# Patient Record
Sex: FEMALE | Race: WHITE | NOT HISPANIC OR LATINO | Employment: OTHER | URBAN - METROPOLITAN AREA
[De-identification: names, ages, dates, MRNs, and addresses within clinical notes are randomized per-mention and may not be internally consistent; named-entity substitution may affect disease eponyms.]

---

## 2018-03-29 ENCOUNTER — ANESTHESIA EVENT (OUTPATIENT)
Dept: ENDOSCOPY | Facility: HOSPITAL | Age: 63
DRG: 812 | End: 2018-03-29

## 2018-03-29 ENCOUNTER — TELEPHONE (OUTPATIENT)
Dept: GASTROENTEROLOGY | Facility: CLINIC | Age: 63
End: 2018-03-29

## 2018-03-29 ENCOUNTER — HOSPITAL ENCOUNTER (INPATIENT)
Facility: HOSPITAL | Age: 63
LOS: 2 days | Discharge: HOME | DRG: 812 | End: 2018-03-31
Attending: EMERGENCY MEDICINE | Admitting: INTERNAL MEDICINE

## 2018-03-29 DIAGNOSIS — Z85.038 HISTORY OF COLON CANCER: ICD-10-CM

## 2018-03-29 DIAGNOSIS — K92.1 HEMATOCHEZIA: Primary | ICD-10-CM

## 2018-03-29 DIAGNOSIS — I10 HYPERTENSION, UNSPECIFIED TYPE: ICD-10-CM

## 2018-03-29 DIAGNOSIS — D64.9 ANEMIA, UNSPECIFIED TYPE: ICD-10-CM

## 2018-03-29 DIAGNOSIS — D64.9 SYMPTOMATIC ANEMIA: ICD-10-CM

## 2018-03-29 LAB
ABO + RH BLD: NORMAL
ALBUMIN SERPL BCP-MCNC: 3.2 G/DL
ALP SERPL-CCNC: 82 U/L
ALT SERPL W/O P-5'-P-CCNC: 12 U/L
ANION GAP SERPL CALC-SCNC: 10 MMOL/L
ANISOCYTOSIS BLD QL SMEAR: SLIGHT
AST SERPL-CCNC: 13 U/L
BASOPHILS # BLD AUTO: 0 K/UL
BASOPHILS NFR BLD: 0 %
BILIRUB SERPL-MCNC: 0.3 MG/DL
BLD GP AB SCN CELLS X3 SERPL QL: NORMAL
BLD PROD TYP BPU: NORMAL
BLD PROD TYP BPU: NORMAL
BLOOD UNIT EXPIRATION DATE: NORMAL
BLOOD UNIT EXPIRATION DATE: NORMAL
BLOOD UNIT TYPE CODE: 6200
BLOOD UNIT TYPE CODE: 6200
BLOOD UNIT TYPE: NORMAL
BLOOD UNIT TYPE: NORMAL
BUN SERPL-MCNC: 12 MG/DL
CALCIUM SERPL-MCNC: 9.3 MG/DL
CHLORIDE SERPL-SCNC: 103 MMOL/L
CO2 SERPL-SCNC: 26 MMOL/L
CODING SYSTEM: NORMAL
CODING SYSTEM: NORMAL
CREAT SERPL-MCNC: 0.7 MG/DL
DIFFERENTIAL METHOD: ABNORMAL
DISPENSE STATUS: NORMAL
DISPENSE STATUS: NORMAL
EOSINOPHIL # BLD AUTO: 0 K/UL
EOSINOPHIL NFR BLD: 0.5 %
ERYTHROCYTE [DISTWIDTH] IN BLOOD BY AUTOMATED COUNT: 18.2 %
EST. GFR  (AFRICAN AMERICAN): >60 ML/MIN/1.73 M^2
EST. GFR  (NON AFRICAN AMERICAN): >60 ML/MIN/1.73 M^2
FOLATE SERPL-MCNC: 19.5 NG/ML
GLUCOSE SERPL-MCNC: 109 MG/DL
HCT VFR BLD AUTO: 16.2 %
HCT VFR BLD AUTO: 23.5 %
HGB BLD-MCNC: 5 G/DL
HGB BLD-MCNC: 7.3 G/DL
HYPOCHROMIA BLD QL SMEAR: ABNORMAL
INR PPP: 1.1
LYMPHOCYTES # BLD AUTO: 1.2 K/UL
LYMPHOCYTES NFR BLD: 16.4 %
MCH RBC QN AUTO: 21.6 PG
MCHC RBC AUTO-ENTMCNC: 30.9 G/DL
MCV RBC AUTO: 70 FL
MONOCYTES # BLD AUTO: 0.4 K/UL
MONOCYTES NFR BLD: 5.7 %
NEUTROPHILS # BLD AUTO: 5.5 K/UL
NEUTROPHILS NFR BLD: 77.4 %
NUM UNITS TRANS PACKED RBC: NORMAL
NUM UNITS TRANS PACKED RBC: NORMAL
OVALOCYTES BLD QL SMEAR: ABNORMAL
PLATELET # BLD AUTO: 517 K/UL
PLATELET BLD QL SMEAR: ABNORMAL
PMV BLD AUTO: 7.2 FL
POIKILOCYTOSIS BLD QL SMEAR: SLIGHT
POTASSIUM SERPL-SCNC: 3.7 MMOL/L
PROT SERPL-MCNC: 6.7 G/DL
PROTHROMBIN TIME: 11.5 SEC
RBC # BLD AUTO: 2.31 M/UL
SODIUM SERPL-SCNC: 139 MMOL/L
TARGETS BLD QL SMEAR: ABNORMAL
VIT B12 SERPL-MCNC: 422 PG/ML
WBC # BLD AUTO: 7.2 K/UL

## 2018-03-29 PROCEDURE — 85018 HEMOGLOBIN: CPT | Mod: 91

## 2018-03-29 PROCEDURE — 25000003 PHARM REV CODE 250: Performed by: EMERGENCY MEDICINE

## 2018-03-29 PROCEDURE — 83540 ASSAY OF IRON: CPT

## 2018-03-29 PROCEDURE — 25500020 PHARM REV CODE 255

## 2018-03-29 PROCEDURE — C9113 INJ PANTOPRAZOLE SODIUM, VIA: HCPCS | Performed by: NURSE PRACTITIONER

## 2018-03-29 PROCEDURE — 11000001 HC ACUTE MED/SURG PRIVATE ROOM

## 2018-03-29 PROCEDURE — 99223 1ST HOSP IP/OBS HIGH 75: CPT | Mod: ,,, | Performed by: INTERNAL MEDICINE

## 2018-03-29 PROCEDURE — 63600175 PHARM REV CODE 636 W HCPCS: Performed by: NURSE PRACTITIONER

## 2018-03-29 PROCEDURE — 85610 PROTHROMBIN TIME: CPT

## 2018-03-29 PROCEDURE — 63600175 PHARM REV CODE 636 W HCPCS: Performed by: EMERGENCY MEDICINE

## 2018-03-29 PROCEDURE — P9016 RBC LEUKOCYTES REDUCED: HCPCS

## 2018-03-29 PROCEDURE — 86850 RBC ANTIBODY SCREEN: CPT

## 2018-03-29 PROCEDURE — 96374 THER/PROPH/DIAG INJ IV PUSH: CPT

## 2018-03-29 PROCEDURE — 94761 N-INVAS EAR/PLS OXIMETRY MLT: CPT

## 2018-03-29 PROCEDURE — 36415 COLL VENOUS BLD VENIPUNCTURE: CPT

## 2018-03-29 PROCEDURE — 80053 COMPREHEN METABOLIC PANEL: CPT

## 2018-03-29 PROCEDURE — 25000003 PHARM REV CODE 250: Performed by: INTERNAL MEDICINE

## 2018-03-29 PROCEDURE — 99284 EMERGENCY DEPT VISIT MOD MDM: CPT | Mod: 25

## 2018-03-29 PROCEDURE — 82746 ASSAY OF FOLIC ACID SERUM: CPT

## 2018-03-29 PROCEDURE — C9113 INJ PANTOPRAZOLE SODIUM, VIA: HCPCS | Performed by: EMERGENCY MEDICINE

## 2018-03-29 PROCEDURE — 85025 COMPLETE CBC W/AUTO DIFF WBC: CPT

## 2018-03-29 PROCEDURE — 85014 HEMATOCRIT: CPT | Mod: 91

## 2018-03-29 PROCEDURE — 86920 COMPATIBILITY TEST SPIN: CPT

## 2018-03-29 PROCEDURE — 99254 IP/OBS CNSLTJ NEW/EST MOD 60: CPT | Mod: ,,, | Performed by: INTERNAL MEDICINE

## 2018-03-29 PROCEDURE — 82607 VITAMIN B-12: CPT

## 2018-03-29 RX ORDER — METOPROLOL TARTRATE 25 MG/1
TABLET, FILM COATED ORAL NIGHTLY
COMMUNITY
End: 2019-02-11 | Stop reason: SDUPTHER

## 2018-03-29 RX ORDER — AMLODIPINE BESYLATE 5 MG/1
5 TABLET ORAL DAILY
Status: ON HOLD | COMMUNITY
End: 2019-01-04 | Stop reason: HOSPADM

## 2018-03-29 RX ORDER — SIMVASTATIN 20 MG/1
20 TABLET, FILM COATED ORAL NIGHTLY
Status: ON HOLD | COMMUNITY
End: 2018-12-04 | Stop reason: ALTCHOICE

## 2018-03-29 RX ORDER — AMLODIPINE BESYLATE 5 MG/1
5 TABLET ORAL DAILY
Status: DISCONTINUED | OUTPATIENT
Start: 2018-03-30 | End: 2018-03-31 | Stop reason: HOSPADM

## 2018-03-29 RX ORDER — METOPROLOL TARTRATE 25 MG/1
25 TABLET, FILM COATED ORAL 2 TIMES DAILY
Status: DISCONTINUED | OUTPATIENT
Start: 2018-03-29 | End: 2018-03-31 | Stop reason: HOSPADM

## 2018-03-29 RX ORDER — ASPIRIN 81 MG/1
81 TABLET ORAL DAILY
Status: ON HOLD | COMMUNITY
End: 2018-12-04 | Stop reason: ALTCHOICE

## 2018-03-29 RX ORDER — HYDROCHLOROTHIAZIDE 12.5 MG/1
12.5 CAPSULE ORAL DAILY
Status: ON HOLD | COMMUNITY
End: 2019-01-04 | Stop reason: HOSPADM

## 2018-03-29 RX ORDER — PANTOPRAZOLE SODIUM 40 MG/10ML
80 INJECTION, POWDER, LYOPHILIZED, FOR SOLUTION INTRAVENOUS
Status: COMPLETED | OUTPATIENT
Start: 2018-03-29 | End: 2018-03-29

## 2018-03-29 RX ORDER — HYDROCODONE BITARTRATE AND ACETAMINOPHEN 500; 5 MG/1; MG/1
TABLET ORAL
Status: DISCONTINUED | OUTPATIENT
Start: 2018-03-29 | End: 2018-03-31 | Stop reason: HOSPADM

## 2018-03-29 RX ORDER — SODIUM CHLORIDE 9 MG/ML
INJECTION, SOLUTION INTRAVENOUS
Status: DISPENSED
Start: 2018-03-29 | End: 2018-03-29

## 2018-03-29 RX ORDER — SODIUM CHLORIDE 0.9 % (FLUSH) 0.9 %
3 SYRINGE (ML) INJECTION
Status: DISCONTINUED | OUTPATIENT
Start: 2018-03-29 | End: 2018-03-31 | Stop reason: HOSPADM

## 2018-03-29 RX ADMIN — DEXTROSE 8 MG/HR: 50 INJECTION, SOLUTION INTRAVENOUS at 06:03

## 2018-03-29 RX ADMIN — IOHEXOL 30 ML: 350 INJECTION, SOLUTION INTRAVENOUS at 12:03

## 2018-03-29 RX ADMIN — PANTOPRAZOLE SODIUM 80 MG: 40 INJECTION, POWDER, FOR SOLUTION INTRAVENOUS at 12:03

## 2018-03-29 RX ADMIN — METOPROLOL TARTRATE 25 MG: 25 TABLET ORAL at 08:03

## 2018-03-29 RX ADMIN — POLYETHYLENE GLYCOL-3350 AND ELECTROLYTES WITH FLAVOR PACK 4000 ML: 240; 5.84; 2.98; 6.72; 22.72 POWDER, FOR SOLUTION ORAL at 08:03

## 2018-03-29 RX ADMIN — IOHEXOL 100 ML: 350 INJECTION, SOLUTION INTRAVENOUS at 12:03

## 2018-03-29 RX ADMIN — DEXTROSE 8 MG/HR: 50 INJECTION, SOLUTION INTRAVENOUS at 02:03

## 2018-03-29 NOTE — NURSING
patient arrived to unit, Dr Arguello at bedside, patient awake alert, oriented x4, denies pain/ discomfort/ complaints, patient oriented to room and bed, patient instructed to call for assistance when needed to ambulate, call light and bedside table within reach patient assisted to bathroom

## 2018-03-29 NOTE — PLAN OF CARE
Problem: Fall Risk (Adult)  Goal: Identify Related Risk Factors and Signs and Symptoms  Related risk factors and signs and symptoms are identified upon initiation of Human Response Clinical Practice Guideline (CPG)   Patient lying in bed, blood infusing, call light and table within reach, fall sign in place, patient calls for assistance when needed, patient denies pain/ discomfort/complaints

## 2018-03-29 NOTE — ED PROVIDER NOTES
"Encounter Date: 3/29/2018    SCRIBE #1 NOTE: IOmer, am scribing for, and in the presence of, Dr. Quintana .       History     Chief Complaint   Patient presents with    Anemia     sent by . Fatigue, sob       03/29/2018 9:51 AM     Chief complaint: Anemia       Indigo Staurt is a 62 y.o. female with a hx of HTN and colon CA who presents to the ED with complaints of anemia. She was called by Dr. Conner office advising she present to the ED for further evaluation for anemia. The blood was drawn yesterday. Pt reports fatigue and SOB x a few days. She took iron supplements for the fatigue with no significant relief. Pt reports a hx of colon cancer which was removed 13 years ago. No f/u colonoscopy in 8 years. Pt also reports cough and congestion x 1 week. She denies diarrhea, bloody stools, abd pain ,vaginal bleeding hx of anemia and anticoagulant use. PSHx include hysterectomy and cholecystomy. Allergens include Codeine.        The history is provided by the patient.     Review of patient's allergies indicates:   Allergen Reactions    Codeine      Past Medical History:   Diagnosis Date    Cancer     Hypertension      Past Surgical History:   Procedure Laterality Date    CHOLECYSTECTOMY      COLON SURGERY      HYSTERECTOMY       History reviewed. No pertinent family history.  Social History   Substance Use Topics    Smoking status: Never Smoker    Smokeless tobacco: Not on file    Alcohol use Not on file     Review of Systems   Constitutional: Positive for fatigue. Negative for fever.        + for "Anemia"   HENT: Positive for congestion. Negative for sore throat.    Eyes: Negative for redness.   Respiratory: Positive for cough and shortness of breath.    Cardiovascular: Negative for chest pain.   Gastrointestinal: Negative for abdominal pain, diarrhea and nausea.   Genitourinary: Negative for dysuria and vaginal bleeding.   Musculoskeletal: Negative for back pain.   Skin: Negative " for rash.   Neurological: Negative for weakness.   Hematological: Does not bruise/bleed easily.       Physical Exam     Initial Vitals [03/29/18 0840]   BP Pulse Resp Temp SpO2   136/64 95 16 99 °F (37.2 °C) 100 %      MAP       88         Physical Exam    Nursing note and vitals reviewed.  Constitutional: She appears well-developed.   HENT:   Head: Normocephalic and atraumatic.   Dry mouth.    Eyes: EOM are normal. Pupils are equal, round, and reactive to light.   Pale conjunctivae.    Neck: Neck supple.   Cardiovascular: Normal rate, regular rhythm, normal heart sounds and intact distal pulses. Exam reveals no gallop and no friction rub.    No murmur heard.  Pulmonary/Chest: Breath sounds normal. No respiratory distress. She has no decreased breath sounds. She has no wheezes. She has no rhonchi. She has no rales.   Abdominal: Soft. Bowel sounds are normal.   Vertical scar in lower abd.    Genitourinary: Rectal exam shows guaiac positive stool. Guaiac positive stool. : Acceptable.  Musculoskeletal: Normal range of motion.   No peripheral edema.    Neurological: She is alert and oriented to person, place, and time.   Skin: Skin is warm and dry. There is pallor.   Psychiatric: She has a normal mood and affect.         ED Course   Critical Care  Performed by: VICTORIANO OZUNA.  Authorized by: VICTORIANO OZUNA   Direct patient critical care time: 15 minutes  Additional history critical care time: 5 minutes  Ordering / reviewing critical care time: 5 minutes  Documentation critical care time: 5 minutes  Consulting other physicians critical care time: 5 minutes  Consult with family critical care time: 5 minutes  Total critical care time (exclusive of procedural time) : 40 minutes  Critical care was necessary to treat or prevent imminent or life-threatening deterioration of the following conditions: metabolic crisis.  Critical care was time spent personally by me on the following activities: discussions  with consultants, evaluation of patient's response to treatment, ordering and review of laboratory studies, examination of patient, ordering and review of radiographic studies, re-evaluation of patient's condition, ordering and performing treatments and interventions, obtaining history from patient or surrogate and review of old charts.        Labs Reviewed   CBC W/ AUTO DIFFERENTIAL - Abnormal; Notable for the following:        Result Value    RBC 2.31 (*)     Hemoglobin 5.0 (*)     Hematocrit 16.2 (*)     MCV 70 (*)     MCH 21.6 (*)     MCHC 30.9 (*)     RDW 18.2 (*)     Platelets 517 (*)     MPV 7.2 (*)     All other components within normal limits    Narrative:        critical result(s) called and verbal readback obtained from Marichuy Gao, 03/29/2018 09:49   COMPREHENSIVE METABOLIC PANEL - Abnormal; Notable for the following:     Albumin 3.2 (*)     All other components within normal limits   PROTIME-INR   TYPE & SCREEN     EKG Readings: (Independently Interpreted)          Medical Decision Making:   History:   Old Medical Records: I decided to obtain old medical records.  Clinical Tests:   Lab Tests: Ordered and Reviewed  Radiological Study: Ordered and Reviewed  Patient has critical anemia and will need to be admitted for blood transfusion and for evaluation of chronic GI bleed.  She does not have significant bleeding here in the ER at this time.  Spoke with gastroenterologist who agrees with admission.  She was given IV fluids and Protonix in the ER.    Imaging Results          CT Abdomen Pelvis With Contrast (Final result)  Result time 03/29/18 12:20:45    Final result by Namrata Kennedy MD (03/29/18 12:20:45)                 Impression:      No acute findings.  Findings as detailed above including ventral/periumbilical hernia, prior sigmoidectomy, hysterectomy, cholecystectomy.  Renal masses consistent with cysts.      Electronically signed by: Namrata Kennedy MD  Date:    03/29/2018  Time:    12:20              Narrative:    EXAMINATION:  CT ABDOMEN PELVIS WITH CONTRAST    CLINICAL HISTORY:  GI bleeding;    TECHNIQUE:  Low dose axial images, sagittal and coronal reformations were obtained from the lung bases to the pubic symphysis following the IV administration of 100 mL of Omnipaque 350 and the oral administration of 30 mL of Omnipaque 350.    COMPARISON:  None.    FINDINGS:  There are cholecystectomy clips.  The liver is unremarkable in appearance.  There are calcifications of the splenic hilum appearing vascular and small calcified renal artery aneurysm measuring just less than 1 cm.  Adrenal glands and pancreas are unremarkable in appearance.  Abdominal aorta is non aneurysmal.  There is 2.4 cm left renal mass consistent with a cyst.  There is subcentimeter hypodensity in the right kidney too small to reliably characterize but most likely a cyst.  There is mild renal scarring.    There is small fat containing supraumbilical ventral hernia.  There is umbilical or periumbilical hernia containing fat and small segment of bowel without bowel obstructive or inflammatory change.  There is fat stranding anterior abdominal wall likely on a postoperative basis although could represent mild edematous or inflammatory change.  There is no focal fluid collection suggesting drainable abscess.  There is no free intraperitoneal air or fluid.  The patient has had a prior hysterectomy and the adnexal region is unremarkable in appearance.    There is a mild grade 1 spondylolisthesis L4-L5 and there are degenerative changes within the visualized spine and hips.    There are postoperative changes with anastomotic sutures sigmoid colon suggesting prior partial colectomy. There is mild diverticulosis versus incomplete distention of colon without CT findings of acute diverticulitis.  Normal appearing appendix is thought to be visualized and no abnormal appendix inflammatory changes appendiceal region is seen    There is very mild  mesenteric fat stranding likely representing panniculitis. There are small shotty appearing nodes.                                      Scribe Attestation:   Scribe #1: I performed the above scribed service and the documentation accurately describes the services I performed. I attest to the accuracy of the note.    I, Dr. Bebeto Quintana personally performed the services described in this documentation. All medical record entries made by the scribe were at my direction and in my presence.  I have reviewed the chart and agree that the record reflects my personal performance and is accurate and complete. Bebeto Quintana MD.  7:58 PM 04/04/2018    DISCLAIMER: This note was prepared with Dragon NaturallySpeaking voice recognition transcription software. Garbled syntax, mangled pronouns, and other bizarre constructions may be attributed to that software system            Clinical Impression:     1. Hematochezia    2. Anemia, unspecified type        Disposition:   Disposition: Admitted                        Bebeto Quintana MD  04/04/18 1958

## 2018-03-29 NOTE — TELEPHONE ENCOUNTER
----- Message from Leila Ambrocio sent at 3/29/2018 10:37 AM CDT -----  Contact: Dr Patterson in the Er   Dr Quintana  in the Er needs to speak to Dr Magana about patient     Please call back [phone]

## 2018-03-29 NOTE — H&P
"PCP: John Conner MD    History & Physical    Chief Complaint: Worsening anemia    History of Present Illness:  Patient is a 62 y.o. female admitted to Hospitalist Service from [facility] Room with complaint of worsening anemia. Patient reportedly has past medical history significant for history of colon cancer and hypertension. She was called by Dr. Conner office advising she present to the ED for further evaluation for anemia. The blood was drawn yesterday. Pt reported associated fatigue, BAUER and frequent passage of bloody stools. Patient has history of Colon cancer removed 13 years ago. No f/u colonoscop in 8 years ago "sometime" constipation. Patient denied chest pain, headache, vision changes, focal neuro-deficits, cough or fever.    Past Medical History:   Diagnosis Date    Cancer     Hypertension      Past Surgical History:   Procedure Laterality Date    CHOLECYSTECTOMY      COLON SURGERY      HYSTERECTOMY       History reviewed. No pertinent family history.  Social History   Substance Use Topics    Smoking status: Never Smoker    Smokeless tobacco: Not on file    Alcohol use Not on file      Review of patient's allergies indicates:   Allergen Reactions    Codeine        (Not in a hospital admission)  Review of Systems:  Constitutional: no fever or chills. + Fatigue  Eyes: no visual changes  Ears, nose, mouth, throat, and face: +Congestion  Respiratory: + cough +shorness of breath  Cardiovascular: no chest pain or palpitations  Gastrointestinal: see HPI  Genitourinary: no hematuria or dysuria  Integument/breast: no rash or pruritis  Hematologic/lymphatic: no easy bruising or lymphadenopathy  Musculoskeletal: no arthralgias or myalgias  Neurological: no seizures or tremors.  Behavioral/Psych: no auditory or visual hallucinations  Endocrine: no heat or cold intolerance     OBJECTIVE:     Vital Signs (Most Recent)  Temp: 99 °F (37.2 °C) (03/29/18 0840)  Pulse: 92 (03/29/18 " 1108)  Resp: 18 (03/29/18 1108)  BP: 135/65 (03/29/18 1108)  SpO2: 99 % (03/29/18 1108)    Physical Exam:  General appearance: well developed, appears stated age  Head: normocephalic, atraumatic  Eyes:  Pallor conjunctivae/corneas clear. PERRL.  Nose: Nares normal. Septum midline.  Throat: lips, dry mucosa, and tongue normal; teeth and gums normal, no throat erythema.  Neck: supple, symmetrical, trachea midline, no JVD and thyroid not enlarged, symmetric, no tenderness/mass/nodules  Lungs:  clear to auscultation bilaterally and normal respiratory effort  Chest wall: no tenderness  Heart: regular rate and rhythm, S1, S2 normal, no murmur, click, rub or gallop  Abdomen: soft, non-tender non-distented; bowel sounds normal; no masses,  no organomegaly. Positive Guaiac in the ER.  Extremities: no cyanosis, clubbing or edema.   Pulses: 2+ and symmetric  Skin: Skin color, texture, turgor normal. No rashes or lesions.  Lymph nodes: Cervical, supraclavicular, and axillary nodes normal.  Neurologic: Normal strength and tone. No focal numbness or weakness. CNII-XII intact.      Laboratory:   CBC:   Recent Labs  Lab 03/29/18  0915   WBC 7.20   RBC 2.31*   HGB 5.0*   HCT 16.2*   *   MCV 70*   MCH 21.6*   MCHC 30.9*     CMP:   Recent Labs  Lab 03/29/18  0915      CALCIUM 9.3   ALBUMIN 3.2*   PROT 6.7      K 3.7   CO2 26      BUN 12   CREATININE 0.7   ALKPHOS 82   ALT 12   AST 13   BILITOT 0.3     Coagulation:   Recent Labs  Lab 03/29/18  0915   LABPROT 11.5   INR 1.1     Microbiology Results (last 7 days)     ** No results found for the last 168 hours. **        Diagnostic Results:   CT abdomen and pelvis: No acute findings.  Findings as detailed above including ventral/periumbilical hernia, prior sigmoidectomy, hysterectomy, cholecystectomy.  Renal masses consistent with cysts.    Assessment/Plan:     Active Hospital Problems    Diagnosis  POA    *Symptomatic anemia [D64.9]  Yes    Hematochezia  [K92.1]  History of colon cancer [Z85.038]  Keep patient NPO. Hold ASA.   Follow H/H closely. Type and screen blood and transfuse 3 PRBC  Continue IV Protonix infusion 8 mg/hr.  Consult Gastronetrologist.   Check Iron, TIBC, B12 and folic acid.   Continue IVF hydration.   Use IV anti-emetics as needed.     Yes    Hypertension [I10]  Chronic problem. Will continue chronic medications and monitor for any changes, adjusting as needed.    Yes         Discussed with family members.      DVT prophylaxis: Use SCD and TEDs.    Dahlia Li MD  Department of Hospital Medicine   [facility]

## 2018-03-30 ENCOUNTER — ANESTHESIA (OUTPATIENT)
Dept: ENDOSCOPY | Facility: HOSPITAL | Age: 63
DRG: 812 | End: 2018-03-30

## 2018-03-30 LAB
ALBUMIN SERPL BCP-MCNC: 3.4 G/DL
ALP SERPL-CCNC: 77 U/L
ALT SERPL W/O P-5'-P-CCNC: 14 U/L
ANION GAP SERPL CALC-SCNC: 12 MMOL/L
AST SERPL-CCNC: 22 U/L
BASOPHILS # BLD AUTO: 0.1 K/UL
BASOPHILS NFR BLD: 0.6 %
BILIRUB SERPL-MCNC: 1 MG/DL
BUN SERPL-MCNC: 9 MG/DL
CALCIUM SERPL-MCNC: 9 MG/DL
CHLORIDE SERPL-SCNC: 103 MMOL/L
CO2 SERPL-SCNC: 21 MMOL/L
CREAT SERPL-MCNC: 0.7 MG/DL
DIFFERENTIAL METHOD: ABNORMAL
EOSINOPHIL # BLD AUTO: 0 K/UL
EOSINOPHIL NFR BLD: 0 %
ERYTHROCYTE [DISTWIDTH] IN BLOOD BY AUTOMATED COUNT: 20.5 %
EST. GFR  (AFRICAN AMERICAN): >60 ML/MIN/1.73 M^2
EST. GFR  (NON AFRICAN AMERICAN): >60 ML/MIN/1.73 M^2
GLUCOSE SERPL-MCNC: 94 MG/DL
HCT VFR BLD AUTO: 24 %
HCT VFR BLD AUTO: 24 %
HCT VFR BLD AUTO: 25.1 %
HGB BLD-MCNC: 7.7 G/DL
HGB BLD-MCNC: 7.7 G/DL
HGB BLD-MCNC: 7.8 G/DL
IRON SERPL-MCNC: 71 UG/DL
LYMPHOCYTES # BLD AUTO: 1.2 K/UL
LYMPHOCYTES NFR BLD: 11.9 %
MCH RBC QN AUTO: 24.2 PG
MCHC RBC AUTO-ENTMCNC: 32.3 G/DL
MCV RBC AUTO: 75 FL
MONOCYTES # BLD AUTO: 0.7 K/UL
MONOCYTES NFR BLD: 7.2 %
NEUTROPHILS # BLD AUTO: 7.9 K/UL
NEUTROPHILS NFR BLD: 80.3 %
PLATELET # BLD AUTO: 467 K/UL
PMV BLD AUTO: 7.7 FL
POTASSIUM SERPL-SCNC: 3.8 MMOL/L
PROT SERPL-MCNC: 6.8 G/DL
RBC # BLD AUTO: 3.2 M/UL
SATURATED IRON: 12 %
SODIUM SERPL-SCNC: 136 MMOL/L
TOTAL IRON BINDING CAPACITY: 591 UG/DL
TRANSFERRIN SERPL-MCNC: 399 MG/DL
WBC # BLD AUTO: 9.8 K/UL

## 2018-03-30 PROCEDURE — 80053 COMPREHEN METABOLIC PANEL: CPT

## 2018-03-30 PROCEDURE — 37000009 HC ANESTHESIA EA ADD 15 MINS: Performed by: INTERNAL MEDICINE

## 2018-03-30 PROCEDURE — 63600175 PHARM REV CODE 636 W HCPCS: Performed by: NURSE ANESTHETIST, CERTIFIED REGISTERED

## 2018-03-30 PROCEDURE — D9220A PRA ANESTHESIA: Mod: ,,, | Performed by: ANESTHESIOLOGY

## 2018-03-30 PROCEDURE — C9113 INJ PANTOPRAZOLE SODIUM, VIA: HCPCS | Performed by: EMERGENCY MEDICINE

## 2018-03-30 PROCEDURE — 88304 TISSUE EXAM BY PATHOLOGIST: CPT | Mod: 26,,, | Performed by: PATHOLOGY

## 2018-03-30 PROCEDURE — 85025 COMPLETE CBC W/AUTO DIFF WBC: CPT

## 2018-03-30 PROCEDURE — 11000001 HC ACUTE MED/SURG PRIVATE ROOM

## 2018-03-30 PROCEDURE — 25000003 PHARM REV CODE 250: Performed by: INTERNAL MEDICINE

## 2018-03-30 PROCEDURE — 25000003 PHARM REV CODE 250: Performed by: NURSE ANESTHETIST, CERTIFIED REGISTERED

## 2018-03-30 PROCEDURE — 43235 EGD DIAGNOSTIC BRUSH WASH: CPT | Mod: 51,,, | Performed by: INTERNAL MEDICINE

## 2018-03-30 PROCEDURE — P9016 RBC LEUKOCYTES REDUCED: HCPCS

## 2018-03-30 PROCEDURE — 0DBL8ZX EXCISION OF TRANSVERSE COLON, VIA NATURAL OR ARTIFICIAL OPENING ENDOSCOPIC, DIAGNOSTIC: ICD-10-PCS | Performed by: INTERNAL MEDICINE

## 2018-03-30 PROCEDURE — 43235 EGD DIAGNOSTIC BRUSH WASH: CPT | Performed by: INTERNAL MEDICINE

## 2018-03-30 PROCEDURE — 88304 TISSUE EXAM BY PATHOLOGIST: CPT | Performed by: PATHOLOGY

## 2018-03-30 PROCEDURE — 63600175 PHARM REV CODE 636 W HCPCS: Performed by: EMERGENCY MEDICINE

## 2018-03-30 PROCEDURE — 45385 COLONOSCOPY W/LESION REMOVAL: CPT | Performed by: INTERNAL MEDICINE

## 2018-03-30 PROCEDURE — 45385 COLONOSCOPY W/LESION REMOVAL: CPT | Mod: ,,, | Performed by: INTERNAL MEDICINE

## 2018-03-30 PROCEDURE — 37000008 HC ANESTHESIA 1ST 15 MINUTES: Performed by: INTERNAL MEDICINE

## 2018-03-30 PROCEDURE — 27201089 HC SNARE, DISP (ANY): Performed by: INTERNAL MEDICINE

## 2018-03-30 PROCEDURE — 99233 SBSQ HOSP IP/OBS HIGH 50: CPT | Mod: ,,, | Performed by: INTERNAL MEDICINE

## 2018-03-30 PROCEDURE — 0DJ08ZZ INSPECTION OF UPPER INTESTINAL TRACT, VIA NATURAL OR ARTIFICIAL OPENING ENDOSCOPIC: ICD-10-PCS | Performed by: INTERNAL MEDICINE

## 2018-03-30 RX ORDER — GLYCOPYRROLATE 0.2 MG/ML
INJECTION INTRAMUSCULAR; INTRAVENOUS
Status: DISCONTINUED | OUTPATIENT
Start: 2018-03-30 | End: 2018-03-30

## 2018-03-30 RX ORDER — GLYCOPYRROLATE 0.2 MG/ML
INJECTION INTRAMUSCULAR; INTRAVENOUS
Status: DISCONTINUED
Start: 2018-03-30 | End: 2018-03-31 | Stop reason: HOSPADM

## 2018-03-30 RX ORDER — SODIUM CHLORIDE 9 MG/ML
INJECTION, SOLUTION INTRAVENOUS CONTINUOUS
Status: DISCONTINUED | OUTPATIENT
Start: 2018-03-30 | End: 2018-03-31 | Stop reason: HOSPADM

## 2018-03-30 RX ORDER — DEXTROMETHORPHAN/PSEUDOEPHED 2.5-7.5/.8
DROPS ORAL
Status: DISCONTINUED
Start: 2018-03-30 | End: 2018-03-31 | Stop reason: HOSPADM

## 2018-03-30 RX ORDER — LIDOCAINE HYDROCHLORIDE 10 MG/ML
INJECTION, SOLUTION EPIDURAL; INFILTRATION; INTRACAUDAL; PERINEURAL
Status: DISCONTINUED | OUTPATIENT
Start: 2018-03-30 | End: 2018-03-30

## 2018-03-30 RX ORDER — LIDOCAINE HYDROCHLORIDE 10 MG/ML
INJECTION, SOLUTION EPIDURAL; INFILTRATION; INTRACAUDAL; PERINEURAL
Status: COMPLETED
Start: 2018-03-30 | End: 2018-03-30

## 2018-03-30 RX ORDER — PROPOFOL 10 MG/ML
INJECTION, EMULSION INTRAVENOUS
Status: COMPLETED
Start: 2018-03-30 | End: 2018-03-30

## 2018-03-30 RX ORDER — PROPOFOL 10 MG/ML
VIAL (ML) INTRAVENOUS
Status: DISCONTINUED | OUTPATIENT
Start: 2018-03-30 | End: 2018-03-30

## 2018-03-30 RX ORDER — PANTOPRAZOLE SODIUM 40 MG/1
40 TABLET, DELAYED RELEASE ORAL DAILY
Status: DISCONTINUED | OUTPATIENT
Start: 2018-03-30 | End: 2018-03-31 | Stop reason: HOSPADM

## 2018-03-30 RX ADMIN — PROPOFOL 20 MG: 10 INJECTION, EMULSION INTRAVENOUS at 09:03

## 2018-03-30 RX ADMIN — METOPROLOL TARTRATE 25 MG: 25 TABLET ORAL at 09:03

## 2018-03-30 RX ADMIN — PROPOFOL 20 MG: 10 INJECTION, EMULSION INTRAVENOUS at 08:03

## 2018-03-30 RX ADMIN — DEXTROSE 8 MG/HR: 50 INJECTION, SOLUTION INTRAVENOUS at 04:03

## 2018-03-30 RX ADMIN — DEXTROSE 8 MG/HR: 50 INJECTION, SOLUTION INTRAVENOUS at 12:03

## 2018-03-30 RX ADMIN — PANTOPRAZOLE SODIUM 40 MG: 40 TABLET, DELAYED RELEASE ORAL at 11:03

## 2018-03-30 RX ADMIN — AMLODIPINE BESYLATE 5 MG: 5 TABLET ORAL at 09:03

## 2018-03-30 RX ADMIN — LIDOCAINE HYDROCHLORIDE 100 MG: 10 INJECTION, SOLUTION EPIDURAL; INFILTRATION; INTRACAUDAL; PERINEURAL at 08:03

## 2018-03-30 RX ADMIN — PROPOFOL 30 MG: 10 INJECTION, EMULSION INTRAVENOUS at 08:03

## 2018-03-30 RX ADMIN — PROPOFOL 80 MG: 10 INJECTION, EMULSION INTRAVENOUS at 08:03

## 2018-03-30 RX ADMIN — GLYCOPYRROLATE 0.2 MG: 0.2 INJECTION, SOLUTION INTRAMUSCULAR; INTRAVENOUS at 09:03

## 2018-03-30 RX ADMIN — SODIUM CHLORIDE: 0.9 INJECTION, SOLUTION INTRAVENOUS at 11:03

## 2018-03-30 RX ADMIN — METOPROLOL TARTRATE 25 MG: 25 TABLET ORAL at 08:03

## 2018-03-30 RX ADMIN — SODIUM CHLORIDE: 0.9 INJECTION, SOLUTION INTRAVENOUS at 08:03

## 2018-03-30 NOTE — OR NURSING
Report received from Jazz, assumed care of patient via wheelchair per RN transport, family notified at bedside and updated per text alerts.

## 2018-03-30 NOTE — NURSING
Spoke with dr. Li no clarify blood orders. Dr. Li states to give one unit of PRBC and H&H will be rechecked in the am

## 2018-03-30 NOTE — PROVATION PATIENT INSTRUCTIONS
Discharge Summary/Instructions after an Endoscopic Procedure  Patient Name: Indigo Stuart  Patient MRN: [id number]  Patient YOB: 1955 Friday, March 30, 2018  Daniel Magana MD  RESTRICTIONS:  During your procedure today, you received medications for sedation.  These   medications may affect your judgment, balance and coordination.  Therefore,   for 24 hours, you have the following restrictions:   - DO NOT drive a car, operate machinery, make legal/financial decisions,   sign important papers or drink alcohol.    ACTIVITY:  The following day: return to full activity including work, except no heavy   lifting, straining or running for 3 days if polyps were removed.  DIET:  Eat and drink normally unless instructed otherwise.     TREATMENT FOR COMMON SIDE EFFECTS:  - Mild abdominal pain, nausea, belching, bloating or excessive gas:  rest,   eat lightly and use a heating pad.  - Sore Throat: treat with throat lozenges and/or gargle with warm salt   water.  - Because air was used during the procedure, expelling large amounts of air   from your rectum or belching is normal.  - If a bowel prep was taken, you may not have a bowel movement for 1-3 days.    This is normal.  SYMPTOMS TO WATCH FOR AND REPORT TO YOUR PHYSICIAN:  1. Abdominal pain or bloating, other than gas cramps.  2. Chest pain.  3. Back pain.  4. Signs of infection such as: chills or fever occurring within 24 hours   after the procedure.  5. Rectal bleeding, which would show as bright red, maroon, or black stools.   (A tablespoon of blood from the rectum is not serious, especially if   hemorrhoids are present.)  6. Vomiting.  7. Weakness or dizziness.  GO DIRECTLY TO THE NEAREST EMERGENCY ROOM IF YOU HAVE ANY OF THE FOLLOWING:      Difficulty breathing              Chills and/or fever over 101 F   Persistent vomiting and/or vomiting blood   Severe abdominal pain   Severe chest pain   Black, tarry stools   Bleeding- more than one tablespoon   Any  other symptom or condition that you feel may need urgent attention  Your doctor recommends these additional instructions:  If any biopsies were taken, your doctors clinic will contact you in 1 to 2   weeks with any results.  - Await pathology results.   - Repeat colonoscopy in 5 years for surveillance based on pathology results.     - [facility].  For questions, problems or results please call your physician - Daniel Magana MD at Work: [phone].  [facility], EMERGENCY ROOM PHONE NUMBER: [phone]  IF A COMPLICATION OR EMERGENCY SITUATION ARISES AND YOU ARE UNABLE TO REACH   YOUR PHYSICIAN - GO DIRECTLY TO THE EMERGENCY ROOM.  Daniel Magana MD  3/30/2018 9:25:20 AM  This report has been verified and signed electronically.

## 2018-03-30 NOTE — PLAN OF CARE
Report given to Jazz, patient returned to room 212 via wheelchair, VSS, RN assessment complete per flowsheet

## 2018-03-30 NOTE — TRANSFER OF CARE
"Anesthesia Transfer of Care Note    Patient: Indigo Stuart    Procedure(s) Performed: Procedure(s) (LRB):  ESOPHAGOGASTRODUODENOSCOPY (EGD) (N/A)  COLONOSCOPY (N/A)    Patient location: PACU    Anesthesia Type: general    Transport from OR: Transported from OR on room air with adequate spontaneous ventilation    Post pain: adequate analgesia    Post assessment: no apparent anesthetic complications    Post vital signs: stable    Level of consciousness: awake    Nausea/Vomiting: no nausea/vomiting    Complications: none    Transfer of care protocol was followed      Last vitals:   Visit Vitals  BP (!) 149/69 (BP Location: Left arm, Patient Position: Sitting)   Pulse 84   Temp 36.6 °C (97.9 °F) (Skin)   Resp 20   Ht 5' 2" (1.575 m)   Wt 111.7 kg (246 lb 4.1 oz)   SpO2 100%   Breastfeeding? No   BMI 45.04 kg/m²     "

## 2018-03-30 NOTE — PLAN OF CARE
Problem: Patient Care Overview  Goal: Plan of Care Review  Outcome: Ongoing (interventions implemented as appropriate)  Pt went for EGD and Colonoscopy today no evidence of active bleed, one colon polyp found and biopsied, one unit of PRBC given for H/H of 7.7/24, plan to re-drawl H/H tomorrow morning, Protonix gtt stopped IV NS at 100cc/hr started Cardiac diet resumed without complications, bed low and locked call light within reach no distress or acute events thus far this shift will continue to monitor pt.

## 2018-03-30 NOTE — PROVATION PATIENT INSTRUCTIONS
Discharge Summary/Instructions after an Endoscopic Procedure  Patient Name: Indigo Stuart  Patient MRN: [id number]  Patient YOB: 1955 Friday, March 30, 2018  Daniel Magana MD  RESTRICTIONS:  During your procedure today, you received medications for sedation.  These   medications may affect your judgment, balance and coordination.  Therefore,   for 24 hours, you have the following restrictions:   - DO NOT drive a car, operate machinery, make legal/financial decisions,   sign important papers or drink alcohol.    ACTIVITY:  The following day: return to full activity including work, except no heavy   lifting, straining or running for 3 days if polyps were removed.  DIET:  Eat and drink normally unless instructed otherwise.     TREATMENT FOR COMMON SIDE EFFECTS:  - Mild abdominal pain, nausea, belching, bloating or excessive gas:  rest,   eat lightly and use a heating pad.  - Sore Throat: treat with throat lozenges and/or gargle with warm salt   water.  - Because air was used during the procedure, expelling large amounts of air   from your rectum or belching is normal.  - If a bowel prep was taken, you may not have a bowel movement for 1-3 days.    This is normal.  SYMPTOMS TO WATCH FOR AND REPORT TO YOUR PHYSICIAN:  1. Abdominal pain or bloating, other than gas cramps.  2. Chest pain.  3. Back pain.  4. Signs of infection such as: chills or fever occurring within 24 hours   after the procedure.  5. Rectal bleeding, which would show as bright red, maroon, or black stools.   (A tablespoon of blood from the rectum is not serious, especially if   hemorrhoids are present.)  6. Vomiting.  7. Weakness or dizziness.  GO DIRECTLY TO THE NEAREST EMERGENCY ROOM IF YOU HAVE ANY OF THE FOLLOWING:      Difficulty breathing              Chills and/or fever over 101 F   Persistent vomiting and/or vomiting blood   Severe abdominal pain   Severe chest pain   Black, tarry stools   Bleeding- more than one tablespoon   Any  other symptom or condition that you feel may need urgent attention  Your doctor recommends these additional instructions:  If any biopsies were taken, your doctors clinic will contact you in 1 to 2   weeks with any results.  - Perform a colonoscopy today.   - [facility] to [facility] for [facility] care.  For questions, problems or results please call your physician - Daniel Magana MD at Work: [phone].  [facility], EMERGENCY ROOM PHONE NUMBER: [phone]  IF A COMPLICATION OR EMERGENCY SITUATION ARISES AND YOU ARE UNABLE TO REACH   YOUR PHYSICIAN - GO DIRECTLY TO THE EMERGENCY ROOM.  Daniel Magana MD  3/30/2018 9:06:15 AM  This report has been verified and signed electronically.

## 2018-03-30 NOTE — PROGRESS NOTES
"Progress Note  Hospital Medicine  Patient Name:Indigo Stuart  MRN:  [id number]  Patient Class: IP- Inpatient  Admit Date: 3/29/2018  Length of Stay: 1 days  Expected Discharge Date:   Attending Physician: Dahlia Li MD  Primary Care Provider:  John Conner MD    SUBJECTIVE:     Principal Problem: Symptomatic anemia  Initial history of present illness: Patient is a 62 y.o. female admitted to Hospitalist Service from [facility] Room with complaint of worsening anemia. Patient reportedly has past medical history significant for history of colon cancer and hypertension. She was called by Dr. Connre office advising she present to the ED for further evaluation for anemia. The blood was drawn yesterday. Pt reported associated fatigue, BAUER and frequent passage of bloody stools. Patient has history of Colon cancer removed 13 years ago. No f/u colonoscop in 8 years ago "sometime" constipation. Patient denied chest pain, headache, vision changes, focal neuro-deficits, cough or fever.    PMH/PSH/SH/FH/Meds: reviewed.    Symptoms/Review of Systems: No obvious blood loss reported. Scheduled for EGD and colonoscopy today. No shortness of breath, cough, chest pain or headache, fever or abdominal pain. Feeling better with 2 PRBC.  Diet:  NPO  Activity level: Normal.    Pain:  Patient reports no pain.       OBJECTIVE:   Vital Signs (Most Recent):      Temp: 99.3 °F (37.4 °C) (03/30/18 0454)  Pulse: 77 (03/30/18 0454)  Resp: 18 (03/30/18 0454)  BP: (!) 143/63 (03/30/18 0454)  SpO2: 99 % (03/30/18 0454)       Vital Signs Range (Last 24H):  Temp:  [98.1 °F (36.7 °C)-99.9 °F (37.7 °C)]   Pulse:  [77-96]   Resp:  [16-18]   BP: (135-163)/(63-86)   SpO2:  [97 %-100 %]     Weight: 111.7 kg (246 lb 4.1 oz)  Body mass index is 45.04 kg/m².    Intake/Output Summary (Last 24 hours) at 03/30/18 0649  Last data filed at 03/30/18 0400   Gross per 24 hour   Intake          2779.33 ml   Output                7 ml "   Net          2772.33 ml     Physical Examination:  General appearance: well developed, appears stated age  Head: normocephalic, atraumatic  Eyes:  Pallor conjunctivae/corneas clear. PERRL.  Nose: Nares normal. Septum midline.  Throat: lips, dry mucosa, and tongue normal; teeth and gums normal, no throat erythema.  Neck: supple, symmetrical, trachea midline, no JVD and thyroid not enlarged, symmetric, no tenderness/mass/nodules  Lungs:  clear to auscultation bilaterally and normal respiratory effort  Chest wall: no tenderness  Heart: regular rate and rhythm, S1, S2 normal, no murmur, click, rub or gallop  Abdomen: soft, non-tender non-distented; bowel sounds normal; no masses,  no organomegaly. Positive Guaiac in the ER.  Extremities: no cyanosis, clubbing or edema.   Pulses: 2+ and symmetric  Skin: Skin color, texture, turgor normal. No rashes or lesions.  Lymph nodes: Cervical, supraclavicular, and axillary nodes normal.  Neurologic: Normal strength and tone. No focal numbness or weakness. CNII-XII intact.      CBC:    Recent Labs  Lab 03/29/18  0915 03/29/18  1832 03/29/18  2340   WBC 7.20  --   --    RBC 2.31*  --   --    HGB 5.0* 7.3* 7.8*   HCT 16.2* 23.5* 25.1*   *  --   --    MCV 70*  --   --    MCH 21.6*  --   --    MCHC 30.9*  --   --    BMP    Recent Labs  Lab 03/29/18  0915         K 3.7      CO2 26   BUN 12   CREATININE 0.7   CALCIUM 9.3      Diagnostic Results:  Microbiology Results (last 7 days)     ** No results found for the last 168 hours. **         CT abdomen and pelvis: No acute findings.  Findings as detailed above including ventral/periumbilical hernia, prior sigmoidectomy, hysterectomy, cholecystectomy.  Renal masses consistent with cysts.    EGD:   - Normal esophagus.                       - Normal stomach.                       - Normal examined duodenum.                       - No specimens collected.    Colonoscopy:   - One 5 to 6 mm polyp in the distal  transverse                        colon, removed with a cold snare. Resected and                        retrieved.                       - Internal hemorrhoids.                       - Patent end-to-end colo-colonic anastomosis,                        characterized by healthy appearing mucosa.                       - The examination was otherwise normal.    Assessment/Plan:      *Symptomatic anemia,  Iron deficiency [D64.9]   Yes    Hematochezia [K92.1]  History of colon cancer [Z85.038]  Keep patient NPO. Hold ASA.   Follow H/H closely. Transfuse 1 more unit of PRBC as patient is complaining of fatigue and weakness.  Continue IV Protonix infusion 8 mg/hr. Follow GI recommendations.  Continue IVF hydration.   Use IV anti-emetics as needed.       Yes    Hypertension [I10]  Continue chronic medications and monitor for any changes, adjusting as needed.      Yes             Discussed with family members.       DVT prophylaxis: Use SCD and TEDs.    Dahlia Li MD  Department of Hospital Medicine   [facility]

## 2018-03-30 NOTE — CONSULTS
"[facility]     CC: Blood in stool    HPI 62 y.o. female with blood in stool, bright red initially with some clot thereafter, recent onset, intermittent, associated with intermittent constipation, with no alleviating/exacerbating factors.  Associated signs/symptoms include fatigue and BAUER.  She has a history of colon cancer resected several years ago.  Last colonoscopy was 8 years ago.  No recent EGD.  She has profound anemia which has been worsening lately and was told to present for evaluation by her PCP.  She denies hematemesis, dysphagia, or weight loss.      Past Medical History:   Diagnosis Date    Cancer     Hypertension        Past Surgical History:   Procedure Laterality Date    CHOLECYSTECTOMY      COLON SURGERY      HYSTERECTOMY         Social History   Substance Use Topics    Smoking status: Never Smoker    Smokeless tobacco: Not on file    Alcohol use Not on file       History reviewed. No pertinent family history.    Review of Systems  General ROS: negative for - chills, fever or weight loss  Psychological ROS: negative for - hallucination, depression or suicidal ideation  Ophthalmic ROS: negative for - blurry vision, photophobia or eye pain  ENT ROS: negative for - epistaxis, sore throat or rhinorrhea  Respiratory ROS: no cough, + shortness of breath, no wheezing  Cardiovascular ROS: no chest pain + dyspnea on exertion  Gastrointestinal ROS: + blood in stool  Genito-Urinary ROS: no dysuria, trouble voiding, or hematuria  Musculoskeletal ROS: negative for - arthralgia, myalgia, weakness  Neurological ROS: no syncope or seizures; no ataxia  Dermatological ROS: negative for pruritis, rash and jaundice    Physical Examination  /65   Pulse 82   Temp 98.2 °F (36.8 °C) (Oral)   Resp 16   Ht 5' 2" (1.575 m)   Wt 111.7 kg (246 lb 4.1 oz)   SpO2 97%   Breastfeeding? No   BMI 45.04 kg/m²   General appearance: alert, cooperative, no distress  HENT: Normocephalic, atraumatic, " neck symmetrical, no nasal discharge   Eyes: conjunctivae/corneas clear, PERRL, EOM's intact, sclera anicteric  Lungs: coarse to auscultation bilaterally, no dullness to percussion bilaterally, symmetric expansion, breathing unlabored  Heart: regular rate and rhythm without rub; no displacement of the PMI   Abdomen: obese, NT + BS  Extremities: extremities symmetric; no clubbing, cyanosis, or edema  Integument: Skin color, texture, turgor normal; no rashes; hair distrubution normal, no jaundice  Neurologic: Alert and oriented X 3, no focal sensory or motor neurologic deficits  Psychiatric: no pressured speech; normal affect; no evidence of impaired cognition, no anxiety/depression     Labs:  Lab Results   Component Value Date    WBC 7.20 03/29/2018    HGB 7.3 (L) 03/29/2018    HCT 23.5 (L) 03/29/2018    MCV 70 (L) 03/29/2018     (H) 03/29/2018       CMP  Sodium   Date Value Ref Range Status   03/29/2018 139 136 - 145 mmol/L Final     Potassium   Date Value Ref Range Status   03/29/2018 3.7 3.5 - 5.1 mmol/L Final     Chloride   Date Value Ref Range Status   03/29/2018 103 95 - 110 mmol/L Final     CO2   Date Value Ref Range Status   03/29/2018 26 23 - 29 mmol/L Final     Glucose   Date Value Ref Range Status   03/29/2018 109 70 - 110 mg/dL Final     BUN, Bld   Date Value Ref Range Status   03/29/2018 12 8 - 23 mg/dL Final     Creatinine   Date Value Ref Range Status   03/29/2018 0.7 0.5 - 1.4 mg/dL Final     Calcium   Date Value Ref Range Status   03/29/2018 9.3 8.7 - 10.5 mg/dL Final     Total Protein   Date Value Ref Range Status   03/29/2018 6.7 6.0 - 8.4 g/dL Final     Albumin   Date Value Ref Range Status   03/29/2018 3.2 (L) 3.5 - 5.2 g/dL Final     Total Bilirubin   Date Value Ref Range Status   03/29/2018 0.3 0.1 - 1.0 mg/dL Final     Comment:     For infants and newborns, interpretation of results should be based  on gestational age, weight and in agreement with clinical  observations.  Premature  Infant recommended reference ranges:  Up to 24 hours.............<8.0 mg/dL  Up to 48 hours............<12.0 mg/dL  3-5 days..................<15.0 mg/dL  6-29 days.................<15.0 mg/dL       Alkaline Phosphatase   Date Value Ref Range Status   03/29/2018 82 55 - 135 U/L Final     AST   Date Value Ref Range Status   03/29/2018 13 10 - 40 U/L Final     ALT   Date Value Ref Range Status   03/29/2018 12 10 - 44 U/L Final     Anion Gap   Date Value Ref Range Status   03/29/2018 10 8 - 16 mmol/L Final     eGFR if    Date Value Ref Range Status   03/29/2018 >60 >60 mL/min/1.73 m^2 Final     eGFR if non    Date Value Ref Range Status   03/29/2018 >60 >60 mL/min/1.73 m^2 Final     Comment:     Calculation used to obtain the estimated glomerular filtration  rate (eGFR) is the CKD-EPI equation.              Imaging:  CT scan was independently visualized and reviewed by me and showed postoperative changes and chronic changes but no acute process.    I have personally reviewed these images    Case discussed with Dr. Quintana who relates that transfusion has been started.  Case discussed with multiple family members at bedside who state that she has been weak lately.      Assessment:   1.  Anemia  2.  BRBPR  3.  History of colon cancer      Plan:  1.  Transfuse PRN  2.  Bowel prep tonight  3.  EGD/colonoscopy tomorrow to further evaluate.  4.  Further recommendations to follow after above.  5.  Communication will be sent to the referring MD, Dr. Li regarding my assessment and plan on this patient via EPIC.      Adis Valverde MD  [facility]  [address], [address]  [place] [address]  Office: [phone]  Fax: [phone]

## 2018-03-30 NOTE — PLAN OF CARE
Problem: Patient Care Overview  Goal: Plan of Care Review  NPO status, Cardiac, and Fall  Precautions all  maintained.  Continuous Protonix running 20 ml/hr. Colyte bowel prep initiated and maintained. Bed in low position, call light within reach.

## 2018-03-30 NOTE — H&P
History & Physical - Short Stay  Gastroenterology      SUBJECTIVE:     Procedure: Colonoscopy and EGD    Chief Complaint/Indication for Procedure: History of Colon Cancer, Rectal Bleeding and Iron Deficiency Anemia    PTA Medications   Medication Sig    amLODIPine (NORVASC) 5 MG tablet Take 5 mg by mouth once daily.    aspirin (ECOTRIN) 81 MG EC tablet Take 81 mg by mouth once daily.    hydroCHLOROthiazide (MICROZIDE) 12.5 mg capsule Take 12.5 mg by mouth once daily.    metoprolol tartrate (LOPRESSOR) 25 MG tablet Take 25 mg by mouth 2 (two) times daily.    simvastatin (ZOCOR) 20 MG tablet Take 20 mg by mouth every evening.       Review of patient's allergies indicates:   Allergen Reactions    Codeine Nausea And Vomiting        Past Medical History:   Diagnosis Date    Cancer     colon    Encounter for blood transfusion     Hypertension      Past Surgical History:   Procedure Laterality Date    CHOLECYSTECTOMY      COLON SURGERY      HYSTERECTOMY       History reviewed. No pertinent family history.  Social History   Substance Use Topics    Smoking status: Never Smoker    Smokeless tobacco: Never Used    Alcohol use No         OBJECTIVE:     Vital Signs (Most Recent)  Temp: 97.9 °F (36.6 °C) (03/30/18 0809)  Pulse: 84 (03/30/18 0809)  Resp: 20 (03/30/18 0809)  BP: (!) 149/69 (03/30/18 0809)  SpO2: 100 % (03/30/18 0809)    Physical Exam:                                                       GENERAL:  Comfortable, in no acute distress.                                 HEENT EXAM:  Nonicteric.  No adenopathy.  Oropharynx is clear.               NECK:  Supple.                                                               LUNGS:  Clear.                                                               CARDIAC:  Regular rate and rhythm.  S1, S2.  No murmur.                      ABDOMEN:  Soft, positive bowel sounds, nontender.  No hepatosplenomegaly or masses.  No rebound or guarding.                                              EXTREMITIES:  No edema.     MENTAL STATUS:  Normal, alert and oriented.      ASSESSMENT/PLAN:     Assessment: History of Colon Cancer, Rectal Bleeding and Iron Deficiency Anemia    Plan: Colonoscopy and EGD    Anesthesia Plan: General    ASA Grade: ASA 2 - Patient with mild systemic disease with no functional limitations    MALLAMPATI SCORE:  I (soft palate, uvula, fauces, and tonsillar pillars visible)

## 2018-03-30 NOTE — ANESTHESIA POSTPROCEDURE EVALUATION
"Anesthesia Post Evaluation    Patient: Indigo Stuart    Procedure(s) Performed: Procedure(s) (LRB):  ESOPHAGOGASTRODUODENOSCOPY (EGD) (N/A)  COLONOSCOPY (N/A)    Final Anesthesia Type: general  Patient location during evaluation: PACU  Patient participation: Yes- Able to Participate  Level of consciousness: awake and alert  Post-procedure vital signs: reviewed and stable  Pain management: adequate  Airway patency: patent  PONV status at discharge: No PONV  Anesthetic complications: no      Cardiovascular status: hemodynamically stable and blood pressure returned to baseline  Respiratory status: unassisted, spontaneous ventilation and room air  Hydration status: euvolemic  Follow-up not needed.        Visit Vitals  /60   Pulse 80   Temp 36.7 °C (98 °F) (Skin)   Resp 16   Ht 5' 2" (1.575 m)   Wt 111.7 kg (246 lb 4.1 oz)   SpO2 100%   Breastfeeding? No   BMI 45.04 kg/m²       Pain/Ming Score: Pain Assessment Performed: Yes (3/30/2018  9:40 AM)  Presence of Pain: denies (3/30/2018  9:40 AM)  Ming Score: 10 (3/30/2018  9:40 AM)      "

## 2018-03-30 NOTE — ANESTHESIA PREPROCEDURE EVALUATION
03/30/2018  Indigo Stuart is a 62 y.o., female.    Anesthesia Evaluation      I have reviewed the Medications.     Review of Systems  Anesthesia Hx:  No problems with previous Anesthesia   Social:  Non-Smoker, No Alcohol Use    Hematology/Oncology:         -- Anemia: Hematology Comments: H/H 7/24  --  Cancer in past history (colon CA):    Cardiovascular:   Hypertension    Pulmonary:  Pulmonary Normal    Renal/:  Renal/ Normal     Hepatic/GI:  Hepatic/GI Normal    Neurological:  Neurology Normal    Endocrine:  Endocrine Normal        Physical Exam  General:  Morbid Obesity    Airway/Jaw/Neck:  Airway Findings: Mouth Opening: Normal General Airway Assessment: Adult  Mallampati: II  Jaw/Neck Findings:  Neck ROM: Extension Decreased, Mild       Chest/Lungs:  Chest/Lungs Findings: Clear to auscultation, Normal Respiratory Rate     Heart/Vascular:  Heart Findings: Rate: Normal  Rhythm: Regular Rhythm  Sounds: Normal  Heart murmur: negative Vascular Findings: Normal (No carotid bruits.)       Mental Status:  Mental Status Findings:  Cooperative, Alert and Oriented         Anesthesia Plan  Type of Anesthesia, risks & benefits discussed:  Anesthesia Type:  general  Patient's Preference:   Intra-op Monitoring Plan:   Intra-op Monitoring Plan Comments:   Post Op Pain Control Plan:   Post Op Pain Control Plan Comments:   Induction:   IV  Beta Blocker:  Patient is on a Beta-Blocker and has received one dose within the past 24 hours (No further documentation required).       Informed Consent: Patient understands risks and agrees with Anesthesia plan.  Questions answered. Anesthesia consent signed with patient.  ASA Score: 3     Day of Surgery Review of History & Physical:        Anesthesia Plan Notes: Propofol general.        Ready For Surgery From Anesthesia Perspective.

## 2018-03-31 VITALS
DIASTOLIC BLOOD PRESSURE: 61 MMHG | WEIGHT: 246.25 LBS | OXYGEN SATURATION: 97 % | RESPIRATION RATE: 18 BRPM | BODY MASS INDEX: 45.32 KG/M2 | HEIGHT: 62 IN | TEMPERATURE: 98 F | HEART RATE: 83 BPM | SYSTOLIC BLOOD PRESSURE: 129 MMHG

## 2018-03-31 LAB
ALBUMIN SERPL BCP-MCNC: 3 G/DL
ALP SERPL-CCNC: 76 U/L
ALT SERPL W/O P-5'-P-CCNC: 16 U/L
ANION GAP SERPL CALC-SCNC: 10 MMOL/L
AST SERPL-CCNC: 27 U/L
BASOPHILS # BLD AUTO: 0 K/UL
BASOPHILS NFR BLD: 0.2 %
BILIRUB SERPL-MCNC: 0.5 MG/DL
BUN SERPL-MCNC: 12 MG/DL
CALCIUM SERPL-MCNC: 8.5 MG/DL
CHLORIDE SERPL-SCNC: 108 MMOL/L
CO2 SERPL-SCNC: 25 MMOL/L
CREAT SERPL-MCNC: 0.7 MG/DL
DIFFERENTIAL METHOD: ABNORMAL
EOSINOPHIL # BLD AUTO: 0.1 K/UL
EOSINOPHIL NFR BLD: 0.9 %
ERYTHROCYTE [DISTWIDTH] IN BLOOD BY AUTOMATED COUNT: 22 %
EST. GFR  (AFRICAN AMERICAN): >60 ML/MIN/1.73 M^2
EST. GFR  (NON AFRICAN AMERICAN): >60 ML/MIN/1.73 M^2
GLUCOSE SERPL-MCNC: 111 MG/DL
HCT VFR BLD AUTO: 25.8 %
HGB BLD-MCNC: 8.1 G/DL
LYMPHOCYTES # BLD AUTO: 1.7 K/UL
LYMPHOCYTES NFR BLD: 17.5 %
MCH RBC QN AUTO: 24.5 PG
MCHC RBC AUTO-ENTMCNC: 31.4 G/DL
MCV RBC AUTO: 78 FL
MONOCYTES # BLD AUTO: 0.6 K/UL
MONOCYTES NFR BLD: 5.9 %
NEUTROPHILS # BLD AUTO: 7.3 K/UL
NEUTROPHILS NFR BLD: 75.5 %
PLATELET # BLD AUTO: 434 K/UL
PMV BLD AUTO: 7.3 FL
POTASSIUM SERPL-SCNC: 3.3 MMOL/L
PROT SERPL-MCNC: 6.1 G/DL
RBC # BLD AUTO: 3.3 M/UL
SODIUM SERPL-SCNC: 143 MMOL/L
WBC # BLD AUTO: 9.7 K/UL

## 2018-03-31 PROCEDURE — 25000003 PHARM REV CODE 250: Performed by: INTERNAL MEDICINE

## 2018-03-31 PROCEDURE — 36415 COLL VENOUS BLD VENIPUNCTURE: CPT

## 2018-03-31 PROCEDURE — 25000003 PHARM REV CODE 250: Performed by: HOSPITALIST

## 2018-03-31 PROCEDURE — 80053 COMPREHEN METABOLIC PANEL: CPT

## 2018-03-31 PROCEDURE — 85025 COMPLETE CBC W/AUTO DIFF WBC: CPT

## 2018-03-31 RX ORDER — POTASSIUM CHLORIDE 20 MEQ/1
40 TABLET, EXTENDED RELEASE ORAL ONCE
Status: COMPLETED | OUTPATIENT
Start: 2018-03-31 | End: 2018-03-31

## 2018-03-31 RX ORDER — PANTOPRAZOLE SODIUM 40 MG/1
40 TABLET, DELAYED RELEASE ORAL DAILY
Qty: 30 TABLET | Refills: 5 | Status: ON HOLD | OUTPATIENT
Start: 2018-04-01 | End: 2018-12-04

## 2018-03-31 RX ADMIN — POTASSIUM CHLORIDE 40 MEQ: 20 TABLET, EXTENDED RELEASE ORAL at 09:03

## 2018-03-31 RX ADMIN — PANTOPRAZOLE SODIUM 40 MG: 40 TABLET, DELAYED RELEASE ORAL at 08:03

## 2018-03-31 RX ADMIN — AMLODIPINE BESYLATE 5 MG: 5 TABLET ORAL at 08:03

## 2018-03-31 RX ADMIN — METOPROLOL TARTRATE 25 MG: 25 TABLET ORAL at 08:03

## 2018-03-31 NOTE — PLAN OF CARE
03/31/18 1231   Final Note   Assessment Type Final Discharge Note   Discharge Disposition Home

## 2018-03-31 NOTE — PLAN OF CARE
Problem: Patient Care Overview  Goal: Plan of Care Review  Outcome: Revised  Sleeping well tonight  No bowel movements tonight so far  No s/s bleeding noted  Monitor am labs; report any critical levels  Pt want to go home today.  Denies any pain

## 2018-04-02 LAB
BLD PROD TYP BPU: NORMAL
BLOOD UNIT EXPIRATION DATE: NORMAL
BLOOD UNIT TYPE CODE: 6200
BLOOD UNIT TYPE: NORMAL
CODING SYSTEM: NORMAL
DISPENSE STATUS: NORMAL
NUM UNITS TRANS PACKED RBC: NORMAL

## 2018-04-08 NOTE — HOSPITAL COURSE
The patient was admitted and GI was consulted. She was transfused a total of 3 units of PRBCs during the hospitalization. She underwent and EGD and colonoscopy that showed     EGD:   - Normal esophagus.                       - Normal stomach.                       - Normal examined duodenum.                       - No specimens collected.     Colonoscopy:   - One 5 to 6 mm polyp in the distal transverse                        colon, removed with a cold snare. Resected and                        retrieved.                       - Internal hemorrhoids.                       - Patent end-to-end colo-colonic anastomosis,                        characterized by healthy appearing mucosa.                       - The examination was otherwise normal.    She had no acute events during her stay in the hospital and was discharged home with instructions to follow up with her GI physician. H/h at the time of discharge was 8.1/25. The plan was discussed with the patient at the time of discharge and she was in agreement.

## 2018-04-08 NOTE — HPI
She is a 62 y.o. female who was admitted to the hospitalist service from the [facility] Emergency Room with complaints of worsening anemia. Patient has a past medical history significant for history of colon cancer and hypertension. She was called by Dr. Conner office advising she present to the ED for further evaluation for anemia. The blood was drawn the day prior to presentation. Pt reported associated fatigue, BAUER and frequent passage of bloody stools. Patient has history of Colon cancer removed 13 years ago. No f/u colonoscopy in 8 years. Patient denied chest pain, headache, vision changes, focal neuro-deficits, cough or fever on presentation.

## 2018-04-08 NOTE — DISCHARGE SUMMARY
[facility] Medicine  Discharge Summary    Patient Name: Indigo Stuart  MRN: [id number]  Admission Date: 3/29/2018  Hospital Length of Stay: 2 days  Discharge Date and Time: 3/31/2018  1:17 PM  Attending Physician: Helga att. providers found   Discharging Provider: Sulaiman Galeana MD  Primary Care Provider: John Conner MD      HPI:   She is a 62 y.o. female  who was admitted to the hospitalist service from the [facility] Emergency Room with complaints of worsening anemia. Patient has a past medical history significant for history of colon cancer and hypertension. She was called by Dr. Conner office advising she present to the ED for further evaluation for anemia. The blood was drawn the day prior to presentation. Pt reported associated fatigue, BAUER and frequent passage of bloody stools. Patient has history of Colon cancer removed 13 years ago. No f/u colonoscopy in 8 years. Patient denied chest pain, headache, vision changes, focal neuro-deficits, cough or fever on presentation.    Procedure(s) (LRB):  ESOPHAGOGASTRODUODENOSCOPY (EGD) (N/A)  COLONOSCOPY (N/A)      Hospital Course:   The patient was admitted and GI was consulted. She was transfused a total of 3 units of PRBCs during the hospitalization. She underwent and EGD and colonoscopy that showed     EGD:   - Normal esophagus.                       - Normal stomach.                       - Normal examined duodenum.                       - No specimens collected.     Colonoscopy:   - One 5 to 6 mm polyp in the distal transverse                        colon, removed with a cold snare. Resected and                        retrieved.                       - Internal hemorrhoids.                       - Patent end-to-end colo-colonic anastomosis,                        characterized by healthy appearing mucosa.                       - The examination was otherwise normal.    She had no acute events during her stay in  the hospital and was discharged home with instructions to follow up with her GI physician. H/h at the time of discharge was 8.1/25. The plan was discussed with the patient at the time of discharge and she was in agreement.     Consults:   Consults         Status Ordering Provider     Inpatient consult to Gastroenterology  Once     Provider:  Daniel Magana MD    Completed VICTORIANO OZUNA     Inpatient consult to Gastroenterology  Once     Provider:  Adis Arguello MD    Completed ANTHONY AKINS          No new Assessment & Plan notes have been filed under this hospital service since the last note was generated.  Service: Hospital Medicine    Final Active Diagnoses:    Diagnosis Date Noted POA    PRINCIPAL PROBLEM:  Symptomatic anemia [D64.9] 03/29/2018 Yes    Hematochezia [K92.1] 03/29/2018 Yes    Hypertension [I10] 03/29/2018 Yes    History of colon cancer [Z85.038] 03/29/2018 Yes      Problems Resolved During this Admission:    Diagnosis Date Noted Date Resolved POA       Discharged Condition: good    Disposition: Home or Self Care    Follow Up:  Follow-up Information     Adis Arguello MD In 1 week.    Specialty:  Gastroenterology  Contact information:  [address] [place] [address]  [address]  [facility] [address]  [phone]                 Patient Instructions:     Activity as tolerated     Notify your health care provider if you experience any of the following:  temperature >100.4     Notify your health care provider if you experience any of the following:  persistent nausea and vomiting or diarrhea     Notify your health care provider if you experience any of the following:  severe uncontrolled pain     Notify your health care provider if you experience any of the following:  difficulty breathing or increased cough     Notify your health care provider if you experience any of the following:  severe persistent headache     Notify your health care provider if you experience any of the following:  worsening rash      Notify your health care provider if you experience any of the following:  persistent dizziness, light-headedness, or visual disturbances     Notify your health care provider if you experience any of the following:  increased confusion or weakness         Significant Diagnostic Studies: Labs:   BMP: No results for input(s): GLU, NA, K, CL, CO2, BUN, CREATININE, CALCIUM, MG in the last 48 hours., CMP No results for input(s): NA, K, CL, CO2, GLU, BUN, CREATININE, CALCIUM, PROT, ALBUMIN, BILITOT, ALKPHOS, AST, ALT, ANIONGAP, ESTGFRAFRICA, EGFRNONAA in the last 48 hours., CBC No results for input(s): WBC, HGB, HCT, PLT in the last 48 hours. and INR   Lab Results   Component Value Date    INR 1.1 03/29/2018     Microbiology: Blood Culture No results found for: LABBLOO  Radiology: CT scan: CT ABDOMEN PELVIS WITH CONTRAST:   Results for orders placed or performed during the hospital encounter of 03/29/18   CT Abdomen Pelvis With Contrast    Narrative    EXAMINATION:  CT ABDOMEN PELVIS WITH CONTRAST    CLINICAL HISTORY:  GI bleeding;    TECHNIQUE:  Low dose axial images, sagittal and coronal reformations were obtained from the lung bases to the pubic symphysis following the IV administration of 100 mL of Omnipaque 350 and the oral administration of 30 mL of Omnipaque 350.    COMPARISON:  None.    FINDINGS:  There are cholecystectomy clips.  The liver is unremarkable in appearance.  There are calcifications of the splenic hilum appearing vascular and small calcified renal artery aneurysm measuring just less than 1 cm.  Adrenal glands and pancreas are unremarkable in appearance.  Abdominal aorta is non aneurysmal.  There is 2.4 cm left renal mass consistent with a cyst.  There is subcentimeter hypodensity in the right kidney too small to reliably characterize but most likely a cyst.  There is mild renal scarring.    There is small fat containing supraumbilical ventral hernia.  There is umbilical or periumbilical hernia  containing fat and small segment of bowel without bowel obstructive or inflammatory change.  There is fat stranding anterior abdominal wall likely on a postoperative basis although could represent mild edematous or inflammatory change.  There is no focal fluid collection suggesting drainable abscess.  There is no free intraperitoneal air or fluid.  The patient has had a prior hysterectomy and the adnexal region is unremarkable in appearance.    There is a mild grade 1 spondylolisthesis L4-L5 and there are degenerative changes within the visualized spine and hips.    There are postoperative changes with anastomotic sutures sigmoid colon suggesting prior partial colectomy. There is mild diverticulosis versus incomplete distention of colon without CT findings of acute diverticulitis.  Normal appearing appendix is thought to be visualized and no abnormal appendix inflammatory changes appendiceal region is seen    There is very mild mesenteric fat stranding likely representing panniculitis. There are small shotty appearing nodes.      Impression    No acute findings.  Findings as detailed above including ventral/periumbilical hernia, prior sigmoidectomy, hysterectomy, cholecystectomy.  Renal masses consistent with cysts.      Electronically signed by: Namrata Kennedy MD  Date:    03/29/2018  Time:    12:20     Cardiac Graphics: Echocardiogram: 2D echo with color flow doppler: No results found for this or any previous visit.      Pending Diagnostic Studies:     Procedure Component Value Units Date/Time    EKG 12-LEAD [[id number]]     Order Status:  Sent Lab Status:  No result          Medications:  Reconciled Home Medications:      Medication List      START taking these medications    pantoprazole 40 MG tablet  Commonly known as:  PROTONIX  Take 1 tablet (40 mg total) by mouth once daily.        CONTINUE taking these medications    amLODIPine 5 MG tablet  Commonly known as:  NORVASC     aspirin 81 MG EC tablet  Commonly  known as:  ECOTRIN     hydroCHLOROthiazide 12.5 mg capsule  Commonly known as:  MICROZIDE     metoprolol tartrate 25 MG tablet  Commonly known as:  LOPRESSOR     simvastatin 20 MG tablet  Commonly known as:  ZOCOR           Where to Get Your Medications      You can get these medications from any pharmacy    Bring a paper prescription for each of these medications  · pantoprazole 40 MG tablet         Indwelling Lines/Drains at time of discharge:   Lines/Drains/Airways          No matching active lines, drains, or airways          Time spent on the discharge of patient: 35 minutes  Patient was seen and examined on the date of discharge and determined to be suitable for discharge.    Sulaiman Galeana MD  Department of Hospital Medicine  [facility]

## 2018-04-09 NOTE — PHYSICIAN QUERY
PT Name: Indigo Stuart  MR #: [id number]     Physician Query Form - Documentation Clarification      CDS/: Danyell Shrestha RN              Contact information: [phone]    This form is a permanent document in the medical record.     Query Date: April 9, 2018    By submitting this query, we are merely seeking further clarification of documentation. Please utilize your independent clinical judgment when addressing the question(s) below.    The Medical record reflects the following:    Supporting Clinical Findings Location in Medical Record   Iron deficiency anemia    Pt reported... frequent passage of bloody stools.       H&P 3/30   Assessment: History of Colon Cancer, Rectal Bleeding     Hematochezia    Rectal exam shows guaiac positive stool. Guaiac positive stool    Colonoscopy:   - One 5 to 6 mm polyp in the distal transverse                        colon, removed with a cold snare. Resected and                        retrieved.                       - Internal hemorrhoids.                       - Patent end-to-end colo-colonic anastomosis,                        characterized by healthy appearing mucosa.                       - The examination was otherwise normal.    Iron                    71  TIBC                  591  Saturated iron   12  Transferrin         399  Folate               19.5  Vit B 12             422       Gastro H&P 3/30      DC Summary 3/31    ED Note 3/29      HM PN 3/30                            Labs 3/29                                                                                Doctor, for accurate coding purposes, if known, please further specify iron deficiency anemia as    Provider Use Only    [  ] due to inadequate dietary iron intake  [ x ] secondary to blood loss, chronic  [  ] other (please clarify)________________________________  [  ] clinically undetermined

## 2018-04-11 ENCOUNTER — TELEPHONE (OUTPATIENT)
Dept: SURGERY | Facility: CLINIC | Age: 63
End: 2018-04-11

## 2018-04-11 NOTE — TELEPHONE ENCOUNTER
----- Message from Aidee Akins sent at 4/11/2018 10:06 AM CDT -----  Contact: Karen w/ [facility] is calling on behalf of PT requesting to see doctor Heather, patient is having issues with bleeding, anemia, hemroids, etc would like to be seen ASAP (pt is cash pay, and is aware of $500 copay)    Call back# [phone]  Thanks

## 2018-04-11 NOTE — TELEPHONE ENCOUNTER
Patient called back and I scheduled her to see Dr. Romero on Thursday, 4/12/18 at 10:00am in [place].  Kush

## 2018-04-12 ENCOUNTER — OFFICE VISIT (OUTPATIENT)
Dept: SURGERY | Facility: CLINIC | Age: 63
End: 2018-04-12

## 2018-04-12 VITALS
SYSTOLIC BLOOD PRESSURE: 141 MMHG | HEIGHT: 62 IN | DIASTOLIC BLOOD PRESSURE: 67 MMHG | BODY MASS INDEX: 45.06 KG/M2 | WEIGHT: 244.88 LBS | HEART RATE: 88 BPM | TEMPERATURE: 98 F

## 2018-04-12 DIAGNOSIS — K64.1 GRADE II HEMORRHOIDS: Primary | ICD-10-CM

## 2018-04-12 PROCEDURE — 99999 PR PBB SHADOW E&M-EST. PATIENT-LVL III: CPT | Mod: PBBFAC,,, | Performed by: SURGERY

## 2018-04-12 PROCEDURE — 46221 LIGATION OF HEMORRHOID(S): CPT | Mod: S$PBB,,, | Performed by: SURGERY

## 2018-04-12 PROCEDURE — 46221 LIGATION OF HEMORRHOID(S): CPT | Mod: PBBFAC,PO | Performed by: SURGERY

## 2018-04-12 PROCEDURE — 99204 OFFICE O/P NEW MOD 45 MIN: CPT | Mod: S$PBB,25,, | Performed by: SURGERY

## 2018-04-12 PROCEDURE — 99213 OFFICE O/P EST LOW 20 MIN: CPT | Mod: PBBFAC,PO,25 | Performed by: SURGERY

## 2018-04-12 RX ORDER — BIOTIN 1 MG
1000 TABLET ORAL 3 TIMES DAILY
Status: ON HOLD | COMMUNITY
End: 2018-12-04 | Stop reason: ALTCHOICE

## 2018-04-12 NOTE — PROGRESS NOTES
Subjective:       Patient ID: Indigo Stuart is a 62 y.o. female.    Chief Complaint: Consult (Bleeding hemorrhoids)    HPI  Pleasant 63 yo F referred to me in consultation from Dr Conner for evaluation of anemia felt to be from hemorrhoids. [facility] found to be at [facility].  She herself denies seeing any blood per rectum.  NO blood in stool and no bloody leakage.  DDneis changes in bowel habits.  Pt had EGD and cscope in the hospital with hemorrhoids seen but no source of bleeding. Pt denies fever/chills. No n/v.  No changes in bowel habits. Has had a cough recently.  CT scan of abd/pelvis in the hospital with no acute findings.  PT has been told that maybe her hemorrhoids were bleeding.  Pt did respond appropriately to to blood when given in hospital. On exam yesterday she states that Dr Armstrong tested for FOBT which was positive       Review of Systems   Constitutional: Negative for activity change, appetite change, fever and unexpected weight change.   HENT: Negative for congestion and ear pain.    Respiratory: Negative for chest tightness, shortness of breath and wheezing.    Cardiovascular: Negative for chest pain.   Gastrointestinal: Negative for abdominal distention, abdominal pain, anal bleeding, blood in stool, constipation, diarrhea, nausea, rectal pain and vomiting.   Genitourinary: Negative for difficulty urinating, dysuria and frequency.   Musculoskeletal: Negative for arthralgias and joint swelling.   Skin: Negative for color change and wound.   Neurological: Negative for dizziness.   Hematological: Negative for adenopathy. Does not bruise/bleed easily.   Psychiatric/Behavioral: Negative for agitation and decreased concentration.       Objective:      Physical Exam   Constitutional: She is oriented to person, place, and time. She appears well-developed and well-nourished.   HENT:   Head: Normocephalic and atraumatic.   Eyes: Pupils are equal, round,  and reactive to light.   Neck: Normal range of motion. Neck supple. No tracheal deviation present. No thyromegaly present.   Cardiovascular: Normal rate, regular rhythm and normal heart sounds.    No murmur heard.  Pulmonary/Chest: Effort normal and breath sounds normal. She exhibits no tenderness.   Abdominal: Soft. Bowel sounds are normal. She exhibits no distension, no abdominal bruit, no pulsatile midline mass and no mass. There is no hepatosplenomegaly. There is no tenderness. There is no rigidity, no rebound, no guarding, no tenderness at McBurney's point and negative Alvarado's sign. No hernia. Hernia confirmed negative in the ventral area.   Genitourinary: Rectum normal.   Genitourinary Comments: Ext exam demonstrates no significant ext hemorrhoids.  SMILEY with normal sphincter tone. Anoscopy demonstrating enalrged nonbleeding hemorrhoids in the R ant and L lateral position.  Mildly in R post.    Musculoskeletal: Normal range of motion.   Neurological: She is alert and oriented to person, place, and time.   Skin: Skin is warm. No rash noted. No erythema.   Psychiatric: She has a normal mood and affect.   Vitals reviewed.      Assessment:     Anemia  Hemorrhoids  No diagnosis found.    Plan:       D/w pt. And her .  Stated to them that she did in fact have internal hemorrhoids but that I am uncertain that is the source of her anemia.  Typ[ically with hemorrhoids one will see bright blood mixed with the stool or in toilet which she has not seen at all.  I have offered to perform banding but did states that I do not know how beneficial that will be.  She prefers to proceed with banding today    Having received informed consent pt was placed in prone jackknife position. I then placed rubber bands on the L lateral column, R Ant and R post columns without event. Bleeding was minimal and pt tolerated the procedure well.    PT will RTC in 6 weeks.  If anemia persists will need consideration for capsule study with  GI

## 2018-04-12 NOTE — Clinical Note
I saw your patient, Indigo Stuart in the office.  Attached are my findings and plan.  Thank you for referring her to my office and if you have any questions please do not hesitate to call my cell [phone].  Renaldo Romero

## 2018-12-04 ENCOUNTER — HOSPITAL ENCOUNTER (INPATIENT)
Facility: HOSPITAL | Age: 63
LOS: 3 days | Discharge: HOME | DRG: 436 | End: 2018-12-07
Attending: INTERNAL MEDICINE | Admitting: INTERNAL MEDICINE
Payer: MEDICARE

## 2018-12-04 DIAGNOSIS — E44.0 MALNUTRITION OF MODERATE DEGREE: ICD-10-CM

## 2018-12-04 DIAGNOSIS — D64.9 ANEMIA, UNSPECIFIED TYPE: ICD-10-CM

## 2018-12-04 DIAGNOSIS — R00.0 TACHYCARDIA: ICD-10-CM

## 2018-12-04 DIAGNOSIS — E87.6 HYPOKALEMIA: ICD-10-CM

## 2018-12-04 DIAGNOSIS — D64.9 ANEMIA: ICD-10-CM

## 2018-12-04 DIAGNOSIS — Z85.038 HISTORY OF COLON CANCER: Primary | ICD-10-CM

## 2018-12-04 DIAGNOSIS — D64.9 SYMPTOMATIC ANEMIA: ICD-10-CM

## 2018-12-04 DIAGNOSIS — I10 HYPERTENSION, UNSPECIFIED TYPE: ICD-10-CM

## 2018-12-04 LAB
ABO + RH BLD: NORMAL
ALBUMIN SERPL BCP-MCNC: 2.6 G/DL
ALP SERPL-CCNC: 301 U/L
ALT SERPL W/O P-5'-P-CCNC: 30 U/L
ANION GAP SERPL CALC-SCNC: 9 MMOL/L
AST SERPL-CCNC: 33 U/L
BASOPHILS # BLD AUTO: 0 K/UL
BASOPHILS NFR BLD: 0 %
BILIRUB SERPL-MCNC: 0.2 MG/DL
BILIRUB UR QL STRIP: NEGATIVE
BLD GP AB SCN CELLS X3 SERPL QL: NORMAL
BLD PROD TYP BPU: NORMAL
BLD PROD TYP BPU: NORMAL
BLOOD UNIT EXPIRATION DATE: NORMAL
BLOOD UNIT EXPIRATION DATE: NORMAL
BLOOD UNIT TYPE CODE: 6200
BLOOD UNIT TYPE CODE: 6200
BLOOD UNIT TYPE: NORMAL
BLOOD UNIT TYPE: NORMAL
BUN SERPL-MCNC: 13 MG/DL
CALCIUM SERPL-MCNC: 9.2 MG/DL
CHLORIDE SERPL-SCNC: 100 MMOL/L
CLARITY UR: CLEAR
CO2 SERPL-SCNC: 28 MMOL/L
CODING SYSTEM: NORMAL
CODING SYSTEM: NORMAL
COLOR UR: YELLOW
CREAT SERPL-MCNC: 0.7 MG/DL
DIFFERENTIAL METHOD: ABNORMAL
DISPENSE STATUS: NORMAL
DISPENSE STATUS: NORMAL
EOSINOPHIL # BLD AUTO: 0.1 K/UL
EOSINOPHIL NFR BLD: 0.7 %
ERYTHROCYTE [DISTWIDTH] IN BLOOD BY AUTOMATED COUNT: 15.7 %
EST. GFR  (AFRICAN AMERICAN): >60 ML/MIN/1.73 M^2
EST. GFR  (NON AFRICAN AMERICAN): >60 ML/MIN/1.73 M^2
ESTIMATED AVG GLUCOSE: 97 MG/DL
FOLATE SERPL-MCNC: 11.4 NG/ML
GLUCOSE SERPL-MCNC: 117 MG/DL
GLUCOSE UR QL STRIP: NEGATIVE
HBA1C MFR BLD HPLC: 5 %
HCT VFR BLD AUTO: 20.5 %
HGB BLD-MCNC: 6.3 G/DL
HGB UR QL STRIP: ABNORMAL
IRON SERPL-MCNC: <10 UG/DL
KETONES UR QL STRIP: NEGATIVE
LDH SERPL L TO P-CCNC: 368 U/L
LEUKOCYTE ESTERASE UR QL STRIP: NEGATIVE
LYMPHOCYTES # BLD AUTO: 1 K/UL
LYMPHOCYTES NFR BLD: 9.2 %
MCH RBC QN AUTO: 22.4 PG
MCHC RBC AUTO-ENTMCNC: 30.5 G/DL
MCV RBC AUTO: 73 FL
MONOCYTES # BLD AUTO: 0.8 K/UL
MONOCYTES NFR BLD: 7.3 %
NEUTROPHILS # BLD AUTO: 9.3 K/UL
NEUTROPHILS NFR BLD: 82.8 %
NITRITE UR QL STRIP: NEGATIVE
NUM UNITS TRANS PACKED RBC: NORMAL
NUM UNITS TRANS PACKED RBC: NORMAL
PH UR STRIP: 7 [PH] (ref 5–8)
PLATELET # BLD AUTO: 766 K/UL
PMV BLD AUTO: 6.8 FL
POTASSIUM SERPL-SCNC: 3.3 MMOL/L
PROT SERPL-MCNC: 6.7 G/DL
PROT UR QL STRIP: NEGATIVE
RBC # BLD AUTO: 2.79 M/UL
RETICS/RBC NFR AUTO: 2.1 %
SATURATED IRON: ABNORMAL %
SODIUM SERPL-SCNC: 137 MMOL/L
SP GR UR STRIP: 1.01 (ref 1–1.03)
TOTAL IRON BINDING CAPACITY: 425 UG/DL
TRANSFERRIN SERPL-MCNC: 287 MG/DL
URN SPEC COLLECT METH UR: ABNORMAL
UROBILINOGEN UR STRIP-ACNC: 1 EU/DL
VIT B12 SERPL-MCNC: 445 PG/ML
WBC # BLD AUTO: 11.2 K/UL

## 2018-12-04 PROCEDURE — 36415 COLL VENOUS BLD VENIPUNCTURE: CPT

## 2018-12-04 PROCEDURE — 83036 HEMOGLOBIN GLYCOSYLATED A1C: CPT

## 2018-12-04 PROCEDURE — 99223 1ST HOSP IP/OBS HIGH 75: CPT | Mod: ,,, | Performed by: INTERNAL MEDICINE

## 2018-12-04 PROCEDURE — 63600175 PHARM REV CODE 636 W HCPCS: Performed by: INTERNAL MEDICINE

## 2018-12-04 PROCEDURE — 83540 ASSAY OF IRON: CPT

## 2018-12-04 PROCEDURE — 93010 ELECTROCARDIOGRAM REPORT: CPT | Mod: ,,, | Performed by: INTERNAL MEDICINE

## 2018-12-04 PROCEDURE — 86920 COMPATIBILITY TEST SPIN: CPT

## 2018-12-04 PROCEDURE — 82607 VITAMIN B-12: CPT

## 2018-12-04 PROCEDURE — 85045 AUTOMATED RETICULOCYTE COUNT: CPT

## 2018-12-04 PROCEDURE — 81003 URINALYSIS AUTO W/O SCOPE: CPT

## 2018-12-04 PROCEDURE — 82746 ASSAY OF FOLIC ACID SERUM: CPT

## 2018-12-04 PROCEDURE — 36430 TRANSFUSION BLD/BLD COMPNT: CPT

## 2018-12-04 PROCEDURE — 25000003 PHARM REV CODE 250: Performed by: INTERNAL MEDICINE

## 2018-12-04 PROCEDURE — 12000002 HC ACUTE/MED SURGE SEMI-PRIVATE ROOM

## 2018-12-04 PROCEDURE — 83615 LACTATE (LD) (LDH) ENZYME: CPT

## 2018-12-04 PROCEDURE — P9016 RBC LEUKOCYTES REDUCED: HCPCS

## 2018-12-04 PROCEDURE — 93005 ELECTROCARDIOGRAM TRACING: CPT

## 2018-12-04 PROCEDURE — 85025 COMPLETE CBC W/AUTO DIFF WBC: CPT

## 2018-12-04 PROCEDURE — 25000003 PHARM REV CODE 250: Performed by: NURSE PRACTITIONER

## 2018-12-04 PROCEDURE — 80053 COMPREHEN METABOLIC PANEL: CPT

## 2018-12-04 PROCEDURE — 86850 RBC ANTIBODY SCREEN: CPT

## 2018-12-04 PROCEDURE — 94761 N-INVAS EAR/PLS OXIMETRY MLT: CPT

## 2018-12-04 RX ORDER — POTASSIUM CHLORIDE 750 MG/1
40 TABLET, EXTENDED RELEASE ORAL ONCE
Status: COMPLETED | OUTPATIENT
Start: 2018-12-04 | End: 2018-12-04

## 2018-12-04 RX ORDER — SODIUM CHLORIDE 0.9 % (FLUSH) 0.9 %
5 SYRINGE (ML) INJECTION
Status: DISCONTINUED | OUTPATIENT
Start: 2018-12-04 | End: 2018-12-07 | Stop reason: HOSPADM

## 2018-12-04 RX ORDER — GLUCAGON 1 MG
1 KIT INJECTION
Status: DISCONTINUED | OUTPATIENT
Start: 2018-12-04 | End: 2018-12-07 | Stop reason: HOSPADM

## 2018-12-04 RX ORDER — ASPIRIN 81 MG/1
81 TABLET ORAL DAILY
Status: DISCONTINUED | OUTPATIENT
Start: 2018-12-04 | End: 2018-12-07 | Stop reason: HOSPADM

## 2018-12-04 RX ORDER — ENOXAPARIN SODIUM 100 MG/ML
40 INJECTION SUBCUTANEOUS EVERY 24 HOURS
Status: DISCONTINUED | OUTPATIENT
Start: 2018-12-04 | End: 2018-12-05

## 2018-12-04 RX ORDER — IBUPROFEN 200 MG
24 TABLET ORAL
Status: DISCONTINUED | OUTPATIENT
Start: 2018-12-04 | End: 2018-12-07 | Stop reason: HOSPADM

## 2018-12-04 RX ORDER — ATORVASTATIN CALCIUM 20 MG/1
20 TABLET, FILM COATED ORAL EVERY OTHER DAY
Status: ON HOLD | COMMUNITY
End: 2018-12-06

## 2018-12-04 RX ORDER — ACETAMINOPHEN 325 MG/1
650 TABLET ORAL EVERY 6 HOURS PRN
Status: DISCONTINUED | OUTPATIENT
Start: 2018-12-04 | End: 2018-12-07 | Stop reason: HOSPADM

## 2018-12-04 RX ORDER — METOPROLOL TARTRATE 25 MG/1
25 TABLET, FILM COATED ORAL 2 TIMES DAILY
Status: DISCONTINUED | OUTPATIENT
Start: 2018-12-04 | End: 2018-12-07 | Stop reason: HOSPADM

## 2018-12-04 RX ORDER — UBIDECARENONE 30 MG
100 CAPSULE ORAL 2 TIMES DAILY
COMMUNITY
End: 2019-02-25

## 2018-12-04 RX ORDER — PANTOPRAZOLE SODIUM 40 MG/1
40 TABLET, DELAYED RELEASE ORAL DAILY
Status: DISCONTINUED | OUTPATIENT
Start: 2018-12-04 | End: 2018-12-07 | Stop reason: HOSPADM

## 2018-12-04 RX ORDER — FERROUS GLUCONATE 324(38)MG
324 TABLET ORAL
Status: ON HOLD | COMMUNITY
End: 2018-12-07 | Stop reason: HOSPADM

## 2018-12-04 RX ORDER — BENZONATATE 200 MG/1
200 CAPSULE ORAL 3 TIMES DAILY PRN
COMMUNITY
End: 2019-02-01 | Stop reason: SDUPTHER

## 2018-12-04 RX ORDER — AMLODIPINE BESYLATE 5 MG/1
5 TABLET ORAL DAILY
Status: DISCONTINUED | OUTPATIENT
Start: 2018-12-04 | End: 2018-12-07 | Stop reason: HOSPADM

## 2018-12-04 RX ORDER — SODIUM CHLORIDE 450 MG/100ML
INJECTION, SOLUTION INTRAVENOUS CONTINUOUS
Status: DISCONTINUED | OUTPATIENT
Start: 2018-12-04 | End: 2018-12-04

## 2018-12-04 RX ORDER — IBUPROFEN 200 MG
16 TABLET ORAL
Status: DISCONTINUED | OUTPATIENT
Start: 2018-12-04 | End: 2018-12-07 | Stop reason: HOSPADM

## 2018-12-04 RX ORDER — SIMVASTATIN 10 MG/1
20 TABLET, FILM COATED ORAL NIGHTLY
Status: DISCONTINUED | OUTPATIENT
Start: 2018-12-04 | End: 2018-12-06 | Stop reason: SDUPTHER

## 2018-12-04 RX ORDER — HYDROCODONE BITARTRATE AND ACETAMINOPHEN 500; 5 MG/1; MG/1
TABLET ORAL
Status: DISCONTINUED | OUTPATIENT
Start: 2018-12-04 | End: 2018-12-07 | Stop reason: HOSPADM

## 2018-12-04 RX ORDER — BIOTIN 1 MG
1000 TABLET ORAL 3 TIMES DAILY
Status: DISCONTINUED | OUTPATIENT
Start: 2018-12-04 | End: 2018-12-04

## 2018-12-04 RX ADMIN — SIMVASTATIN 20 MG: 10 TABLET, FILM COATED ORAL at 09:12

## 2018-12-04 RX ADMIN — ENOXAPARIN SODIUM 40 MG: 100 INJECTION SUBCUTANEOUS at 05:12

## 2018-12-04 RX ADMIN — ACETAMINOPHEN 650 MG: 325 TABLET, FILM COATED ORAL at 10:12

## 2018-12-04 RX ADMIN — POTASSIUM CHLORIDE 40 MEQ: 750 TABLET, EXTENDED RELEASE ORAL at 07:12

## 2018-12-04 RX ADMIN — METOPROLOL TARTRATE 25 MG: 25 TABLET, FILM COATED ORAL at 09:12

## 2018-12-04 NOTE — CONSULTS
[facility]  Hematology/Oncology  Consult Note    Patient Name: Indigo Stuart  MRN: [id number]  Admission Date: 12/4/2018  Hospital Length of Stay: 0 days  Code Status: Full Code   Attending Provider: Dahlia Li MD  Consulting Provider: Karina Pettit MD  Primary Care Physician: John Conner MD  Principal Problem:<principal problem not specified>  December 4  Consults  Subjective:     HPI:     This is a 63 yr old female with a history of hematochezia known to Dr Romero. She underwent banding of internal hemorrhoids. Dr Romero did not see any bleeding from her hemorrhoids at that time in April 2018. EGD was performed in March 2018 and was normal.  Colonoscopy was done as well which revealed a colonic polyp in the distal transverse section and a colo colonic anastomosis in the sigmoid area. No bleeding was noted     Pt reports a history of colon cancer      Medications:  Continuous Infusions:  Scheduled Meds:   amLODIPine  5 mg Oral Daily    aspirin  81 mg Oral Daily    enoxaparin  40 mg Subcutaneous Daily    metoprolol tartrate  25 mg Oral BID    pantoprazole  40 mg Oral Daily    simvastatin  20 mg Oral QHS     PRN Meds:sodium chloride, dextrose 50%, dextrose 50%, glucagon (human recombinant), glucose, glucose, sodium chloride 0.9%     Review of patient's allergies indicates:   Allergen Reactions    Codeine Nausea And Vomiting    Erythromycin base Other (See Comments)    Lisinopril Palpitations     Cough         Past Medical History:   Diagnosis Date    Cancer     colon    Encounter for blood transfusion     Hypertension      Past Surgical History:   Procedure Laterality Date    CHOLECYSTECTOMY      COLON SURGERY      COLONOSCOPY N/A 3/30/2018    Procedure: COLONOSCOPY;  Surgeon: Daniel Magana MD;  [facility];  Service: Endoscopy;  Laterality: N/A;    COLONOSCOPY N/A 3/30/2018    Performed by [facility]O     ESOPHAGOGASTRODUODENOSCOPY (EGD) N/A 3/30/2018    [facility]O    HYSTERECTOMY       Family History     Problem Relation (Age of Onset)    Cancer Mother, Father        Tobacco Use    Smoking status: Never Smoker    Smokeless tobacco: Never Used   Substance and Sexual Activity    Alcohol use: No    Drug use: No    Sexual activity: Not on file       Review of Systems   CONSTITUTIONAL: No fevers, chills, night sweats, wt. loss, appetite changes  SKIN: no rashes or itching  ENT: No headaches, head trauma, vision changes, or eye pain  LYMPH NODES: None noticed   CV: No chest pain, palpitations.   RESP: No dyspnea on exertion, cough, wheezing, or hemoptysis  GI: No nausea, emesis, diarrhea, constipation, melena, hematochezia, pain.   : No dysuria, hematuria, urgency, or frequency   HEME: No easy bruising, bleeding problems  PSYCHIATRIC: No depression, anxiety, psychosis, hallucinations.  NEURO: No seizures, memory loss, dizziness or difficulty sleeping  MSK: No arthralgias or joint swelling  Objective:     Vital Signs (Most Recent):  Temp: 98.5 °F (36.9 °C) (12/04/18 1510)  Pulse: 86 (12/04/18 1510)  Resp: 20 (12/04/18 1510)  BP: (!) 174/84 (12/04/18 1510)  SpO2: 99 % (12/04/18 1510) Vital Signs (24h Range):  Temp:  [97.1 °F (36.2 °C)-99.8 °F (37.7 °C)] 98.5 °F (36.9 °C)  Pulse:  [86-92] 86  Resp:  [18-20] 20  SpO2:  [98 %-100 %] 99 %  BP: (135-174)/(63-84) 174/84     Weight: 117.9 kg (260 lb)  Body mass index is 47.55 kg/m².  Body surface area is 2.27 meters squared.    No intake or output data in the 24 hours ending 12/04/18 1711    Physical Exam  Height / weight / VS reviewed  GENERAL.: Well-developed, well-nourished, in no acute distress  PSYCH: pleasant affect, no anxiety, no depression  HEENT: Normocephalic, lids intact, conjunctiva pink, sinuses nontender to palpation   NECK: Supple,trachea midline, no palpable abnormalities  LYMPHATICS: No cervical or axillary  adenopathy  RESPIRATORY: CTAB, no wheezes, rubs or rhonchi  Good respiratory effort without any retractions or diaphragmatic movement  CARDIOVASCULAR: no JVD, S1 / S2 with RRR, no murmur, 2+ capillary refill  ABDOMEN: NTND, normal bowel sounds, no palpable HSM or mass  EXTREMITIES: No cyanosis, no clubbing, no edema  NEUROLOGICAL: alert and oriented x 3, cranial nerves grossly intact  SKIN: Warm, dry, no rash or lesions noted, no tenting  Significant Labs:     Lab Results   Component Value Date    WBC 11.20 12/04/2018    RBC 2.79 (L) 12/04/2018    HGB 6.3 (L) 12/04/2018    HCT 20.5 (L) 12/04/2018    MCV 73 (L) 12/04/2018    MCH 22.4 (L) 12/04/2018    MCHC 30.5 (L) 12/04/2018    RDW 15.7 (H) 12/04/2018     (H) 12/04/2018    MPV 6.8 (L) 12/04/2018    GRAN 9.3 (H) 12/04/2018    GRAN 82.8 (H) 12/04/2018    LYMPH 1.0 12/04/2018    LYMPH 9.2 (L) 12/04/2018    MONO 0.8 12/04/2018    MONO 7.3 12/04/2018    EOS 0.1 12/04/2018    BASO 0.00 12/04/2018    EOSINOPHIL 0.7 12/04/2018    BASOPHIL 0.0 12/04/2018         Assessment/Plan:     Active Diagnoses:    Diagnosis Date Noted POA    Anemia [D64.9] 12/04/2018 Yes      Problems Resolved During this Admission:     Pt tolerating blood transfusion  Workup to include I have explained the lengthy differential diagnosis for anemia.Labs will be ordered to evaluate for bone marrow suppression, peripheral destruction as well as nutritional deficits. An ultrasound of the abdomen may be indicated to evaluate for hepatosplenomegaly which can lead to sequestration of cells. Ultimately a bone marrow biopsy and aspirate may be in order. SPEP and UPEP will also be included to look for a possible paraproteinemia.       Thank you for your consult.     Karina Pettit MD  Hematology/Oncology  [facility]

## 2018-12-04 NOTE — PROGRESS NOTES
The following nutraceutical has been discontinued for Indigo Phillips Narciso [id number] according to the pharmacy practice protocol:  Biotin.    Thank you,  Noelle Bullard

## 2018-12-04 NOTE — PLAN OF CARE
12/04/18 1207   PRE-TX-O2-ETCO2   O2 Device (Oxygen Therapy) room air   SpO2 99 %   Pulse Oximetry Type Intermittent   $ Pulse Oximetry - Multiple Charge Pulse Oximetry - Multiple

## 2018-12-04 NOTE — H&P
PCP: John Conner MD    History & Physical    Chief Complaint: Low hemoglobin count    History of Present Illness:  Patient is a 63 y.o. female admitted to Hospitalist Service from Dr. Conner's office with complaint of low Hgb count. Patient reportedly has past medical history significant for hypertension and history of colon CA. Patient presented with complaint of easy fatigability, generalized weakness, BAUER and lightheadedness. Per Dr. Conner, Hgb ~ 7 g/dl. Patient was evaluated by GI specialist in the past for GI bleeding. Patient denied any recent GI bleeding, bleeding per rectum, melena or hematuria. No easy bruising reported. Patient also reports dry non-productive cough. Patient denied chest pain, shortness of breath, abdominal pain, nausea, vomiting, headache, vision changes, focal neuro-deficits or fever.    Past Medical History:   Diagnosis Date    Cancer     colon    Encounter for blood transfusion     Hypertension      Past Surgical History:   Procedure Laterality Date    CHOLECYSTECTOMY      COLON SURGERY      COLONOSCOPY N/A 3/30/2018    Procedure: COLONOSCOPY;  Surgeon: Daniel Magana MD;  Location: [facility];  Service: Endoscopy;  Laterality: N/A;    COLONOSCOPY N/A 3/30/2018    Performed by Daniel Magana MD at [facility] END[facility]    ESOPHAGOGASTRODUODENOSCOPY (EGD) N/A 3/30/2018    Performed by Daniel Magana MD at [facility] ENDO    HYSTERECTOMY       No family history on file.  Social History     Tobacco Use    Smoking status: Never Smoker    Smokeless tobacco: Never Used   Substance Use Topics    Alcohol use: No    Drug use: No      Review of patient's allergies indicates:   Allergen Reactions    Codeine Nausea And Vomiting    Erythromycin base Other (See Comments)    Lisinopril Palpitations     Cough      PTA Medications   Medication Sig    amLODIPine (NORVASC) 5 MG tablet Take 5 mg by mouth once daily.    aspirin (ECOTRIN) 81 MG EC tablet Take 81 mg by mouth once daily.     biotin 1 mg tablet Take 1,000 mcg by mouth 3 (three) times daily.    hydroCHLOROthiazide (MICROZIDE) 12.5 mg capsule Take 12.5 mg by mouth once daily.    metoprolol tartrate (LOPRESSOR) 25 MG tablet Take 25 mg by mouth 2 (two) times daily.    pantoprazole (PROTONIX) 40 MG tablet Take 1 tablet (40 mg total) by mouth once daily.    simvastatin (ZOCOR) 20 MG tablet Take 20 mg by mouth every evening.     Review of Systems:  Constitutional: no fever or chills. + weakness, easy fatiguability  Eyes: no visual changes  Ears, nose, mouth, throat, and face: no nasal congestion or sore throat  Respiratory: + cough + BAUER  Cardiovascular: no chest pain or palpitations  Gastrointestinal: no nausea or vomiting, no abdominal pain or change in bowel habits  Genitourinary: no hematuria or dysuria  Integument/breast: no rash or pruritis  Hematologic/lymphatic: no easy bruising or lymphadenopathy  Musculoskeletal: no arthralgias or myalgias  Neurological: no seizures or tremors. + Lightheadedness  Behavioral/Psych: no auditory or visual hallucinations  Endocrine: no heat or cold intolerance     OBJECTIVE:     Vital Signs (Most Recent)  Temp: 97.1 °F (36.2 °C) (12/04/18 1006)  Pulse: 92 (12/04/18 1006)  Resp: 18 (12/04/18 1006)  BP: 135/63 (12/04/18 1006)  SpO2: 100 % (12/04/18 1006)    Physical Exam:  General appearance: well developed, appears stated age  Head: normocephalic, atraumatic  Eyes: Pallor conjunctivae/corneas clear. PERRL.  Nose: Nares normal. Septum midline.  Throat: lips, mucosa, and tongue normal; teeth and gums normal, no throat erythema.  Neck: supple, symmetrical, trachea midline, no JVD and thyroid not enlarged, symmetric, no tenderness/mass/nodules  Lungs:  clear to auscultation bilaterally and normal respiratory effort  Chest wall: no tenderness  Heart: regular rate and rhythm, S1, S2 normal, no murmur, click, rub or gallop  Abdomen: soft, non-tender non-distented; bowel sounds normal; no masses,  no  organomegaly  Extremities: no cyanosis, clubbing or edema.   Pulses: 2+ and symmetric  Skin: Skin color, texture, turgor normal. No rashes or lesions.  Lymph nodes: Cervical, supraclavicular, and axillary nodes normal.  Neurologic: Normal strength and tone. No focal numbness or weakness. CNII-XII intact.      Laboratory:   CBC:   Recent Labs   Lab 12/04/18  1153   WBC 11.20   RBC 2.79*   HGB 6.3*   HCT 20.5*   *   MCV 73*   MCH 22.4*   MCHC 30.5*     CMP:   Recent Labs   Lab 12/04/18  1152   *   CALCIUM 9.2   ALBUMIN 2.6*   PROT 6.7      K 3.3*   CO2 28      BUN 13   CREATININE 0.7   ALKPHOS 301*   ALT 30   AST 33   BILITOT 0.2     Recent Labs   Lab 12/04/18  1148   COLORU Yellow   SPECGRAV 1.015   PHUR 7.0   PROTEINUA Negative   NITRITE Negative   LEUKOCYTESUR Negative   UROBILINOGEN 1.0     Diagnostic Results:  Chest X-Ray: Cardiomegaly.  Small left pleural effusion is possible.    EGD (03-30-18):  - Normal esophagus.                       - Normal stomach.                       - Normal examined duodenum.                       - No specimens collected.    Colonoscopy (03-30-18):   - One 5 to 6 mm polyp in the distal transverse                        colon, removed with a cold snare. Resected and                        retrieved.                       - Internal hemorrhoids.                       - Patent end-to-end colo-colonic anastomosis,                        characterized by healthy appearing mucosa.                       - The examination was otherwise normal.    Assessment/Plan:     Active Hospital Problems    Diagnosis  POA    *Symptomatic anemia [D64.9]  Case discussed with Dr. Conner.  Transfuse 2 units of PRBC.   Will consult hepatologist for assistance.  Check Iron, TIBC, B12, Folate, retic count, LDH and haptoglobin level.  Patient to continue GI surveillance by her GI specialist.     Yes    Malnutrition of moderate degree [E44.0]  Nutrition consulted. Encourage maximal  PO intake. Diet supplementation ordered per nutrition approval. Will encourage PO and monitor closely for weight changes.    Yes    Hypokalemia [E87.6]  Replete KCl. Follow BMP.    Yes    History of colon cancer [Z85.038]  Patient follows with GI specialist.    Yes    Hypertension [I10]  Chronic problem. Will continue chronic medications and monitor for any changes, adjusting as needed.    Yes        VTE Risk Mitigation (From admission, onward)        Ordered     enoxaparin injection 40 mg  Daily      12/04/18 1018     IP VTE HIGH RISK PATIENT  Once      12/04/18 1018        Dahlia Li MD  Department of Hospital Medicine   [facility]

## 2018-12-05 LAB
ALBUMIN SERPL BCP-MCNC: 2.5 G/DL
ALP SERPL-CCNC: 285 U/L
ALT SERPL W/O P-5'-P-CCNC: 29 U/L
ANION GAP SERPL CALC-SCNC: 10 MMOL/L
AST SERPL-CCNC: 34 U/L
BASOPHILS # BLD AUTO: 0.2 K/UL
BASOPHILS NFR BLD: 1.9 %
BILIRUB SERPL-MCNC: 1.1 MG/DL
BLD PROD TYP BPU: NORMAL
BLOOD UNIT EXPIRATION DATE: NORMAL
BLOOD UNIT TYPE CODE: 6200
BLOOD UNIT TYPE: NORMAL
BNP SERPL-MCNC: 74 PG/ML
BUN SERPL-MCNC: 12 MG/DL
CALCIUM SERPL-MCNC: 9.1 MG/DL
CHLORIDE SERPL-SCNC: 103 MMOL/L
CO2 SERPL-SCNC: 24 MMOL/L
CODING SYSTEM: NORMAL
CREAT SERPL-MCNC: 0.6 MG/DL
DIFFERENTIAL METHOD: ABNORMAL
DISPENSE STATUS: NORMAL
EOSINOPHIL # BLD AUTO: 0.3 K/UL
EOSINOPHIL NFR BLD: 2.5 %
ERYTHROCYTE [DISTWIDTH] IN BLOOD BY AUTOMATED COUNT: 17.9 %
EST. GFR  (AFRICAN AMERICAN): >60 ML/MIN/1.73 M^2
EST. GFR  (NON AFRICAN AMERICAN): >60 ML/MIN/1.73 M^2
GLUCOSE SERPL-MCNC: 94 MG/DL
HCT VFR BLD AUTO: 25.4 %
HGB BLD-MCNC: 8.2 G/DL
IGA SERPL-MCNC: 241 MG/DL
IGG SERPL-MCNC: 732 MG/DL
IGM SERPL-MCNC: 90 MG/DL
LYMPHOCYTES # BLD AUTO: 1.3 K/UL
LYMPHOCYTES NFR BLD: 12.8 %
MCH RBC QN AUTO: 24.5 PG
MCHC RBC AUTO-ENTMCNC: 32.1 G/DL
MCV RBC AUTO: 77 FL
MONOCYTES # BLD AUTO: 0.5 K/UL
MONOCYTES NFR BLD: 4.3 %
NEUTROPHILS # BLD AUTO: 8.2 K/UL
NEUTROPHILS NFR BLD: 78.5 %
NUM UNITS TRANS PACKED RBC: NORMAL
PLATELET # BLD AUTO: 551 K/UL
PMV BLD AUTO: 7.4 FL
POTASSIUM SERPL-SCNC: 3.7 MMOL/L
PROT SERPL-MCNC: 6.4 G/DL
PROT UR-MCNC: 10 MG/DL
RBC # BLD AUTO: 3.32 M/UL
SODIUM SERPL-SCNC: 137 MMOL/L
WBC # BLD AUTO: 10.5 K/UL

## 2018-12-05 PROCEDURE — 86334 IMMUNOFIX E-PHORESIS SERUM: CPT

## 2018-12-05 PROCEDURE — 82784 ASSAY IGA/IGD/IGG/IGM EACH: CPT

## 2018-12-05 PROCEDURE — 84156 ASSAY OF PROTEIN URINE: CPT

## 2018-12-05 PROCEDURE — 80053 COMPREHEN METABOLIC PANEL: CPT

## 2018-12-05 PROCEDURE — 81376 HLA II TYPING 1 LOCUS LR: CPT | Mod: 91

## 2018-12-05 PROCEDURE — 82784 ASSAY IGA/IGD/IGG/IGM EACH: CPT | Mod: 59

## 2018-12-05 PROCEDURE — P9016 RBC LEUKOCYTES REDUCED: HCPCS

## 2018-12-05 PROCEDURE — 36415 COLL VENOUS BLD VENIPUNCTURE: CPT

## 2018-12-05 PROCEDURE — 99233 SBSQ HOSP IP/OBS HIGH 50: CPT | Mod: ,,, | Performed by: INTERNAL MEDICINE

## 2018-12-05 PROCEDURE — 25000003 PHARM REV CODE 250: Performed by: INTERNAL MEDICINE

## 2018-12-05 PROCEDURE — 97802 MEDICAL NUTRITION INDIV IN: CPT

## 2018-12-05 PROCEDURE — 83520 IMMUNOASSAY QUANT NOS NONAB: CPT

## 2018-12-05 PROCEDURE — 86335 IMMUNFIX E-PHORSIS/URINE/CSF: CPT

## 2018-12-05 PROCEDURE — 25000003 PHARM REV CODE 250: Performed by: NURSE PRACTITIONER

## 2018-12-05 PROCEDURE — 83880 ASSAY OF NATRIURETIC PEPTIDE: CPT

## 2018-12-05 PROCEDURE — 86335 IMMUNFIX E-PHORSIS/URINE/CSF: CPT | Mod: 26,,, | Performed by: PATHOLOGY

## 2018-12-05 PROCEDURE — 86334 IMMUNOFIX E-PHORESIS SERUM: CPT | Mod: 26,,, | Performed by: PATHOLOGY

## 2018-12-05 PROCEDURE — 12000002 HC ACUTE/MED SURGE SEMI-PRIVATE ROOM

## 2018-12-05 PROCEDURE — 36430 TRANSFUSION BLD/BLD COMPNT: CPT

## 2018-12-05 PROCEDURE — 63600175 PHARM REV CODE 636 W HCPCS: Performed by: INTERNAL MEDICINE

## 2018-12-05 PROCEDURE — 85025 COMPLETE CBC W/AUTO DIFF WBC: CPT

## 2018-12-05 PROCEDURE — 94761 N-INVAS EAR/PLS OXIMETRY MLT: CPT

## 2018-12-05 RX ORDER — ENOXAPARIN SODIUM 100 MG/ML
40 INJECTION SUBCUTANEOUS EVERY 12 HOURS
Status: DISCONTINUED | OUTPATIENT
Start: 2018-12-05 | End: 2018-12-07

## 2018-12-05 RX ORDER — HYDROCODONE BITARTRATE AND ACETAMINOPHEN 500; 5 MG/1; MG/1
TABLET ORAL
Status: DISCONTINUED | OUTPATIENT
Start: 2018-12-05 | End: 2018-12-07 | Stop reason: HOSPADM

## 2018-12-05 RX ADMIN — METOPROLOL TARTRATE 25 MG: 25 TABLET, FILM COATED ORAL at 08:12

## 2018-12-05 RX ADMIN — AMLODIPINE BESYLATE 5 MG: 5 TABLET ORAL at 08:12

## 2018-12-05 RX ADMIN — PANTOPRAZOLE SODIUM 40 MG: 40 TABLET, DELAYED RELEASE ORAL at 08:12

## 2018-12-05 RX ADMIN — ASPIRIN 81 MG: 81 TABLET, COATED ORAL at 08:12

## 2018-12-05 RX ADMIN — ACETAMINOPHEN 650 MG: 325 TABLET, FILM COATED ORAL at 08:12

## 2018-12-05 RX ADMIN — ENOXAPARIN SODIUM 40 MG: 100 INJECTION SUBCUTANEOUS at 12:12

## 2018-12-05 NOTE — PLAN OF CARE
PCP is Dr Conner.  Verified insurance as [employer] [facility] medicare.  Pharmacy is [facility] on [address].  Denies HH/DME.  Discharge plan is home; no needs.       12/05/18 1159   Discharge Assessment   Assessment Type Discharge Planning Assessment   Confirmed/corrected address and phone number on facesheet? Yes   Assessment information obtained from? Patient   Prior to hospitilization cognitive status: Alert/Oriented   Prior to hospitalization functional status: Independent   Current cognitive status: Alert/Oriented   Current Functional Status: Independent   Lives With spouse   Able to Return to Prior Arrangements yes   Is patient able to care for self after discharge? Yes   Patient's perception of discharge disposition home or selfcare   Readmission Within The Last 30 Days no previous admission in last 30 days   Patient currently being followed by outpatient case management? No   Patient currently receives any other outside agency services? No   Equipment Currently Used at Home none   Do you have any problems affording any of your prescribed medications? No   Is the patient taking medications as prescribed? yes   Does the patient have transportation home? Yes   Transportation Available family or friend will provide   Dialysis Name and Scheduled days none   Does the patient receive services at the [facility]? No   Discharge Plan A Home   Patient/Family In Agreement With Plan yes

## 2018-12-05 NOTE — PROGRESS NOTES
Progress Note  Hospital Medicine  Patient Name:Indigo Stuart  MRN:  [id number]  Patient Class: IP- Inpatient  Admit Date: 12/4/2018  Length of Stay: 1 days  Expected Discharge Date:   Attending Physician: Dahlia Li MD  Primary Care Provider:  John Conner MD    SUBJECTIVE:     Principal Problem: Symptomatic anemia  Initial history of present illness: Patient is a 63 y.o. female admitted to Hospitalist Service from Dr. Conner's office with complaint of low Hgb count. Patient reportedly has past medical history significant for hypertension and history of colon CA. Patient presented with complaint of easy fatigability, generalized weakness, BAUER and lightheadedness. Per Dr. Conner, Hgb ~ 7 g/dl. Patient was evaluated by GI specialist in the past for GI bleeding. Patient denied any recent GI bleeding, bleeding per rectum, melena or hematuria. No easy bruising reported. Patient also reports dry non-productive cough. Patient denied chest pain, shortness of breath, abdominal pain, nausea, vomiting, headache, vision changes, focal neuro-deficits or fever.    PMH/PSH/SH/FH/Meds: reviewed.    Symptoms/Review of Systems: Patient received 2 units of PRBC yesterday. Tmax 100.9F. Still feels lightheaded and weak. No shortness of breath, cough, chest pain or headache, fever or abdominal pain.     Diet:  Adequate intake.    Activity level: Normal.    Pain:  Patient reports no pain.       OBJECTIVE:   Vital Signs (Most Recent):      Temp: 100 °F (37.8 °C) (12/05/18 0721)  Pulse: 92 (12/05/18 0721)  Resp: 18 (12/05/18 0721)  BP: (!) 141/67 (12/05/18 0721)  SpO2: 97 % (12/05/18 0721)       Vital Signs Range (Last 24H):  Temp:  [98.1 °F (36.7 °C)-100.9 °F (38.3 °C)]   Pulse:  []   Resp:  [16-20]   BP: (134-189)/(61-84)   SpO2:  [95 %-100 %]     Weight: 117.9 kg (260 lb)  Body mass index is 47.55 kg/m².    Intake/Output Summary (Last 24 hours) at 12/5/2018 1133  Last data filed at 12/5/2018 0800  Gross per 24 hour    Intake 1713.33 ml   Output 1840 ml   Net -126.67 ml     Physical Examination:  General appearance: well developed, appears stated age  Head: normocephalic, atraumatic  Eyes: Pallor conjunctivae/corneas clear. PERRL.  Nose: Nares normal. Septum midline.  Throat: lips, mucosa, and tongue normal; teeth and gums normal, no throat erythema.  Neck: supple, symmetrical, trachea midline, no JVD and thyroid not enlarged, symmetric, no tenderness/mass/nodules  Lungs:  clear to auscultation bilaterally and normal respiratory effort  Chest wall: no tenderness  Heart: regular rate and rhythm, S1, S2 normal, no murmur, click, rub or gallop  Abdomen: soft, non-tender non-distented; bowel sounds normal; no masses,  no organomegaly  Extremities: no cyanosis, clubbing or edema.   Pulses: 2+ and symmetric  Skin: Skin color, texture, turgor normal. No rashes or lesions.  Lymph nodes: Cervical, supraclavicular, and axillary nodes normal.  Neurologic: Normal strength and tone. No focal numbness or weakness. CNII-XII intact.      CBC:  Recent Labs   Lab 12/04/18  1153 12/05/18  0450   WBC 11.20 10.50   RBC 2.79* 3.32*   HGB 6.3* 8.2*   HCT 20.5* 25.4*   * 551*   MCV 73* 77*   MCH 22.4* 24.5*   MCHC 30.5* 32.1   BMP  Recent Labs   Lab 12/04/18  1152 12/05/18  0450   * 94    137   K 3.3* 3.7    103   CO2 28 24   BUN 13 12   CREATININE 0.7 0.6   CALCIUM 9.2 9.1      Diagnostic Results:  Microbiology Results (last 7 days)     ** No results found for the last 168 hours. **         Chest X-Ray: Cardiomegaly.  Small left pleural effusion is possible.     EGD (03-30-18):  - Normal esophagus.                       - Normal stomach.                       - Normal examined duodenum.                       - No specimens collected.     Colonoscopy (03-30-18):   - One 5 to 6 mm polyp in the distal transverse                        colon, removed with a cold snare. Resected and                        retrieved.                        - Internal hemorrhoids.                       - Patent end-to-end colo-colonic anastomosis,                        characterized by healthy appearing mucosa.                       - The examination was otherwise normal.    Assessment/Plan:      *Symptomatic anemia [D64.9] s/p 2 PRBC  Follow Dr. Pettit's recommendations. Transfuse 1 unit of PRBC.  Patient to continue GI surveillance by her GI specialist.       Yes    Malnutrition of moderate degree [E44.0]  Encourage maximal PO intake. Diet supplementation ordered per nutrition approval. Will encourage PO and monitor closely for weight changes.      Yes    Hypokalemia [E87.6] - corrected  Follow BMP.      Yes    History of colon cancer [Z85.038]  Patient follows with GI specialist.      Yes    Hypertension [I10]  Continue chronic medications and monitor for any changes, adjusting as needed.      Yes     VTE Risk Mitigation (From admission, onward)        Ordered     enoxaparin injection 40 mg  Daily      12/04/18 1018     IP VTE HIGH RISK PATIENT  Once      12/04/18 1018        Dahlia Li MD  Department of Hospital Medicine   [facility]

## 2018-12-05 NOTE — PLAN OF CARE
Problem: Nutrition, Imbalanced: Inadequate Oral Intake (Adult)  Intervention: Promote/Optimize Nutrition   Intervention: Nutrition Education, Nutrition Supplement Therapy-Commercial Food, and calorie/ sodium modified diet     Recommendation:  1) Rec. continue diet as ordered  2) Add Boost Pudding BID until appetite improves   3) Education verbally given to pt concerning iron sources  4) Please take measured wt when able     Goals: PO intakes > 50% of meals and supplements at f/u   Nutrition Goal Status: new  Communication of RD Recs: (POC, sticky note, second sign)

## 2018-12-05 NOTE — PROGRESS NOTES
12/04/18 1958   Patient Assessment/Suction   Level of Consciousness (AVPU) alert   Respiratory Effort Normal;Unlabored   PRE-TX-O2-ETCO2   O2 Device (Oxygen Therapy) room air   SpO2 97 %   Pulse Oximetry Type Intermittent   Pulse (!) 118

## 2018-12-05 NOTE — CONSULTS
"  [facility]  Adult Nutrition  Consult Note    SUMMARY    Intervention: Nutrition Education, Nutrition Supplement Therapy-Commercial Food, and calorie/ sodium modified diet    Recommendation:  1) Rec. continue diet as ordered  2) Add Boost Pudding BID until appetite improves   3) Education verbally given to pt concerning iron sources  4) Please take measured wt when able    Goals: PO intakes > 50% of meals and supplements at f/u   Nutrition Goal Status: new  Communication of RD Recs: (POC, sticky note, second sign)    Reason for Assessment    Reason for Assessment: consult  Diagnosis: (Symptomatic anemia)  Relevant Medical History: HTN, Colon CA, GI bleed  Interdisciplinary Rounds: attended    General Information Comments: 62 y/o female admitted with symptomatic anemia. Pt has had a decreased appetite x the past "few months," but denies GI symptoms. No weight loss noted. NFPE done, pt appears obese no wasting noted. Educated pt on iron sources, she declined to talk about wt loss at this time. Pt did not eat much of breakfast this morning because she did not like how it tasted.     Nutrition Discharge Planning: To be determined- Heart healthy diet/decreased calories    Nutrition Risk Screen    Nutrition Risk Screen: no indicators present    Nutrition/Diet History    Patient Reported Diet/Restrictions/Preferences: general  Do you have any cultural, spiritual, Sikhism conflicts, given your current situation?: none  Food Allergies: NKFA(or intolerances)  Factors Affecting Nutritional Intake: decreased appetite    Anthropometrics    Temp: 100 °F (37.8 °C)  Height Method: Measured  Height: 5' 2"  Height (inches): 62 in  Weight Method: Stated  Weight: 117.9 kg (259 lb 14.8 oz)  Weight (lb): 259.92 lb  Ideal Body Weight (IBW), Female: 110 lb  % Ideal Body Weight, Female (lb): 236.29 lb  BMI (Calculated): 47.6  BMI Grade: greater than 40 - morbid obesity  Usual Body Weight (UBW), k.3 " kg(3/16/18)  % Usual Body Weight: 104.28  % Weight Change From Usual Weight: 4.06 %       Lab/Procedures/Meds    Pertinent Labs Reviewed: reviewed  BMP  Lab Results   Component Value Date     12/05/2018    K 3.7 12/05/2018     12/05/2018    CO2 24 12/05/2018    BUN 12 12/05/2018    CREATININE 0.6 12/05/2018    CALCIUM 9.1 12/05/2018    ANIONGAP 10 12/05/2018    ESTGFRAFRICA >60 12/05/2018    EGFRNONAA >60 12/05/2018     Lab Results   Component Value Date    IRON <10 (L) 12/04/2018    TIBC 425 12/04/2018         Pertinent Medications Reviewed: reviewed  Pertinent Medications Comments: KCl, statin    Physical Findings/Assessment    Overall Physical Appearance: obese  Oral/Mouth Cavity: (Pt has most of her teeth)  Skin: (Earnest = 20)    Estimated/Assessed Needs    Weight Used For Calorie Calculations: 111.4 kg (245 lb 9.5 oz)(4/12/18 last measured wt)  Energy Calorie Requirements (kcal): [place] St Jeor (no activity factor/obesity ) 1615 kcal  Energy Need Method: [place]-St Jeor  Protein Requirements: 0.8-1.0 g protein/kg (  g protein)  Weight Used For Protein Calculations: 112.4 kg (247 lb 12.8 oz)  Fluid Requirements (mL): 1615 ml  Fluid Need Method: RDA Method  CHO Requirement: N/A      Nutrition Prescription Ordered    Current Diet Order: Cardiac    Evaluation of Received Nutrient/Fluid Intake    Energy Calories Required: not meeting needs  Protein Required: not meeting needs  Fluid Required: meeting needs  Tolerance: tolerating  % Intake of Estimated Energy Needs: 30%  % Meal Intake: 25-30%    Nutrition Risk    Level of Risk/Frequency of Follow-up: moderate 2 x weekly     Assessment and Plan  Inadequate energy intake   R/t decreased appetite  As evidenced by PO intakes < 75% of needs x > 1 month  New    Pt only meets one criteria for malnutrition at this time,  will reassess once measured wt taken    Monitor and Evaluation    Food and Nutrient Intake: energy intake, food and beverage intake  Food  and Nutrient Adminstration: diet order  Anthropometric Measurements: weight  Biochemical Data, Medical Tests and Procedures: electrolyte and renal panel, glucose/endocrine profile  Nutrition-Focused Physical Findings: overall appearance     Nutrition Follow-Up    RD Follow-up?: Yes

## 2018-12-05 NOTE — PLAN OF CARE
Pt resting quietly at this time. Able to make needs known. Cont B/B. 2 units PRBC received. Tolerated well. Temp noted. PRN Tylenol administered. Denies pain at this time.

## 2018-12-05 NOTE — PROGRESS NOTES
The enoxaparin dose has been adjusted for Indigo Phillips Narciso [id number] according to the pharmacy practice protocol.      Based on the patient's Estimated Creatinine Clearance: 113.9 mL/min (based on SCr of 0.6 mg/dL)., Body mass index is 47.55 kg/m²., and weight= 117.9 kg (260 lb), the enoxaparin dose has been adjusted 40 mg every 12 hours for CrCl =/>30 and BMI=/>40.    Thank you,  Kush Glass, PharmD

## 2018-12-06 ENCOUNTER — TELEPHONE (OUTPATIENT)
Dept: HEMATOLOGY/ONCOLOGY | Facility: CLINIC | Age: 63
End: 2018-12-06

## 2018-12-06 DIAGNOSIS — D64.9 ANEMIA, UNSPECIFIED TYPE: Primary | ICD-10-CM

## 2018-12-06 LAB
AFP SERPL-MCNC: 2.6 NG/ML
ALBUMIN SERPL BCP-MCNC: 2.5 G/DL
ALP SERPL-CCNC: 289 U/L
ALT SERPL W/O P-5'-P-CCNC: 29 U/L
ANION GAP SERPL CALC-SCNC: 10 MMOL/L
AST SERPL-CCNC: 32 U/L
BASOPHILS # BLD AUTO: 0.1 K/UL
BASOPHILS NFR BLD: 0.7 %
BILIRUB SERPL-MCNC: 0.9 MG/DL
BUN SERPL-MCNC: 12 MG/DL
CALCIUM SERPL-MCNC: 9.1 MG/DL
CANCER AG125 SERPL-ACNC: 37 U/ML
CANCER AG19-9 SERPL-ACNC: 221 U/ML
CEA SERPL-MCNC: 643.1 NG/ML
CHLORIDE SERPL-SCNC: 104 MMOL/L
CO2 SERPL-SCNC: 26 MMOL/L
CREAT SERPL-MCNC: 0.7 MG/DL
DIFFERENTIAL METHOD: ABNORMAL
EOSINOPHIL # BLD AUTO: 0.4 K/UL
EOSINOPHIL NFR BLD: 3.5 %
ERYTHROCYTE [DISTWIDTH] IN BLOOD BY AUTOMATED COUNT: 17.1 %
EST. GFR  (AFRICAN AMERICAN): >60 ML/MIN/1.73 M^2
EST. GFR  (NON AFRICAN AMERICAN): >60 ML/MIN/1.73 M^2
GLUCOSE SERPL-MCNC: 80 MG/DL
HCT VFR BLD AUTO: 29.7 %
HGB BLD-MCNC: 9.4 G/DL
INTERPRETATION SERPL IFE-IMP: NORMAL
KAPPA LC SER QL IA: 1.92 MG/DL
KAPPA LC/LAMBDA SER IA: 1.19
LAMBDA LC SER QL IA: 1.62 MG/DL
LYMPHOCYTES # BLD AUTO: 1.4 K/UL
LYMPHOCYTES NFR BLD: 13.4 %
MCH RBC QN AUTO: 25 PG
MCHC RBC AUTO-ENTMCNC: 31.8 G/DL
MCV RBC AUTO: 78 FL
MONOCYTES # BLD AUTO: 0.9 K/UL
MONOCYTES NFR BLD: 8.2 %
NEUTROPHILS # BLD AUTO: 7.9 K/UL
NEUTROPHILS NFR BLD: 74.2 %
PATHOLOGIST INTERPRETATION IFE: NORMAL
PLATELET # BLD AUTO: 628 K/UL
PMV BLD AUTO: 7.4 FL
POTASSIUM SERPL-SCNC: 3.5 MMOL/L
PROT SERPL-MCNC: 6.5 G/DL
RBC # BLD AUTO: 3.78 M/UL
SODIUM SERPL-SCNC: 140 MMOL/L
WBC # BLD AUTO: 10.6 K/UL

## 2018-12-06 PROCEDURE — 85025 COMPLETE CBC W/AUTO DIFF WBC: CPT

## 2018-12-06 PROCEDURE — 25500020 PHARM REV CODE 255

## 2018-12-06 PROCEDURE — 25000003 PHARM REV CODE 250: Performed by: NURSE PRACTITIONER

## 2018-12-06 PROCEDURE — 63600175 PHARM REV CODE 636 W HCPCS: Performed by: INTERNAL MEDICINE

## 2018-12-06 PROCEDURE — 86304 IMMUNOASSAY TUMOR CA 125: CPT

## 2018-12-06 PROCEDURE — 82105 ALPHA-FETOPROTEIN SERUM: CPT

## 2018-12-06 PROCEDURE — 80053 COMPREHEN METABOLIC PANEL: CPT

## 2018-12-06 PROCEDURE — 99232 SBSQ HOSP IP/OBS MODERATE 35: CPT | Mod: ,,, | Performed by: INTERNAL MEDICINE

## 2018-12-06 PROCEDURE — 99233 SBSQ HOSP IP/OBS HIGH 50: CPT | Mod: ,,, | Performed by: INTERNAL MEDICINE

## 2018-12-06 PROCEDURE — 25000003 PHARM REV CODE 250: Performed by: INTERNAL MEDICINE

## 2018-12-06 PROCEDURE — 36415 COLL VENOUS BLD VENIPUNCTURE: CPT

## 2018-12-06 PROCEDURE — 12000002 HC ACUTE/MED SURGE SEMI-PRIVATE ROOM

## 2018-12-06 PROCEDURE — 82378 CARCINOEMBRYONIC ANTIGEN: CPT

## 2018-12-06 PROCEDURE — 86301 IMMUNOASSAY TUMOR CA 19-9: CPT

## 2018-12-06 PROCEDURE — 94761 N-INVAS EAR/PLS OXIMETRY MLT: CPT

## 2018-12-06 RX ORDER — BENZONATATE 100 MG/1
200 CAPSULE ORAL 3 TIMES DAILY PRN
Status: DISCONTINUED | OUTPATIENT
Start: 2018-12-06 | End: 2018-12-07 | Stop reason: HOSPADM

## 2018-12-06 RX ORDER — FERROUS SULFATE 325(65) MG
325 TABLET, DELAYED RELEASE (ENTERIC COATED) ORAL 2 TIMES DAILY
Status: DISCONTINUED | OUTPATIENT
Start: 2018-12-06 | End: 2018-12-07 | Stop reason: HOSPADM

## 2018-12-06 RX ORDER — ATORVASTATIN CALCIUM 20 MG/1
20 TABLET, FILM COATED ORAL NIGHTLY
COMMUNITY
End: 2019-10-28 | Stop reason: SDUPTHER

## 2018-12-06 RX ORDER — SIMVASTATIN 20 MG/1
20 TABLET, FILM COATED ORAL NIGHTLY
Status: ON HOLD | COMMUNITY
End: 2018-12-06

## 2018-12-06 RX ORDER — DOCUSATE SODIUM 100 MG/1
100 CAPSULE, LIQUID FILLED ORAL 2 TIMES DAILY PRN
Status: DISCONTINUED | OUTPATIENT
Start: 2018-12-06 | End: 2018-12-07 | Stop reason: HOSPADM

## 2018-12-06 RX ORDER — SODIUM CHLORIDE 9 MG/ML
INJECTION, SOLUTION INTRAVENOUS
Status: DISPENSED
Start: 2018-12-06 | End: 2018-12-06

## 2018-12-06 RX ORDER — ATORVASTATIN CALCIUM 20 MG/1
20 TABLET, FILM COATED ORAL NIGHTLY
Status: DISCONTINUED | OUTPATIENT
Start: 2018-12-06 | End: 2018-12-07 | Stop reason: HOSPADM

## 2018-12-06 RX ADMIN — ACETAMINOPHEN 650 MG: 325 TABLET, FILM COATED ORAL at 08:12

## 2018-12-06 RX ADMIN — BENZONATATE 200 MG: 100 CAPSULE ORAL at 10:12

## 2018-12-06 RX ADMIN — ENOXAPARIN SODIUM 40 MG: 100 INJECTION SUBCUTANEOUS at 08:12

## 2018-12-06 RX ADMIN — PANTOPRAZOLE SODIUM 40 MG: 40 TABLET, DELAYED RELEASE ORAL at 09:12

## 2018-12-06 RX ADMIN — ATORVASTATIN CALCIUM 20 MG: 20 TABLET, FILM COATED ORAL at 10:12

## 2018-12-06 RX ADMIN — IOHEXOL 100 ML: 350 INJECTION, SOLUTION INTRAVENOUS at 10:12

## 2018-12-06 RX ADMIN — ENOXAPARIN SODIUM 40 MG: 100 INJECTION SUBCUTANEOUS at 09:12

## 2018-12-06 RX ADMIN — AMLODIPINE BESYLATE 5 MG: 5 TABLET ORAL at 09:12

## 2018-12-06 RX ADMIN — FERROUS SULFATE TAB EC 325 MG (65 MG FE EQUIVALENT) 325 MG: 325 (65 FE) TABLET DELAYED RESPONSE at 08:12

## 2018-12-06 RX ADMIN — METOPROLOL TARTRATE 25 MG: 25 TABLET, FILM COATED ORAL at 08:12

## 2018-12-06 RX ADMIN — FERROUS SULFATE TAB EC 325 MG (65 MG FE EQUIVALENT) 325 MG: 325 (65 FE) TABLET DELAYED RESPONSE at 09:12

## 2018-12-06 NOTE — PLAN OF CARE
"Problem: Patient Care Overview  Goal: Plan of Care Review  Patient alert and oriented.  Patient completed blood transfusion this shift, tolerated well.  Sinus rhythm.  Up ad patel.  Patient prefers to be called "Candy."  Full code.  Call light in reach.  Bed in low/locked position.  Call light in reach.  No complaints at this time.          "

## 2018-12-06 NOTE — PROGRESS NOTES
Waiting for patient's AM labs to result.   Dr. Li wants to be called with results, pending patient discharge by 9 AM.

## 2018-12-06 NOTE — PROGRESS NOTES
Progress Note  Hospital Medicine  Patient Name:Indigo Stuart  MRN:  [id number]  Patient Class: IP- Inpatient  Admit Date: 12/4/2018  Length of Stay: 2 days  Expected Discharge Date:   Attending Physician: Dahlia Li MD  Primary Care Provider:  John Conner MD    SUBJECTIVE:     Principal Problem: Symptomatic anemia  Initial history of present illness: Patient is a 63 y.o. female admitted to Hospitalist Service from Dr. Conner's office with complaint of low Hgb count. Patient reportedly has past medical history significant for hypertension and history of colon CA. Patient presented with complaint of easy fatigability, generalized weakness, BAUER and lightheadedness. Per Dr. Conner, Hgb ~ 7 g/dl. Patient was evaluated by GI specialist in the past for GI bleeding. Patient denied any recent GI bleeding, bleeding per rectum, melena or hematuria. No easy bruising reported. Patient also reports dry non-productive cough. Patient denied chest pain, shortness of breath, abdominal pain, nausea, vomiting, headache, vision changes, focal neuro-deficits or fever.    PMH/PSH/SH/FH/Meds: reviewed.    Symptoms/Review of Systems: Patient received 3 units of PRBC. No shortness of breath, cough, chest pain or headache, fever or abdominal pain. Patient is feeling much better, anxious to go home.  Diet:  Adequate intake.    Activity level: Normal.    Pain:  Patient reports no pain.       OBJECTIVE:   Vital Signs (Most Recent):      Temp: 99.5 °F (37.5 °C) (12/06/18 0726)  Pulse: 78 (12/06/18 0726)  Resp: 18 (12/06/18 0726)  BP: (!) 147/72 (12/06/18 0726)  SpO2: 95 % (12/06/18 0726)       Vital Signs Range (Last 24H):  Temp:  [97.1 °F (36.2 °C)-99.5 °F (37.5 °C)]   Pulse:  [67-91]   Resp:  [16-20]   BP: (123-147)/(58-72)   SpO2:  [94 %-98 %]     Weight: 117.9 kg (259 lb 14.8 oz)  Body mass index is 47.54 kg/m².    Intake/Output Summary (Last 24 hours) at 12/6/2018 7314  Last data filed at 12/6/2018 0400  Gross per 24 hour    Intake 2236.67 ml   Output --   Net 2236.67 ml     Physical Examination:  General appearance: well developed, appears stated age  Head: normocephalic, atraumatic  Eyes: Pallor conjunctivae/corneas clear. PERRL.  Nose: Nares normal. Septum midline.  Throat: lips, mucosa, and tongue normal; teeth and gums normal, no throat erythema.  Neck: supple, symmetrical, trachea midline, no JVD and thyroid not enlarged, symmetric, no tenderness/mass/nodules  Lungs:  clear to auscultation bilaterally and normal respiratory effort  Chest wall: no tenderness  Heart: regular rate and rhythm, S1, S2 normal, no murmur, click, rub or gallop  Abdomen: soft, non-tender non-distented; bowel sounds normal; no masses,  no organomegaly  Extremities: no cyanosis, clubbing or edema.   Pulses: 2+ and symmetric  Skin: Skin color, texture, turgor normal. No rashes or lesions.  Lymph nodes: Cervical, supraclavicular, and axillary nodes normal.  Neurologic: Normal strength and tone. No focal numbness or weakness. CNII-XII intact.      CBC:  Recent Labs   Lab 12/04/18  1153 12/05/18  0450 12/06/18  0433   WBC 11.20 10.50 10.60   RBC 2.79* 3.32* 3.78*   HGB 6.3* 8.2* 9.4*   HCT 20.5* 25.4* 29.7*   * 551* 628*   MCV 73* 77* 78*   MCH 22.4* 24.5* 25.0*   MCHC 30.5* 32.1 31.8*   BMP  Recent Labs   Lab 12/04/18  1152 12/05/18  0450 12/06/18  0433   * 94 80    137 140   K 3.3* 3.7 3.5    103 104   CO2 28 24 26   BUN 13 12 12   CREATININE 0.7 0.6 0.7   CALCIUM 9.2 9.1 9.1      Diagnostic Results:  Microbiology Results (last 7 days)     ** No results found for the last 168 hours. **         Chest X-Ray: Cardiomegaly.  Small left pleural effusion is possible.     EGD (03-30-18):  - Normal esophagus.                       - Normal stomach.                       - Normal examined duodenum.                       - No specimens collected.     Colonoscopy (03-30-18):   - One 5 to 6 mm polyp in the distal transverse                         colon, removed with a cold snare. Resected and                        retrieved.                       - Internal hemorrhoids.                       - Patent end-to-end colo-colonic anastomosis,                        characterized by healthy appearing mucosa.                       - The examination was otherwise normal.    Assessment/Plan:      *Symptomatic anemia [D64.9] s/p 3 PRBC  Iron deficiency anemia  Follow Dr. Pettit's recommendations.   Patient to continue GI surveillance by her GI specialist.   Start Iron supplementation with stool softener.      Yes    Malnutrition of moderate degree [E44.0]  Encourage maximal PO intake. Diet supplementation ordered per nutrition approval. Will encourage PO and monitor closely for weight changes.      Yes    Hypokalemia [E87.6] - corrected  Follow BMP.      Yes    History of colon cancer [Z85.038]  Possible liver mets.  Patient follows with GI specialist. Discussed with Dr. Pettit, will check CT chest, abdomen and pelvis with IV contrast. Check tumor markers.       Yes    Hypertension [I10]  Continue chronic medications and monitor for any changes, adjusting as needed.      Yes   Discussed with patient's . Time spent in care of the patient, counseling and coordination of care (Greater than 50% spent in direct face to face contact): 35 min.    VTE Risk Mitigation (From admission, onward)        Ordered     enoxaparin injection 40 mg  Every 12 hours      12/05/18 1138     IP VTE HIGH RISK PATIENT  Once      12/04/18 1018        Dahlia Li MD  Department of Hospital Medicine   [facility]

## 2018-12-06 NOTE — TELEPHONE ENCOUNTER
----- Message from Karina Pettit MD sent at 12/6/2018  6:31 AM CST -----  Please set her up for OV next week with cbc and diff    Les

## 2018-12-06 NOTE — PLAN OF CARE
12/05/18 2120   PRE-TX-O2-ETCO2   O2 Device (Oxygen Therapy) room air   SpO2 97 %   Pulse Oximetry Type Intermittent   $ Pulse Oximetry - Multiple Charge Pulse Oximetry - Multiple

## 2018-12-06 NOTE — PROGRESS NOTES
[facility]  Hematology/Oncology  Progress Note    Patient Name: Indigo Stuart  Admission Date: 12/4/2018  Hospital Length of Stay: 2 days  Code Status: Full Code   December 6   Subjective:     Interval History:     Pt admitted with severe anemia. She has received 2 units of PRBCs. Hb increased from 6.3 to 8.2. Her platelets decreased from 766 to 551. I had seen the pt this am and thought her abdominal ultrasound was negative .  I appreciate Dr Li calling me. I am seeing her currently to review the findings with her. She has multiple hyperechoic lever lesions whic could be mets from her prior colon cancer or the radiologist felt they could be a variant of infection. She has just had a CT of CAP and results are pending         Medications:  Continuous Infusions:  Scheduled Meds:   amLODIPine  5 mg Oral Daily    aspirin  81 mg Oral Daily    enoxaparin  40 mg Subcutaneous Q12H    metoprolol tartrate  25 mg Oral BID    pantoprazole  40 mg Oral Daily    simvastatin  20 mg Oral QHS     PRN Meds:sodium chloride, sodium chloride, acetaminophen, dextrose 50%, dextrose 50%, glucagon (human recombinant), glucose, glucose, sodium chloride 0.9%     Review of Systems     Review of Systems:  General: Weight gain: No, Weight Loss: No, Fatigue: No  Fever: No, Chills: No, Night Sweats: No, Insomnia: No, Excessive sleeping: No   Respiratory:  Cough: No, Shortness of Breath: No,   Wheezing: No, Excessive Snoring: No, Coughing up blood: No  Endocrine: Heat Intolerance: No, Cold Intolerance: No,   Excessive Thirst: No, Excessive Hunger: No,   Eyes:  Blurred Vision: No, Double Vision: No,   Light Sensitivity: No, Eye pain: No  Musculoskeletal: Muscle Aches/Pain: No, Joint Pain/Swelling: No, Muscle Cramps: No, Muscle Weakness: No, Neck Pain: No, Back Pain: No   Neurological: Difficulty Walking/Falls: Yes, Headache Migraine: No, Dizziness/Vertigo: No, Fainting: No, Difficulty with Speech: No, Weakness:  No, Tingling/Numbness: No, Tremors: No,  Memory Problems: No, Seizures: No, Difficulty Swallowing: No, Altered Taste: No.  Cardiovascular: Chest Pain: No, Shortness of Breath: No,   Palpitations: No,  Gastrointestinal: Nausea/Vomiting: No, Constipation: No, Diarrhea: No, Bloody Stools: No   Psych/Cog:  Depression: No, Anxiety: No, Hallucinations: No, Problems Concentrating: No  : Frequent Urination: No, Incontinence: No, Blood of Urine: No, Urinary Infections: No,   ENT:Hearing Loss: No, Earache: No, Ringing in Ears: No,   Facial Pain: No, Chronic Congestion: No   Immune: Seasonal Allergies: No, Hives and/or Rashes: No  The remainder of the review of twelve body systems was reviewed and normal      Objective:     Vital Signs (Most Recent):  Temp: 98.4 °F (36.9 °C) (12/06/18 0423)  Pulse: 80 (12/06/18 0423)  Resp: 16 (12/06/18 0423)  BP: 136/62 (12/06/18 0423)  SpO2: 96 % (12/06/18 0423) Vital Signs (24h Range):  Temp:  [97.1 °F (36.2 °C)-100 °F (37.8 °C)] 98.4 °F (36.9 °C)  Pulse:  [67-92] 80  Resp:  [16-20] 16  SpO2:  [94 %-98 %] 96 %  BP: (123-141)/(58-70) 136/62     Weight: 117.9 kg (259 lb 14.8 oz)  Body mass index is 47.54 kg/m².  Body surface area is 2.27 meters squared.      Intake/Output Summary (Last 24 hours) at 12/6/2018 0623  Last data filed at 12/6/2018 0400  Gross per 24 hour   Intake 3950 ml   Output --   Net 3950 ml       Physical Exam  Gen: NAD, A and O x3,   Psych: pleasant affect, normal thought process  Eyes: Pupils round and non dilated, EOM intact  Nose: Nares patent  OP clear, mucosa patent  Neck: suppple, no JVD, trachea midline, no palpable mass  Lungs: CTAB, no wheezes, no use of accessory muscles  CV: S1S2 with RRR, No mrg  Abd: soft, NTND, + BS, No HSM, no ascites  Extr: No CCE, ANN, strength normal, good capillary refill  Neuro: steady gait, CNs grossly intact  Skin: intact, no lesions noted  Rheum: No joint swelling  Significant Labs:     Pending from today  Lab Results   Component  Value Date    WBC 10.50 12/05/2018    RBC 3.32 (L) 12/05/2018    HGB 8.2 (L) 12/05/2018    HCT 25.4 (L) 12/05/2018    MCV 77 (L) 12/05/2018    MCH 24.5 (L) 12/05/2018    MCHC 32.1 12/05/2018    RDW 17.9 (H) 12/05/2018     (H) 12/05/2018    MPV 7.4 (L) 12/05/2018    GRAN 8.2 (H) 12/05/2018    GRAN 78.5 (H) 12/05/2018    LYMPH 1.3 12/05/2018    LYMPH 12.8 (L) 12/05/2018    MONO 0.5 12/05/2018    MONO 4.3 12/05/2018    EOS 0.3 12/05/2018    BASO 0.20 12/05/2018    EOSINOPHIL 2.5 12/05/2018    BASOPHIL 1.9 12/05/2018           Assessment/Plan:     Active Diagnoses:    Diagnosis Date Noted POA    PRINCIPAL PROBLEM:  Symptomatic anemia [D64.9] 03/29/2018 Yes    Malnutrition of moderate degree [E44.0] 12/04/2018 Yes    Hypokalemia [E87.6] 12/04/2018 Yes    History of colon cancer [Z85.038] 03/29/2018 Yes    Hypertension [I10] 03/29/2018 Yes      Problems Resolved During this Admission:       Anemia and thrombocytosis stable  Clinically she appears stable   Her discharge this am was held in lieu of ultrasound findings  Pt is still asking if she can go home  I explained if she has findings to suggest a possible malignancy I would prefer she stay so that we could get a tissue biopsy for diagnosis. I also mentioned she may even need an MRI if the ct is not clear as to the findings in her liver. She politely agreed to wait for the ct results and I will phone her as soon as they are available .   She should cont PPI for GERD and metoprolol to help with heart rate and HTN        Thank you     Karina Pettit MD  Hematology/Oncology  [facility]

## 2018-12-06 NOTE — PROGRESS NOTES
Patient asking for labs results, AM results for CMP still haven't posted.  Will pass on report to day shift nurse.

## 2018-12-06 NOTE — TELEPHONE ENCOUNTER
Called and spoke to pt to schedule a hospital f/u with . Pt confirmed apt scheduled and verbalized understanding.

## 2018-12-07 VITALS
HEIGHT: 62 IN | SYSTOLIC BLOOD PRESSURE: 140 MMHG | TEMPERATURE: 97 F | WEIGHT: 259.94 LBS | OXYGEN SATURATION: 98 % | HEART RATE: 94 BPM | BODY MASS INDEX: 47.84 KG/M2 | RESPIRATION RATE: 17 BRPM | DIASTOLIC BLOOD PRESSURE: 64 MMHG

## 2018-12-07 LAB
ALBUMIN SERPL BCP-MCNC: 2.4 G/DL
ALP SERPL-CCNC: 276 U/L
ALT SERPL W/O P-5'-P-CCNC: 34 U/L
ANION GAP SERPL CALC-SCNC: 10 MMOL/L
AST SERPL-CCNC: 40 U/L
BASOPHILS # BLD AUTO: 0 K/UL
BASOPHILS NFR BLD: 0.2 %
BILIRUB SERPL-MCNC: 0.5 MG/DL
BUN SERPL-MCNC: 13 MG/DL
CALCIUM SERPL-MCNC: 8.8 MG/DL
CHLORIDE SERPL-SCNC: 104 MMOL/L
CO2 SERPL-SCNC: 25 MMOL/L
CREAT SERPL-MCNC: 0.6 MG/DL
DIFFERENTIAL METHOD: ABNORMAL
EOSINOPHIL # BLD AUTO: 0.4 K/UL
EOSINOPHIL NFR BLD: 3.8 %
ERYTHROCYTE [DISTWIDTH] IN BLOOD BY AUTOMATED COUNT: 17.9 %
EST. GFR  (AFRICAN AMERICAN): >60 ML/MIN/1.73 M^2
EST. GFR  (NON AFRICAN AMERICAN): >60 ML/MIN/1.73 M^2
GLUCOSE SERPL-MCNC: 95 MG/DL
HCT VFR BLD AUTO: 27.1 %
HGB BLD-MCNC: 8.7 G/DL
INR PPP: 1.1
INTERPRETATION UR IFE-IMP: NORMAL
LYMPHOCYTES # BLD AUTO: 1.2 K/UL
LYMPHOCYTES NFR BLD: 11.7 %
MCH RBC QN AUTO: 25 PG
MCHC RBC AUTO-ENTMCNC: 32.1 G/DL
MCV RBC AUTO: 78 FL
MONOCYTES # BLD AUTO: 0.9 K/UL
MONOCYTES NFR BLD: 8.7 %
NEUTROPHILS # BLD AUTO: 7.9 K/UL
NEUTROPHILS NFR BLD: 75.6 %
PLATELET # BLD AUTO: 613 K/UL
PMV BLD AUTO: 7.3 FL
POTASSIUM SERPL-SCNC: 3.6 MMOL/L
PROT SERPL-MCNC: 6.2 G/DL
PROTHROMBIN TIME: 11.4 SEC
RBC # BLD AUTO: 3.48 M/UL
SODIUM SERPL-SCNC: 139 MMOL/L
WBC # BLD AUTO: 10.4 K/UL

## 2018-12-07 PROCEDURE — 85025 COMPLETE CBC W/AUTO DIFF WBC: CPT

## 2018-12-07 PROCEDURE — 99239 HOSP IP/OBS DSCHRG MGMT >30: CPT | Mod: ,,, | Performed by: INTERNAL MEDICINE

## 2018-12-07 PROCEDURE — 85610 PROTHROMBIN TIME: CPT

## 2018-12-07 PROCEDURE — 88307 TISSUE EXAM BY PATHOLOGIST: CPT | Mod: 26,,, | Performed by: PATHOLOGY

## 2018-12-07 PROCEDURE — 63600175 PHARM REV CODE 636 W HCPCS

## 2018-12-07 PROCEDURE — 80053 COMPREHEN METABOLIC PANEL: CPT

## 2018-12-07 PROCEDURE — 88342 IMHCHEM/IMCYTCHM 1ST ANTB: CPT | Performed by: PATHOLOGY

## 2018-12-07 PROCEDURE — 88342 IMHCHEM/IMCYTCHM 1ST ANTB: CPT | Mod: 26,,, | Performed by: PATHOLOGY

## 2018-12-07 PROCEDURE — 36415 COLL VENOUS BLD VENIPUNCTURE: CPT

## 2018-12-07 PROCEDURE — 0FB03ZX EXCISION OF LIVER, PERCUTANEOUS APPROACH, DIAGNOSTIC: ICD-10-PCS | Performed by: RADIOLOGY

## 2018-12-07 PROCEDURE — 81275 KRAS GENE VARIANTS EXON 2: CPT | Performed by: PATHOLOGY

## 2018-12-07 PROCEDURE — 88341 IMHCHEM/IMCYTCHM EA ADD ANTB: CPT | Mod: 26,,, | Performed by: PATHOLOGY

## 2018-12-07 PROCEDURE — 99233 SBSQ HOSP IP/OBS HIGH 50: CPT | Mod: ,,, | Performed by: INTERNAL MEDICINE

## 2018-12-07 RX ORDER — FERROUS SULFATE 325(65) MG
325 TABLET, DELAYED RELEASE (ENTERIC COATED) ORAL 2 TIMES DAILY
Qty: 60 TABLET | Refills: 0 | Status: SHIPPED | OUTPATIENT
Start: 2018-12-07 | End: 2019-02-25

## 2018-12-07 RX ORDER — MIDAZOLAM HYDROCHLORIDE 2 MG/2ML
1 INJECTION, SOLUTION INTRAMUSCULAR; INTRAVENOUS
Status: DISCONTINUED | OUTPATIENT
Start: 2018-12-07 | End: 2018-12-07 | Stop reason: HOSPADM

## 2018-12-07 RX ORDER — LIDOCAINE HYDROCHLORIDE 10 MG/ML
1 INJECTION, SOLUTION EPIDURAL; INFILTRATION; INTRACAUDAL; PERINEURAL ONCE
Status: DISCONTINUED | OUTPATIENT
Start: 2018-12-07 | End: 2018-12-07 | Stop reason: HOSPADM

## 2018-12-07 RX ORDER — MIDAZOLAM HYDROCHLORIDE 1 MG/ML
INJECTION, SOLUTION INTRAMUSCULAR; INTRAVENOUS
Status: COMPLETED
Start: 2018-12-07 | End: 2018-12-07

## 2018-12-07 RX ORDER — FENTANYL CITRATE 50 UG/ML
25 INJECTION, SOLUTION INTRAMUSCULAR; INTRAVENOUS
Status: DISCONTINUED | OUTPATIENT
Start: 2018-12-07 | End: 2018-12-07 | Stop reason: HOSPADM

## 2018-12-07 RX ORDER — LIDOCAINE HYDROCHLORIDE 10 MG/ML
INJECTION, SOLUTION EPIDURAL; INFILTRATION; INTRACAUDAL; PERINEURAL
Status: DISCONTINUED
Start: 2018-12-07 | End: 2018-12-07 | Stop reason: HOSPADM

## 2018-12-07 RX ORDER — FENTANYL CITRATE 50 UG/ML
INJECTION, SOLUTION INTRAMUSCULAR; INTRAVENOUS
Status: COMPLETED
Start: 2018-12-07 | End: 2018-12-07

## 2018-12-07 RX ADMIN — FENTANYL CITRATE 25 MCG: 50 INJECTION, SOLUTION INTRAMUSCULAR; INTRAVENOUS at 11:12

## 2018-12-07 RX ADMIN — FENTANYL CITRATE 25 MCG: 50 INJECTION INTRAMUSCULAR; INTRAVENOUS at 11:12

## 2018-12-07 RX ADMIN — MIDAZOLAM HYDROCHLORIDE 1 MG: 2 INJECTION, SOLUTION INTRAMUSCULAR; INTRAVENOUS at 12:12

## 2018-12-07 RX ADMIN — MIDAZOLAM HYDROCHLORIDE 1 MG: 1 INJECTION INTRAMUSCULAR; INTRAVENOUS at 12:12

## 2018-12-07 NOTE — NURSING
Pt arrived via stretcher from room 206 NAD . AAOx4,- procedure explained and consent obtained by Dr. Kennedy. Time out performed. Pt received Fentanyl  25 mcg and Versed 1 mg IV. Vital signs monitored and remained stable for duration of procedure. Biopsies collected By Dr. Kennedy. Specimens submitted to lab for Pathology. Bandage applied to pts right lateral abdomen. CDI- Report called to Suze- Pt transported back to room via bed in stable condition

## 2018-12-07 NOTE — DISCHARGE SUMMARY
Discharge Summary  Hospital Medicine    Admit Date: 12/4/2018    Date and Time: 12/7/20182:01 PM    Discharge Attending Physician: Dahlia Li MD    Primary Care Physician: John Conner MD    Diagnoses:  Active Hospital Problems    Diagnosis  POA    *Symptomatic anemia [D64.9] s/p 3 PRBC  Yes    Malnutrition of moderate degree [E44.0]  Yes    Hypokalemia [E87.6]  Liver Metastatic disease s/p liver biopsy  Elevated CA 19-19  Elevated CEA  Yes    History of colon cancer [Z85.038]  Yes    Hypertension [I10]  Yes     Discharged Condition: Good    Hospital Course:   Patient is a 63 y.o. female admitted to Hospitalist Service from Dr. Conner's office with complaint of low Hgb count. Patient reportedly has past medical history significant for hypertension and history of colon CA. Patient presented with complaint of easy fatigability, generalized weakness, BAUER and lightheadedness. Per Dr. Conner, Hgb ~ 7 g/dl. Patient was evaluated by GI specialist in the past for GI bleeding. Patient denied any recent GI bleeding, bleeding per rectum, melena or hematuria. No easy bruising reported. Patient also reported dry non-productive cough. Patient denied chest pain, shortness of breath, abdominal pain, nausea, vomiting, headache, vision changes, focal neuro-deficits or fever. Patient was admitted to Hospitalist medicine service. Patient was evaluated by Dr. Pettit from hematology. Patient received 3 units of pRBC with improved symptoms. Hematology work up revealed presence of liver metastatic disease for which patient underwent liver biopsy which she tolerated well. Patient anxious to be discharge and will closely follow up with Dr. Pettit next week to discuss liver biopsy results and further plan of care. CEA and CA 19-9 level sre high as well. Patient advised to continue close follow up with her doctor. Patient was discharged home in stable condition with following discharge plan of care. Total time with the patient was 30  minutes and greater than 50% was spent in counseling and coordination of care. The assessment and plan have been discussed at length. Physicians' notes reviewed. Labs and procedure reviewed.     Consults: Dr. Pettit    Significant Diagnostic Studies:   Chest X-Ray: Cardiomegaly.  Small left pleural effusion is possible.     EGD (03-30-18):  - Normal esophagus.                       - Normal stomach.                       - Normal examined duodenum.                       - No specimens collected.     Colonoscopy (03-30-18):   - One 5 to 6 mm polyp in the distal transverse                        colon, removed with a cold snare. Resected and                        retrieved.                       - Internal hemorrhoids.                       - Patent end-to-end colo-colonic anastomosis,                        characterized by healthy appearing mucosa.                       - The examination was otherwise normal.    CT chest, abdomen and pelvis with IV contrast:  Multiple masses in the liver consistent metastatic disease.  Mildly enlarged mesenteric lymph nodes.  Circumferential wall thickening of the distal duodenum.  Additional findings as detailed above including diverticulosis without CT findings of acute diverticulitis, prior colon surgery, renal cysts, prior hysterectomy and prior cholecystectomy    Special Treatments/Procedures: as above  Disposition: Home or Self Care    Medications:  Reconciled Home Medications:   Current Discharge Medication List      START taking these medications    Details   ferrous sulfate 325 (65 FE) MG EC tablet Take 1 tablet (325 mg total) by mouth 2 (two) times daily.  Qty: 60 tablet, Refills: 0         CONTINUE these medications which have NOT CHANGED    Details   amLODIPine (NORVASC) 5 MG tablet Take 5 mg by mouth once daily.      atorvastatin (LIPITOR) 20 MG tablet Take 20 mg by mouth every evening.      benzonatate (TESSALON) 200 MG capsule Take 200 mg by mouth 3 (three) times daily  as needed for Cough.      co-enzyme Q-10 30 mg capsule Take 100 mg by mouth 2 (two) times daily.      hydroCHLOROthiazide (MICROZIDE) 12.5 mg capsule Take 12.5 mg by mouth once daily.      metoprolol tartrate (LOPRESSOR) 25 MG tablet Take by mouth every evening.          STOP taking these medications       ferrous gluconate (FERGON) 324 MG tablet Comments:   Reason for Stopping:         aspirin (ECOTRIN) 81 MG EC tablet Comments:   Reason for Stopping:         pantoprazole (PROTONIX) 40 MG tablet Comments:   Reason for Stopping:             Discharge Procedure Orders   Diet Cardiac     Other restrictions (specify):   Order Comments: PLEASE OBSERVE FALL PRECAUTIONS     Call MD for:   Order Comments: For worsening symptoms, chest pain, shortness of breath, increased abdominal pain, high grade fever, stroke or stroke like symptoms, immediately go to the nearest Emergency Room or call 911 as soon as possible.     Follow-up Information     Karina Pettit MD On 12/14/2018.    Specialty:  Hematology and Oncology  Why:  @10:00am   Contact information:  [address]  [address]  [place] [address]  [phone]             John Conner MD In 1 week.    Specialty:  Family Medicine  Contact information:  [address]  [place] [address]  [phone]             Please follow up.    Contact information:  Have CBC checked in 7 days.            Adis Arguello MD In 3 weeks.    Specialty:  Gastroenterology  Contact information:  [address]  [address]  [place] [address]  [phone]

## 2018-12-07 NOTE — PROGRESS NOTES
[facility]  Hematology/Oncology  Progress Note    Patient Name: Indigo Stuart  Admission Date: 12/4/2018  Hospital Length of Stay: 3 days  Code Status: Full Code   December 7  Subjective:     Interval History:     Pt with no complications ON. SHe is anxious to go home. Reviewed CT scan findings with her , Thyroid nodule, Multiple liver lesions and elevated CEA with CA 19-9. Thickening noted in the duodenum       CT Chest Abdoment Pelvis With Contrast   Order: [id number]   Status:  Final result   Visible to patient:  No (Not Released) Next appt:  12/13/2018 at 09:00 AM in Lab (LAB, [facility])   Details     Reading Physician Reading Date Result Priority   Namrata Kennedy MD 12/6/2018       Narrative     EXAMINATION:  CT CHEST ABDOMEN PELVIS WITH CONTRAST (XPD)    CLINICAL HISTORY:  Liver mets;    TECHNIQUE:  Low dose axial images, sagittal and coronal reformations were obtained from the thoracic inlet to the pubic symphysis following the IV administration of 100 mL of Omnipaque 350 and without PO contrast    COMPARISON:  Abdomen and pelvis 3/29/2018.  Chest none    FINDINGS:  Chest; 8 mm nodule right lobe of the thyroid gland.  This could be further characterized with ultrasound.    No significant hilar or mediastinal adenopathy.  No pleural or pericardial effusion.  Significant axillary adenopathy not seen.  There are mildly prominent left axillary nodes but with large fatty hilum and thin cortex.    8 mm nodule right lower lobe axial series 2, image 34.  Six mm nodule superior segment left lower lobe axial series 2 image 29.  Mild dependent hypoventilatory change bilaterally    Abdomen and pelvis; multiple masses consistent with metastatic disease.    Index lesions; hepatic section VI - V 6.3 cm x 6.3 cm    Hepatic section VII-VI image 55 5.8 by 5.0 cm    Hepatic section IV axial series 2, image 50; 4.8 x 4.3 cm    Spleen; unremarkable in appearance.  Calcifications at the splenic  hilum appearing vascular and may represent small calcified splenic artery aneurysm.    Adrenal glands; unremarkable appearance    Pancreas; unremarkable in appearance.    Abdominal aorta; no aneurysm    Gallbladder and bile ducts; prior cholecystectomy.  No biliary duct dilatation    Kidney; 2.8 cm left renal cyst.  Symmetrical enhancement.  Tiny subcentimeter hypodensity in the right kidney and left kidney too small to reliably characterize but most all cysts and probable small parapelvic left renal cyst as well with no suspicious appearing renal masses seen.    Stomach, bowel, mesentery; circumferential wall thickening of the distal duodenum for example axial series 2, images 75 through 72 is thought more likely in than neoplasm but cannot exclude neoplasm.  Postoperative changes of hemicolectomy with anastomotic sutures sigmoid colon.  Normal appearance of the appendix.  No free intraperitoneal air or fluid.  Mild mesenteric fat stranding that appears similar to the prior exam but with now mildly enlarged mesenteric lymph nodes, largest with short axis diameter of 11 mm axial series 2, image 67.    Reproductive organs; prior hysterectomy.  Adnexal region unremarkable in appearance    Osseous structures; appear intact there are degenerative changes with severe facet arthropathy lower lumbar spine colo listhesis L4-L5, degenerative changes at the hips without findings suggesting osseous metastatic disease.    There is fat stranding anterior abdominal wall all fat containing ventral hernias.  There is air in the anterior likely secondary to injections.      Impression       Multiple masses in the liver consistent metastatic disease.    Mildly enlarged mesenteric lymph nodes.    Circumferential wall thickening of the distal duodenum.    Additional findings as detailed above including diverticulosis without CT findings of acute diverticulitis, prior colon surgery, renal cysts, prior hysterectomy and prior  cholecystectomy      Electronically signed by: Namrata Kennedy MD  Date: 12/06/2018  Time: 17:07                Medications:  Continuous Infusions:  Scheduled Meds:   amLODIPine  5 mg Oral Daily    aspirin  81 mg Oral Daily    atorvastatin  20 mg Oral QHS    ferrous sulfate  325 mg Oral BID    metoprolol tartrate  25 mg Oral BID    pantoprazole  40 mg Oral Daily     PRN Meds:sodium chloride, sodium chloride, acetaminophen, benzonatate, dextrose 50%, dextrose 50%, docusate sodium, glucagon (human recombinant), glucose, glucose, sodium chloride 0.9%     Review of Systems   CONSTITUTIONAL: No fevers, chills, night sweats, wt. loss, appetite changes  SKIN: no rashes or itching  ENT: No headaches, head trauma, vision changes, or eye pain  LYMPH NODES: None noticed   CV: No chest pain, palpitations.   RESP: No dyspnea on exertion, cough, wheezing, or hemoptysis  GI: No nausea, emesis, diarrhea, constipation, melena, hematochezia, No pain  : No dysuria, hematuria, urgency, or frequency   HEME: No easy bruising, bleeding problems  PSYCHIATRIC: No depression, anxiety, psychosis, hallucinations.  NEURO: No seizures, memory loss, dizziness or difficulty sleeping  MSK: No arthralgias or joint swelling  Objective:     Vital Signs (Most Recent):  Temp: 99.1 °F (37.3 °C) (12/07/18 0500)  Pulse: 90 (12/07/18 0427)  Resp: 20 (12/07/18 0427)  BP: (!) 165/99 (12/07/18 0427)  SpO2: (!) 94 % (12/07/18 0427) Vital Signs (24h Range):  Temp:  [96.6 °F (35.9 °C)-100.5 °F (38.1 °C)] 99.1 °F (37.3 °C)  Pulse:  [90-96] 90  Resp:  [16-20] 20  SpO2:  [92 %-99 %] 94 %  BP: (124-165)/(64-99) 165/99     Weight: 117.9 kg (259 lb 14.8 oz)  Body mass index is 47.54 kg/m².  Body surface area is 2.27 meters squared.      Intake/Output Summary (Last 24 hours) at 12/7/2018 0742  Last data filed at 12/7/2018 0600  Gross per 24 hour   Intake 350 ml   Output --   Net 350 ml       Physical Exam  Height / weight / VS reviewed  GENERAL.: Well-developed,  well-nourished, in no acute distress  PSYCH: pleasant affect, no anxiety, no depression  HEENT: Normocephalic, lids intact, conjunctiva pink, sinuses nontender to palpation TMs intact, non-boggy turbinates,Normal auricula, nasal septum, dentition, gums and mucosa ,OP clear,no palatal pallor  NECK: Supple,trachea midline, no palpable abnormalities  LYMPHATICS: No cervical or axillary adenopathy  RESPIRATORY: CTAB, no wheezes, rubs or rhonchi  Good respiratory effort without any retractions or diaphragmatic movement  CARDIOVASCULAR: no JVD, S1 / S2 with RRR, no murmur, no rub,2+ capillary refill  ABDOMEN: NTND, normal bowel sounds, no palpable HSM or mass  EXTREMITIES: No cyanosis, no clubbing, no edema, no joint effusion  NEUROLOGICAL: alert and oriented x 3, cranial nerves grossly intact  SKIN: Warm, dry, no rash or lesions noted, no tenting, no petechiae or ecchymosis  Significant Labs:     Lab Results   Component Value Date    WBC 10.40 12/07/2018    RBC 3.48 (L) 12/07/2018    HGB 8.7 (L) 12/07/2018    HCT 27.1 (L) 12/07/2018    MCV 78 (L) 12/07/2018    MCH 25.0 (L) 12/07/2018    MCHC 32.1 12/07/2018    RDW 17.9 (H) 12/07/2018     (H) 12/07/2018    MPV 7.3 (L) 12/07/2018    GRAN 7.9 (H) 12/07/2018    GRAN 75.6 (H) 12/07/2018    LYMPH 1.2 12/07/2018    LYMPH 11.7 (L) 12/07/2018    MONO 0.9 12/07/2018    MONO 8.7 12/07/2018    EOS 0.4 12/07/2018    BASO 0.00 12/07/2018    EOSINOPHIL 3.8 12/07/2018    BASOPHIL 0.2 12/07/2018       Ref Range & Juzkg2g ago  CA 19-9    2.0 - 40.0 U/mL   221.0 Abnormally high      Ref Range & Fuxst3x ago  CEA    0.0 - 5.0 ng/mL    643.1 Abnormally high      Ref Range & Adkku9y ago      0 - 30 U/mL 37 Abnormally high     Ref Range & Hhyvx5p ago  AFP    0.0 - 8.4 ng/mL   2.6       Diagnostic Results:  See above   Assessment/Plan:     Active Diagnoses:    Diagnosis Date Noted POA    PRINCIPAL PROBLEM:  Symptomatic anemia [D64.9] 03/29/2018 Yes    Malnutrition of moderate  degree [E44.0] 12/04/2018 Yes    Hypokalemia [E87.6] 12/04/2018 Yes    History of colon cancer [Z85.038] 03/29/2018 Yes    Hypertension [I10] 03/29/2018 Yes      Problems Resolved During this Admission:         Lengthy visit today  Dicussed differential diagnosis being possible stage 4 malignancy with unknown primary at this time  She could have an underlying obscured mass in the pancreas: Will need MRI with lisa and thin cuts through pancreas, I do not want her to have further contrast so soon after after having her CT scan hence I will have my office schedule this early next week  Pt has consented to biopsy of the liver  She states she recently had an EGD and I explained that this does not visualize the duodenum   I also discussed she could even have a carcinoid or GIST    She seems to understand somewhat that biopsy will help guide further recs  Even after the biopsy today I would not have any results for at least 72 hours hence I am ok with her being discharged home with F/U in office     Copies of her CT have been given to her  Chest port will be needed if she required chemo and this was explained in detail     Karina Pettit MD  Hematology/Oncology  [facility]

## 2018-12-07 NOTE — PROGRESS NOTES
Radiology Post-Procedure Note    Pre Op Diagnosis:liver masses  Post Op Diagnosis: Same    Procedure: CT guided percutaneous liver biopsy    Procedure performed by: Dr. Namrata Kennedy    Written Informed Consent Obtained: Yes  Specimen Removed: YES sent to lab  Estimated Blood Loss: Minimal    Findings:   Successful CT guided percutaneous liver biopsy  Patient returned to floor to be monitored  following the procedure   To resume previous diet as tolerated     ASA 2, Mallampati !  No family or personal history of difficulty with anesthesia      Patient tolerated procedure well.    @SIG@

## 2018-12-07 NOTE — PLAN OF CARE
Problem: Patient Care Overview  Goal: Plan of Care Review  Outcome: Ongoing (interventions implemented as appropriate)  Patient alert and oriented resting in bed. NAD. Denies pain or SOB. VSS. Up ab patel to BR. Room air. Normal SR.  Plan of care reviewed with patient. Verbalizes understanding.Call light in reach. Pt free from fall or injury. Will monitor.

## 2018-12-07 NOTE — PLAN OF CARE
Discharged pt per MD order. Given d/c instruction and education on new medication with family at bedside. Pt transferred off unit via w/c with NAD noted. Belongings with family.

## 2018-12-08 LAB — PATHOLOGIST INTERPRETATION UIFE: NORMAL

## 2018-12-08 NOTE — PLAN OF CARE
12/08/18 0720   Final Note   Assessment Type Final Discharge Note   Anticipated Discharge Disposition Home

## 2018-12-10 ENCOUNTER — TELEPHONE (OUTPATIENT)
Dept: HEMATOLOGY/ONCOLOGY | Facility: CLINIC | Age: 63
End: 2018-12-10

## 2018-12-10 NOTE — TELEPHONE ENCOUNTER
----- Message from Karina Pettit MD sent at 12/10/2018  1:35 PM CST -----  Can you please help me send her for MRI abdomen asap and OV like Thursday to get biopsy results

## 2018-12-11 ENCOUNTER — HOSPITAL ENCOUNTER (OUTPATIENT)
Dept: RADIOLOGY | Facility: HOSPITAL | Age: 63
Discharge: HOME | End: 2018-12-11
Attending: INTERNAL MEDICINE
Payer: MEDICARE

## 2018-12-11 DIAGNOSIS — C22.9 MALIGNANT NEOPLASM OF LIVER, UNSPECIFIED LIVER MALIGNANCY TYPE: Primary | ICD-10-CM

## 2018-12-11 DIAGNOSIS — D64.9 ANEMIA, UNSPECIFIED TYPE: ICD-10-CM

## 2018-12-11 LAB
HLA CELIAC INTERPRETATION: ABNORMAL
HLA CELIAC SPECIMEN: ABNORMAL
HLA-DQ8 QL: NEGATIVE
HLA-DQA1*05:01 QL: POSITIVE
HLA-DQB1*02:01 QL: POSITIVE

## 2018-12-11 PROCEDURE — 25500020 PHARM REV CODE 255: Performed by: INTERNAL MEDICINE

## 2018-12-11 PROCEDURE — 74183 MRI ABD W/O CNTR FLWD CNTR: CPT | Mod: 26,,, | Performed by: RADIOLOGY

## 2018-12-11 PROCEDURE — 74183 MRI ABD W/O CNTR FLWD CNTR: CPT | Mod: TC

## 2018-12-11 PROCEDURE — A9585 GADOBUTROL INJECTION: HCPCS | Performed by: INTERNAL MEDICINE

## 2018-12-11 RX ORDER — SODIUM CHLORIDE 9 MG/ML
INJECTION, SOLUTION INTRAVENOUS
Status: DISCONTINUED
Start: 2018-12-11 | End: 2018-12-12 | Stop reason: HOSPADM

## 2018-12-11 RX ORDER — GADOBUTROL 604.72 MG/ML
10 INJECTION INTRAVENOUS
Status: COMPLETED | OUTPATIENT
Start: 2018-12-11 | End: 2018-12-11

## 2018-12-11 RX ORDER — GADOBUTROL 604.72 MG/ML
INJECTION INTRAVENOUS
Status: DISCONTINUED
Start: 2018-12-11 | End: 2018-12-12 | Stop reason: HOSPADM

## 2018-12-11 RX ADMIN — GADOBUTROL 10 ML: 604.72 INJECTION INTRAVENOUS at 09:12

## 2018-12-11 NOTE — PHYSICIAN QUERY
PT Name: Indigo Stuart  MR #: [id number]    Physician Query Form - Cause and Effect Relationship Clarification      CDS/: Danyell Shrestha RN             Contact information: [phone]    This form is a permanent document in the medical record.     Query Date: December 11, 2018    By submitting this query, we are merely seeking further clarification of documentation. Please utilize your independent clinical judgment when addressing the question(s) below.    The Medical record contains the following:  Supporting Clinical Findings   Location in record      Symptomatic anemia                                                                                                  Iron deficiency anemia                           Start Iron supplementation with stool softener.                                                         H&P 12/4     PN 12/6         History of colon cancer [Z85.038]  Possible liver mets      Multiple liver lesions and elevated CEA with CA 19-9. Thickening noted in the duodenum        Multiple masses in the liver consistent metastatic disease.                                                                      discuss liver biopsy results and further plan of care. CEA and CA 19-9 level sre high as well.                                                                                                                         PN 12/6                  Hematology note 12/7      DC Summary 12/7         Provider, please clarify if there is any correlation between ___anemia __________ and _multiple masses in liver consistent with metastatic disease_________________.           Are the conditions:      [ x ] Due to or associated with each other   [  ] Unrelated to each other   [ x ] Other (Please Specify): __Iron deficiency anemia_______________________   [  ] Clinically Undetermined

## 2018-12-13 ENCOUNTER — LAB VISIT (OUTPATIENT)
Dept: LAB | Facility: HOSPITAL | Age: 63
End: 2018-12-13
Attending: INTERNAL MEDICINE
Payer: MEDICARE

## 2018-12-13 DIAGNOSIS — D64.9 ANEMIA, UNSPECIFIED TYPE: ICD-10-CM

## 2018-12-13 LAB
BASOPHILS # BLD AUTO: 0.1 K/UL
BASOPHILS NFR BLD: 0.5 %
DIFFERENTIAL METHOD: ABNORMAL
EOSINOPHIL # BLD AUTO: 0.2 K/UL
EOSINOPHIL NFR BLD: 1.5 %
ERYTHROCYTE [DISTWIDTH] IN BLOOD BY AUTOMATED COUNT: 19.1 %
HCT VFR BLD AUTO: 30.4 %
HGB BLD-MCNC: 9.5 G/DL
LYMPHOCYTES # BLD AUTO: 1.1 K/UL
LYMPHOCYTES NFR BLD: 10.1 %
MCH RBC QN AUTO: 24.6 PG
MCHC RBC AUTO-ENTMCNC: 31.2 G/DL
MCV RBC AUTO: 79 FL
MONOCYTES # BLD AUTO: 0.7 K/UL
MONOCYTES NFR BLD: 6.4 %
NEUTROPHILS # BLD AUTO: 9 K/UL
NEUTROPHILS NFR BLD: 81.5 %
PLATELET # BLD AUTO: 715 K/UL
PMV BLD AUTO: 7.6 FL
RBC # BLD AUTO: 3.85 M/UL
WBC # BLD AUTO: 11 K/UL

## 2018-12-13 PROCEDURE — 85025 COMPLETE CBC W/AUTO DIFF WBC: CPT

## 2018-12-13 PROCEDURE — 36415 COLL VENOUS BLD VENIPUNCTURE: CPT

## 2018-12-14 ENCOUNTER — OFFICE VISIT (OUTPATIENT)
Dept: HEMATOLOGY/ONCOLOGY | Facility: CLINIC | Age: 63
End: 2018-12-14
Payer: MEDICARE

## 2018-12-14 ENCOUNTER — TELEPHONE (OUTPATIENT)
Dept: HEMATOLOGY/ONCOLOGY | Facility: CLINIC | Age: 63
End: 2018-12-14

## 2018-12-14 VITALS
SYSTOLIC BLOOD PRESSURE: 137 MMHG | HEART RATE: 93 BPM | HEIGHT: 62 IN | WEIGHT: 227.75 LBS | RESPIRATION RATE: 20 BRPM | TEMPERATURE: 98 F | DIASTOLIC BLOOD PRESSURE: 63 MMHG | BODY MASS INDEX: 41.91 KG/M2

## 2018-12-14 DIAGNOSIS — C78.7 COLON CANCER METASTASIZED TO LIVER: ICD-10-CM

## 2018-12-14 DIAGNOSIS — D64.9 ANEMIA, UNSPECIFIED TYPE: ICD-10-CM

## 2018-12-14 DIAGNOSIS — Z85.038 HISTORY OF COLON CANCER: ICD-10-CM

## 2018-12-14 DIAGNOSIS — K92.1 HEMATOCHEZIA: Primary | ICD-10-CM

## 2018-12-14 DIAGNOSIS — D75.839 THROMBOCYTOSIS: ICD-10-CM

## 2018-12-14 DIAGNOSIS — J40 BRONCHITIS: ICD-10-CM

## 2018-12-14 DIAGNOSIS — D49.0 COLORECTAL NEOPLASM: ICD-10-CM

## 2018-12-14 DIAGNOSIS — C18.9 COLON CANCER METASTASIZED TO LIVER: ICD-10-CM

## 2018-12-14 DIAGNOSIS — K90.41 GLUTEN-SENSITIVE ENTEROPATHY: ICD-10-CM

## 2018-12-14 PROCEDURE — 99999 PR PBB SHADOW E&M-EST. PATIENT-LVL III: CPT | Mod: PBBFAC,,, | Performed by: INTERNAL MEDICINE

## 2018-12-14 PROCEDURE — 3078F DIAST BP <80 MM HG: CPT | Mod: S$GLB,,, | Performed by: INTERNAL MEDICINE

## 2018-12-14 PROCEDURE — 3075F SYST BP GE 130 - 139MM HG: CPT | Mod: S$GLB,,, | Performed by: INTERNAL MEDICINE

## 2018-12-14 PROCEDURE — 1101F PT FALLS ASSESS-DOCD LE1/YR: CPT | Mod: S$GLB,,, | Performed by: INTERNAL MEDICINE

## 2018-12-14 PROCEDURE — 3008F BODY MASS INDEX DOCD: CPT | Mod: S$GLB,,, | Performed by: INTERNAL MEDICINE

## 2018-12-14 PROCEDURE — 99215 OFFICE O/P EST HI 40 MIN: CPT | Mod: S$GLB,,, | Performed by: INTERNAL MEDICINE

## 2018-12-14 NOTE — TELEPHONE ENCOUNTER
Pt stated that this morning before her apt she thought she had to pass gas, was not sure if she had to have a bowel movement or if it was just gas. She stated that she strained really hard and blood came out, she stated that the blood was red and not black. She stated that after she wiped there was no more blood. I instructed pt to inform dr. Pettit. Pt verbalized understanding.

## 2018-12-14 NOTE — PROGRESS NOTES
Cc I had my biopsy    Indigo Stuart is a 63 y.o.  This is a 63 yr old female with a history of colon cancer who presents with innumerable mets to liver. She underwent a biopsy in the hospital of her liver and path is consistent with colon   She has  been having hematochezia and fatigue   Past Medical History:   Diagnosis Date    Cancer     colon    Encounter for blood transfusion     Hypertension      Past Surgical History:   Procedure Laterality Date    CHOLECYSTECTOMY      COLON SURGERY      COLONOSCOPY N/A 3/30/2018    Procedure: COLONOSCOPY;  Surgeon: Daniel Magana MD;  Location: [facility];  Service: Endoscopy;  Laterality: N/A;    COLONOSCOPY N/A 3/30/2018    Performed by Daniel Magana MD at [facility]    ESOPHAGOGASTRODUODENOSCOPY (EGD) N/A 3/30/2018    Performed by Daniel Magana MD at [facility]    HYSTERECTOMY         Current Outpatient Medications:     amLODIPine (NORVASC) 5 MG tablet, Take 5 mg by mouth once daily., Disp: , Rfl:     atorvastatin (LIPITOR) 20 MG tablet, Take 20 mg by mouth every evening., Disp: , Rfl:     benzonatate (TESSALON) 200 MG capsule, Take 200 mg by mouth 3 (three) times daily as needed for Cough., Disp: , Rfl:     co-enzyme Q-10 30 mg capsule, Take 100 mg by mouth 2 (two) times daily., Disp: , Rfl:     ferrous sulfate 325 (65 FE) MG EC tablet, Take 1 tablet (325 mg total) by mouth 2 (two) times daily., Disp: 60 tablet, Rfl: 0    hydroCHLOROthiazide (MICROZIDE) 12.5 mg capsule, Take 12.5 mg by mouth once daily., Disp: , Rfl:     metoprolol tartrate (LOPRESSOR) 25 MG tablet, Take by mouth every evening. , Disp: , Rfl:   Review of patient's allergies indicates:   Allergen Reactions    Codeine Nausea And Vomiting    Erythromycin base Other (See Comments)    Lisinopril Palpitations     Cough      Social History     Tobacco Use    Smoking status: Never Smoker    Smokeless tobacco: Never Used   Substance Use Topics    Alcohol use: No    Drug  "use: No     Family History   Problem Relation Age of Onset    Cancer Mother     Cancer Father        CONSTITUTIONAL: No fevers, chills, night sweats, wt. loss, appetite changes  SKIN: no rashes or itching  ENT: No headaches, head trauma, vision changes, or eye pain  LYMPH NODES: None noticed   CV: No chest pain, palpitations.   RESP: + cough of green sputum   GI: No nausea, emesis, diarrhea, constipation, melena, hematochezia, pain.   : No dysuria, hematuria, urgency, or frequency   HEME: No easy bruising, bleeding problems  PSYCHIATRIC: No depression, anxiety, psychosis, hallucinations.  NEURO: No seizures, memory loss, dizziness or difficulty sleeping  MSK: No arthralgias or joint swelling         /63   Pulse 93   Temp 98.2 °F (36.8 °C)   Resp 20   Ht 5' 2" (1.575 m)   Wt 103.3 kg (227 lb 11.8 oz)   BMI 41.65 kg/m²   Gen: NAD, A and O x3,   Psych: pleasant affect, normal thought process  Eyes: Pupils round and non dilated, EOM intact  Nose: Nares patent  OP clear, mucosa patent  Neck: suppple, no JVD, trachea midline, no palpable mass, no adenopathy  Lungs: CTAB, no wheezes, no use of accessory muscles  CV: S1S2 with RRR, No mrg  Abd: soft, NTND, + BS, No HSM, no ascites  Extr: No CCE, ANN, strength normal  Neuro: steady gait, CNs grossly intact  Skin: intact, no lesions noted  Rheum: No joint swelling    Lab Results   Component Value Date    WBC 11.00 12/13/2018    HGB 9.5 (L) 12/13/2018    HCT 30.4 (L) 12/13/2018    MCV 79 (L) 12/13/2018     (H) 12/13/2018     CMP  Sodium   Date Value Ref Range Status   12/07/2018 139 136 - 145 mmol/L Final     Potassium   Date Value Ref Range Status   12/07/2018 3.6 3.5 - 5.1 mmol/L Final     Chloride   Date Value Ref Range Status   12/07/2018 104 95 - 110 mmol/L Final     CO2   Date Value Ref Range Status   12/07/2018 25 23 - 29 mmol/L Final     Glucose   Date Value Ref Range Status   12/07/2018 95 70 - 110 mg/dL Final     BUN, Bld   Date Value Ref " Range Status   12/07/2018 13 8 - 23 mg/dL Final     Creatinine   Date Value Ref Range Status   12/07/2018 0.6 0.5 - 1.4 mg/dL Final     Calcium   Date Value Ref Range Status   12/07/2018 8.8 8.7 - 10.5 mg/dL Final     Total Protein   Date Value Ref Range Status   12/07/2018 6.2 6.0 - 8.4 g/dL Final     Albumin   Date Value Ref Range Status   12/07/2018 2.4 (L) 3.5 - 5.2 g/dL Final     Total Bilirubin   Date Value Ref Range Status   12/07/2018 0.5 0.1 - 1.0 mg/dL Final     Comment:     For infants and newborns, interpretation of results should be based  on gestational age, weight and in agreement with clinical  observations.  Premature Infant recommended reference ranges:  Up to 24 hours.............<8.0 mg/dL  Up to 48 hours............<12.0 mg/dL  3-5 days..................<15.0 mg/dL  6-29 days.................<15.0 mg/dL       Alkaline Phosphatase   Date Value Ref Range Status   12/07/2018 276 (H) 55 - 135 U/L Final     AST   Date Value Ref Range Status   12/07/2018 40 10 - 40 U/L Final     ALT   Date Value Ref Range Status   12/07/2018 34 10 - 44 U/L Final     Anion Gap   Date Value Ref Range Status   12/07/2018 10 8 - 16 mmol/L Final     eGFR if    Date Value Ref Range Status   12/07/2018 >60 >60 mL/min/1.73 m^2 Final     eGFR if non    Date Value Ref Range Status   12/07/2018 >60 >60 mL/min/1.73 m^2 Final     Comment:     Calculation used to obtain the estimated glomerular filtration  rate (eGFR) is the CKD-EPI equation.        INICAL DIAGNOSIS/INFORMATION  Clinical information: possible liver mets- hx of colon cancer  PreOperative Diagnosis  SPECIMEN  1) Liver Lesion Biopsy  FINAL PATHOLOGIC DIAGNOSIS  LIVER LESION, NEEDLE CORE BIOPSIES:  - Metastatic colonic adenocarcinoma.  COMMENT: The histopathologic features and immunohistochemistry profile of this metastatic tumor strongly  support the interpretation of a metastatic colonic adenocarcinoma which also correlates with the  patient's history of  colon cancer.  Ref Range & Mtcgq0e ago  CA 1250 - 30 U/mL37 Abnormally high   Bnwti0y ago  CEA0.0 - 5.0 ng/mL643.1 Abnormally high      Ref Range & Kjplt2g ago  CA 19-92.0 - 40.0 U/mL221.0 Abnormally high    appt:  None   Component 9d ago   HLA Celiac Specimen Whole Blood    Celiac (HLA-DQB1*02) Positive Abnormal     Celiac (HLA-DQ8) Negative    Celiac (HLA-DQA1*05) Positive Abnormal               Ref Range & Osaqp3p ago  CA 19-92.0 - 40.0 U/mL221.0 Abnormally high      Ref Range & Ceexd2i ago  CEA0.0 - 5.0 ng/mL643.1 Abnormally high      Ref Range & Hesxj2a ago  CA 1250 - 30 U/mL37 Abnormally high     Ref Range & Ddlhx6m ago  AFP0.0 - 8.4 ng/mL2.6     Hematochezia    Colon cancer metastasized to liver  -     CARLOS-2 KINASE MUTATION; Future; Expected date: 12/14/2018  -     Iron and TIBC; Future; Expected date: 12/14/2018    Thrombocytosis  -     CARLOS-2 KINASE MUTATION; Future; Expected date: 12/14/2018  -     Iron and TIBC; Future; Expected date: 12/14/2018    Bronchitis    Gluten-sensitive enteropathy    History of colon cancer    Anemia, unspecified type  -     CARLOS-2 KINASE MUTATION; Future; Expected date: 12/14/2018  -     Iron and TIBC; Future; Expected date: 12/14/2018        Anemia : check iron stores  To GI for assessment GI bleeding  Diet explained with gluten free foods for now  Carlos 2 ordered to assess for a possible myeloproliferative disorder  To start chemo after chest port  Full staging needed with pet ct and mri brain   Explained various tretament options  Check EGFR kras msi etc on path  Thank you for allowing me to evaluate and participate in the care of this pleasant patient. Please fell free to call me with any questions or concerns.    Warmly,  Karina Pettit MD    This note was dictated with Dragon and briefly proofread. Please excuse any sentences that may be unclear or words misspelled

## 2018-12-18 ENCOUNTER — TELEPHONE (OUTPATIENT)
Dept: HEMATOLOGY/ONCOLOGY | Facility: CLINIC | Age: 63
End: 2018-12-18

## 2018-12-18 DIAGNOSIS — C18.9 MALIGNANT NEOPLASM OF COLON, UNSPECIFIED PART OF COLON: Primary | ICD-10-CM

## 2018-12-18 NOTE — TELEPHONE ENCOUNTER
----- Message from Leticia Chavira sent at 12/18/2018 11:53 AM CST -----  Contact: self [phone]  Please call her regarding the PET scan, no one has called her to schedule it.  It is supposed to be scheduled at [facility] imaging. Thank you!

## 2018-12-18 NOTE — TELEPHONE ENCOUNTER
Called patient and informed her that imaging should be contacting her scheduling. I spoke to Lynn at [facility] and she will be calling Mrs Stuart

## 2018-12-19 ENCOUNTER — TELEPHONE (OUTPATIENT)
Dept: GASTROENTEROLOGY | Facility: CLINIC | Age: 63
End: 2018-12-19

## 2018-12-19 NOTE — TELEPHONE ENCOUNTER
Patient offered first available with NP and refused wants to leave as scheduled with Dr. Arguello and placed on cancel list.

## 2018-12-19 NOTE — TELEPHONE ENCOUNTER
----- Message from Manuela Garcia sent at 12/19/2018  9:21 AM CST -----  Contact: self  Patient has appointment scheduled for 1/25/2019   Patient would like sooner appointment in January if possible.   Please call pt at [phone]

## 2018-12-20 ENCOUNTER — TELEPHONE (OUTPATIENT)
Dept: SURGERY | Facility: CLINIC | Age: 63
End: 2018-12-20

## 2018-12-20 NOTE — TELEPHONE ENCOUNTER
----- Message from Diana Richey sent at 12/20/2018 11:13 AM CST -----  Contact: patient  Patient needs to speak with the nurse to reschedule an appointment she had cancelled. Patient's # is [phone]

## 2018-12-20 NOTE — TELEPHONE ENCOUNTER
----- Message from Diana Richey sent at 12/20/2018 11:13 AM CST -----  Contact: patient  Patient needs to speak with the nurse to reschedule an appointment she had cancelled. Patient's # is [phone]

## 2018-12-20 NOTE — TELEPHONE ENCOUNTER
----- Message from Diana Richey sent at 12/20/2018 11:13 AM CST -----  Contact: patient  Patient needs to speak with the nurse to reschedule an appointment she had cancelled. Patient's # is [phone]

## 2018-12-31 ENCOUNTER — TELEPHONE (OUTPATIENT)
Dept: HEMATOLOGY/ONCOLOGY | Facility: CLINIC | Age: 63
End: 2018-12-31

## 2018-12-31 DIAGNOSIS — C17.0 MALIGNANT NEOPLASM OF DUODENUM: Primary | ICD-10-CM

## 2018-12-31 NOTE — TELEPHONE ENCOUNTER
----- Message from Aby Casillas sent at 12/31/2018 11:17 AM CST -----  Contact: Patient  Patient is calling to schedule an appointment, as soon as possible she stated.  Please call to schedule.  Call Back#[phone]  Thanks

## 2018-12-31 NOTE — TELEPHONE ENCOUNTER
Spoke to patient. Patient stated her PET scan has been completed and she needs to schedule an f/u appointment. Patient scheduled for 1/15/19 at 8:20a.m

## 2019-01-01 ENCOUNTER — HOSPITAL ENCOUNTER (INPATIENT)
Facility: HOSPITAL | Age: 64
LOS: 3 days | Discharge: HOME HEALTH | DRG: 981 | End: 2019-01-04
Attending: EMERGENCY MEDICINE | Admitting: HOSPITALIST
Payer: MEDICARE

## 2019-01-01 DIAGNOSIS — K92.1 HEMATOCHEZIA: Primary | ICD-10-CM

## 2019-01-01 DIAGNOSIS — C78.7 METASTATIC COLON CANCER TO LIVER: ICD-10-CM

## 2019-01-01 DIAGNOSIS — R94.8 ABNORMAL GASTROINTESTINAL PET SCAN: ICD-10-CM

## 2019-01-01 DIAGNOSIS — E44.0 MALNUTRITION OF MODERATE DEGREE: ICD-10-CM

## 2019-01-01 DIAGNOSIS — R53.83 FATIGUE: ICD-10-CM

## 2019-01-01 DIAGNOSIS — C18.9 METASTATIC COLON CANCER TO LIVER: ICD-10-CM

## 2019-01-01 DIAGNOSIS — K31.89 DUODENAL MASS: ICD-10-CM

## 2019-01-01 DIAGNOSIS — D64.9 SYMPTOMATIC ANEMIA: ICD-10-CM

## 2019-01-01 PROBLEM — D75.839 THROMBOCYTOSIS: Status: ACTIVE | Noted: 2019-01-01

## 2019-01-01 PROBLEM — R79.89 ELEVATED LFTS: Status: ACTIVE | Noted: 2019-01-01

## 2019-01-01 LAB
ABO + RH BLD: NORMAL
ALBUMIN SERPL BCP-MCNC: 2.3 G/DL
ALP SERPL-CCNC: 500 U/L
ALT SERPL W/O P-5'-P-CCNC: 52 U/L
ANION GAP SERPL CALC-SCNC: 14 MMOL/L
APTT BLDCRRT: 28 SEC
AST SERPL-CCNC: 74 U/L
BASOPHILS # BLD AUTO: 0 K/UL
BASOPHILS NFR BLD: 0.2 %
BILIRUB SERPL-MCNC: 0.2 MG/DL
BLD GP AB SCN CELLS X3 SERPL QL: NORMAL
BUN SERPL-MCNC: 15 MG/DL
CALCIUM SERPL-MCNC: 9.4 MG/DL
CHLORIDE SERPL-SCNC: 100 MMOL/L
CO2 SERPL-SCNC: 21 MMOL/L
CREAT SERPL-MCNC: 0.7 MG/DL
DIFFERENTIAL METHOD: ABNORMAL
EOSINOPHIL # BLD AUTO: 0.3 K/UL
EOSINOPHIL NFR BLD: 2.3 %
ERYTHROCYTE [DISTWIDTH] IN BLOOD BY AUTOMATED COUNT: 18.7 %
EST. GFR  (AFRICAN AMERICAN): >60 ML/MIN/1.73 M^2
EST. GFR  (NON AFRICAN AMERICAN): >60 ML/MIN/1.73 M^2
FERRITIN SERPL-MCNC: 57 NG/ML
FOLATE SERPL-MCNC: 16.2 NG/ML
GLUCOSE SERPL-MCNC: 119 MG/DL
HCT VFR BLD AUTO: 22 %
HGB BLD-MCNC: 6.7 G/DL
INR PPP: 1.1
IRON SERPL-MCNC: 31 UG/DL
LYMPHOCYTES # BLD AUTO: 1.2 K/UL
LYMPHOCYTES NFR BLD: 10.7 %
MCH RBC QN AUTO: 22.6 PG
MCHC RBC AUTO-ENTMCNC: 30.3 G/DL
MCV RBC AUTO: 75 FL
MONOCYTES # BLD AUTO: 0.6 K/UL
MONOCYTES NFR BLD: 5.2 %
NEUTROPHILS # BLD AUTO: 9.1 K/UL
NEUTROPHILS NFR BLD: 81.6 %
PLATELET # BLD AUTO: 650 K/UL
PMV BLD AUTO: 7.2 FL
POTASSIUM SERPL-SCNC: 4.4 MMOL/L
PROT SERPL-MCNC: 7.3 G/DL
PROTHROMBIN TIME: 11.8 SEC
RBC # BLD AUTO: 2.94 M/UL
SATURATED IRON: 9 %
SODIUM SERPL-SCNC: 135 MMOL/L
TOTAL IRON BINDING CAPACITY: 360 UG/DL
TRANSFERRIN SERPL-MCNC: 243 MG/DL
VIT B12 SERPL-MCNC: 468 PG/ML
WBC # BLD AUTO: 11.1 K/UL

## 2019-01-01 PROCEDURE — 85025 COMPLETE CBC W/AUTO DIFF WBC: CPT

## 2019-01-01 PROCEDURE — 99291 CRITICAL CARE FIRST HOUR: CPT | Mod: 25

## 2019-01-01 PROCEDURE — 85610 PROTHROMBIN TIME: CPT

## 2019-01-01 PROCEDURE — 80053 COMPREHEN METABOLIC PANEL: CPT

## 2019-01-01 PROCEDURE — 82728 ASSAY OF FERRITIN: CPT

## 2019-01-01 PROCEDURE — P9016 RBC LEUKOCYTES REDUCED: HCPCS

## 2019-01-01 PROCEDURE — 83540 ASSAY OF IRON: CPT

## 2019-01-01 PROCEDURE — 82607 VITAMIN B-12: CPT

## 2019-01-01 PROCEDURE — 94760 N-INVAS EAR/PLS OXIMETRY 1: CPT

## 2019-01-01 PROCEDURE — 85730 THROMBOPLASTIN TIME PARTIAL: CPT

## 2019-01-01 PROCEDURE — 86920 COMPATIBILITY TEST SPIN: CPT

## 2019-01-01 PROCEDURE — 82746 ASSAY OF FOLIC ACID SERUM: CPT

## 2019-01-01 PROCEDURE — 12000002 HC ACUTE/MED SURGE SEMI-PRIVATE ROOM

## 2019-01-01 PROCEDURE — 93005 ELECTROCARDIOGRAM TRACING: CPT

## 2019-01-01 PROCEDURE — 36430 TRANSFUSION BLD/BLD COMPNT: CPT

## 2019-01-01 PROCEDURE — 36415 COLL VENOUS BLD VENIPUNCTURE: CPT

## 2019-01-01 PROCEDURE — 86850 RBC ANTIBODY SCREEN: CPT

## 2019-01-01 RX ORDER — POTASSIUM CHLORIDE 750 MG/1
40 TABLET, EXTENDED RELEASE ORAL
Status: DISCONTINUED | OUTPATIENT
Start: 2019-01-01 | End: 2019-01-04 | Stop reason: HOSPADM

## 2019-01-01 RX ORDER — ONDANSETRON 2 MG/ML
4 INJECTION INTRAMUSCULAR; INTRAVENOUS EVERY 4 HOURS PRN
Status: DISCONTINUED | OUTPATIENT
Start: 2019-01-01 | End: 2019-01-04 | Stop reason: HOSPADM

## 2019-01-01 RX ORDER — RAMELTEON 8 MG/1
8 TABLET ORAL NIGHTLY PRN
Status: DISCONTINUED | OUTPATIENT
Start: 2019-01-01 | End: 2019-01-04 | Stop reason: HOSPADM

## 2019-01-01 RX ORDER — POTASSIUM CHLORIDE 750 MG/1
60 TABLET, EXTENDED RELEASE ORAL
Status: DISCONTINUED | OUTPATIENT
Start: 2019-01-01 | End: 2019-01-04 | Stop reason: HOSPADM

## 2019-01-01 RX ORDER — SODIUM CHLORIDE 0.9 % (FLUSH) 0.9 %
5 SYRINGE (ML) INJECTION
Status: DISCONTINUED | OUTPATIENT
Start: 2019-01-01 | End: 2019-01-04 | Stop reason: HOSPADM

## 2019-01-01 RX ORDER — ENOXAPARIN SODIUM 100 MG/ML
40 INJECTION SUBCUTANEOUS EVERY 24 HOURS
Status: CANCELLED | OUTPATIENT
Start: 2019-01-01

## 2019-01-01 RX ORDER — IBUPROFEN 600 MG/1
600 TABLET ORAL EVERY 6 HOURS PRN
Status: DISCONTINUED | OUTPATIENT
Start: 2019-01-01 | End: 2019-01-04 | Stop reason: HOSPADM

## 2019-01-01 RX ORDER — AMOXICILLIN 250 MG
1 CAPSULE ORAL 2 TIMES DAILY PRN
Status: DISCONTINUED | OUTPATIENT
Start: 2019-01-01 | End: 2019-01-04 | Stop reason: HOSPADM

## 2019-01-01 NOTE — ED PROVIDER NOTES
"Encounter Date: 1/1/2019    SCRIBE #1 NOTE: I, Mimi Akins, am scribing for, and in the presence of, Dr. Garcia.       History     Chief Complaint   Patient presents with    Fatigue     States she has been feeling progressively weaker x 2 weeks. States she has Liver CA and probably needs another blood tranfusion.        Time seen by provider: 4:51 PM on 01/01/2019    The patient is a 63 y.o. female Hx of colon cancer (2006) and liver cancer stage 4 and who presents to the ED with complaint of worsening generalized weakness for 3 weeks. Patient was diagnosed with liver cancer 3 weeks ago after a liver biopsy. She was told to follow-up with Dr. Pettit the next week, but she did not. She had a blood transfusion 12/6/2018 and states "I think I need another transfusion". She also reports shortness of breath with exertion. She has not undergone chemotherapy. The patient denies any other symptoms at this time. PSHx of colon surgery, colonoscopy.       The history is provided by the patient.     Review of patient's allergies indicates:   Allergen Reactions    Codeine Nausea And Vomiting    Erythromycin base Other (See Comments)    Lisinopril Palpitations     Cough      Past Medical History:   Diagnosis Date    Cancer     colon    Encounter for blood transfusion     Hypertension      Past Surgical History:   Procedure Laterality Date    CHOLECYSTECTOMY      COLON SURGERY      COLONOSCOPY N/A 3/30/2018    Performed by [facility] MD at [facility]    ESOPHAGOGASTRODUODENOSCOPY (EGD) N/A 3/30/2018    Performed by [facility] at [facility] ENDO    HYSTERECTOMY       Family History   Problem Relation Age of Onset    Cancer Mother     Cancer Father      Social History     Tobacco Use    Smoking status: Never Smoker    Smokeless tobacco: Never Used   Substance Use Topics    Alcohol use: No    Drug use: No     Review of Systems   Constitutional: Positive for fatigue.   Respiratory: Positive for " shortness of breath.    Gastrointestinal: Negative for abdominal pain.   Neurological: Positive for weakness (generalized).   Hematological: Does not bruise/bleed easily.   All other systems reviewed and are negative.      Physical Exam     Initial Vitals [01/01/19 1600]   BP Pulse Resp Temp SpO2   136/73 (!) 113 18 99.8 °F (37.7 °C) 97 %      MAP       --         Physical Exam    Nursing note and vitals reviewed.  Constitutional: She appears well-developed and well-nourished. She is not diaphoretic. She appears ill. No distress.   Appearing short of breath.   HENT:   Head: Normocephalic and atraumatic.   Eyes: EOM are normal.   Neck: Normal range of motion. Neck supple.   Cardiovascular: Normal rate, regular rhythm and normal heart sounds. Exam reveals no gallop and no friction rub.    No murmur heard.  Pulmonary/Chest: Breath sounds normal. No respiratory distress. She has no wheezes. She has no rhonchi. She has no rales.   Abdominal: Soft. She exhibits no distension. There is no tenderness.   Musculoskeletal: Normal range of motion.   Neurological: She is alert and oriented to person, place, and time.   Skin: Skin is warm and dry. There is pallor.   Psychiatric: She has a normal mood and affect. Her behavior is normal. Judgment and thought content normal.         ED Course   Critical Care  Date/Time: 1/1/2019 7:41 PM  Performed by: Gilbert Garcia MD  Authorized by: Gilbert Garcia MD   Direct patient critical care time: 11 minutes  Additional history critical care time: 20 minutes  Ordering / reviewing critical care time: 9 minutes  Documentation critical care time: 8 minutes  Consulting other physicians critical care time: 6 minutes  Total critical care time (exclusive of procedural time) : 54 minutes  Critical care was necessary to treat or prevent imminent or life-threatening deterioration of the following conditions: Anemia requiring transfusion.  Critical care was time spent personally by me on the  following activities: review of old charts, pulse oximetry, re-evaluation of patient's condition, ordering and review of laboratory studies, ordering and review of radiographic studies, ordering and performing treatments and interventions, obtaining history from patient or surrogate, examination of patient and evaluation of patient's response to treatment.        Labs Reviewed   CBC W/ AUTO DIFFERENTIAL - Abnormal; Notable for the following components:       Result Value    RBC 2.94 (*)     Hemoglobin 6.7 (*)     Hematocrit 22.0 (*)     MCV 75 (*)     MCH 22.6 (*)     MCHC 30.3 (*)     RDW 18.7 (*)     Platelets 650 (*)     MPV 7.2 (*)     Gran # (ANC) 9.1 (*)     Gran% 81.6 (*)     Lymph% 10.7 (*)     All other components within normal limits   COMPREHENSIVE METABOLIC PANEL - Abnormal; Notable for the following components:    Sodium 135 (*)     CO2 21 (*)     Glucose 119 (*)     Albumin 2.3 (*)     Alkaline Phosphatase 500 (*)     AST 74 (*)     ALT 52 (*)     All other components within normal limits   TYPE & SCREEN   PREPARE RBC SOFT          Imaging Results          X-Ray Chest AP Portable (Final result)  Result time 01/01/19 17:13:25    Final result by Arie Fernandez Jr., MD (01/01/19 17:13:25)                 Impression:      No acute abnormality.      Electronically signed by: Arie Fernandez MD  Date:    01/01/2019  Time:    17:13             Narrative:    EXAMINATION:  XR CHEST AP PORTABLE    CLINICAL HISTORY:  Other fatigue    TECHNIQUE:  Single frontal view of the chest was performed.    COMPARISON:  Portable chest of December 4, 2018.    FINDINGS:  The lungs are clear, with normal appearance of pulmonary vasculature and no pleural effusion or pneumothorax.    The cardiac silhouette is normal in size. The hilar and mediastinal contours are unremarkable.    Bones are intact.                                 Medical Decision Making:   History:   Old Medical Records: I decided to obtain old medical  records.  Clinical Tests:   Lab Tests: Ordered and Reviewed  Radiological Study: Ordered and Reviewed  Medical Tests: Ordered and Reviewed            Scribe Attestation:   Scribe #1: I performed the above scribed service and the documentation accurately describes the services I performed. I attest to the accuracy of the note.    I, Dr. Garcia, personally performed the services described in this documentation. All medical record entries made by the scribe were at my direction and in my presence.  I have reviewed the chart and agree that the record reflects my personal performance and is accurate and complete.7:41 PM 01/01/2019            ED Course as of Jan 01 1923   Tue Jan 01, 2019   1646 BP: 136/73 [EF]   1646 Temp: 99.8 °F (37.7 °C) [EF]   1646 Temp src: Oral [EF]   1646 Pulse: (!) 113 [EF]   1646 Resp: 18 [EF]   1646 SpO2: 97 % [EF]   1831 Hemoglobin: (!) 6.7 [EF]   1917 Joy Rooney to admit  [EF]      ED Course User Index  [EF] Gilbert Garcia MD     Clinical Impression:   The encounter diagnosis was Fatigue.      Disposition:   Disposition: Admitted         63-year-old with metastatic colon cancer presents for fatigue and shortness of breath similar to episode 1 month ago requiring a blood transfusion.  Hemoglobin 6 at this time, patient will be transfused and admitted to the hospital.  No distress in the ER.               Gilbert Garcia MD  01/01/19 1942

## 2019-01-02 PROBLEM — E66.01 MORBID OBESITY: Status: ACTIVE | Noted: 2019-01-02

## 2019-01-02 LAB
ALBUMIN SERPL BCP-MCNC: 2.2 G/DL
ALP SERPL-CCNC: 438 U/L
ALT SERPL W/O P-5'-P-CCNC: 43 U/L
ANION GAP SERPL CALC-SCNC: 11 MMOL/L
AST SERPL-CCNC: 50 U/L
BASOPHILS # BLD AUTO: 0.1 K/UL
BASOPHILS NFR BLD: 0.7 %
BILIRUB SERPL-MCNC: 1.8 MG/DL
BLD PROD TYP BPU: NORMAL
BLOOD UNIT EXPIRATION DATE: NORMAL
BLOOD UNIT TYPE CODE: 6200
BLOOD UNIT TYPE: NORMAL
BUN SERPL-MCNC: 12 MG/DL
CALCIUM SERPL-MCNC: 9 MG/DL
CHLORIDE SERPL-SCNC: 100 MMOL/L
CO2 SERPL-SCNC: 26 MMOL/L
CODING SYSTEM: NORMAL
CREAT SERPL-MCNC: 0.6 MG/DL
DIFFERENTIAL METHOD: ABNORMAL
DISPENSE STATUS: NORMAL
EOSINOPHIL # BLD AUTO: 0.2 K/UL
EOSINOPHIL NFR BLD: 2.2 %
ERYTHROCYTE [DISTWIDTH] IN BLOOD BY AUTOMATED COUNT: 17.8 %
EST. GFR  (AFRICAN AMERICAN): >60 ML/MIN/1.73 M^2
EST. GFR  (NON AFRICAN AMERICAN): >60 ML/MIN/1.73 M^2
GLUCOSE SERPL-MCNC: 100 MG/DL
HCT VFR BLD AUTO: 29.2 %
HGB BLD-MCNC: 9.3 G/DL
LYMPHOCYTES # BLD AUTO: 1.1 K/UL
LYMPHOCYTES NFR BLD: 9.9 %
MCH RBC QN AUTO: 25.1 PG
MCHC RBC AUTO-ENTMCNC: 32 G/DL
MCV RBC AUTO: 79 FL
MONOCYTES # BLD AUTO: 1 K/UL
MONOCYTES NFR BLD: 8.7 %
NEUTROPHILS # BLD AUTO: 8.7 K/UL
NEUTROPHILS NFR BLD: 78.5 %
NUM UNITS TRANS PACKED RBC: NORMAL
OB PNL STL: POSITIVE
PLATELET # BLD AUTO: 607 K/UL
PMV BLD AUTO: 6.9 FL
POTASSIUM SERPL-SCNC: 3.2 MMOL/L
PROT SERPL-MCNC: 6.5 G/DL
RBC # BLD AUTO: 3.72 M/UL
SODIUM SERPL-SCNC: 137 MMOL/L
WBC # BLD AUTO: 11 K/UL

## 2019-01-02 PROCEDURE — 80053 COMPREHEN METABOLIC PANEL: CPT

## 2019-01-02 PROCEDURE — 99222 1ST HOSP IP/OBS MODERATE 55: CPT | Mod: ,,, | Performed by: INTERNAL MEDICINE

## 2019-01-02 PROCEDURE — 82272 OCCULT BLD FECES 1-3 TESTS: CPT

## 2019-01-02 PROCEDURE — 25000003 PHARM REV CODE 250: Performed by: NURSE PRACTITIONER

## 2019-01-02 PROCEDURE — 99222 PR INITIAL HOSPITAL CARE,LEVL II: ICD-10-PCS | Mod: ,,, | Performed by: INTERNAL MEDICINE

## 2019-01-02 PROCEDURE — 36415 COLL VENOUS BLD VENIPUNCTURE: CPT

## 2019-01-02 PROCEDURE — P9016 RBC LEUKOCYTES REDUCED: HCPCS

## 2019-01-02 PROCEDURE — 99223 1ST HOSP IP/OBS HIGH 75: CPT | Mod: ,,, | Performed by: INTERNAL MEDICINE

## 2019-01-02 PROCEDURE — 99223 PR INITIAL HOSPITAL CARE,LEVL III: ICD-10-PCS | Mod: ,,, | Performed by: INTERNAL MEDICINE

## 2019-01-02 PROCEDURE — 94761 N-INVAS EAR/PLS OXIMETRY MLT: CPT

## 2019-01-02 PROCEDURE — 12000002 HC ACUTE/MED SURGE SEMI-PRIVATE ROOM

## 2019-01-02 PROCEDURE — 85025 COMPLETE CBC W/AUTO DIFF WBC: CPT

## 2019-01-02 RX ORDER — AMLODIPINE BESYLATE 5 MG/1
5 TABLET ORAL DAILY
Status: DISCONTINUED | OUTPATIENT
Start: 2019-01-02 | End: 2019-01-04

## 2019-01-02 RX ORDER — FERROUS SULFATE 325(65) MG
325 TABLET, DELAYED RELEASE (ENTERIC COATED) ORAL 2 TIMES DAILY
Status: DISCONTINUED | OUTPATIENT
Start: 2019-01-02 | End: 2019-01-04 | Stop reason: HOSPADM

## 2019-01-02 RX ORDER — METOPROLOL TARTRATE 25 MG/1
25 TABLET, FILM COATED ORAL NIGHTLY
Status: DISCONTINUED | OUTPATIENT
Start: 2019-01-02 | End: 2019-01-04

## 2019-01-02 RX ORDER — HYDROCHLOROTHIAZIDE 12.5 MG/1
12.5 TABLET ORAL DAILY
Status: DISCONTINUED | OUTPATIENT
Start: 2019-01-02 | End: 2019-01-04

## 2019-01-02 RX ORDER — BENZONATATE 100 MG/1
200 CAPSULE ORAL 3 TIMES DAILY PRN
Status: DISCONTINUED | OUTPATIENT
Start: 2019-01-02 | End: 2019-01-04 | Stop reason: HOSPADM

## 2019-01-02 RX ADMIN — BENZONATATE 200 MG: 100 CAPSULE ORAL at 12:01

## 2019-01-02 RX ADMIN — METOPROLOL TARTRATE 25 MG: 25 TABLET, FILM COATED ORAL at 12:01

## 2019-01-02 RX ADMIN — BENZONATATE 200 MG: 100 CAPSULE ORAL at 04:01

## 2019-01-02 RX ADMIN — FERROUS SULFATE TAB EC 325 MG (65 MG FE EQUIVALENT) 325 MG: 325 (65 FE) TABLET DELAYED RESPONSE at 09:01

## 2019-01-02 RX ADMIN — AMLODIPINE BESYLATE 5 MG: 5 TABLET ORAL at 09:01

## 2019-01-02 RX ADMIN — METOPROLOL TARTRATE 25 MG: 25 TABLET, FILM COATED ORAL at 09:01

## 2019-01-02 RX ADMIN — HYDROCHLOROTHIAZIDE 12.5 MG: 12.5 CAPSULE ORAL at 09:01

## 2019-01-02 NOTE — SUBJECTIVE & OBJECTIVE
Past Medical History:   Diagnosis Date    Cancer     colon    Encounter for blood transfusion     Hypertension        Past Surgical History:   Procedure Laterality Date    CHOLECYSTECTOMY      COLON SURGERY      COLONOSCOPY N/A 3/30/2018    [facility]    ESOPHAGOGASTRODUODENOSCOPY (EGD) N/A 3/30/2018    Performed by [facility] ENDO    HYSTERECTOMY         Review of patient's allergies indicates:   Allergen Reactions    Codeine Nausea And Vomiting    Erythromycin base Other (See Comments)    Lisinopril Palpitations     Cough        No current facility-administered medications on file prior to encounter.      Current Outpatient Medications on File Prior to Encounter   Medication Sig    amLODIPine (NORVASC) 5 MG tablet Take 5 mg by mouth once daily.    atorvastatin (LIPITOR) 20 MG tablet Take 20 mg by mouth every evening.    benzonatate (TESSALON) 200 MG capsule Take 200 mg by mouth 3 (three) times daily as needed for Cough.    co-enzyme Q-10 30 mg capsule Take 100 mg by mouth 2 (two) times daily.    ferrous sulfate 325 (65 FE) MG EC tablet Take 1 tablet (325 mg total) by mouth 2 (two) times daily.    hydroCHLOROthiazide (MICROZIDE) 12.5 mg capsule Take 12.5 mg by mouth once daily.    metoprolol tartrate (LOPRESSOR) 25 MG tablet Take by mouth every evening.      Family History     Problem Relation (Age of Onset)    Cancer Mother, Father        Tobacco Use    Smoking status: Never Smoker    Smokeless tobacco: Never Used   Substance and Sexual Activity    Alcohol use: No    Drug use: No    Sexual activity: Not on file     Review of Systems   Constitutional: Positive for fatigue. Negative for chills and fever.   HENT: Negative for congestion.    Eyes: Negative for visual disturbance.   Respiratory: Positive for shortness of breath. Negative for cough.         Exertional Dyspnea   Cardiovascular: Negative for chest pain and palpitations.    Gastrointestinal: Positive for anal bleeding. Negative for abdominal pain, constipation, diarrhea, nausea, rectal pain and vomiting.   Genitourinary: Negative for dysuria and hematuria.   Musculoskeletal: Negative for arthralgias.   Skin: Negative for wound.   Neurological: Negative for dizziness, syncope and headaches.   Hematological: Does not bruise/bleed easily.   Psychiatric/Behavioral: Negative for confusion. The patient is nervous/anxious. The patient is not hyperactive.      Objective:     Vital Signs (Most Recent):  Temp: 99.5 °F (37.5 °C) (01/01/19 2000)  Pulse: 101 (01/01/19 2000)  Resp: 18 (01/01/19 2000)  BP: 128/60 (01/01/19 2000)  SpO2: 95 % (01/01/19 2000) Vital Signs (24h Range):  Temp:  [99.5 °F (37.5 °C)-99.8 °F (37.7 °C)] 99.5 °F (37.5 °C)  Pulse:  [] 101  Resp:  [18] 18  SpO2:  [94 %-97 %] 95 %  BP: (128-137)/(59-73) 128/60     Weight: 103.3 kg (227 lb 11.8 oz)  Body mass index is 41.65 kg/m².    Physical Exam   Constitutional: She is oriented to person, place, and time. No distress.   HENT:   Head: Normocephalic.   Eyes: Pupils are equal, round, and reactive to light.   Neck: Normal range of motion. Neck supple. No JVD present. No tracheal deviation present.   Cardiovascular: Normal rate, regular rhythm, normal heart sounds and intact distal pulses.   No murmur heard.  Pulmonary/Chest: Effort normal and breath sounds normal. No respiratory distress.   Abdominal: Soft. Bowel sounds are normal. She exhibits no distension. There is no tenderness.   Musculoskeletal: Normal range of motion. She exhibits no edema.   Neurological: She is alert and oriented to person, place, and time. No cranial nerve deficit.   Skin: Skin is warm, dry and intact. Capillary refill takes less than 2 seconds.   Psychiatric: Her behavior is normal. Judgment and thought content normal. Her mood appears anxious.         CRANIAL NERVES     CN III, IV, VI   Pupils are equal, round, and reactive to light.        Significant Labs:   CBC:   Recent Labs   Lab 01/01/19  1745   WBC 11.10   HGB 6.7*   HCT 22.0*   *     CMP:   Recent Labs   Lab 01/01/19  1745   *   K 4.4      CO2 21*   *   BUN 15   CREATININE 0.7   CALCIUM 9.4   PROT 7.3   ALBUMIN 2.3*   BILITOT 0.2   ALKPHOS 500*   AST 74*   ALT 52*   ANIONGAP 14   EGFRNONAA >60     Liver Biopsy 12/7/18: (+) metastatic colonic adenocarcinoma    Significant Imaging:     CXR: Reviewed film--looks hazy. Reviewed radiologist's report--   Impression     No acute abnormality.     MRI Abdomen w/ & w/o Contrast 12/10/18: Impression   Multiple masses in the liver consistent metastatic disease.  Index lesions have been detailed above.     CT Chest Abd pelvis w/ Contrast 12/6/18:   Impression     Multiple masses in the liver consistent metastatic disease.  Mildly enlarged mesenteric lymph nodes.  Circumferential wall thickening of the distal duodenum.  Additional findings as detailed above including diverticulosis without CT findings of acute diverticulitis, prior colon surgery, renal cysts, prior hysterectomy and prior cholecystectomy

## 2019-01-02 NOTE — PLAN OF CARE
Problem: Fluid Volume Deficit  Goal: Fluid Balance    Intervention: Monitor and Manage Hypovolemia  Alert/oriented  Very pleasant  Denies pain/discomfort  Shortness of breath with exertion only  Tolerating blood transfusions   Occasional cough controlled with home medication Tessalon Jennae   Swallows without problems  Sinus rhythm  Awaiting stool specimen  Clear colored urine  Safe

## 2019-01-02 NOTE — PLAN OF CARE
01/01/19 6180   Patient Assessment/Suction   Level of Consciousness (AVPU) alert   PRE-TX-O2-ETCO2   O2 Device (Oxygen Therapy) room air   SpO2 97 %   Pulse Oximetry Type Intermittent   $ Pulse Oximetry - Single Charge Pulse Oximetry - Single

## 2019-01-02 NOTE — ASSESSMENT & PLAN NOTE
Body mass index is 42.26 kg/m².  General weight loss/lifestyle modification strategies discussed (elicit support from others; identify saboteurs; non-food rewards, etc).

## 2019-01-02 NOTE — NURSING
2nd unit infused without problems. 3rd and final unit prbc infusing without problems. Reminded patient of adverse effects to report immediately. Verbalized understanding. Will continue to monitor.

## 2019-01-02 NOTE — PLAN OF CARE
PCP is Dr Conner.  Pharmacy is [facility] on [address].  Denies HH, however feels she may benefit if MD agrees.  Denies DME.  Discharge plan is home; possibly with HH.       01/02/19 1137   Discharge Assessment   Assessment Type Discharge Planning Assessment   Confirmed/corrected address and phone number on facesheet? Yes   Assessment information obtained from? Patient   Prior to hospitilization cognitive status: Alert/Oriented   Prior to hospitalization functional status: Independent   Current cognitive status: Alert/Oriented   Current Functional Status: Independent   Lives With spouse   Able to Return to Prior Arrangements yes   Is patient able to care for self after discharge? Yes   Patient's perception of discharge disposition home or selfcare   Readmission Within the Last 30 Days previous discharge plan unsuccessful   If yes, most recent facility name: [facility]   Patient currently being followed by outpatient case management? No   Patient currently receives any other outside agency services? No   Equipment Currently Used at Home none   Do you have any problems affording any of your prescribed medications? No   Is the patient taking medications as prescribed? yes   Does the patient have transportation home? Yes   Transportation Anticipated family or friend will provide   Dialysis Name and Scheduled days none   Does the patient receive services at the [facility]? No   Discharge Plan A Home   Discharge Plan B Home Health   Patient/Family in Agreement with Plan yes   Readmission Questionnaire   At the time of your discharge, did someone talk to you about what your health problems were? Yes   At the time of discharge, did someone talk to you about what to watch out for regarding worsening of your health problem? Yes   At the time of discharge, did someone talk to you about what to do if you experienced worsening of your health problem? Yes   At the time of discharge, did someone talk to you about which  medication to take when you left the hospital and which ones to stop taking? Yes   At the time of discharge, did someone talk to you about when and where to follow up with a doctor after you left the hospital? Yes   What do you believe caused you to be sick enough to be re-admitted? weakness; I knew I needed blood   How often do you need to have someone help you when you read instructions, pamphlets, or other written material from your doctor or pharmacy? Rarely   Do you have problems taking your medications as prescribed? No   Do you have any problems affording any of  your prescribed medications? No   Do you have problems obtaining/receiving your medications? No   Does the patient have transportation to healthcare appointments? Yes   Does the patient have family/friends to help with healtcare needs after discharge? yes   Does your caregiver provide all the help you need? Yes   Are you currently feeling confused? No   Are you currently having problems thinking? No   Are you currently having memory problems? No   Have you felt down, depressed, or hopeless? 1   Have you felt little interest or pleasure in doing things? 1   In the last 7 days, my sleep quality was: fair

## 2019-01-02 NOTE — H&P
[facility] Medicine  History & Physical    Patient Name: Indigo Stuart  MRN: [id number]  Admission Date: 1/1/2019  Attending Physician: Gilbert Garcia MD   Primary Care Provider: John Conner MD         Patient information was obtained from patient, past medical records and ER records.     Subjective:     Principal Problem:Symptomatic anemia    Chief Complaint:   Chief Complaint   Patient presents with    Fatigue     States she has been feeling progressively weaker x 2 weeks. States she has Liver CA and probably needs another blood tranfusion.         HPI: Ms. Stuart is a 64yo F with a PMH of HTN, Colon Ca, and most recently, Anemia and Stage 4 metastatic Liver Disease (12/2018). She was recently hospitalized 12/4/18-12/7/18 for Symptomatic Anemia. She was found to have stage 4 metastatic liver cancer and Dr. Pettit was consulted. She seen Dr. Pettit 12/14/18 and had her PET Scan yesterday. She has an appointment with General Surgeon, Dr. Albert, tomorrow and does not know why. She presents to the hospital today with c/o Fatigue. She started feeling fatigued about 3 weeks ago and has gotten progressively worst. Associated symptoms include exertional dyspnea. She has also been having intermittent rectal bleeding. She is upset that she has not started chemotherapy as of yet and her next Oncology appointment is 1/15/19. While in the ED, she was found to be significantly anemic with a Hgb 6.7. She was T&M for 3 units PRBC and her first unit is in progress. She currently denies pain or discomforts.    Past Medical History:   Diagnosis Date    Cancer     colon    Encounter for blood transfusion     Hypertension        Past Surgical History:   Procedure Laterality Date    CHOLECYSTECTOMY      COLON SURGERY      COLONOSCOPY N/A 3/30/2018    [facility]    ESOPHAGOGASTRODUODENOSCOPY (EGD) N/A 3/30/2018    Performed by Daniel [facility]ana MD at  [facility] ENDO    HYSTERECTOMY         Review of patient's allergies indicates:   Allergen Reactions    Codeine Nausea And Vomiting    Erythromycin base Other (See Comments)    Lisinopril Palpitations     Cough        No current facility-administered medications on file prior to encounter.      Current Outpatient Medications on File Prior to Encounter   Medication Sig    amLODIPine (NORVASC) 5 MG tablet Take 5 mg by mouth once daily.    atorvastatin (LIPITOR) 20 MG tablet Take 20 mg by mouth every evening.    benzonatate (TESSALON) 200 MG capsule Take 200 mg by mouth 3 (three) times daily as needed for Cough.    co-enzyme Q-10 30 mg capsule Take 100 mg by mouth 2 (two) times daily.    ferrous sulfate 325 (65 FE) MG EC tablet Take 1 tablet (325 mg total) by mouth 2 (two) times daily.    hydroCHLOROthiazide (MICROZIDE) 12.5 mg capsule Take 12.5 mg by mouth once daily.    metoprolol tartrate (LOPRESSOR) 25 MG tablet Take by mouth every evening.      Family History     Problem Relation (Age of Onset)    Mother:   Father:      Cancer Mother, Father        Tobacco Use    Smoking status: Never Smoker    Smokeless tobacco: Never Used   Substance and Sexual Activity    Alcohol use: No    Drug use: No    Sexual activity: Not on file     Review of Systems   Constitutional: Positive for fatigue. Negative for chills and fever.   HENT: Negative for congestion.    Eyes: Negative for visual disturbance.   Respiratory: Positive for shortness of breath. Negative for cough.         Exertional Dyspnea   Cardiovascular: Negative for chest pain and palpitations.   Gastrointestinal: Positive for anal bleeding. Negative for abdominal pain, constipation, diarrhea, nausea, rectal pain and vomiting.   Genitourinary: Negative for dysuria and hematuria.   Musculoskeletal: Negative for arthralgias.   Skin: Negative for wound.   Neurological: Negative for dizziness, syncope and headaches.   Hematological: Does not  bruise/bleed easily.   Psychiatric/Behavioral: Negative for confusion. The patient is nervous/anxious. The patient is not hyperactive.      Objective:     Vital Signs (Most Recent):  Temp: 99.5 °F (37.5 °C) (01/01/19 2000)  Pulse: 101 (01/01/19 2000)  Resp: 18 (01/01/19 2000)  BP: 128/60 (01/01/19 2000)  SpO2: 95 % (01/01/19 2000) Vital Signs (24h Range):  Temp:  [99.5 °F (37.5 °C)-99.8 °F (37.7 °C)] 99.5 °F (37.5 °C)  Pulse:  [] 101  Resp:  [18] 18  SpO2:  [94 %-97 %] 95 %  BP: (128-137)/(59-73) 128/60     Weight: 103.3 kg (227 lb 11.8 oz)  Body mass index is 41.65 kg/m².    Physical Exam   Constitutional: She is oriented to person, place, and time. No distress.   HENT:   Head: Normocephalic.   Eyes: Pupils are equal, round, and reactive to light.   Neck: Normal range of motion. Neck supple. No JVD present. No tracheal deviation present.   Cardiovascular: Normal rate, regular rhythm, normal heart sounds and intact distal pulses.   No murmur heard.  Pulmonary/Chest: Effort normal and breath sounds normal. No respiratory distress.   Abdominal: Soft. Bowel sounds are normal. She exhibits no distension. There is no tenderness.   Musculoskeletal: Normal range of motion. She exhibits no edema.   Neurological: She is alert and oriented to person, place, and time. No cranial nerve deficit.   Skin: Skin is warm, dry and intact. Capillary refill takes less than 2 seconds.   Psychiatric: Her behavior is normal. Judgment and thought content normal. Her mood appears anxious.         CRANIAL NERVES     CN III, IV, VI   Pupils are equal, round, and reactive to light.       Significant Labs:   CBC:   Recent Labs   Lab 01/01/19  1745   WBC 11.10   HGB 6.7*   HCT 22.0*   *     CMP:   Recent Labs   Lab 01/01/19  1745   *   K 4.4      CO2 21*   *   BUN 15   CREATININE 0.7   CALCIUM 9.4   PROT 7.3   ALBUMIN 2.3*   BILITOT 0.2   ALKPHOS 500*   AST 74*   ALT 52*   ANIONGAP 14   EGFRNONAA >60     Liver  Biopsy 12/7/18: (+) metastatic colonic adenocarcinoma    Significant Imaging:     CXR: Reviewed film--looks hazy. Reviewed radiologist's report--   Impression     No acute abnormality.     MRI Abdomen w/ & w/o Contrast 12/10/18: Impression   Multiple masses in the liver consistent metastatic disease.  Index lesions have been detailed above.     CT Chest Abd pelvis w/ Contrast 12/6/18:   Impression     Multiple masses in the liver consistent metastatic disease.  Mildly enlarged mesenteric lymph nodes.  Circumferential wall thickening of the distal duodenum.  Additional findings as detailed above including diverticulosis without CT findings of acute diverticulitis, prior colon surgery, renal cysts, prior hysterectomy and prior cholecystectomy           Assessment/Plan:     * Symptomatic anemia    Transfuse 3units PRBC  Anemia workup: iron studies, B12, folate, stool OCB  Monitor labs         Hematochezia    Check Stool OCB  Consult GI  Monitor CBC  Check Coags     Thrombocytosis    Followed by Dr. Pettit  Being worked up for possible Myeloproliferative Disorder  Check iron studies       Elevated LFTs    Likely due to Hepatic CA  Stop Statin  Avoid Acetaminophen   Monitor labs     Metastatic adenocarcinoma to liver/ Hx Colon CA    Consult Dr. Pettit  Had PET Scan 12/31/18  VAD has not been placed as of yet     Hypertension    Chronic/Controlled. Continue chronic medications, adjusting as needed.         VTE Risk Mitigation (From admission, onward)    High Risk: TEDs  No lovenox due to Hematochezia             Joy Rooney NP  Department of Hospital Medicine   [facility]

## 2019-01-02 NOTE — PROGRESS NOTES
[facility]  Progress Note    Patient Name: Indigo Stuart  MRN: [id number]  Patient Class: IP- Inpatient   Admission Date: 1/1/2019  Length of Stay: 1 days  Attending Physician: Kwabena Guardado MD  Primary Care Provider: John Conner MD        Subjective:     Principal Problem:Symptomatic anemia    HPI:  Ms. Stuart is a 62yo F with a PMH of HTN, Colon Ca, and most recently, Anemia and Stage 4 metastatic Liver Disease (12/2018). She was recently hospitalized 12/4/18-12/7/18 for Symptomatic Anemia. She was found to have stage 4 metastatic liver cancer and Dr. Pettit was consulted. She seen Dr. Pettit 12/14/18 and had her PET Scan yesterday. She has an appointment with General Surgeon, Dr. Albert, tomorrow and does not know why. She presents to the hospital today with c/o Fatigue. She started feeling fatigued about 3 weeks ago and has gotten progressively worst. Associated symptoms include exertional dyspnea. She has also been having intermittent rectal bleeding. She is upset that she has not started chemotherapy as of yet and her next Oncology appointment is 1/15/19. While in the ED, she was found to be significantly anemic with a Hgb 6.7. She was T&M for 3 units PRBC and her first unit is in progress. She currently denies pain or discomforts.    Hospital Course:  No notes on file    Interval History: no new issues. Received 3 units of PRBCs. Feeling better. States black stool this am. Denies SOB. +FOB  NPO pending GI evaluation.    Review of Systems   Constitutional: Positive for appetite change and fatigue. Negative for diaphoresis and fever.   Respiratory: Positive for shortness of breath (BAUER). Negative for cough.    Cardiovascular: Negative for chest pain and leg swelling.   Gastrointestinal: Positive for blood in stool (black stool). Negative for abdominal distention, abdominal pain, diarrhea and nausea.   Skin: Negative for rash.   Neurological: Negative for speech  difficulty and numbness.     Objective:     Vital Signs (Most Recent):  Temp: 98.6 °F (37 °C) (01/02/19 1203)  Pulse: 88 (01/02/19 1203)  Resp: 20 (01/02/19 1203)  BP: (!) 145/64 (01/02/19 1203)  SpO2: (!) 93 % (01/02/19 1203) Vital Signs (24h Range):  Temp:  [96.3 °F (35.7 °C)-99.8 °F (37.7 °C)] 98.6 °F (37 °C)  Pulse:  [] 88  Resp:  [15-20] 20  SpO2:  [93 %-97 %] 93 %  BP: (110-145)/(55-73) 145/64     Weight: 104.8 kg (231 lb 0.7 oz)  Body mass index is 42.26 kg/m².    Intake/Output Summary (Last 24 hours) at 1/2/2019 1227  Last data filed at 1/2/2019 0700  Gross per 24 hour   Intake 625 ml   Output 600 ml   Net 25 ml      Physical Exam   Constitutional: She is oriented to person, place, and time. She appears well-developed and well-nourished.   HENT:   Head: Normocephalic and atraumatic.   Mouth/Throat: Oropharynx is clear and moist.   Eyes: Conjunctivae and EOM are normal. Pupils are equal, round, and reactive to light.   Neck: Normal range of motion. Neck supple.   Cardiovascular: Normal rate, regular rhythm, normal heart sounds and intact distal pulses.   No murmur heard.  Pulmonary/Chest: Effort normal and breath sounds normal. She has no wheezes.   Abdominal: Soft. Bowel sounds are normal. There is no tenderness.   Palpable ventral hernia to right abdomen. nontender   Musculoskeletal: Normal range of motion.   Neurological: She is alert and oriented to person, place, and time.   Skin: Skin is warm and dry.   Psychiatric: She has a normal mood and affect. Her behavior is normal. Judgment normal.   Nursing note and vitals reviewed.      Significant Labs:   BMP:   Recent Labs   Lab 01/02/19  0740         K 3.2*      CO2 26   BUN 12   CREATININE 0.6   CALCIUM 9.0     CBC:   Recent Labs   Lab 01/01/19  1745 01/02/19  0740   WBC 11.10 11.00   HGB 6.7* 9.3*   HCT 22.0* 29.2*   * 607*       Significant Imaging: I have reviewed and interpreted all pertinent imaging results/findings  within the past 24 hours.    Assessment/Plan:      * Symptomatic anemia    Transfuse 3units PRBC  Anemia workup: iron studies, B12, folate, stool OCB  Monitor labs         Morbid obesity    Body mass index is 42.26 kg/m².  General weight loss/lifestyle modification strategies discussed (elicit support from others; identify saboteurs; non-food rewards, etc).         Elevated LFTs    Likely due to Hepatic CA  Stop Statin  Avoid Acetaminophen   Monitor labs     Thrombocytosis    Followed by Dr. Pettit  Being worked up for possible Myeloproliferative Disorder  Check iron studies       Metastatic adenocarcinoma to liver/ Hx Colon CA    Consult Dr. Pettit  Had PET Scan 12/31/18- possible colon primary with liver mets       Hypokalemia    Replace with oral supplementation. Prn orders.   Check MG       Hypertension    Chronic/Controlled. Continue chronic medications, adjusting as needed.       Hematochezia    Check Stool OCB  + stool FOB  Consult GI  Monitor CBC  Check Coags  NPO after MN for upper GI per Dr. Arguello       VTE Risk Mitigation (From admission, onward)        Ordered     IP VTE HIGH RISK PATIENT  Once      01/01/19 2233     Place JUAN hose  Until discontinued      01/01/19 2233        Discussed with Dr. Jos Chapman NP  Department of Hospital Medicine   [facility]

## 2019-01-02 NOTE — HPI
Ms. Stuart is a 62yo F with a PMH of HTN, Colon Ca, and most recently, Anemia and Stage 4 metastatic Liver Disease (12/2018). She was recently hospitalized 12/4/18-12/7/18 for Symptomatic Anemia. She was found to have stage 4 metastatic liver cancer and Dr. Pettit was consulted. She seen Dr. Pettit 12/14/18 and had her PET Scan yesterday. She has an appointment with General Surgeon, Dr. Albert, tomorrow and does not know why. She presents to the hospital today with c/o Fatigue. She started feeling fatigued about 3 weeks ago and has gotten progressively worst. Associated symptoms include exertional dyspnea. She has also been having intermittent rectal bleeding. She is upset that she has not started chemotherapy as of yet and her next Oncology appointment is 1/15/19. While in the ED, she was found to be significantly anemic with a Hgb 6.7. She was T&M for 3 units PRBC and her first unit is in progress. She currently denies pain or discomforts.

## 2019-01-02 NOTE — SUBJECTIVE & OBJECTIVE
Interval History: no new issues. Received 3 units of PRBCs. Feeling better. States black stool this am. Denies SOB. +FOB  NPO pending GI evaluation.    Review of Systems   Constitutional: Positive for appetite change and fatigue. Negative for diaphoresis and fever.   Respiratory: Positive for shortness of breath (BAUER). Negative for cough.    Cardiovascular: Negative for chest pain and leg swelling.   Gastrointestinal: Positive for blood in stool (black stool). Negative for abdominal distention, abdominal pain, diarrhea and nausea.   Skin: Negative for rash.   Neurological: Negative for speech difficulty and numbness.     Objective:     Vital Signs (Most Recent):  Temp: 98.6 °F (37 °C) (01/02/19 1203)  Pulse: 88 (01/02/19 1203)  Resp: 20 (01/02/19 1203)  BP: (!) 145/64 (01/02/19 1203)  SpO2: (!) 93 % (01/02/19 1203) Vital Signs (24h Range):  Temp:  [96.3 °F (35.7 °C)-99.8 °F (37.7 °C)] 98.6 °F (37 °C)  Pulse:  [] 88  Resp:  [15-20] 20  SpO2:  [93 %-97 %] 93 %  BP: (110-145)/(55-73) 145/64     Weight: 104.8 kg (231 lb 0.7 oz)  Body mass index is 42.26 kg/m².    Intake/Output Summary (Last 24 hours) at 1/2/2019 1227  Last data filed at 1/2/2019 0700  Gross per 24 hour   Intake 625 ml   Output 600 ml   Net 25 ml      Physical Exam   Constitutional: She is oriented to person, place, and time. She appears well-developed and well-nourished.   HENT:   Head: Normocephalic and atraumatic.   Mouth/Throat: Oropharynx is clear and moist.   Eyes: Conjunctivae and EOM are normal. Pupils are equal, round, and reactive to light.   Neck: Normal range of motion. Neck supple.   Cardiovascular: Normal rate, regular rhythm, normal heart sounds and intact distal pulses.   No murmur heard.  Pulmonary/Chest: Effort normal and breath sounds normal. She has no wheezes.   Abdominal: Soft. Bowel sounds are normal. There is no tenderness.   Palpable ventral hernia to right abdomen. nontender   Musculoskeletal: Normal range of motion.    Neurological: She is alert and oriented to person, place, and time.   Skin: Skin is warm and dry.   Psychiatric: She has a normal mood and affect. Her behavior is normal. Judgment normal.   Nursing note and vitals reviewed.      Significant Labs:   BMP:   Recent Labs   Lab 01/02/19  0740         K 3.2*      CO2 26   BUN 12   CREATININE 0.6   CALCIUM 9.0     CBC:   Recent Labs   Lab 01/01/19  1745 01/02/19  0740   WBC 11.10 11.00   HGB 6.7* 9.3*   HCT 22.0* 29.2*   * 607*       Significant Imaging: I have reviewed and interpreted all pertinent imaging results/findings within the past 24 hours.

## 2019-01-02 NOTE — NURSING
Received via stretcher. Transferred with ease. Denies any pain/shortness of breath. Instructed on plan of care and treatment. Instructed on s/s of adverse effects to notify staff of . Verbalized understanding. Short of breath with exertion. Will continue to monitor.

## 2019-01-02 NOTE — CONSULTS
[facility]  Hematology/Oncology  Consult Note    Patient Name: Indigo Stuart  MRN: [id number]  Admission Date: 1/1/2019  Hospital Length of Stay: 1 days  Code Status: Full Code   Attending Provider: Kwabena Guardado MD  Consulting Provider: Karina Pettit MD  Primary Care Physician: John Conner MD  Principal Problem:Symptomatic anemia  January 2  Consults  Subjective:     HPI:     This is a very pleasant 63-year-old female who was found to have stage IV colorectal carcinoma with hepatic Mets.  She is scheduled to have a chest port placed and then to start chemotherapy with FOLFOX plus Avastin.  She presented to the emergency room with increasing weakness for the past 2 weeks.  She was worried she was in need of a blood transfusion.  She reports intermittent rectal bleeding. She presented with a hemoglobin of 6.7 has received a blood transfusion for such.  She was found to have a gluten sensitivity as well. She also has an appointment with GI which when offered a sooner appointment that her regularly scheduled appointment she politely declined      Medications:  Continuous Infusions:  Scheduled Meds:   amLODIPine  5 mg Oral Daily    ferrous sulfate  325 mg Oral BID    hydroCHLOROthiazide  12.5 mg Oral Daily    metoprolol tartrate  25 mg Oral QHS     PRN Meds:benzonatate, ibuprofen, ondansetron, potassium chloride, potassium chloride, potassium chloride, ramelteon, senna-docusate 8.6-50 mg, sodium chloride 0.9%     Review of patient's allergies indicates:   Allergen Reactions    Codeine Nausea And Vomiting    Erythromycin base Other (See Comments)    Lisinopril Palpitations     Cough         Past Medical History:   Diagnosis Date    Cancer     colon    Encounter for blood transfusion     Hypertension      Past Surgical History:   Procedure Laterality Date    CHOLECYSTECTOMY      COLON SURGERY      COLONOSCOPY N/A 3/30/2018    [facility]     ESOPHAGOGASTRODUODENOSCOPY (EGD) N/A 3/30/2018    [facility]    HYSTERECTOMY       Family History     Problem Relation (Age of Onset)    Cancer Mother, Father        Tobacco Use    Smoking status: Never Smoker    Smokeless tobacco: Never Used   Substance and Sexual Activity    Alcohol use: No    Drug use: No    Sexual activity: Not on file       Review of Systems     Review of Systems:  General: Weight gain: No, Weight Loss: No, Fatigue: y   Fever: No, Chills: No, Night Sweats: No, Insomnia: No, Excessive sleeping: No   Respiratory:  Cough: No, Shortness of Breath:  y  Wheezing: No, Excessive Snoring: No, Coughing up blood: No  Endocrine: Heat Intolerance: No, Cold Intolerance: No,   Excessive Thirst: No, Excessive Hunger: No,   Eyes:  Blurred Vision: No, Double Vision: No,   Light Sensitivity: No, Eye pain: No  Musculoskeletal: Muscle Aches/Pain: No, Joint Pain/Swelling: No, Muscle Cramps: No, Muscle Weakness: No, Neck Pain: No, Back Pain: No   Neurological: Difficulty Walking/Falls: Yes, Headache Migraine: No, Dizziness/Vertigo: No, Fainting: No, Difficulty with Speech: No, Weakness: No, Tingling/Numbness: No, Tremors: No,  Memory Problems: No, Seizures: No, Difficulty Swallowing: No, Altered Taste: No.  Cardiovascular: Chest Pain: No, Shortness of Breath: y    Palpitations: No,  Gastrointestinal: Nausea/Vomiting: No, Constipation: No, Diarrhea: No, Bloody Stools: No   Psych/Cog:  Depression: No, Anxiety: No, Hallucinations: No, Problems Concentrating: No  : Frequent Urination: No, Incontinence: No, Blood of Urine: No, Urinary Infections: No, Changes in Sex Drive: No   ENT:Hearing Loss: No, Earache: No, Ringing in Ears: No,   Facial Pain: No, Chronic Congestion: No   Immune: Seasonal Allergies: No, Hives and/or Rashes: No  The remainder of the review of twelve body systems was reviewed and normal      Objective:     Vital Signs (Most Recent):  Temp: 97.5 °F (36.4 °C)  (01/02/19 0612)  Pulse: 79 (01/02/19 0612)  Resp: 17 (01/02/19 0612)  BP: (!) 117/55 (01/02/19 0612)  SpO2: (!) 94 % (01/02/19 0612) Vital Signs (24h Range):  Temp:  [96.3 °F (35.7 °C)-99.8 °F (37.7 °C)] 97.5 °F (36.4 °C)  Pulse:  [] 79  Resp:  [15-20] 17  SpO2:  [93 %-97 %] 94 %  BP: (110-137)/(55-73) 117/55     Weight: 104.8 kg (231 lb 0.7 oz)  Body mass index is 42.26 kg/m².  Body surface area is 2.14 meters squared.      Intake/Output Summary (Last 24 hours) at 1/2/2019 1119  Last data filed at 1/2/2019 0700  Gross per 24 hour   Intake 625 ml   Output 600 ml   Net 25 ml       Physical Exam  Height / weight / VS reviewed  GENERAL.: Well-developed, well-nourished, in no acute distress  PSYCH: pleasant affect, no anxiety, no depression  HEENT: Normocephalic, lids intact, conjunctiva pink, sinuses nontender to palpation TMs intact, non-boggy turbinates,Normal auricula, nasal septum, dentition, gums and mucosa ,OP clear,no palatal pallor  NECK: Supple,trachea midline, no palpable abnormalities  LYMPHATICS: No cervical or axillary adenopathy  RESPIRATORY: CTAB, no wheezes, rubs or rhonchi  Good respiratory effort without any retractions or diaphragmatic movement  CARDIOVASCULAR: no JVD, S1 / S2 with RRR, no murmur, no rub,2+ capillary refill  ABDOMEN: NTND, normal bowel sounds, no palpable HSM or mass  EXTREMITIES: No cyanosis, no clubbing, no edema, no joint effusion  NEUROLOGICAL: alert and oriented x 3, cranial nerves grossly intact  SKIN: Warm, dry, no rash or lesions noted, no tenting, no petechiae or ecchymosis  Significant Labs:     Lab Results   Component Value Date    WBC 11.00 01/02/2019    RBC 3.72 (L) 01/02/2019    HGB 9.3 (L) 01/02/2019    HCT 29.2 (L) 01/02/2019    MCV 79 (L) 01/02/2019    MCH 25.1 (L) 01/02/2019    MCHC 32.0 01/02/2019    RDW 17.8 (H) 01/02/2019     (H) 01/02/2019    MPV 6.9 (L) 01/02/2019    GRAN 8.7 (H) 01/02/2019    GRAN 78.5 (H) 01/02/2019    LYMPH 1.1 01/02/2019     LYMPH 9.9 (L) 01/02/2019    MONO 1.0 01/02/2019    MONO 8.7 01/02/2019    EOS 0.2 01/02/2019    BASO 0.10 01/02/2019    EOSINOPHIL 2.2 01/02/2019    BASOPHIL 0.7 01/02/2019           Assessment/Plan:     Active Diagnoses:    Diagnosis Date Noted POA    PRINCIPAL PROBLEM:  Symptomatic anemia [D64.9] 03/29/2018 Yes    Metastatic adenocarcinoma to liver/ Hx Colon CA [C78.7] 01/01/2019 Yes    Thrombocytosis [D47.3] 01/01/2019 Yes    Elevated LFTs [R94.5] 01/01/2019 Yes    Hematochezia [K92.1] 03/29/2018 Yes    Hypertension [I10] 03/29/2018 Yes      Problems Resolved During this Admission:       gI to assess for mass near small intestine that could explain hematochezia  She will need a chest port to accept chemo for stage 4 CRC  Consult DR Albert for chest port   Explained the staging and the extent of hepatic mets  Pt may have pulmonary mets per PET CT   Her family was asking about chemo and possibly a liver transplant  Liver transplantation is usually not considered in stage 4 malignancy   Await findings of EGD tomorrow   Cont supportive transfusions to keep Hb greater than 7    Karina Pettit MD  Hematology/Oncology  [facility]

## 2019-01-02 NOTE — PLAN OF CARE
01/02/19 1508   PRE-TX-O2-ETCO2   O2 Device (Oxygen Therapy) room air   SpO2 95 %   Pulse Oximetry Type Intermittent   $ Pulse Oximetry - Multiple Charge Pulse Oximetry - Multiple

## 2019-01-02 NOTE — NURSING
1st unit blood transfusion complete. Second unit has started. Patient denies any problems. Assisted to and from bathroom. Will continue to monitor.

## 2019-01-02 NOTE — ASSESSMENT & PLAN NOTE
Followed by Dr. Pettit  Being worked up for possible Myeloproliferative Disorder  Check iron studies

## 2019-01-02 NOTE — CONSULTS
[facility]     CC: Blood in stool    HPI 63 y.o. female with blood in stool, recent onset, moderate amount, dark/maroon, with no alleviating/exacerbating factors.  She admits to concurrent abdominal bloating.  She denies any hematemesis.  She had CT scan and liver biopsy recently which showed multiple liver lesions consistent with stage IV cancer and biopsy appeared consistent with bowel primary.  She had remote history of colon cancer in 2003 which was resected and most recent EGD/colonoscopy were done in 03/2018 and were unremarkable.  Of note, EGD was to second portion of duodenum.  On PET scan done this admission, uptake noted in the distal duodenum/proximal jejunum which is concerning for small bowel lesion. She denies any other acute complaints.  Case discussed with Dr. Guardado and with Мария Chapman NP in person and plan reviewed.  Dr. Ptetit following as well.      Past Medical History:   Diagnosis Date    Cancer     colon    Encounter for blood transfusion     Hypertension        Past Surgical History:   Procedure Laterality Date    CHOLECYSTECTOMY      COLON SURGERY      COLONOSCOPY N/A 3/30/2018    [facility]    ESOPHAGOGASTRODUODENOSCOPY (EGD) N/A 3/30/2018    [facility]    HYSTERECTOMY         Social History     Tobacco Use    Smoking status: Never Smoker    Smokeless tobacco: Never Used   Substance Use Topics    Alcohol use: No    Drug use: No       Family History   Problem Relation Age of Onset    Cancer Mother     Cancer Father        Review of Systems  General ROS: negative for - chills, fever or weight loss  Psychological ROS: negative for - hallucination, depression or suicidal ideation  Ophthalmic ROS: negative for - blurry vision, photophobia or eye pain  ENT ROS: negative for - epistaxis, sore throat or rhinorrhea  Respiratory ROS: no cough, shortness of breath, or wheezing  Cardiovascular ROS:  "no chest pain or dyspnea on exertion  Gastrointestinal ROS: + abdominal pain and blood in stool  Genito-Urinary ROS: no dysuria, trouble voiding, or hematuria  Musculoskeletal ROS: negative for - arthralgia, myalgia, weakness  Neurological ROS: no syncope or seizures; no ataxia  Dermatological ROS: negative for pruritis, rash and jaundice    Physical Examination  /69 (BP Location: Right arm, Patient Position: Lying)   Pulse 87   Temp 99.7 °F (37.6 °C) (Oral)   Resp 20   Ht 5' 2" (1.575 m)   Wt 104.8 kg (231 lb 0.7 oz)   SpO2 (!) 94%   Breastfeeding? No   BMI 42.26 kg/m²   General appearance: alert, cooperative, no distress  HENT: Normocephalic, atraumatic, neck symmetrical, no nasal discharge   Eyes: conjunctivae/corneas clear, PERRL, EOM's intact, sclera anicteric  Lungs: clear to auscultation bilaterally, no dullness to percussion bilaterally, symmetric expansion, breathing unlabored  Heart: regular rate and rhythm without rub; no displacement of the PMI   Abdomen: obese with TTP  Extremities: extremities symmetric; no clubbing, cyanosis, or edema  Integument: Skin color, texture, turgor normal; no rashes; hair distrubution normal, no jaundice  Neurologic: Alert and oriented X 3, no focal sensory or motor neurologic deficits  Psychiatric: no pressured speech; normal affect; no evidence of impaired cognition, no anxiety/depression     Labs:  Lab Results   Component Value Date    WBC 11.00 01/02/2019    HGB 9.3 (L) 01/02/2019    HCT 29.2 (L) 01/02/2019    MCV 79 (L) 01/02/2019     (H) 01/02/2019       CMP  Sodium   Date Value Ref Range Status   01/02/2019 137 136 - 145 mmol/L Final     Potassium   Date Value Ref Range Status   01/02/2019 3.2 (L) 3.5 - 5.1 mmol/L Final     Chloride   Date Value Ref Range Status   01/02/2019 100 95 - 110 mmol/L Final     CO2   Date Value Ref Range Status   01/02/2019 26 23 - 29 mmol/L Final     Glucose   Date Value Ref Range Status   01/02/2019 100 70 - 110 mg/dL " Final     BUN, Bld   Date Value Ref Range Status   01/02/2019 12 8 - 23 mg/dL Final     Creatinine   Date Value Ref Range Status   01/02/2019 0.6 0.5 - 1.4 mg/dL Final     Calcium   Date Value Ref Range Status   01/02/2019 9.0 8.7 - 10.5 mg/dL Final     Total Protein   Date Value Ref Range Status   01/02/2019 6.5 6.0 - 8.4 g/dL Final     Albumin   Date Value Ref Range Status   01/02/2019 2.2 (L) 3.5 - 5.2 g/dL Final     Total Bilirubin   Date Value Ref Range Status   01/02/2019 1.8 (H) 0.1 - 1.0 mg/dL Final     Comment:     For infants and newborns, interpretation of results should be based  on gestational age, weight and in agreement with clinical  observations.  Premature Infant recommended reference ranges:  Up to 24 hours.............<8.0 mg/dL  Up to 48 hours............<12.0 mg/dL  3-5 days..................<15.0 mg/dL  6-29 days.................<15.0 mg/dL       Alkaline Phosphatase   Date Value Ref Range Status   01/02/2019 438 (H) 55 - 135 U/L Final     AST   Date Value Ref Range Status   01/02/2019 50 (H) 10 - 40 U/L Final     ALT   Date Value Ref Range Status   01/02/2019 43 10 - 44 U/L Final     Anion Gap   Date Value Ref Range Status   01/02/2019 11 8 - 16 mmol/L Final     eGFR if    Date Value Ref Range Status   01/02/2019 >60 >60 mL/min/1.73 m^2 Final     eGFR if non    Date Value Ref Range Status   01/02/2019 >60 >60 mL/min/1.73 m^2 Final     Comment:     Calculation used to obtain the estimated glomerular filtration  rate (eGFR) is the CKD-EPI equation.              Imaging:  PET scan was independently visualized and reviewed by me and showed findings as above in HPI.    I have personally reviewed these images    Case discussed in person with multiple providers as above.    Old records from Dr. Magana (previous EGD/colonoscopy) reviewed and are as summarized above in the HPI.      Assessment:   1.  History of colon cancer  2.  Metastatic liver disease  3.  Abnormal  small bowel on PET scan  4.  Blood in stool  5.  Anemia    Plan:  1.  Transfuse PRN  2.  Diet today  3.  NPO past midnight  4.  EGD with push enteroscopy tomorrow to attempt to visualize abnormal area on PET scan  5.  Further management per oncology  6.  Further recommendations to follow after above.  7.  Communication will be sent to the referring MD, Dr. Guardado regarding my assessment and plan on this patient via EPIC.      Adis Valverde MD  [facility]  [address], [address]  [place] [address]  Office: [phone]  Fax: [phone]

## 2019-01-02 NOTE — ASSESSMENT & PLAN NOTE
Check Stool OCB  + stool FOB  Consult GI  Monitor CBC  Check Coags  NPO after MN for upper GI per Dr. Arguello

## 2019-01-03 ENCOUNTER — ANESTHESIA EVENT (OUTPATIENT)
Dept: ENDOSCOPY | Facility: HOSPITAL | Age: 64
DRG: 981 | End: 2019-01-03
Payer: MEDICARE

## 2019-01-03 ENCOUNTER — ANESTHESIA (OUTPATIENT)
Dept: ENDOSCOPY | Facility: HOSPITAL | Age: 64
DRG: 981 | End: 2019-01-03
Payer: MEDICARE

## 2019-01-03 PROBLEM — R53.83 FATIGUE: Status: ACTIVE | Noted: 2019-01-03

## 2019-01-03 PROBLEM — K31.89 DUODENAL MASS: Status: ACTIVE | Noted: 2019-01-03

## 2019-01-03 PROBLEM — C18.9 METASTATIC COLON CANCER TO LIVER: Status: ACTIVE | Noted: 2019-01-01

## 2019-01-03 LAB
ALBUMIN SERPL BCP-MCNC: 2.1 G/DL
ALP SERPL-CCNC: 416 U/L
ALT SERPL W/O P-5'-P-CCNC: 45 U/L
ANION GAP SERPL CALC-SCNC: 12 MMOL/L
AST SERPL-CCNC: 49 U/L
BASOPHILS # BLD AUTO: 0 K/UL
BASOPHILS NFR BLD: 0.2 %
BILIRUB SERPL-MCNC: 0.7 MG/DL
BUN SERPL-MCNC: 12 MG/DL
CALCIUM SERPL-MCNC: 9.1 MG/DL
CHLORIDE SERPL-SCNC: 102 MMOL/L
CO2 SERPL-SCNC: 25 MMOL/L
CREAT SERPL-MCNC: 0.7 MG/DL
DIFFERENTIAL METHOD: ABNORMAL
EOSINOPHIL # BLD AUTO: 0.3 K/UL
EOSINOPHIL NFR BLD: 2.9 %
ERYTHROCYTE [DISTWIDTH] IN BLOOD BY AUTOMATED COUNT: 17.9 %
EST. GFR  (AFRICAN AMERICAN): >60 ML/MIN/1.73 M^2
EST. GFR  (NON AFRICAN AMERICAN): >60 ML/MIN/1.73 M^2
GLUCOSE SERPL-MCNC: 91 MG/DL
HCT VFR BLD AUTO: 27.4 %
HGB BLD-MCNC: 8.8 G/DL
LYMPHOCYTES # BLD AUTO: 1.1 K/UL
LYMPHOCYTES NFR BLD: 10 %
MCH RBC QN AUTO: 24.9 PG
MCHC RBC AUTO-ENTMCNC: 32.2 G/DL
MCV RBC AUTO: 77 FL
MONOCYTES # BLD AUTO: 1 K/UL
MONOCYTES NFR BLD: 8.9 %
NEUTROPHILS # BLD AUTO: 8.5 K/UL
NEUTROPHILS NFR BLD: 78 %
PLATELET # BLD AUTO: 641 K/UL
PMV BLD AUTO: 6.9 FL
POTASSIUM SERPL-SCNC: 3.3 MMOL/L
PROT SERPL-MCNC: 6.2 G/DL
RBC # BLD AUTO: 3.53 M/UL
SODIUM SERPL-SCNC: 139 MMOL/L
WBC # BLD AUTO: 10.9 K/UL

## 2019-01-03 PROCEDURE — 88342 IMHCHEM/IMCYTCHM 1ST ANTB: CPT | Performed by: PATHOLOGY

## 2019-01-03 PROCEDURE — 88342 TISSUE SPECIMEN TO PATHOLOGY: ICD-10-PCS | Mod: 26,,, | Performed by: PATHOLOGY

## 2019-01-03 PROCEDURE — D9220A PRA ANESTHESIA: ICD-10-PCS | Mod: ANES,,, | Performed by: ANESTHESIOLOGY

## 2019-01-03 PROCEDURE — 88341 PR IHC OR ICC EACH ADD'L SINGLE ANTIBODY  STAINPR: ICD-10-PCS | Mod: 26,,, | Performed by: PATHOLOGY

## 2019-01-03 PROCEDURE — 37000009 HC ANESTHESIA EA ADD 15 MINS: Performed by: INTERNAL MEDICINE

## 2019-01-03 PROCEDURE — 43239 PR EGD, FLEX, W/BIOPSY, SGL/MULTI: ICD-10-PCS | Mod: 22,,, | Performed by: INTERNAL MEDICINE

## 2019-01-03 PROCEDURE — 25000003 PHARM REV CODE 250: Performed by: INTERNAL MEDICINE

## 2019-01-03 PROCEDURE — 94761 N-INVAS EAR/PLS OXIMETRY MLT: CPT

## 2019-01-03 PROCEDURE — 88342 IMHCHEM/IMCYTCHM 1ST ANTB: CPT | Mod: 26,,, | Performed by: PATHOLOGY

## 2019-01-03 PROCEDURE — 99223 PR INITIAL HOSPITAL CARE,LEVL III: ICD-10-PCS | Mod: ,,, | Performed by: SURGERY

## 2019-01-03 PROCEDURE — 43239 EGD BIOPSY SINGLE/MULTIPLE: CPT | Mod: 22,,, | Performed by: INTERNAL MEDICINE

## 2019-01-03 PROCEDURE — 88341 IMHCHEM/IMCYTCHM EA ADD ANTB: CPT | Mod: 26,,, | Performed by: PATHOLOGY

## 2019-01-03 PROCEDURE — 99223 1ST HOSP IP/OBS HIGH 75: CPT | Mod: ,,, | Performed by: SURGERY

## 2019-01-03 PROCEDURE — D9220A PRA ANESTHESIA: Mod: CRNA,,, | Performed by: NURSE ANESTHETIST, CERTIFIED REGISTERED

## 2019-01-03 PROCEDURE — 99233 PR SUBSEQUENT HOSPITAL CARE,LEVL III: ICD-10-PCS | Mod: ,,, | Performed by: INTERNAL MEDICINE

## 2019-01-03 PROCEDURE — 27201012 HC FORCEPS, HOT/COLD, DISP: Performed by: INTERNAL MEDICINE

## 2019-01-03 PROCEDURE — 85025 COMPLETE CBC W/AUTO DIFF WBC: CPT

## 2019-01-03 PROCEDURE — D9220A PRA ANESTHESIA: Mod: ANES,,, | Performed by: ANESTHESIOLOGY

## 2019-01-03 PROCEDURE — 37000008 HC ANESTHESIA 1ST 15 MINUTES: Performed by: INTERNAL MEDICINE

## 2019-01-03 PROCEDURE — 99233 SBSQ HOSP IP/OBS HIGH 50: CPT | Mod: ,,, | Performed by: INTERNAL MEDICINE

## 2019-01-03 PROCEDURE — 88305 TISSUE EXAM BY PATHOLOGIST: CPT | Performed by: PATHOLOGY

## 2019-01-03 PROCEDURE — 63600175 PHARM REV CODE 636 W HCPCS: Performed by: NURSE ANESTHETIST, CERTIFIED REGISTERED

## 2019-01-03 PROCEDURE — D9220A PRA ANESTHESIA: ICD-10-PCS | Mod: CRNA,,, | Performed by: NURSE ANESTHETIST, CERTIFIED REGISTERED

## 2019-01-03 PROCEDURE — 44361 SMALL BOWEL ENDOSCOPY/BIOPSY: CPT | Performed by: INTERNAL MEDICINE

## 2019-01-03 PROCEDURE — 80053 COMPREHEN METABOLIC PANEL: CPT

## 2019-01-03 PROCEDURE — 25000003 PHARM REV CODE 250: Performed by: NURSE PRACTITIONER

## 2019-01-03 PROCEDURE — 36415 COLL VENOUS BLD VENIPUNCTURE: CPT

## 2019-01-03 PROCEDURE — 88305 TISSUE SPECIMEN TO PATHOLOGY: ICD-10-PCS | Mod: 26,,, | Performed by: PATHOLOGY

## 2019-01-03 PROCEDURE — 12000002 HC ACUTE/MED SURGE SEMI-PRIVATE ROOM

## 2019-01-03 RX ORDER — LIDOCAINE HCL/PF 100 MG/5ML
SYRINGE (ML) INTRAVENOUS
Status: DISCONTINUED | OUTPATIENT
Start: 2019-01-03 | End: 2019-01-03

## 2019-01-03 RX ORDER — PROPOFOL 10 MG/ML
VIAL (ML) INTRAVENOUS
Status: DISCONTINUED | OUTPATIENT
Start: 2019-01-03 | End: 2019-01-03

## 2019-01-03 RX ORDER — LIDOCAINE HYDROCHLORIDE 20 MG/ML
INJECTION, SOLUTION EPIDURAL; INFILTRATION; INTRACAUDAL; PERINEURAL
Status: DISCONTINUED
Start: 2019-01-03 | End: 2019-01-04 | Stop reason: HOSPADM

## 2019-01-03 RX ORDER — PROPOFOL 10 MG/ML
INJECTION, EMULSION INTRAVENOUS
Status: COMPLETED
Start: 2019-01-03 | End: 2019-01-03

## 2019-01-03 RX ORDER — SODIUM CHLORIDE 9 MG/ML
INJECTION, SOLUTION INTRAVENOUS CONTINUOUS
Status: DISCONTINUED | OUTPATIENT
Start: 2019-01-03 | End: 2019-01-04

## 2019-01-03 RX ADMIN — PROPOFOL 20 MG: 10 INJECTION, EMULSION INTRAVENOUS at 11:01

## 2019-01-03 RX ADMIN — SODIUM CHLORIDE: 0.9 INJECTION, SOLUTION INTRAVENOUS at 10:01

## 2019-01-03 RX ADMIN — PROPOFOL 40 MG: 10 INJECTION, EMULSION INTRAVENOUS at 11:01

## 2019-01-03 RX ADMIN — AMLODIPINE BESYLATE 5 MG: 5 TABLET ORAL at 09:01

## 2019-01-03 RX ADMIN — PROPOFOL 40 MG: 10 INJECTION, EMULSION INTRAVENOUS at 10:01

## 2019-01-03 RX ADMIN — PROPOFOL 100 MG: 10 INJECTION, EMULSION INTRAVENOUS at 10:01

## 2019-01-03 RX ADMIN — HYDROCHLOROTHIAZIDE 12.5 MG: 12.5 CAPSULE ORAL at 09:01

## 2019-01-03 RX ADMIN — LIDOCAINE HYDROCHLORIDE 75 MG: 20 INJECTION, SOLUTION INTRAVENOUS at 10:01

## 2019-01-03 NOTE — ANESTHESIA PREPROCEDURE EVALUATION
01/03/2019  Indigo Stuart is a 63 y.o., female.    Anesthesia Evaluation    I have reviewed the Patient Summary Reports.    I have reviewed the Nursing Notes.      Review of Systems  Anesthesia Hx:  No problems with previous Anesthesia    Cardiovascular:   Hypertension, well controlled        Physical Exam  General:  Obesity    Airway/Jaw/Neck:  Airway Findings: Mallampati: II                Anesthesia Plan  Type of Anesthesia, risks & benefits discussed:  Anesthesia Type:  general  Patient's Preference:   Intra-op Monitoring Plan:   Intra-op Monitoring Plan Comments:   Post Op Pain Control Plan:   Post Op Pain Control Plan Comments:   Induction:   IV  Beta Blocker:  Patient is not currently on a Beta-Blocker (No further documentation required).       Informed Consent: Patient understands risks and agrees with Anesthesia plan.  Questions answered. Anesthesia consent signed with patient.  ASA Score: 2     Day of Surgery Review of History & Physical:    H&P update referred to the surgeon.         Ready For Surgery From Anesthesia Perspective.

## 2019-01-03 NOTE — ASSESSMENT & PLAN NOTE
Transfuse 3units PRBC  Anemia workup: iron studies, B12, folate, stool OCB  Monitor labs  Improved with PRBCs

## 2019-01-03 NOTE — ASSESSMENT & PLAN NOTE
Pathology report for liver biopsy colorectal adenocarcinoma.   Consult Dr. Pettit  Had PET Scan 12/31/18- possible colon primary with liver mets  Status post upper GI with  Large nearly obstructing mass noted at the junction of the 3rd and 4th portion of the duodenum.  Consider duodenal stent at [facility] per GI

## 2019-01-03 NOTE — SUBJECTIVE & OBJECTIVE
No current facility-administered medications on file prior to encounter.      Current Outpatient Medications on File Prior to Encounter   Medication Sig    amLODIPine (NORVASC) 5 MG tablet Take 5 mg by mouth once daily.    atorvastatin (LIPITOR) 20 MG tablet Take 20 mg by mouth every evening.    benzonatate (TESSALON) 200 MG capsule Take 200 mg by mouth 3 (three) times daily as needed for Cough.    co-enzyme Q-10 30 mg capsule Take 100 mg by mouth 2 (two) times daily.    ferrous sulfate 325 (65 FE) MG EC tablet Take 1 tablet (325 mg total) by mouth 2 (two) times daily.    hydroCHLOROthiazide (MICROZIDE) 12.5 mg capsule Take 12.5 mg by mouth once daily.    metoprolol tartrate (LOPRESSOR) 25 MG tablet Take by mouth every evening.        Review of patient's allergies indicates:   Allergen Reactions    Codeine Nausea And Vomiting    Erythromycin base Other (See Comments)    Lisinopril Palpitations     Cough        Past Medical History:   Diagnosis Date    Cancer     colon    Encounter for blood transfusion     Hypertension      Past Surgical History:   Procedure Laterality Date    CHOLECYSTECTOMY      COLON SURGERY      COLONOSCOPY N/A 3/30/2018    [facility]    ESOPHAGOGASTRODUODENOSCOPY (EGD) N/A 3/30/2018    [facility]    HYSTERECTOMY       Family History     Problem Relation (Age of Onset)    Cancer Mother, Father        Tobacco Use    Smoking status: Never Smoker    Smokeless tobacco: Never Used   Substance and Sexual Activity    Alcohol use: No    Drug use: No    Sexual activity: Not on file     Review of Systems   Constitutional: Positive for unexpected weight change. Negative for activity change.   HENT: Negative for congestion.    Respiratory: Negative for apnea and chest tightness.    Cardiovascular: Negative for chest pain and palpitations.   Gastrointestinal: Positive for anal bleeding. Negative for abdominal  distention, abdominal pain and vomiting.   Skin: Negative for color change and pallor.   Hematological: Negative for adenopathy. Does not bruise/bleed easily.     Objective:     Vital Signs (Most Recent):  Temp: 99.4 °F (37.4 °C) (01/03/19 1652)  Pulse: 98 (01/03/19 1652)  Resp: 20 (01/03/19 1652)  BP: 118/69 (01/03/19 1652)  SpO2: 96 % (01/03/19 1652) Vital Signs (24h Range):  Temp:  [97 °F (36.1 °C)-99.7 °F (37.6 °C)] 99.4 °F (37.4 °C)  Pulse:  [] 98  Resp:  [18-23] 20  SpO2:  [94 %-97 %] 96 %  BP: (118-139)/(59-77) 118/69     Weight: 104.8 kg (231 lb 0.7 oz)  Body mass index is 42.26 kg/m².    Physical Exam   Constitutional: She is oriented to person, place, and time. No distress.   HENT:   Head: Normocephalic and atraumatic.   Eyes: Pupils are equal, round, and reactive to light. Right eye exhibits no discharge. Left eye exhibits no discharge.   Cardiovascular: Normal rate and regular rhythm.   No murmur heard.  Pulmonary/Chest: Effort normal. No stridor. No respiratory distress.   Abdominal: Soft. She exhibits no distension. There is no tenderness. There is no guarding.   Musculoskeletal: Normal range of motion. She exhibits no edema or deformity.   Neurological: She is alert and oriented to person, place, and time.   Skin: Skin is warm and dry. She is not diaphoretic.   Psychiatric: She has a normal mood and affect. Her behavior is normal.   Vitals reviewed.      Significant Labs:  CBC:   Recent Labs   Lab 01/03/19 0419   WBC 10.90   RBC 3.53*   HGB 8.8*   HCT 27.4*   *   MCV 77*   MCH 24.9*   MCHC 32.2     BMP:   Recent Labs   Lab 01/03/19 0419   GLU 91      K 3.3*      CO2 25   BUN 12   CREATININE 0.7   CALCIUM 9.1       Significant Diagnostics:  EGD: mass in distal duodenum.    Ct scan and PET reviewed.

## 2019-01-03 NOTE — NURSING TRANSFER
Report to rn Vinayak rn pt back to room bed locked and low iv capped to int pt alert and orient vs wnl no vomit no spitting now has akua sips of water diet changed to full informed pt and family and nurse  And dietary in hallway

## 2019-01-03 NOTE — PROGRESS NOTES
EGD with push enteroscopy done.  Large nearly obstructing mass noted at the junction of the 3rd and 4th portion of the duodenum, biopsied but unable to be traversed raising concern for impending obstruction.  This is likely the cause of her metastatic disease as she had an unremarkable colonoscopy in 03/2018.  Recommend surgical consultation.  Full liquid diet.  Will follow.  Will need further management per oncology.

## 2019-01-03 NOTE — PROGRESS NOTES
[facility] Hospital Medicine  Progress Note    Patient Name: Indigo Stuart  MRN: [id number]  Patient Class: IP- Inpatient   Admission Date: 1/1/2019  Length of Stay: 2 days  Attending Physician: Kwabena Guardado MD  Primary Care Provider: John Conner MD        Subjective:     Principal Problem:Symptomatic anemia    HPI:  Ms. Stuart is a 64yo F with a PMH of HTN, Colon Ca, and most recently, Anemia and Stage 4 metastatic Liver Disease (12/2018). She was recently hospitalized 12/4/18-12/7/18 for Symptomatic Anemia. She was found to have stage 4 metastatic liver cancer and Dr. Pettit was consulted. She seen Dr. Pettit 12/14/18 and had her PET Scan yesterday. She has an appointment with General Surgeon, Dr. Albert, tomorrow and does not know why. She presents to the hospital today with c/o Fatigue. She started feeling fatigued about 3 weeks ago and has gotten progressively worst. Associated symptoms include exertional dyspnea. She has also been having intermittent rectal bleeding. She is upset that she has not started chemotherapy as of yet and her next Oncology appointment is 1/15/19. While in the ED, she was found to be significantly anemic with a Hgb 6.7. She was T&M for 3 units PRBC and her first unit is in progress. She currently denies pain or discomforts.    Hospital Course:  No notes on file    Interval History: no new issues. Status post upper GI- found Large nearly obstructing mass noted at the junction of the 3rd and 4th portion of the duodenum  Denies pain and further black stools. States she is hungry. On Full liquid diet.    Long discussion with family regarding overall prognosis in patient with metastatic colon cancer to liver. Reviewed liver pathology report and previous imaging in [facility]. Answered all family questions.   Will consult surgery or duodenum mass. Discussed with Dr. Arguello.       Review of Systems   Constitutional: Positive for appetite change and fatigue. Negative  for diaphoresis and fever.   Respiratory: Positive for shortness of breath (BAUER). Negative for cough.    Cardiovascular: Negative for chest pain and leg swelling.   Gastrointestinal: Positive for blood in stool (black stool). Negative for abdominal distention, abdominal pain, diarrhea and nausea.   Skin: Negative for rash.   Neurological: Negative for speech difficulty and numbness.     Objective:     Vital Signs (Most Recent):  Temp: 98.1 °F (36.7 °C) (01/03/19 1334)  Pulse: 93 (01/03/19 1334)  Resp: 20 (01/03/19 1334)  BP: 135/68 (01/03/19 1334)  SpO2: 97 % (01/03/19 1334) Vital Signs (24h Range):  Temp:  [97 °F (36.1 °C)-99.7 °F (37.6 °C)] 98.1 °F (36.7 °C)  Pulse:  [] 93  Resp:  [18-23] 20  SpO2:  [94 %-97 %] 97 %  BP: (120-139)/(59-77) 135/68     Weight: 104.8 kg (231 lb 0.7 oz)  Body mass index is 42.26 kg/m².    Intake/Output Summary (Last 24 hours) at 1/3/2019 1529  Last data filed at 1/3/2019 1112  Gross per 24 hour   Intake 640 ml   Output 650 ml   Net -10 ml      Physical Exam   Constitutional: She is oriented to person, place, and time. She appears well-developed and well-nourished.   HENT:   Head: Normocephalic and atraumatic.   Mouth/Throat: Oropharynx is clear and moist.   Eyes: Conjunctivae and EOM are normal. Pupils are equal, round, and reactive to light.   Neck: Normal range of motion. Neck supple.   Cardiovascular: Normal rate, regular rhythm, normal heart sounds and intact distal pulses.   No murmur heard.  Pulmonary/Chest: Effort normal and breath sounds normal. She has no wheezes.   Abdominal: Soft. Bowel sounds are normal. There is no tenderness.   Palpable ventral hernia to right abdomen. nontender   Musculoskeletal: Normal range of motion.   Neurological: She is alert and oriented to person, place, and time.   Skin: Skin is warm and dry.   Psychiatric: She has a normal mood and affect. Her behavior is normal. Judgment normal.   Nursing note and vitals reviewed.      Significant Labs:    BMP:   Recent Labs   Lab 01/03/19  0419   GLU 91      K 3.3*      CO2 25   BUN 12   CREATININE 0.7   CALCIUM 9.1     CBC:   Recent Labs   Lab 01/01/19  1745 01/02/19  0740 01/03/19  0419   WBC 11.10 11.00 10.90   HGB 6.7* 9.3* 8.8*   HCT 22.0* 29.2* 27.4*   * 607* 641*       Significant Imaging: I have reviewed and interpreted all pertinent imaging results/findings within the past 24 hours.    Assessment/Plan:      * Symptomatic anemia    Transfuse 3units PRBC  Anemia workup: iron studies, B12, folate, stool OCB  Monitor labs  Improved with PRBCs         Morbid obesity    Body mass index is 42.26 kg/m².  General weight loss/lifestyle modification strategies discussed (elicit support from others; identify saboteurs; non-food rewards, etc).         Elevated LFTs    Likely due to Hepatic CA  Stop Statin  Avoid Acetaminophen   Monitor labs     Thrombocytosis    Followed by Dr. Pettit  Being worked up for possible Myeloproliferative Disorder  Check iron studies       Metastatic colon cancer to liver    Pathology report for liver biopsy colorectal adenocarcinoma.   Consult Dr. Pettit  Had PET Scan 12/31/18- possible colon primary with liver mets  Status post upper GI with  Large nearly obstructing mass noted at the junction of the 3rd and 4th portion of the duodenum.  Consider duodenal [place] [facility] per GI            Hypokalemia    Replace with oral supplementation. Prn orders.   Check MG         Hypertension    Chronic/Controlled. Continue chronic medications, adjusting as needed.       Hematochezia    Check Stool OCB  + stool FOB  Consult GI  Monitor CBC  Check Coags  Denies further bleeding.   Status post upper GI       VTE Risk Mitigation (From admission, onward)        Ordered     IP VTE HIGH RISK PATIENT  Once      01/01/19 2233     Place JUAN hose  Until discontinued      01/01/19 2233          Discussed with Dr. Guardado.      Мария Chapman, NP  Department of Hospital Medicine   [facility]

## 2019-01-03 NOTE — ANESTHESIA POSTPROCEDURE EVALUATION
"Anesthesia Post Evaluation    Patient: Indigo Stuart    Procedure(s) Performed: Procedure(s) (LRB):  EGD (ESOPHAGOGASTRODUODENOSCOPY) (N/A)    Final Anesthesia Type: general  Patient location during evaluation: PACU  Patient participation: Yes- Able to Participate  Level of consciousness: awake and alert  Post-procedure vital signs: reviewed and stable  Pain management: adequate  Airway patency: patent  PONV status at discharge: No PONV  Anesthetic complications: no      Cardiovascular status: blood pressure returned to baseline and hemodynamically stable  Respiratory status: unassisted  Hydration status: euvolemic  Follow-up not needed.        Visit Vitals  /63   Pulse 82   Temp 36.6 °C (97.9 °F) (Temporal)   Resp (!) 23   Ht 5' 2" (1.575 m)   Wt 104.8 kg (231 lb 0.7 oz)   SpO2 (!) 94%   Breastfeeding? No   BMI 42.26 kg/m²       Pain/Ming Score: No Data Recorded      "

## 2019-01-03 NOTE — SUBJECTIVE & OBJECTIVE
Interval History: no new issues. Status post upper GI- found Large nearly obstructing mass noted at the junction of the 3rd and 4th portion of the duodenum  Denies pain and further black stools. States she is hungry. On Full liquid diet.    Long discussion with family regarding overall prognosis in patient with metastatic colon cancer to liver. Reviewed liver pathology report and previous imaging in [facility]. Answered all family questions.   Will consult surgery or duodenum mass. Discussed with Dr. Arguello.       Review of Systems   Constitutional: Positive for appetite change and fatigue. Negative for diaphoresis and fever.   Respiratory: Positive for shortness of breath (BAUER). Negative for cough.    Cardiovascular: Negative for chest pain and leg swelling.   Gastrointestinal: Positive for blood in stool (black stool). Negative for abdominal distention, abdominal pain, diarrhea and nausea.   Skin: Negative for rash.   Neurological: Negative for speech difficulty and numbness.     Objective:     Vital Signs (Most Recent):  Temp: 98.1 °F (36.7 °C) (01/03/19 1334)  Pulse: 93 (01/03/19 1334)  Resp: 20 (01/03/19 1334)  BP: 135/68 (01/03/19 1334)  SpO2: 97 % (01/03/19 1334) Vital Signs (24h Range):  Temp:  [97 °F (36.1 °C)-99.7 °F (37.6 °C)] 98.1 °F (36.7 °C)  Pulse:  [] 93  Resp:  [18-23] 20  SpO2:  [94 %-97 %] 97 %  BP: (120-139)/(59-77) 135/68     Weight: 104.8 kg (231 lb 0.7 oz)  Body mass index is 42.26 kg/m².    Intake/Output Summary (Last 24 hours) at 1/3/2019 1529  Last data filed at 1/3/2019 1112  Gross per 24 hour   Intake 640 ml   Output 650 ml   Net -10 ml      Physical Exam   Constitutional: She is oriented to person, place, and time. She appears well-developed and well-nourished.   HENT:   Head: Normocephalic and atraumatic.   Mouth/Throat: Oropharynx is clear and moist.   Eyes: Conjunctivae and EOM are normal. Pupils are equal, round, and reactive to light.   Neck: Normal range of motion. Neck supple.    Cardiovascular: Normal rate, regular rhythm, normal heart sounds and intact distal pulses.   No murmur heard.  Pulmonary/Chest: Effort normal and breath sounds normal. She has no wheezes.   Abdominal: Soft. Bowel sounds are normal. There is no tenderness.   Palpable ventral hernia to right abdomen. nontender   Musculoskeletal: Normal range of motion.   Neurological: She is alert and oriented to person, place, and time.   Skin: Skin is warm and dry.   Psychiatric: She has a normal mood and affect. Her behavior is normal. Judgment normal.   Nursing note and vitals reviewed.      Significant Labs:   BMP:   Recent Labs   Lab 01/03/19  0419   GLU 91      K 3.3*      CO2 25   BUN 12   CREATININE 0.7   CALCIUM 9.1     CBC:   Recent Labs   Lab 01/01/19  1745 01/02/19  0740 01/03/19  0419   WBC 11.10 11.00 10.90   HGB 6.7* 9.3* 8.8*   HCT 22.0* 29.2* 27.4*   * 607* 641*       Significant Imaging: I have reviewed and interpreted all pertinent imaging results/findings within the past 24 hours.

## 2019-01-03 NOTE — PROGRESS NOTES
[facility]  Hematology/Oncology  Progress Note    Patient Name: Indigo Stuart  Admission Date: 1/1/2019  Hospital Length of Stay: 2 days  Code Status: Full Code   January 3rd  Subjective:     Interval History:   Patient surrounded by her family members.  Patient underwent EGD and was found to have a large nearly obstructing mass at the junction of the 3rd and 4th portion of the duodenum.DR Baum concerned that she is at a definite risk for impending obstruction.  No evidence of bleeding noted. Patient again is pleasantly confused about her diagnosis and treatment plan.  She is surrounded by family including her granddaughter.  Dr. bradshaw is pleasantly agreed to see the patient discussed possible chest port placement for chemotherapy.  Lengthy discussion today that chemotherapy could possibly shorten her life span a make her sicker.  Patient is extremely overwhelmed and started crying.  She is having a difficult time coping with her diagnosis and her wishes whether to proceed with palliative care or hospice.  Treatment team has been in communication with each other and understands that this is a difficult decision    .  Medications:  Continuous Infusions:   sodium chloride 0.9% 100 mL/hr at 01/03/19 1045     Scheduled Meds:   amLODIPine  5 mg Oral Daily    ferrous sulfate  325 mg Oral BID    hydroCHLOROthiazide  12.5 mg Oral Daily    lidocaine (PF) 20 mg/mL (2%)        metoprolol tartrate  25 mg Oral QHS     PRN Meds:benzonatate, ibuprofen, ondansetron, potassium chloride, potassium chloride, potassium chloride, ramelteon, senna-docusate 8.6-50 mg, sodium chloride 0.9%     Review of Systems   CONSTITUTIONAL: No fevers, chills, night sweats, wt. loss, appetite changes  SKIN: no rashes or itching  ENT: No headaches, head trauma, vision changes, or eye pain  LYMPH NODES: None noticed   CV: No chest pain, palpitations.   RESP: No dyspnea on exertion, cough, wheezing, or hemoptysis  GI:  No nausea, emesis, diarrhea, constipation, melena, hematochezia, pain.   : No dysuria, hematuria, urgency, or frequency   HEME: No easy bruising, bleeding problems  PSYCHIATRIC:  Positive depression, positive anxiety, no psychosis, hallucinations.  NEURO: No seizures, memory loss, dizziness or difficulty sleeping  MSK: No arthralgias or joint swelling  Objective:     Vital Signs (Most Recent):  Temp: 99.4 °F (37.4 °C) (01/03/19 1652)  Pulse: 98 (01/03/19 1652)  Resp: 20 (01/03/19 1652)  BP: 118/69 (01/03/19 1652)  SpO2: 96 % (01/03/19 1652) Vital Signs (24h Range):  Temp:  [97 °F (36.1 °C)-99.7 °F (37.6 °C)] 99.4 °F (37.4 °C)  Pulse:  [] 98  Resp:  [18-23] 20  SpO2:  [94 %-97 %] 96 %  BP: (118-139)/(59-77) 118/69     Weight: 104.8 kg (231 lb 0.7 oz)  Body mass index is 42.26 kg/m².  Body surface area is 2.14 meters squared.      Intake/Output Summary (Last 24 hours) at 1/3/2019 1657  Last data filed at 1/3/2019 1112  Gross per 24 hour   Intake 640 ml   Output 650 ml   Net -10 ml       Physical Exam  Height / weight / VS reviewed  GENERAL.: Well-developed, well-nourished, in no acute distress  PSYCH: pleasant affect, no anxiety, no depression  HEENT: Normocephalic, lids intact, conjunctiva pink,  NECK: Supple,trachea midline, no palpable abnormalities  LYMPHATICS: No cervical or axillary adenopathy  RESPIRATORY: CTAB, no wheezes, rubs or rhonchi  Good respiratory effort without any retractions   CARDIOVASCULAR: no JVD, S1 / S2 with RRR, no murmur,  ABDOMEN: NTND, normal bowel sounds, no palpable HSM or mass  EXTREMITIES: No cyanosis, no clubbing, no edema  NEUROLOGICAL: alert and oriented x 3, cranial nerves grossly intact  SKIN: Warm, dry, no rash or lesions noted, no tenting, no petechiae or ecchymosis  Significant Labs:     Lab Results   Component Value Date    WBC 10.90 01/03/2019    RBC 3.53 (L) 01/03/2019    HGB 8.8 (L) 01/03/2019    HCT 27.4 (L) 01/03/2019    MCV 77 (L) 01/03/2019    MCH 24.9 (L)  01/03/2019    MCHC 32.2 01/03/2019    RDW 17.9 (H) 01/03/2019     (H) 01/03/2019    MPV 6.9 (L) 01/03/2019    GRAN 8.5 (H) 01/03/2019    GRAN 78.0 (H) 01/03/2019    LYMPH 1.1 01/03/2019    LYMPH 10.0 (L) 01/03/2019    MONO 1.0 01/03/2019    MONO 8.9 01/03/2019    EOS 0.3 01/03/2019    BASO 0.00 01/03/2019    EOSINOPHIL 2.9 01/03/2019    BASOPHIL 0.2 01/03/2019           Assessment/Plan:     Active Diagnoses:    Diagnosis Date Noted POA    PRINCIPAL PROBLEM:  Symptomatic anemia [D64.9] 03/29/2018 Yes    Fatigue [R53.83] 01/03/2019 Yes    Duodenal mass [K31.89] 01/03/2019 Yes    Morbid obesity [E66.01] 01/02/2019 Yes    Metastatic colon cancer to liver [C18.9, C78.7] 01/01/2019 Yes    Thrombocytosis [D47.3] 01/01/2019 Yes    Elevated LFTs [R94.5] 01/01/2019 Yes    Hypokalemia [E87.6] 12/04/2018 Yes    Hematochezia [K92.1] 03/29/2018 Yes    Hypertension [I10] 03/29/2018 Yes      Problems Resolved During this Admission:       Lengthy visit with the patient her family today.  Explained that chemotherapy for palliation will be given every 2 weeks if the patient shows to proceed with such.  She would need frequent labs in frequent office visits.  Unfortunately I do not think she understands what is meant by the risk of impending obstruction concerning the duodenal mass. She was asking if she could get her chest port in go home and think about her treatment options.  Her treatment team is concerned that she may obstructive home and should possibly consider stent placement prior to discharge to buy her some time.  Her anemia is relatively stable for now.  She does not want any medications this time for anxiety  She should abstain from using further ibuprofen as this can increase her risk of bleeding. She will be reassessed in the morning as to her decision which way she would like to proceed as far as addressing her stage IV malignancy     Current Facility-Administered Medications:     0.9%  NaCl infusion,  , Intravenous, Continuous, Adis Arguello MD, Last Rate: 100 mL/hr at 01/03/19 1045    amLODIPine tablet 5 mg, 5 mg, Oral, Daily, Joy Rooney NP, 5 mg at 01/03/19 0927    benzonatate capsule 200 mg, 200 mg, Oral, TID PRN, Joy Rooney NP, 200 mg at 01/02/19 1649    ferrous sulfate EC tablet 325 mg, 325 mg, Oral, BID, Joy Rooney NP, Stopped at 01/03/19 0900    hydroCHLOROthiazide tablet 12.5 mg, 12.5 mg, Oral, Daily, Joy Rooney NP, 12.5 mg at 01/03/19 0927    ibuprofen tablet 600 mg, 600 mg, Oral, Q6H PRN, Joy Rooney NP    lidocaine (PF) 20 mg/mL (2%) 20 mg/mL (2 %) injection, , , ,     metoprolol tartrate (LOPRESSOR) tablet 25 mg, 25 mg, Oral, QHS, Joy Rooney NP, 25 mg at 01/02/19 2108    ondansetron injection 4 mg, 4 mg, Intravenous, Q4H PRN, Joy Rooney NP    potassium chloride SA CR tablet 40 mEq, 40 mEq, Oral, PRN, Joy Rooney NP    potassium chloride SA CR tablet 40 mEq, 40 mEq, Oral, PRN, Joy Rooney NP    potassium chloride SA CR tablet 60 mEq, 60 mEq, Oral, PRN, Joy Rooney NP    ramelteon tablet 8 mg, 8 mg, Oral, Nightly PRN, Joy Rooney NP    senna-docusate 8.6-50 mg per tablet 1 tablet, 1 tablet, Oral, BID PRN, Joy Rooney NP    sodium chloride 0.9% flush 5 mL, 5 mL, Intravenous, PRN, SASKIA Sanchez MD  Hematology/Oncology  [facility]

## 2019-01-03 NOTE — CONSULTS
[facility]  General Surgery  Consult Note    Patient Name: Indigo Stuart  MRN: [id number]  Code Status: Full Code  Admission Date: 1/1/2019  Hospital Length of Stay: 2 days  Attending Physician: Kwabena Guardado MD  Primary Care Provider: John Conner MD    Patient information was obtained from patient and ER records.     Inpatient consult to General Surgery  Consult performed by: Emilio Romero MD  Consult ordered by: Мария Chapman NP        Subjective:     Principal Problem: Symptomatic anemia    History of Present Illness: 64 yo F with recent diagnosis of metastatic cancer this had initially been presumed to be secondary to colon primary.  Pt presented to hospital within past few days with anemia and fatigue.  Her hgb was 6.7.  Pt has received transfusion and responded appropriately.  Pt had EGD today demonstrating a nearly obstructing mass in the 3rd/4th portion of her duodenum.  Pt is desiring chemotherapy.  I have been asked to place port.  Pt denies history of ESRD or dialysis use.  No history of breast cancer.     No current facility-administered medications on file prior to encounter.      Current Outpatient Medications on File Prior to Encounter   Medication Sig    amLODIPine (NORVASC) 5 MG tablet Take 5 mg by mouth once daily.    atorvastatin (LIPITOR) 20 MG tablet Take 20 mg by mouth every evening.    benzonatate (TESSALON) 200 MG capsule Take 200 mg by mouth 3 (three) times daily as needed for Cough.    co-enzyme Q-10 30 mg capsule Take 100 mg by mouth 2 (two) times daily.    ferrous sulfate 325 (65 FE) MG EC tablet Take 1 tablet (325 mg total) by mouth 2 (two) times daily.    hydroCHLOROthiazide (MICROZIDE) 12.5 mg capsule Take 12.5 mg by mouth once daily.    metoprolol tartrate (LOPRESSOR) 25 MG tablet Take by mouth every evening.        Review of patient's allergies indicates:   Allergen Reactions    Codeine Nausea And Vomiting    Erythromycin base Other  (See Comments)    Lisinopril Palpitations     Cough        Past Medical History:   Diagnosis Date    Cancer     colon    Encounter for blood transfusion     Hypertension      Past Surgical History:   Procedure Laterality Date    CHOLECYSTECTOMY      COLON SURGERY      COLONOSCOPY N/A 3/30/2018    Performed by [facility]    ESOPHAGOGASTRODUODENOSCOPY (EGD) N/A 3/30/2018    Performed by [facility] ENDO    HYSTERECTOMY       Family History     Problem Relation (Age of Onset)    Cancer Mother, Father        Tobacco Use    Smoking status: Never Smoker    Smokeless tobacco: Never Used   Substance and Sexual Activity    Alcohol use: No    Drug use: No    Sexual activity: Not on file     Review of Systems   Constitutional: Positive for unexpected weight change. Negative for activity change.   HENT: Negative for congestion.    Respiratory: Negative for apnea and chest tightness.    Cardiovascular: Negative for chest pain and palpitations.   Gastrointestinal: Positive for anal bleeding. Negative for abdominal distention, abdominal pain and vomiting.   Skin: Negative for color change and pallor.   Hematological: Negative for adenopathy. Does not bruise/bleed easily.     Objective:     Vital Signs (Most Recent):  Temp: 99.4 °F (37.4 °C) (01/03/19 1652)  Pulse: 98 (01/03/19 1652)  Resp: 20 (01/03/19 1652)  BP: 118/69 (01/03/19 1652)  SpO2: 96 % (01/03/19 1652) Vital Signs (24h Range):  Temp:  [97 °F (36.1 °C)-99.7 °F (37.6 °C)] 99.4 °F (37.4 °C)  Pulse:  [] 98  Resp:  [18-23] 20  SpO2:  [94 %-97 %] 96 %  BP: (118-139)/(59-77) 118/69     Weight: 104.8 kg (231 lb 0.7 oz)  Body mass index is 42.26 kg/m².    Physical Exam   Constitutional: She is oriented to person, place, and time. No distress.   HENT:   Head: Normocephalic and atraumatic.   Eyes: Pupils are equal, round, and reactive to light. Right eye exhibits no discharge. Left eye exhibits no discharge.    Cardiovascular: Normal rate and regular rhythm.   No murmur heard.  Pulmonary/Chest: Effort normal. No stridor. No respiratory distress.   Abdominal: Soft. She exhibits no distension. There is no tenderness. There is no guarding.   Musculoskeletal: Normal range of motion. She exhibits no edema or deformity.   Neurological: She is alert and oriented to person, place, and time.   Skin: Skin is warm and dry. She is not diaphoretic.   Psychiatric: She has a normal mood and affect. Her behavior is normal.   Vitals reviewed.      Significant Labs:  CBC:   Recent Labs   Lab 01/03/19 0419   WBC 10.90   RBC 3.53*   HGB 8.8*   HCT 27.4*   *   MCV 77*   MCH 24.9*   MCHC 32.2     BMP:   Recent Labs   Lab 01/03/19 0419   GLU 91      K 3.3*      CO2 25   BUN 12   CREATININE 0.7   CALCIUM 9.1       Significant Diagnostics:  EGD: mass in distal duodenum.    Ct scan and PET reviewed.      Assessment/Plan:     Duodenal mass    Pt with metastatic cancer to liver with unknown primary.  Agree with Dr jay that this is likely the source.  Do feel that pt would benefit from stenting as mass is likely to obstruct.  Pt is planning on doing chemo and is still considering stenting.    Will place port tomorrow for chemo access       VTE Risk Mitigation (From admission, onward)        Ordered     IP VTE HIGH RISK PATIENT  Once      01/01/19 2233     Place JUAN hose  Until discontinued      01/01/19 2233          Thank you for your consult. I will follow-up with patient. Please contact us if you have any additional questions.    Emilio Romero MD  General Surgery  [facility]

## 2019-01-03 NOTE — TRANSFER OF CARE
"Anesthesia Transfer of Care Note    Patient: Indigo Stuart    Procedure(s) Performed: Procedure(s) (LRB):  EGD (ESOPHAGOGASTRODUODENOSCOPY) (N/A)    Patient location: PACU    Anesthesia Type: general    Transport from OR: Transported from OR on room air with adequate spontaneous ventilation    Post pain: adequate analgesia    Post assessment: no apparent anesthetic complications    Post vital signs: stable    Level of consciousness: awake    Nausea/Vomiting: no nausea/vomiting    Complications: none    Transfer of care protocol was followed      Last vitals:   Visit Vitals  /60 (BP Location: Left arm, Patient Position: Lying)   Pulse 90   Temp 36.6 °C (97.9 °F) (Temporal)   Resp (!) 22   Ht 5' 2" (1.575 m)   Wt 104.8 kg (231 lb 0.7 oz)   SpO2 97%   Breastfeeding? No   BMI 42.26 kg/m²     "

## 2019-01-03 NOTE — NURSING
Quiet day.  No c/o pain or discomfort.  No reports of bloody/ tarry stools.  Instructed on NPO after Mn for EGD.  She v/u.

## 2019-01-03 NOTE — ASSESSMENT & PLAN NOTE
Check Stool OCB  + stool FOB  Consult GI  Monitor CBC  Check Coags  Denies further bleeding.   Status post upper GI

## 2019-01-03 NOTE — ASSESSMENT & PLAN NOTE
Pt with metastatic cancer to liver with unknown primary.  Agree with Dr jay that this is likely the source.  Do feel that pt would benefit from stenting as mass is likely to obstruct.  Pt is planning on doing chemo and is still considering stenting.    Will place port tomorrow for chemo access

## 2019-01-03 NOTE — [employer] PATIENT INSTRUCTIONS
Discharge Summary/Instructions after an Endoscopic Procedure  Patient Name: Indigo Stuart  Patient MRN: [id number]  Patient YOB: 1955 Thursday, January 03, 2019  Adis Valverde MD  RESTRICTIONS:  During your procedure today, you received medications for sedation.  These   medications may affect your judgment, balance and coordination.  Therefore,   for 24 hours, you have the following restrictions:   - DO NOT drive a car, operate machinery, make legal/financial decisions,   sign important papers or drink alcohol.    ACTIVITY:  Today: no heavy lifting, straining or running due to procedural   sedation/anesthesia.  The following day: return to full activity including work.  DIET:  Eat and drink normally unless instructed otherwise.     TREATMENT FOR COMMON SIDE EFFECTS:  - Mild abdominal pain, nausea, belching, bloating or excessive gas:  rest,   eat lightly and use a heating pad.  - Sore Throat: treat with throat lozenges and/or gargle with warm salt   water.  - Because air was used during the procedure, expelling large amounts of air   from your rectum or belching is normal.  - If a bowel prep was taken, you may not have a bowel movement for 1-3 days.    This is normal.  SYMPTOMS TO WATCH FOR AND REPORT TO YOUR PHYSICIAN:  1. Abdominal pain or bloating, other than gas cramps.  2. Chest pain.  3. Back pain.  4. Signs of infection such as: chills or fever occurring within 24 hours   after the procedure.  5. Rectal bleeding, which would show as bright red, maroon, or black stools.   (A tablespoon of blood from the rectum is not serious, especially if   hemorrhoids are present.)  6. Vomiting.  7. Weakness or dizziness.  GO DIRECTLY TO THE NEAREST EMERGENCY ROOM IF YOU HAVE ANY OF THE FOLLOWING:      Difficulty breathing              Chills and/or fever over 101 F   Persistent vomiting and/or vomiting blood   Severe abdominal pain   Severe chest pain   Black, tarry stools   Bleeding- more than one  tablespoon   Any other symptom or condition that you feel may need urgent attention  Your doctor recommends these additional instructions:  If any biopsies were taken, your doctors clinic will contact you in 1 to 2   weeks with any results.  - Return patient to [facility] for [facility] care.   - Full liquid diet.   - Continue present medications.   - No aspirin, ibuprofen, naproxen, or other non-steroidal anti-inflammatory   drugs.   - Await pathology results.   - Refer to a surgeon at appointment to be scheduled.  For questions, problems or results please call your physician - Adis Valverde MD at Work:  [phone].  [facility], EMERGENCY ROOM PHONE NUMBER: [phone]  IF A COMPLICATION OR EMERGENCY SITUATION ARISES AND YOU ARE UNABLE TO REACH   YOUR PHYSICIAN - GO DIRECTLY TO THE EMERGENCY ROOM.  Adis Valverde MD  1/3/2019 11:17:45 AM  This report has been verified and signed electronically.  [employer]

## 2019-01-03 NOTE — SUBJECTIVE & OBJECTIVE
Interval History: no new issues. Received 3 units of PRBCs. Feeling better. States black stool this am. Denies SOB. +FOB  NPO pending GI evaluation.    Review of Systems   Constitutional: Positive for appetite change and fatigue. Negative for diaphoresis and fever.   Respiratory: Positive for shortness of breath (BAUER). Negative for cough.    Cardiovascular: Negative for chest pain and leg swelling.   Gastrointestinal: Positive for blood in stool (black stool). Negative for abdominal distention, abdominal pain, diarrhea and nausea.   Skin: Negative for rash.   Neurological: Negative for speech difficulty and numbness.     Objective:     Vital Signs (Most Recent):  Temp: 97.9 °F (36.6 °C) (01/03/19 1135)  Pulse: 88 (01/03/19 1145)  Resp: 18 (01/03/19 1145)  BP: 133/76 (01/03/19 1145)  SpO2: 95 % (01/03/19 1145) Vital Signs (24h Range):  Temp:  [97 °F (36.1 °C)-99.7 °F (37.6 °C)] 97.9 °F (36.6 °C)  Pulse:  [] 88  Resp:  [18-23] 18  SpO2:  [94 %-97 %] 95 %  BP: (120-139)/(59-77) 133/76     Weight: 104.8 kg (231 lb 0.7 oz)  Body mass index is 42.26 kg/m².    Intake/Output Summary (Last 24 hours) at 1/3/2019 1223  Last data filed at 1/3/2019 1112  Gross per 24 hour   Intake 640 ml   Output 650 ml   Net -10 ml      Physical Exam   Constitutional: She is oriented to person, place, and time. She appears well-developed and well-nourished.   HENT:   Head: Normocephalic and atraumatic.   Mouth/Throat: Oropharynx is clear and moist.   Eyes: Conjunctivae and EOM are normal. Pupils are equal, round, and reactive to light.   Neck: Normal range of motion. Neck supple.   Cardiovascular: Normal rate, regular rhythm, normal heart sounds and intact distal pulses.   No murmur heard.  Pulmonary/Chest: Effort normal and breath sounds normal. She has no wheezes.   Abdominal: Soft. Bowel sounds are normal. There is no tenderness.   Palpable ventral hernia to right abdomen. nontender   Musculoskeletal: Normal range of motion.    Neurological: She is alert and oriented to person, place, and time.   Skin: Skin is warm and dry.   Psychiatric: She has a normal mood and affect. Her behavior is normal. Judgment normal.   Nursing note and vitals reviewed.      Significant Labs:   BMP:   Recent Labs   Lab 01/03/19  0419   GLU 91      K 3.3*      CO2 25   BUN 12   CREATININE 0.7   CALCIUM 9.1     CBC:   Recent Labs   Lab 01/01/19  1745 01/02/19  0740 01/03/19 0419   WBC 11.10 11.00 10.90   HGB 6.7* 9.3* 8.8*   HCT 22.0* 29.2* 27.4*   * 607* 641*       Significant Imaging: I have reviewed and interpreted all pertinent imaging results/findings within the past 24 hours.

## 2019-01-03 NOTE — HPI
64 yo F with recent diagnosis of metastatic cancer this had initially been presumed to be secondary to colon primary.  Pt presented to hospital within past few days with anemia and fatigue.  Her hgb was 6.7.  Pt has received transfusion and responded appropriately.  Pt had EGD today demonstrating a nearly obstructing mass in the 3rd/4th portion of her duodenum.  Pt is desiring chemotherapy.  I have been asked to place port.  Pt denies history of ESRD or dialysis use.  No history of breast cancer.

## 2019-01-04 ENCOUNTER — ANESTHESIA EVENT (OUTPATIENT)
Dept: SURGERY | Facility: HOSPITAL | Age: 64
DRG: 981 | End: 2019-01-04
Payer: MEDICARE

## 2019-01-04 ENCOUNTER — ANESTHESIA (OUTPATIENT)
Dept: SURGERY | Facility: HOSPITAL | Age: 64
DRG: 981 | End: 2019-01-04
Payer: MEDICARE

## 2019-01-04 VITALS
HEART RATE: 101 BPM | BODY MASS INDEX: 42.52 KG/M2 | TEMPERATURE: 98 F | WEIGHT: 231.06 LBS | OXYGEN SATURATION: 92 % | HEIGHT: 62 IN | DIASTOLIC BLOOD PRESSURE: 61 MMHG | RESPIRATION RATE: 18 BRPM | SYSTOLIC BLOOD PRESSURE: 130 MMHG

## 2019-01-04 PROBLEM — E43 SEVERE MALNUTRITION: Status: ACTIVE | Noted: 2018-12-04

## 2019-01-04 LAB
ALBUMIN SERPL BCP-MCNC: 2.1 G/DL
ALP SERPL-CCNC: 454 U/L
ALT SERPL W/O P-5'-P-CCNC: 45 U/L
ANION GAP SERPL CALC-SCNC: 10 MMOL/L
AST SERPL-CCNC: 51 U/L
BASOPHILS # BLD AUTO: 0 K/UL
BASOPHILS NFR BLD: 0.2 %
BILIRUB SERPL-MCNC: 0.9 MG/DL
BUN SERPL-MCNC: 10 MG/DL
CALCIUM SERPL-MCNC: 9.2 MG/DL
CHLORIDE SERPL-SCNC: 102 MMOL/L
CO2 SERPL-SCNC: 26 MMOL/L
CREAT SERPL-MCNC: 0.6 MG/DL
DIFFERENTIAL METHOD: ABNORMAL
EOSINOPHIL # BLD AUTO: 0.2 K/UL
EOSINOPHIL NFR BLD: 2 %
ERYTHROCYTE [DISTWIDTH] IN BLOOD BY AUTOMATED COUNT: 18.7 %
EST. GFR  (AFRICAN AMERICAN): >60 ML/MIN/1.73 M^2
EST. GFR  (NON AFRICAN AMERICAN): >60 ML/MIN/1.73 M^2
GLUCOSE SERPL-MCNC: 89 MG/DL
HCT VFR BLD AUTO: 27.2 %
HGB BLD-MCNC: 8.5 G/DL
LYMPHOCYTES # BLD AUTO: 1.2 K/UL
LYMPHOCYTES NFR BLD: 10.5 %
MCH RBC QN AUTO: 24.6 PG
MCHC RBC AUTO-ENTMCNC: 31.3 G/DL
MCV RBC AUTO: 79 FL
MONOCYTES # BLD AUTO: 0.9 K/UL
MONOCYTES NFR BLD: 8.4 %
NEUTROPHILS # BLD AUTO: 8.9 K/UL
NEUTROPHILS NFR BLD: 78.9 %
PLATELET # BLD AUTO: 633 K/UL
PMV BLD AUTO: 7 FL
POTASSIUM SERPL-SCNC: 3.3 MMOL/L
PROT SERPL-MCNC: 6.1 G/DL
RBC # BLD AUTO: 3.47 M/UL
SODIUM SERPL-SCNC: 138 MMOL/L
WBC # BLD AUTO: 11.2 K/UL

## 2019-01-04 PROCEDURE — 99900103 DSU ONLY-NO CHARGE-INITIAL HR (STAT): Performed by: SURGERY

## 2019-01-04 PROCEDURE — 25000003 PHARM REV CODE 250: Performed by: SURGERY

## 2019-01-04 PROCEDURE — D9220A PRA ANESTHESIA: Mod: CRNA,,, | Performed by: NURSE ANESTHETIST, CERTIFIED REGISTERED

## 2019-01-04 PROCEDURE — 97802 MEDICAL NUTRITION INDIV IN: CPT

## 2019-01-04 PROCEDURE — 37000009 HC ANESTHESIA EA ADD 15 MINS: Performed by: SURGERY

## 2019-01-04 PROCEDURE — 36561 PR INSERT TUNNELED CV CATH WITH PORT: ICD-10-PCS | Mod: LT,,, | Performed by: SURGERY

## 2019-01-04 PROCEDURE — D9220A PRA ANESTHESIA: ICD-10-PCS | Mod: CRNA,,, | Performed by: NURSE ANESTHETIST, CERTIFIED REGISTERED

## 2019-01-04 PROCEDURE — 36000707: Performed by: SURGERY

## 2019-01-04 PROCEDURE — 25000003 PHARM REV CODE 250: Performed by: ANESTHESIOLOGY

## 2019-01-04 PROCEDURE — 94761 N-INVAS EAR/PLS OXIMETRY MLT: CPT

## 2019-01-04 PROCEDURE — 71000033 HC RECOVERY, INTIAL HOUR: Performed by: SURGERY

## 2019-01-04 PROCEDURE — 63600175 PHARM REV CODE 636 W HCPCS: Performed by: NURSE PRACTITIONER

## 2019-01-04 PROCEDURE — 36415 COLL VENOUS BLD VENIPUNCTURE: CPT

## 2019-01-04 PROCEDURE — 36000706: Performed by: SURGERY

## 2019-01-04 PROCEDURE — D9220A PRA ANESTHESIA: ICD-10-PCS | Mod: ANES,,, | Performed by: ANESTHESIOLOGY

## 2019-01-04 PROCEDURE — 25000003 PHARM REV CODE 250: Performed by: NURSE PRACTITIONER

## 2019-01-04 PROCEDURE — 99232 PR SUBSEQUENT HOSPITAL CARE,LEVL II: ICD-10-PCS | Mod: ,,, | Performed by: INTERNAL MEDICINE

## 2019-01-04 PROCEDURE — 80053 COMPREHEN METABOLIC PANEL: CPT

## 2019-01-04 PROCEDURE — C1788 PORT, INDWELLING, IMP: HCPCS | Performed by: SURGERY

## 2019-01-04 PROCEDURE — 25000003 PHARM REV CODE 250: Performed by: NURSE ANESTHETIST, CERTIFIED REGISTERED

## 2019-01-04 PROCEDURE — 99232 SBSQ HOSP IP/OBS MODERATE 35: CPT | Mod: ,,, | Performed by: INTERNAL MEDICINE

## 2019-01-04 PROCEDURE — 77001 FLUOROGUIDE FOR VEIN DEVICE: CPT | Mod: 26,,, | Performed by: SURGERY

## 2019-01-04 PROCEDURE — D9220A PRA ANESTHESIA: Mod: ANES,,, | Performed by: ANESTHESIOLOGY

## 2019-01-04 PROCEDURE — 85025 COMPLETE CBC W/AUTO DIFF WBC: CPT

## 2019-01-04 PROCEDURE — 77001 CHG FLUOROGUIDE CNTRL VEN ACCESS,PLACE,REPLACE,REMOVE: ICD-10-PCS | Mod: 26,,, | Performed by: SURGERY

## 2019-01-04 PROCEDURE — 37000008 HC ANESTHESIA 1ST 15 MINUTES: Performed by: SURGERY

## 2019-01-04 PROCEDURE — 36561 INSERT TUNNELED CV CATH: CPT | Mod: LT,,, | Performed by: SURGERY

## 2019-01-04 PROCEDURE — 63600175 PHARM REV CODE 636 W HCPCS: Performed by: SURGERY

## 2019-01-04 PROCEDURE — 63600175 PHARM REV CODE 636 W HCPCS: Performed by: NURSE ANESTHETIST, CERTIFIED REGISTERED

## 2019-01-04 PROCEDURE — 71000039 HC RECOVERY, EACH ADD'L HOUR: Performed by: SURGERY

## 2019-01-04 DEVICE — KIT POWERPORT SINGLE 8FR: Type: IMPLANTABLE DEVICE | Site: SUBCLAVIAN | Status: FUNCTIONAL

## 2019-01-04 RX ORDER — FENTANYL CITRATE 50 UG/ML
25 INJECTION, SOLUTION INTRAMUSCULAR; INTRAVENOUS EVERY 5 MIN PRN
Status: DISCONTINUED | OUTPATIENT
Start: 2019-01-04 | End: 2019-01-04 | Stop reason: HOSPADM

## 2019-01-04 RX ORDER — POTASSIUM CHLORIDE 7.45 MG/ML
10 INJECTION INTRAVENOUS ONCE
Status: DISCONTINUED | OUTPATIENT
Start: 2019-01-04 | End: 2019-01-04

## 2019-01-04 RX ORDER — SODIUM CHLORIDE, SODIUM LACTATE, POTASSIUM CHLORIDE, CALCIUM CHLORIDE 600; 310; 30; 20 MG/100ML; MG/100ML; MG/100ML; MG/100ML
INJECTION, SOLUTION INTRAVENOUS CONTINUOUS
Status: DISCONTINUED | OUTPATIENT
Start: 2019-01-04 | End: 2019-01-04 | Stop reason: HOSPADM

## 2019-01-04 RX ORDER — OXYCODONE HYDROCHLORIDE 5 MG/1
5 TABLET ORAL
Status: DISCONTINUED | OUTPATIENT
Start: 2019-01-04 | End: 2019-01-04 | Stop reason: HOSPADM

## 2019-01-04 RX ORDER — ROCURONIUM BROMIDE 10 MG/ML
INJECTION, SOLUTION INTRAVENOUS
Status: DISCONTINUED | OUTPATIENT
Start: 2019-01-04 | End: 2019-01-04

## 2019-01-04 RX ORDER — CEFAZOLIN SODIUM 2 G/50ML
2 SOLUTION INTRAVENOUS ONCE
Status: COMPLETED | OUTPATIENT
Start: 2019-01-04 | End: 2019-01-04

## 2019-01-04 RX ORDER — DEXAMETHASONE SODIUM PHOSPHATE 4 MG/ML
INJECTION, SOLUTION INTRA-ARTICULAR; INTRALESIONAL; INTRAMUSCULAR; INTRAVENOUS; SOFT TISSUE
Status: DISCONTINUED | OUTPATIENT
Start: 2019-01-04 | End: 2019-01-04

## 2019-01-04 RX ORDER — SUCCINYLCHOLINE CHLORIDE 20 MG/ML
INJECTION INTRAMUSCULAR; INTRAVENOUS
Status: DISCONTINUED | OUTPATIENT
Start: 2019-01-04 | End: 2019-01-04

## 2019-01-04 RX ORDER — HEPARIN SODIUM (PORCINE) LOCK FLUSH IV SOLN 100 UNIT/ML 100 UNIT/ML
SOLUTION INTRAVENOUS
Status: DISCONTINUED | OUTPATIENT
Start: 2019-01-04 | End: 2019-01-04 | Stop reason: HOSPADM

## 2019-01-04 RX ORDER — FENTANYL CITRATE 50 UG/ML
INJECTION, SOLUTION INTRAMUSCULAR; INTRAVENOUS
Status: DISCONTINUED | OUTPATIENT
Start: 2019-01-04 | End: 2019-01-04

## 2019-01-04 RX ORDER — ONDANSETRON 2 MG/ML
4 INJECTION INTRAMUSCULAR; INTRAVENOUS DAILY PRN
Status: DISCONTINUED | OUTPATIENT
Start: 2019-01-04 | End: 2019-01-04 | Stop reason: HOSPADM

## 2019-01-04 RX ORDER — LIDOCAINE HCL/PF 100 MG/5ML
SYRINGE (ML) INTRAVENOUS
Status: DISCONTINUED | OUTPATIENT
Start: 2019-01-04 | End: 2019-01-04

## 2019-01-04 RX ORDER — SODIUM CHLORIDE 0.9 % (FLUSH) 0.9 %
3 SYRINGE (ML) INJECTION
Status: CANCELLED | OUTPATIENT
Start: 2019-01-04

## 2019-01-04 RX ORDER — BUPIVACAINE HCL/EPINEPHRINE 0.25-.0005
VIAL (ML) INJECTION
Status: DISCONTINUED | OUTPATIENT
Start: 2019-01-04 | End: 2019-01-04 | Stop reason: HOSPADM

## 2019-01-04 RX ORDER — CEFAZOLIN SODIUM 2 G/50ML
SOLUTION INTRAVENOUS
Status: COMPLETED
Start: 2019-01-04 | End: 2019-01-04

## 2019-01-04 RX ORDER — MEPERIDINE HYDROCHLORIDE 50 MG/ML
12.5 INJECTION INTRAMUSCULAR; INTRAVENOUS; SUBCUTANEOUS ONCE AS NEEDED
Status: DISCONTINUED | OUTPATIENT
Start: 2019-01-04 | End: 2019-01-04 | Stop reason: HOSPADM

## 2019-01-04 RX ORDER — PROPOFOL 10 MG/ML
VIAL (ML) INTRAVENOUS
Status: DISCONTINUED | OUTPATIENT
Start: 2019-01-04 | End: 2019-01-04

## 2019-01-04 RX ORDER — DIPHENHYDRAMINE HCL 25 MG
25 CAPSULE ORAL EVERY 6 HOURS PRN
Status: CANCELLED | OUTPATIENT
Start: 2019-01-04

## 2019-01-04 RX ORDER — SODIUM CHLORIDE 0.9 % (FLUSH) 0.9 %
3 SYRINGE (ML) INJECTION EVERY 8 HOURS
Status: CANCELLED | OUTPATIENT
Start: 2019-01-04

## 2019-01-04 RX ORDER — HYDROMORPHONE HYDROCHLORIDE 2 MG/ML
0.2 INJECTION, SOLUTION INTRAMUSCULAR; INTRAVENOUS; SUBCUTANEOUS EVERY 5 MIN PRN
Status: DISCONTINUED | OUTPATIENT
Start: 2019-01-04 | End: 2019-01-04 | Stop reason: HOSPADM

## 2019-01-04 RX ORDER — MIDAZOLAM HYDROCHLORIDE 1 MG/ML
INJECTION INTRAMUSCULAR; INTRAVENOUS
Status: DISCONTINUED | OUTPATIENT
Start: 2019-01-04 | End: 2019-01-04

## 2019-01-04 RX ORDER — SODIUM CHLORIDE 9 MG/ML
INJECTION, SOLUTION INTRAVENOUS CONTINUOUS
Status: DISCONTINUED | OUTPATIENT
Start: 2019-01-04 | End: 2019-01-04 | Stop reason: HOSPADM

## 2019-01-04 RX ADMIN — SUCCINYLCHOLINE CHLORIDE 120 MG: 20 INJECTION, SOLUTION INTRAMUSCULAR; INTRAVENOUS at 01:01

## 2019-01-04 RX ADMIN — IRON SUCROSE 200 MG: 20 INJECTION, SOLUTION INTRAVENOUS at 11:01

## 2019-01-04 RX ADMIN — CEFAZOLIN SODIUM 2 G: 2 SOLUTION INTRAVENOUS at 01:01

## 2019-01-04 RX ADMIN — ROCURONIUM BROMIDE 5 MG: 10 INJECTION, SOLUTION INTRAVENOUS at 01:01

## 2019-01-04 RX ADMIN — POTASSIUM CHLORIDE 40 MEQ: 10 TABLET, EXTENDED RELEASE ORAL at 07:01

## 2019-01-04 RX ADMIN — LIDOCAINE HYDROCHLORIDE 100 MG: 20 INJECTION, SOLUTION INTRAVENOUS at 01:01

## 2019-01-04 RX ADMIN — PROPOFOL 150 MG: 10 INJECTION, EMULSION INTRAVENOUS at 01:01

## 2019-01-04 RX ADMIN — MIDAZOLAM HYDROCHLORIDE 2 MG: 1 INJECTION, SOLUTION INTRAMUSCULAR; INTRAVENOUS at 01:01

## 2019-01-04 RX ADMIN — SODIUM CHLORIDE, SODIUM LACTATE, POTASSIUM CHLORIDE, AND CALCIUM CHLORIDE: .6; .31; .03; .02 INJECTION, SOLUTION INTRAVENOUS at 12:01

## 2019-01-04 RX ADMIN — ONDANSETRON 4 MG: 2 INJECTION, SOLUTION INTRAMUSCULAR; INTRAVENOUS at 01:01

## 2019-01-04 RX ADMIN — SODIUM CHLORIDE: 0.9 INJECTION, SOLUTION INTRAVENOUS at 10:01

## 2019-01-04 RX ADMIN — DEXAMETHASONE SODIUM PHOSPHATE 4 MG: 4 INJECTION, SOLUTION INTRAMUSCULAR; INTRAVENOUS at 01:01

## 2019-01-04 RX ADMIN — FENTANYL CITRATE 50 MCG: 50 INJECTION, SOLUTION INTRAMUSCULAR; INTRAVENOUS at 01:01

## 2019-01-04 NOTE — PLAN OF CARE
Problem: Malnutrition  Goal: Improved Nutritional Intake    Intervention: Promote and Optimize Oral Intake   Intervention: Texture modified diet, Nutrition Education, and Nutrition Supplement Therapy- commercial beverage     Recommendation:  1) Continue full liquid diet, allow pureed solids as well per GI (mashed potato cream of wheat etc..)   2) Boost Plus with meals   3) Weigh pt weekly    4) Nutrition education on full liquid diet and protein needs + handout given    Goals: 1) PO intakes > 50% of meals and supplements at f/u   Nutrition Goal Status: new  Communication of RD Recs: reviewed with physician(POC, sticky note, secure chat)

## 2019-01-04 NOTE — TRANSFER OF CARE
"Anesthesia Transfer of Care Note    Patient: Indigo Stuart    Procedure(s) Performed: Procedure(s) (LRB):  [id number] (N/A)    Patient location: PACU    Anesthesia Type: general    Transport from OR: Transported from OR on 2-3 L/min O2 by NC with adequate spontaneous ventilation    Post pain: adequate analgesia    Post assessment: no apparent anesthetic complications and tolerated procedure well    Post vital signs: stable    Level of consciousness: sedated    Nausea/Vomiting: no nausea/vomiting    Complications: none    Transfer of care protocol was followed      Last vitals:   Visit Vitals  /60 (BP Location: Left arm, Patient Position: Sitting)   Pulse 96   Temp 36.7 °C (98 °F) (Skin)   Resp 18   Ht 5' 2" (1.575 m)   Wt 104.8 kg (231 lb 0.7 oz)   SpO2 98%   Breastfeeding? No   BMI 42.26 kg/m²     "

## 2019-01-04 NOTE — PLAN OF CARE
SSC met with patient and family at bedside regarding home health services.  Patient's spouse signed patient preference form choosing to allow placement with first available provider.  SSC sent patient information to [facility] through [facility] for authorization and acceptance.ANASTASIYA Jackson     The referral for the patient in [facility]/PCU, room 220, bed 220 A admitted at 1/1/2019 4:43 PM has been updated by Kristina Shrestha.  Update: Accepted.

## 2019-01-04 NOTE — NURSING
NPO since MN except for meds with sip h2o. Voided,dressed in hosp gown. Family at bedside. To surgery per bed. No acute distress or discomfort noted at this time

## 2019-01-04 NOTE — ASSESSMENT & PLAN NOTE
Contributing Nutrition Diagnosis  Morbid obesity stage 3    Related to (etiology):   Hx. Of excessive energy intake    Signs and Symptoms (as evidenced by):   BM > 40 kg/m2    Interventions/Recommendations (treatment strategy):  1) Discuss healthy weight loss once pt's appetite improves    Nutrition Diagnosis Status:   New

## 2019-01-04 NOTE — BRIEF OP NOTE
[facility]  Brief Operative Note    SUMMARY     Surgery Date: 1/4/2019     Surgeon(s) and Role:     * Emilio Romero MD - Primary    Assisting Surgeon: None    Pre-op Diagnosis:  Duodenal mass [K31.89]    Post-op Diagnosis:  Post-Op Diagnosis Codes:     * Duodenal mass [K31.89]    Procedure(s) (LRB):  [id number] (N/A)    Anesthesia: General    Description of Procedure: Placement of L subclavian catheter with 2 percutaneous sticks    Description of the findings of the procedure: see above.    Estimated Blood Loss: 15 cc's         Specimens:   Specimen (12h ago, onward)    None

## 2019-01-04 NOTE — ANESTHESIA POSTPROCEDURE EVALUATION
"Anesthesia Post Evaluation    Patient: Indigo Stuart    Procedure(s) Performed: Procedure(s) (LRB):  [id number]-P-CATH (N/A)    Final Anesthesia Type: general  Patient location during evaluation: PACU  Patient participation: Yes- Able to Participate  Level of consciousness: awake and alert and oriented  Post-procedure vital signs: reviewed and stable  Pain management: adequate  Airway patency: patent  PONV status at discharge: No PONV  Anesthetic complications: no      Cardiovascular status: blood pressure returned to baseline and stable  Respiratory status: unassisted and spontaneous ventilation  Hydration status: euvolemic  Follow-up not needed.        Visit Vitals  /61   Pulse 101   Temp 36.8 °C (98.2 °F) (Oral)   Resp 18   Ht 5' 2" (1.575 m)   Wt 104.8 kg (231 lb 0.7 oz)   SpO2 (!) 92%   Breastfeeding? No   BMI 42.26 kg/m²       Pain/Ming Score: Pain Rating Prior to Med Admin: 0 (1/4/2019  2:55 PM)  Ming Score: 10 (1/4/2019  2:55 PM)        "

## 2019-01-04 NOTE — PROGRESS NOTES
01/04/19 0850   Patient Assessment/Suction   Level of Consciousness (AVPU) responds to voice   Respiratory Effort Normal;Unlabored   PRE-TX-O2-ETCO2   O2 Device (Oxygen Therapy) room air   SpO2 95 %   Pulse Oximetry Type Intermittent   $ Pulse Oximetry - Multiple Charge Pulse Oximetry - Multiple   Pulse 90   Resp 18

## 2019-01-04 NOTE — OP NOTE
DATE OF PROCEDURE:  01/04/2019.    STAFF SURGEON:  Emilio Romero M.D.    PREOPERATIVE DIAGNOSIS:  Metastatic cancer.    POSTOPERATIVE DIAGNOSIS:  Metastatic cancer.    PROCEDURE:  Placement of left subclavian Port-A-Cath.    ANESTHESIA:  General anesthesia.    INDICATION FOR PROCEDURE:  A pleasant 63-year-old female recently diagnosed with   metastatic disease in need of a port.  She was scheduled for port today.    DESCRIPTION OF PROCEDURE:  Following signing of informed consent, she received   preoperative IV antibiotics, taken to the OR and placed in supine position.    SCDs were placed.  General anesthesia was administered without event.  Chest and   neck were prepped and draped in standard fashion.  Appropriate timeout   procedure was performed.  I then obtained access to the left subclavian vein   with two percutaneous sticks, a guidewire was placed and fluoroscopy was used to   confirm placement.  I then made an incision incorporating the insertion site   into the incision, carried down to deep dermal tissue and raised a flap to hold   the port.  I then placed a dilator and tunneling sheath over the guidewire under   fluoroscopic visualization.  I removed the tunneling sheath and the guidewire.    I passed the catheter through the tunneling sheath and positioned the proximal   tip was at the confluence of the subclavian vein.  Tunneling sheath was removed.    I then cut the catheter and secured the catheter to the port and secured the   port down to the chest wall with 2-0 Vicryl suture.  I next ensured the port   flushes and aspirated easily, which it does.  I then irrigated, ensured adequate   hemostasis and closed the wound in 2 layers with 3-0 Vicryl and 4-0 Monocryl.    The patient was awakened, taken to Recovery in stable condition.  There were no   immediate complications.      JANINE/IN  dd: 01/04/2019 14:03:10 (CST)  td: 01/04/2019 17:29:03 (CST)  Doc ID   #[id number]  Job ID #[id number]    CC:

## 2019-01-04 NOTE — ASSESSMENT & PLAN NOTE
Contributing Nutrition Diagnosis  Severe  Chronic illness related malnutrition    Related to (etiology):   Decreased appetite d/t CA    Signs and Symptoms (as evidenced by):   1) PO intakes < 75% estimated needs x > 2 months 2) 10% Wt loss x 1 month    Interventions/Recommendations (treatment strategy):  1) Boost Plus with all meals on full liquid diet 2) Nutrition education given, suggested 5-6 small frequent meals    Nutrition Diagnosis Status:   New

## 2019-01-04 NOTE — PLAN OF CARE
01/03/19 2136   PRE-TX-O2-ETCO2   O2 Device (Oxygen Therapy) room air   SpO2 95 %   Pulse Oximetry Type Intermittent

## 2019-01-04 NOTE — PLAN OF CARE
Report to sathish Randle. Patient in  No distress, b/p , hr, resp alll ok. No n/v noted. Dressing to left chest dry, intact without drainage..  at bedside

## 2019-01-04 NOTE — ANESTHESIA PREPROCEDURE EVALUATION
01/04/2019  Indigo Stuart is a 63 y.o., female.    Anesthesia Evaluation    I have reviewed the Patient Summary Reports.    I have reviewed the Nursing Notes.   I have reviewed the Medications.     Review of Systems  Anesthesia Hx:  No problems with previous Anesthesia    Social:  Non-Smoker    Hematology/Oncology:        Current/Recent Cancer. (colon CA with liver mets)   Cardiovascular:   Hypertension, well controlled    Pulmonary:  Pulmonary Normal    Renal/:  Renal/ Normal     Neurological:  Neurology Normal    Endocrine:  Endocrine Normal        Physical Exam  General:  Well nourished, Morbid Obesity    Airway/Jaw/Neck:  Airway Findings: Mouth Opening: Normal Tongue: Normal  General Airway Assessment: Adult  Oropharynx Findings:  Mallampati: II  TM Distance: Normal, at least 6 cm  Jaw/Neck Findings:  Neck ROM: Normal ROM     Eyes/Ears/Nose:  Eyes/Ears/Nose Findings:    Dental:  Dental Findings: In tact   Chest/Lungs:  Chest/Lungs Findings: Normal Respiratory Rate     Heart/Vascular:  Heart Findings: Rate: Normal  Rhythm: Regular Rhythm        Mental Status:  Mental Status Findings:  Cooperative, Alert and Oriented         Anesthesia Plan  Type of Anesthesia, risks & benefits discussed:  Anesthesia Type:  general  Patient's Preference:   Intra-op Monitoring Plan: standard ASA monitors  Intra-op Monitoring Plan Comments:   Post Op Pain Control Plan: multimodal analgesia  Post Op Pain Control Plan Comments:   Induction:   IV  Beta Blocker:  Patient is on a Beta-Blocker and has received one dose within the past 24 hours (No further documentation required).       Informed Consent: Patient understands risks and agrees with Anesthesia plan.  Questions answered. Anesthesia consent signed with patient.  ASA Score: 3     Day of Surgery Review of History & Physical:  There are no significant changes.    H&P completed by Anesthesiologist.       Ready For Surgery From Anesthesia Perspective.

## 2019-01-04 NOTE — PLAN OF CARE
01/04/19 1609   Final Note   Assessment Type Final Discharge Note   Anticipated Discharge Disposition Home-Health

## 2019-01-04 NOTE — NURSING
Returned from surgery per bed. Accompanied by pacu staff nurse and family members. Awake and alert. Afebrile. VSS. LCW drsg clean dry and intact. No c/o voiced

## 2019-01-04 NOTE — NURSING
Discharge instructions given. Pt verbalized an understanding. Discharged to home with HH. To lobby per wc. Accompanied by hosp staff member and family members. No c/o voiced

## 2019-01-04 NOTE — CONSULTS
The  offered emotional support and spiritual care to this patient and family upon her return from surgery. The patient denied care needs, did not express concerns about her recent diagnosis, and thanked the  for visiting. The  remains available to care for patient and family.

## 2019-01-04 NOTE — CONSULTS
"  [facility] [place]  Adult Nutrition  Consult Note    SUMMARY    Intervention: Texture modified diet, Nutrition Education, and Nutrition Supplement Therapy- commercial beverage     Recommendation:  1) Continue full liquid diet, allow pureed solids as well per GI (mashed potato cream of wheat etc..)   2) Boost Plus with meals   3) Weigh pt weekly    4) Nutrition education on full liquid diet and protein needs + handout given    Goals: 1) PO intakes > 50% of meals and supplements at f/u   Nutrition Goal Status: new  Communication of RD Recs: reviewed with physician(POC, sticky note, secure chat)    Reason for Assessment    Reason For Assessment: consult  Diagnosis: (Mets. liver CA)  Relevant Medical History: colon CA, mets. liver CA, HTN, anemia  Interdisciplinary Rounds: attended    General Information Comments: 62 y/o female diagnosed with mets. liver CA, tumor obstructing part of duodenum, GI recommending full liquid diet (per discussion via secure chat pureed consistency OK as well). Por for chemo to be placed today. PTA pt states she was eating 1-2 good meals daily for the past few months. Significant wt loss x 1 month. NFPE done, no wasting seen, pt appears obese. Educated pt on full liquid diet, nutrition supplements and protein needs. Family bought whey protein powder (30 g protien per scoop).    Nutrition Discharge Planning: Home- Full liquid/ pureed Diet + Boost Glucose control TID (or protein shakes made with whey protein powder)    Nutrition Risk Screen    Nutrition Risk Screen: no indicators present    Nutrition/Diet History    Patient Reported Diet/Restrictions/Preferences: general  Spiritual, Cultural Beliefs, Sikh Practices, Values that Affect Care: no  Food Allergies: NKFA(or intolerances)    Anthropometrics    Temp: 98 °F (36.7 °C)  Height Method: Measured  Height: 5' 2"(Office 3/16/18)  Height (inches): 62 in  Weight Method: Bed Scale  Weight: 104.8 kg (231 lb 0.7 oz)  Weight (lb): " 231.04 lb  Ideal Body Weight (IBW), Female: 110 lb  % Ideal Body Weight, Female (lb): 210.04 lb  BMI (Calculated): 42.3  BMI Grade: greater than 40 - morbid obesity  Weight Loss: unintentional  Usual Body Weight (UBW), k.3 kg(3/16/18)  Weight Change Amount: (Was 117.9 18)  % Usual Body Weight: 92.69  % Weight Change From Usual Weight: -7.5 %       Lab/Procedures/Meds    Pertinent Labs Reviewed: reviewed  Lab Results   Component Value Date    ALBUMIN 2.1 (L) 2019     BMP  Lab Results   Component Value Date     2019    K 3.3 (L) 2019     2019    CO2 26 2019    BUN 10 2019    CREATININE 0.6 2019    CALCIUM 9.2 2019    ANIONGAP 10 2019    ESTGFRAFRICA >60 2019    EGFRNONAA >60 2019       Pertinent Medications Reviewed: reviewed  Pertinent Medications Comments: NS @ 100 ml/hr, iron, zofran, KCl, senna    Physical Findings/Assessment         Estimated/Assessed Needs    Weight Used For Calorie Calculations: 104.8 kg (231 lb 0.7 oz)  Energy Calorie Requirements (kcal): Alma St Jeor ( no activity factor) = 6844-5451 kcal  Energy Need Method: Alma-St Jeor  Protein Requirements: 1.0 g protein/kg (while pt on chemo vs. obesity) = 105 g protein  Weight Used For Protein Calculations: 104.8 kg (231 lb 0.7 oz)  Fluid Requirements (mL): 1600 ml  Estimated Fluid Requirement Method: RDA Method  CHO Requirement: N/A      Nutrition Prescription Ordered    Current Diet Order: NPO  Nutrition Order Comments: For port placement since midnight    Evaluation of Received Nutrient/Fluid Intake    Energy Calories Required: not meeting needs  Protein Required: not meeting needs  Fluid Required: not meeting needs  Comments: NPO  Tolerance: other (see comments)(was tolerating full liquids yesterday after eating regular steak tips prior to this)  % Intake of Estimated Energy Needs: 0%  % Meal Intake: 0% NPO  (was eating % yesterday)    Nutrition  Risk    Level of Risk/Frequency of Follow-up: moderate 2 x weekly     Assessment and Plan    Morbid obesity    Contributing Nutrition Diagnosis  Morbid obesity stage 3    Related to (etiology):   Hx. Of excessive energy intake    Signs and Symptoms (as evidenced by):   BM > 40 kg/m2    Interventions/Recommendations (treatment strategy):  1) Discuss healthy weight loss once pt's appetite improves    Nutrition Diagnosis Status:   New       Severe malnutrition    Contributing Nutrition Diagnosis  Severe  Chronic illness related malnutrition    Related to (etiology):   Decreased appetite d/t CA    Signs and Symptoms (as evidenced by):   1) PO intakes < 75% estimated needs x > 2 months 2) 10% Wt loss x 1 month    Interventions/Recommendations (treatment strategy):  1) Boost Plus with all meals on full liquid diet 2) Nutrition education given, suggested 5-6 small frequent meals    Nutrition Diagnosis Status:   New            Monitor and Evaluation    Food and Nutrient Intake: energy intake, food and beverage intake  Food and Nutrient Adminstration: diet order  Anthropometric Measurements: weight  Biochemical Data, Medical Tests and Procedures: electrolyte and renal panel  Nutrition-Focused Physical Findings: overall appearance     Malnutrition Assessment  Energy Intake: severe energy intake  Skin (Micronutrient): (Earnest = 21)  Nails (Micronutrient): brittle, ridged  Teeth (Micronutrient): (missing some)   Micronutrient Evaluation: suspected deficiency  Micronutrient Evaluation Comments: iron   Weight Loss (Malnutrition): greater than 5% in 1 month(10% x 1 month)  Energy Intake (Malnutrition): less than 75% for greater than or equal to 1 month(>/= 2 months)           Edema (Fluid Accumulation): 0-->no edema present   Subcutaneous Fat Loss (Final Summary): well nourished  Muscle Loss Evaluation (Final Summary): well nourished    Severe Weight Loss (Malnutrition): greater than 5% in 1 month    Nutrition Follow-Up    RD  Follow-up?: Yes

## 2019-01-05 NOTE — HOSPITAL COURSE
Patient monitored closely during hospitalization. She received 3 units of PRBCs. CBC trended. GI consulted for +hemicult blood. She underwent upper GI was noted to have large duodenal mass consistent with colon CA to liver. General surgery and oncology consulted. She was recommended for possible duodenal stent due to likelihood of obstruction, however wishes to wait on procedure at this time. Dietary consulted for malnutrition. She underwent port placement for chemotherapy during admission. She is stable for DC from GI, surgical, and oncology standpoint.

## 2019-01-05 NOTE — DISCHARGE SUMMARY
[facility] Hospital Medicine  Discharge Summary      Patient Name: Indigo Stuart  MRN: [id number]  Admission Date: 1/1/2019  Hospital Length of Stay: 3 days  Discharge Date and Time: 1/4/2019  5:46 PM  Attending Physician: No att. providers found   Discharging Provider: Мария Chapman NP  Primary Care Provider: John Conner MD      HPI:   Ms. Stuart is a 62yo F with a PMH of HTN, Colon Ca, and most recently, Anemia and Stage 4 metastatic Liver Disease (12/2018). She was recently hospitalized 12/4/18-12/7/18 for Symptomatic Anemia. She was found to have stage 4 metastatic liver cancer and Dr. Pettit was consulted. She seen Dr. Pettit 12/14/18 and had her PET Scan yesterday. She has an appointment with General Surgeon, Dr. Albert, tomorrow and does not know why. She presents to the hospital today with c/o Fatigue. She started feeling fatigued about 3 weeks ago and has gotten progressively worst. Associated symptoms include exertional dyspnea. She has also been having intermittent rectal bleeding. She is upset that she has not started chemotherapy as of yet and her next Oncology appointment is 1/15/19. While in the ED, she was found to be significantly anemic with a Hgb 6.7. She was T&M for 3 units PRBC and her first unit is in progress. She currently denies pain or discomforts.    Procedure(s) (LRB):  [id number]-CATH (N/A)      Hospital Course:   Patient monitored closely during hospitalization. She received 3 units of PRBCs. CBC trended. GI consulted for +hemicult blood. She underwent upper GI was noted to have large duodenal mass consistent with colon CA to liver. General surgery and oncology consulted. She was recommended for possible duodenal stent due to likelihood of obstruction, however wishes to wait on procedure at this time. Dietary consulted for malnutrition. She underwent port placement for chemotherapy during admission. She is stable for DC from GI, surgical, and  oncology standpoint.          Consults:   Consults (From admission, onward)        Status Ordering Provider     Inpatient consult to Gastroenterology  Once     Provider:  TRESSA, GUARISCO    Completed BERT MENG     Inpatient consult to General Surgery  Once     Provider:  Emilio Romero MD    Completed SAY ORANTES     Inpatient consult to Spiritual Care  Once     Provider:  (Not yet assigned)    KRISTINA Raymond          No new Assessment & Plan notes have been filed under this hospital service since the last note was generated.  Service: Hospital Medicine    Final Active Diagnoses:    Diagnosis Date Noted POA    PRINCIPAL PROBLEM:  Duodenal mass [K31.89] 01/03/2019 Yes    Fatigue [R53.83] 01/03/2019 Yes    Morbid obesity [E66.01] 01/02/2019 Yes    Metastatic colon cancer to liver [C18.9, C78.7] 01/01/2019 Yes    Thrombocytosis [D47.3] 01/01/2019 Yes    Elevated LFTs [R94.5] 01/01/2019 Yes    Hypokalemia [E87.6] 12/04/2018 Yes    Severe malnutrition [E43] 12/04/2018 Yes    Symptomatic anemia [D64.9] 03/29/2018 Yes    Hematochezia [K92.1] 03/29/2018 Yes    Hypertension [I10] 03/29/2018 Yes      Problems Resolved During this Admission:       Discharged Condition: stable    Disposition: Home-Health Care [facility]    Follow Up:  Follow-up Information     John Conner MD In 1 week.    Specialty:  Family Medicine  Why:  hospital follow up  Contact information:  [address]  [place] [address]  [phone]             Karina Pettit MD In 1 week.    Specialty:  Hematology and Oncology  Contact information:  [address]  [address]  [place] [address]  [phone]             [facility].    Specialty:  Home Health Services  Why:  Home Health  Contact information:  [address]  [facility] [id number] [phone]                 Patient Instructions:      Ambulatory referral to Home Health   Referral Priority: Routine Referral Type: Home Health    Referral Reason: Specialty Services Required   Requested Specialty: Home Health Services   Number of Visits Requested: 1     Diet Cardiac     Notify your health care provider if you experience any of the following:  persistent dizziness, light-headedness, or visual disturbances     Notify your health care provider if you experience any of the following:  worsening rash     Notify your health care provider if you experience any of the following:  severe persistent headache     Notify your health care provider if you experience any of the following:  persistent nausea and vomiting or diarrhea     Notify your health care provider if you experience any of the following:  severe uncontrolled pain     Notify your health care provider if you experience any of the following:  redness, tenderness, or signs of infection (pain, swelling, redness, odor or green/yellow discharge around incision site)     Notify your health care provider if you experience any of the following:  difficulty breathing or increased cough     Activity as tolerated       Significant Diagnostic Studies: Labs:   BMP:   Recent Labs   Lab 01/04/19 0457   GLU 89      K 3.3*      CO2 26   BUN 10   CREATININE 0.6   CALCIUM 9.2    and CMP   Recent Labs   Lab 01/04/19 0457      K 3.3*      CO2 26   GLU 89   BUN 10   CREATININE 0.6   CALCIUM 9.2   PROT 6.1   ALBUMIN 2.1*   BILITOT 0.9   ALKPHOS 454*   AST 51*   ALT 45*   ANIONGAP 10   ESTGFRAFRICA >60   EGFRNONAA >60       Pending Diagnostic Studies:     None         Medications:  Reconciled Home Medications:      Medication List      CONTINUE taking these medications    atorvastatin 20 MG tablet  Commonly known as:  LIPITOR  Take 20 mg by mouth every evening.     benzonatate 200 MG capsule  Commonly known as:  TESSALON  Take 200 mg by mouth 3 (three) times daily as needed for Cough.     co-enzyme Q-10 30 mg capsule  Take 100 mg by mouth 2 (two) times daily.     ferrous sulfate 325  (65 FE) MG EC tablet  Take 1 tablet (325 mg total) by mouth 2 (two) times daily.     metoprolol tartrate 25 MG tablet  Commonly known as:  LOPRESSOR  Take by mouth every evening.        STOP taking these medications    amLODIPine 5 MG tablet  Commonly known as:  NORVASC     hydroCHLOROthiazide 12.5 mg capsule  Commonly known as:  MICROZIDE            Indwelling Lines/Drains at time of discharge:   Lines/Drains/Airways     Central Venous Catheter Line                 Port A Cath Single Lumen 01/04/19 1345 left subclavian 1 day                Time spent on the discharge of patient: 40 minutes  Patient was seen and examined on the date of discharge and determined to be suitable for discharge.         Мария Chapman NP  Department of Hospital Medicine  [facility]

## 2019-01-07 ENCOUNTER — TELEPHONE (OUTPATIENT)
Dept: MEDSURG UNIT | Facility: HOSPITAL | Age: 64
End: 2019-01-07

## 2019-01-08 ENCOUNTER — TELEPHONE (OUTPATIENT)
Dept: HEMATOLOGY/ONCOLOGY | Facility: CLINIC | Age: 64
End: 2019-01-08

## 2019-01-08 ENCOUNTER — LAB VISIT (OUTPATIENT)
Dept: LAB | Facility: HOSPITAL | Age: 64
End: 2019-01-08
Attending: INTERNAL MEDICINE
Payer: MEDICARE

## 2019-01-08 DIAGNOSIS — D64.9 ANEMIA, UNSPECIFIED TYPE: ICD-10-CM

## 2019-01-08 DIAGNOSIS — C18.9 COLON CANCER METASTASIZED TO LIVER: ICD-10-CM

## 2019-01-08 DIAGNOSIS — D75.839 THROMBOCYTOSIS: ICD-10-CM

## 2019-01-08 DIAGNOSIS — C78.7 COLON CANCER METASTASIZED TO LIVER: ICD-10-CM

## 2019-01-08 LAB
IRON SERPL-MCNC: <10 UG/DL
SATURATED IRON: ABNORMAL %
TOTAL IRON BINDING CAPACITY: 283 UG/DL
TRANSFERRIN SERPL-MCNC: 191 MG/DL

## 2019-01-08 PROCEDURE — 81270 JAK2 GENE: CPT

## 2019-01-08 PROCEDURE — 36415 COLL VENOUS BLD VENIPUNCTURE: CPT

## 2019-01-08 PROCEDURE — 83540 ASSAY OF IRON: CPT

## 2019-01-08 NOTE — TELEPHONE ENCOUNTER
Called and spoke to pt to inform her that labs needed to be completed before her scheduled apt with Dr. Pettit. Pt confirmed apt schedule and verbalized understanding.

## 2019-01-10 ENCOUNTER — OFFICE VISIT (OUTPATIENT)
Dept: GASTROENTEROLOGY | Facility: CLINIC | Age: 64
End: 2019-01-10
Payer: MEDICARE

## 2019-01-10 VITALS
WEIGHT: 224 LBS | HEART RATE: 118 BPM | BODY MASS INDEX: 41.22 KG/M2 | HEIGHT: 62 IN | DIASTOLIC BLOOD PRESSURE: 85 MMHG | SYSTOLIC BLOOD PRESSURE: 130 MMHG

## 2019-01-10 DIAGNOSIS — K31.5 DUODENAL STENOSIS: Primary | ICD-10-CM

## 2019-01-10 LAB
JAK2 P.V617F BLD/T QL: NORMAL
JAK2 V617F MUTATION DETECTION BLD RESULT: NORMAL

## 2019-01-10 PROCEDURE — 3075F PR MOST RECENT SYSTOLIC BLOOD PRESS GE 130-139MM HG: ICD-10-PCS | Mod: S$GLB,,, | Performed by: INTERNAL MEDICINE

## 2019-01-10 PROCEDURE — 99214 OFFICE O/P EST MOD 30 MIN: CPT | Mod: S$GLB,,, | Performed by: INTERNAL MEDICINE

## 2019-01-10 PROCEDURE — 3008F PR BODY MASS INDEX (BMI) DOCUMENTED: ICD-10-PCS | Mod: S$GLB,,, | Performed by: INTERNAL MEDICINE

## 2019-01-10 PROCEDURE — 99999 PR PBB SHADOW E&M-EST. PATIENT-LVL III: ICD-10-PCS | Mod: PBBFAC,,, | Performed by: INTERNAL MEDICINE

## 2019-01-10 PROCEDURE — 3008F BODY MASS INDEX DOCD: CPT | Mod: S$GLB,,, | Performed by: INTERNAL MEDICINE

## 2019-01-10 PROCEDURE — 3079F DIAST BP 80-89 MM HG: CPT | Mod: S$GLB,,, | Performed by: INTERNAL MEDICINE

## 2019-01-10 PROCEDURE — 1101F PT FALLS ASSESS-DOCD LE1/YR: CPT | Mod: S$GLB,,, | Performed by: INTERNAL MEDICINE

## 2019-01-10 PROCEDURE — 3079F PR MOST RECENT DIASTOLIC BLOOD PRESSURE 80-89 MM HG: ICD-10-PCS | Mod: S$GLB,,, | Performed by: INTERNAL MEDICINE

## 2019-01-10 PROCEDURE — 3075F SYST BP GE 130 - 139MM HG: CPT | Mod: S$GLB,,, | Performed by: INTERNAL MEDICINE

## 2019-01-10 PROCEDURE — 99214 PR OFFICE/OUTPT VISIT, EST, LEVL IV, 30-39 MIN: ICD-10-PCS | Mod: S$GLB,,, | Performed by: INTERNAL MEDICINE

## 2019-01-10 PROCEDURE — 99999 PR PBB SHADOW E&M-EST. PATIENT-LVL III: CPT | Mod: PBBFAC,,, | Performed by: INTERNAL MEDICINE

## 2019-01-10 PROCEDURE — 1101F PR PT FALLS ASSESS DOC 0-1 FALLS W/OUT INJ PAST YR: ICD-10-PCS | Mod: S$GLB,,, | Performed by: INTERNAL MEDICINE

## 2019-01-10 NOTE — PROGRESS NOTES
Advanced Endoscopy / Pancreaticobiliary Service    Reason for visit (Chief Complaint): duodenal stenosis    Referring provider/PCP: Adis Arguello MD  [address]  [address]  [place] [address]    History of Present Illness: Patient presents for follow-up of duodenal stenosis. Recently dx'd with metastatic carcinoma (liver mets). Recent EGD showed duodenal mass (bx pending). Dr. Arguello has sent pt here for consideration of duodenal stent. Pt does report early satiety. Currently only taking liquids.            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 Will schedule EGD with duodenal stent within a week. Risks/benefits of procedure discussed with pt and her son.      Total visit time was 25 minutes, more than 50% of which was spent in face-to-face counseling with patient regarding the evaluation, management, and treatment options for her duodenal stenosis, and for coordinating care.    Flako Toth MD, FASGE  Section Head of Advanced Endoscopy  Department of Gastroenterology

## 2019-01-10 NOTE — PHYSICIAN QUERY
"PT Name: Indigo Stuart  MR #: [id number]    Physician Query Form - Hematology Clarification      CDS/: Dona Pantoja RN              Contact information:[email or web address]    This form is a permanent document in the medical record.      Query Date: January 10, 2019    By submitting this query, we are merely seeking further clarification of documentation. Please utilize your independent clinical judgment when addressing the question(s) below.    The Medical record contains the following:   Indicators  Supporting Clinical Findings Location in Medical Record   X "Anemia" documented Symptomatic anemia Hospital medicine 1/3   X H & H = H/H 6.7, 22.0 Labs 1/1   X BP =                     HR= /60,  H & P vitals   X "GI bleeding" documented Positive for anal bleeding H & P    Acute bleeding (Non GI site)     X Transfusion(s) Transfuse 3 units PRBC [facility] 1/3   X Treatment: Transfuse 3units PRBC  Anemia workup: iron studies, B12, folate, stool O[facility]  Monitor labs  Improved with PRBCs    [facility] 1/3   X Other:  Patient monitored closely during hospitalization. She received 3 units of PRBCs. CBC trended. GI consulted for +hemicult blood. She underwent upper GI was noted to have large duodenal mass consistent with colon CA to liver. General surgery and oncology consulted. She was recommended for possible duodenal stent due to likelihood of obstruction, however wishes to wait on procedure at this time     D/c Summary     Provider, please specify diagnosis or diagnoses associated with above clinical findings.    [  ] Acute blood loss anemia     [ x ] Anemia of chronic disease ( Specify chronic disease)       [ x ] Neoplastic disease    [  ] Other (Specify):   [  ] Clinically Undetermined       [  ] Other Hematological Diagnosis (please specify):     [  ] Clinically Undetermined       Please document in your progress notes daily for the duration of treatment, until resolved, and " include in your discharge summary.

## 2019-01-10 NOTE — LETTER
January 10, 2019      Adis Arguello MD  [address] [facility]  [address] [place] [id number]           [facility] - Gastroenterology  [address]  [place] [address]  Phone: [phone]  Fax: [phone]          Patient: Indigo Stuart   MR Number: [id number]   YOB: 1955   Date of Visit: 1/10/2019       Dear Dr. Adis Arguello:    Thank you for referring Indigo Stuart to me for evaluation. Attached you will find relevant portions of my assessment and plan of care.    If you have questions, please do not hesitate to call me. I look forward to following Indigo Stuart along with you.    Sincerely,    Flako Toth MD    Enclosure  CC:  No Recipients    If you would like to receive this communication electronically, please contact [email or web address] or [phone] to request more information on [facility] Link access.    For providers and/or their staff who would like to refer a patient to [facility], please contact us through our one-stop-shop provider referral line, [facility], at [phone].    If you feel you have received this communication in error or would no longer like to receive these types of communications, please e-mail [email or web address]

## 2019-01-11 ENCOUNTER — TELEPHONE (OUTPATIENT)
Dept: ENDOSCOPY | Facility: HOSPITAL | Age: 64
End: 2019-01-11

## 2019-01-11 DIAGNOSIS — K31.5 DUODENAL STENOSIS: Primary | ICD-10-CM

## 2019-01-11 NOTE — TELEPHONE ENCOUNTER
Pt scheduled for egd with Dr Filiberto Lopez on Monday 1/14/19 at 1:00 pm. Prep instructions e-mailed to [email or web address].

## 2019-01-14 ENCOUNTER — HOSPITAL ENCOUNTER (OUTPATIENT)
Facility: HOSPITAL | Age: 64
Discharge: HOME | End: 2019-01-14
Attending: INTERNAL MEDICINE | Admitting: INTERNAL MEDICINE
Payer: MEDICARE

## 2019-01-14 ENCOUNTER — ANESTHESIA (OUTPATIENT)
Dept: ENDOSCOPY | Facility: HOSPITAL | Age: 64
End: 2019-01-14
Payer: MEDICARE

## 2019-01-14 ENCOUNTER — ANESTHESIA EVENT (OUTPATIENT)
Dept: ENDOSCOPY | Facility: HOSPITAL | Age: 64
End: 2019-01-14
Payer: MEDICARE

## 2019-01-14 VITALS
OXYGEN SATURATION: 96 % | HEART RATE: 84 BPM | SYSTOLIC BLOOD PRESSURE: 141 MMHG | BODY MASS INDEX: 41.04 KG/M2 | WEIGHT: 223 LBS | RESPIRATION RATE: 18 BRPM | HEIGHT: 62 IN | TEMPERATURE: 98 F | DIASTOLIC BLOOD PRESSURE: 66 MMHG

## 2019-01-14 DIAGNOSIS — K31.89 DUODENAL MASS: Primary | ICD-10-CM

## 2019-01-14 PROCEDURE — 37000008 HC ANESTHESIA 1ST 15 MINUTES: Performed by: INTERNAL MEDICINE

## 2019-01-14 PROCEDURE — C1769 GUIDE WIRE: HCPCS | Performed by: INTERNAL MEDICINE

## 2019-01-14 PROCEDURE — 43266 EGD ENDOSCOPIC STENT PLACE: CPT | Performed by: INTERNAL MEDICINE

## 2019-01-14 PROCEDURE — 43266 EGD ENDOSCOPIC STENT PLACE: CPT | Mod: ,,, | Performed by: INTERNAL MEDICINE

## 2019-01-14 PROCEDURE — D9220A PRA ANESTHESIA: Mod: CRNA,,, | Performed by: NURSE ANESTHETIST, CERTIFIED REGISTERED

## 2019-01-14 PROCEDURE — D9220A PRA ANESTHESIA: ICD-10-PCS | Mod: CRNA,,, | Performed by: NURSE ANESTHETIST, CERTIFIED REGISTERED

## 2019-01-14 PROCEDURE — 63600175 PHARM REV CODE 636 W HCPCS: Performed by: NURSE ANESTHETIST, CERTIFIED REGISTERED

## 2019-01-14 PROCEDURE — D9220A PRA ANESTHESIA: Mod: ANES,,, | Performed by: ANESTHESIOLOGY

## 2019-01-14 PROCEDURE — 43266 PR EGD, FLEX, W/PLCMT, STENT: ICD-10-PCS | Mod: ,,, | Performed by: INTERNAL MEDICINE

## 2019-01-14 PROCEDURE — 37000009 HC ANESTHESIA EA ADD 15 MINS: Performed by: INTERNAL MEDICINE

## 2019-01-14 PROCEDURE — C1874 STENT, COATED/COV W/DEL SYS: HCPCS | Performed by: INTERNAL MEDICINE

## 2019-01-14 PROCEDURE — 25000003 PHARM REV CODE 250: Performed by: INTERNAL MEDICINE

## 2019-01-14 PROCEDURE — D9220A PRA ANESTHESIA: ICD-10-PCS | Mod: ANES,,, | Performed by: ANESTHESIOLOGY

## 2019-01-14 RX ORDER — PROPOFOL 10 MG/ML
VIAL (ML) INTRAVENOUS CONTINUOUS PRN
Status: DISCONTINUED | OUTPATIENT
Start: 2019-01-14 | End: 2019-01-14

## 2019-01-14 RX ORDER — SODIUM CHLORIDE 9 MG/ML
INJECTION, SOLUTION INTRAVENOUS CONTINUOUS
Status: DISCONTINUED | OUTPATIENT
Start: 2019-01-14 | End: 2019-01-14 | Stop reason: HOSPADM

## 2019-01-14 RX ORDER — MIDAZOLAM HYDROCHLORIDE 1 MG/ML
INJECTION, SOLUTION INTRAMUSCULAR; INTRAVENOUS
Status: DISCONTINUED | OUTPATIENT
Start: 2019-01-14 | End: 2019-01-14

## 2019-01-14 RX ORDER — FENTANYL CITRATE 50 UG/ML
25 INJECTION, SOLUTION INTRAMUSCULAR; INTRAVENOUS EVERY 5 MIN PRN
Status: DISCONTINUED | OUTPATIENT
Start: 2019-01-14 | End: 2019-01-14 | Stop reason: HOSPADM

## 2019-01-14 RX ORDER — LIDOCAINE HCL/PF 100 MG/5ML
SYRINGE (ML) INTRAVENOUS
Status: DISCONTINUED | OUTPATIENT
Start: 2019-01-14 | End: 2019-01-14

## 2019-01-14 RX ORDER — SODIUM CHLORIDE 0.9 % (FLUSH) 0.9 %
3 SYRINGE (ML) INJECTION
Status: DISCONTINUED | OUTPATIENT
Start: 2019-01-14 | End: 2019-01-14 | Stop reason: HOSPADM

## 2019-01-14 RX ORDER — FENTANYL CITRATE 50 UG/ML
INJECTION, SOLUTION INTRAMUSCULAR; INTRAVENOUS
Status: DISCONTINUED | OUTPATIENT
Start: 2019-01-14 | End: 2019-01-14

## 2019-01-14 RX ORDER — PROPOFOL 10 MG/ML
VIAL (ML) INTRAVENOUS
Status: DISCONTINUED | OUTPATIENT
Start: 2019-01-14 | End: 2019-01-14

## 2019-01-14 RX ORDER — DIPHENHYDRAMINE HCL 25 MG
25 CAPSULE ORAL EVERY 6 HOURS PRN
Status: DISCONTINUED | OUTPATIENT
Start: 2019-01-14 | End: 2019-01-14 | Stop reason: HOSPADM

## 2019-01-14 RX ORDER — SODIUM CHLORIDE 0.9 % (FLUSH) 0.9 %
3 SYRINGE (ML) INJECTION EVERY 8 HOURS
Status: DISCONTINUED | OUTPATIENT
Start: 2019-01-14 | End: 2019-01-14 | Stop reason: HOSPADM

## 2019-01-14 RX ADMIN — FENTANYL CITRATE 25 MCG: 50 INJECTION, SOLUTION INTRAMUSCULAR; INTRAVENOUS at 01:01

## 2019-01-14 RX ADMIN — LIDOCAINE HYDROCHLORIDE 100 MG: 20 INJECTION, SOLUTION INTRAVENOUS at 01:01

## 2019-01-14 RX ADMIN — SODIUM CHLORIDE: 0.9 INJECTION, SOLUTION INTRAVENOUS at 12:01

## 2019-01-14 RX ADMIN — MIDAZOLAM HYDROCHLORIDE 1 MG: 1 INJECTION, SOLUTION INTRAMUSCULAR; INTRAVENOUS at 01:01

## 2019-01-14 RX ADMIN — PROPOFOL 40 MG: 10 INJECTION, EMULSION INTRAVENOUS at 01:01

## 2019-01-14 RX ADMIN — PROPOFOL 200 MCG/KG/MIN: 10 INJECTION, EMULSION INTRAVENOUS at 01:01

## 2019-01-14 NOTE — ANESTHESIA PREPROCEDURE EVALUATION
01/14/2019  Indigo Stuart is a 63 y.o., female.    Pre-op Assessment    I have reviewed the Patient Summary Reports.     I have reviewed the Nursing Notes.   I have reviewed the Medications.     Review of Systems  Anesthesia Hx:  No problems with previous Anesthesia    Social:  Non-Smoker    Hematology/Oncology:        Current/Recent Cancer. (colon CA with liver mets)   Cardiovascular:   Hypertension, well controlled    Pulmonary:  Pulmonary Normal    Renal/:  Renal/ Normal     Neurological:  Neurology Normal    Endocrine:  Endocrine Normal        Physical Exam  General:  Well nourished, Morbid Obesity    Airway/Jaw/Neck:  Airway Findings: Mouth Opening: Normal Tongue: Normal  General Airway Assessment: Adult  Oropharynx Findings:  Mallampati: II  TM Distance: Normal, at least 6 cm  Jaw/Neck Findings:  Neck ROM: Normal ROM     Eyes/Ears/Nose:  Eyes/Ears/Nose Findings:    Dental:  Dental Findings: In tact   Chest/Lungs:  Chest/Lungs Findings: Normal Respiratory Rate     Heart/Vascular:  Heart Findings: Rate: Normal  Rhythm: Regular Rhythm        Mental Status:  Mental Status Findings:  Cooperative, Alert and Oriented         Anesthesia Plan  Type of Anesthesia, risks & benefits discussed:  Anesthesia Type:  general  Patient's Preference:   Intra-op Monitoring Plan: standard ASA monitors  Intra-op Monitoring Plan Comments:   Post Op Pain Control Plan: multimodal analgesia  Post Op Pain Control Plan Comments:   Induction:   IV  Beta Blocker:  Patient is on a Beta-Blocker and has received one dose within the past 24 hours (No further documentation required).       Informed Consent: Patient understands risks and agrees with Anesthesia plan.  Questions answered. Anesthesia consent signed with patient.  ASA Score: 3     Day of Surgery Review of History & Physical:    H&P update referred to the surgeon.          Ready For Surgery From Anesthesia Perspective.

## 2019-01-14 NOTE — [employer] PATIENT INSTRUCTIONS
Discharge Summary/Instructions after an Endoscopic Procedure  Patient Name: Indigo Stuart  Patient MRN: [id number]  Patient YOB: 1955 Monday, January 14, 2019  Monserrat Lopez MD  RESTRICTIONS:  During your procedure today, you received medications for sedation.  These   medications may affect your judgment, balance and coordination.  Therefore,   for 24 hours, you have the following restrictions:   - DO NOT drive a car, operate machinery, make legal/financial decisions,   sign important papers or drink alcohol.    ACTIVITY:  Today: no heavy lifting, straining or running due to procedural   sedation/anesthesia.  The following day: return to full activity including work.  DIET:  Eat and drink normally unless instructed otherwise.     TREATMENT FOR COMMON SIDE EFFECTS:  - Mild abdominal pain, nausea, belching, bloating or excessive gas:  rest,   eat lightly and use a heating pad.  - Sore Throat: treat with throat lozenges and/or gargle with warm salt   water.  - Because air was used during the procedure, expelling large amounts of air   from your rectum or belching is normal.  - If a bowel prep was taken, you may not have a bowel movement for 1-3 days.    This is normal.  SYMPTOMS TO WATCH FOR AND REPORT TO YOUR PHYSICIAN:  1. Abdominal pain or bloating, other than gas cramps.  2. Chest pain.  3. Back pain.  4. Signs of infection such as: chills or fever occurring within 24 hours   after the procedure.  5. Rectal bleeding, which would show as bright red, maroon, or black stools.   (A tablespoon of blood from the rectum is not serious, especially if   hemorrhoids are present.)  6. Vomiting.  7. Weakness or dizziness.  GO DIRECTLY TO THE NEAREST EMERGENCY ROOM IF YOU HAVE ANY OF THE FOLLOWING:      Difficulty breathing              Chills and/or fever over 101 F   Persistent vomiting and/or vomiting blood   Severe abdominal pain   Severe chest pain   Black, tarry stools   Bleeding- more than one  tablespoon   Any other symptom or condition that you feel may need urgent attention  Your doctor recommends these additional instructions:  If any biopsies were taken, your doctors clinic will contact you in 1 to 2   weeks with any results.  - Discharge patient to home.   - Full liquid diet for 2 days. Advance to soft mechanical diet.   - Continue present medications.   - Return to referring physician.  For questions, problems or results please call your physician - Monserrat Lopez MD at Work:  [phone].  [facility] [place], EMERGENCY ROOM PHONE NUMBER: [phone]  IF A COMPLICATION OR EMERGENCY SITUATION ARISES AND YOU ARE UNABLE TO REACH   YOUR PHYSICIAN - GO DIRECTLY TO THE EMERGENCY ROOM.  Monserrat Lopez MD  1/14/2019 2:22:47 PM  This report has been verified and signed electronically.  [employer]

## 2019-01-14 NOTE — ANESTHESIA POSTPROCEDURE EVALUATION
"Anesthesia Post Evaluation    Patient: Indigo Stuart    Procedure(s) Performed: Procedure(s) (LRB):  EGD (ESOPHAGOGASTRODUODENOSCOPY) (N/A)    Final Anesthesia Type: general  Patient location during evaluation: PACU  Patient participation: Yes- Able to Participate  Level of consciousness: awake and alert  Post-procedure vital signs: reviewed and stable  Pain management: adequate  Airway patency: patent  PONV status at discharge: No PONV  Anesthetic complications: no      Cardiovascular status: blood pressure returned to baseline and hemodynamically stable  Respiratory status: unassisted, spontaneous ventilation and room air  Hydration status: euvolemic  Follow-up not needed.        Visit Vitals  BP (!) 118/58   Pulse 84   Temp 36.3 °C (97.3 °F) (Temporal)   Resp 16   Ht 5' 2" (1.575 m)   Wt 101.2 kg (223 lb)   SpO2 97%   Breastfeeding? No   BMI 40.79 kg/m²       Pain/Ming Score: No Data Recorded      "

## 2019-01-14 NOTE — DISCHARGE INSTRUCTIONS

## 2019-01-14 NOTE — H&P
History & Physical - Short Stay  Gastroenterology      SUBJECTIVE:     Procedure: EGD    Chief Complaint/Indication for Procedure: GOO     History of Present Illness:  Patient is a 63 y.o. 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     PTA Medications   Medication Sig    atorvastatin (LIPITOR) 20 MG tablet Take 20 mg by mouth every evening.    co-enzyme Q-10 30 mg capsule Take 100 mg by mouth 2 (two) times daily.    ferrous sulfate 325 (65 FE) MG EC tablet Take 1 tablet (325 mg total) by mouth 2 (two) times daily.    metoprolol tartrate (LOPRESSOR) 25 MG tablet Take by mouth every evening.     benzonatate (TESSALON) 200 MG capsule Take 200 mg by mouth 3 (three) times daily as needed for Cough.       Review of patient's allergies indicates:   Allergen Reactions    Codeine Nausea And Vomiting    Erythromycin base Other (See Comments)    Lisinopril Palpitations     Cough         Past Medical History:   Diagnosis Date    Cancer     colon    Encounter for blood transfusion     Hypertension      Past Surgical History:   Procedure Laterality Date    CHOLECYSTECTOMY      COLON SURGERY      COLONOSCOPY N/A 3/30/2018    Performed by Daniel Magana MD at [facility] ENDO    EGD (ESOPHAGOGASTRODUODENOSCOPY) N/A 1/3/2019    Performed by Adis Arguello MD at [facility] ENDO    ESOPHAGOGASTRODUODENOSCOPY (EGD) N/A 3/30/2018    Performed by Daniel Magana MD at [facility] ENDO    HYSTERECTOMY      [id number] N/A 1/4/2019    Performed by Emilio Romero MD at [facility] OR     Family History   Problem Relation Age of Onset    Cancer Mother         colon ca, liver ca    Cancer Father      Social History     Tobacco Use    Smoking status: Never Smoker    Smokeless tobacco: Never Used   Substance Use Topics    Alcohol use: No    Drug use: No            OBJECTIVE:     Vital Signs (Most Recent)  Temp: 97.7 °F (36.5 °C) (01/14/19 1226)  Pulse: 105 (01/14/19  1226)  Resp: 16 (01/14/19 1226)  BP: (!) 140/77 (01/14/19 1226)  SpO2: 98 % (01/14/19 1226)         ASSESSMENT/PLAN:     Patient is a 63 y.o. 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     Plan: EGD    Anesthesia Plan: Moderate Sedation    ASA Grade: ASA 2 - Patient with mild systemic disease with no functional limitations

## 2019-01-14 NOTE — TRANSFER OF CARE
"Anesthesia Transfer of Care Note    Patient: Indigo Stuart    Procedure(s) Performed: Procedure(s) (LRB):  EGD (ESOPHAGOGASTRODUODENOSCOPY) (N/A)    Patient location: PACU    Anesthesia Type: general    Transport from OR: Transported from OR on room air with adequate spontaneous ventilation    Post pain: adequate analgesia    Post assessment: no apparent anesthetic complications    Post vital signs: stable    Level of consciousness: awake    Nausea/Vomiting: no nausea/vomiting    Complications: none    Transfer of care protocol was followed      Last vitals:   Visit Vitals  BP (!) 140/77 (BP Location: Left arm, Patient Position: Lying)   Pulse 105   Temp 36.5 °C (97.7 °F) (Temporal)   Resp 16   Ht 5' 2" (1.575 m)   Wt 101.2 kg (223 lb)   SpO2 98%   Breastfeeding? No   BMI 40.79 kg/m²     "

## 2019-01-14 NOTE — PLAN OF CARE
Patient and son state they are ready to be discharged. Instructions and report given to patient and family. Both verbalize understanding. Patient tolerating po liquids with no difficulty. Patient denies pain. Anesthesia consent and surgical consent in chart upon patient's discharge from [facility].

## 2019-01-15 ENCOUNTER — LAB VISIT (OUTPATIENT)
Dept: LAB | Facility: HOSPITAL | Age: 64
End: 2019-01-15
Attending: INTERNAL MEDICINE
Payer: MEDICARE

## 2019-01-15 ENCOUNTER — OFFICE VISIT (OUTPATIENT)
Dept: HEMATOLOGY/ONCOLOGY | Facility: CLINIC | Age: 64
End: 2019-01-15
Payer: MEDICARE

## 2019-01-15 VITALS
HEIGHT: 62 IN | WEIGHT: 220.25 LBS | SYSTOLIC BLOOD PRESSURE: 120 MMHG | DIASTOLIC BLOOD PRESSURE: 60 MMHG | TEMPERATURE: 98 F | HEART RATE: 103 BPM | BODY MASS INDEX: 40.53 KG/M2 | RESPIRATION RATE: 18 BRPM

## 2019-01-15 DIAGNOSIS — K31.5 DUODENAL STENOSIS: ICD-10-CM

## 2019-01-15 DIAGNOSIS — C78.7 METASTATIC COLON CANCER TO LIVER: Primary | ICD-10-CM

## 2019-01-15 DIAGNOSIS — C78.7 HEPATIC METASTASES: ICD-10-CM

## 2019-01-15 DIAGNOSIS — R97.8 ELEVATED CA 19-9 LEVEL: ICD-10-CM

## 2019-01-15 DIAGNOSIS — K92.1 HEMATOCHEZIA: ICD-10-CM

## 2019-01-15 DIAGNOSIS — D64.9 SYMPTOMATIC ANEMIA: ICD-10-CM

## 2019-01-15 DIAGNOSIS — R79.89 ELEVATED LFTS: ICD-10-CM

## 2019-01-15 DIAGNOSIS — Z09 HOSPITAL DISCHARGE FOLLOW-UP: ICD-10-CM

## 2019-01-15 DIAGNOSIS — C17.0 DUODENAL CANCER: ICD-10-CM

## 2019-01-15 DIAGNOSIS — C18.9 METASTATIC COLON CANCER TO LIVER: Primary | ICD-10-CM

## 2019-01-15 DIAGNOSIS — D75.839 THROMBOCYTOSIS: ICD-10-CM

## 2019-01-15 DIAGNOSIS — C78.7 METASTATIC COLON CANCER TO LIVER: ICD-10-CM

## 2019-01-15 DIAGNOSIS — R97.0 ELEVATED CEA: ICD-10-CM

## 2019-01-15 DIAGNOSIS — C78.00 MALIGNANT NEOPLASM METASTATIC TO LUNG, UNSPECIFIED LATERALITY: ICD-10-CM

## 2019-01-15 DIAGNOSIS — C18.9 METASTATIC COLON CANCER TO LIVER: ICD-10-CM

## 2019-01-15 LAB
ALBUMIN SERPL BCP-MCNC: 2.2 G/DL
ALP SERPL-CCNC: 695 U/L
ALT SERPL W/O P-5'-P-CCNC: 58 U/L
ANION GAP SERPL CALC-SCNC: 13 MMOL/L
AST SERPL-CCNC: 58 U/L
BASOPHILS # BLD AUTO: 0.2 K/UL
BASOPHILS NFR BLD: 1.6 %
BILIRUB SERPL-MCNC: 0.6 MG/DL
BUN SERPL-MCNC: 12 MG/DL
CALCIUM SERPL-MCNC: 9.7 MG/DL
CANCER AG19-9 SERPL-ACNC: 523 U/ML
CEA SERPL-MCNC: 1292.5 NG/ML
CHLORIDE SERPL-SCNC: 100 MMOL/L
CO2 SERPL-SCNC: 23 MMOL/L
CREAT SERPL-MCNC: 0.7 MG/DL
DIFFERENTIAL METHOD: ABNORMAL
EOSINOPHIL # BLD AUTO: 0.1 K/UL
EOSINOPHIL NFR BLD: 0.6 %
ERYTHROCYTE [DISTWIDTH] IN BLOOD BY AUTOMATED COUNT: 21.1 %
EST. GFR  (AFRICAN AMERICAN): >60 ML/MIN/1.73 M^2
EST. GFR  (NON AFRICAN AMERICAN): >60 ML/MIN/1.73 M^2
GLUCOSE SERPL-MCNC: 94 MG/DL
HCT VFR BLD AUTO: 26.8 %
HGB BLD-MCNC: 8.5 G/DL
LYMPHOCYTES # BLD AUTO: 0.8 K/UL
LYMPHOCYTES NFR BLD: 5.8 %
MCH RBC QN AUTO: 24.5 PG
MCHC RBC AUTO-ENTMCNC: 31.6 G/DL
MCV RBC AUTO: 78 FL
MONOCYTES # BLD AUTO: 1.2 K/UL
MONOCYTES NFR BLD: 8.6 %
NEUTROPHILS # BLD AUTO: 11.4 K/UL
NEUTROPHILS NFR BLD: 83.4 %
PLATELET # BLD AUTO: 932 K/UL
PMV BLD AUTO: 6.6 FL
POTASSIUM SERPL-SCNC: 3.9 MMOL/L
PROT SERPL-MCNC: 7 G/DL
RBC # BLD AUTO: 3.46 M/UL
SODIUM SERPL-SCNC: 136 MMOL/L
WBC # BLD AUTO: 13.7 K/UL

## 2019-01-15 PROCEDURE — 99999 PR PBB SHADOW E&M-EST. PATIENT-LVL III: CPT | Mod: PBBFAC,,, | Performed by: INTERNAL MEDICINE

## 2019-01-15 PROCEDURE — 3074F PR MOST RECENT SYSTOLIC BLOOD PRESSURE < 130 MM HG: ICD-10-PCS | Mod: S$GLB,,, | Performed by: INTERNAL MEDICINE

## 2019-01-15 PROCEDURE — 36415 COLL VENOUS BLD VENIPUNCTURE: CPT

## 2019-01-15 PROCEDURE — 82378 CARCINOEMBRYONIC ANTIGEN: CPT

## 2019-01-15 PROCEDURE — 99215 PR OFFICE/OUTPT VISIT, EST, LEVL V, 40-54 MIN: ICD-10-PCS | Mod: S$GLB,,, | Performed by: INTERNAL MEDICINE

## 2019-01-15 PROCEDURE — 85025 COMPLETE CBC W/AUTO DIFF WBC: CPT

## 2019-01-15 PROCEDURE — 1101F PR PT FALLS ASSESS DOC 0-1 FALLS W/OUT INJ PAST YR: ICD-10-PCS | Mod: S$GLB,,, | Performed by: INTERNAL MEDICINE

## 2019-01-15 PROCEDURE — 3078F PR MOST RECENT DIASTOLIC BLOOD PRESSURE < 80 MM HG: ICD-10-PCS | Mod: S$GLB,,, | Performed by: INTERNAL MEDICINE

## 2019-01-15 PROCEDURE — 3078F DIAST BP <80 MM HG: CPT | Mod: S$GLB,,, | Performed by: INTERNAL MEDICINE

## 2019-01-15 PROCEDURE — 1101F PT FALLS ASSESS-DOCD LE1/YR: CPT | Mod: S$GLB,,, | Performed by: INTERNAL MEDICINE

## 2019-01-15 PROCEDURE — 3008F BODY MASS INDEX DOCD: CPT | Mod: S$GLB,,, | Performed by: INTERNAL MEDICINE

## 2019-01-15 PROCEDURE — 99215 OFFICE O/P EST HI 40 MIN: CPT | Mod: S$GLB,,, | Performed by: INTERNAL MEDICINE

## 2019-01-15 PROCEDURE — 86301 IMMUNOASSAY TUMOR CA 19-9: CPT

## 2019-01-15 PROCEDURE — 99999 PR PBB SHADOW E&M-EST. PATIENT-LVL III: ICD-10-PCS | Mod: PBBFAC,,, | Performed by: INTERNAL MEDICINE

## 2019-01-15 PROCEDURE — 3008F PR BODY MASS INDEX (BMI) DOCUMENTED: ICD-10-PCS | Mod: S$GLB,,, | Performed by: INTERNAL MEDICINE

## 2019-01-15 PROCEDURE — 80053 COMPREHEN METABOLIC PANEL: CPT

## 2019-01-15 PROCEDURE — 3074F SYST BP LT 130 MM HG: CPT | Mod: S$GLB,,, | Performed by: INTERNAL MEDICINE

## 2019-01-15 RX ORDER — SODIUM CHLORIDE 0.9 % (FLUSH) 0.9 %
10 SYRINGE (ML) INJECTION
Status: CANCELLED | OUTPATIENT
Start: 2019-01-23

## 2019-01-15 RX ORDER — EPINEPHRINE 0.3 MG/.3ML
0.3 INJECTION SUBCUTANEOUS ONCE AS NEEDED
Status: CANCELLED | OUTPATIENT
Start: 2019-01-15 | End: 2019-01-15

## 2019-01-15 RX ORDER — HEPARIN 100 UNIT/ML
500 SYRINGE INTRAVENOUS
Status: CANCELLED | OUTPATIENT
Start: 2019-01-23

## 2019-01-15 RX ORDER — DIPHENHYDRAMINE HYDROCHLORIDE 50 MG/ML
50 INJECTION INTRAMUSCULAR; INTRAVENOUS ONCE AS NEEDED
Status: CANCELLED | OUTPATIENT
Start: 2019-01-15 | End: 2019-01-15

## 2019-01-15 RX ORDER — SODIUM CHLORIDE 0.9 % (FLUSH) 0.9 %
10 SYRINGE (ML) INJECTION
Status: CANCELLED | OUTPATIENT
Start: 2019-01-15

## 2019-01-15 RX ORDER — HEPARIN 100 UNIT/ML
500 SYRINGE INTRAVENOUS
Status: CANCELLED | OUTPATIENT
Start: 2019-01-15

## 2019-01-15 RX ORDER — FLUOROURACIL 50 MG/ML
400 INJECTION, SOLUTION INTRAVENOUS
Status: CANCELLED | OUTPATIENT
Start: 2019-01-15

## 2019-01-15 NOTE — PROGRESS NOTES
Cc I had my chest port placed and a stent placed in her duodenum     Indigo Stuart is a 63 y.o.  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  Needs MRI of brain still  CHest port in place   Past Medical History:   Diagnosis Date    Cancer     colon    Encounter for blood transfusion     Hypertension      Past Surgical History:   Procedure Laterality Date    CHOLECYSTECTOMY      COLON SURGERY      COLONOSCOPY N/A 3/30/2018    Performed by [facility] at [facility] ENDO    EGD (ESOPHAGOGASTRODUODENOSCOPY) N/A 1/3/2019    Performed by Adis [facility]guello MD at [facility] ENDO    ESOPHAGOGASTRODUODENOSCOPY (EGD) N/A 3/30/2018    Performed by Daniel Magana MD at [facility] ENDO    HYSTERECTOMY      [id number]-CATH N/A 1/4/2019    Performed by Emilio Romero MD at [facility] OR   She is on lopressor for HTn and lipitor for cholesterol     Current Outpatient Medications:     atorvastatin (LIPITOR) 20 MG tablet, Take 20 mg by mouth every evening., Disp: , Rfl:     benzonatate (TESSALON) 200 MG capsule, Take 200 mg by mouth 3 (three) times daily as needed for Cough., Disp: , Rfl:     co-enzyme Q-10 30 mg capsule, Take 100 mg by mouth 2 (two) times daily., Disp: , Rfl:     ferrous sulfate 325 (65 FE) MG EC tablet, Take 1 tablet (325 mg total) by mouth 2 (two) times daily., Disp: 60 tablet, Rfl: 0    metoprolol tartrate (LOPRESSOR) 25 MG tablet, Take by mouth every evening. , Disp: , Rfl:   No current facility-administered medications for this visit.   Review of patient's allergies indicates:   Allergen Reactions    Codeine Nausea And Vomiting    Erythromycin base Other (See Comments)    Lisinopril Palpitations     Cough      Social History     Tobacco Use    Smoking status: Never Smoker    Smokeless tobacco: Never Used   Substance Use Topics    Alcohol use: No    Drug use: No     Family History   Problem Relation Age of  "Onset    Cancer Mother         colon ca, liver ca    Cancer Father        CONSTITUTIONAL: No fevers, chills, night sweats, wt. loss, appetite changes  SKIN: no rashes or itching  ENT: No headaches, head trauma, vision changes, or eye pain  LYMPH NODES: None noticed   CV: No chest pain, palpitations.   RESP: no cough . No sob, no chest pain   GI: No nausea, emesis, diarrhea, constipation, melena, hematochezia, pain.   : No dysuria, hematuria, urgency, or frequency   HEME: No easy bruising, bleeding problems  PSYCHIATRIC: No depression, anxiety, psychosis, hallucinations.  NEURO: No seizures, memory loss, dizziness or difficulty sleeping  MSK: No arthralgias or joint swelling  She has resumed iron orally        /60   Pulse 103   Temp 98.1 °F (36.7 °C)   Resp 18   Ht 5' 2" (1.575 m)   Wt 99.9 kg (220 lb 3.8 oz)   BMI 40.28 kg/m²   Gen: NAD, A and O x3,   Psych: pleasant affect, normal thought process  Eyes: Pupils round and non dilated, EOM intact  Nose: Nares patent  OP clear, mucosa patent  Neck: suppple, no JVD, trachea midline, no palpable mass, no adenopathy  Lungs: CTAB, no wheezes, no use of accessory muscles  CV: S1S2 with RRR, No mrg  Abd: soft, NTND, + BS, No HSM, no ascites  Extr: No CCE, ANN, strength normal  Neuro: steady gait, CNs grossly intact  Skin: intact, no lesions noted  Rheum: No joint swelling    Lab Results   Component Value Date    WBC 11.20 01/04/2019    HGB 8.5 (L) 01/04/2019    HCT 27.2 (L) 01/04/2019    MCV 79 (L) 01/04/2019     (H) 01/04/2019     CMP  Sodium   Date Value Ref Range Status   01/04/2019 138 136 - 145 mmol/L Final     Potassium   Date Value Ref Range Status   01/04/2019 3.3 (L) 3.5 - 5.1 mmol/L Final     Chloride   Date Value Ref Range Status   01/04/2019 102 95 - 110 mmol/L Final     CO2   Date Value Ref Range Status   01/04/2019 26 23 - 29 mmol/L Final     Glucose   Date Value Ref Range Status   01/04/2019 89 70 - 110 mg/dL Final     BUN, Bld   Date " Value Ref Range Status   01/04/2019 10 8 - 23 mg/dL Final     Creatinine   Date Value Ref Range Status   01/04/2019 0.6 0.5 - 1.4 mg/dL Final     Calcium   Date Value Ref Range Status   01/04/2019 9.2 8.7 - 10.5 mg/dL Final     Total Protein   Date Value Ref Range Status   01/04/2019 6.1 6.0 - 8.4 g/dL Final     Albumin   Date Value Ref Range Status   01/04/2019 2.1 (L) 3.5 - 5.2 g/dL Final     Total Bilirubin   Date Value Ref Range Status   01/04/2019 0.9 0.1 - 1.0 mg/dL Final     Comment:     For infants and newborns, interpretation of results should be based  on gestational age, weight and in agreement with clinical  observations.  Premature Infant recommended reference ranges:  Up to 24 hours.............<8.0 mg/dL  Up to 48 hours............<12.0 mg/dL  3-5 days..................<15.0 mg/dL  6-29 days.................<15.0 mg/dL       Alkaline Phosphatase   Date Value Ref Range Status   01/04/2019 454 (H) 55 - 135 U/L Final     AST   Date Value Ref Range Status   01/04/2019 51 (H) 10 - 40 U/L Final     ALT   Date Value Ref Range Status   01/04/2019 45 (H) 10 - 44 U/L Final     Anion Gap   Date Value Ref Range Status   01/04/2019 10 8 - 16 mmol/L Final     eGFR if    Date Value Ref Range Status   01/04/2019 >60 >60 mL/min/1.73 m^2 Final     eGFR if non    Date Value Ref Range Status   01/04/2019 >60 >60 mL/min/1.73 m^2 Final     Comment:     Calculation used to obtain the estimated glomerular filtration  rate (eGFR) is the CKD-EPI equation.        INICAL DIAGNOSIS/INFORMATION  Clinical information: possible liver mets- hx of colon cancer  PreOperative Diagnosis  SPECIMEN  1) Liver Lesion Biopsy  FINAL PATHOLOGIC DIAGNOSIS  LIVER LESION, NEEDLE CORE BIOPSIES:  - Metastatic colonic adenocarcinoma.  COMMENT: The histopathologic features and immunohistochemistry profile of this metastatic tumor strongly  support the interpretation of a metastatic colonic adenocarcinoma which also  correlates with the patient's history of  colon cancer.  Ref Range & Iqixs6z ago  CA 1250 - 30 U/mL37 Abnormally high   Pkdtx9q ago  CEA0.0 - 5.0 ng/mL643.1 Abnormally high      Ref Range & Jojmm2q ago  CA 19-92.0 - 40.0 U/mL221.0 Abnormally high    appt:  None   Component 9d ago   HLA Celiac Specimen Whole Blood    Celiac (HLA-DQB1*02) Positive Abnormal     Celiac (HLA-DQ8) Negative    Celiac (HLA-DQA1*05) Positive Abnormal               Ref Range & Zunkz0z ago  CA 19-92.0 - 40.0 U/mL      221.0 Abnormally high      Ref Range & Vkqql7o ago  CEA0.0 - 5.0 ng/mL     643.1 Abnormally high      Ref Range & Hmrtc0h ago  CA 1250 - 30 U/mL    37 Abnormally high     Ref Range & Cquxc5t ago  AFP0.0 - 8.4 ng/mL    2.6     Metastatic colon cancer to liver  -     CBC auto differential; Future; Expected date: 01/15/2019  -     CMP; Future; Expected date: 01/15/2019  -     CA19.9; Future; Expected date: 01/15/2019  -     CEA; Future; Expected date: 01/15/2019  -     MRI BRAIN; Future; Expected date: 01/15/2019    Symptomatic anemia    Thrombocytosis  -     CEA; Future; Expected date: 01/15/2019    Elevated LFTs    Hematochezia    Hospital discharge follow-up    Duodenal cancer  -     CBC auto differential; Future; Expected date: 01/15/2019  -     CMP; Future; Expected date: 01/15/2019  -     CA19.9; Future; Expected date: 01/15/2019  -     MRI BRAIN; Future; Expected date: 01/15/2019    Malignant neoplasm metastatic to lung, unspecified laterality  -     MRI BRAIN; Future; Expected date: 01/15/2019    Hepatic metastases    Duodenal stenosis  -     CBC auto differential; Future; Expected date: 01/15/2019  -     CMP; Future; Expected date: 01/15/2019  -     CA19.9; Future; Expected date: 01/15/2019    Elevated CEA  -     MRI BRAIN; Future; Expected date: 01/15/2019    Elevated CA 19-9 level  -     CBC auto differential; Future; Expected date: 01/15/2019  -     CMP; Future; Expected date: 01/15/2019  -     CA19.9; Future; Expected  date: 01/15/2019    Other orders  -     palonosetron (ALOXI) 0.25 mg, dexamethasone (DECADRON) 12 mg in sodium chloride 0.9 % 50 mL IVPB  -     EPINEPHrine (EPIPEN) 0.3 mg/0.3 mL pen injection 0.3 mg  -     diphenhydrAMINE injection 50 mg  -     hydrocortisone sodium succinate injection 100 mg  -     bevacizumab (AVASTIN) 5 mg/kg = 500 mg in sodium chloride 0.9% 100 mL chemo infusion  -     oxaliplatin (ELOXATIN) 85 mg/m2 = 178 mg in dextrose 5 % 500 mL chemo infusion  -     leucovorin calcium 400 mg/m2 = 835 mg in dextrose 5 % 250 mL infusion  -     fluorouracil injection 835 mg  -     fluorouracil (ADRUCIL) 2,400 mg/m2 = 5,015 mg in sodium chloride 0.9% 200.3 mL chemo infusion  -     dextrose 5 % 250 mL flush bag  -     sodium chloride 0.9% 100 mL flush bag  -     sodium chloride 0.9% flush 10 mL  -     heparin, porcine (PF) 100 unit/mL injection flush 500 Units  -     alteplase injection 2 mg  -     sodium chloride 0.9% flush 10 mL  -     heparin, porcine (PF) 100 unit/mL injection flush 500 Units  -     alteplase injection 2 mg        Anemia : continue oral iron   To GI for assessment GI bleeding  Diet explained with gluten free foods for now  Carlos 2 negative   To start chemo NOW chest port in place   Mri orderded to complete staging  Start folfox and avastin for stage  4 disease   Explained various tretament options  Check EGFR kras msi etc on path     I discussed the available treatment option(s) in accordance with the latest NCCN Guidelines, their overall age and their co-morbidities. I went over the risks and benefits of the chemotherapy with regard to their particular cancer type, their cancer stage, their age, and their co-morbidities. I provided literature on the chemotherapy regimen and discussed the chemotherapy side-effect profiles of the drug(s). I discussed the importance of compliance with obtaining and monitoring weekly lab work, and went over the potential hematopathology issues and risks with  anemia, leucopenia and thrombocytopenia that can occur with chemotherapy. I discussed the potential risks of liver and kidney damage, which could be permanent and could necessitate dialysis long-term if kidney failure developed. I discussed the emetic and/or diarrheal potential of the regimen and the potential need for use of antiemetic and anti-diarrheal medications. I discussed the risk for development of anaphylactic shock, bronchospasm, dysrhythmia, and respiratory/cardiovascular failure. I discussed the potential risks for development of alopecia, cold sensory issues, ringing in ears, vertigo and neuropathy, all of which could end up being chronic and life-long. I discussed the risks of hand-foot syndrome and rashes, and development of other autoimmune mediated processes such as pneumonitis and colitis which could be life threatening. I discussed the risks of the potential development of leukemia and/or lymphoma from use of certain chemotherapy agents. I discussed the need for neutropenic precautions, basic hygiene/sanitation behaviors and dietary restrictions.     The patient's consent has been obtained to proceed with the chemotherapy.The patient will be referred to [facility]otherapy [facility] /[facility] for training and education on chemotherapy, use of antiemetics and/or anti-diarrheals, use of NSAID's, potential chemotherapy side-effects, and any specific recommendations and precautions with the particular chemotherapy agents.       I answered all of the patient's (and family's, if applicable) questions to the best of my ability and to their complete satisfaction. The patient acknowledged full understanding of the risks, recommendations and plan(s).      Sincerely,  Karina Pettit MD [facility]    Thank you for allowing me to evaluate and participate in the care of this pleasant patient. Please fell free to call me with any questions or concerns.    [place]armly,  Karina Pettit MD    This note was dictated with Navarro  and briefly proofread. Please excuse any sentences that may be unclear or words misspelled

## 2019-01-18 ENCOUNTER — TELEPHONE (OUTPATIENT)
Dept: HEMATOLOGY/ONCOLOGY | Facility: CLINIC | Age: 64
End: 2019-01-18

## 2019-01-18 ENCOUNTER — CLINICAL SUPPORT (OUTPATIENT)
Dept: HEMATOLOGY/ONCOLOGY | Facility: CLINIC | Age: 64
End: 2019-01-18
Payer: MEDICARE

## 2019-01-18 DIAGNOSIS — R11.2 CHEMOTHERAPY INDUCED NAUSEA AND VOMITING: Primary | ICD-10-CM

## 2019-01-18 DIAGNOSIS — T45.1X5A CHEMOTHERAPY INDUCED NAUSEA AND VOMITING: Primary | ICD-10-CM

## 2019-01-18 RX ORDER — ONDANSETRON 8 MG/1
8 TABLET, ORALLY DISINTEGRATING ORAL EVERY 12 HOURS PRN
Qty: 30 TABLET | Refills: 1 | Status: SHIPPED | OUTPATIENT
Start: 2019-01-18 | End: 2020-01-18

## 2019-01-18 RX ORDER — PROMETHAZINE HYDROCHLORIDE 25 MG/1
25 TABLET ORAL EVERY 6 HOURS PRN
Qty: 30 TABLET | Refills: 1 | Status: SHIPPED | OUTPATIENT
Start: 2019-01-18 | End: 2020-02-24 | Stop reason: CLARIF

## 2019-01-18 NOTE — PHYSICIAN QUERY
PT Name: Indigo Stuart  MR #: [id number]    Physician Query Form - Pathology Findings Clarification     CDS/: Dona Pantoja RN              Contact information:[email or web address]  This form is a permanent document in the medical record.     Query Date: January 18, 2019      By submitting this query, we are merely seeking further clarification of documentation.  Please utilize your independent clinical judgment when addressing the question(s) below.      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 The depth of tumor invasion  cannot be determined on the basis of these biopsies (see comment) Pathology report 1/14     Please document the clinical significance of the Pathologists findings of _villous adenoma with high-grade dysplasia and focal intramucosal adenocarcinoma_____________    [  x ] I agree with the Pathology Findings   [   ] I do not agree with the Pathology Findings   [   ] Other/Clarification of Findings:   [   ] Clinically Insignificant   [  ] Clinically Undetermined       Please document in your progress notes daily for the duration of treatment until resolved and include in your discharge summary.

## 2019-01-18 NOTE — PROGRESS NOTES
Indigo Stuart  [id number]    [facility]    TITLE: PLAN OF CARE FOR THE CHEMOTHERAPY PATIENT / TEACHING PROTOCOL    PURPOSE: To involve the patient / significant other in the plan of care and to provide teaching to the significant other & patient receiving chemotherapy.    LEVEL: Independent.    CONTENT: The Plan of Care for the chemotherapy patient is individualized and appropriate to the patients needs, strengths, limitations, & goals.  Education includes information regarding chemotherapy side effects, the treatment itself, and self-care  Activities.    GOAL / OUTCOME STANDARDS    PHYSIOLOGIC: The client will remain free or experience minimal side effects or toxicities throughout the chemotherapy treatment period.     PSYCHOLOGIC: The client/significant others will demonstrate positive coping mechanisms in relation to chemotherapy and its side effects.      COGINITIVE: The client/significant others will verbalize understanding of self-care measure to avoid/minimize side effects of the chemotherapy regime.    EVALUATION / COMMENT KEY:    V = Audiovisual/Video  S = Successfully meets outcome  N = Needs further instruction  NA = Not applicable to the patient  P = Previous knowledge  U = Unable to comprehend  * = See progress notes          PLAN OF CARE  INFORMATION TO BE DELIVERED / NURSING INTERVENTIONS DATE EVALUATION   1. Assessment of client/caregiver,         knowledge of cancer diagnosis,         and chemotherapy as a treatment. 1a. Evaluate patient/caregiver learning ability    b. Plan teaching sessions with patient/caregiver according to needs and present anxiety level/ability to learn.    c. Provide Chemotherapy Education Packet,        Mouth Care Protocol,         Specific Patient Education Sheets. 01/18/2019 S   2. Individual chemotherapy treatment         plan. 2a. Review of Chemotherapy Education handout from [employer]            01/18/2019   S   3. Knowledge  Deficit & Self-Management of general side effects common to all chemotherapy:  a. Nausea/Vomiting  b.   Diarrhea  c. Mouth Care  d. Dental care  e. Constipation  f. Hair Loss  g. Potential for infection  h. Fatigue   3a. Reinforce that the majority of side effects from chemotherapy are reversible and are  controlled both in the hospital and at home        (blood counts recover, hair grows back).   b.  Refer to the following for reinforcement of         information post-treatment:  1. Mouth Care Protocol.  2. Bowel Protocol for constipation or diarrhea.  3.  Drug Specific Chemotherapy Information Sheets for each medication patient receiving.    01/18/2019     S     PLAN OF CARE  INFORMATION TO BE DELIVERED / NURSING INTERVENTIONS DATE EVALUATION   h. Potential for bleeding         i. Potential anemia/fatigue         j. Potential sunburn         k. Birth control measures  l. Safety measures post treatment 4.  Chemotherapy Home Care Instruction  and Safety Information Sheet.  A. patient/caregivers to thoroughly cook shellfish (shrimp, crab, etc) to decrease the chance of infection.    B.  Use sunscreen and protective clothing while in the sun.   01/18/2019      4. Knowledge deficit & Self Management of Drug Specific  Side Effects.    a. BLADDER EFFECTS        (Hemorrhagic Cystitis)                  Preventable with adequate hydration; occurs 2-3 days or more post treatment.   1.  Instruct patient to:  a.   Void at least every 2 hours; increase intake.  b.   DO NOT hold urine; go when urge is felt.  c.    Empty bladder at bedtime and on         awakening.  d.   Observe for color changes (red to tea           colored), amount and frequency changes.  e.   Notify oncologist of any abnormalities           in urine or voiding or if you cannot               drink adequate fluids.   01/18/2019   S   b.   CHANGES IN URINE        COLOR:      1.   Instruct patient:  a.   Most evident in first 2-3 voidings after            administration.  b. Lasts less than 24 hours.  c. If urine is discolored 2 or more days post- treatment, notify oncologist.      01/18/2019 S   c.    KIDNEY EFFECTS           (Nephrotoxicity)   1.  Instruct patient to:  a.   Drink 8-16 glasses of fluid/day the day   pre-treatment and 3-4 days post-treatment to maintain hydration; the best way to minimize kidney problems.  b.   Notify oncologist immediately if unable to drink fluids or if changes are noted in urinary elimination.     01/18/2019   S   a. PULMONARY TOXICITY    1. Instruct patient to report symptoms such as shortness of breath, chest pain, shallow breathing, or chest wall discomfort to physician.  2. Reinforce preventative measures used by the health care team.  a. Baseline and periodic PFT and chest x-ray.   01/18/2019   S     PLAN OF CARE INFORMATION TO BE DELIVERED / NURSING INTERVENTIONS DATE EVALUATION   b. NERVE & MUSCLE EFFECTS (neurotoxocity; neuropathy, possible visual/hearing changes)        3. Instruct patient to:    a. Report numbness or tingling of the hands/feet, loss of fine motor movement (buttoning shirt, tying shoelaces), or gait changes to your oncologist.  b. If numbness/tingling are present:  1. protect feet with shoes at all times.  2. Use gloves for washing dishes/gardening & potholders in kitchen.       01/18/2019   S   c. CARDIOTOXICITY  Decreased effectiveness of             cardiac function. Effective are                  cumulative and irreversible.                                    CARDIAC ARRYTHMIAS              4   Instruct:  a. Heart function may be tested before treatment and perdiocally during treatment.  b. Notify oncologist of irregular pulse, palpitations, shortness of breath, or swelling in lower extremities/feet.          Taxol and Taxotere can cause arrhythmias on infusion that resolve once infusion discontinued. Instruct nurse if any irregularity felt.    01/18/2019   S   d. EXTRAVASTION  Occurs when vesicants  leak outside of vein and cause damage to the skin and underlying tissues.   1. Reinforce preventive measures used to avoid complications.  a. Fresh IV site or central line monitored continuously with vesicant IVP.  b. Continuous infusion via central line site and blood return monitored periodically around the clock.  2. Instruct to:  a. Notify nurse of any discomfort, burning, stinging, etc. at IV site during chemotherapy administration.  b. Notify oncologist of any redness, pain, or swelling at IV site after discharge from hospital.   01/18/2019   S   e. HYPERSENSITIVITY can happen with any medication.   1. Instruct patient:  a. Nurse is with them during the initial part of treatment and will be close by to monitor.  b. Pre-medication ordered by the oncologist must be taken on time. If doses are missed, treatment will need to be re-scheduled.  c. Skin redness, itching, or hives appearing after discharge should be reported to oncologist. 01/18/2019   S       PLAN OF CARE INFORMATION TO BE DELIVERED / NURSING INTERVENTIONS DATE EVALUATION   f. FLU-LIKE SYNDROME      1. Instruct patient symptoms are hard to prevent and may include fever, shaking chills, muscle and body aches.  a. Taking prescribed medications from physician if needed.  b. Adequate fluids are important.    2. Reinforce the need to call if temperature is         elevated to 100.4 or more  01/18/2019   S   g. HAND-FOOT SYNDROME  causes painful, symmetric swelling and redness of palms and soles                  5. Instruct patient to report any numbness or tingling in the hands or feet.  6. Explain prevention techniques, such as     a. Use heavy moisturizers to lessen skin dryness and itching, but to avoid if skin is cracked or broken  b. Bathe in tepid water, use non-perfumed soap, and wash gently. Baths with oatmeal or diluted baking soda may be soothing.  c. Avoid tight fitting shoes and repetitive actions, such as rubbing hands or applying pressure to  hands/feet.  7. Review measures to take should syndrome occur:  a. Cold compresses and elevation for          edema  b. Pain medications and other measures as ordered by oncologist.   4.   Syndrome resolves few weeks after therapy. 01/18/2019   S   5. DISCHARGE PLANNING /        EDUCATION 1.    Explain importance of compliance with follow- up  tests (CBC, CMP).  2.    Verify patient/caregiver know:  a.    Oncologists office phone number.  b.    Dates of follow-up appointments.  c.    Prescriptions given for nausea  3.   Review side effects to monitor and notify          oncologist about.  4.   Reinforce the need for patient and caregivers to:  a.    Review information given.  b.    Call oncologists office with questions          or symptoms  5.   Provide [facility] [place] Brochure make referrals if needed for financial or .   01/18/2019   S     PROGRESS NOTES: I met with the patient and her  today for chemotherapy education. she will be starting treatment with 5FU, Leucovorin, Oxaliplatin, and Avastin. We discussed the mechanism of action, potential side effects of this treatment as well as ways she can manage them at home. Some of these side effects include but or not limited to fever, nausea, vomiting, decreased appetite, fatigue, weakness, cytopenias, myalgia/arthralgia, constipation, diarrhea, bleeding, headache, shortness of breath, nail changes, taste change, hair thinning/loss, mood disturbances, or edema. We also discussed dietary modifications she should make although this will be discussed in more detail with the dietician. she was provided with a copy of all of the information we discussed today as well as our contact information. she will be provided a schedule on his first day of treatment. We will obtain labs on a weekly basis and the patient will follow-up with the physician for toxicity monitoring throughout treatment. All questions were answered and an informed consent  was obtained. she was reminded to certainly contact Dr. Pettit's office sooner if needed. Patient has a port in place. She is scheduled to start chemo Monday at 0800. Showed her and her  where to check in for chemo as well as where the appearance center is located. Patient verbalized understanding to call for a temp 100.4F or greater and states she has a thermometer at home. She was given her follow up for labs 1/31/19 and follow up appt with Dr. Pettit 2/1/19.

## 2019-01-18 NOTE — TELEPHONE ENCOUNTER
Called and spoke to pt to move her apt time scheduled to an earlier time. Also scheduled pt mri to complete before apt with . Pt confirmed apt scheduled and verbalized understanding.

## 2019-01-18 NOTE — TELEPHONE ENCOUNTER
----- Message from Suzi Escalante sent at 1/18/2019 10:36 AM CST -----  Contact: self   Patient want to speak with a nurse regarding scheduling an earlier appointment for Feb 1st please call back at

## 2019-01-21 ENCOUNTER — DOCUMENTATION ONLY (OUTPATIENT)
Dept: RADIATION ONCOLOGY | Facility: CLINIC | Age: 64
End: 2019-01-21

## 2019-01-21 NOTE — PROGRESS NOTES
NUTRITION NOTE - CHEMO SCHOOL    Jacqueline is a 63 year old female with metastatic colon cancer and duodenal cancer.  She has a stent, but is not a candidate for surgery.  She is getting FOLFOX and Avastin.  She has been having some constipation and is taking a stool softener.  Weight: 217# she has recently lost 40 - 50#'s which she attributes to poor appetite.  She is very fatigued.    Albumin: 2.1 - Could be at risk for protein malnutrition, most likely associated with mets to the liver.    Plan: Educated on foods to avoid while having a luminal stent in place.  Advised that should she feel like food is getting stuck, to drink a carbonated beverage.  2. Discussed the importance of nutrition and hydration during cancer treatment.  3. Educated on the diet for diarrhea should she get chemo induced diarrhea.  Also discussed the Fiber One and Senokot-S protocol, should her constipation continue.  4. Discouraged use of vitamin and herbal supplements during cancer treated and discussed food safety.  5. Gave eating tips with nausea and taste changes.  6. Gave list of protein dense foods and gave samples and coupons of boost plus and ensure plus.  7. RD contact information given.

## 2019-01-21 NOTE — PROGRESS NOTES
Met with patient to complete New Patient Orientation and distress screening; she indicated a distress rating of 0.  Patient did not want to sign the  consent form to receive information from the [employer].  She did not request any supportive services at this time.

## 2019-01-24 ENCOUNTER — LAB VISIT (OUTPATIENT)
Dept: LAB | Facility: HOSPITAL | Age: 64
End: 2019-01-24
Attending: FAMILY MEDICINE
Payer: MEDICARE

## 2019-01-24 DIAGNOSIS — K92.2 ACUTE GASTROINTESTINAL HEMORRHAGE: ICD-10-CM

## 2019-01-24 DIAGNOSIS — C18.9 COLON CANCER: ICD-10-CM

## 2019-01-24 DIAGNOSIS — C78.7 SECONDARY MALIGNANT NEOPLASM OF LIVER: Primary | ICD-10-CM

## 2019-01-24 LAB
ANION GAP SERPL CALC-SCNC: 13 MMOL/L
BUN SERPL-MCNC: 14 MG/DL
CALCIUM SERPL-MCNC: 9 MG/DL
CHLORIDE SERPL-SCNC: 96 MMOL/L
CO2 SERPL-SCNC: 26 MMOL/L
CREAT SERPL-MCNC: 0.6 MG/DL
EST. GFR  (AFRICAN AMERICAN): >60 ML/MIN/1.73 M^2
EST. GFR  (NON AFRICAN AMERICAN): >60 ML/MIN/1.73 M^2
ESTIMATED AVG GLUCOSE: 114 MG/DL
GLUCOSE SERPL-MCNC: 105 MG/DL
HBA1C MFR BLD HPLC: 5.6 %
POTASSIUM SERPL-SCNC: 3.3 MMOL/L
SODIUM SERPL-SCNC: 135 MMOL/L

## 2019-01-24 PROCEDURE — 83036 HEMOGLOBIN GLYCOSYLATED A1C: CPT

## 2019-01-24 PROCEDURE — 80048 BASIC METABOLIC PNL TOTAL CA: CPT

## 2019-01-30 ENCOUNTER — TELEPHONE (OUTPATIENT)
Dept: HEMATOLOGY/ONCOLOGY | Facility: CLINIC | Age: 64
End: 2019-01-30

## 2019-01-30 ENCOUNTER — HOSPITAL ENCOUNTER (OUTPATIENT)
Dept: RADIOLOGY | Facility: HOSPITAL | Age: 64
Discharge: HOME | End: 2019-01-30
Attending: INTERNAL MEDICINE
Payer: MEDICARE

## 2019-01-30 DIAGNOSIS — C18.9 METASTATIC COLON CANCER TO LIVER: ICD-10-CM

## 2019-01-30 DIAGNOSIS — C17.0 DUODENAL CANCER: ICD-10-CM

## 2019-01-30 DIAGNOSIS — C78.00 MALIGNANT NEOPLASM METASTATIC TO LUNG, UNSPECIFIED LATERALITY: ICD-10-CM

## 2019-01-30 DIAGNOSIS — R97.0 ELEVATED CEA: ICD-10-CM

## 2019-01-30 DIAGNOSIS — C78.7 METASTATIC COLON CANCER TO LIVER: ICD-10-CM

## 2019-01-30 PROCEDURE — 25500020 PHARM REV CODE 255

## 2019-01-30 PROCEDURE — 70553 MRI BRAIN STEM W/O & W/DYE: CPT | Mod: TC

## 2019-01-30 PROCEDURE — 70553 MRI BRAIN W WO CONTRAST: ICD-10-PCS | Mod: 26,,, | Performed by: RADIOLOGY

## 2019-01-30 PROCEDURE — 70553 MRI BRAIN STEM W/O & W/DYE: CPT | Mod: 26,,, | Performed by: RADIOLOGY

## 2019-01-30 PROCEDURE — A9585 GADOBUTROL INJECTION: HCPCS

## 2019-01-30 RX ORDER — GADOBUTROL 604.72 MG/ML
INJECTION INTRAVENOUS
Status: COMPLETED
Start: 2019-01-30 | End: 2019-01-30

## 2019-01-30 RX ADMIN — GADOBUTROL 10 ML: 604.72 INJECTION INTRAVENOUS at 08:01

## 2019-01-30 NOTE — TELEPHONE ENCOUNTER
Called and spoke to karen with Dr. Conner office regarding pt medication for potassium. Karen was making sure it was ok for Dr. Conner to prescribe pt potassium medication due to her levels. Karen advised that Dr. Conner was going to prescribe meds for pt.

## 2019-01-30 NOTE — TELEPHONE ENCOUNTER
----- Message from Laine Campos sent at 1/30/2019 10:51 AM CST -----  Contact: Karen with Dr Conner's office  Karen with Dr Conner's office calling regarding critical labs they received on patient's potassium level of 3.3. Dr Conner would like to start patient on potassium. Please call Karen at [phone]. Thanks!

## 2019-01-31 ENCOUNTER — LAB VISIT (OUTPATIENT)
Dept: LAB | Facility: HOSPITAL | Age: 64
End: 2019-01-31
Attending: INTERNAL MEDICINE
Payer: MEDICARE

## 2019-01-31 DIAGNOSIS — C17.0 MALIGNANT NEOPLASM OF DUODENUM: ICD-10-CM

## 2019-01-31 LAB
ALBUMIN SERPL BCP-MCNC: 1.8 G/DL
ALP SERPL-CCNC: 430 U/L
ALT SERPL W/O P-5'-P-CCNC: 24 U/L
ANION GAP SERPL CALC-SCNC: 16 MMOL/L
ANISOCYTOSIS BLD QL SMEAR: ABNORMAL
AST SERPL-CCNC: 33 U/L
BASOPHILS NFR BLD: 0 %
BILIRUB SERPL-MCNC: 0.5 MG/DL
BUN SERPL-MCNC: 10 MG/DL
CALCIUM SERPL-MCNC: 8.9 MG/DL
CANCER AG19-9 SERPL-ACNC: 334 U/ML
CEA SERPL-MCNC: 2543.3 NG/ML
CHLORIDE SERPL-SCNC: 99 MMOL/L
CO2 SERPL-SCNC: 24 MMOL/L
CREAT SERPL-MCNC: 0.6 MG/DL
DIFFERENTIAL METHOD: ABNORMAL
EOSINOPHIL NFR BLD: 9 %
ERYTHROCYTE [DISTWIDTH] IN BLOOD BY AUTOMATED COUNT: 21.3 %
EST. GFR  (AFRICAN AMERICAN): >60 ML/MIN/1.73 M^2
EST. GFR  (NON AFRICAN AMERICAN): >60 ML/MIN/1.73 M^2
GLUCOSE SERPL-MCNC: 131 MG/DL
HCT VFR BLD AUTO: 30.1 %
HGB BLD-MCNC: 9.2 G/DL
HYPOCHROMIA BLD QL SMEAR: ABNORMAL
LYMPHOCYTES NFR BLD: 47 %
MCH RBC QN AUTO: 23.4 PG
MCHC RBC AUTO-ENTMCNC: 30.5 G/DL
MCV RBC AUTO: 77 FL
MONOCYTES NFR BLD: 6 %
NEUTROPHILS NFR BLD: 38 %
OVALOCYTES BLD QL SMEAR: ABNORMAL
PLATELET # BLD AUTO: 379 K/UL
PLATELET BLD QL SMEAR: ABNORMAL
PMV BLD AUTO: 7.1 FL
POIKILOCYTOSIS BLD QL SMEAR: SLIGHT
POTASSIUM SERPL-SCNC: 2.8 MMOL/L
PROT SERPL-MCNC: 6.7 G/DL
RBC # BLD AUTO: 3.92 M/UL
SODIUM SERPL-SCNC: 139 MMOL/L
WBC # BLD AUTO: 1.6 K/UL

## 2019-01-31 PROCEDURE — 85027 COMPLETE CBC AUTOMATED: CPT

## 2019-01-31 PROCEDURE — 86301 IMMUNOASSAY TUMOR CA 19-9: CPT

## 2019-01-31 PROCEDURE — 82378 CARCINOEMBRYONIC ANTIGEN: CPT

## 2019-01-31 PROCEDURE — 36415 COLL VENOUS BLD VENIPUNCTURE: CPT

## 2019-01-31 PROCEDURE — 80053 COMPREHEN METABOLIC PANEL: CPT

## 2019-01-31 PROCEDURE — 85007 BL SMEAR W/DIFF WBC COUNT: CPT

## 2019-02-01 ENCOUNTER — OFFICE VISIT (OUTPATIENT)
Dept: HEMATOLOGY/ONCOLOGY | Facility: CLINIC | Age: 64
End: 2019-02-01
Payer: MEDICARE

## 2019-02-01 VITALS
HEART RATE: 100 BPM | DIASTOLIC BLOOD PRESSURE: 67 MMHG | TEMPERATURE: 101 F | BODY MASS INDEX: 40.65 KG/M2 | WEIGHT: 220.88 LBS | HEIGHT: 62 IN | SYSTOLIC BLOOD PRESSURE: 110 MMHG | RESPIRATION RATE: 22 BRPM

## 2019-02-01 DIAGNOSIS — T45.1X5A CHEMOTHERAPY INDUCED NEUTROPENIA: Primary | ICD-10-CM

## 2019-02-01 DIAGNOSIS — Z85.038 HISTORY OF COLON CANCER: ICD-10-CM

## 2019-02-01 DIAGNOSIS — C78.7 HEPATIC METASTASES: ICD-10-CM

## 2019-02-01 DIAGNOSIS — R50.9 FEVER, UNSPECIFIED FEVER CAUSE: ICD-10-CM

## 2019-02-01 DIAGNOSIS — C18.9 METASTATIC COLON CANCER TO LIVER: ICD-10-CM

## 2019-02-01 DIAGNOSIS — D70.1 CHEMOTHERAPY INDUCED NEUTROPENIA: Primary | ICD-10-CM

## 2019-02-01 DIAGNOSIS — R05.9 COUGH: ICD-10-CM

## 2019-02-01 DIAGNOSIS — C78.7 METASTATIC COLON CANCER TO LIVER: ICD-10-CM

## 2019-02-01 PROCEDURE — 99215 PR OFFICE/OUTPT VISIT, EST, LEVL V, 40-54 MIN: ICD-10-PCS | Mod: S$GLB,,, | Performed by: INTERNAL MEDICINE

## 2019-02-01 PROCEDURE — 1101F PR PT FALLS ASSESS DOC 0-1 FALLS W/OUT INJ PAST YR: ICD-10-PCS | Mod: S$GLB,,, | Performed by: INTERNAL MEDICINE

## 2019-02-01 PROCEDURE — 3008F BODY MASS INDEX DOCD: CPT | Mod: S$GLB,,, | Performed by: INTERNAL MEDICINE

## 2019-02-01 PROCEDURE — 3078F DIAST BP <80 MM HG: CPT | Mod: S$GLB,,, | Performed by: INTERNAL MEDICINE

## 2019-02-01 PROCEDURE — 3074F SYST BP LT 130 MM HG: CPT | Mod: S$GLB,,, | Performed by: INTERNAL MEDICINE

## 2019-02-01 PROCEDURE — 99215 OFFICE O/P EST HI 40 MIN: CPT | Mod: S$GLB,,, | Performed by: INTERNAL MEDICINE

## 2019-02-01 PROCEDURE — 3078F PR MOST RECENT DIASTOLIC BLOOD PRESSURE < 80 MM HG: ICD-10-PCS | Mod: S$GLB,,, | Performed by: INTERNAL MEDICINE

## 2019-02-01 PROCEDURE — 99999 PR PBB SHADOW E&M-EST. PATIENT-LVL III: CPT | Mod: PBBFAC,,, | Performed by: INTERNAL MEDICINE

## 2019-02-01 PROCEDURE — 3008F PR BODY MASS INDEX (BMI) DOCUMENTED: ICD-10-PCS | Mod: S$GLB,,, | Performed by: INTERNAL MEDICINE

## 2019-02-01 PROCEDURE — 99999 PR PBB SHADOW E&M-EST. PATIENT-LVL III: ICD-10-PCS | Mod: PBBFAC,,, | Performed by: INTERNAL MEDICINE

## 2019-02-01 PROCEDURE — 1101F PT FALLS ASSESS-DOCD LE1/YR: CPT | Mod: S$GLB,,, | Performed by: INTERNAL MEDICINE

## 2019-02-01 PROCEDURE — 3074F PR MOST RECENT SYSTOLIC BLOOD PRESSURE < 130 MM HG: ICD-10-PCS | Mod: S$GLB,,, | Performed by: INTERNAL MEDICINE

## 2019-02-01 RX ORDER — LEVOFLOXACIN 500 MG/1
500 TABLET, FILM COATED ORAL DAILY
Qty: 7 TABLET | Refills: 0 | Status: SHIPPED | OUTPATIENT
Start: 2019-02-01 | End: 2019-03-11

## 2019-02-01 RX ORDER — FLUOROURACIL 50 MG/ML
400 INJECTION, SOLUTION INTRAVENOUS
Status: CANCELLED | OUTPATIENT
Start: 2019-02-04

## 2019-02-01 RX ORDER — SODIUM CHLORIDE 0.9 % (FLUSH) 0.9 %
10 SYRINGE (ML) INJECTION
Status: CANCELLED | OUTPATIENT
Start: 2019-02-04

## 2019-02-01 RX ORDER — BENZONATATE 200 MG/1
200 CAPSULE ORAL 3 TIMES DAILY PRN
Qty: 30 CAPSULE | Refills: 0 | Status: SHIPPED | OUTPATIENT
Start: 2019-02-01 | End: 2019-03-11

## 2019-02-01 RX ORDER — EPINEPHRINE 0.3 MG/.3ML
0.3 INJECTION SUBCUTANEOUS ONCE AS NEEDED
Status: CANCELLED | OUTPATIENT
Start: 2019-02-04 | End: 2019-02-04

## 2019-02-01 RX ORDER — HEPARIN 100 UNIT/ML
500 SYRINGE INTRAVENOUS
Status: CANCELLED | OUTPATIENT
Start: 2019-02-04

## 2019-02-01 RX ORDER — SODIUM CHLORIDE 0.9 % (FLUSH) 0.9 %
10 SYRINGE (ML) INJECTION
Status: CANCELLED | OUTPATIENT
Start: 2019-02-13

## 2019-02-01 RX ORDER — HEPARIN 100 UNIT/ML
500 SYRINGE INTRAVENOUS
Status: CANCELLED | OUTPATIENT
Start: 2019-02-13

## 2019-02-01 RX ORDER — DIPHENHYDRAMINE HYDROCHLORIDE 50 MG/ML
50 INJECTION INTRAMUSCULAR; INTRAVENOUS ONCE AS NEEDED
Status: CANCELLED | OUTPATIENT
Start: 2019-02-04 | End: 2019-02-04

## 2019-02-01 NOTE — PROGRESS NOTES
Cc I had my chest port placed and a stent placed in her duodenum     Indigo Stuart is a 63 y.o.  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  Kras was MUTATED  Cycle 1  January 15 2019  Cycle 2 due February 4     Mri of brain reviewed and is negative for mets   She is sick today, she has fever, + diarrhea despite imodium, decreased appetite see ROS   CHest port in place     Tolerating lopressor for htn and lipitor for dyslipidemia  Tolerating zofran fro nausea chemo   Past Medical History:   Diagnosis Date    Cancer     colon    Encounter for blood transfusion     Hypertension      Past Surgical History:   Procedure Laterality Date    CHOLECYSTECTOMY      COLON SURGERY      COLONOSCOPY N/A 3/30/2018    Performed by Daniel Magana MD at [facility] ENDO    EGD (ESOPHAGOGASTRODUODENOSCOPY) N/A 1/14/2019    Performed by [facility]errat Lopez [facility] at [facility] (2ND FLR)    EGD (ESOPHAGOGASTRODUODENOSCOPY) N/A 1/3/2019    Performed by Adis Arguello MD at [facility] ENDO    ESOPHAGOGASTRODUODENOSCOPY (EGD) N/A 3/30/2018    Performed by Daniel Magana MD at [facility] ENDO    HYSTERECTOMY      FZYCHSVGE-[id number]-F-CATH N/A 1/4/2019    Performed by Emilio Romero MD at [facility] OR   She is on lopressor for HTn and lipitor for cholesterol     Current Outpatient Medications:     atorvastatin (LIPITOR) 20 MG tablet, Take 20 mg by mouth every evening., Disp: , Rfl:     benzonatate (TESSALON) 200 MG capsule, Take 200 mg by mouth 3 (three) times daily as needed for Cough., Disp: , Rfl:     co-enzyme Q-10 30 mg capsule, Take 100 mg by mouth 2 (two) times daily., Disp: , Rfl:     ferrous sulfate 325 (65 FE) MG EC tablet, Take 1 tablet (325 mg total) by mouth 2 (two) times daily., Disp: 60 tablet, Rfl: 0    metoprolol tartrate (LOPRESSOR) 25 MG tablet, Take by mouth every evening. , Disp: , Rfl:     ondansetron (ZOFRAN-ODT) 8 MG TbDL, Take 1  tablet (8 mg total) by mouth every 12 (twelve) hours as needed., Disp: 30 tablet, Rfl: 1    promethazine (PHENERGAN) 25 MG tablet, Take 1 tablet (25 mg total) by mouth every 6 (six) hours as needed for Nausea., Disp: 30 tablet, Rfl: 1  Review of patient's allergies indicates:   Allergen Reactions    Codeine Nausea And Vomiting    Erythromycin base Other (See Comments)    Lisinopril Palpitations     Cough      Social History     Tobacco Use    Smoking status: Never Smoker    Smokeless tobacco: Never Used   Substance Use Topics    Alcohol use: No    Drug use: No     Family History   Problem Relation Age of Onset    Cancer Mother         colon ca, liver ca    Cancer Father          Review of Systems:  General: Weight gain: No, Weight Loss: No, Fatigue: y  Fever: y Chills: No, Night Sweats: No, Insomnia: No, Excessive sleeping: No   Respiratory:  Cough: No, Shortness of Breath: No,   Wheezing: No, Excessive Snoring: No, Coughing up blood: No  Endocrine: Heat Intolerance: No, Cold Intolerance: No,   Excessive Thirst: No, Excessive Hunger: No,   Eyes:  Blurred Vision: No, Double Vision: No,   Light Sensitivity: No, Eye pain: No  Musculoskeletal: Muscle Aches/Pain: No, Joint Pain/Swelling: No, Muscle Cramps: No, Muscle Weakness:y, Neck Pain: No, Back Pain: y  Neurological: Difficulty Walking/Falls: Yes, Headache Migraine: No, Dizziness/Vertigo: No, Fainting: No, Difficulty with Speech: No, Weakness: No, Tingling/Numbness: No, Tremors: No,  Memory Problems: No, Seizures: No, Difficulty Swallowing: No, Altered Taste: y  Cardiovascular: Chest Pain: No, Shortness of Breath: No,   Palpitations: No,  Gastrointestinal: Nausea/Vomiting: No, Constipation: No, Diarrhea: No, Bloody Stools: No   Psych/Cog:  Depression: No, Anxiety: No, Hallucinations: No, Problems Concentrating: No  : Frequent Urination: No, Incontinence: No, Blood of Urine: No, Urinary Infections: No  ENT:Hearing Loss: No, Earache: No, Ringing in Ears:  "No,   Facial Pain: No, Chronic Congestion:y  Immune: Seasonal Allergies: No, Hives and/or Rashes: No  The remainder of the review of twelve body systems was reviewed and normal        /67   Pulse 100   Temp (!) 100.6 °F (38.1 °C)   Resp (!) 22   Ht 5' 2" (1.575 m)   Wt 100.2 kg (220 lb 14.4 oz)   BMI 40.40 kg/m²   Constitutional: oriented to person, place, and time.  Appears ill  HENT:   Head: Normocephalic and atraumatic.   Right Ear: External ear normal.   Left Ear: External ear normal.   Nose: Nose normal.   Eyes: Conjunctivae and EOM are normal. Pupils are equal, round, and reactive to light.   Neck: Normal range of motion. Neck supple. No thyromegaly present.   Cardiovascular: Normal rate, regular rhythm, normal heart sounds and intact distal pulses.    No murmur heard.  Pulmonary/Chest: Effort normal and breath sounds normal.  no wheezes.   Abdominal: Soft. Bowel sounds are normal.  no mass. There is no tenderness.   Musculoskeletal: Normal range of motion. Weak decreased strength   Lymphadenopathy:    no cervical adenopathy.   Neurological: alert and oriented to person, place, and time.   Skin: Skin is warm and dry. No rash noted.   Psychiatric: normal mood and affect.   behavior is normal.   Vitals reviewed.  + boggy turbinates and pharyngeal erythema and edema     Lab Results   Component Value Date    WBC 1.60 (LL) 01/31/2019    HGB 9.2 (L) 01/31/2019    HCT 30.1 (L) 01/31/2019    MCV 77 (L) 01/31/2019     (H) 01/31/2019     CMP  Sodium   Date Value Ref Range Status   01/31/2019 139 136 - 145 mmol/L Final     Potassium   Date Value Ref Range Status   01/31/2019 2.8 (L) 3.5 - 5.1 mmol/L Final     Chloride   Date Value Ref Range Status   01/31/2019 99 95 - 110 mmol/L Final     CO2   Date Value Ref Range Status   01/31/2019 24 23 - 29 mmol/L Final     Glucose   Date Value Ref Range Status   01/31/2019 131 (H) 70 - 110 mg/dL Final     BUN, Bld   Date Value Ref Range Status   01/31/2019 10 8 - " 23 mg/dL Final     Creatinine   Date Value Ref Range Status   01/31/2019 0.6 0.5 - 1.4 mg/dL Final     Calcium   Date Value Ref Range Status   01/31/2019 8.9 8.7 - 10.5 mg/dL Final     Total Protein   Date Value Ref Range Status   01/31/2019 6.7 6.0 - 8.4 g/dL Final     Albumin   Date Value Ref Range Status   01/31/2019 1.8 (L) 3.5 - 5.2 g/dL Final     Total Bilirubin   Date Value Ref Range Status   01/31/2019 0.5 0.1 - 1.0 mg/dL Final     Comment:     For infants and newborns, interpretation of results should be based  on gestational age, weight and in agreement with clinical  observations.  Premature Infant recommended reference ranges:  Up to 24 hours.............<8.0 mg/dL  Up to 48 hours............<12.0 mg/dL  3-5 days..................<15.0 mg/dL  6-29 days.................<15.0 mg/dL       Alkaline Phosphatase   Date Value Ref Range Status   01/31/2019 430 (H) 55 - 135 U/L Final     AST   Date Value Ref Range Status   01/31/2019 33 10 - 40 U/L Final     ALT   Date Value Ref Range Status   01/31/2019 24 10 - 44 U/L Final     Anion Gap   Date Value Ref Range Status   01/31/2019 16 8 - 16 mmol/L Final     eGFR if    Date Value Ref Range Status   01/31/2019 >60 >60 mL/min/1.73 m^2 Final     eGFR if non    Date Value Ref Range Status   01/31/2019 >60 >60 mL/min/1.73 m^2 Final     Comment:     Calculation used to obtain the estimated glomerular filtration  rate (eGFR) is the CKD-EPI equation.        INICAL DIAGNOSIS/INFORMATION  Clinical information: possible liver mets- hx of colon cancer  PreOperative Diagnosis  SPECIMEN  1) Liver Lesion Biopsy  FINAL PATHOLOGIC DIAGNOSIS  LIVER LESION, NEEDLE CORE BIOPSIES:  - Metastatic colonic adenocarcinoma.  COMMENT: The histopathologic features and immunohistochemistry profile of this metastatic tumor strongly  support the interpretation of a metastatic colonic adenocarcinoma which also correlates with the patient's history of  colon  cancer.  Ref Range & Xyxtr7n ago  CA 1250 - 30 U/mL37 Abnormally high   Ztqso4x ago  CEA0.0 - 5.0 ng/mL643.1 Abnormally high      Ref Range & Mfkan1i ago  CA 19-92.0 - 40.0 U/mL221.0 Abnormally high    appt:  None   Component 9d ago   HLA Celiac Specimen Whole Blood    Celiac (HLA-DQB1*02) Positive Abnormal     Celiac (HLA-DQ8) Negative    Celiac (HLA-DQA1*05) Positive Abnormal               Ref Range & Leuiv0l ago  CA 19-92.0 - 40.0 U/mL      221.0 Abnormally high      Ref Range & Ncath5a ago  CEA0.0 - 5.0 ng/mL     643.1 Abnormally high      Ref Range & Ugtnk0m ago  CA 1250 - 30 U/mL    37 Abnormally high     Ref Range & Wpulq7w ago  AFP0.0 - 8.4 ng/mL    2.6     Chemotherapy induced neutropenia  -     levoFLOXacin (LEVAQUIN) 500 MG tablet; Take 1 tablet (500 mg total) by mouth once daily.  Dispense: 7 tablet; Refill: 0  -     benzonatate (TESSALON) 200 MG capsule; Take 1 capsule (200 mg total) by mouth 3 (three) times daily as needed for Cough.  Dispense: 30 capsule; Refill: 0    Fever, unspecified fever cause  -     levoFLOXacin (LEVAQUIN) 500 MG tablet; Take 1 tablet (500 mg total) by mouth once daily.  Dispense: 7 tablet; Refill: 0  -     benzonatate (TESSALON) 200 MG capsule; Take 1 capsule (200 mg total) by mouth 3 (three) times daily as needed for Cough.  Dispense: 30 capsule; Refill: 0    History of colon cancer    Metastatic colon cancer to liver    Hepatic metastases    Cough  -     levoFLOXacin (LEVAQUIN) 500 MG tablet; Take 1 tablet (500 mg total) by mouth once daily.  Dispense: 7 tablet; Refill: 0  -     benzonatate (TESSALON) 200 MG capsule; Take 1 capsule (200 mg total) by mouth 3 (three) times daily as needed for Cough.  Dispense: 30 capsule; Refill: 0    Other orders  -     pegfilgrastim injection 6 mg  -     palonosetron (ALOXI) 0.25 mg, dexamethasone (DECADRON) 12 mg in sodium chloride 0.9 % 50 mL IVPB  -     EPINEPHrine (EPIPEN) 0.3 mg/0.3 mL pen injection 0.3 mg  -     diphenhydrAMINE  injection 50 mg  -     hydrocortisone sodium succinate injection 100 mg  -     bevacizumab (AVASTIN) 5 mg/kg = 500 mg in sodium chloride 0.9% 100 mL chemo infusion  -     oxaliplatin (ELOXATIN) 85 mg/m2 = 178 mg in dextrose 5 % 500 mL chemo infusion  -     leucovorin calcium 400 mg/m2 = 835 mg in dextrose 5 % 250 mL infusion  -     fluorouracil injection 835 mg  -     fluorouracil (ADRUCIL) 2,400 mg/m2 = 5,015 mg in sodium chloride 0.9% 200.3 mL chemo infusion  -     dextrose 5 % 250 mL flush bag  -     sodium chloride 0.9% 100 mL flush bag  -     sodium chloride 0.9% flush 10 mL  -     heparin, porcine (PF) 100 unit/mL injection flush 500 Units  -     alteplase injection 2 mg  -     sodium chloride 0.9% flush 10 mL  -     heparin, porcine (PF) 100 unit/mL injection flush 500 Units  -     alteplase injection 2 mg  -     filgrastim (NEUPOGEN) injection 300 mcg/mL      Needs neupogen daily for 3 days then repeat cbc  POSTPONE chemo cycle 2 delayed due to pancytopenia from chemo and fever  Start levaquin daily for 7 days   rtc one week for re evaluation hopefully for cycle 1 day 14     On  folfox and avastin for stage  4 disease     Check EGFR kras msi etc on path   End of life care discussed   Even HOSPICE for ehrn the time comes   I discussed the available treatment option(s) in accordance with the latest NCCN Guidelines, their overall age and their co-morbidities. I went over the risks and benefits of the chemotherapy with regard to their particular cancer type, their cancer stage, their age, and their co-morbidities. I provided literature on the chemotherapy regimen and discussed the chemotherapy side-effect profiles of the drug(s). I discussed the importance of compliance with obtaining and monitoring weekly lab work, and went over the potential hematopathology issues and risks with anemia, leucopenia and thrombocytopenia that can occur with chemotherapy. I discussed the potential risks of liver and kidney damage,  which could be permanent and could necessitate dialysis long-term if kidney failure developed. I discussed the emetic and/or diarrheal potential of the regimen and the potential need for use of antiemetic and anti-diarrheal medications. I discussed the risk for development of anaphylactic shock, bronchospasm, dysrhythmia, and respiratory/cardiovascular failure. I discussed the potential risks for development of alopecia, cold sensory issues, ringing in ears, vertigo and neuropathy, all of which could end up being chronic and life-long. I discussed the risks of hand-foot syndrome and rashes, and development of other autoimmune mediated processes such as pneumonitis and colitis which could be life threatening. I discussed the risks of the potential development of leukemia and/or lymphoma from use of certain chemotherapy agents. I discussed the need for neutropenic precautions, basic hygiene/sanitation behaviors and dietary restrictions.     The patient's consent has been obtained to proceed with the chemotherapy.The patient will be referred to [facility] /[facility] for training and education on chemotherapy, use of antiemetics and/or anti-diarrheals, use of NSAID's, potential chemotherapy side-effects, and any specific recommendations and precautions with the particular chemotherapy agents.       I answered all of the patient's (and family's, if applicable) questions to the best of my ability and to their complete satisfaction. The patient acknowledged full understanding of the risks, recommendations and plan(s).      Sincerely,  Karina Pettit MD [facility]    Thank you for allowing me to evaluate and participate in the care of this pleasant patient. Please fell free to call me with any questions or concerns.    Warmly,  Karina Pettit MD    This note was dictated with Dragon and briefly proofread. Please excuse any sentences that may be unclear or words misspelled

## 2019-02-04 DIAGNOSIS — D70.1 CHEMOTHERAPY INDUCED NEUTROPENIA: Primary | ICD-10-CM

## 2019-02-04 DIAGNOSIS — T45.1X5A CHEMOTHERAPY INDUCED NEUTROPENIA: Primary | ICD-10-CM

## 2019-02-07 ENCOUNTER — TELEPHONE (OUTPATIENT)
Dept: HEMATOLOGY/ONCOLOGY | Facility: CLINIC | Age: 64
End: 2019-02-07

## 2019-02-07 NOTE — TELEPHONE ENCOUNTER
----- Message from Suzi Escalante sent at 2/7/2019  3:17 PM CST -----  Contact: [facility] [employer], [employer]  Channel want to know if you can put order in for a Rolator please fax to [facility] or [facility] at [phone], any questions please call back at [phone]

## 2019-02-08 ENCOUNTER — LAB VISIT (OUTPATIENT)
Dept: LAB | Facility: HOSPITAL | Age: 64
End: 2019-02-08
Attending: INTERNAL MEDICINE
Payer: MEDICARE

## 2019-02-08 DIAGNOSIS — T45.1X5A CHEMOTHERAPY INDUCED NEUTROPENIA: ICD-10-CM

## 2019-02-08 DIAGNOSIS — D70.1 CHEMOTHERAPY INDUCED NEUTROPENIA: ICD-10-CM

## 2019-02-08 LAB
ALBUMIN SERPL BCP-MCNC: 1.9 G/DL
ALP SERPL-CCNC: 557 U/L
ALT SERPL W/O P-5'-P-CCNC: 26 U/L
ANION GAP SERPL CALC-SCNC: 11 MMOL/L
ANISOCYTOSIS BLD QL SMEAR: ABNORMAL
AST SERPL-CCNC: 55 U/L
BASOPHILS NFR BLD: 0 %
BILIRUB SERPL-MCNC: 0.4 MG/DL
BUN SERPL-MCNC: 13 MG/DL
CALCIUM SERPL-MCNC: 9.1 MG/DL
CANCER AG19-9 SERPL-ACNC: 395 U/ML
CEA SERPL-MCNC: 1756.7 NG/ML
CHLORIDE SERPL-SCNC: 103 MMOL/L
CO2 SERPL-SCNC: 26 MMOL/L
CREAT SERPL-MCNC: 0.6 MG/DL
DIFFERENTIAL METHOD: ABNORMAL
EOSINOPHIL NFR BLD: 0 %
ERYTHROCYTE [DISTWIDTH] IN BLOOD BY AUTOMATED COUNT: 24.3 %
EST. GFR  (AFRICAN AMERICAN): >60 ML/MIN/1.73 M^2
EST. GFR  (NON AFRICAN AMERICAN): >60 ML/MIN/1.73 M^2
GLUCOSE SERPL-MCNC: 99 MG/DL
HCT VFR BLD AUTO: 26.1 %
HGB BLD-MCNC: 8.3 G/DL
HYPOCHROMIA BLD QL SMEAR: ABNORMAL
LYMPHOCYTES NFR BLD: 13 %
MCH RBC QN AUTO: 24.6 PG
MCHC RBC AUTO-ENTMCNC: 31.8 G/DL
MCV RBC AUTO: 77 FL
MONOCYTES NFR BLD: 3 %
NEUTROPHILS NFR BLD: 71 %
NEUTS BAND NFR BLD MANUAL: 13 %
OVALOCYTES BLD QL SMEAR: ABNORMAL
PLATELET # BLD AUTO: 431 K/UL
PLATELET BLD QL SMEAR: ABNORMAL
PMV BLD AUTO: 6.4 FL
POIKILOCYTOSIS BLD QL SMEAR: SLIGHT
POLYCHROMASIA BLD QL SMEAR: ABNORMAL
POTASSIUM SERPL-SCNC: 3.7 MMOL/L
PROT SERPL-MCNC: 6.1 G/DL
RBC # BLD AUTO: 3.38 M/UL
SODIUM SERPL-SCNC: 140 MMOL/L
WBC # BLD AUTO: 18.9 K/UL

## 2019-02-08 PROCEDURE — 85007 BL SMEAR W/DIFF WBC COUNT: CPT

## 2019-02-08 PROCEDURE — 80053 COMPREHEN METABOLIC PANEL: CPT

## 2019-02-08 PROCEDURE — 36415 COLL VENOUS BLD VENIPUNCTURE: CPT

## 2019-02-08 PROCEDURE — 86301 IMMUNOASSAY TUMOR CA 19-9: CPT

## 2019-02-08 PROCEDURE — 82378 CARCINOEMBRYONIC ANTIGEN: CPT

## 2019-02-08 PROCEDURE — 85027 COMPLETE CBC AUTOMATED: CPT

## 2019-02-10 NOTE — PROGRESS NOTES
Progress note pre chemo evaluation    HPI:    Ms. Stuart known to Dr. Pettit for treatment of colon cancer present with duodenum cancer and mets to liver and lung.  MRI of the brain was negative for metastases. Patient was scheduled for cycle 2.  However she had a fever and positive diarrhea cycle 2 was delayed appropriately.  Path report documented KRAS Mutated.  Patient completed cycle 1 of chemotherapy which consists of FOLFOX plus Avastin.  Cycle 1 was initiated on January 15, 2019 through February 3, 2019.       Patient was scheduled for cycle 2.  However she had a fever and positive diarrhea cycle 2     Review of system  Patient denies any chest pain shortness breath.  Patient denies any palpitation.  Patient denies any abdominal pain nausea vomiting or diarrhea.  Patient denies any shortness of breath.  The patient denies any weight lost.  Patient denies any decreasing appetite.  Patient denies any fever.  Patient denies a skin lesion rash I have inquired about patient's fever chills any acute viral symptoms patient states that she has none of those above.    Fourteen point review of system negative except for above.    Past Medical History:   Diagnosis Date    Cancer     colon    Encounter for blood transfusion     Hypertension      Past Surgical History:   Procedure Laterality Date    CHOLECYSTECTOMY      COLON SURGERY      COLONOSCOPY N/A 3/30/2018    Performed by Daniel Magana MD at [facility] ENDO    EGD (ESOPHAGOGASTRODUODENOSCOPY) N/A 1/14/2019    [facility]at [facility] ENDO (2ND FLR)    EGD (ESOPHAGOGASTRODUODENOSCOPY) N/A 1/3/2019    Performed by Adis Arguello MD at [facility] ENDO    ESOPHAGOGASTRODUODENOSCOPY (EGD) N/A 3/30/2018    Performed by Daniel Magana MD at [facility] ENDO    HYSTERECTOMY      SIRUQVAPK-HWGW-U-CATH N/A 1/4/2019    Performed by Emilio Romero MD at [facility] OR   She is on lopressor for HTn and lipitor for cholesterol     Current Outpatient  Medications:     atorvastatin (LIPITOR) 20 MG tablet, Take 20 mg by mouth every evening., Disp: , Rfl:     benzonatate (TESSALON) 200 MG capsule, Take 1 capsule (200 mg total) by mouth 3 (three) times daily as needed for Cough., Disp: 30 capsule, Rfl: 0    co-enzyme Q-10 30 mg capsule, Take 100 mg by mouth 2 (two) times daily., Disp: , Rfl:     ferrous sulfate 325 (65 FE) MG EC tablet, Take 1 tablet (325 mg total) by mouth 2 (two) times daily., Disp: 60 tablet, Rfl: 0    levoFLOXacin (LEVAQUIN) 500 MG tablet, Take 1 tablet (500 mg total) by mouth once daily., Disp: 7 tablet, Rfl: 0    metoprolol tartrate (LOPRESSOR) 25 MG tablet, Take by mouth every evening. , Disp: , Rfl:     ondansetron (ZOFRAN-ODT) 8 MG TbDL, Take 1 tablet (8 mg total) by mouth every 12 (twelve) hours as needed., Disp: 30 tablet, Rfl: 1    promethazine (PHENERGAN) 25 MG tablet, Take 1 tablet (25 mg total) by mouth every 6 (six) hours as needed for Nausea., Disp: 30 tablet, Rfl: 1  Review of patient's allergies indicates:   Allergen Reactions    Codeine Nausea And Vomiting    Erythromycin base Other (See Comments)    Lisinopril Palpitations     Cough      Social History     Tobacco Use    Smoking status: Never Smoker    Smokeless tobacco: Never Used   Substance Use Topics    Alcohol use: No    Drug use: No     Family History   Problem Relation Age of Onset    Cancer Mother         colon ca, liver ca    Cancer Father        Physical exam:    Vitals:    02/11/19 0814   BP: 130/70   Pulse: 101   Resp: (!) 24   Temp: 99.4 °F (37.4 °C)     Constitutional: oriented to person, place, and time.  In wheelchair  HENT:  Normocephalic atraumatic pupils equal round reactive to light extraocular muscle intact.  Neck:  No JVD  Cardiovascular:  Slight tachycardic.  Heart rate 100 on manual physical exam regular rhythm.  Pulmonary/Chest: Effort normal and breath sounds normal.  no wheezes.   Abdominal: Soft. Bowel sounds are normal.  no mass.  There is no tenderness.   Musculoskeletal: Normal range of motion. Weak decreased strength   Lymphadenopathy:    no cervical adenopathy.   Neurological: alert and oriented to person, place, and time.   Skin: Skin is warm and dry. No rash noted.   Psychiatric: normal mood and affect.   behavior is normal.   Vitals reviewed.  + boggy turbinates and pharyngeal erythema and edema     Lab Results   Component Value Date    WBC 18.90 (H) 02/08/2019    HGB 8.3 (L) 02/08/2019    HCT 26.1 (L) 02/08/2019    MCV 77 (L) 02/08/2019     (H) 02/08/2019     CMP  Sodium   Date Value Ref Range Status   02/08/2019 140 136 - 145 mmol/L Final     Potassium   Date Value Ref Range Status   02/08/2019 3.7 3.5 - 5.1 mmol/L Final     Chloride   Date Value Ref Range Status   02/08/2019 103 95 - 110 mmol/L Final     CO2   Date Value Ref Range Status   02/08/2019 26 23 - 29 mmol/L Final     Glucose   Date Value Ref Range Status   02/08/2019 99 70 - 110 mg/dL Final     BUN, Bld   Date Value Ref Range Status   02/08/2019 13 8 - 23 mg/dL Final     Creatinine   Date Value Ref Range Status   02/08/2019 0.6 0.5 - 1.4 mg/dL Final     Calcium   Date Value Ref Range Status   02/08/2019 9.1 8.7 - 10.5 mg/dL Final     Total Protein   Date Value Ref Range Status   02/08/2019 6.1 6.0 - 8.4 g/dL Final     Albumin   Date Value Ref Range Status   02/08/2019 1.9 (L) 3.5 - 5.2 g/dL Final     Total Bilirubin   Date Value Ref Range Status   02/08/2019 0.4 0.1 - 1.0 mg/dL Final     Comment:     For infants and newborns, interpretation of results should be based  on gestational age, weight and in agreement with clinical  observations.  Premature Infant recommended reference ranges:  Up to 24 hours.............<8.0 mg/dL  Up to 48 hours............<12.0 mg/dL  3-5 days..................<15.0 mg/dL  6-29 days.................<15.0 mg/dL       Alkaline Phosphatase   Date Value Ref Range Status   02/08/2019 557 (H) 55 - 135 U/L Final     AST   Date Value Ref Range  Status   02/08/2019 55 (H) 10 - 40 U/L Final     ALT   Date Value Ref Range Status   02/08/2019 26 10 - 44 U/L Final     Anion Gap   Date Value Ref Range Status   02/08/2019 11 8 - 16 mmol/L Final     eGFR if    Date Value Ref Range Status   02/08/2019 >60 >60 mL/min/1.73 m^2 Final     eGFR if non    Date Value Ref Range Status   02/08/2019 >60 >60 mL/min/1.73 m^2 Final     Comment:     Calculation used to obtain the estimated glomerular filtration  rate (eGFR) is the CKD-EPI equation.          CA 19 9 on February 08/2019:  395  CEA on February 8th 2019:  1756      INICAL DIAGNOSIS/INFORMATION  Clinical information: possible liver mets- hx of colon cancer  PreOperative Diagnosis  SPECIMEN  1) Liver Lesion Biopsy  FINAL PATHOLOGIC DIAGNOSIS  LIVER LESION, NEEDLE CORE BIOPSIES:  - Metastatic colonic adenocarcinoma.  COMMENT: The histopathologic features and immunohistochemistry profile of this metastatic tumor strongly  support the interpretation of a metastatic colonic adenocarcinoma which also correlates with the patient's history of  colon cancer.  Ref Range & Etids8e ago  CA 1250 - 30 U/mL37 Abnormally high   Cfaug0x ago  CEA0.0 - 5.0 ng/mL643.1 Abnormally high      Ref Range & Rmrlf4k ago  CA 19-92.0 - 40.0 U/mL221.0 Abnormally high    appt:  None   Component 9d ago   HLA Celiac Specimen Whole Blood    Celiac (HLA-DQB1*02) Positive Abnormal     Celiac (HLA-DQ8) Negative    Celiac (HLA-DQA1*05) Positive Abnormal               Ref Range & Pxyqq1e ago  CA 19-92.0 - 40.0 U/mL      221.0 Abnormally high      Ref Range & Hszog7x ago  CEA0.0 - 5.0 ng/mL     643.1 Abnormally high      Ref Range & Ygcch3k ago  CA 1250 - 30 U/mL    37 Abnormally high     Ref Range & Nttgp7v ago  AFP0.0 - 8.4 ng/mL    2.6   Needs neupogen daily for 3 days then repeat cbc  POSTPONE chemo cycle 2 delayed due to pancytopenia from chemo and fever  Start levaquin daily for 7 days   rtc one week for re evaluation  hopefully for cycle 1 day 14       Assessment plan  February 11, 2019    [] Stage is for metastatic colon cancer, Mets to liver and lung.  EGFR:  Not identified  KRAS:  New dated  Patient has currently received FOLFOX plus Avastin    [] Patient is here for pretreatment evaluation of cycle 2 of above regimen  Review of Chem 12 and CBC with differential  Leukocytosis secondary to Neupogen      [] Chemotherapy induced neutropenia responded well with Neupogen daily x3  -will continue to follow likely patient will require Neupogen again for cycle 2.  Will continue the current medical plan    [] Patient will follow up with Dr. Pettit on next scheduled a for chemotherapy    [] Review NCCN guideline.  Dr. Pettit are ready had a detailed discussion regarding potential side effect of therapy.  Patient has also been given instruction in case of emergency.  Patient voiced understanding.    [] RTC next cycle with pre laboratory evaluation/Neupogen for cycle 3.

## 2019-02-11 ENCOUNTER — OFFICE VISIT (OUTPATIENT)
Dept: HEMATOLOGY/ONCOLOGY | Facility: CLINIC | Age: 64
End: 2019-02-11
Payer: MEDICARE

## 2019-02-11 VITALS
TEMPERATURE: 99 F | BODY MASS INDEX: 40.65 KG/M2 | OXYGEN SATURATION: 97 % | RESPIRATION RATE: 24 BRPM | DIASTOLIC BLOOD PRESSURE: 70 MMHG | HEIGHT: 62 IN | SYSTOLIC BLOOD PRESSURE: 130 MMHG | WEIGHT: 220.88 LBS | HEART RATE: 101 BPM

## 2019-02-11 DIAGNOSIS — C78.7 METASTATIC COLON CANCER TO LIVER: ICD-10-CM

## 2019-02-11 DIAGNOSIS — Z09 ONCOLOGY FOLLOW-UP ENCOUNTER: ICD-10-CM

## 2019-02-11 DIAGNOSIS — Z09 CHEMOTHERAPY FOLLOW-UP EXAMINATION: ICD-10-CM

## 2019-02-11 DIAGNOSIS — C18.9 METASTATIC COLON CANCER TO LIVER: ICD-10-CM

## 2019-02-11 DIAGNOSIS — C17.0 DUODENAL CANCER: ICD-10-CM

## 2019-02-11 DIAGNOSIS — T45.1X5A CHEMOTHERAPY INDUCED NEUTROPENIA: ICD-10-CM

## 2019-02-11 DIAGNOSIS — Z51.11 ENCOUNTER FOR ANTINEOPLASTIC CHEMOTHERAPY: Primary | ICD-10-CM

## 2019-02-11 DIAGNOSIS — C78.00 MALIGNANT NEOPLASM METASTATIC TO LUNG, UNSPECIFIED LATERALITY: ICD-10-CM

## 2019-02-11 DIAGNOSIS — D70.1 CHEMOTHERAPY INDUCED NEUTROPENIA: ICD-10-CM

## 2019-02-11 PROCEDURE — 3078F PR MOST RECENT DIASTOLIC BLOOD PRESSURE < 80 MM HG: ICD-10-PCS | Mod: S$GLB,,, | Performed by: INTERNAL MEDICINE

## 2019-02-11 PROCEDURE — 99999 PR PBB SHADOW E&M-EST. PATIENT-LVL III: ICD-10-PCS | Mod: PBBFAC,,, | Performed by: INTERNAL MEDICINE

## 2019-02-11 PROCEDURE — 3078F DIAST BP <80 MM HG: CPT | Mod: S$GLB,,, | Performed by: INTERNAL MEDICINE

## 2019-02-11 PROCEDURE — 3075F SYST BP GE 130 - 139MM HG: CPT | Mod: S$GLB,,, | Performed by: INTERNAL MEDICINE

## 2019-02-11 PROCEDURE — 99215 OFFICE O/P EST HI 40 MIN: CPT | Mod: S$GLB,,, | Performed by: INTERNAL MEDICINE

## 2019-02-11 PROCEDURE — 3008F BODY MASS INDEX DOCD: CPT | Mod: S$GLB,,, | Performed by: INTERNAL MEDICINE

## 2019-02-11 PROCEDURE — 1101F PT FALLS ASSESS-DOCD LE1/YR: CPT | Mod: S$GLB,,, | Performed by: INTERNAL MEDICINE

## 2019-02-11 PROCEDURE — 3075F PR MOST RECENT SYSTOLIC BLOOD PRESS GE 130-139MM HG: ICD-10-PCS | Mod: S$GLB,,, | Performed by: INTERNAL MEDICINE

## 2019-02-11 PROCEDURE — 1101F PR PT FALLS ASSESS DOC 0-1 FALLS W/OUT INJ PAST YR: ICD-10-PCS | Mod: S$GLB,,, | Performed by: INTERNAL MEDICINE

## 2019-02-11 PROCEDURE — 3008F PR BODY MASS INDEX (BMI) DOCUMENTED: ICD-10-PCS | Mod: S$GLB,,, | Performed by: INTERNAL MEDICINE

## 2019-02-11 PROCEDURE — 99999 PR PBB SHADOW E&M-EST. PATIENT-LVL III: CPT | Mod: PBBFAC,,, | Performed by: INTERNAL MEDICINE

## 2019-02-11 PROCEDURE — 99215 PR OFFICE/OUTPT VISIT, EST, LEVL V, 40-54 MIN: ICD-10-PCS | Mod: S$GLB,,, | Performed by: INTERNAL MEDICINE

## 2019-02-11 RX ORDER — POTASSIUM CHLORIDE 600 MG/1
8 TABLET, FILM COATED, EXTENDED RELEASE ORAL
COMMUNITY
Start: 2019-01-30 | End: 2019-07-29

## 2019-02-11 RX ORDER — EPINEPHRINE 0.3 MG/.3ML
0.3 INJECTION SUBCUTANEOUS ONCE AS NEEDED
Status: CANCELLED | OUTPATIENT
Start: 2019-02-11 | End: 2019-02-11

## 2019-02-11 RX ORDER — SODIUM CHLORIDE 0.9 % (FLUSH) 0.9 %
10 SYRINGE (ML) INJECTION
Status: CANCELLED | OUTPATIENT
Start: 2019-02-11

## 2019-02-11 RX ORDER — METOPROLOL SUCCINATE 25 MG/1
25 TABLET, EXTENDED RELEASE ORAL NIGHTLY
Refills: 3 | COMMUNITY
Start: 2018-12-15 | End: 2019-10-28 | Stop reason: SDUPTHER

## 2019-02-11 RX ORDER — HYDROCHLOROTHIAZIDE 12.5 MG/1
12.5 CAPSULE ORAL DAILY
Refills: 1 | Status: ON HOLD | COMMUNITY
Start: 2019-02-01 | End: 2019-03-27 | Stop reason: HOSPADM

## 2019-02-11 RX ORDER — HEPARIN 100 UNIT/ML
500 SYRINGE INTRAVENOUS
Status: CANCELLED | OUTPATIENT
Start: 2019-02-11

## 2019-02-11 RX ORDER — FLUOROURACIL 50 MG/ML
400 INJECTION, SOLUTION INTRAVENOUS
Status: CANCELLED | OUTPATIENT
Start: 2019-02-11

## 2019-02-11 RX ORDER — DIPHENHYDRAMINE HYDROCHLORIDE 50 MG/ML
50 INJECTION INTRAMUSCULAR; INTRAVENOUS ONCE AS NEEDED
Status: CANCELLED | OUTPATIENT
Start: 2019-02-11 | End: 2019-02-11

## 2019-02-13 ENCOUNTER — PATIENT MESSAGE (OUTPATIENT)
Dept: HEMATOLOGY/ONCOLOGY | Facility: CLINIC | Age: 64
End: 2019-02-13

## 2019-02-14 ENCOUNTER — TELEPHONE (OUTPATIENT)
Dept: HEMATOLOGY/ONCOLOGY | Facility: CLINIC | Age: 64
End: 2019-02-14

## 2019-02-14 NOTE — TELEPHONE ENCOUNTER
Spoke to patient informed her that Dr Pettit has written a scritp for her rollator walker. Patient stated she will  the script on tomorrow 2/15/19

## 2019-02-15 ENCOUNTER — TELEPHONE (OUTPATIENT)
Dept: HEMATOLOGY/ONCOLOGY | Facility: CLINIC | Age: 64
End: 2019-02-15

## 2019-02-15 NOTE — TELEPHONE ENCOUNTER
Called pharmacy and was informed that a [employer] equipment certification will need to be signed by the physician and faxed along with the clinical noted. [employer] form received by fax placed in MD folder to be signed

## 2019-02-15 NOTE — TELEPHONE ENCOUNTER
----- Message from Mihai Zaidi sent at 2/15/2019 10:00 AM CST -----  Contact: Dank with [facility]   Dank with [facility] called, he need a copy of patient clinical notes for patient rollator. Please fax to [phone] or call back at [phone].

## 2019-02-21 ENCOUNTER — TELEPHONE (OUTPATIENT)
Dept: HEMATOLOGY/ONCOLOGY | Facility: CLINIC | Age: 64
End: 2019-02-21

## 2019-02-21 NOTE — TELEPHONE ENCOUNTER
----- Message from Marissa Akins sent at 2/21/2019  9:55 AM CST -----  Contact: jerri from [facility]  Type: Needs Medical Advice    Who Called:  patient  Best Call Back Number: [phone]  Additional Information: calling to confirm that he received the fax

## 2019-02-22 ENCOUNTER — LAB VISIT (OUTPATIENT)
Dept: LAB | Facility: HOSPITAL | Age: 64
End: 2019-02-22
Attending: INTERNAL MEDICINE
Payer: MEDICARE

## 2019-02-22 DIAGNOSIS — Z51.11 ENCOUNTER FOR ANTINEOPLASTIC CHEMOTHERAPY: ICD-10-CM

## 2019-02-22 DIAGNOSIS — T45.1X5A CHEMOTHERAPY INDUCED NEUTROPENIA: ICD-10-CM

## 2019-02-22 DIAGNOSIS — C17.0 DUODENAL CANCER: ICD-10-CM

## 2019-02-22 DIAGNOSIS — Z09 ONCOLOGY FOLLOW-UP ENCOUNTER: ICD-10-CM

## 2019-02-22 DIAGNOSIS — C78.7 METASTATIC COLON CANCER TO LIVER: ICD-10-CM

## 2019-02-22 DIAGNOSIS — D70.1 CHEMOTHERAPY INDUCED NEUTROPENIA: ICD-10-CM

## 2019-02-22 DIAGNOSIS — C78.00 MALIGNANT NEOPLASM METASTATIC TO LUNG, UNSPECIFIED LATERALITY: ICD-10-CM

## 2019-02-22 DIAGNOSIS — C18.9 METASTATIC COLON CANCER TO LIVER: ICD-10-CM

## 2019-02-22 LAB
ALBUMIN SERPL BCP-MCNC: 2.2 G/DL
ALP SERPL-CCNC: 340 U/L
ALT SERPL W/O P-5'-P-CCNC: 24 U/L
ANION GAP SERPL CALC-SCNC: 9 MMOL/L
ANISOCYTOSIS BLD QL SMEAR: ABNORMAL
AST SERPL-CCNC: 38 U/L
BASOPHILS # BLD AUTO: 0 K/UL
BASOPHILS NFR BLD: 0.9 %
BILIRUB SERPL-MCNC: 0.3 MG/DL
BUN SERPL-MCNC: 10 MG/DL
CALCIUM SERPL-MCNC: 9.4 MG/DL
CHLORIDE SERPL-SCNC: 105 MMOL/L
CO2 SERPL-SCNC: 23 MMOL/L
CREAT SERPL-MCNC: 0.6 MG/DL
DIFFERENTIAL METHOD: ABNORMAL
EOSINOPHIL # BLD AUTO: 0.1 K/UL
EOSINOPHIL NFR BLD: 2.2 %
ERYTHROCYTE [DISTWIDTH] IN BLOOD BY AUTOMATED COUNT: 26.7 %
EST. GFR  (AFRICAN AMERICAN): >60 ML/MIN/1.73 M^2
EST. GFR  (NON AFRICAN AMERICAN): >60 ML/MIN/1.73 M^2
GLUCOSE SERPL-MCNC: 115 MG/DL
HCT VFR BLD AUTO: 25.6 %
HGB BLD-MCNC: 8.1 G/DL
HYPOCHROMIA BLD QL SMEAR: ABNORMAL
LYMPHOCYTES # BLD AUTO: 1.1 K/UL
LYMPHOCYTES NFR BLD: 26.5 %
MAGNESIUM SERPL-MCNC: 1.9 MG/DL
MCH RBC QN AUTO: 25.1 PG
MCHC RBC AUTO-ENTMCNC: 31.5 G/DL
MCV RBC AUTO: 80 FL
MONOCYTES # BLD AUTO: 0.5 K/UL
MONOCYTES NFR BLD: 13.4 %
NEUTROPHILS # BLD AUTO: 2.3 K/UL
NEUTROPHILS NFR BLD: 57 %
PLATELET # BLD AUTO: 652 K/UL
PLATELET BLD QL SMEAR: ABNORMAL
PMV BLD AUTO: 5.9 FL
POTASSIUM SERPL-SCNC: 4 MMOL/L
PROT SERPL-MCNC: 6.5 G/DL
RBC # BLD AUTO: 3.21 M/UL
SODIUM SERPL-SCNC: 137 MMOL/L
WBC # BLD AUTO: 4 K/UL

## 2019-02-22 PROCEDURE — 83735 ASSAY OF MAGNESIUM: CPT

## 2019-02-22 PROCEDURE — 85025 COMPLETE CBC W/AUTO DIFF WBC: CPT

## 2019-02-22 PROCEDURE — 36415 COLL VENOUS BLD VENIPUNCTURE: CPT

## 2019-02-22 PROCEDURE — 80053 COMPREHEN METABOLIC PANEL: CPT

## 2019-02-25 ENCOUNTER — OFFICE VISIT (OUTPATIENT)
Dept: HEMATOLOGY/ONCOLOGY | Facility: CLINIC | Age: 64
End: 2019-02-25
Payer: MEDICARE

## 2019-02-25 VITALS
WEIGHT: 218.25 LBS | TEMPERATURE: 98 F | HEIGHT: 62 IN | SYSTOLIC BLOOD PRESSURE: 133 MMHG | RESPIRATION RATE: 18 BRPM | BODY MASS INDEX: 40.16 KG/M2 | HEART RATE: 83 BPM | DIASTOLIC BLOOD PRESSURE: 63 MMHG | OXYGEN SATURATION: 96 %

## 2019-02-25 DIAGNOSIS — C78.7 HEPATIC METASTASES: ICD-10-CM

## 2019-02-25 DIAGNOSIS — Z09 CHEMOTHERAPY FOLLOW-UP EXAMINATION: ICD-10-CM

## 2019-02-25 DIAGNOSIS — C78.7 METASTATIC COLON CANCER TO LIVER: Primary | ICD-10-CM

## 2019-02-25 DIAGNOSIS — C78.00 MALIGNANT NEOPLASM METASTATIC TO LUNG, UNSPECIFIED LATERALITY: ICD-10-CM

## 2019-02-25 DIAGNOSIS — Z09 ONCOLOGY FOLLOW-UP ENCOUNTER: ICD-10-CM

## 2019-02-25 DIAGNOSIS — C17.0 DUODENAL CANCER: ICD-10-CM

## 2019-02-25 DIAGNOSIS — D50.8 IRON DEFICIENCY ANEMIA SECONDARY TO INADEQUATE DIETARY IRON INTAKE: ICD-10-CM

## 2019-02-25 DIAGNOSIS — D64.81 ANEMIA ASSOCIATED WITH CHEMOTHERAPY: ICD-10-CM

## 2019-02-25 DIAGNOSIS — D64.9 ANEMIA, UNSPECIFIED TYPE: ICD-10-CM

## 2019-02-25 DIAGNOSIS — T45.1X5A ANEMIA ASSOCIATED WITH CHEMOTHERAPY: ICD-10-CM

## 2019-02-25 DIAGNOSIS — C18.9 METASTATIC COLON CANCER TO LIVER: Primary | ICD-10-CM

## 2019-02-25 PROCEDURE — 3075F SYST BP GE 130 - 139MM HG: CPT | Mod: S$GLB,,, | Performed by: INTERNAL MEDICINE

## 2019-02-25 PROCEDURE — 3078F DIAST BP <80 MM HG: CPT | Mod: S$GLB,,, | Performed by: INTERNAL MEDICINE

## 2019-02-25 PROCEDURE — 3008F BODY MASS INDEX DOCD: CPT | Mod: S$GLB,,, | Performed by: INTERNAL MEDICINE

## 2019-02-25 PROCEDURE — 99215 PR OFFICE/OUTPT VISIT, EST, LEVL V, 40-54 MIN: ICD-10-PCS | Mod: S$GLB,,, | Performed by: INTERNAL MEDICINE

## 2019-02-25 PROCEDURE — 3075F PR MOST RECENT SYSTOLIC BLOOD PRESS GE 130-139MM HG: ICD-10-PCS | Mod: S$GLB,,, | Performed by: INTERNAL MEDICINE

## 2019-02-25 PROCEDURE — 3008F PR BODY MASS INDEX (BMI) DOCUMENTED: ICD-10-PCS | Mod: S$GLB,,, | Performed by: INTERNAL MEDICINE

## 2019-02-25 PROCEDURE — 99215 OFFICE O/P EST HI 40 MIN: CPT | Mod: S$GLB,,, | Performed by: INTERNAL MEDICINE

## 2019-02-25 PROCEDURE — 1101F PR PT FALLS ASSESS DOC 0-1 FALLS W/OUT INJ PAST YR: ICD-10-PCS | Mod: S$GLB,,, | Performed by: INTERNAL MEDICINE

## 2019-02-25 PROCEDURE — 99999 PR PBB SHADOW E&M-EST. PATIENT-LVL IV: ICD-10-PCS | Mod: PBBFAC,,, | Performed by: INTERNAL MEDICINE

## 2019-02-25 PROCEDURE — 1101F PT FALLS ASSESS-DOCD LE1/YR: CPT | Mod: S$GLB,,, | Performed by: INTERNAL MEDICINE

## 2019-02-25 PROCEDURE — 99999 PR PBB SHADOW E&M-EST. PATIENT-LVL IV: CPT | Mod: PBBFAC,,, | Performed by: INTERNAL MEDICINE

## 2019-02-25 PROCEDURE — 3078F PR MOST RECENT DIASTOLIC BLOOD PRESSURE < 80 MM HG: ICD-10-PCS | Mod: S$GLB,,, | Performed by: INTERNAL MEDICINE

## 2019-02-25 RX ORDER — HEPARIN 100 UNIT/ML
500 SYRINGE INTRAVENOUS
Status: CANCELLED | OUTPATIENT
Start: 2019-02-27

## 2019-02-25 RX ORDER — FLUOROURACIL 50 MG/ML
400 INJECTION, SOLUTION INTRAVENOUS
Status: CANCELLED | OUTPATIENT
Start: 2019-02-25

## 2019-02-25 RX ORDER — SODIUM CHLORIDE 9 MG/ML
INJECTION, SOLUTION INTRAVENOUS CONTINUOUS
Status: CANCELLED | OUTPATIENT
Start: 2019-02-25

## 2019-02-25 RX ORDER — SODIUM CHLORIDE 0.9 % (FLUSH) 0.9 %
10 SYRINGE (ML) INJECTION
Status: CANCELLED | OUTPATIENT
Start: 2019-02-27

## 2019-02-25 RX ORDER — DIPHENHYDRAMINE HYDROCHLORIDE 50 MG/ML
50 INJECTION INTRAMUSCULAR; INTRAVENOUS ONCE AS NEEDED
Status: CANCELLED | OUTPATIENT
Start: 2019-02-25 | End: 2019-02-25

## 2019-02-25 RX ORDER — HEPARIN 100 UNIT/ML
5 SYRINGE INTRAVENOUS
Status: CANCELLED | OUTPATIENT
Start: 2019-02-25

## 2019-02-25 RX ORDER — SODIUM CHLORIDE 0.9 % (FLUSH) 0.9 %
10 SYRINGE (ML) INJECTION
Status: CANCELLED | OUTPATIENT
Start: 2019-02-25

## 2019-02-25 RX ORDER — EPINEPHRINE 0.3 MG/.3ML
0.3 INJECTION SUBCUTANEOUS ONCE AS NEEDED
Status: CANCELLED | OUTPATIENT
Start: 2019-02-25 | End: 2019-02-25

## 2019-02-25 RX ORDER — HEPARIN 100 UNIT/ML
500 SYRINGE INTRAVENOUS
Status: CANCELLED | OUTPATIENT
Start: 2019-02-25

## 2019-02-25 NOTE — PROGRESS NOTES
Cc I am exhuasted    Indigo Stuart is a 63 y.o.  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  Kras was MUTATED  Cycle 1  January 15 2019    Mri of brain reviewed and is negative for mets   In wheelchair   CHest port in place   Increased BAUER and dizziness  Tolerating lopressor for htn and lipitor for dyslipidemia  Tolerating zofran fro nausea chemo   Past Medical History:   Diagnosis Date    Cancer     colon    Encounter for blood transfusion     Hypertension      Past Surgical History:   Procedure Laterality Date    CHOLECYSTECTOMY      COLON SURGERY      COLONOSCOPY N/A 3/30/2018    Performed by Daniel Magana MD at [facility] ENDO    EGD (ESOPHAGOGASTRODUODENOSCOPY) N/A 1/14/2019    Performed by Monserrat Lopez MD at [facility] ENDO (2ND FLR)    EGD (ESOPHAGOGASTRODUODENOSCOPY) N/A 1/3/2019    Performed by Adis Arguello MD at [facility] ENDO    ESOPHAGOGASTRODUODENOSCOPY (EGD) N/A 3/30/2018    Performed by Daniel Magana MD at [facility] ENDO    HYSTERECTOMY      [id number]-[id number] N/A 1/4/2019    Performed by Emilio Romero MD at [facility] OR   She is on lopressor for HTn and lipitor for cholesterol     Current Outpatient Medications:     atorvastatin (LIPITOR) 20 MG tablet, Take 20 mg by mouth every evening., Disp: , Rfl:     hydroCHLOROthiazide (MICROZIDE) 12.5 mg capsule, Take 12.5 mg by mouth once daily., Disp: , Rfl: 1    metoprolol succinate (TOPROL-XL) 25 MG 24 hr tablet, Take 25 mg by mouth every evening., Disp: , Rfl: 3    potassium chloride (KLOR-CON) 8 MEQ TbSR, Take 8 mEq by mouth., Disp: , Rfl:     promethazine (PHENERGAN) 25 MG tablet, Take 1 tablet (25 mg total) by mouth every 6 (six) hours as needed for Nausea., Disp: 30 tablet, Rfl: 1    benzonatate (TESSALON) 200 MG capsule, Take 1 capsule (200 mg total) by mouth 3 (three) times daily as needed for Cough., Disp: 30 capsule, Rfl: 0    levoFLOXacin  (LEVAQUIN) 500 MG tablet, Take 1 tablet (500 mg total) by mouth once daily., Disp: 7 tablet, Rfl: 0    ondansetron (ZOFRAN-ODT) 8 MG TbDL, Take 1 tablet (8 mg total) by mouth every 12 (twelve) hours as needed., Disp: 30 tablet, Rfl: 1  Review of patient's allergies indicates:   Allergen Reactions    Codeine Nausea And Vomiting    Erythromycin base Other (See Comments)    Lisinopril Palpitations     Cough      Social History     Tobacco Use    Smoking status: Never Smoker    Smokeless tobacco: Never Used   Substance Use Topics    Alcohol use: No    Drug use: No     Family History   Problem Relation Age of Onset    Cancer Mother         colon ca, liver ca    Cancer Father          Review of Systems:  General: Weight gain: No, Weight Loss: No, Fatigue: y  Fever: y Chills: No, Night Sweats: No, Insomnia: No, Excessive sleeping: No   Respiratory:  Cough: No, Shortness of Breath: YES      Wheezing: No, Excessive Snoring: No, Coughing up blood: No  Endocrine: Heat Intolerance: No, Cold Intolerance: No,   Excessive Thirst: No, Excessive Hunger: No,   Eyes:  Blurred Vision: No, Double Vision: No,   Light Sensitivity: No, Eye pain: No  Musculoskeletal: Muscle Aches/Pain: No, Joint Pain/Swelling: No, Muscle Cramps:  YES      Muscle Weakness:y, Neck Pain: No, Back Pain: y  Neurological: Difficulty Walking/Falls: Yes,   Headache Migraine: No, Dizziness/Vertigo: No, Fainting: No, Difficulty with Speech: No, Weakness: YES    Cardiovascular: Chest Pain: No, Shortness of Breath: YES       Gastrointestinal: Nausea/Vomiting: No, Constipation: No, Diarrhea: No, Bloody Stools: No   Psych/Cog:  Depression: No, Anxiety: No, Hallucinations: No, Problems Concentrating: No  : Frequent Urination: No, Incontinence: No, Blood of Urine: No, Urinary Infections: No  ENT:Hearing Loss: No, Earache: No, Ringing in Ears: No,   Facial Pain: No, Chronic Congestion:y  Immune: Seasonal Allergies: No, Hives and/or Rashes: No  The remainder of  "the review of twelve body systems was reviewed and normal        /63   Pulse 83   Temp 98.1 °F (36.7 °C)   Resp 18   Ht 5' 2" (1.575 m)   Wt 99 kg (218 lb 4.1 oz)   SpO2 96%   BMI 39.92 kg/m²   Constitutional: oriented to person, place, and time.  Appears good today   HENT:   Head: Normocephalic and atraumatic.   Right Ear: External ear normal.   Left Ear: External ear normal.   Nose: Nose normal.   Eyes: Conjunctivae PALE  EOM are normal. Pupils are equal, round  Neck: Normal range of motion. Neck supple. No thyromegaly present.   Cardiovascular: Normal rate, regular rhythm, normal heart sounds     No murmur heard.  Pulmonary/Chest: Effort normal and breath sounds normal.  no wheezes. NO TACHYCARDIA     Abdominal: Soft. Bowel sounds are normal.  no mass. There is no tenderness.   Musculoskeletal: Normal range of motion. Weak decreased strength YES    Lymphadenopathy:  no cervical adenopathy.   Neurological: alert and oriented to person, place, and time.   Skin: Skin is warm and dry. No rash noted.   Psychiatric: normal mood and affect.   behavior is normal.   Vitals reviewed.  + boggy turbinates and pharyngeal erythema and edema     Lab Results   Component Value Date    WBC 4.00 02/22/2019    HGB 8.1 (L) 02/22/2019    HCT 25.6 (L) 02/22/2019    MCV 80 (L) 02/22/2019     (H) 02/22/2019     CMP  Sodium   Date Value Ref Range Status   02/22/2019 137 136 - 145 mmol/L Final     Potassium   Date Value Ref Range Status   02/22/2019 4.0 3.5 - 5.1 mmol/L Final     Chloride   Date Value Ref Range Status   02/22/2019 105 95 - 110 mmol/L Final     CO2   Date Value Ref Range Status   02/22/2019 23 23 - 29 mmol/L Final     Glucose   Date Value Ref Range Status   02/22/2019 115 (H) 70 - 110 mg/dL Final     BUN, Bld   Date Value Ref Range Status   02/22/2019 10 8 - 23 mg/dL Final     Creatinine   Date Value Ref Range Status   02/22/2019 0.6 0.5 - 1.4 mg/dL Final     Calcium   Date Value Ref Range Status "   02/22/2019 9.4 8.7 - 10.5 mg/dL Final     Total Protein   Date Value Ref Range Status   02/22/2019 6.5 6.0 - 8.4 g/dL Final     Albumin   Date Value Ref Range Status   02/22/2019 2.2 (L) 3.5 - 5.2 g/dL Final     Total Bilirubin   Date Value Ref Range Status   02/22/2019 0.3 0.1 - 1.0 mg/dL Final     Comment:     For infants and newborns, interpretation of results should be based  on gestational age, weight and in agreement with clinical  observations.  Premature Infant recommended reference ranges:  Up to 24 hours.............<8.0 mg/dL  Up to 48 hours............<12.0 mg/dL  3-5 days..................<15.0 mg/dL  6-29 days.................<15.0 mg/dL       Alkaline Phosphatase   Date Value Ref Range Status   02/22/2019 340 (H) 55 - 135 U/L Final     AST   Date Value Ref Range Status   02/22/2019 38 10 - 40 U/L Final     ALT   Date Value Ref Range Status   02/22/2019 24 10 - 44 U/L Final     Anion Gap   Date Value Ref Range Status   02/22/2019 9 8 - 16 mmol/L Final     eGFR if    Date Value Ref Range Status   02/22/2019 >60 >60 mL/min/1.73 m^2 Final     eGFR if non    Date Value Ref Range Status   02/22/2019 >60 >60 mL/min/1.73 m^2 Final     Comment:     Calculation used to obtain the estimated glomerular filtration  rate (eGFR) is the CKD-EPI equation.        INICAL DIAGNOSIS/INFORMATION  Clinical information: possible liver mets- hx of colon cancer    SPECIMEN  1) Liver Lesion Biopsy  FINAL PATHOLOGIC DIAGNOSIS  LIVER LESION, NEEDLE CORE BIOPSIES:  - Metastatic colonic adenocarcinoma.    CEA0.0 - 5.0 ng/iW7426.7 Abnormally high    CEA0.0 - 5.0 ng/nF6231.3 Abnormally high        Component 9d ago   HLA Celiac Specimen Whole Blood    Celiac (HLA-DQB1*02) Positive Abnormal     Celiac (HLA-DQ8) Negative    Celiac (HLA-DQA1*05) Positive Abnormal               Metastatic colon cancer to liver    Oncology follow-up encounter    Malignant neoplasm metastatic to lung, unspecified  laterality  -     CBC auto differential; Future; Expected date: 02/25/2019  -     CMP; Future; Expected date: 02/25/2019    Hepatic metastases    Duodenal cancer    Chemotherapy follow-up examination    Anemia, unspecified type    Iron deficiency anemia secondary to inadequate dietary iron intake    Anemia associated with chemotherapy    Other orders  -     palonosetron (ALOXI) 0.25 mg, dexamethasone (DECADRON) 12 mg in sodium chloride 0.9 % 50 mL IVPB  -     EPINEPHrine (EPIPEN) 0.3 mg/0.3 mL pen injection 0.3 mg  -     diphenhydrAMINE injection 50 mg  -     hydrocortisone sodium succinate injection 100 mg  -     bevacizumab (AVASTIN) 5 mg/kg = 500 mg in sodium chloride 0.9% 100 mL chemo infusion  -     oxaliplatin (ELOXATIN) 85 mg/m2 = 178 mg in dextrose 5 % 500 mL chemo infusion  -     leucovorin calcium 400 mg/m2 = 835 mg in dextrose 5 % 250 mL infusion  -     fluorouracil injection 835 mg  -     fluorouracil (ADRUCIL) 2,400 mg/m2 = 5,015 mg in sodium chloride 0.9% 200.3 mL chemo infusion  -     dextrose 5 % 250 mL flush bag  -     sodium chloride 0.9% 100 mL flush bag  -     sodium chloride 0.9% flush 10 mL  -     heparin, porcine (PF) 100 unit/mL injection flush 500 Units  -     alteplase injection 2 mg  -     sodium chloride 0.9% flush 10 mL  -     heparin, porcine (PF) 100 unit/mL injection flush 500 Units  -     alteplase injection 2 mg  -     ferric carboxymaltose (INJECTAFER) 750 mg in sodium chloride 0.9% 250 mL infusion  -     0.9%  NaCl infusion  -     sodium chloride 0.9% flush 10 mL  -     heparin, porcine (PF) 100 unit/mL injection flush 500 Units  -     sodium chloride 0.9% 100 mL flush bag  -     epoetin aleksandra injection 20,000 Units      Must stay on schedule   Chemo cleared for today  No delay is acceptable today   Needs IV iron and procrit asap   On  folfox and avastin for stage  4 disease   Increase protein   Cont zofran prn nausea   Cont toprol for hR   No further levaquin needed     Current  Outpatient Medications:     atorvastatin (LIPITOR) 20 MG tablet, Take 20 mg by mouth every evening., Disp: , Rfl:     hydroCHLOROthiazide (MICROZIDE) 12.5 mg capsule, Take 12.5 mg by mouth once daily., Disp: , Rfl: 1    metoprolol succinate (TOPROL-XL) 25 MG 24 hr tablet, Take 25 mg by mouth every evening., Disp: , Rfl: 3    potassium chloride (KLOR-CON) 8 MEQ TbSR, Take 8 mEq by mouth., Disp: , Rfl:     promethazine (PHENERGAN) 25 MG tablet, Take 1 tablet (25 mg total) by mouth every 6 (six) hours as needed for Nausea., Disp: 30 tablet, Rfl: 1    benzonatate (TESSALON) 200 MG capsule, Take 1 capsule (200 mg total) by mouth 3 (three) times daily as needed for Cough., Disp: 30 capsule, Rfl: 0    levoFLOXacin (LEVAQUIN) 500 MG tablet, Take 1 tablet (500 mg total) by mouth once daily., Disp: 7 tablet, Rfl: 0    ondansetron (ZOFRAN-ODT) 8 MG TbDL, Take 1 tablet (8 mg total) by mouth every 12 (twelve) hours as needed., Disp: 30 tablet, Rfl: 1    End of life care discussed        Thank you for allowing me to evaluate and participate in the care of this pleasant patient. Please fell free to call me with any questions or concerns.    Warmly,  Karina Pettit MD    This note was dictated with Dragon and briefly proofread. Please excuse any sentences that may be unclear or words misspelled

## 2019-02-26 ENCOUNTER — TELEPHONE (OUTPATIENT)
Dept: HEMATOLOGY/ONCOLOGY | Facility: CLINIC | Age: 64
End: 2019-02-26

## 2019-02-26 NOTE — TELEPHONE ENCOUNTER
Called and spoke to pt to verify that [facility] called with scheduled injectafer and procrit dates. I confirmed with pt verbally of scheduled dated. Pt confirmed and verbalized understanding.

## 2019-03-04 RX ORDER — HEPARIN 100 UNIT/ML
5 SYRINGE INTRAVENOUS
Status: CANCELLED | OUTPATIENT
Start: 2019-03-04

## 2019-03-04 RX ORDER — SODIUM CHLORIDE 0.9 % (FLUSH) 0.9 %
10 SYRINGE (ML) INJECTION
Status: CANCELLED | OUTPATIENT
Start: 2019-03-04

## 2019-03-04 RX ORDER — SODIUM CHLORIDE 9 MG/ML
INJECTION, SOLUTION INTRAVENOUS CONTINUOUS
Status: CANCELLED | OUTPATIENT
Start: 2019-03-04

## 2019-03-07 ENCOUNTER — PATIENT MESSAGE (OUTPATIENT)
Dept: HEMATOLOGY/ONCOLOGY | Facility: CLINIC | Age: 64
End: 2019-03-07

## 2019-03-08 ENCOUNTER — LAB VISIT (OUTPATIENT)
Dept: LAB | Facility: HOSPITAL | Age: 64
End: 2019-03-08
Attending: INTERNAL MEDICINE
Payer: MEDICARE

## 2019-03-08 DIAGNOSIS — C78.00 MALIGNANT NEOPLASM METASTATIC TO LUNG, UNSPECIFIED LATERALITY: ICD-10-CM

## 2019-03-08 LAB
ALBUMIN SERPL BCP-MCNC: 2.8 G/DL
ALP SERPL-CCNC: 264 U/L
ALT SERPL W/O P-5'-P-CCNC: 32 U/L
ANION GAP SERPL CALC-SCNC: 12 MMOL/L
ANISOCYTOSIS BLD QL SMEAR: ABNORMAL
AST SERPL-CCNC: 41 U/L
BASOPHILS # BLD AUTO: 0.1 K/UL
BASOPHILS NFR BLD: 1.2 %
BILIRUB SERPL-MCNC: 0.5 MG/DL
BUN SERPL-MCNC: 13 MG/DL
CALCIUM SERPL-MCNC: 9.6 MG/DL
CHLORIDE SERPL-SCNC: 103 MMOL/L
CO2 SERPL-SCNC: 24 MMOL/L
CREAT SERPL-MCNC: 0.7 MG/DL
DIFFERENTIAL METHOD: ABNORMAL
EOSINOPHIL # BLD AUTO: 0.1 K/UL
EOSINOPHIL NFR BLD: 1.1 %
ERYTHROCYTE [DISTWIDTH] IN BLOOD BY AUTOMATED COUNT: 25.8 %
EST. GFR  (AFRICAN AMERICAN): >60 ML/MIN/1.73 M^2
EST. GFR  (NON AFRICAN AMERICAN): >60 ML/MIN/1.73 M^2
GLUCOSE SERPL-MCNC: 98 MG/DL
HCT VFR BLD AUTO: 30.2 %
HGB BLD-MCNC: 9.4 G/DL
LYMPHOCYTES # BLD AUTO: 1.2 K/UL
LYMPHOCYTES NFR BLD: 20.7 %
MCH RBC QN AUTO: 25.4 PG
MCHC RBC AUTO-ENTMCNC: 31.1 G/DL
MCV RBC AUTO: 82 FL
MONOCYTES # BLD AUTO: 0.2 K/UL
MONOCYTES NFR BLD: 4.2 %
NEUTROPHILS # BLD AUTO: 4.1 K/UL
NEUTROPHILS NFR BLD: 72.8 %
PLATELET # BLD AUTO: 386 K/UL
PLATELET BLD QL SMEAR: ABNORMAL
PMV BLD AUTO: 6.1 FL
POLYCHROMASIA BLD QL SMEAR: ABNORMAL
POTASSIUM SERPL-SCNC: 3.5 MMOL/L
PROT SERPL-MCNC: 6.6 G/DL
RBC # BLD AUTO: 3.7 M/UL
SODIUM SERPL-SCNC: 139 MMOL/L
WBC # BLD AUTO: 5.6 K/UL

## 2019-03-08 PROCEDURE — 85025 COMPLETE CBC W/AUTO DIFF WBC: CPT

## 2019-03-08 PROCEDURE — 80053 COMPREHEN METABOLIC PANEL: CPT

## 2019-03-08 PROCEDURE — 36415 COLL VENOUS BLD VENIPUNCTURE: CPT

## 2019-03-11 ENCOUNTER — OFFICE VISIT (OUTPATIENT)
Dept: HEMATOLOGY/ONCOLOGY | Facility: CLINIC | Age: 64
End: 2019-03-11
Payer: MEDICARE

## 2019-03-11 ENCOUNTER — TELEPHONE (OUTPATIENT)
Dept: HEMATOLOGY/ONCOLOGY | Facility: CLINIC | Age: 64
End: 2019-03-11

## 2019-03-11 VITALS
RESPIRATION RATE: 12 BRPM | DIASTOLIC BLOOD PRESSURE: 70 MMHG | WEIGHT: 217.63 LBS | SYSTOLIC BLOOD PRESSURE: 126 MMHG | HEIGHT: 62 IN | TEMPERATURE: 99 F | HEART RATE: 83 BPM | BODY MASS INDEX: 40.05 KG/M2

## 2019-03-11 DIAGNOSIS — Z09 ONCOLOGY FOLLOW-UP ENCOUNTER: ICD-10-CM

## 2019-03-11 DIAGNOSIS — K92.1 HEMATOCHEZIA: ICD-10-CM

## 2019-03-11 DIAGNOSIS — K64.9 HEMORRHOIDS, UNSPECIFIED HEMORRHOID TYPE: ICD-10-CM

## 2019-03-11 DIAGNOSIS — C78.7 METASTATIC COLON CANCER TO LIVER: ICD-10-CM

## 2019-03-11 DIAGNOSIS — C17.0 DUODENAL CANCER: Primary | ICD-10-CM

## 2019-03-11 DIAGNOSIS — C18.9 METASTATIC COLON CANCER TO LIVER: ICD-10-CM

## 2019-03-11 DIAGNOSIS — C17.0 DUODENAL CANCER: ICD-10-CM

## 2019-03-11 DIAGNOSIS — C78.00 MALIGNANT NEOPLASM METASTATIC TO LUNG, UNSPECIFIED LATERALITY: ICD-10-CM

## 2019-03-11 DIAGNOSIS — Z09 CHEMOTHERAPY FOLLOW-UP EXAMINATION: Primary | ICD-10-CM

## 2019-03-11 PROCEDURE — 3288F FALL RISK ASSESSMENT DOCD: CPT | Mod: S$GLB,,, | Performed by: INTERNAL MEDICINE

## 2019-03-11 PROCEDURE — 3008F BODY MASS INDEX DOCD: CPT | Mod: S$GLB,,, | Performed by: INTERNAL MEDICINE

## 2019-03-11 PROCEDURE — 1101F PT FALLS ASSESS-DOCD LE1/YR: CPT | Mod: S$GLB,,, | Performed by: INTERNAL MEDICINE

## 2019-03-11 PROCEDURE — 3008F PR BODY MASS INDEX (BMI) DOCUMENTED: ICD-10-PCS | Mod: S$GLB,,, | Performed by: INTERNAL MEDICINE

## 2019-03-11 PROCEDURE — 3288F PR FALLS RISK ASSESSMENT DOCUMENTED: ICD-10-PCS | Mod: S$GLB,,, | Performed by: INTERNAL MEDICINE

## 2019-03-11 PROCEDURE — 1101F PR PT FALLS ASSESS DOC 0-1 FALLS W/OUT INJ PAST YR: ICD-10-PCS | Mod: S$GLB,,, | Performed by: INTERNAL MEDICINE

## 2019-03-11 PROCEDURE — 99999 PR PBB SHADOW E&M-EST. PATIENT-LVL III: CPT | Mod: PBBFAC,,, | Performed by: INTERNAL MEDICINE

## 2019-03-11 PROCEDURE — 99999 PR PBB SHADOW E&M-EST. PATIENT-LVL III: ICD-10-PCS | Mod: PBBFAC,,, | Performed by: INTERNAL MEDICINE

## 2019-03-11 PROCEDURE — 3078F PR MOST RECENT DIASTOLIC BLOOD PRESSURE < 80 MM HG: ICD-10-PCS | Mod: S$GLB,,, | Performed by: INTERNAL MEDICINE

## 2019-03-11 PROCEDURE — 3078F DIAST BP <80 MM HG: CPT | Mod: S$GLB,,, | Performed by: INTERNAL MEDICINE

## 2019-03-11 PROCEDURE — 3074F SYST BP LT 130 MM HG: CPT | Mod: S$GLB,,, | Performed by: INTERNAL MEDICINE

## 2019-03-11 PROCEDURE — 99215 OFFICE O/P EST HI 40 MIN: CPT | Mod: S$GLB,,, | Performed by: INTERNAL MEDICINE

## 2019-03-11 PROCEDURE — 3074F PR MOST RECENT SYSTOLIC BLOOD PRESSURE < 130 MM HG: ICD-10-PCS | Mod: S$GLB,,, | Performed by: INTERNAL MEDICINE

## 2019-03-11 PROCEDURE — 99215 PR OFFICE/OUTPT VISIT, EST, LEVL V, 40-54 MIN: ICD-10-PCS | Mod: S$GLB,,, | Performed by: INTERNAL MEDICINE

## 2019-03-11 RX ORDER — DIPHENHYDRAMINE HYDROCHLORIDE 50 MG/ML
50 INJECTION INTRAMUSCULAR; INTRAVENOUS ONCE AS NEEDED
Status: CANCELLED | OUTPATIENT
Start: 2019-03-11 | End: 2019-03-11

## 2019-03-11 RX ORDER — HEPARIN 100 UNIT/ML
500 SYRINGE INTRAVENOUS
Status: CANCELLED | OUTPATIENT
Start: 2019-03-11

## 2019-03-11 RX ORDER — FERROUS SULFATE 325(65) MG
1 TABLET ORAL 2 TIMES DAILY
Refills: 3 | COMMUNITY
Start: 2019-03-07

## 2019-03-11 RX ORDER — FLUOROURACIL 50 MG/ML
400 INJECTION, SOLUTION INTRAVENOUS
Status: CANCELLED | OUTPATIENT
Start: 2019-03-11

## 2019-03-11 RX ORDER — SODIUM CHLORIDE 0.9 % (FLUSH) 0.9 %
10 SYRINGE (ML) INJECTION
Status: CANCELLED | OUTPATIENT
Start: 2019-03-11

## 2019-03-11 RX ORDER — AMLODIPINE BESYLATE 5 MG/1
5 TABLET ORAL DAILY
Refills: 3 | COMMUNITY
Start: 2019-03-03 | End: 2019-10-28 | Stop reason: SDUPTHER

## 2019-03-11 RX ORDER — EPINEPHRINE 0.3 MG/.3ML
0.3 INJECTION SUBCUTANEOUS ONCE AS NEEDED
Status: CANCELLED | OUTPATIENT
Start: 2019-03-11 | End: 2019-03-11

## 2019-03-11 NOTE — TELEPHONE ENCOUNTER
While rooming patient she stated that she has had blood in her stool while having a bowel movement. Pt stated that she went twice this morning and there was blood in the toilet and while wiping.

## 2019-03-11 NOTE — PROGRESS NOTES
Cc I had blood in my stool    Indigo Stuart is a 63 y.o.  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  Kras was MUTATED  Cycle 1  January 15 2019    Here for chemo   New onset blood in stool and pruritus of rectum   CHest port in place   Increased BAUER and dizziness  Tolerating lopressor for htn and lipitor for dyslipidemia  Tolerating zofran fro nausea chemo   Past Medical History:   Diagnosis Date    Cancer     colon    Encounter for blood transfusion     Hypertension      Past Surgical History:   Procedure Laterality Date    CHOLECYSTECTOMY      COLON SURGERY      COLONOSCOPY N/A 3/30/2018    Performed by Daniel Magana MD at [facility] ENDO    EGD (ESOPHAGOGASTRODUODENOSCOPY) N/A 1/14/2019    Performed by [facility]err[facility] MD at [facility] ENDO (2ND FLR)    EGD (ESOPHAGOGASTRODUODENOSCOPY) N/A 1/3/2019    Performed by Adis Arguello MD at [facility] ENDO    ESOPHAGOGASTRODUODENOSCOPY (EGD) N/A 3/30/2018    Performed by Daniel Magana MD at [facility] ENDO    HYSTERECTOMY      [id number]-CATH N/A 1/4/2019    Performed by Emilio Romero MD at [facility] OR   She is on lopressor for HTn and lipitor for cholesterol     Current Outpatient Medications:     amLODIPine (NORVASC) 5 MG tablet, Take 5 mg by mouth once daily., Disp: , Rfl: 3    atorvastatin (LIPITOR) 20 MG tablet, Take 20 mg by mouth every evening., Disp: , Rfl:     ferrous sulfate (FEOSOL) 325 mg (65 mg iron) Tab tablet, Take 1 tablet by mouth 2 (two) times daily., Disp: , Rfl: 3    hydroCHLOROthiazide (MICROZIDE) 12.5 mg capsule, Take 12.5 mg by mouth once daily., Disp: , Rfl: 1    metoprolol succinate (TOPROL-XL) 25 MG 24 hr tablet, Take 25 mg by mouth every evening., Disp: , Rfl: 3    ondansetron (ZOFRAN-ODT) 8 MG TbDL, Take 1 tablet (8 mg total) by mouth every 12 (twelve) hours as needed., Disp: 30 tablet, Rfl: 1    potassium chloride (KLOR-CON) 8 MEQ TbSR,  Take 8 mEq by mouth., Disp: , Rfl:     promethazine (PHENERGAN) 25 MG tablet, Take 1 tablet (25 mg total) by mouth every 6 (six) hours as needed for Nausea., Disp: 30 tablet, Rfl: 1  Review of patient's allergies indicates:   Allergen Reactions    Codeine Nausea And Vomiting    Erythromycin base Other (See Comments)    Lisinopril Palpitations     Cough      Social History     Tobacco Use    Smoking status: Never Smoker    Smokeless tobacco: Never Used   Substance Use Topics    Alcohol use: No    Drug use: No     Family History   Problem Relation Age of Onset    Cancer Mother         colon ca, liver ca    Cancer Father          Review of Systems:  General: Weight gain: No, Weight Loss: No, Fatigue: y  Fever: y Chills: No, Night Sweats: No, Insomnia: No, Excessive sleeping: No   Respiratory:  Cough: No, Shortness of Breath: NO     Wheezing: No, Excessive Snoring: No, Coughing up blood: No  Endocrine: Heat Intolerance: No, Cold Intolerance: No,   Excessive Thirst: No, Excessive Hunger: No,   Eyes:  Blurred Vision: No, Double Vision: No,   Light Sensitivity: No, Eye pain: No  Musculoskeletal: Muscle Aches/Pain: No, Joint Pain/Swelling: No, Muscle Cramps:  no      Muscle Weakness:y, Neck Pain: No, Back Pain: y  Neurological: Difficulty Walking/Falls: Yes,   Headache Migraine: No, Dizziness/Vertigo: No, Fainting: No, Difficulty with Speech: No, Weakness: no     Cardiovascular: Chest Pain: No, Shortness of Breath: no   Gastrointestinal: Nausea/Vomiting: No, Constipation: No, Diarrhea: No, Bloody Stools: yes with straining and hemorrhoids  Psych/Cog:  Depression: No, Anxiety: No, Hallucinations: No, Problems Concentrating: No  : Frequent Urination: No, Incontinence: No, Blood of Urine: No, Urinary Infections: No  ENT:Hearing Loss: No, Earache: No, Ringing in Ears: No,   Facial Pain: No, Chronic Congestion:y  Immune: Seasonal Allergies: No, Hives and/or Rashes: No  The remainder of the review of twelve body  "systems was reviewed and normal        /70   Pulse 83   Temp 98.5 °F (36.9 °C)   Resp 12   Ht 5' 2" (1.575 m)   Wt 98.7 kg (217 lb 9.5 oz)   BMI 39.80 kg/m²   Constitutional: oriented to person, place, and time.  Appears good today   HENT: anicteric sclera   Head: Normocephalic and atraumatic.   Right Ear: External ear normal.   Left Ear: External ear normal.   Nose: Nose normal.   Eyes: Conjunctivae PALE  EOM are normal. Pupils are equal, round  Neck: Normal range of motion. Neck supple. No thyromegaly present.   Cardiovascular: Normal rate, regular rhythm, normal heart sounds     No murmur heard.  Pulmonary/Chest: Effort normal and breath sounds normal.  no wheezes. No TACHYCARDIA     Abdominal: Soft. Bowel sounds are normal.  no mass. There is no tenderness.   Musculoskeletal: Normal range of motion. Weak decreased strength better     Lymphadenopathy:  no cervical adenopathy.   Neurological: alert and oriented to person, place, and time.   Skin: Skin is warm and dry. No rash noted.   Psychiatric: normal mood and affect.   behavior is normal.   Vitals reviewed.  + boggy turbinates and pharyngeal erythema and edema     Lab Results   Component Value Date    WBC 5.60 03/08/2019    HGB 9.4 (L) 03/08/2019    HCT 30.2 (L) 03/08/2019    MCV 82 03/08/2019     (H) 03/08/2019     CMP  Sodium   Date Value Ref Range Status   03/08/2019 139 136 - 145 mmol/L Final     Potassium   Date Value Ref Range Status   03/08/2019 3.5 3.5 - 5.1 mmol/L Final     Chloride   Date Value Ref Range Status   03/08/2019 103 95 - 110 mmol/L Final     CO2   Date Value Ref Range Status   03/08/2019 24 23 - 29 mmol/L Final     Glucose   Date Value Ref Range Status   03/08/2019 98 70 - 110 mg/dL Final     BUN, Bld   Date Value Ref Range Status   03/08/2019 13 8 - 23 mg/dL Final     Creatinine   Date Value Ref Range Status   03/08/2019 0.7 0.5 - 1.4 mg/dL Final     Calcium   Date Value Ref Range Status   03/08/2019 9.6 8.7 - 10.5 " mg/dL Final     Total Protein   Date Value Ref Range Status   03/08/2019 6.6 6.0 - 8.4 g/dL Final     Albumin   Date Value Ref Range Status   03/08/2019 2.8 (L) 3.5 - 5.2 g/dL Final     Total Bilirubin   Date Value Ref Range Status   03/08/2019 0.5 0.1 - 1.0 mg/dL Final     Comment:     For infants and newborns, interpretation of results should be based  on gestational age, weight and in agreement with clinical  observations.  Premature Infant recommended reference ranges:  Up to 24 hours.............<8.0 mg/dL  Up to 48 hours............<12.0 mg/dL  3-5 days..................<15.0 mg/dL  6-29 days.................<15.0 mg/dL       Alkaline Phosphatase   Date Value Ref Range Status   03/08/2019 264 (H) 55 - 135 U/L Final     AST   Date Value Ref Range Status   03/08/2019 41 (H) 10 - 40 U/L Final     ALT   Date Value Ref Range Status   03/08/2019 32 10 - 44 U/L Final     Anion Gap   Date Value Ref Range Status   03/08/2019 12 8 - 16 mmol/L Final     eGFR if    Date Value Ref Range Status   03/08/2019 >60 >60 mL/min/1.73 m^2 Final     eGFR if non    Date Value Ref Range Status   03/08/2019 >60 >60 mL/min/1.73 m^2 Final     Comment:     Calculation used to obtain the estimated glomerular filtration  rate (eGFR) is the CKD-EPI equation.        INICAL DIAGNOSIS/INFORMATION  Clinical information: possible liver mets- hx of colon cancer      NO falls and no pain       SPECIMEN  1) Liver Lesion Biopsy  FINAL PATHOLOGIC DIAGNOSIS  LIVER LESION, NEEDLE CORE BIOPSIES:  - Metastatic colonic adenocarcinoma.    CEA0.0 - 5.0 ng/sJ9916.7 Abnormally high    CEA0.0 - 5.0 ng/nW5258.3 Abnormally high        Component 9d ago   HLA Celiac Specimen Whole Blood    Celiac (HLA-DQB1*02) Positive Abnormal     Celiac (HLA-DQ8) Negative    Celiac (HLA-DQA1*05) Positive Abnormal               Chemotherapy follow-up examination    Duodenal cancer    Metastatic colon cancer to liver    Malignant neoplasm metastatic  to lung, unspecified laterality    Oncology follow-up encounter    Other orders  -     palonosetron (ALOXI) 0.25 mg, dexamethasone (DECADRON) 12 mg in sodium chloride 0.9 % 50 mL IVPB  -     EPINEPHrine (EPIPEN) 0.3 mg/0.3 mL pen injection 0.3 mg  -     diphenhydrAMINE injection 50 mg  -     hydrocortisone sodium succinate injection 100 mg  -     bevacizumab (AVASTIN) 5 mg/kg = 500 mg in sodium chloride 0.9% 100 mL chemo infusion  -     oxaliplatin (ELOXATIN) 85 mg/m2 = 178 mg in dextrose 5 % 500 mL chemo infusion  -     leucovorin calcium 400 mg/m2 = 835 mg in dextrose 5 % 250 mL infusion  -     fluorouracil injection 835 mg  -     fluorouracil (ADRUCIL) 2,400 mg/m2 = 5,015 mg in sodium chloride 0.9% 200.3 mL chemo infusion  -     dextrose 5 % 250 mL flush bag  -     sodium chloride 0.9% 100 mL flush bag  -     sodium chloride 0.9% flush 10 mL  -     heparin, porcine (PF) 100 unit/mL injection flush 500 Units  -     alteplase injection 2 mg    hypoalbuminemia : increase protein   Must stay on schedule   Chemo cleared for today  Continue  IV iron and procrit   On  folfox and avastin for stage  4 disease     Cont zofran prn nausea   Cont toprol for hR       Current Outpatient Medications:     amLODIPine (NORVASC) 5 MG tablet, Take 5 mg by mouth once daily., Disp: , Rfl: 3    atorvastatin (LIPITOR) 20 MG tablet, Take 20 mg by mouth every evening., Disp: , Rfl:     ferrous sulfate (FEOSOL) 325 mg (65 mg iron) Tab tablet, Take 1 tablet by mouth 2 (two) times daily., Disp: , Rfl: 3    hydroCHLOROthiazide (MICROZIDE) 12.5 mg capsule, Take 12.5 mg by mouth once daily., Disp: , Rfl: 1    metoprolol succinate (TOPROL-XL) 25 MG 24 hr tablet, Take 25 mg by mouth every evening., Disp: , Rfl: 3    ondansetron (ZOFRAN-ODT) 8 MG TbDL, Take 1 tablet (8 mg total) by mouth every 12 (twelve) hours as needed., Disp: 30 tablet, Rfl: 1    potassium chloride (KLOR-CON) 8 MEQ TbSR, Take 8 mEq by mouth., Disp: , Rfl:      promethazine (PHENERGAN) 25 MG tablet, Take 1 tablet (25 mg total) by mouth every 6 (six) hours as needed for Nausea., Disp: 30 tablet, Rfl: 1    Advance Care Planning     Living Will  During this visit, I engaged the patient in the advance care planning process.  The patient and I reviewed the role for advance directives and their purpose in directing future healthcare if the patient's unable to speak for him/herself.  At this point in time, the patient does have full decision-making capacity.  We discussed different extreme health states that she could experience, and reviewed what kind of medical care she would want in those situations.  The patient communicated that if she were comatose and had little chance of a meaningful recovery, she would not want machines/life-sustaining treatments used.  5 The patient  Has not  completed a living will to reflect these preferences.  The patient   HAS NOT already designated a healthcare power of  to make decisions on her behalf.   I spent a total of 5 minutes engaging the patient in this advance care planning discussion.                  Thank you for allowing me to evaluate and participate in the care of this pleasant patient. Please fell free to call me with any questions or concerns.    Warmly,  Karina Pettit MD    This note was dictated with Dragon and briefly proofread. Please excuse any sentences that may be unclear or words misspelled

## 2019-03-12 ENCOUNTER — TELEPHONE (OUTPATIENT)
Dept: HEMATOLOGY/ONCOLOGY | Facility: CLINIC | Age: 64
End: 2019-03-12

## 2019-03-12 NOTE — TELEPHONE ENCOUNTER
Patient notified that Dr Pettit wanted her to mix lotrimin and hydrocortisone cream and apply to rectum as needed.

## 2019-03-13 RX ORDER — SODIUM CHLORIDE 0.9 % (FLUSH) 0.9 %
10 SYRINGE (ML) INJECTION
Status: CANCELLED | OUTPATIENT
Start: 2019-04-10

## 2019-03-13 RX ORDER — FLUOROURACIL 50 MG/ML
400 INJECTION, SOLUTION INTRAVENOUS
Status: CANCELLED | OUTPATIENT
Start: 2019-04-08

## 2019-03-13 RX ORDER — HEPARIN 100 UNIT/ML
500 SYRINGE INTRAVENOUS
Status: CANCELLED | OUTPATIENT
Start: 2019-03-25

## 2019-03-13 RX ORDER — HEPARIN 100 UNIT/ML
500 SYRINGE INTRAVENOUS
Status: CANCELLED | OUTPATIENT
Start: 2019-04-10

## 2019-03-13 RX ORDER — DIPHENHYDRAMINE HYDROCHLORIDE 50 MG/ML
50 INJECTION INTRAMUSCULAR; INTRAVENOUS ONCE AS NEEDED
Status: CANCELLED | OUTPATIENT
Start: 2019-03-25 | End: 2019-03-25

## 2019-03-13 RX ORDER — EPINEPHRINE 0.3 MG/.3ML
0.3 INJECTION SUBCUTANEOUS ONCE AS NEEDED
Status: CANCELLED | OUTPATIENT
Start: 2019-04-08 | End: 2019-04-08

## 2019-03-13 RX ORDER — SODIUM CHLORIDE 0.9 % (FLUSH) 0.9 %
10 SYRINGE (ML) INJECTION
Status: CANCELLED | OUTPATIENT
Start: 2019-03-13

## 2019-03-13 RX ORDER — EPINEPHRINE 0.3 MG/.3ML
0.3 INJECTION SUBCUTANEOUS ONCE AS NEEDED
Status: CANCELLED | OUTPATIENT
Start: 2019-03-25 | End: 2019-03-25

## 2019-03-13 RX ORDER — SODIUM CHLORIDE 0.9 % (FLUSH) 0.9 %
10 SYRINGE (ML) INJECTION
Status: CANCELLED | OUTPATIENT
Start: 2019-03-25

## 2019-03-13 RX ORDER — HEPARIN 100 UNIT/ML
500 SYRINGE INTRAVENOUS
Status: CANCELLED | OUTPATIENT
Start: 2019-04-08

## 2019-03-13 RX ORDER — HEPARIN 100 UNIT/ML
500 SYRINGE INTRAVENOUS
Status: CANCELLED | OUTPATIENT
Start: 2019-03-27

## 2019-03-13 RX ORDER — DIPHENHYDRAMINE HYDROCHLORIDE 50 MG/ML
50 INJECTION INTRAMUSCULAR; INTRAVENOUS ONCE AS NEEDED
Status: CANCELLED | OUTPATIENT
Start: 2019-04-08 | End: 2019-04-08

## 2019-03-13 RX ORDER — SODIUM CHLORIDE 0.9 % (FLUSH) 0.9 %
10 SYRINGE (ML) INJECTION
Status: CANCELLED | OUTPATIENT
Start: 2019-03-27

## 2019-03-13 RX ORDER — SODIUM CHLORIDE 0.9 % (FLUSH) 0.9 %
10 SYRINGE (ML) INJECTION
Status: CANCELLED | OUTPATIENT
Start: 2019-04-08

## 2019-03-13 RX ORDER — HEPARIN 100 UNIT/ML
500 SYRINGE INTRAVENOUS
Status: CANCELLED | OUTPATIENT
Start: 2019-03-13

## 2019-03-13 RX ORDER — FLUOROURACIL 50 MG/ML
400 INJECTION, SOLUTION INTRAVENOUS
Status: CANCELLED | OUTPATIENT
Start: 2019-03-25

## 2019-03-14 RX ORDER — SODIUM CHLORIDE 9 MG/ML
INJECTION, SOLUTION INTRAVENOUS CONTINUOUS
Status: CANCELLED | OUTPATIENT
Start: 2019-03-14

## 2019-03-14 RX ORDER — SODIUM CHLORIDE 0.9 % (FLUSH) 0.9 %
10 SYRINGE (ML) INJECTION
Status: CANCELLED | OUTPATIENT
Start: 2019-03-14

## 2019-03-14 RX ORDER — HEPARIN 100 UNIT/ML
5 SYRINGE INTRAVENOUS
Status: CANCELLED | OUTPATIENT
Start: 2019-03-14

## 2019-03-18 RX ORDER — HEPARIN 100 UNIT/ML
5 SYRINGE INTRAVENOUS
Status: CANCELLED | OUTPATIENT
Start: 2019-03-18

## 2019-03-18 RX ORDER — SODIUM CHLORIDE 0.9 % (FLUSH) 0.9 %
10 SYRINGE (ML) INJECTION
Status: CANCELLED | OUTPATIENT
Start: 2019-03-18

## 2019-03-18 RX ORDER — SODIUM CHLORIDE 9 MG/ML
INJECTION, SOLUTION INTRAVENOUS CONTINUOUS
Status: CANCELLED | OUTPATIENT
Start: 2019-03-18

## 2019-03-22 ENCOUNTER — TELEPHONE (OUTPATIENT)
Dept: HEMATOLOGY/ONCOLOGY | Facility: CLINIC | Age: 64
End: 2019-03-22

## 2019-03-22 RX ORDER — POTASSIUM CHLORIDE 7.45 MG/ML
10 INJECTION INTRAVENOUS
Status: CANCELLED | OUTPATIENT
Start: 2019-03-22

## 2019-03-22 NOTE — TELEPHONE ENCOUNTER
Message left notifying patient that her potassium is low and that she needs to have it replaced asap. Patient instructed to call [phone] ext [phone]

## 2019-03-24 ENCOUNTER — HOSPITAL ENCOUNTER (INPATIENT)
Facility: HOSPITAL | Age: 64
LOS: 3 days | Discharge: HOME HEALTH | DRG: 871 | End: 2019-03-27
Attending: EMERGENCY MEDICINE | Admitting: INTERNAL MEDICINE
Payer: MEDICARE

## 2019-03-24 ENCOUNTER — PATIENT MESSAGE (OUTPATIENT)
Dept: HEMATOLOGY/ONCOLOGY | Facility: CLINIC | Age: 64
End: 2019-03-24

## 2019-03-24 DIAGNOSIS — D70.9 NEUTROPENIC FEVER: Primary | ICD-10-CM

## 2019-03-24 DIAGNOSIS — R50.81 NEUTROPENIC FEVER: Primary | ICD-10-CM

## 2019-03-24 DIAGNOSIS — K56.609 INTESTINAL OBSTRUCTION, UNSPECIFIED CAUSE, UNSPECIFIED WHETHER PARTIAL OR COMPLETE: ICD-10-CM

## 2019-03-24 DIAGNOSIS — C78.7 HEPATIC METASTASES: ICD-10-CM

## 2019-03-24 DIAGNOSIS — R00.0 TACHYCARDIA: ICD-10-CM

## 2019-03-24 PROBLEM — A41.9 SEPSIS: Status: ACTIVE | Noted: 2019-03-24

## 2019-03-24 LAB
ALBUMIN SERPL BCP-MCNC: 2.5 G/DL (ref 3.5–5.2)
ALP SERPL-CCNC: 210 U/L (ref 55–135)
ALT SERPL W/O P-5'-P-CCNC: 27 U/L (ref 10–44)
AMORPH CRY URNS QL MICRO: ABNORMAL
ANION GAP SERPL CALC-SCNC: 14 MMOL/L (ref 8–16)
ANISOCYTOSIS BLD QL SMEAR: ABNORMAL
AST SERPL-CCNC: 41 U/L (ref 10–40)
BACTERIA #/AREA URNS HPF: ABNORMAL /HPF
BASOPHILS NFR BLD: 0 % (ref 0–1.9)
BILIRUB SERPL-MCNC: 0.9 MG/DL (ref 0.1–1)
BILIRUB UR QL STRIP: NEGATIVE
BUN SERPL-MCNC: 10 MG/DL (ref 8–23)
CALCIUM SERPL-MCNC: 9.5 MG/DL (ref 8.7–10.5)
CHLORIDE SERPL-SCNC: 98 MMOL/L (ref 95–110)
CLARITY UR: CLEAR
CO2 SERPL-SCNC: 21 MMOL/L (ref 23–29)
COLOR UR: YELLOW
CREAT SERPL-MCNC: 0.8 MG/DL (ref 0.5–1.4)
DACRYOCYTES BLD QL SMEAR: ABNORMAL
DIFFERENTIAL METHOD: ABNORMAL
EOSINOPHIL NFR BLD: 0 % (ref 0–8)
ERYTHROCYTE [DISTWIDTH] IN BLOOD BY AUTOMATED COUNT: 30.4 % (ref 11.5–14.5)
EST. GFR  (AFRICAN AMERICAN): >60 ML/MIN/1.73 M^2
EST. GFR  (NON AFRICAN AMERICAN): >60 ML/MIN/1.73 M^2
GLUCOSE SERPL-MCNC: 138 MG/DL (ref 70–110)
GLUCOSE UR QL STRIP: NEGATIVE
HCT VFR BLD AUTO: 41 % (ref 37–48.5)
HGB BLD-MCNC: 13.1 G/DL (ref 12–16)
HGB UR QL STRIP: NEGATIVE
HYALINE CASTS #/AREA URNS LPF: 2 /LPF
INFLUENZA A, MOLECULAR: NEGATIVE
INFLUENZA B, MOLECULAR: NEGATIVE
KETONES UR QL STRIP: NEGATIVE
LACTATE SERPL-SCNC: 1.7 MMOL/L (ref 0.5–2.2)
LACTATE SERPL-SCNC: 2 MMOL/L (ref 0.5–2.2)
LEUKOCYTE ESTERASE UR QL STRIP: NEGATIVE
LYMPHOCYTES NFR BLD: 74 % (ref 18–48)
MCH RBC QN AUTO: 27.5 PG (ref 27–31)
MCHC RBC AUTO-ENTMCNC: 31.8 G/DL (ref 32–36)
MCV RBC AUTO: 86 FL (ref 82–98)
MICROSCOPIC COMMENT: ABNORMAL
MONOCYTES NFR BLD: 11 % (ref 4–15)
NEUTROPHILS NFR BLD: 11 % (ref 38–73)
NEUTS BAND NFR BLD MANUAL: 4 %
NITRITE UR QL STRIP: NEGATIVE
OVALOCYTES BLD QL SMEAR: ABNORMAL
PH UR STRIP: 6 [PH] (ref 5–8)
PLATELET # BLD AUTO: 302 K/UL (ref 150–350)
PMV BLD AUTO: 6.1 FL (ref 9.2–12.9)
POLYCHROMASIA BLD QL SMEAR: ABNORMAL
POTASSIUM SERPL-SCNC: 3.2 MMOL/L (ref 3.5–5.1)
PROT SERPL-MCNC: 6.7 G/DL (ref 6–8.4)
PROT UR QL STRIP: ABNORMAL
RBC # BLD AUTO: 4.75 M/UL (ref 4–5.4)
RBC #/AREA URNS HPF: 6 /HPF (ref 0–4)
SODIUM SERPL-SCNC: 133 MMOL/L (ref 136–145)
SP GR UR STRIP: 1.01 (ref 1–1.03)
SPECIMEN SOURCE: NORMAL
SQUAMOUS #/AREA URNS HPF: 7 /HPF
URATE CRY URNS QL MICRO: ABNORMAL
URN SPEC COLLECT METH UR: ABNORMAL
UROBILINOGEN UR STRIP-ACNC: 1 EU/DL
WBC # BLD AUTO: 2.2 K/UL (ref 3.9–12.7)
WBC #/AREA URNS HPF: 14 /HPF (ref 0–5)

## 2019-03-24 PROCEDURE — 36415 COLL VENOUS BLD VENIPUNCTURE: CPT

## 2019-03-24 PROCEDURE — 96375 TX/PRO/DX INJ NEW DRUG ADDON: CPT

## 2019-03-24 PROCEDURE — 80053 COMPREHEN METABOLIC PANEL: CPT

## 2019-03-24 PROCEDURE — 83605 ASSAY OF LACTIC ACID: CPT

## 2019-03-24 PROCEDURE — 25000003 PHARM REV CODE 250: Performed by: NURSE PRACTITIONER

## 2019-03-24 PROCEDURE — 25000003 PHARM REV CODE 250: Performed by: EMERGENCY MEDICINE

## 2019-03-24 PROCEDURE — 25500020 PHARM REV CODE 255: Performed by: EMERGENCY MEDICINE

## 2019-03-24 PROCEDURE — 96366 THER/PROPH/DIAG IV INF ADDON: CPT

## 2019-03-24 PROCEDURE — 87040 BLOOD CULTURE FOR BACTERIA: CPT | Mod: 59

## 2019-03-24 PROCEDURE — 63600175 PHARM REV CODE 636 W HCPCS: Performed by: EMERGENCY MEDICINE

## 2019-03-24 PROCEDURE — 81000 URINALYSIS NONAUTO W/SCOPE: CPT

## 2019-03-24 PROCEDURE — 96365 THER/PROPH/DIAG IV INF INIT: CPT

## 2019-03-24 PROCEDURE — 99285 EMERGENCY DEPT VISIT HI MDM: CPT | Mod: 25

## 2019-03-24 PROCEDURE — 93005 ELECTROCARDIOGRAM TRACING: CPT

## 2019-03-24 PROCEDURE — 85007 BL SMEAR W/DIFF WBC COUNT: CPT

## 2019-03-24 PROCEDURE — 85027 COMPLETE CBC AUTOMATED: CPT

## 2019-03-24 PROCEDURE — 12000002 HC ACUTE/MED SURGE SEMI-PRIVATE ROOM

## 2019-03-24 PROCEDURE — 63600175 PHARM REV CODE 636 W HCPCS: Performed by: NURSE PRACTITIONER

## 2019-03-24 PROCEDURE — 87086 URINE CULTURE/COLONY COUNT: CPT

## 2019-03-24 PROCEDURE — 87502 INFLUENZA DNA AMP PROBE: CPT

## 2019-03-24 PROCEDURE — 96361 HYDRATE IV INFUSION ADD-ON: CPT

## 2019-03-24 RX ORDER — SODIUM CHLORIDE 9 MG/ML
INJECTION, SOLUTION INTRAVENOUS CONTINUOUS
Status: DISCONTINUED | OUTPATIENT
Start: 2019-03-24 | End: 2019-03-26

## 2019-03-24 RX ORDER — MORPHINE SULFATE 4 MG/ML
4 INJECTION, SOLUTION INTRAMUSCULAR; INTRAVENOUS EVERY 4 HOURS PRN
Status: DISCONTINUED | OUTPATIENT
Start: 2019-03-24 | End: 2019-03-26

## 2019-03-24 RX ORDER — GLUCAGON 1 MG
1 KIT INJECTION
Status: DISCONTINUED | OUTPATIENT
Start: 2019-03-24 | End: 2019-03-27 | Stop reason: HOSPADM

## 2019-03-24 RX ORDER — CEFEPIME HYDROCHLORIDE 2 G/50ML
2 INJECTION, SOLUTION INTRAVENOUS
Status: DISCONTINUED | OUTPATIENT
Start: 2019-03-24 | End: 2019-03-24

## 2019-03-24 RX ORDER — IBUPROFEN 200 MG
24 TABLET ORAL
Status: DISCONTINUED | OUTPATIENT
Start: 2019-03-24 | End: 2019-03-27 | Stop reason: HOSPADM

## 2019-03-24 RX ORDER — MORPHINE SULFATE 4 MG/ML
2 INJECTION, SOLUTION INTRAMUSCULAR; INTRAVENOUS EVERY 4 HOURS PRN
Status: DISCONTINUED | OUTPATIENT
Start: 2019-03-24 | End: 2019-03-26

## 2019-03-24 RX ORDER — ACETAMINOPHEN 500 MG
1000 TABLET ORAL EVERY 6 HOURS PRN
Status: DISCONTINUED | OUTPATIENT
Start: 2019-03-24 | End: 2019-03-27 | Stop reason: HOSPADM

## 2019-03-24 RX ORDER — ENOXAPARIN SODIUM 100 MG/ML
40 INJECTION SUBCUTANEOUS EVERY 24 HOURS
Status: DISCONTINUED | OUTPATIENT
Start: 2019-03-24 | End: 2019-03-27 | Stop reason: HOSPADM

## 2019-03-24 RX ORDER — ONDANSETRON 2 MG/ML
8 INJECTION INTRAMUSCULAR; INTRAVENOUS EVERY 8 HOURS PRN
Status: DISCONTINUED | OUTPATIENT
Start: 2019-03-24 | End: 2019-03-27 | Stop reason: HOSPADM

## 2019-03-24 RX ORDER — IBUPROFEN 200 MG
16 TABLET ORAL
Status: DISCONTINUED | OUTPATIENT
Start: 2019-03-24 | End: 2019-03-27 | Stop reason: HOSPADM

## 2019-03-24 RX ORDER — KETOROLAC TROMETHAMINE 30 MG/ML
15 INJECTION, SOLUTION INTRAMUSCULAR; INTRAVENOUS EVERY 6 HOURS PRN
Status: DISCONTINUED | OUTPATIENT
Start: 2019-03-24 | End: 2019-03-27 | Stop reason: HOSPADM

## 2019-03-24 RX ORDER — SODIUM CHLORIDE 0.9 % (FLUSH) 0.9 %
5 SYRINGE (ML) INJECTION
Status: DISCONTINUED | OUTPATIENT
Start: 2019-03-24 | End: 2019-03-27 | Stop reason: HOSPADM

## 2019-03-24 RX ORDER — POTASSIUM CHLORIDE 7.45 MG/ML
10 INJECTION INTRAVENOUS
Status: COMPLETED | OUTPATIENT
Start: 2019-03-24 | End: 2019-03-25

## 2019-03-24 RX ADMIN — SODIUM CHLORIDE: 0.9 INJECTION, SOLUTION INTRAVENOUS at 10:03

## 2019-03-24 RX ADMIN — IOHEXOL 100 ML: 350 INJECTION, SOLUTION INTRAVENOUS at 05:03

## 2019-03-24 RX ADMIN — SODIUM CHLORIDE 1000 ML: 0.9 INJECTION, SOLUTION INTRAVENOUS at 04:03

## 2019-03-24 RX ADMIN — POTASSIUM CHLORIDE 10 MEQ: 7.46 INJECTION, SOLUTION INTRAVENOUS at 10:03

## 2019-03-24 RX ADMIN — ENOXAPARIN SODIUM 40 MG: 100 INJECTION SUBCUTANEOUS at 10:03

## 2019-03-24 RX ADMIN — VANCOMYCIN HYDROCHLORIDE 1500 MG: 1 INJECTION, POWDER, LYOPHILIZED, FOR SOLUTION INTRAVENOUS at 08:03

## 2019-03-24 RX ADMIN — PIPERACILLIN SODIUM AND TAZOBACTAM SODIUM 4.5 G: 4; .5 INJECTION, POWDER, LYOPHILIZED, FOR SOLUTION INTRAVENOUS at 05:03

## 2019-03-24 RX ADMIN — MORPHINE SULFATE 2 MG: 4 INJECTION, SOLUTION INTRAMUSCULAR; INTRAVENOUS at 10:03

## 2019-03-24 RX ADMIN — ONDANSETRON 8 MG: 2 INJECTION INTRAMUSCULAR; INTRAVENOUS at 10:03

## 2019-03-24 NOTE — ED PROVIDER NOTES
Encounter Date: 3/24/2019    SCRIBE #1 NOTE: I, Becky Mckenna and nilda scribing for, and in the presence of, Blaine Watson MD.       History     Chief Complaint   Patient presents with    Abdominal Pain     since yesterday        Time seen by provider: 3:37 PM on 03/24/2019    Indigo Stuart is a 63 y.o. female with colon cancer and HTN who presents to the ED with an onset of continuous abdominal cramps starting 1 day ago. Patient reports an associated symptom of nausea but denies vomiting. The patient also has a fever as measured in the ED, but she did not notice a fever PTA. She took pepto-bismol but has not experienced relief. She reports not eating since the stomach pain began, and her last bowel movement was 1 day ago. The patient denies diarrhea, abdominal swelling, cough, painful urination, back pain, rashes, or any other symptoms at this time. Patient's PSHx includes colon surgery, hysterectomy, cholecystectomy, and esophagogastroduodenoscopy. Drug allergies to Codeine, Erythromycin base, and Lisinopril noted.    The history is provided by the patient.     Review of patient's allergies indicates:   Allergen Reactions    Codeine Nausea And Vomiting    Erythromycin base Other (See Comments)    Lisinopril Palpitations     Cough      Past Medical History:   Diagnosis Date    Cancer     colon    Encounter for blood transfusion     Hypertension      Past Surgical History:   Procedure Laterality Date    CHOLECYSTECTOMY      COLON SURGERY      COLONOSCOPY N/A 3/30/2018    Performed by Daniel Magana MD at [facility] ENDO    EGD (ESOPHAGOGASTRODUODENOSCOPY) N/A 1/14/2019    Performed by Monserrat Lopez MD at [facility] ENDO (2ND FLR)    EGD (ESOPHAGOGASTRODUODENOSCOPY) N/A 1/3/2019    Performed by Adis Arguello MD at [facility] ENDO    ESOPHAGOGASTRODUODENOSCOPY (EGD) N/A 3/30/2018    Performed by Daniel Magana MD at [facility] ENDO    HYSTERECTOMY      HTBGBYFUJ-KIOX-Q-CATH N/A 1/4/2019     Performed by Emilio Romero MD at [facility] OR     Family History   Problem Relation Age of Onset    Cancer Mother         colon ca, liver ca    Cancer Father      Social History     Tobacco Use    Smoking status: Never Smoker    Smokeless tobacco: Never Used   Substance Use Topics    Alcohol use: No    Drug use: No     Review of Systems   Constitutional: Positive for appetite change and fever.   HENT: Negative for sore throat.    Respiratory: Negative for cough and shortness of breath.    Cardiovascular: Negative for chest pain.   Gastrointestinal: Positive for abdominal pain and nausea. Negative for diarrhea and vomiting.   Genitourinary: Negative for dysuria.   Musculoskeletal: Negative for back pain.   Skin: Negative for rash.   Neurological: Negative for weakness.   Hematological: Does not bruise/bleed easily.       Physical Exam     Initial Vitals [03/24/19 1350]   BP Pulse Resp Temp SpO2   124/75 (!) 130 20 (!) 101.9 °F (38.8 °C) 95 %      MAP       --         Physical Exam    Nursing note and vitals reviewed.  Constitutional: She appears well-developed and well-nourished.  Non-toxic appearance. No distress.   HENT:   Head: Normocephalic and atraumatic.   Eyes: EOM are normal. Pupils are equal, round, and reactive to light.   Neck: Normal range of motion. Neck supple. No neck rigidity. No JVD present.   Cardiovascular: Regular rhythm, normal heart sounds and intact distal pulses. Tachycardia present.  Exam reveals no gallop and no friction rub.    No murmur heard.  Mild tachycardia noted.   Pulmonary/Chest: Breath sounds normal. She has no wheezes. She has no rhonchi. She has no rales.   Abdominal: Bowel sounds are normal. She exhibits distension. There is tenderness in the right lower quadrant. There is no rigidity, no rebound and no guarding.   Firm mass in the RLQ noted. Tympany in the upper quadrants noted. Infraumbilical incision scar noted.   Musculoskeletal: Normal range of motion.   Neurological:  She is alert and oriented to person, place, and time. She has normal strength and normal reflexes. No cranial nerve deficit or sensory deficit. She exhibits normal muscle tone. Coordination normal. GCS eye subscore is 4. GCS verbal subscore is 5. GCS motor subscore is 6.   Skin: Skin is warm and dry.   Psychiatric: She has a normal mood and affect. Her speech is normal and behavior is normal. She is not actively hallucinating.         ED Course   Procedures  Labs Reviewed   CBC W/ AUTO DIFFERENTIAL - Abnormal; Notable for the following components:       Result Value    WBC 2.20 (*)     MCHC 31.8 (*)     RDW 30.4 (*)     MPV 6.1 (*)     Gran% 11.0 (*)     Lymph% 74.0 (*)     All other components within normal limits   COMPREHENSIVE METABOLIC PANEL - Abnormal; Notable for the following components:    Sodium 133 (*)     Potassium 3.2 (*)     CO2 21 (*)     Glucose 138 (*)     Albumin 2.5 (*)     Alkaline Phosphatase 210 (*)     AST 41 (*)     All other components within normal limits   URINALYSIS, REFLEX TO URINE CULTURE - Abnormal; Notable for the following components:    Protein, UA 1+ (*)     All other components within normal limits    Narrative:     Preferred Collection Type->Urine, Clean Catch   URINALYSIS MICROSCOPIC - Abnormal; Notable for the following components:    RBC, UA 6 (*)     WBC, UA 14 (*)     Bacteria, UA Few (*)     Hyaline Casts, UA 2 (*)     All other components within normal limits    Narrative:     Preferred Collection Type->Urine, Clean Catch   INFLUENZA A & B BY MOLECULAR   CLOSTRIDIUM DIFFICILE   LACTIC ACID, PLASMA   LACTIC ACID, PLASMA     EKG Readings: (Independently Interpreted)   Initial Reading: No STEMI.   Tachycardic sinus rhythm at rate of 103. ST segment and T wave abnormality in anterior lateral leads noted. Normal axis noted.     ECG Results          EKG 12-lead (In process)  Result time 03/25/19 10:15:09    In process by Interface, Lab In [facility] (03/25/19 10:15:09)                  Narrative:    Test Reason : R00.0,    Vent. Rate : 103 BPM     Atrial Rate : 103 BPM     P-R Int : 150 ms          QRS Dur : 084 ms      QT Int : 330 ms       P-R-T Axes : 050 072 008 degrees     QTc Int : 432 ms    Sinus tachycardia  ST and T wave abnormality, consider anterolateral ischemia  Abnormal ECG  When compared with ECG of 01-JAN-2019 17:19,  T wave inversion now evident in Lateral leads    Referred By: [email or web address]   SELF           Confirmed By:                             Imaging Results          CT Abdomen Pelvis With Contrast (Final result)  Result time 03/25/19 10:05:39    Final result by Namrata Kennedy MD (03/25/19 10:05:39)                 Impression:      Visualized nodules in the lung bases not significantly changed compared to prior study 12/6/2018    Increase of the liver lesions consistent metastatic disease.  Index lesions have been detailed above    Findings consistent with small-bowel obstruction distal small bowel proximal to to the ileum with there is also wall thickening which could be infectious/inflammatory, schema, neoplastic.    Duodenal stent placed during the interval    Multiple ventral hernias fat containing increased compared to the prior exam.  Additional findings as detailed above including mild mesenteric fat stranding and mildly prominent mesenteric lymph nodes that appears similar or just minimally more prominent than on the prior exam, trace ascites.    Final read      Electronically signed by: Namrata Kennedy MD  Date:    03/25/2019  Time:    10:05             Narrative:    EXAMINATION:  CT ABDOMEN PELVIS WITH CONTRAST    CLINICAL HISTORY:  Abdominal distension;Nausea, vomiting, diarrhea;RLQ pain, appendicitis suspected;    TECHNIQUE:  Low dose axial images, sagittal and coronal reformations were obtained from the lung bases to the pubic symphysis following the IV administration of 100 mL of Omnipaque 350 and without PO contrast    COMPARISON:  12/6/2018 hepatic masses  consistent with metastatic disease.    FINDINGS:  Visualized lung bases show dependent hypoventilatory changes, 8 mm nodule right lung base, 5 mm nodule left lung base not appearing significantly changed..    There are numerous hepatic masses consistent with metastatic disease.  Index lesions    Hepatic section VI/V axial series 2, image 48 6.5 x 6.3 cm in contrast to 6.3 x 6.3 cm previously    Hepatic section V 7/VI  axial series 2, image 36; 6.0 x 4.8 cm today in contrast to 5.8 x 5.0 cm previously    Hepatic section IV axial series 2, image 28 5.6 x 4.6 cm today in contrast to 4.8 x 4.3 cm previously    Other masses have also obviously increased in size    Spleen; unremarkable appearance.  Accessory spleen noted.    Adrenal glands; unremarkable appearance    Pancreas; unremarkable appearance    Gallbladder and bile ducts; prior cholecystectomy.  No biliary duct dilatation    Abdominal aorta; no aneurysm    Kidneys, ureters, bladder; 2.4 cm left renal cyst and subcentimeter hypodensities also most likely representing cysts.  Renal enhancement is symmetrical.  Urinary bladder mildly distended at time of the exam and unremarkable appearance    Reproductive organs; prior hysterectomy.  Adnexal region unremarkable appearance    Stomach, bowel, mesentery; duodenal stent which has been inserted since the prior exam.  Mild dilatation of small bowel distal to the stent.  The appearance is consistent with small-bowel obstruction.  The transition zone appears to be in distal small bowel proximal to the terminal ileum with terminal ileum decompressed but distal ileum slightly more proximally having thick wall and fecal filled appearance for example axial series 2 images 119 through 122.  Very small amount of free fluid the pelvis.  Postoperative changes with anastomotic sutures sigmoid colon.  Diverticulosis without CT findings of acute diverticulitis.  Appendix normal in appearance very mild mesenteric fat stranding.   Mildly prominent mesenteric lymph nodes..  No free intraperitoneal air    Anterior abdominal wall; multiple fat containing ventral hernias more so in larger today than on the prior exam including lobular fat containing ventral hernia just to the right of the midline.  Small bilateral fat containing inguinal hernias also noted..    Osseous structures; degenerative changes.  Grade 1 spondylolisthesis L5-S1.                               X-Ray Chest AP Portable (Final result)  Result time 03/24/19 16:34:30    Final result by Micky Ward MD (03/24/19 16:34:30)                 Impression:      Mild cardiomegaly.  Left Port-A-Cath.      Electronically signed by: Micky Ward MD  Date:    03/24/2019  Time:    16:34             Narrative:    EXAMINATION:  XR CHEST AP PORTABLE    CLINICAL HISTORY:  Sepsis;    TECHNIQUE:  Single frontal view of the chest was performed.    COMPARISON:  01/04/2019    FINDINGS:  A left subclavian Port-A-Cath has its tip in the superior vena cava.  The heart is mildly enlarged.  No consolidation or pleural effusion.                                 Medical Decision Making:   History:   Old Medical Records: I decided to obtain old medical records.  Initial Assessment:   Patient is 63-year-old woman who presents to emergency department complaining of abdominal pain with neutropenic fever.  Sepsis criteria initiated and patient started on IV antibiotics.  Vital signs stable. No evidence of septic shock.  Care turned over to oncoming physician Dr. Green at end of shift.  Patient will need to be admitted to the hospital for IV antibiotic therapy.  Clinical Tests:   Lab Tests: Reviewed and Ordered  Radiological Study: Ordered and Reviewed  Medical Tests: Reviewed and Ordered            Scribe Attestation:   Scribe #1: I performed the above scribed service and the documentation accurately describes the services I performed. I attest to the accuracy of the note.    I, Walter Carlson,  personally performed the services described in this documentation. All medical record entries made by the scribe were at my direction and in my presence.  I have reviewed the chart and agree that the record reflects my personal performance and is accurate and complete. Blaine Watson MD.  4:08 PM 03/26/2019       DISCLAIMER: This note was prepared with [employer] voice recognition transcription software. Garbled syntax, mangled pronouns, and other bizarre constructions may be attributed to that software system.             Clinical Impression:       ICD-10-CM ICD-9-CM   1. Neutropenic fever D70.9 288.00    R50.81 780.61   2. Tachycardia R00.0 785.0   3. Intestinal obstruction, unspecified cause, unspecified whether partial or complete K56.609 560.9                                Blaine Watson MD  03/26/19 1608

## 2019-03-25 PROBLEM — E83.42 HYPOMAGNESEMIA: Status: ACTIVE | Noted: 2019-03-25

## 2019-03-25 LAB
ALBUMIN SERPL BCP-MCNC: 2.3 G/DL (ref 3.5–5.2)
ALP SERPL-CCNC: 188 U/L (ref 55–135)
ALT SERPL W/O P-5'-P-CCNC: 23 U/L (ref 10–44)
ANION GAP SERPL CALC-SCNC: 11 MMOL/L (ref 8–16)
ANISOCYTOSIS BLD QL SMEAR: ABNORMAL
AST SERPL-CCNC: 24 U/L (ref 10–40)
BACTERIA #/AREA URNS HPF: NORMAL /HPF
BASOPHILS # BLD AUTO: ABNORMAL K/UL (ref 0–0.2)
BASOPHILS NFR BLD: 0 % (ref 0–1.9)
BILIRUB SERPL-MCNC: 0.9 MG/DL (ref 0.1–1)
BILIRUB UR QL STRIP: NEGATIVE
BUN SERPL-MCNC: 11 MG/DL (ref 8–23)
CALCIUM SERPL-MCNC: 9.2 MG/DL (ref 8.7–10.5)
CHLORIDE SERPL-SCNC: 99 MMOL/L (ref 95–110)
CLARITY UR: CLEAR
CO2 SERPL-SCNC: 24 MMOL/L (ref 23–29)
COLOR UR: YELLOW
CREAT SERPL-MCNC: 0.8 MG/DL (ref 0.5–1.4)
DIFFERENTIAL METHOD: ABNORMAL
EOSINOPHIL # BLD AUTO: ABNORMAL K/UL (ref 0–0.5)
EOSINOPHIL NFR BLD: 1 % (ref 0–8)
ERYTHROCYTE [DISTWIDTH] IN BLOOD BY AUTOMATED COUNT: 30 % (ref 11.5–14.5)
EST. GFR  (AFRICAN AMERICAN): >60 ML/MIN/1.73 M^2
EST. GFR  (NON AFRICAN AMERICAN): >60 ML/MIN/1.73 M^2
GLUCOSE SERPL-MCNC: 124 MG/DL (ref 70–110)
GLUCOSE UR QL STRIP: NEGATIVE
HCT VFR BLD AUTO: 39.2 % (ref 37–48.5)
HGB BLD-MCNC: 12.5 G/DL (ref 12–16)
HGB UR QL STRIP: NEGATIVE
HYALINE CASTS #/AREA URNS LPF: 0 /LPF
KETONES UR QL STRIP: NEGATIVE
LACTATE SERPL-SCNC: 1.7 MMOL/L (ref 0.5–2.2)
LEUKOCYTE ESTERASE UR QL STRIP: NEGATIVE
LYMPHOCYTES # BLD AUTO: ABNORMAL K/UL (ref 1–4.8)
LYMPHOCYTES NFR BLD: 55 % (ref 18–48)
MAGNESIUM SERPL-MCNC: 1.4 MG/DL (ref 1.6–2.6)
MCH RBC QN AUTO: 27.8 PG (ref 27–31)
MCHC RBC AUTO-ENTMCNC: 31.8 G/DL (ref 32–36)
MCV RBC AUTO: 87 FL (ref 82–98)
METAMYELOCYTES NFR BLD MANUAL: 9 %
MICROSCOPIC COMMENT: NORMAL
MONOCYTES # BLD AUTO: ABNORMAL K/UL (ref 0.3–1)
MONOCYTES NFR BLD: 12 % (ref 4–15)
MYELOCYTES NFR BLD MANUAL: 1 %
NEUTROPHILS NFR BLD: 13 % (ref 38–73)
NEUTS BAND NFR BLD MANUAL: 9 %
NITRITE UR QL STRIP: NEGATIVE
OVALOCYTES BLD QL SMEAR: ABNORMAL
PH UR STRIP: 6 [PH] (ref 5–8)
PLATELET # BLD AUTO: 321 K/UL (ref 150–350)
PLATELET BLD QL SMEAR: ABNORMAL
PMV BLD AUTO: 6.6 FL (ref 9.2–12.9)
POIKILOCYTOSIS BLD QL SMEAR: SLIGHT
POLYCHROMASIA BLD QL SMEAR: ABNORMAL
POTASSIUM SERPL-SCNC: 3.3 MMOL/L (ref 3.5–5.1)
PROT SERPL-MCNC: 6.1 G/DL (ref 6–8.4)
PROT UR QL STRIP: ABNORMAL
RBC # BLD AUTO: 4.5 M/UL (ref 4–5.4)
RBC #/AREA URNS HPF: 1 /HPF (ref 0–4)
SODIUM SERPL-SCNC: 134 MMOL/L (ref 136–145)
SP GR UR STRIP: 1.01 (ref 1–1.03)
SQUAMOUS #/AREA URNS HPF: 5 /HPF
URN SPEC COLLECT METH UR: ABNORMAL
UROBILINOGEN UR STRIP-ACNC: 1 EU/DL
WBC # BLD AUTO: 3.2 K/UL (ref 3.9–12.7)
WBC #/AREA URNS HPF: 3 /HPF (ref 0–5)

## 2019-03-25 PROCEDURE — 25000003 PHARM REV CODE 250: Performed by: NURSE PRACTITIONER

## 2019-03-25 PROCEDURE — 63600175 PHARM REV CODE 636 W HCPCS: Performed by: HOSPITALIST

## 2019-03-25 PROCEDURE — 81000 URINALYSIS NONAUTO W/SCOPE: CPT

## 2019-03-25 PROCEDURE — 97161 PT EVAL LOW COMPLEX 20 MIN: CPT

## 2019-03-25 PROCEDURE — 85007 BL SMEAR W/DIFF WBC COUNT: CPT

## 2019-03-25 PROCEDURE — 99223 1ST HOSP IP/OBS HIGH 75: CPT | Mod: ,,, | Performed by: SURGERY

## 2019-03-25 PROCEDURE — 80053 COMPREHEN METABOLIC PANEL: CPT

## 2019-03-25 PROCEDURE — 97802 MEDICAL NUTRITION INDIV IN: CPT

## 2019-03-25 PROCEDURE — 85027 COMPLETE CBC AUTOMATED: CPT

## 2019-03-25 PROCEDURE — 36415 COLL VENOUS BLD VENIPUNCTURE: CPT

## 2019-03-25 PROCEDURE — 83605 ASSAY OF LACTIC ACID: CPT

## 2019-03-25 PROCEDURE — 12000002 HC ACUTE/MED SURGE SEMI-PRIVATE ROOM

## 2019-03-25 PROCEDURE — 25000003 PHARM REV CODE 250: Performed by: HOSPITALIST

## 2019-03-25 PROCEDURE — 63600175 PHARM REV CODE 636 W HCPCS: Performed by: NURSE PRACTITIONER

## 2019-03-25 PROCEDURE — 83735 ASSAY OF MAGNESIUM: CPT

## 2019-03-25 PROCEDURE — 99223 PR INITIAL HOSPITAL CARE,LEVL III: ICD-10-PCS | Mod: ,,, | Performed by: SURGERY

## 2019-03-25 PROCEDURE — 97116 GAIT TRAINING THERAPY: CPT

## 2019-03-25 PROCEDURE — 94761 N-INVAS EAR/PLS OXIMETRY MLT: CPT

## 2019-03-25 RX ORDER — METOPROLOL SUCCINATE 25 MG/1
25 TABLET, EXTENDED RELEASE ORAL NIGHTLY
Status: DISCONTINUED | OUTPATIENT
Start: 2019-03-25 | End: 2019-03-27 | Stop reason: HOSPADM

## 2019-03-25 RX ORDER — MAGNESIUM SULFATE HEPTAHYDRATE 40 MG/ML
2 INJECTION, SOLUTION INTRAVENOUS ONCE
Status: COMPLETED | OUTPATIENT
Start: 2019-03-25 | End: 2019-03-25

## 2019-03-25 RX ADMIN — SODIUM CHLORIDE: 0.9 INJECTION, SOLUTION INTRAVENOUS at 10:03

## 2019-03-25 RX ADMIN — POTASSIUM CHLORIDE 10 MEQ: 7.46 INJECTION, SOLUTION INTRAVENOUS at 01:03

## 2019-03-25 RX ADMIN — METOPROLOL SUCCINATE 25 MG: 25 TABLET, EXTENDED RELEASE ORAL at 10:03

## 2019-03-25 RX ADMIN — POTASSIUM CHLORIDE 10 MEQ: 7.46 INJECTION, SOLUTION INTRAVENOUS at 03:03

## 2019-03-25 RX ADMIN — PIPERACILLIN SODIUM AND TAZOBACTAM SODIUM 4.5 G: 4; .5 INJECTION, POWDER, LYOPHILIZED, FOR SOLUTION INTRAVENOUS at 10:03

## 2019-03-25 RX ADMIN — MAGNESIUM SULFATE HEPTAHYDRATE 2 G: 40 INJECTION, SOLUTION INTRAVENOUS at 03:03

## 2019-03-25 RX ADMIN — MORPHINE SULFATE 4 MG: 4 INJECTION, SOLUTION INTRAMUSCULAR; INTRAVENOUS at 02:03

## 2019-03-25 RX ADMIN — PIPERACILLIN AND TAZOBACTAM 4.5 G: 4; .5 INJECTION, POWDER, LYOPHILIZED, FOR SOLUTION INTRAVENOUS; PARENTERAL at 04:03

## 2019-03-25 RX ADMIN — PIPERACILLIN SODIUM AND TAZOBACTAM SODIUM 4.5 G: 4; .5 INJECTION, POWDER, LYOPHILIZED, FOR SOLUTION INTRAVENOUS at 11:03

## 2019-03-25 RX ADMIN — ENOXAPARIN SODIUM 40 MG: 100 INJECTION SUBCUTANEOUS at 05:03

## 2019-03-25 RX ADMIN — POTASSIUM CHLORIDE 10 MEQ: 7.46 INJECTION, SOLUTION INTRAVENOUS at 02:03

## 2019-03-25 NOTE — ED NOTES
Assumed care of pt. Pt denies nausea this time. Abdomen soft. Pt in no noted acute distress. Pt on vitals monitor. Alert and oriented at this time.

## 2019-03-25 NOTE — ASSESSMENT & PLAN NOTE
Established diagnosis, Pt underwent duodenal-jejunal stenting in January.  CT without any evidence of complications.    Ongoing management of metastatic cancer process per Oncology.

## 2019-03-25 NOTE — ASSESSMENT & PLAN NOTE
Acute, replete with IV potassium. And magnesium      Monitor electrolytes closely and replete as necessary.

## 2019-03-25 NOTE — SUBJECTIVE & OBJECTIVE
Interval History: *pt seen/examined --  Feells like passed some flatus after eating ice   Weakness better after getting ivf     Review of Systems   Constitutional: Positive for fatigue and fever (here at hospital). Negative for activity change, appetite change and chills.   HENT: Negative for congestion, mouth sores, postnasal drip, sinus pressure, sinus pain, sore throat and trouble swallowing.    Eyes: Negative for photophobia and visual disturbance.   Respiratory: Negative for cough, shortness of breath and wheezing.    Cardiovascular: Negative for chest pain, palpitations and leg swelling.   Gastrointestinal: Positive for abdominal pain, diarrhea and nausea. Negative for blood in stool, constipation and vomiting.   Genitourinary: Negative for difficulty urinating, dysuria, flank pain, frequency and hematuria.   Musculoskeletal: Negative for arthralgias and myalgias.   Skin: Negative for color change.   Neurological: Positive for weakness. Negative for dizziness, light-headedness and headaches.   Psychiatric/Behavioral: Negative for confusion. The patient is not nervous/anxious.      Objective:     Vital Signs (Most Recent):  Temp: 98.2 °F (36.8 °C) (03/25/19 1137)  Pulse: 92 (03/25/19 1137)  Resp: 18 (03/25/19 1137)  BP: 124/69 (03/25/19 1137)  SpO2: 95 % (03/25/19 1500) Vital Signs (24h Range):  Temp:  [98.2 °F (36.8 °C)-99.9 °F (37.7 °C)] 98.2 °F (36.8 °C)  Pulse:  [] 92  Resp:  [18-20] 18  SpO2:  [92 %-100 %] 95 %  BP: (124-177)/(62-90) 124/69     Weight: 100.2 kg (220 lb 12.8 oz)  Body mass index is 40.38 kg/m².    Intake/Output Summary (Last 24 hours) at 3/25/2019 1513  Last data filed at 3/25/2019 0107  Gross per 24 hour   Intake --   Output 200 ml   Net -200 ml      Physical Exam   Constitutional: She is oriented to person, place, and time. She appears well-developed and well-nourished. No distress.   HENT:   Head: Normocephalic and atraumatic.   Nose: Nose normal.   Mouth/Throat: Oropharynx is  clear and moist.   Eyes: Conjunctivae and EOM are normal.   Neck: Normal range of motion. Neck supple. No thyromegaly present.   Cardiovascular: Normal rate, regular rhythm, normal heart sounds and intact distal pulses.   No murmur heard.  Pulmonary/Chest: Effort normal and breath sounds normal. No respiratory distress.   Abdominal: Soft. She exhibits no distension and no mass. There is no tenderness. There is no guarding.   Hypoactive bowel sounds;    Musculoskeletal: Normal range of motion. She exhibits no edema or tenderness.   Neurological: She is alert and oriented to person, place, and time. No cranial nerve deficit.   Skin: Capillary refill takes less than 2 seconds.   Psychiatric: She has a normal mood and affect. Her behavior is normal. Judgment and thought content normal.   Nursing note and vitals reviewed.      Significant Labs:   CBC:   Recent Labs   Lab 03/24/19  1623 03/25/19  0514   WBC 2.20* 3.20*   HGB 13.1 12.5   HCT 41.0 39.2    321     CMP:   Recent Labs   Lab 03/24/19  1623 03/25/19  0514   * 134*   K 3.2* 3.3*   CL 98 99   CO2 21* 24   * 124*   BUN 10 11   CREATININE 0.8 0.8   CALCIUM 9.5 9.2   PROT 6.7 6.1   ALBUMIN 2.5* 2.3*   BILITOT 0.9 0.9   ALKPHOS 210* 188*   AST 41* 24   ALT 27 23   ANIONGAP 14 11   EGFRNONAA >60 >60       Significant Imaging: I have reviewed and interpreted all pertinent imaging results/findings within the past 24 hours.

## 2019-03-25 NOTE — ASSESSMENT & PLAN NOTE
This patient does have evidence of infective focus- neutropenic fever, ? Intraabdominal source  My overall impression is sepsis.  Antibiotics given-   Antibiotics (From admission, onward)    Start     Stop Route Frequency Ordered    03/25/19 0400  piperacillin-tazobactam 4.5 g in dextrose 5 % 100 mL IVPB (ready to mix system)      -- IV Every 8 hours (non-standard times) 03/24/19 2136          Severe Sepsis only-  Latest labs reviewed, they are-  Recent Labs   Lab 03/24/19  1623   BILITOT 0.9     Recent Labs   Lab 03/24/19  1703 03/24/19  2059   LACTATE 1.7 2.0     No results for input(s): PH, PO2, PCO2, HCO3, BE in the last 168 hours.    No evidence of organ dysfunction or shock.   Follow blood cultures.  Obtain stool cultures/C Diff if has diarrhea.

## 2019-03-25 NOTE — SUBJECTIVE & OBJECTIVE
Past Medical History:   Diagnosis Date    Cancer     colon    Encounter for blood transfusion     Hypertension        Past Surgical History:   Procedure Laterality Date    CHOLECYSTECTOMY      COLON SURGERY      COLONOSCOPY N/A 3/30/2018    Performed by Daniel Magana MD at [facility] ENDO    EGD (ESOPHAGOGASTRODUODENOSCOPY) N/A 1/14/2019    Performed by Monserrat Lopez MD at [facility] ENDO (2ND FLR)    EGD (ESOPHAGOGASTRODUODENOSCOPY) N/A 1/3/2019    Performed by Adis Arguello MD at [facility] ENDO    ESOPHAGOGASTRODUODENOSCOPY (EGD) N/A 3/30/2018    Performed by Daniel Magana MD at [facility] ENDO    HYSTERECTOMY      [id number]-H-CATH N/A 1/4/2019    Performed by Emilio Romero MD at [facility] OR       Review of patient's allergies indicates:   Allergen Reactions    Codeine Nausea And Vomiting    Erythromycin base Other (See Comments)    Lisinopril Palpitations     Cough        No current facility-administered medications on file prior to encounter.      Current Outpatient Medications on File Prior to Encounter   Medication Sig    amLODIPine (NORVASC) 5 MG tablet Take 5 mg by mouth once daily.    atorvastatin (LIPITOR) 20 MG tablet Take 20 mg by mouth every evening.    ferrous sulfate (FEOSOL) 325 mg (65 mg iron) Tab tablet Take 1 tablet by mouth 2 (two) times daily.    hydroCHLOROthiazide (MICROZIDE) 12.5 mg capsule Take 12.5 mg by mouth once daily.    metoprolol succinate (TOPROL-XL) 25 MG 24 hr tablet Take 25 mg by mouth every evening.    ondansetron (ZOFRAN-ODT) 8 MG TbDL Take 1 tablet (8 mg total) by mouth every 12 (twelve) hours as needed.    potassium chloride (KLOR-CON) 8 MEQ TbSR Take 8 mEq by mouth.    promethazine (PHENERGAN) 25 MG tablet Take 1 tablet (25 mg total) by mouth every 6 (six) hours as needed for Nausea.     Family History     Problem Relation (Age of Onset)    Cancer Mother, Father        Tobacco Use    Smoking status: Never Smoker    Smokeless tobacco: Never Used    Substance and Sexual Activity    Alcohol use: No    Drug use: No    Sexual activity: Not on file     Review of Systems   Constitutional: Positive for fatigue and fever (here at hospital). Negative for activity change, appetite change and chills.   HENT: Negative for congestion, mouth sores, postnasal drip, sinus pressure, sinus pain, sore throat and trouble swallowing.    Eyes: Negative for photophobia and visual disturbance.   Respiratory: Negative for cough, shortness of breath and wheezing.    Cardiovascular: Negative for chest pain, palpitations and leg swelling.   Gastrointestinal: Positive for abdominal pain, diarrhea and nausea. Negative for blood in stool, constipation and vomiting.   Genitourinary: Negative for difficulty urinating, dysuria, flank pain, frequency and hematuria.   Musculoskeletal: Negative for arthralgias and myalgias.   Skin: Negative for color change.   Neurological: Positive for weakness. Negative for dizziness, light-headedness and headaches.   Psychiatric/Behavioral: Negative for confusion. The patient is not nervous/anxious.      Objective:     Vital Signs (Most Recent):  Temp: 98.7 °F (37.1 °C) (03/24/19 2204)  Pulse: 106 (03/24/19 2153)  Resp: 20 (03/24/19 2153)  BP: 131/76 (03/24/19 2153)  SpO2: 97 % (03/24/19 2153) Vital Signs (24h Range):  Temp:  [98.7 °F (37.1 °C)-101.9 °F (38.8 °C)] 98.7 °F (37.1 °C)  Pulse:  [] 106  Resp:  [20] 20  SpO2:  [92 %-100 %] 97 %  BP: (124-177)/(75-90) 131/76     Weight: 98.9 kg (218 lb)  Body mass index is 39.87 kg/m².    Physical Exam   Constitutional: She is oriented to person, place, and time. She appears well-developed and well-nourished. No distress.   HENT:   Head: Normocephalic and atraumatic.   Eyes: Pupils are equal, round, and reactive to light. Conjunctivae and EOM are normal.   Neck: Normal range of motion. Neck supple. No thyromegaly present.   Cardiovascular: Normal rate, regular rhythm, normal heart sounds and intact distal  pulses.   No murmur heard.  Pulmonary/Chest: Effort normal and breath sounds normal. No respiratory distress.   Abdominal: Soft. She exhibits distension. She exhibits no mass. There is tenderness. There is no guarding.   Hypoactive bowel sounds; abd originally soft on exam, however after pain episode tensed up.   Musculoskeletal: Normal range of motion. She exhibits no edema or tenderness.   Neurological: She is alert and oriented to person, place, and time. No cranial nerve deficit.   Skin: Capillary refill takes less than 2 seconds.   Psychiatric: She has a normal mood and affect. Her behavior is normal. Judgment and thought content normal.         CRANIAL NERVES     CN III, IV, VI   Pupils are equal, round, and reactive to light.  Extraocular motions are normal.        Significant Labs:   CBC:   Recent Labs   Lab 03/24/19  1623   WBC 2.20*   HGB 13.1   HCT 41.0        CMP:   Recent Labs   Lab 03/24/19  1623   *   K 3.2*   CL 98   CO2 21*   *   BUN 10   CREATININE 0.8   CALCIUM 9.5   PROT 6.7   ALBUMIN 2.5*   BILITOT 0.9   ALKPHOS 210*   AST 41*   ALT 27   ANIONGAP 14   EGFRNONAA >60     Coagulation: No results for input(s): PT, INR, APTT in the last 48 hours.  Lactic Acid:   Recent Labs   Lab 03/24/19  1703 03/24/19  2059   LACTATE 1.7 2.0     Urine Studies:   Recent Labs   Lab 03/24/19  1910   COLORU Yellow   APPEARANCEUA Clear   PHUR 6.0   SPECGRAV 1.015   PROTEINUA 1+*   GLUCUA Negative   KETONESU Negative   BILIRUBINUA Negative   OCCULTUA Negative   NITRITE Negative   UROBILINOGEN 1.0   LEUKOCYTESUR Negative   RBCUA 6*   WBCUA 14*   BACTERIA Few*   SQUAMEPITHEL 7   HYALINECASTS 2*       Significant Imaging:   CT A/P: ([facility])  1. Ileal small bowel wall thickening resulting in a small bowel obstruction as above. Bowel wall  thickening is nonspecific and may be infectious, inflammatory or ischemic in etiology. Metastatic  disease not excluded.  2. Stable bilateral lower lobe pulmonary nodules.  For patients at low risk (minimal or absent history of  smoking and of other known risk factors), recommend CT at 3-6 months, then consider CT at 18-24  months. For patients at high risk (history of smoking or of other known risk factors), recommend CT at  3-6 months, then CT at 18-24 months. (Luis Alberto et al., Fleischner Society, 2017)  3. Progression of multiple liver metastases.  4. Interval placement of duodenal-jejunal stent.  5. Small nonspecific mesenteric lymph nodes.  6. Fat-containing ventral wall hernias with a small amount of free fluid in the right ventral pelvic wall  hernia and adjacent subcutaneous edema.  7. Small bilateral fat-containing inguinal hernias.  8. Mild mesenteric edema and a small amount of free of fluid.  9. Additional nonacute findings as above.

## 2019-03-25 NOTE — PLAN OF CARE
Problem: Physical Therapy Goal  Goal: Physical Therapy Goal  Goals to be met by: 19     Patient will increase functional independence with mobility by performin. Supine to sit with Modified [place]  2. Sit to stand transfer with Stand-by Assistance  3. Bed to chair transfer with Stand-by Assistance with or without a Rolling Walker  4. Gait  x 100 feet with Stand-by Assistance using Rolling Walker.   5. Lower extremity exercise program x20 reps per handout, with supervision    Outcome: Ongoing (interventions implemented as appropriate)  Functional mob training and safety prec

## 2019-03-25 NOTE — ASSESSMENT & PLAN NOTE
Requiring NPO status for small bowel obstruction.  Consult with registered dietitian-Pt may require TPN.

## 2019-03-25 NOTE — PROGRESS NOTES
"[facility]  Adult Nutrition  Progress Note    SUMMARY   Intervention: pending    Recommendations:   1) Consider TPN if unable to advance diet within 48 hrs. D15, AA5 @ 75 mls/hr plus lipids.  Provides 1778 calories, 90 gm protein, 1800 mls fluid, GIR 1.87 (Meets 100% EEN/EPN)  Monitor triglycerides wkly when on lipids daily.    2) Advance diet per pt tolerance to low fiber + Boost Plus daily  Goals: 1) Pt will receive nutrition within 48 hrs  Nutrition Goal Status: new  Communication of RD Recs: (POC, sticky note)    Reason for Assessment    Reason For Assessment: identified at risk by screening criteria(MST)  Diagnosis: cancer diagnosis/related complications  Relevant Medical History: Metastatic colon cancer with mets to the liver. Last chemo 2 wks ago. Neutropenic fever.    Interdisciplinary Rounds: did not attend  General Information Comments: Pt alert with family at bedside. Hx of not eating since Saturday (3 days). Small amount of wt loss noted. NFPE completed. Pt has been educated re cancer nutrition by outpatient RD.   Nutrition Discharge Planning: Regular diet with nutrition recs for cancer nutrition    Nutrition Risk Screen    Nutrition Risk Screen: no indicators present    Nutrition/Diet History    Patient Reported Diet/Restrictions/Preferences: general  Spiritual, Cultural Beliefs, Yarsanism Practices, Values that Affect Care: no  Supplemental Drinks or Food Habits: (none)  Food Allergies: NKFA(or intolerances)  Factors Affecting Nutritional Intake: altered gastrointestinal function    Anthropometrics    Temp: 98 °F (36.7 °C)  Height Method: Stated  Height: 5' 2"  Height (inches): 62 in  Weight Method: Bed Scale  Weight: 100.2 kg (220 lb 14.4 oz)  Weight (lb): 220.9 lb  Ideal Body Weight (IBW), Female: 110 lb  % Ideal Body Weight, Female (lb): 200.82 lb  BMI (Calculated): 40.5  Usual Body Weight (UBW), k.2 kg (per chart review 19)  % Usual Body Weight: 96.36  % Weight Change " From Usual Weight: -3.84 %       Lab/Procedures/Meds    Pertinent Labs Reviewed: reviewed  Lab Results   Component Value Date    ALBUMIN 2.3 (L) 03/25/2019     Lab Results   Component Value Date    WBC 3.20 (L) 03/25/2019     BMP  Lab Results   Component Value Date     (L) 03/25/2019    K 3.3 (L) 03/25/2019    CL 99 03/25/2019    CO2 24 03/25/2019    BUN 11 03/25/2019    CREATININE 0.8 03/25/2019    CALCIUM 9.2 03/25/2019    ANIONGAP 11 03/25/2019    ESTGFRAFRICA >60 03/25/2019    EGFRNONAA >60 03/25/2019       Pertinent Medications Reviewed: reviewed  Scheduled Meds:   enoxaparin  40 mg Subcutaneous Daily    magnesium sulfate IVPB  2 g Intravenous Once    metoprolol succinate  25 mg Oral QHS    piperacillin-tazobactam 4.5 g in dextrose 5 % 100 mL IVPB (ready to mix system)  4.5 g Intravenous Q8H     Continuous Infusions:   sodium chloride 0.9% 100 mL/hr at 03/24/19 2212       Physical Findings/Assessment         Estimated/Assessed Needs    Weight Used For Calorie Calculations: 100.2 kg (220 lb 14.4 oz)  Energy Calorie Requirements (kcal): 3996-8030 (1-1.1 obesity, cancer)  Energy Need Method: [place]  Protein Requirements:  (.9-1.1 gm/kg/day, obesity vs cancer)  Weight Used For Protein Calculations: 100.2 kg (220 lb 14.4 oz)     Estimated Fluid Requirement Method: RDA Method(or per MD)  RDA Method (mL): 1500  CHO Requirement: n/a      Nutrition Prescription Ordered    Current Diet Order: NPO    Evaluation of Received Nutrient/Fluid Intake    Energy Calories Required: not meeting needs  Protein Required: not meeting needs  Fluid Required: exceeds needs  % Intake of Estimated Energy Needs: 0 - 25 %  % Meal Intake: NPO    Nutrition Risk    Level of Risk/Frequency of Follow-up: (2 x wkly)     Assessment and Plan     Inadequate energy intake r/t inability to consume sufficient energy as evidenced by altered GI tract    Monitor and Evaluation    Food and Nutrient Intake: energy intake  Food and  Nutrient Adminstration: diet order  Anthropometric Measurements: weight  Biochemical Data, Medical Tests and Procedures: electrolyte and renal panel, inflammatory profile  Nutrition-Focused Physical Findings: overall appearance, skin     Malnutrition Assessment     Edema (Fluid Accumulation): (no edema noted)   Subcutaneous Fat Loss (Final Summary): well nourished  Muscle Loss Evaluation (Final Summary): well nourished       No wound noted. Requires assistance in 5 of 8 ADLs per functional screen    Nutrition Follow-Up  yes

## 2019-03-25 NOTE — PLAN OF CARE
Problem: Adult Inpatient Plan of Care  Goal: Plan of Care Review  Outcome: Ongoing (interventions implemented as appropriate)  A&Ox.  Pt educated to call for assistance. Plan of care discussed with patient, all question answered. C/o pain addressed with prn medication. Comfort level established. Pt remains free from fall/injury throughout shift. IV fluids infusing per orders, pt tolerating well. Telemetry on and being monitored. Pt in NSR. Pt in isolation to r/o C.diff and for neutropenia. Clean catch obtained per orders. Pt has port a cath to L chest wall that's being accessed for chemo only. Bed in lowest position, wheels locked, side rails up x2, and call light within reach. No distress noted at this time. Will Continue to observe.

## 2019-03-25 NOTE — ASSESSMENT & PLAN NOTE
Chronic, stable.  Holding oral antihypertensives due to small-bowel obstruction.  BP stable this time.  Monitor BP closely and resume oral antihypertensives as clinically indicated.  Resume metoprolol -pt having some flatus and tolerating ice chips.

## 2019-03-25 NOTE — PLAN OF CARE
Met with pt to complete her assessment.  Pt's spouse and granddaughter were in the room.  Pt, who is independent with her self care, has [facility], a rollator and SC and lives with her spouse.  Pt's PCP is Dr. Conner and insurance is BC/BS.  Pt's discharge disposition is home with [facility].  Obtained signature for the disclosure form.      03/25/19 9679   Discharge Assessment   Assessment Type Discharge Planning Assessment   Confirmed/corrected address and phone number on facesheet? Yes   Assessment information obtained from? Patient   Prior to hospitilization cognitive status: Alert/Oriented   Prior to hospitalization functional status: Independent   Current cognitive status: Alert/Oriented   Current Functional Status: Independent   Lives With spouse   Able to Return to Prior Arrangements yes   Is patient able to care for self after discharge? Yes   Who are your caregiver(s) and their phone number(s)?   (spouse Valdez Stuart, [phone])   Patient's perception of discharge disposition home health   Readmission Within the Last 30 Days no previous admission in last 30 days   Patient currently being followed by outpatient case management? No   Patient currently receives any other outside agency services? Yes   Name and contact number of agency or person providing outside services   [facility])   Is it the patient/care giver preference to resume care with the current outside agency? Yes   Equipment Currently Used at Home shower chair;rollator   Do you have any problems affording any of your prescribed medications? Yes  (pharmacy is [facility] on [address])   Is the patient taking medications as prescribed? yes   Does the patient have transportation home? Yes   Transportation Anticipated family or friend will provide   Does the patient receive services at the [facility]? No   Discharge Plan A Home Health  ([facility])   Patient/Family in Agreement with Plan yes

## 2019-03-25 NOTE — PROVIDER PROGRESS NOTES - EMERGENCY DEPT.
Encounter Date: 3/24/2019    ED Physician Progress Notes        Physician Note:   63-year-old female presented with a chief complaint of abdominal pain. The patient was handed over to me at the change of shift by Dr. Watson pending the results of her CT scan.  The patient has a history of metastatic cancer, and her labs today, showed neutropenia.  She was already treated IV Zosyn by Dr. Watson prior to my arrival.  The patient's CT scan shows a bowel obstruction and her metastatic disease per virtual Radiology.  I have added IV vancomycin to the patient's medication regimen.  I will admit the patient to the hospitalist service for further care.  The case was discussed with the APC on call for the hospitalist service.  She has accepted the patient for admission.

## 2019-03-25 NOTE — ASSESSMENT & PLAN NOTE
Fever in neutropenic Pt undergoing chemotherapy with complaint of abdominal pain and abnormal CT indicating small bowel obstruction due to marked ileal small-bowel inflammation.  Presumed source intra-abdominal with no other clinical evidence of infection.  Her UA is abnormal, however it is contaminated with skin cells.  Will repeat UA in attempt for clean-catch specimen.  She has no Hx of MRSA and no evidence of line infection.  She has no stomatitis.  She did receive a dose of IV vancomycin in the ED.  Will defer any further need for MRSA coverage as she is low risk to Oncology who has been consulted and will see Pt tomorrow.  Continue Zosyn q.8 hours.

## 2019-03-25 NOTE — ASSESSMENT & PLAN NOTE
Chronic, stable.  Holding oral antihypertensives due to small-bowel obstruction.  BP stable this time.  Monitor BP closely and resume oral antihypertensives as clinically indicated.

## 2019-03-25 NOTE — PLAN OF CARE
Problem: Adult Inpatient Plan of Care  Goal: Patient-Specific Goal (Individualization)  Intervention: pending    Recommendations:   1) Consider TPN if unable to advance diet within 48 hrs. D15, AA5 @ 75 mls/hr plus lipids.  Provides 1778 calories, 90 gm protein, 1800 mls fluid, GIR 1.87 (Meets 100% EEN/EPN)  Monitor triglycerides wkly when on lipids daily.    2) Advance diet per pt tolerance to low fiber + Boost Plus daily  Goals: 1) Pt will receive nutrition within 48 hrs  Nutrition Goal Status: new  Communication of RD Recs: (POC, sticky note)

## 2019-03-25 NOTE — PT/OT/SLP EVAL
Physical Therapy Evaluation    Patient Name:  Indigo Stuart   MRN:  [id number]    Recommendations:     Discharge Recommendations:  home health PT   Discharge Equipment Recommendations: none   Barriers to discharge: Decreased caregiver support    Assessment:     Indigo Stuart is a 63 y.o. female admitted with a medical diagnosis of Neutropenic fever.  She presents with the following impairments/functional limitations: decreased strength, decreased balance, decreased coordination, decreased tolerance to functional activity, decreased level of functional mobility  .    Rehab Prognosis: Good; patient would benefit from acute skilled PT services to address these deficits and reach maximum level of function.    Recent Surgery: * No surgery found *      Plan:     During this hospitalization, patient to be seen 6 x/week to address the identified rehab impairments via gait training, therapeutic exercises, therapeutic activities and progress toward the following goals:    · Plan of Care Expires:  04/01/19    Subjective     Chief Complaint: occasional abd pain, that comes and goes  Patient/Family Comments/goals: pt states she feels unsteady and weak, afraid to fall  Pain/Comfort:  Pain Rating 1: 2/10  Location - Side 1: Bilateral  Location - Orientation 1: generalized  Location 1: abdomen  Pain Addressed 1: Reposition, Nurse notified  Pain Rating Post-Intervention 1: 0/10    Patients cultural, spiritual, Sabianist conflicts given the current situation: no    Living Environment:  Lives with  and granddaughter in a [address] house with no steps at the entrance  Prior to admission, patients level of function was mod (I) with a rollator/ RW only about 2 months ago, before this pt was not using any AD; ambulated outside the house with a rollator as the weather permits; pt does not drive.  Equipment used at home: walker, rolling, rollator.  DME owned (not currently used): rolling walker.  Upon discharge, patient  will have assistance from family.    Objective:     Communicated with RN Shantell prior to session.  Patient found HOB elevated with peripheral IV, telemetry  upon PT entry to room.    General Precautions: Standard, fall, neutropenic   Orthopedic Precautions:N/A   Braces: N/A     Exams:  · Cognitive Exam:  Patient is oriented to Person, Place, Time and Situation  · Gross Motor Coordination:  WFL  · Postural Exam:  Patient presented with the following abnormalities:    · -       Rounded shoulders  · -       Kyphosis  · RLE ROM: WFL  · RLE Strength: graded 3-/5 to 3+/5  · LLE ROM: WFL  · LLE Strength: graded 3-/5 to 3+/5    Functional Mobility:  · Bed Mobility:     · Rolling Right: stand by assistance  · Scooting: contact guard assistance  · Supine to Sit: contact guard assistance  · Transfers:     · Sit to Stand:  minimum assistance with rolling walker  · Gait: 22 ft with a RW, min A, slow paced, min unsteady, pt needs verbal instructions for proper walker placement and safety during transitional movements  · Balance: SITTING: Good; STANDING: Fair      Therapeutic Activities and Exercises:   functional mob training as above; gait training with verbal and tactile instructions for proper sequencing and safety    AM-PAC 6 CLICK MOBILITY  Total Score:17     Patient left sitting at EOB with all lines intact, call button in reach and Shantell notified.    GOALS:   Multidisciplinary Problems     Physical Therapy Goals        Problem: Physical Therapy Goal    Goal Priority Disciplines Outcome Goal Variances Interventions   Physical Therapy Goal     PT, PT/OT Ongoing (interventions implemented as appropriate)     Description:  Goals to be met by: 19     Patient will increase functional independence with mobility by performin. Supine to sit with Modified [place]  2. Sit to stand transfer with Stand-by Assistance  3. Bed to chair transfer with Stand-by Assistance with or without a Rolling Walker  4. Gait  x 100 feet with  Stand-by Assistance using Rolling Walker.   5. Lower extremity exercise program x20 reps per handout, with supervision                      History:     Past Medical History:   Diagnosis Date    Cancer     colon    Encounter for blood transfusion     Hypertension        Past Surgical History:   Procedure Laterality Date    CHOLECYSTECTOMY      COLON SURGERY      COLONOSCOPY N/A 3/30/2018    Performed by Daniel [facility]ana MD at [facility] ENDO    EGD (ESOPHAGOGASTRODUODENOSCOPY) N/A 1/14/2019    [facility] (2ND F[facility])    EGD (ESOPHAGOGASTRODUODENOSCOPY) N/A 1/3/2019    Performed by Adis Arguello MD at [facility] ENDO    ESOPHAGOGASTRODUODENOSCOPY (EGD) N/A 3/30/2018    Performed by Daniel Magana MD at [facility] ENDO    HYSTERECTOMY      [id number] N/A 1/4/2019    Performed by Emilio Romero MD at [facility] OR       Time Tracking:     PT Received On: 03/25/19  PT Start Time: 1240     PT Stop Time: 1307  PT Total Time (min): 27 min     Billable Minutes: Evaluation 10 and Gait Training 12      Zeferino Dudley, PT  03/25/2019

## 2019-03-25 NOTE — PLAN OF CARE
Problem: Adult Inpatient Plan of Care  Goal: Plan of Care Review  Outcome: Ongoing (interventions implemented as appropriate)  Pt has remained free from injury this shift.  She has been up with PT and ambulated around the bed in room.  She is able to sit on edge of the bed unassisted.  No elevated temp this shift and lab is assessed daily for changes.  She is NPO, iv fluids infusing.  Neutropenic precautions in place.  Pt is AAO she denies pain or discomfort, encouraged to call for assistance or needs with return demonstration on use of call light.

## 2019-03-25 NOTE — ASSESSMENT & PLAN NOTE
Body mass index is 39.87 kg/m².  Obesity compounds patient co-morbidities and complicates treatment course.  Counseling given regarding diet modification and exercise recommendations.

## 2019-03-25 NOTE — ASSESSMENT & PLAN NOTE
Acute, associated with marked ileal small-bowel inflammation.  Pt is not actively vomiting.  Her pain is improving.  Will keep NPO, she declines NG tube at this time.  Pain control-sparing use of narcotics as this can potentiate obstructive pathology.  Consult with General surgery in a.m..  Continue IVFs.  Use antiemetics as needed for nausea.

## 2019-03-25 NOTE — ASSESSMENT & PLAN NOTE
.  Maintain neutropenic precautions.  Consult with oncology- Dr. Pettit.  Treating for neutropenic fever.

## 2019-03-25 NOTE — ASSESSMENT & PLAN NOTE
Not vomiting, conservative management for now.  May be malignant obstruction, unclear cause.  Doing ok with gi rest.  Continue observation.  Last chemo 2 weeks ago.

## 2019-03-25 NOTE — HPI
62 y/o with abdominal pain.  Mid abdomen cramping up to 10/10 but now much better starting yesterday.  Nausea no vomiting.  Fever reported in ER.  Gets chemo for metastatic colon cancer.   Reports no flatus in the last 48 hours.

## 2019-03-25 NOTE — CONSULTS
[facility]  General Surgery  Consult Note    Patient Name: Indigo Stuart  MRN: [id number]  Code Status: Full Code  Admission Date: 3/24/2019  Hospital Length of Stay: 1 days  Attending Physician: Viviana Sifuentes MD  Primary Care Provider: John Conner MD    Patient information was obtained from patient and ER records.     Inpatient consult to General Surgery  Consult performed by: Mumtaz Scruggs MD  Consult ordered by: Andreea Rodriguez NP  Reason for consult: sbo  Assessment/Recommendations: Conservative management        Subjective:     Principal Problem: Neutropenic fever    History of Present Illness: 64 y/o with abdominal pain.  Mid abdomen cramping up to 10/10 but now much better starting yesterday.  Nausea no vomiting.  Fever reported in ER.  Gets chemo for metastatic colon cancer.   Reports no flatus in the last 48 hours.       No current facility-administered medications on file prior to encounter.      Current Outpatient Medications on File Prior to Encounter   Medication Sig    amLODIPine (NORVASC) 5 MG tablet Take 5 mg by mouth once daily.    atorvastatin (LIPITOR) 20 MG tablet Take 20 mg by mouth every evening.    ferrous sulfate (FEOSOL) 325 mg (65 mg iron) Tab tablet Take 1 tablet by mouth 2 (two) times daily.    hydroCHLOROthiazide (MICROZIDE) 12.5 mg capsule Take 12.5 mg by mouth once daily.    metoprolol succinate (TOPROL-XL) 25 MG 24 hr tablet Take 25 mg by mouth every evening.    potassium chloride (KLOR-CON) 8 MEQ TbSR Take 8 mEq by mouth.    promethazine (PHENERGAN) 25 MG tablet Take 1 tablet (25 mg total) by mouth every 6 (six) hours as needed for Nausea.    ondansetron (ZOFRAN-ODT) 8 MG TbDL Take 1 tablet (8 mg total) by mouth every 12 (twelve) hours as needed.       Review of patient's allergies indicates:   Allergen Reactions    Codeine Nausea And Vomiting    Erythromycin base Other (See Comments)    Lisinopril Palpitations     Cough        Past  Medical History:   Diagnosis Date    Cancer     colon    Encounter for blood transfusion     Hypertension      Past Surgical History:   Procedure Laterality Date    CHOLECYSTECTOMY      COLON SURGERY      COLONOSCOPY N/A 3/30/2018    Performed by [facility] at [facility]O    EGD (ESOPHAGOGASTRODUODENOSCOPY) N/A 1/14/2019    Performed by [facility]err[facility] Lopez [facility] (2ND F[facility])    EGD (ESOPHAGOGASTRODUODENOSCOPY) N/A 1/3/2019    Performed by Adis Arguello MD at [facility] ENDO    ESOPHAGOGASTRODUODENOSCOPY (EGD) N/A 3/30/2018    Performed by Daniel [facility]ana MD at [facility] ENDO    HYSTERECTOMY      [id number] N/A 1/4/2019    Performed by Emilio Romero MD at [facility] OR     Family History     Problem Relation (Age of Onset)    Cancer Mother, Father        Tobacco Use    Smoking status: Never Smoker    Smokeless tobacco: Never Used   Substance and Sexual Activity    Alcohol use: No    Drug use: No    Sexual activity: Not Currently     Review of Systems   Constitutional: Positive for fatigue and fever (here at hospital). Negative for activity change, appetite change and chills.   HENT: Negative for congestion, mouth sores, postnasal drip, sinus pressure, sinus pain, sore throat and trouble swallowing.    Eyes: Negative for photophobia and visual disturbance.   Respiratory: Negative for cough, shortness of breath and wheezing.    Cardiovascular: Negative for chest pain, palpitations and leg swelling.   Gastrointestinal: Positive for abdominal pain, diarrhea and nausea. Negative for blood in stool, constipation and vomiting.   Genitourinary: Negative for difficulty urinating, dysuria, flank pain, frequency and hematuria.   Musculoskeletal: Negative for arthralgias and myalgias.   Skin: Negative for color change.   Neurological: Positive for weakness. Negative for dizziness, light-headedness and headaches.   Psychiatric/Behavioral: Negative for confusion. The patient is not  nervous/anxious.      Objective:     Vital Signs (Most Recent):  Temp: 98 °F (36.7 °C) (03/25/19 1552)  Pulse: 96 (03/25/19 1552)  Resp: 18 (03/25/19 1552)  BP: 127/65 (03/25/19 1552)  SpO2: 96 % (03/25/19 1552) Vital Signs (24h Range):  Temp:  [98 °F (36.7 °C)-99.2 °F (37.3 °C)] 98 °F (36.7 °C)  Pulse:  [] 96  Resp:  [18-20] 18  SpO2:  [92 %-100 %] 96 %  BP: (124-177)/(62-90) 127/65     Weight: 100.2 kg (220 lb 14.4 oz)  Body mass index is 40.4 kg/m².    Physical Exam   Constitutional: She is oriented to person, place, and time. She appears well-developed and well-nourished. No distress.   HENT:   Head: Normocephalic and atraumatic.   Nose: Nose normal.   Mouth/Throat: Oropharynx is clear and moist.   Eyes: Conjunctivae and EOM are normal.   Neck: Normal range of motion. Neck supple. No thyromegaly present.   Cardiovascular: Normal rate, regular rhythm, normal heart sounds and intact distal pulses.   No murmur heard.  Pulmonary/Chest: Effort normal and breath sounds normal. No respiratory distress.   Abdominal: Soft. She exhibits no distension and no mass. There is no tenderness. There is no guarding.   Musculoskeletal: Normal range of motion. She exhibits no edema or tenderness.   Neurological: She is alert and oriented to person, place, and time. No cranial nerve deficit.   Skin: Capillary refill takes less than 2 seconds.   Psychiatric: She has a normal mood and affect. Her behavior is normal. Judgment and thought content normal.   Nursing note and vitals reviewed.      Significant Labs:  CBC:   Recent Labs   Lab 03/25/19 0514   WBC 3.20*   RBC 4.50   HGB 12.5   HCT 39.2      MCV 87   MCH 27.8   MCHC 31.8*     BMP:   Recent Labs   Lab 03/25/19 0514   *   *   K 3.3*   CL 99   CO2 24   BUN 11   CREATININE 0.8   CALCIUM 9.2   MG 1.4*       Significant Diagnostics:  CT: I have reviewed all pertinent results/findings within the past 24 hours and my personal findings are:  multiple liver  lesions, dilated fluid filled small bowel, some wall thickening of small bowel, duodenal stent, ventral hernias    Assessment/Plan:     SBO (small bowel obstruction)  Not vomiting, conservative management for now.  May be malignant obstruction, unclear cause.  Doing ok with gi rest.  Continue observation.  Last chemo 2 weeks ago.      VTE Risk Mitigation (From admission, onward)        Ordered     enoxaparin injection 40 mg  Daily      03/24/19 2133     IP VTE HIGH RISK PATIENT  Once      03/24/19 2133          Thank you for your consult. I will follow-up with patient. Please contact us if you have any additional questions.    Mumtaz Scruggs MD  General Surgery  [facility]

## 2019-03-25 NOTE — SUBJECTIVE & OBJECTIVE
No current facility-administered medications on file prior to encounter.      Current Outpatient Medications on File Prior to Encounter   Medication Sig    amLODIPine (NORVASC) 5 MG tablet Take 5 mg by mouth once daily.    atorvastatin (LIPITOR) 20 MG tablet Take 20 mg by mouth every evening.    ferrous sulfate (FEOSOL) 325 mg (65 mg iron) Tab tablet Take 1 tablet by mouth 2 (two) times daily.    hydroCHLOROthiazide (MICROZIDE) 12.5 mg capsule Take 12.5 mg by mouth once daily.    metoprolol succinate (TOPROL-XL) 25 MG 24 hr tablet Take 25 mg by mouth every evening.    potassium chloride (KLOR-CON) 8 MEQ TbSR Take 8 mEq by mouth.    promethazine (PHENERGAN) 25 MG tablet Take 1 tablet (25 mg total) by mouth every 6 (six) hours as needed for Nausea.    ondansetron (ZOFRAN-ODT) 8 MG TbDL Take 1 tablet (8 mg total) by mouth every 12 (twelve) hours as needed.       Review of patient's allergies indicates:   Allergen Reactions    Codeine Nausea And Vomiting    Erythromycin base Other (See Comments)    Lisinopril Palpitations     Cough        Past Medical History:   Diagnosis Date    Cancer     colon    Encounter for blood transfusion     Hypertension      Past Surgical History:   Procedure Laterality Date    CHOLECYSTECTOMY      COLON SURGERY      COLONOSCOPY N/A 3/30/2018    Performed by [facility] at [facility] ENDO    EGD (ESOPHAGOGASTRODUODENOSCOPY) N/A 1/14/2019    [facility]formed [facility] (2ND FLR)    EGD (ESOPHAGOGASTRODUODENOSCOPY) N/A 1/3/2019    Performed by Adis Arguello MD at [facility] ENDO    ESOPHAGOGASTRODUODENOSCOPY (EGD) N/A 3/30/2018    Performed by Daniel Magana MD at [facility] ENDO    HYSTERECTOMY      [id number] N/A 1/4/2019    Performed by Emilio Romero MD at [facility] OR     Family History     Problem Relation (Age of Onset)    Cancer Mother, Father        Tobacco Use    Smoking status: Never Smoker    Smokeless tobacco: Never Used    Substance and Sexual Activity    Alcohol use: No    Drug use: No    Sexual activity: Not Currently     Review of Systems   Constitutional: Positive for fatigue and fever (here at hospital). Negative for activity change, appetite change and chills.   HENT: Negative for congestion, mouth sores, postnasal drip, sinus pressure, sinus pain, sore throat and trouble swallowing.    Eyes: Negative for photophobia and visual disturbance.   Respiratory: Negative for cough, shortness of breath and wheezing.    Cardiovascular: Negative for chest pain, palpitations and leg swelling.   Gastrointestinal: Positive for abdominal pain, diarrhea and nausea. Negative for blood in stool, constipation and vomiting.   Genitourinary: Negative for difficulty urinating, dysuria, flank pain, frequency and hematuria.   Musculoskeletal: Negative for arthralgias and myalgias.   Skin: Negative for color change.   Neurological: Positive for weakness. Negative for dizziness, light-headedness and headaches.   Psychiatric/Behavioral: Negative for confusion. The patient is not nervous/anxious.      Objective:     Vital Signs (Most Recent):  Temp: 98 °F (36.7 °C) (03/25/19 1552)  Pulse: 96 (03/25/19 1552)  Resp: 18 (03/25/19 1552)  BP: 127/65 (03/25/19 1552)  SpO2: 96 % (03/25/19 1552) Vital Signs (24h Range):  Temp:  [98 °F (36.7 °C)-99.2 °F (37.3 °C)] 98 °F (36.7 °C)  Pulse:  [] 96  Resp:  [18-20] 18  SpO2:  [92 %-100 %] 96 %  BP: (124-177)/(62-90) 127/65     Weight: 100.2 kg (220 lb 14.4 oz)  Body mass index is 40.4 kg/m².    Physical Exam   Constitutional: She is oriented to person, place, and time. She appears well-developed and well-nourished. No distress.   HENT:   Head: Normocephalic and atraumatic.   Nose: Nose normal.   Mouth/Throat: Oropharynx is clear and moist.   Eyes: Conjunctivae and EOM are normal.   Neck: Normal range of motion. Neck supple. No thyromegaly present.   Cardiovascular: Normal rate, regular rhythm, normal heart  sounds and intact distal pulses.   No murmur heard.  Pulmonary/Chest: Effort normal and breath sounds normal. No respiratory distress.   Abdominal: Soft. She exhibits no distension and no mass. There is no tenderness. There is no guarding.   Musculoskeletal: Normal range of motion. She exhibits no edema or tenderness.   Neurological: She is alert and oriented to person, place, and time. No cranial nerve deficit.   Skin: Capillary refill takes less than 2 seconds.   Psychiatric: She has a normal mood and affect. Her behavior is normal. Judgment and thought content normal.   Nursing note and vitals reviewed.      Significant Labs:  CBC:   Recent Labs   Lab 03/25/19 0514   WBC 3.20*   RBC 4.50   HGB 12.5   HCT 39.2      MCV 87   MCH 27.8   MCHC 31.8*     BMP:   Recent Labs   Lab 03/25/19 0514   *   *   K 3.3*   CL 99   CO2 24   BUN 11   CREATININE 0.8   CALCIUM 9.2   MG 1.4*       Significant Diagnostics:  CT: I have reviewed all pertinent results/findings within the past 24 hours and my personal findings are:  multiple liver lesions, dilated fluid filled small bowel, some wall thickening of small bowel, duodenal stent, ventral hernias

## 2019-03-25 NOTE — H&P
[facility] Medicine  History & Physical    Patient Name: Indigo Stuart  MRN: [id number]  Admission Date: 3/24/2019  Attending Physician: Jesus Jean-Baptiste MD   Primary Care Provider: John Conner MD         Patient information was obtained from patient, past medical records and ER records.     Subjective:     Principal Problem:Neutropenic fever    Chief Complaint:   Chief Complaint   Patient presents with    Abdominal Pain     since yesterday         HPI: Jacqueline Stuart is a 63-year-old female with PMHx significant for HTN, and recently diagnosed metastatic colon cancer.  She presented to the ED with complaint of abdominal pain that started yesterday.  She describes the pain as a mid abdominal pain that is 10/10 at worse with no alleviating or exacerbating factors.  Associated symptoms include nausea without vomiting.  She states initially she thought the pain may be gas, however she has never had a pain that bad.  Pain at this time is down to 4/10.  She reports several days of diarrhea that ceased 2 days ago with Imodium.  Since that time she has had normal bowel movements with last normal bowel movement yesterday.  She denies any bloody or black tarry stool.  She denies any fever, chills, but does endorse generalized weakness that was worse this morning upon wakening.  She denies any mouth sores or pain, chest pain, SOB, burning or blood with urination, flank pain, or leg swelling. She denies any recent travel, sick contact, rale or suspicious food intake, or Hx of MRSA or line infection.  She reports her last chemo treatment was approximately 2 weeks ago and is due to have another round of chemo tomorrow. She is followed closely by Dr. Pettit.  Other pertinent medical Hx as below:    Past Medical History:   Diagnosis Date    Cancer     colon    Encounter for blood transfusion     Hypertension        Past Surgical History:   Procedure Laterality Date    CHOLECYSTECTOMY       COLON SURGERY      COLONOSCOPY N/A 3/30/2018    [facility]formed by [facility] at [facility] ENDO    EGD (ESOPHAGOGASTRODUODENOSCOPY) N/A 1/14/2019    Performed by Monserrat Lopez MD at [facility] ENDO (2ND FLR)    EGD (ESOPHAGOGASTRODUODENOSCOPY) N/A 1/3/2019    Performed by Adis Arguello MD at [facility] ENDO    ESOPHAGOGASTRODUODENOSCOPY (EGD) N/A 3/30/2018    Performed by Daniel Magana MD at [facility] ENDO    HYSTERECTOMY      XOKFGZDIF-WBVG-B-CATH N/A 1/4/2019    Performed by Emilio Romero MD at [facility] OR       Review of patient's allergies indicates:   Allergen Reactions    Codeine Nausea And Vomiting    Erythromycin base Other (See Comments)    Lisinopril Palpitations     Cough        No current facility-administered medications on file prior to encounter.      Current Outpatient Medications on File Prior to Encounter   Medication Sig    amLODIPine (NORVASC) 5 MG tablet Take 5 mg by mouth once daily.    atorvastatin (LIPITOR) 20 MG tablet Take 20 mg by mouth every evening.    ferrous sulfate (FEOSOL) 325 mg (65 mg iron) Tab tablet Take 1 tablet by mouth 2 (two) times daily.    hydroCHLOROthiazide (MICROZIDE) 12.5 mg capsule Take 12.5 mg by mouth once daily.    metoprolol succinate (TOPROL-XL) 25 MG 24 hr tablet Take 25 mg by mouth every evening.    ondansetron (ZOFRAN-ODT) 8 MG TbDL Take 1 tablet (8 mg total) by mouth every 12 (twelve) hours as needed.    potassium chloride (KLOR-CON) 8 MEQ TbSR Take 8 mEq by mouth.    promethazine (PHENERGAN) 25 MG tablet Take 1 tablet (25 mg total) by mouth every 6 (six) hours as needed for Nausea.     Family History     Problem Relation (Age of Onset)    Cancer Mother, Father        Tobacco Use    Smoking status: Never Smoker    Smokeless tobacco: Never Used   Substance and Sexual Activity    Alcohol use: No    Drug use: No    Sexual activity: Not on file     Review of Systems   Constitutional: Positive for fatigue and fever (here at hospital). Negative  for activity change, appetite change and chills.   HENT: Negative for congestion, mouth sores, postnasal drip, sinus pressure, sinus pain, sore throat and trouble swallowing.    Eyes: Negative for photophobia and visual disturbance.   Respiratory: Negative for cough, shortness of breath and wheezing.    Cardiovascular: Negative for chest pain, palpitations and leg swelling.   Gastrointestinal: Positive for abdominal pain, diarrhea and nausea. Negative for blood in stool, constipation and vomiting.   Genitourinary: Negative for difficulty urinating, dysuria, flank pain, frequency and hematuria.   Musculoskeletal: Negative for arthralgias and myalgias.   Skin: Negative for color change.   Neurological: Positive for weakness. Negative for dizziness, light-headedness and headaches.   Psychiatric/Behavioral: Negative for confusion. The patient is not nervous/anxious.      Objective:     Vital Signs (Most Recent):  Temp: 98.7 °F (37.1 °C) (03/24/19 2204)  Pulse: 106 (03/24/19 2153)  Resp: 20 (03/24/19 2153)  BP: 131/76 (03/24/19 2153)  SpO2: 97 % (03/24/19 2153) Vital Signs (24h Range):  Temp:  [98.7 °F (37.1 °C)-101.9 °F (38.8 °C)] 98.7 °F (37.1 °C)  Pulse:  [] 106  Resp:  [20] 20  SpO2:  [92 %-100 %] 97 %  BP: (124-177)/(75-90) 131/76     Weight: 98.9 kg (218 lb)  Body mass index is 39.87 kg/m².    Physical Exam   Constitutional: She is oriented to person, place, and time. She appears well-developed and well-nourished. No distress.   HENT:   Head: Normocephalic and atraumatic.   Eyes: Pupils are equal, round, and reactive to light. Conjunctivae and EOM are normal.   Neck: Normal range of motion. Neck supple. No thyromegaly present.   Cardiovascular: Normal rate, regular rhythm, normal heart sounds and intact distal pulses.   No murmur heard.  Pulmonary/Chest: Effort normal and breath sounds normal. No respiratory distress.   Abdominal: Soft. She exhibits distension. She exhibits no mass. There is tenderness. There  is no guarding.   Hypoactive bowel sounds; abd originally soft on exam, however after pain episode tensed up.   Musculoskeletal: Normal range of motion. She exhibits no edema or tenderness.   Neurological: She is alert and oriented to person, place, and time. No cranial nerve deficit.   Skin: Capillary refill takes less than 2 seconds.   Psychiatric: She has a normal mood and affect. Her behavior is normal. Judgment and thought content normal.         CRANIAL NERVES     CN III, IV, VI   Pupils are equal, round, and reactive to light.  Extraocular motions are normal.        Significant Labs:   CBC:   Recent Labs   Lab 03/24/19  1623   WBC 2.20*   HGB 13.1   HCT 41.0        CMP:   Recent Labs   Lab 03/24/19  1623   *   K 3.2*   CL 98   CO2 21*   *   BUN 10   CREATININE 0.8   CALCIUM 9.5   PROT 6.7   ALBUMIN 2.5*   BILITOT 0.9   ALKPHOS 210*   AST 41*   ALT 27   ANIONGAP 14   EGFRNONAA >60     Coagulation: No results for input(s): PT, INR, APTT in the last 48 hours.  Lactic Acid:   Recent Labs   Lab 03/24/19  1703 03/24/19  2059   LACTATE 1.7 2.0     Urine Studies:   Recent Labs   Lab 03/24/19  1910   COLORU Yellow   APPEARANCEUA Clear   PHUR 6.0   SPECGRAV 1.015   PROTEINUA 1+*   GLUCUA Negative   KETONESU Negative   BILIRUBINUA Negative   OCCULTUA Negative   NITRITE Negative   UROBILINOGEN 1.0   LEUKOCYTESUR Negative   RBCUA 6*   WBCUA 14*   BACTERIA Few*   SQUAMEPITHEL 7   HYALINECASTS 2*       Significant Imaging:   CT A/P: ([facility])  1. Ileal small bowel wall thickening resulting in a small bowel obstruction as above. Bowel wall  thickening is nonspecific and may be infectious, inflammatory or ischemic in etiology. Metastatic  disease not excluded.  2. Stable bilateral lower lobe pulmonary nodules. For patients at low risk (minimal or absent history of  smoking and of other known risk factors), recommend CT at 3-6 months, then consider CT at 18-24  months. For patients at high risk (history of  smoking or of other known risk factors), recommend CT at  3-6 months, then CT at 18-24 months. (Luis Alberto et al., Fleischner Society, 2017)  3. Progression of multiple liver metastases.  4. Interval placement of duodenal-jejunal stent.  5. Small nonspecific mesenteric lymph nodes.  6. Fat-containing ventral wall hernias with a small amount of free fluid in the right ventral pelvic wall  hernia and adjacent subcutaneous edema.  7. Small bilateral fat-containing inguinal hernias.  8. Mild mesenteric edema and a small amount of free of fluid.  9. Additional nonacute findings as above.    Assessment/Plan:     * Neutropenic fever  Fever in neutropenic Pt undergoing chemotherapy with complaint of abdominal pain and abnormal CT indicating small bowel obstruction due to marked ileal small-bowel inflammation.  Presumed source intra-abdominal with no other clinical evidence of infection.  Her UA is abnormal, however it is contaminated with skin cells.  Will repeat UA in attempt for clean-catch specimen.  She has no Hx of MRSA and no evidence of line infection.  She has no stomatitis.  She did receive a dose of IV vancomycin in the ED.  Will defer any further need for MRSA coverage as she is low risk to Oncology who has been consulted and will see Pt tomorrow.  Continue Zosyn q.8 hours.    SBO (small bowel obstruction)  Acute, associated with marked ileal small-bowel inflammation.  Pt is not actively vomiting.  Her pain is improving.  Will keep NPO, she declines NG tube at this time.  Pain control-sparing use of narcotics as this can potentiate obstructive pathology.  Consult with General surgery in a.m..  Continue IVFs.  Use antiemetics as needed for nausea.      Sepsis  This patient does have evidence of infective focus- neutropenic fever, ? Intraabdominal source  My overall impression is sepsis.  Antibiotics given-   Antibiotics (From admission, onward)    Start     Stop Route Frequency Ordered    03/25/19 0400   piperacillin-tazobactam 4.5 g in dextrose 5 % 100 mL IVPB (ready to mix system)      -- IV Every 8 hours (non-standard times) 03/24/19 2136          Severe Sepsis only-  Latest labs reviewed, they are-  Recent Labs   Lab 03/24/19  1623   BILITOT 0.9     Recent Labs   Lab 03/24/19  1703 03/24/19  2059   LACTATE 1.7 2.0     No results for input(s): PH, PO2, PCO2, HCO3, BE in the last 168 hours.    No evidence of organ dysfunction or shock.   Follow blood cultures.  Obtain stool cultures/C Diff if has diarrhea.     Chemotherapy induced neutropenia  .  Maintain neutropenic precautions.  Consult with oncology- Dr. Pettit.  Treating for neutropenic fever.      Hepatic metastases  Established diagnosis.  Ongoing management of metastatic cancer per Oncology.      Lung metastases  Established diagnosis.  Ongoing management of metastatic cancer per Oncology.      Duodenal cancer  Established diagnosis, Pt underwent duodenal-jejunal stenting in January.  CT without any evidence of complications.  Ongoing management of metastatic cancer process per Oncology.      Morbid obesity  Body mass index is 39.87 kg/m².  Obesity compounds patient co-morbidities and complicates treatment course.  Counseling given regarding diet modification and exercise recommendations.        Hypokalemia  Acute, replete with IV potassium.  Monitor electrolytes closely and replete as necessary.      Severe malnutrition  Requiring NPO status for small bowel obstruction.  Consult with registered dietitian-Pt may require TPN.      Hypertension  Chronic, stable.  Holding oral antihypertensives due to small-bowel obstruction.  BP stable this time.  Monitor BP closely and resume oral antihypertensives as clinically indicated.        Advance Care Planning     [facility]  I engaged the patient in a conversation about advance care planning and we specifically addressed what the goals of care would be moving forward, in light of the patient's change in clinical  status, specifically metastatic cancer.  We did not specifically address the patient's likely prognosis, which is poor- she follows with Dr. Pettit who is guiding her CA care.  We explored the patient's values and preferences for future care.  The patient endorses that what is most important right now is to focus on remaining as independent as possible, symptom/pain control and extending life as long as possible, even it it means sacrificing quality    Accordingly, we have decided that the best plan to meet the patient's goals includes continuing with treatment    I did not explain the role for hospice care at this stage of the patient's illness, including its ability to help the patient live with the best quality of life possible.  We will not be making a hospice referral from the clinic today.    I spent a total of 32 minutes engaging the patient in this advance care planning discussion.              VTE Risk Mitigation (From admission, onward)        Ordered     enoxaparin injection 40 mg  Daily      03/24/19 2133     IP VTE HIGH RISK PATIENT  Once      03/24/19 2133             Andreea Rodriguez NP  Department of Hospital Medicine   [facility]

## 2019-03-25 NOTE — ASSESSMENT & PLAN NOTE
.on admit now 416-    Maintain neutropenic precautions.    Consult with oncology- Dr. Pettit.  Treating for neutropenic fever.

## 2019-03-26 DIAGNOSIS — D75.839 THROMBOCYTOSIS: Primary | ICD-10-CM

## 2019-03-26 PROBLEM — E83.42 HYPOMAGNESEMIA: Status: RESOLVED | Noted: 2019-03-25 | Resolved: 2019-03-26

## 2019-03-26 LAB
ALBUMIN SERPL BCP-MCNC: 2.1 G/DL (ref 3.5–5.2)
ALP SERPL-CCNC: 163 U/L (ref 55–135)
ALT SERPL W/O P-5'-P-CCNC: 18 U/L (ref 10–44)
ANION GAP SERPL CALC-SCNC: 9 MMOL/L (ref 8–16)
ANISOCYTOSIS BLD QL SMEAR: ABNORMAL
AST SERPL-CCNC: 19 U/L (ref 10–40)
BACTERIA UR CULT: NORMAL
BASOPHILS NFR BLD: 0 % (ref 0–1.9)
BILIRUB SERPL-MCNC: 0.6 MG/DL (ref 0.1–1)
BUN SERPL-MCNC: 10 MG/DL (ref 8–23)
CALCIUM SERPL-MCNC: 8.8 MG/DL (ref 8.7–10.5)
CEA SERPL-MCNC: 218.4 NG/ML (ref 0–5)
CHLORIDE SERPL-SCNC: 103 MMOL/L (ref 95–110)
CO2 SERPL-SCNC: 26 MMOL/L (ref 23–29)
CREAT SERPL-MCNC: 0.7 MG/DL (ref 0.5–1.4)
DIFFERENTIAL METHOD: ABNORMAL
EOSINOPHIL NFR BLD: 0 % (ref 0–8)
ERYTHROCYTE [DISTWIDTH] IN BLOOD BY AUTOMATED COUNT: 29.3 % (ref 11.5–14.5)
EST. GFR  (AFRICAN AMERICAN): >60 ML/MIN/1.73 M^2
EST. GFR  (NON AFRICAN AMERICAN): >60 ML/MIN/1.73 M^2
GLUCOSE SERPL-MCNC: 83 MG/DL (ref 70–110)
HCT VFR BLD AUTO: 35.4 % (ref 37–48.5)
HGB BLD-MCNC: 11.2 G/DL (ref 12–16)
LYMPHOCYTES NFR BLD: 42 % (ref 18–48)
MAGNESIUM SERPL-MCNC: 2.1 MG/DL (ref 1.6–2.6)
MCH RBC QN AUTO: 27.8 PG (ref 27–31)
MCHC RBC AUTO-ENTMCNC: 31.7 G/DL (ref 32–36)
MCV RBC AUTO: 88 FL (ref 82–98)
MONOCYTES NFR BLD: 12 % (ref 4–15)
NEUTROPHILS NFR BLD: 40 % (ref 38–73)
NEUTS BAND NFR BLD MANUAL: 6 %
PLATELET # BLD AUTO: 305 K/UL (ref 150–350)
PLATELET BLD QL SMEAR: ABNORMAL
PMV BLD AUTO: 6.4 FL (ref 9.2–12.9)
POLYCHROMASIA BLD QL SMEAR: ABNORMAL
POTASSIUM SERPL-SCNC: 2.8 MMOL/L (ref 3.5–5.1)
PROT SERPL-MCNC: 5.5 G/DL (ref 6–8.4)
RBC # BLD AUTO: 4.03 M/UL (ref 4–5.4)
SODIUM SERPL-SCNC: 138 MMOL/L (ref 136–145)
WBC # BLD AUTO: 3.9 K/UL (ref 3.9–12.7)

## 2019-03-26 PROCEDURE — 97110 THERAPEUTIC EXERCISES: CPT

## 2019-03-26 PROCEDURE — B4185 PARENTERAL SOL 10 GM LIPIDS: HCPCS | Performed by: HOSPITALIST

## 2019-03-26 PROCEDURE — 83735 ASSAY OF MAGNESIUM: CPT

## 2019-03-26 PROCEDURE — 85007 BL SMEAR W/DIFF WBC COUNT: CPT

## 2019-03-26 PROCEDURE — 99223 PR INITIAL HOSPITAL CARE,LEVL III: ICD-10-PCS | Mod: ,,, | Performed by: INTERNAL MEDICINE

## 2019-03-26 PROCEDURE — 25000003 PHARM REV CODE 250: Performed by: HOSPITALIST

## 2019-03-26 PROCEDURE — 97116 GAIT TRAINING THERAPY: CPT

## 2019-03-26 PROCEDURE — 63600175 PHARM REV CODE 636 W HCPCS: Performed by: HOSPITALIST

## 2019-03-26 PROCEDURE — 82378 CARCINOEMBRYONIC ANTIGEN: CPT

## 2019-03-26 PROCEDURE — 63600175 PHARM REV CODE 636 W HCPCS: Performed by: NURSE PRACTITIONER

## 2019-03-26 PROCEDURE — 99223 1ST HOSP IP/OBS HIGH 75: CPT | Mod: ,,, | Performed by: INTERNAL MEDICINE

## 2019-03-26 PROCEDURE — 94761 N-INVAS EAR/PLS OXIMETRY MLT: CPT

## 2019-03-26 PROCEDURE — 80053 COMPREHEN METABOLIC PANEL: CPT

## 2019-03-26 PROCEDURE — 85027 COMPLETE CBC AUTOMATED: CPT

## 2019-03-26 PROCEDURE — 12000002 HC ACUTE/MED SURGE SEMI-PRIVATE ROOM

## 2019-03-26 PROCEDURE — 36415 COLL VENOUS BLD VENIPUNCTURE: CPT

## 2019-03-26 RX ORDER — POTASSIUM CHLORIDE 20 MEQ/15ML
40 SOLUTION ORAL
Status: DISCONTINUED | OUTPATIENT
Start: 2019-03-26 | End: 2019-03-27 | Stop reason: HOSPADM

## 2019-03-26 RX ORDER — POTASSIUM CHLORIDE 20 MEQ/15ML
60 SOLUTION ORAL
Status: DISCONTINUED | OUTPATIENT
Start: 2019-03-26 | End: 2019-03-27 | Stop reason: HOSPADM

## 2019-03-26 RX ORDER — LANOLIN ALCOHOL/MO/W.PET/CERES
800 CREAM (GRAM) TOPICAL
Status: DISCONTINUED | OUTPATIENT
Start: 2019-03-26 | End: 2019-03-27 | Stop reason: HOSPADM

## 2019-03-26 RX ORDER — SODIUM,POTASSIUM PHOSPHATES 280-250MG
2 POWDER IN PACKET (EA) ORAL
Status: DISCONTINUED | OUTPATIENT
Start: 2019-03-26 | End: 2019-03-27 | Stop reason: HOSPADM

## 2019-03-26 RX ORDER — POTASSIUM CHLORIDE 7.45 MG/ML
10 INJECTION INTRAVENOUS
Status: DISPENSED | OUTPATIENT
Start: 2019-03-26 | End: 2019-03-26

## 2019-03-26 RX ADMIN — POTASSIUM CHLORIDE 10 MEQ: 7.46 INJECTION, SOLUTION INTRAVENOUS at 05:03

## 2019-03-26 RX ADMIN — LEUCINE, PHENYLALANINE, LYSINE, METHIONINE, ISOLEUCINE, VALINE, HISTIDINE, THREONINE, TRYPTOPHAN, ALANINE, GLYCINE, ARGININE, PROLINE, SERINE, TYROSINE, SODIUM ACETATE, DIBASIC POTASSIUM PHOSPHATE, MAGNESIUM CHLORIDE, SODIUM CHLORIDE, CALCIUM CHLORIDE, DEXTROSE
365; 280; 290; 200; 300; 290; 240; 210; 90; 1035; 515; 575; 340; 250; 20; 340; 261; 51; 59; 33; 15 INJECTION INTRAVENOUS at 10:03

## 2019-03-26 RX ADMIN — ENOXAPARIN SODIUM 40 MG: 100 INJECTION SUBCUTANEOUS at 06:03

## 2019-03-26 RX ADMIN — POTASSIUM CHLORIDE 10 MEQ: 7.46 INJECTION, SOLUTION INTRAVENOUS at 04:03

## 2019-03-26 RX ADMIN — I.V. FAT EMULSION 250 ML: 20 EMULSION INTRAVENOUS at 11:03

## 2019-03-26 RX ADMIN — POTASSIUM CHLORIDE 10 MEQ: 7.46 INJECTION, SOLUTION INTRAVENOUS at 03:03

## 2019-03-26 RX ADMIN — PIPERACILLIN SODIUM AND TAZOBACTAM SODIUM 4.5 G: 4; .5 INJECTION, POWDER, LYOPHILIZED, FOR SOLUTION INTRAVENOUS at 09:03

## 2019-03-26 RX ADMIN — METOPROLOL SUCCINATE 25 MG: 25 TABLET, EXTENDED RELEASE ORAL at 10:03

## 2019-03-26 RX ADMIN — PIPERACILLIN SODIUM AND TAZOBACTAM SODIUM 4.5 G: 4; .5 INJECTION, POWDER, LYOPHILIZED, FOR SOLUTION INTRAVENOUS at 05:03

## 2019-03-26 RX ADMIN — PIPERACILLIN SODIUM AND TAZOBACTAM SODIUM 4.5 G: 4; .5 INJECTION, POWDER, LYOPHILIZED, FOR SOLUTION INTRAVENOUS at 11:03

## 2019-03-26 NOTE — ASSESSMENT & PLAN NOTE
Fever in neutropenic Pt undergoing chemotherapy with complaint of abdominal pain and abnormal CT indicating small bowel obstruction due to marked ileal small-bowel inflammation.  Presumed source intra-abdominal with no other clinical evidence of infection.      She has no Hx of MRSA and no evidence of line infection..  She did receive a dose of IV vancomycin in the ED   Continue Zosyn q.8 hours for gi etiology and fu cultures .

## 2019-03-26 NOTE — CONSULTS
[facility]  Hematology/Oncology  Consult Note    Patient Name: Indigo Stuart  MRN: [id number]  Admission Date: 3/24/2019  Hospital Length of Stay: 2 days  Code Status: Full Code   Attending Provider: Kwabena Guardado MD  Consulting Provider: Karina Pettit MD  Primary Care Physician: John Conner MD  Principal Problem:Neutropenic fever    Consults  Subjective:     HPI:  63 yr old with metastatic colon cancer and a duodenal mass as well presented with abdominal pain. CT revealed SBO likely due to malignant obstruction as well as progression of diseas. Last chemo folfox avastin approx 2 weeks ago     This am she has had a formed bowel movement  She has no pain, remains slightly distended    Scans reveal progresion pf hepatic mets as well    Medications:  Continuous Infusions:   sodium chloride 0.9% 100 mL/hr at 03/25/19 2224     Scheduled Meds:   enoxaparin  40 mg Subcutaneous Daily    metoprolol succinate  25 mg Oral QHS    piperacillin-tazobactam 4.5 g in dextrose 5 % 100 mL IVPB (ready to mix system)  4.5 g Intravenous Q8H     PRN Meds:acetaminophen, dextrose 50%, dextrose 50%, glucagon (human recombinant), glucose, glucose, ketorolac, morphine, morphine, ondansetron, promethazine (PHENERGAN) IVPB, sodium chloride 0.9%     Review of patient's allergies indicates:   Allergen Reactions    Codeine Nausea And Vomiting    Erythromycin base Other (See Comments)    Lisinopril Palpitations     Cough         Past Medical History:   Diagnosis Date    Cancer     colon    Encounter for blood transfusion     Hypertension      Past Surgical History:   Procedure Laterality Date    CHOLECYSTECTOMY      COLON SURGERY      COLONOSCOPY N/A 3/30/2018    [facility] END[facility]    EGD (ESOPHAGOGASTRODUODENOSCOPY) N/A 1/14/2019    Performed by [facility]at Lopez [facility] ENDO (2ND FLR)    EGD (ESOPHAGOGASTRODUODENOSCOPY) N/A 1/3/2019    Performed by Adis Arguello,  MD at [facility] ENDO    ESOPHAGOGASTRODUODENOSCOPY (EGD) N/A 3/30/2018    [facility]formed by [facility] at [facility] ENDO    HYSTERECTOMY      TECMOSMRZ-UHOP-I-CATH N/A 1/4/2019    Performed by Emilio Romero MD at [facility] OR     Family History     Problem Relation (Age of Onset)    Cancer Mother, Father        Tobacco Use    Smoking status: Never Smoker    Smokeless tobacco: Never Used   Substance and Sexual Activity    Alcohol use: No    Drug use: No    Sexual activity: Not Currently       Review of Systems   Constitutional: Positive for fatigue. Negative for fever and unexpected weight change.   HENT: Negative for rhinorrhea and sore throat.    Eyes: Negative for photophobia.   Respiratory: Negative for cough and shortness of breath.    Cardiovascular: Negative for chest pain and leg swelling.   Gastrointestinal: Negative for abdominal pain, constipation, diarrhea, nausea and vomiting.   Endocrine: Negative for cold intolerance and heat intolerance.   Genitourinary: Negative for dysuria, frequency, hematuria and urgency.   Musculoskeletal: Negative for arthralgias, back pain and neck pain.   Skin: Negative for pallor and rash.   Neurological: Negative for weakness, numbness and headaches.   Hematological: Negative for adenopathy. Does not bruise/bleed easily.   Psychiatric/Behavioral: Negative for agitation.   All other systems reviewed and are negative.     + abdominal pain . Diarrhea, nausea, fever, fatigue, weakness  Objective:     Vital Signs (Most Recent):  Temp: 96.6 °F (35.9 °C) (03/26/19 0800)  Pulse: 83 (03/26/19 0800)  Resp: 20 (03/26/19 0800)  BP: 122/80 (03/26/19 0800)  SpO2: 96 % (03/26/19 0325) Vital Signs (24h Range):  Temp:  [96.6 °F (35.9 °C)-98.2 °F (36.8 °C)] 96.6 °F (35.9 °C)  Pulse:  [74-96] 83  Resp:  [17-20] 20  SpO2:  [92 %-97 %] 96 %  BP: (113-129)/(62-80) 122/80     Weight: 100.2 kg (220 lb 14.4 oz)  Body mass index is 40.4 kg/m².  Body surface area is 2.09 meters  squared.      Intake/Output Summary (Last 24 hours) at 3/26/2019 0842  Last data filed at 3/26/2019 0528  Gross per 24 hour   Intake 3476.67 ml   Output --   Net 3476.67 ml       Physical Exam   Constitutional: She is oriented to person, place, and time. She appears well-developed and well-nourished.   HENT:   Head: Normocephalic and atraumatic.   Mouth/Throat: Oropharynx is clear and moist. No oropharyngeal exudate.   Eyes: Conjunctivae and EOM are normal. No scleral icterus.   Neck: Normal range of motion. Neck supple. No JVD present. No tracheal deviation present.   Cardiovascular: Normal rate, regular rhythm and normal heart sounds.   No murmur (2= capillary refill) heard.  Pulmonary/Chest: Effort normal and breath sounds normal. No respiratory distress. She has no wheezes.   Abdominal: Soft. Bowel sounds are normal. She exhibits distension.   Musculoskeletal: Normal range of motion. She exhibits no edema.   Neurological: She is alert and oriented to person, place, and time. No cranial nerve deficit.   Skin: Skin is warm and dry. No rash noted. No erythema.   Psychiatric: She has a normal mood and affect. Thought content normal.   Nursing note and vitals reviewed.      Significant Labs:     Lab Results   Component Value Date    WBC 3.90 03/26/2019    RBC 4.03 03/26/2019    HGB 11.2 (L) 03/26/2019    HCT 35.4 (L) 03/26/2019    MCV 88 03/26/2019    MCH 27.8 03/26/2019    MCHC 31.7 (L) 03/26/2019    RDW 29.3 (H) 03/26/2019     03/26/2019    MPV 6.4 (L) 03/26/2019    GRAN 40.0 03/26/2019    LYMPH 42.0 03/26/2019    MONO 12.0 03/26/2019    EOS CANCELED 03/25/2019    BASO CANCELED 03/25/2019    EOSINOPHIL 0.0 03/26/2019    BASOPHIL 0.0 03/26/2019         Diagnostic Results:  CT : SBO  multiple liver lesions, dilated fluid filled small bowel, some wall thickening of small bowel, duodenal stent, ventral hernias    Impression       Visualized nodules in the lung bases not significantly changed compared to prior  study 12/6/2018    Increase of the liver lesions consistent metastatic disease.  Index lesions have been detailed above    Findings consistent with small-bowel obstruction distal small bowel proximal to to the ileum with there is also wall thickening which could be infectious/inflammatory, schema, neoplastic.    Duodenal stent placed during the interval    Multiple ventral hernias fat containing increased compared to the prior exam.  Additional findings as detailed above including mild mesenteric fat stranding and mildly prominent mesenteric lymph nodes that appears similar or just minimally more prominent than on the prior exam, trace ascites.    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/2019 Yes    Hepatic metastases [C78.7] 01/15/2019 Yes    Lung metastases [C78.00] 01/15/2019 Yes    Morbid obesity [E66.01] 01/02/2019 Yes    Severe malnutrition [E43] 12/04/2018 Yes    Hypokalemia [E87.6] 12/04/2018 Yes    Hypertension [I10] 03/29/2018 Yes      Problems Resolved During this Admission:     Pt tolerating medical management for SBO  Being monitored closely by DR Scruggs   Stage 4 colon cancer with a duodenal mass and hepatic mets, progression of disease  Pt verbalizes that she wants to accept further chemo because she wants to help raise her grandchild  She does understand that this disease is terminal   As an outpatient she will be able to proceed with folfiri  She understands this may not help slow down the growth of her cancer  Anemia is stable not requiring intervention  Thank  you for your consult.    Karina Pettit MD  Hematology/Oncology  [facility]

## 2019-03-26 NOTE — PLAN OF CARE
Problem: Physical Therapy Goal  Goal: Physical Therapy Goal  Goals to be met by: 19     Patient will increase functional independence with mobility by performin. Supine to sit with Modified [place]  2. Sit to stand transfer with Stand-by Assistance  3. Bed to chair transfer with Stand-by Assistance with or without a Rolling Walker  4. Gait  x 100 feet with Stand-by Assistance using Rolling Walker.   5. Lower extremity exercise program x20 reps per handout, with supervision     Outcome: Ongoing (interventions implemented as appropriate)  PT for bed mobility, transfer training, gait and therex

## 2019-03-26 NOTE — PLAN OF CARE
Problem: Adult Inpatient Plan of Care  Goal: Plan of Care Review  Outcome: Ongoing (interventions implemented as appropriate)  Plan of care reviewed with pt, pt verbalized understanding.  PIV clean dry and intact. IVF infusing per order. IV abx infusing per order. Hourly/Q2 hourly rounds completed throughout shift. Pt denies need for pain medication. Neuro checks q 4, neuro intact. Telemetry continues to be monitored. Comfort level established. Up with assist to restroom. repositioned q2 as tolerated.   Pt has remained free from fall/injury. No skin breakdown noted. Bed in lowest position, brakes locked, call light within reach, SR^x2 for pt safety. Needs attended to, will continue to monitor.

## 2019-03-26 NOTE — PLAN OF CARE
Problem: Adult Inpatient Plan of Care  Goal: Plan of Care Review  Outcome: Ongoing (interventions implemented as appropriate)  PT alert and oriented. Tolerating clear liquid, advanced to full liquid for dinner. No nausea. No pain. No new symptoms. Plan of care reviewed. Safety maintained. Will continue to montior.

## 2019-03-26 NOTE — PROGRESS NOTES
Progress Note  Gen Surg    Admit Date: 3/24/2019  Attending: [facility] Day: 3    SUBJECTIVE:     Having flatus and bm     OBJECTIVE:     Vital Signs (Most Recent)  Temp: 97.3 °F (36.3 °C) (03/26/19 1153)  Pulse: 73 (03/26/19 1153)  Resp: 20 (03/26/19 1153)  BP: 139/68 (03/26/19 1153)  SpO2: 99 % (03/26/19 1153)    Vital Signs Range (Last 24H):  Temp:  [96.6 °F (35.9 °C)-98.1 °F (36.7 °C)]   Pulse:  [73-96]   Resp:  [17-20]   BP: (113-139)/(62-80)   SpO2:  [95 %-99 %]     I & O (Last 24H):    Intake/Output Summary (Last 24 hours) at 3/26/2019 1317  Last data filed at 3/26/2019 0528  Gross per 24 hour   Intake 3476.67 ml   Output --   Net 3476.67 ml       Scheduled medications:   enoxaparin  40 mg Subcutaneous Daily    fat emulsion 20%  250 mL Intravenous Daily    metoprolol succinate  25 mg Oral QHS    piperacillin-tazobactam 4.5 g in dextrose 5 % 100 mL IVPB (ready to mix system)  4.5 g Intravenous Q8H    potassium chloride  10 mEq Intravenous Q2H       Physical Exam:  General: no distress  Lungs:  clear to auscultation bilaterally and normal respiratory effort  Heart: regular rate and rhythm, S1, S2 normal, no murmur, rub or gallop  Abdomen: soft, non-tender non-distented; bowel sounds normal; no masses,  no organomegaly    Laboratory:  Labs within the past 24 hours have been reviewed.    ASSESSMENT/PLAN:     Advance diet as tolerated  If tolerates, then ok to discharge    Mumtaz Scruggs MD

## 2019-03-26 NOTE — PROGRESS NOTES
03/26/19 0915   Patient Assessment/Suction   Level of Consciousness (AVPU) alert   PRE-TX-O2   O2 Device (Oxygen Therapy) room air   SpO2 96 %   Pulse Oximetry Type Intermittent   $ Pulse Oximetry - Multiple Charge Pulse Oximetry - Multiple   Pulse 80   Resp 18

## 2019-03-26 NOTE — PT/OT/SLP PROGRESS
Physical Therapy Treatment    Patient Name:  Indigo Stuart   MRN:  [id number]    Recommendations:     Discharge Recommendations:  home health PT       Assessment:     Indigo Stuart is a 63 y.o. female admitted with a medical diagnosis of Neutropenic fever.  She presents with the following impairments/functional limitations:  weakness, impaired endurance, gait instability, impaired balance, impaired functional mobilty .    Rehab Prognosis: Good; patient would benefit from acute skilled PT services to address these deficits and reach maximum level of function.    Recent Surgery: * No surgery found *      Plan:     During this hospitalization, patient to be seen 6 x/week to address the identified rehab impairments via gait training, therapeutic exercises, therapeutic activities and progress toward the following goals:    · Plan of Care Expires:  04/01/19    Subjective     Chief Complaint: none expressed  Patient/Family Comments/goals: feeling better and stronger today  Pain/Comfort:  · Pain Rating 1: 0/10      Objective:     Communicated with nurse Tatum prior to session.  Patient found supine with peripheral IV, telemetry upon PT entry to room.     General Precautions: Standard, fall, neutropenic   Orthopedic Precautions:N/A   Braces: N/A     Functional Mobility:  · Bed Mobility:     · Scooting: contact guard assistance  · Supine to Sit: contact guard assistance  · Sit to Supine: minimum assistance    · Transfers:     · Sit to Stand:  contact guard assistance with rolling walker    · Gait: 40' with CGA using RW      Therapeutic Activities and Exercises:   seated therex: AP, LAQ, marching, hip abd/add x 10 reps each     pt assisted BTB with min A    Patient left supine with all lines intact, call button in reach and nurse Tatum notified..    GOALS:   Multidisciplinary Problems     Physical Therapy Goals        Problem: Physical Therapy Goal    Goal Priority Disciplines Outcome Goal Variances Interventions    Physical Therapy Goal     PT, PT/OT Ongoing (interventions implemented as appropriate)     Description:  Goals to be met by: 19     Patient will increase functional independence with mobility by performin. Supine to sit with Modified [place]  2. Sit to stand transfer with Stand-by Assistance  3. Bed to chair transfer with Stand-by Assistance with or without a Rolling Walker  4. Gait  x 100 feet with Stand-by Assistance using Rolling Walker.   5. Lower extremity exercise program x20 reps per handout, with supervision                      Time Tracking:     PT Received On: 19  PT Start Time: 945     PT Stop Time: 1003  PT Total Time (min): 18 min     Billable Minutes: Gait Training 10 and Therapeutic Exercise 8    Treatment Type: Treatment  PT/PTA: PTA     PTA Visit Number: 1     Connie Alvarado PTA  2019

## 2019-03-26 NOTE — PROGRESS NOTES
[facility] Medicine  Progress Note    Patient Name: Indigo Stuart  MRN: [id number]  Patient Class: IP- Inpatient   Admission Date: 3/24/2019  Length of Stay: 2 days  Attending Physician: Viviana Sifuentes MD  Primary Care Provider: John Conner MD        Subjective:     Principal Problem:Neutropenic fever    HPI:  Jacqueline Stuart is a 63-year-old female with PMHx significant for HTN, and recently diagnosed metastatic colon cancer.  She presented to the ED with complaint of abdominal pain that started yesterday.  She describes the pain as a mid abdominal pain that is 10/10 at worse with no alleviating or exacerbating factors.  Associated symptoms include nausea without vomiting.  She states initially she thought the pain may be gas, however she has never had a pain that bad.  Pain at this time is down to 4/10.  She reports several days of diarrhea that ceased 2 days ago with Imodium.  Since that time she has had normal bowel movements with last normal bowel movement yesterday.  She denies any bloody or black tarry stool.  She denies any fever, chills, but does endorse generalized weakness that was worse this morning upon wakening.  She denies any mouth sores or pain, chest pain, SOB, burning or blood with urination, flank pain, or leg swelling. She denies any recent travel, sick contact, rale or suspicious food intake, or Hx of MRSA or line infection.  She reports her last chemo treatment was approximately 2 weeks ago and is due to have another round of chemo tomorrow. She is followed closely by Dr. Pettit.  Other pertinent medical Hx as below:    Hospital Course:  No notes on file    Interval History: *pt seen/examined --  Feells like passed some flatus after eating ice   Weakness better after getting ivf     Review of Systems   Constitutional: Positive for fatigue and fever (here at hospital). Negative for activity change, appetite change and chills.   HENT: Negative for congestion,  mouth sores, postnasal drip, sinus pressure, sinus pain, sore throat and trouble swallowing.    Eyes: Negative for photophobia and visual disturbance.   Respiratory: Negative for cough, shortness of breath and wheezing.    Cardiovascular: Negative for chest pain, palpitations and leg swelling.   Gastrointestinal: Positive for abdominal pain, diarrhea and nausea. Negative for blood in stool, constipation and vomiting.   Genitourinary: Negative for difficulty urinating, dysuria, flank pain, frequency and hematuria.   Musculoskeletal: Negative for arthralgias and myalgias.   Skin: Negative for color change.   Neurological: Positive for weakness. Negative for dizziness, light-headedness and headaches.   Psychiatric/Behavioral: Negative for confusion. The patient is not nervous/anxious.      Objective:     Vital Signs (Most Recent):  Temp: 98.2 °F (36.8 °C) (03/25/19 1137)  Pulse: 92 (03/25/19 1137)  Resp: 18 (03/25/19 1137)  BP: 124/69 (03/25/19 1137)  SpO2: 95 % (03/25/19 1500) Vital Signs (24h Range):  Temp:  [98.2 °F (36.8 °C)-99.9 °F (37.7 °C)] 98.2 °F (36.8 °C)  Pulse:  [] 92  Resp:  [18-20] 18  SpO2:  [92 %-100 %] 95 %  BP: (124-177)/(62-90) 124/69     Weight: 100.2 kg (220 lb 12.8 oz)  Body mass index is 40.38 kg/m².    Intake/Output Summary (Last 24 hours) at 3/25/2019 1513  Last data filed at 3/25/2019 0107  Gross per 24 hour   Intake --   Output 200 ml   Net -200 ml      Physical Exam   Constitutional: She is oriented to person, place, and time. She appears well-developed and well-nourished. No distress.   HENT:   Head: Normocephalic and atraumatic.   Nose: Nose normal.   Mouth/Throat: Oropharynx is clear and moist.   Eyes: Conjunctivae and EOM are normal.   Neck: Normal range of motion. Neck supple. No thyromegaly present.   Cardiovascular: Normal rate, regular rhythm, normal heart sounds and intact distal pulses.   No murmur heard.  Pulmonary/Chest: Effort normal and breath sounds normal. No  respiratory distress.   Abdominal: Soft. She exhibits no distension and no mass. There is no tenderness. There is no guarding.   Hypoactive bowel sounds;    Musculoskeletal: Normal range of motion. She exhibits no edema or tenderness.   Neurological: She is alert and oriented to person, place, and time. No cranial nerve deficit.   Skin: Capillary refill takes less than 2 seconds.   Psychiatric: She has a normal mood and affect. Her behavior is normal. Judgment and thought content normal.   Nursing note and vitals reviewed.      Significant Labs:   CBC:   Recent Labs   Lab 03/24/19  1623 03/25/19  0514   WBC 2.20* 3.20*   HGB 13.1 12.5   HCT 41.0 39.2    321     CMP:   Recent Labs   Lab 03/24/19  1623 03/25/19  0514   * 134*   K 3.2* 3.3*   CL 98 99   CO2 21* 24   * 124*   BUN 10 11   CREATININE 0.8 0.8   CALCIUM 9.5 9.2   PROT 6.7 6.1   ALBUMIN 2.5* 2.3*   BILITOT 0.9 0.9   ALKPHOS 210* 188*   AST 41* 24   ALT 27 23   ANIONGAP 14 11   EGFRNONAA >60 >60       Significant Imaging: I have reviewed and interpreted all pertinent imaging results/findings within the past 24 hours.    Assessment/Plan:      * Neutropenic fever  Fever in neutropenic Pt undergoing chemotherapy with complaint of abdominal pain and abnormal CT indicating small bowel obstruction due to marked ileal small-bowel inflammation.  Presumed source intra-abdominal with no other clinical evidence of infection.      She has no Hx of MRSA and no evidence of line infection..  She did receive a dose of IV vancomycin in the ED   Continue Zosyn q.8 hours for gi etiology and fu cultures .    Hypomagnesemia  Acute, uncontrolled 2/2 poor po intake. Supplement with IV and monitor      Sepsis  This patient does have evidence of infective focus- neutropenic fever, ? Intraabdominal source  My overall impression is sepsis.  Antibiotics given-   Antibiotics (From admission, onward)    Start     Stop Route Frequency Ordered    03/25/19 0400   piperacillin-tazobactam 4.5 g in dextrose 5 % 100 mL IVPB (ready to mix system)      -- IV Every 8 hours (non-standard times) 03/24/19 2136          Severe Sepsis only-  Latest labs reviewed, they are-  Recent Labs   Lab 03/24/19  1623   BILITOT 0.9     Recent Labs   Lab 03/24/19  1703 03/24/19  2059   LACTATE 1.7 2.0     No results for input(s): PH, PO2, PCO2, HCO3, BE in the last 168 hours.    No evidence of organ dysfunction or shock.   Follow blood cultures.  Obtain stool cultures/C Diff if has diarrhea.     SBO (small bowel obstruction)  Acute, associated with marked ileal small-bowel inflammation.  Pt is not actively vomiting.  Her pain is improving.  Will keep NPO, she declines NG tube at this time.  Pain control-sparing use of narcotics as this can potentiate obstructive pathology.  Consult with General surgery in a.m..  Continue IVFs.  Use antiemetics as needed for nausea.      Chemotherapy induced neutropenia  .on admit now 416-    Maintain neutropenic precautions.    Consult with oncology- Dr. Pettit.  Treating for neutropenic fever.      Hepatic metastases  Established diagnosis.  Ongoing management of metastatic cancer per Oncology.      Lung metastases  Established diagnosis.  Ongoing management of metastatic cancer per Oncology.      Duodenal cancer  Established diagnosis, Pt underwent duodenal-jejunal stenting in January.  CT without any evidence of complications.    Ongoing management of metastatic cancer process per Oncology.        Morbid obesity  Body mass index is 39.87 kg/m².  Obesity compounds patient co-morbidities and complicates treatment course.  Counseling given regarding diet modification and exercise recommendations.        Hypokalemia  Acute, replete with IV potassium. And magnesium      Monitor electrolytes closely and replete as necessary.      Severe malnutrition  Requiring NPO status for small bowel obstruction.    Consult with registered dietitian-Pt may require TPN-if  continues npo or requires surgery      Hypertension  Chronic, stable.  Holding oral antihypertensives due to small-bowel obstruction.  BP stable this time.  Monitor BP closely and resume oral antihypertensives as clinically indicated.  Resume metoprolol -pt having some flatus and tolerating ice chips.           VTE Risk Mitigation (From admission, onward)        Ordered     enoxaparin injection 40 mg  Daily      03/24/19 2133     IP VTE HIGH RISK PATIENT  Once      03/24/19 2133              Viviana Sifuentes MD  Department of Hospital Medicine   [facility]

## 2019-03-26 NOTE — ASSESSMENT & PLAN NOTE
Requiring NPO status for small bowel obstruction.    Consult with registered dietitian-Pt may require TPN-if continues npo or requires surgery

## 2019-03-27 ENCOUNTER — TELEPHONE (OUTPATIENT)
Dept: HEMATOLOGY/ONCOLOGY | Facility: CLINIC | Age: 64
End: 2019-03-27

## 2019-03-27 VITALS
TEMPERATURE: 97 F | RESPIRATION RATE: 18 BRPM | WEIGHT: 220.88 LBS | DIASTOLIC BLOOD PRESSURE: 67 MMHG | HEART RATE: 68 BPM | SYSTOLIC BLOOD PRESSURE: 152 MMHG | HEIGHT: 62 IN | BODY MASS INDEX: 40.65 KG/M2 | OXYGEN SATURATION: 99 %

## 2019-03-27 LAB
ALBUMIN SERPL BCP-MCNC: 2.1 G/DL (ref 3.5–5.2)
ALP SERPL-CCNC: 156 U/L (ref 55–135)
ALT SERPL W/O P-5'-P-CCNC: 14 U/L (ref 10–44)
ANION GAP SERPL CALC-SCNC: 10 MMOL/L (ref 8–16)
ANISOCYTOSIS BLD QL SMEAR: ABNORMAL
AST SERPL-CCNC: 20 U/L (ref 10–40)
BASOPHILS # BLD AUTO: 0.1 K/UL (ref 0–0.2)
BASOPHILS NFR BLD: 1.3 % (ref 0–1.9)
BILIRUB SERPL-MCNC: 0.4 MG/DL (ref 0.1–1)
BUN SERPL-MCNC: 7 MG/DL (ref 8–23)
CALCIUM SERPL-MCNC: 8.8 MG/DL (ref 8.7–10.5)
CHLORIDE SERPL-SCNC: 104 MMOL/L (ref 95–110)
CO2 SERPL-SCNC: 25 MMOL/L (ref 23–29)
CREAT SERPL-MCNC: 0.7 MG/DL (ref 0.5–1.4)
DACRYOCYTES BLD QL SMEAR: ABNORMAL
DIFFERENTIAL METHOD: ABNORMAL
EOSINOPHIL # BLD AUTO: 0.1 K/UL (ref 0–0.5)
EOSINOPHIL NFR BLD: 1.8 % (ref 0–8)
ERYTHROCYTE [DISTWIDTH] IN BLOOD BY AUTOMATED COUNT: 29.2 % (ref 11.5–14.5)
EST. GFR  (AFRICAN AMERICAN): >60 ML/MIN/1.73 M^2
EST. GFR  (NON AFRICAN AMERICAN): >60 ML/MIN/1.73 M^2
GLUCOSE SERPL-MCNC: 108 MG/DL (ref 70–110)
HCT VFR BLD AUTO: 35 % (ref 37–48.5)
HGB BLD-MCNC: 11.1 G/DL (ref 12–16)
LYMPHOCYTES # BLD AUTO: 1.2 K/UL (ref 1–4.8)
LYMPHOCYTES NFR BLD: 29.4 % (ref 18–48)
MAGNESIUM SERPL-MCNC: 1.9 MG/DL (ref 1.6–2.6)
MCH RBC QN AUTO: 28.1 PG (ref 27–31)
MCHC RBC AUTO-ENTMCNC: 31.8 G/DL (ref 32–36)
MCV RBC AUTO: 89 FL (ref 82–98)
MONOCYTES # BLD AUTO: 0.7 K/UL (ref 0.3–1)
MONOCYTES NFR BLD: 18.4 % (ref 4–15)
NEUTROPHILS # BLD AUTO: 1.9 K/UL (ref 1.8–7.7)
NEUTROPHILS NFR BLD: 49.1 % (ref 38–73)
OVALOCYTES BLD QL SMEAR: ABNORMAL
PLATELET # BLD AUTO: 278 K/UL (ref 150–350)
PLATELET BLD QL SMEAR: ABNORMAL
PMV BLD AUTO: 6.8 FL (ref 9.2–12.9)
POIKILOCYTOSIS BLD QL SMEAR: SLIGHT
POLYCHROMASIA BLD QL SMEAR: ABNORMAL
POTASSIUM SERPL-SCNC: 3 MMOL/L (ref 3.5–5.1)
PREALB SERPL-MCNC: 7 MG/DL (ref 20–43)
PROT SERPL-MCNC: 5.4 G/DL (ref 6–8.4)
RBC # BLD AUTO: 3.96 M/UL (ref 4–5.4)
SODIUM SERPL-SCNC: 139 MMOL/L (ref 136–145)
TRIGL SERPL-MCNC: 150 MG/DL (ref 30–150)
WBC # BLD AUTO: 4 K/UL (ref 3.9–12.7)

## 2019-03-27 PROCEDURE — 80053 COMPREHEN METABOLIC PANEL: CPT

## 2019-03-27 PROCEDURE — 83735 ASSAY OF MAGNESIUM: CPT

## 2019-03-27 PROCEDURE — 25000003 PHARM REV CODE 250: Performed by: HOSPITALIST

## 2019-03-27 PROCEDURE — 63600175 PHARM REV CODE 636 W HCPCS: Performed by: NURSE PRACTITIONER

## 2019-03-27 PROCEDURE — 97803 MED NUTRITION INDIV SUBSEQ: CPT

## 2019-03-27 PROCEDURE — 63600175 PHARM REV CODE 636 W HCPCS: Performed by: HOSPITALIST

## 2019-03-27 PROCEDURE — 84134 ASSAY OF PREALBUMIN: CPT

## 2019-03-27 PROCEDURE — 84478 ASSAY OF TRIGLYCERIDES: CPT

## 2019-03-27 PROCEDURE — 85025 COMPLETE CBC W/AUTO DIFF WBC: CPT

## 2019-03-27 PROCEDURE — 94761 N-INVAS EAR/PLS OXIMETRY MLT: CPT

## 2019-03-27 RX ORDER — POTASSIUM CHLORIDE 20 MEQ/1
40 TABLET, EXTENDED RELEASE ORAL ONCE
Status: DISCONTINUED | OUTPATIENT
Start: 2019-03-27 | End: 2019-03-27

## 2019-03-27 RX ORDER — CIPROFLOXACIN 500 MG/1
500 TABLET ORAL EVERY 12 HOURS
Qty: 20 TABLET | Refills: 0 | Status: SHIPPED | OUTPATIENT
Start: 2019-03-27 | End: 2019-04-03

## 2019-03-27 RX ORDER — METRONIDAZOLE 500 MG/1
500 TABLET ORAL EVERY 8 HOURS
Qty: 21 TABLET | Refills: 0 | Status: SHIPPED | OUTPATIENT
Start: 2019-03-27 | End: 2019-04-03

## 2019-03-27 RX ORDER — CIPROFLOXACIN 500 MG/1
500 TABLET ORAL EVERY 12 HOURS
Status: DISCONTINUED | OUTPATIENT
Start: 2019-03-27 | End: 2019-03-27

## 2019-03-27 RX ORDER — HEPARIN 100 UNIT/ML
5 SYRINGE INTRAVENOUS ONCE
Status: COMPLETED | OUTPATIENT
Start: 2019-03-27 | End: 2019-03-27

## 2019-03-27 RX ORDER — POTASSIUM CHLORIDE 20 MEQ/1
60 TABLET, EXTENDED RELEASE ORAL
Status: COMPLETED | OUTPATIENT
Start: 2019-03-27 | End: 2019-03-27

## 2019-03-27 RX ORDER — CIPROFLOXACIN 500 MG/1
500 TABLET ORAL EVERY 12 HOURS
Status: DISCONTINUED | OUTPATIENT
Start: 2019-03-27 | End: 2019-03-27 | Stop reason: HOSPADM

## 2019-03-27 RX ORDER — METRONIDAZOLE 500 MG/1
500 TABLET ORAL EVERY 8 HOURS
Status: DISCONTINUED | OUTPATIENT
Start: 2019-03-27 | End: 2019-03-27 | Stop reason: HOSPADM

## 2019-03-27 RX ADMIN — POTASSIUM CHLORIDE 60 MEQ: 20 SOLUTION ORAL at 09:03

## 2019-03-27 RX ADMIN — ENOXAPARIN SODIUM 40 MG: 100 INJECTION SUBCUTANEOUS at 04:03

## 2019-03-27 RX ADMIN — METRONIDAZOLE 500 MG: 500 TABLET ORAL at 02:03

## 2019-03-27 RX ADMIN — PIPERACILLIN SODIUM AND TAZOBACTAM SODIUM 4.5 G: 4; .5 INJECTION, POWDER, LYOPHILIZED, FOR SOLUTION INTRAVENOUS at 05:03

## 2019-03-27 RX ADMIN — HEPARIN 500 UNITS: 100 SYRINGE at 06:03

## 2019-03-27 RX ADMIN — CIPROFLOXACIN HYDROCHLORIDE 500 MG: 500 TABLET, FILM COATED ORAL at 04:03

## 2019-03-27 RX ADMIN — POTASSIUM CHLORIDE 60 MEQ: 20 TABLET, EXTENDED RELEASE ORAL at 10:03

## 2019-03-27 RX ADMIN — POTASSIUM CHLORIDE 60 MEQ: 20 TABLET, EXTENDED RELEASE ORAL at 02:03

## 2019-03-27 RX ADMIN — PIPERACILLIN SODIUM AND TAZOBACTAM SODIUM 4.5 G: 4; .5 INJECTION, POWDER, LYOPHILIZED, FOR SOLUTION INTRAVENOUS at 11:03

## 2019-03-27 NOTE — PLAN OF CARE
Shai unable to send referral to resume [facility] via [facility] due to problems with Epic.  Shai called Mariela at [phone].  She will get the orders for [facility].  Accepted [facility].       03/27/19 1519   Post-Acute Status   Post-Acute Authorization Home Health/Hospice   Home Health/Hospice Status Referrals Sent

## 2019-03-27 NOTE — ASSESSMENT & PLAN NOTE
Body mass index is 40.4 kg/m². Obesity compounds patient co-morbidities and complicates treatment course.  Counseling given regarding diet modification and exercise recommendations.

## 2019-03-27 NOTE — PROGRESS NOTES
"[facility]  Adult Nutrition  Progress Note    SUMMARY     Intervention: General Healthful diet    Current Intake: Lipids ( 500 kcal) TPN not reordered    Recommendations:   1) Pt tolerating PO, d/c TPN   2) Change diet to low fiber   3) Add Boost if PO intakes fall < 50% of meals    Goals: 1) Pt will receive nutrition within 48 hrs 2) PO intakes > 50% of meals and supplements by f/u   Nutrition Goal Status: Met  Communication of RD Recs: (POC, sticky note, second sign)     Reason for Assessment     Reason For Assessment: Follow-up  Diagnosis: cancer diagnosis/related complications  Relevant Medical History: Metastatic colon cancer with mets to the liver. Last chemo 2 wks ago. Neutropenic fever.    Interdisciplinary Rounds: Attended    General Information Comments: Pt alert with family at bedside. Hx of not eating since Saturday (3 days). Small amount of wt loss noted. NFPE completed. Pt has been educated re cancer nutrition by outpatient RD, had a few days of diarrhea resolved with imodium s/p chemo treatment.   3/27/19 TPN initiated, was meeting kcal/protein needs. Per GI SBO resolved and pt tolerating regular diet today. Lipids still ordered but TPN not reordered. Tolerated 75% of breakfast, denied GI symptoms. Neutropenia resolved.     Nutrition Discharge Planning: Low fiber diet + Boost plus daily if needed     Nutrition Risk Screen     Nutrition Risk Screen: no indicators present     Nutrition/Diet History     Patient Reported Diet/Restrictions/Preferences: general  Spiritual, Cultural Beliefs, Mu-ism Practices, Values that Affect Care: no  Supplemental Drinks or Food Habits: (none)  Food Allergies: NKFA(or intolerances)  Factors Affecting Nutritional Intake: None     Anthropometrics     Height Method: Stated  Height: 5' 2"  Height (inches): 62 in  Weight Method: Bed Scale  Weight: 100.2 kg (3/25)  Weight (lb): 220.9 lb  Ideal Body Weight (IBW), Female: 110 lb  % Ideal Body Weight, Female " (lb): 200.82 lb  BMI (Calculated): 40.5 Admission  Usual Body Weight (UBW), k.2 kg (per chart review 19)     Lab/Procedures/Meds     Pertinent Labs Reviewed: reviewed  BMP  Lab Results   Component Value Date     2019    K 3.0 (L) 2019     2019    CO2 25 2019    BUN 7 (L) 2019    CREATININE 0.7 2019    CALCIUM 8.8 2019    ANIONGAP 10 2019    ESTGFRAFRICA >60 2019    EGFRNONAA >60 2019       Pertinent Medications Reviewed: reviewed  Lipids, phenergan, KCl, KNaPhos     Estimated/Assessed Needs   Admission  Weight Used For Calorie Calculations: 100.2 kg (220 lb 14.4 oz)  Energy Calorie Requirements (kcal): 3277-5260 (1-1.1 obesity, cancer)  Energy Need Method: [place]  Protein Requirements:  (.9-1.1 gm/kg/day, obesity vs cancer)  Weight Used For Protein Calculations: 100.2 kg (220 lb 14.4 oz)  Estimated Fluid Requirement Method: RDA Method(or per MD)  RDA Method (mL): 1500  CHO Requirement: n/a        Nutrition Prescription Ordered     Current Diet Order: Regular     Evaluation of Received Nutrient/Fluid Intake     Energy Calories Required: exceeds needs  Protein Required: meeting needs  Fluid Required: exceeds needs  % Intake of Estimated Energy Needs: 80-90 %  % Meal Intake: 75     Nutrition Risk     Level of Risk/Frequency of Follow-up: (2 x wkly)      Assessment and Plan      Inadequate energy intake r/t inability to consume sufficient energy as evidenced by altered GI tract  Resolved     Of note pt does not meet 2 qualifications for malnutrition at this time    Monitor and Evaluation     Food and Nutrient Intake: energy intake  Food and Nutrient Adminstration: diet order  Anthropometric Measurements: weight  Biochemical Data, Medical Tests and Procedures: electrolyte and renal panel, inflammatory profile  Nutrition-Focused Physical Findings: overall appearance, skin      Malnutrition Assessment  Edema (Fluid  Accumulation): (no edema noted)   Earnets: 18  Subcutaneous Fat Loss (Final Summary): well nourished  Muscle Loss Evaluation (Final Summary): well nourished    No wound noted. Requires assistance in 5 of 8 ADLs per functional screen     Nutrition Follow-Up  yes

## 2019-03-27 NOTE — PROGRESS NOTES
Tolerating diet, having flatus and bm    Vitals:    03/27/19 1126   BP: (!) 142/72   Pulse: 63   Resp: 18   Temp: 96.2 °F (35.7 °C)     Abdomen soft, non tender, benign    A/P    sbo obstruction resolved  Diet as tolerated

## 2019-03-27 NOTE — DISCHARGE INSTRUCTIONS
Thank you for choosing [facility] for your medical care. The primary doctor who is taking care of you at the time of your discharge is Kwabena Guardado MD.     You were admitted to the hospital with Neutropenic fever.     Please note your discharge instructions, including diet/activity restrictions, follow-up appointments, and medication changes.  If you have any questions about your medical issues, prescriptions, or any other questions, please feel free to contact the [facility] Medicine Dept at [phone] and we will help.    If you are previously with Home health, outpatient PT/OT or under a therapy program, you are cleared to return to those programs.    Please direct all long term medication refills and follow up to your primary care provider, John Conner MD. Thank you again for letting us take care of your health care needs.

## 2019-03-27 NOTE — NURSING
Pt given discharge instructions written and verbal. Education given on when to take next dose of medications. Tolerated diet. Ate % of meal. No nausea. Left with all belongings.

## 2019-03-27 NOTE — PLAN OF CARE
Problem: Adult Inpatient Plan of Care  Goal: Plan of Care Review  Outcome: Ongoing (interventions implemented as appropriate)  Plan of care reviewed with pt, pt verbalized understanding.  PIV clean dry and intact. TPN infusing per order. Lipids infusing per order. IV abx infusing per order. Hourly/Q2 hourly rounds completed throughout shift. Pt denies need for pain medication. Neuro checks q 4, neuro intact. Telemetry continues to be monitored. Comfort level established. Up with assist to restroom. repositioned q2 as tolerated.   Pt has remained free from fall/injury. No skin breakdown noted. Bed in lowest position, brakes locked, call light within reach, SR^x2 for pt safety. Needs attended to, will continue to monitor.

## 2019-03-27 NOTE — PT/OT/SLP PROGRESS
Physical Therapy      Patient Name:  Indigo Stuart   MRN:  [id number]    Patient not seen today secondary to Other (Comment). Pt wanting to rest, anxiously awaiting d/c today. Will follow-up .    Connie Alvarado, PTA

## 2019-03-27 NOTE — PROGRESS NOTES
03/26/19 1921   Patient Assessment/Suction   Level of Consciousness (AVPU) alert   Respiratory Effort Normal;Unlabored   PRE-TX-O2   O2 Device (Oxygen Therapy) room air   SpO2 97 %   Pulse Oximetry Type Intermittent   Pulse 66   Resp 18

## 2019-03-27 NOTE — PLAN OF CARE
03/27/19 0805   Patient Assessment/Suction   Level of Consciousness (AVPU) alert   PRE-TX-O2   O2 Device (Oxygen Therapy) room air   SpO2 98 %   Pulse Oximetry Type Intermittent   $ Pulse Oximetry - Multiple Charge Pulse Oximetry - Multiple   Pulse 70   Resp 18

## 2019-03-27 NOTE — TELEPHONE ENCOUNTER
----- Message from Roddy Vilchis sent at 3/27/2019  3:01 PM CDT -----  Type: Needs Medical Advice    Who Called:  Mai Dixon/[facility]    Best Call Back Number: [phone], Cell [phone]  Additional Information: Caller states that the patient needs to schedule a 2 week hospital FU.

## 2019-03-27 NOTE — PLAN OF CARE
03/27/19 1521   Final Note   Assessment Type Final Discharge Note   Anticipated Discharge Disposition Home-Health   Hospital Follow Up  Appt(s) scheduled? Yes

## 2019-03-27 NOTE — PROGRESS NOTES
[facility] Medicine  Progress Note    Patient Name: Indigo Stuart  MRN: [id number]  Patient Class: IP- Inpatient   Admission Date: 3/24/2019  Length of Stay: 2 days  Attending Physician: Kwabena Guardado MD  Primary Care Provider: John Conner MD        Subjective:     Principal Problem:Neutropenic fever    HPI:  Jacqueline Stuart is a 63-year-old female with PMHx significant for HTN, and recently diagnosed metastatic colon cancer.  She presented to the ED with complaint of abdominal pain that started yesterday.  She describes the pain as a mid abdominal pain that is 10/10 at worse with no alleviating or exacerbating factors.  Associated symptoms include nausea without vomiting.  She states initially she thought the pain may be gas, however she has never had a pain that bad.  Pain at this time is down to 4/10.  She reports several days of diarrhea that ceased 2 days ago with Imodium.  Since that time she has had normal bowel movements with last normal bowel movement yesterday.  She denies any bloody or black tarry stool.  She denies any fever, chills, but does endorse generalized weakness that was worse this morning upon wakening.  She denies any mouth sores or pain, chest pain, SOB, burning or blood with urination, flank pain, or leg swelling. She denies any recent travel, sick contact, rale or suspicious food intake, or Hx of MRSA or line infection.  She reports her last chemo treatment was approximately 2 weeks ago and is due to have another round of chemo tomorrow. She is followed closely by Dr. Pettit.  Other pertinent medical Hx as below:    Hospital Course:  No notes on file    Interval History:   Patient seen and examined.  No abdominal pain or nausea noted on exam exam.  Plan of care discussed with General surgery and Oncology in detail.    Review of Systems   Constitutional: Positive for activity change, appetite change and fatigue.   Respiratory: Negative for cough and  shortness of breath.    Cardiovascular: Negative for chest pain and leg swelling.   Gastrointestinal: Negative for abdominal pain and nausea.   Neurological: Positive for weakness.   Psychiatric/Behavioral: Negative for confusion.   All other systems reviewed and are negative.    Objective:     Vital Signs (Most Recent):  Temp: 96.1 °F (35.6 °C) (03/26/19 1958)  Pulse: 70 (03/26/19 1958)  Resp: 17 (03/26/19 1958)  BP: 124/73 (03/26/19 1958)  SpO2: 99 % (03/26/19 1958) Vital Signs (24h Range):  Temp:  [96.1 °F (35.6 °C)-98 °F (36.7 °C)] 96.1 °F (35.6 °C)  Pulse:  [66-87] 70  Resp:  [17-20] 17  SpO2:  [96 %-100 %] 99 %  BP: (113-141)/(62-80) 124/73     Weight: 100.2 kg (220 lb 14.4 oz)  Body mass index is 40.4 kg/m².    Intake/Output Summary (Last 24 hours) at 3/26/2019 2154  Last data filed at 3/26/2019 1800  Gross per 24 hour   Intake 2226.67 ml   Output --   Net 2226.67 ml      Physical Exam   Constitutional: She is oriented to person, place, and time.   Chronically ill-appearing  female in no acute distress   Eyes: Pupils are equal, round, and reactive to light. EOM are normal.   Cardiovascular: Normal rate, regular rhythm and intact distal pulses.   No murmur heard.  Pulmonary/Chest: Effort normal and breath sounds normal.   Abdominal: Soft. She exhibits no distension. There is no tenderness.   Mild periumbilical tenderness noted   Musculoskeletal: She exhibits edema.   Neurological: She is alert and oriented to person, place, and time.   Skin: No rash noted. No pallor.   Nursing note and vitals reviewed.      Significant Labs: All pertinent labs within the past 24 hours have been reviewed.    Significant Imaging: I have reviewed all pertinent imaging results/findings within the past 24 hours.    Assessment/Plan:      * Neutropenic fever    Patient with history of neutropenia secondary to chemotherapy.  Likely source relates to the patient's ileitis.  Neutropenia appears to have resolved patient remains  afebrile.  Will continue IV antibiotics and transition to oral antibiotics once patient is tolerating p.o. Without difficulty.    SBO (small bowel obstruction)  Acute, associated with marked ileal small-bowel inflammation.  Pt is not actively vomiting.  Her pain is improving.    Consult with General surgery.  Will advance diet to clear liquids and monitor patient closely.  No need for nasogastric tube at this point in time.      Sepsis  This patient does have evidence of infective focus-   Neutropenic fever with intra-abdominal source -ileitis  My overall impression is sepsis.  Antibiotics given-   Antibiotics (From admission, onward)    Start     Stop Route Frequency Ordered    03/25/19 1200  piperacillin-tazobactam 4.5 g in dextrose 5 % 100 mL IVPB (ready to mix system)      -- IV Every 8 hours (non-standard times) 03/25/19 1054        No evidence of organ dysfunction or shock.   Follow blood cultures.  Obtain stool cultures/C Diff if has diarrhea.     Duodenal cancer  Established diagnosis, Pt underwent duodenal-jejunal stenting in January.  CT without any evidence of complications.   Discussed with Oncology -patient is stage IV with high likelihood for terminal demise within the next 6 months.  I discussed with Oncology who does not want me to discuss with the patient hospice measures at this point in time.  Will defer ongoing management of metastatic cancer process per Oncology.        Chemotherapy induced neutropenia  .on admit now 416-    Maintain neutropenic precautions.    Consult with oncology- Dr. Pettit.  Treating for neutropenic fever.      Hepatic metastases  Established diagnosis.  Ongoing management of metastatic cancer per Oncology.      Lung metastases  Established diagnosis.  Ongoing management of metastatic cancer per Oncology.      Morbid obesity  Body mass index is 40.4 kg/m². Obesity compounds patient co-morbidities and complicates treatment course.  Counseling given regarding diet  modification and exercise recommendations.        Hypokalemia  Patient has hypokalemia which is currently uncontrolled. Last electrolytes reviewed-   Recent Labs   Lab 03/25/19  0514 03/26/19  0540   K 3.3* 2.8*   . Will replace potassium and monitor electrolytes closely.         Severe malnutrition  Patient is severely malnourished with Body mass index is 40.4 kg/m². and prealbumin  No results for input(s): PREALBUMIN in the last 72 hours.   unable to accommodate by enteral nutrition. Will continue nutrition consult, calorie count and supplemental nutrition by TPN.        Hypertension  Chronic, stable. Resume metoprolol -pt having some flatus and tolerating ice chips.           VTE Risk Mitigation (From admission, onward)        Ordered     enoxaparin injection 40 mg  Daily      03/24/19 2133     IP VTE HIGH RISK PATIENT  Once      03/24/19 2133              Kwabena Guardado MD  Department of Hospital Medicine   [facility]

## 2019-03-27 NOTE — ASSESSMENT & PLAN NOTE
Acute, associated with marked ileal small-bowel inflammation.  Pt is not actively vomiting.  Her pain is improving.    Consult with General surgery.  Will advance diet to clear liquids and monitor patient closely.  No need for nasogastric tube at this point in time.

## 2019-03-27 NOTE — ASSESSMENT & PLAN NOTE
Patient has hypokalemia which is currently uncontrolled. Last electrolytes reviewed-   Recent Labs   Lab 03/25/19  0514 03/26/19  0540   K 3.3* 2.8*   . Will replace potassium and monitor electrolytes closely.

## 2019-03-27 NOTE — ASSESSMENT & PLAN NOTE
Patient with history of neutropenia secondary to chemotherapy.  Likely source relates to the patient's ileitis.  Neutropenia appears to have resolved patient remains afebrile.  Will continue IV antibiotics and transition to oral antibiotics once patient is tolerating p.o. Without difficulty.

## 2019-03-27 NOTE — PLAN OF CARE
Problem: Oral Intake Inadequate  Goal: Improved Oral Intake    Intervention: Promote and Optimize Oral Intake  Intervention: General Healthful diet     Current Intake: Lipids ( 500 kcal) TPN not reordered     Recommendations:   1) Pt tolerating PO, d/c TPN   2) Change diet to low fiber   3) Add Boost if PO intakes fall < 50% of meals     Goals: 1) Pt will receive nutrition within 48 hrs 2) PO intakes > 50% of meals and supplements by f/u   Nutrition Goal Status: Met  Communication of RD Recs: (POC, sticky note, second sign)

## 2019-03-27 NOTE — TELEPHONE ENCOUNTER
Spoke to patient and notified her that she is already scheduled for 4/8/19 at 840 and to keep lab appt on 4/5

## 2019-03-27 NOTE — ASSESSMENT & PLAN NOTE
Patient is severely malnourished with Body mass index is 40.4 kg/m². and prealbumin  No results for input(s): PREALBUMIN in the last 72 hours.   unable to accommodate by enteral nutrition. Will continue nutrition consult, calorie count and supplemental nutrition by TPN.

## 2019-03-27 NOTE — ASSESSMENT & PLAN NOTE
This patient does have evidence of infective focus-   Neutropenic fever with intra-abdominal source -ileitis  My overall impression is sepsis.  Antibiotics given-   Antibiotics (From admission, onward)    Start     Stop Route Frequency Ordered    03/25/19 1200  piperacillin-tazobactam 4.5 g in dextrose 5 % 100 mL IVPB (ready to mix system)      -- IV Every 8 hours (non-standard times) 03/25/19 1054        No evidence of organ dysfunction or shock.   Follow blood cultures.  Obtain stool cultures/C Diff if has diarrhea.

## 2019-03-27 NOTE — SUBJECTIVE & OBJECTIVE
Interval History:   Patient seen and examined.  No abdominal pain or nausea noted on exam exam.  Plan of care discussed with General surgery and Oncology in detail.    Review of Systems   Constitutional: Positive for activity change, appetite change and fatigue.   Respiratory: Negative for cough and shortness of breath.    Cardiovascular: Negative for chest pain and leg swelling.   Gastrointestinal: Negative for abdominal pain and nausea.   Neurological: Positive for weakness.   Psychiatric/Behavioral: Negative for confusion.   All other systems reviewed and are negative.    Objective:     Vital Signs (Most Recent):  Temp: 96.1 °F (35.6 °C) (03/26/19 1958)  Pulse: 70 (03/26/19 1958)  Resp: 17 (03/26/19 1958)  BP: 124/73 (03/26/19 1958)  SpO2: 99 % (03/26/19 1958) Vital Signs (24h Range):  Temp:  [96.1 °F (35.6 °C)-98 °F (36.7 °C)] 96.1 °F (35.6 °C)  Pulse:  [66-87] 70  Resp:  [17-20] 17  SpO2:  [96 %-100 %] 99 %  BP: (113-141)/(62-80) 124/73     Weight: 100.2 kg (220 lb 14.4 oz)  Body mass index is 40.4 kg/m².    Intake/Output Summary (Last 24 hours) at 3/26/2019 2154  Last data filed at 3/26/2019 1800  Gross per 24 hour   Intake 2226.67 ml   Output --   Net 2226.67 ml      Physical Exam   Constitutional: She is oriented to person, place, and time.   Chronically ill-appearing  female in no acute distress   Eyes: Pupils are equal, round, and reactive to light. EOM are normal.   Cardiovascular: Normal rate, regular rhythm and intact distal pulses.   No murmur heard.  Pulmonary/Chest: Effort normal and breath sounds normal.   Abdominal: Soft. She exhibits no distension. There is no tenderness.   Mild periumbilical tenderness noted   Musculoskeletal: She exhibits edema.   Neurological: She is alert and oriented to person, place, and time.   Skin: No rash noted. No pallor.   Nursing note and vitals reviewed.      Significant Labs: All pertinent labs within the past 24 hours have been reviewed.    Significant  Imaging: I have reviewed all pertinent imaging results/findings within the past 24 hours.

## 2019-03-27 NOTE — ASSESSMENT & PLAN NOTE
Established diagnosis, Pt underwent duodenal-jejunal stenting in January.  CT without any evidence of complications.   Discussed with Oncology -patient is stage IV with high likelihood for terminal demise within the next 6 months.  I discussed with Oncology who does not want me to discuss with the patient hospice measures at this point in time.  Will defer ongoing management of metastatic cancer process per Oncology.

## 2019-03-28 ENCOUNTER — TELEPHONE (OUTPATIENT)
Dept: MEDSURG UNIT | Facility: HOSPITAL | Age: 64
End: 2019-03-28

## 2019-03-28 NOTE — DISCHARGE SUMMARY
[facility] Medicine  Discharge Summary      Patient Name: Indigo Stuart  MRN: [id number]  Admission Date: 3/24/2019  Hospital Length of Stay: 3 days  Discharge Date and Time: 3/27/2019  7:24 PM  Attending Physician: No att. providers found   Discharging Provider: Kwabena Guardado MD  Primary Care Provider: John Conner MD      HPI:   Jacqueline Stuart is a 63-year-old female with PMHx significant for HTN, and recently diagnosed metastatic colon cancer.  She presented to the ED with complaint of abdominal pain that started yesterday.  She describes the pain as a mid abdominal pain that is 10/10 at worse with no alleviating or exacerbating factors.  Associated symptoms include nausea without vomiting.  She states initially she thought the pain may be gas, however she has never had a pain that bad.  Pain at this time is down to 4/10.  She reports several days of diarrhea that ceased 2 days ago with Imodium.  Since that time she has had normal bowel movements with last normal bowel movement yesterday.  She denies any bloody or black tarry stool.  She denies any fever, chills, but does endorse generalized weakness that was worse this morning upon wakening.  She denies any mouth sores or pain, chest pain, SOB, burning or blood with urination, flank pain, or leg swelling. She denies any recent travel, sick contact, rale or suspicious food intake, or Hx of MRSA or line infection.  She reports her last chemo treatment was approximately 2 weeks ago and is due to have another round of chemo tomorrow. She is followed closely by Dr. Pettit.  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  Patient was started on broad-spectrum antibiotics  for her fever and her mental status and abdominal pain improved over the course of her hospitalization.  General surgery was initially consulted for evaluation for surgical management of her possible small-bowel obstruction and was felt that surgical management was not needed at this point in time.  Patient's diet was advanced and she was tolerating a regular diet at time of discharge.  Plan of care was discussed with Oncology who felt that she needed no further evaluation at this point in time.  Patient is likely moving to hospice care in the near future given her terminal cancer.   She was discharged home on oral antibiotics once she was tolerating a regular diet.    Consults:   Consults (From admission, onward)        Status Ordering Provider     Inpatient consult to General Surgery  Once     Provider:  (Not yet assigned)    JUDE Cross            Final Active Diagnoses:    Diagnosis Date Noted POA    PRINCIPAL PROBLEM:  Neutropenic fever [D70.9, R50.81] 03/24/2019 Yes    SBO (small bowel obstruction) [K56.609] 03/24/2019 Yes    Sepsis [A41.9] 03/24/2019 Yes    Duodenal cancer [C17.0] 01/15/2019 Yes    Hepatic metastases [C78.7] 01/15/2019 Yes    Lung metastases [C78.00] 01/15/2019 Yes    Morbid obesity [E66.01] 01/02/2019 Yes    Severe malnutrition [E43] 12/04/2018 Yes    Hypokalemia [E87.6] 12/04/2018 Yes    Hypertension [I10] 03/29/2018 Yes      Problems Resolved During this Admission:    Diagnosis Date Noted Date Resolved POA    Hypomagnesemia [E83.42] 03/25/2019 03/26/2019 Yes       Discharged Condition: stable    Disposition: Home-Health Care [facility]    Follow Up:  Follow-up Information     John Conner MD On 4/9/2019.    Specialty:  Family Medicine  Why:  hospital f/u on Tuesday, 4/9/19 @ 1:30pm  Contact information:  [address] [facility] [address]  [phone]             Karina Pettit MD In 2 weeks.    Specialty:  Hematology and Oncology  Why:    spoke lalit Pereyra in  scheduling, he sent a message to the nurse to call and notify pt of appointment  Contact information:  [address]  [place] [address]  [phone]             [facility].    Specialty:  Home Health Services  Contact information:  [address] [place] [address]  [phone]                 Patient Instructions:      Referral to Home health   Referral Priority: Routine Referral Type: Home Health Care   Referral Reason: Specialty Services Required   Requested Specialty: Home Health Services   Number of Visits Requested: 1     Diet Adult Regular     Notify your health care provider if you experience any of the following:  severe uncontrolled pain     Notify your health care provider if you experience any of the following:  difficulty breathing or increased cough     Notify your health care provider if you experience any of the following:  persistent nausea and vomiting or diarrhea     Activity as tolerated       Significant Diagnostic Studies:     Pending Diagnostic Studies:     None         Medications:  Reconciled Home Medications:      Medication List      START taking these medications    ciprofloxacin HCl 500 MG tablet  Commonly known as:  CIPRO  Take 1 tablet (500 mg total) by mouth every 12 (twelve) hours. for 7 days     metroNIDAZOLE 500 MG tablet  Commonly known as:  FLAGYL  Take 1 tablet (500 mg total) by mouth every 8 (eight) hours. for 7 days        CONTINUE taking these medications    amLODIPine 5 MG tablet  Commonly known as:  NORVASC  Take 5 mg by mouth once daily.     atorvastatin 20 MG tablet  Commonly known as:  LIPITOR  Take 20 mg by mouth every evening.     ferrous sulfate 325 mg (65 mg iron) Tab tablet  Commonly known as:  FEOSOL  Take 1 tablet by mouth 2 (two) times daily.     metoprolol succinate 25 MG 24 hr tablet  Commonly known as:  TOPROL-XL  Take 25 mg by mouth every evening.     ondansetron 8 MG Tbdl  Commonly known as:  ZOFRAN-ODT  Take 1  tablet (8 mg total) by mouth every 12 (twelve) hours as needed.     potassium chloride 8 MEQ Tbsr  Commonly known as:  KLOR-CON  Take 8 mEq by mouth.     promethazine 25 MG tablet  Commonly known as:  PHENERGAN  Take 1 tablet (25 mg total) by mouth every 6 (six) hours as needed for Nausea.        STOP taking these medications    hydroCHLOROthiazide 12.5 mg capsule  Commonly known as:  MICROZIDE            Indwelling Lines/Drains at time of discharge:   Lines/Drains/Airways          None          Time spent on the discharge of patient: 32 minutes  Patient was seen and examined on the date of discharge and determined to be suitable for discharge.         Kwabena Guardado MD  Department of Hospital Medicine  [facility]

## 2019-03-28 NOTE — HOSPITAL COURSE
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  Patient was started on broad-spectrum antibiotics for her fever and her mental status and abdominal pain improved over the course of her hospitalization.  General surgery was initially consulted for evaluation for surgical management of her possible small-bowel obstruction and was felt that surgical management was not needed at this point in time.  Patient's diet was advanced and she was tolerating a regular diet at time of discharge.  Plan of care was discussed with Oncology who felt that she needed no further evaluation at this point in time.  Patient is likely moving to hospice care in the near future given her terminal cancer.

## 2019-03-30 LAB
BACTERIA BLD CULT: NORMAL
BACTERIA BLD CULT: NORMAL

## 2019-04-05 ENCOUNTER — LAB VISIT (OUTPATIENT)
Dept: LAB | Facility: HOSPITAL | Age: 64
End: 2019-04-05
Attending: INTERNAL MEDICINE
Payer: MEDICARE

## 2019-04-05 DIAGNOSIS — C17.0 DUODENAL CANCER: ICD-10-CM

## 2019-04-05 DIAGNOSIS — D75.839 THROMBOCYTOSIS: ICD-10-CM

## 2019-04-05 DIAGNOSIS — Z85.038 HISTORY OF COLON CANCER: Primary | ICD-10-CM

## 2019-04-05 LAB
ALBUMIN SERPL BCP-MCNC: 2.8 G/DL (ref 3.5–5.2)
ALP SERPL-CCNC: 210 U/L (ref 55–135)
ALT SERPL W/O P-5'-P-CCNC: 23 U/L (ref 10–44)
ANION GAP SERPL CALC-SCNC: 6 MMOL/L (ref 8–16)
ANISOCYTOSIS BLD QL SMEAR: ABNORMAL
AST SERPL-CCNC: 45 U/L (ref 10–40)
BASOPHILS # BLD AUTO: 0 K/UL (ref 0–0.2)
BASOPHILS NFR BLD: 0.3 % (ref 0–1.9)
BILIRUB SERPL-MCNC: 0.3 MG/DL (ref 0.1–1)
BUN SERPL-MCNC: 8 MG/DL (ref 8–23)
CALCIUM SERPL-MCNC: 9.9 MG/DL (ref 8.7–10.5)
CHLORIDE SERPL-SCNC: 105 MMOL/L (ref 95–110)
CO2 SERPL-SCNC: 29 MMOL/L (ref 23–29)
CREAT SERPL-MCNC: 0.7 MG/DL (ref 0.5–1.4)
DIFFERENTIAL METHOD: ABNORMAL
EOSINOPHIL # BLD AUTO: 0.1 K/UL (ref 0–0.5)
EOSINOPHIL NFR BLD: 0.9 % (ref 0–8)
ERYTHROCYTE [DISTWIDTH] IN BLOOD BY AUTOMATED COUNT: 28.2 % (ref 11.5–14.5)
EST. GFR  (AFRICAN AMERICAN): >60 ML/MIN/1.73 M^2
EST. GFR  (NON AFRICAN AMERICAN): >60 ML/MIN/1.73 M^2
GLUCOSE SERPL-MCNC: 104 MG/DL (ref 70–110)
HCT VFR BLD AUTO: 39.1 % (ref 37–48.5)
HGB BLD-MCNC: 12.1 G/DL (ref 12–16)
HYPOCHROMIA BLD QL SMEAR: ABNORMAL
LYMPHOCYTES # BLD AUTO: 1.1 K/UL (ref 1–4.8)
LYMPHOCYTES NFR BLD: 18.4 % (ref 18–48)
MCH RBC QN AUTO: 27.9 PG (ref 27–31)
MCHC RBC AUTO-ENTMCNC: 30.9 G/DL (ref 32–36)
MCV RBC AUTO: 90 FL (ref 82–98)
MONOCYTES # BLD AUTO: 0.7 K/UL (ref 0.3–1)
MONOCYTES NFR BLD: 12.2 % (ref 4–15)
NEUTROPHILS # BLD AUTO: 4.1 K/UL (ref 1.8–7.7)
NEUTROPHILS NFR BLD: 68.2 % (ref 38–73)
PLATELET # BLD AUTO: 325 K/UL (ref 150–350)
PLATELET BLD QL SMEAR: ABNORMAL
PMV BLD AUTO: 6.6 FL (ref 9.2–12.9)
POTASSIUM SERPL-SCNC: 3.9 MMOL/L (ref 3.5–5.1)
PROT SERPL-MCNC: 6.2 G/DL (ref 6–8.4)
RBC # BLD AUTO: 4.33 M/UL (ref 4–5.4)
SODIUM SERPL-SCNC: 140 MMOL/L (ref 136–145)
WBC # BLD AUTO: 6 K/UL (ref 3.9–12.7)

## 2019-04-05 PROCEDURE — 80053 COMPREHEN METABOLIC PANEL: CPT

## 2019-04-05 PROCEDURE — 85025 COMPLETE CBC W/AUTO DIFF WBC: CPT

## 2019-04-05 PROCEDURE — 36415 COLL VENOUS BLD VENIPUNCTURE: CPT

## 2019-04-08 ENCOUNTER — OFFICE VISIT (OUTPATIENT)
Dept: HEMATOLOGY/ONCOLOGY | Facility: CLINIC | Age: 64
End: 2019-04-08
Payer: MEDICARE

## 2019-04-08 VITALS
HEIGHT: 62 IN | SYSTOLIC BLOOD PRESSURE: 122 MMHG | HEART RATE: 88 BPM | BODY MASS INDEX: 40.61 KG/M2 | OXYGEN SATURATION: 99 % | RESPIRATION RATE: 20 BRPM | WEIGHT: 220.69 LBS | DIASTOLIC BLOOD PRESSURE: 74 MMHG | TEMPERATURE: 99 F

## 2019-04-08 DIAGNOSIS — Z09 ONCOLOGY FOLLOW-UP ENCOUNTER: ICD-10-CM

## 2019-04-08 DIAGNOSIS — Z51.11 ENCOUNTER FOR ANTINEOPLASTIC CHEMOTHERAPY: ICD-10-CM

## 2019-04-08 DIAGNOSIS — C78.00 MALIGNANT NEOPLASM METASTATIC TO LUNG, UNSPECIFIED LATERALITY: ICD-10-CM

## 2019-04-08 DIAGNOSIS — C18.9 METASTATIC COLON CANCER TO LIVER: ICD-10-CM

## 2019-04-08 DIAGNOSIS — Z09 CHEMOTHERAPY FOLLOW-UP EXAMINATION: Primary | ICD-10-CM

## 2019-04-08 DIAGNOSIS — D64.9 SYMPTOMATIC ANEMIA: ICD-10-CM

## 2019-04-08 DIAGNOSIS — Z85.038 HISTORY OF COLON CANCER: ICD-10-CM

## 2019-04-08 DIAGNOSIS — C78.7 HEPATIC METASTASES: ICD-10-CM

## 2019-04-08 DIAGNOSIS — C17.0 DUODENAL CANCER: ICD-10-CM

## 2019-04-08 DIAGNOSIS — C78.7 METASTATIC COLON CANCER TO LIVER: ICD-10-CM

## 2019-04-08 PROCEDURE — 99999 PR PBB SHADOW E&M-EST. PATIENT-LVL IV: CPT | Mod: PBBFAC,,, | Performed by: INTERNAL MEDICINE

## 2019-04-08 PROCEDURE — 3008F PR BODY MASS INDEX (BMI) DOCUMENTED: ICD-10-PCS | Mod: S$GLB,,, | Performed by: INTERNAL MEDICINE

## 2019-04-08 PROCEDURE — 3078F PR MOST RECENT DIASTOLIC BLOOD PRESSURE < 80 MM HG: ICD-10-PCS | Mod: S$GLB,,, | Performed by: INTERNAL MEDICINE

## 2019-04-08 PROCEDURE — 3074F SYST BP LT 130 MM HG: CPT | Mod: S$GLB,,, | Performed by: INTERNAL MEDICINE

## 2019-04-08 PROCEDURE — 1101F PT FALLS ASSESS-DOCD LE1/YR: CPT | Mod: S$GLB,,, | Performed by: INTERNAL MEDICINE

## 2019-04-08 PROCEDURE — 99215 PR OFFICE/OUTPT VISIT, EST, LEVL V, 40-54 MIN: ICD-10-PCS | Mod: S$GLB,,, | Performed by: INTERNAL MEDICINE

## 2019-04-08 PROCEDURE — 3008F BODY MASS INDEX DOCD: CPT | Mod: S$GLB,,, | Performed by: INTERNAL MEDICINE

## 2019-04-08 PROCEDURE — 99215 OFFICE O/P EST HI 40 MIN: CPT | Mod: S$GLB,,, | Performed by: INTERNAL MEDICINE

## 2019-04-08 PROCEDURE — 3288F FALL RISK ASSESSMENT DOCD: CPT | Mod: S$GLB,,, | Performed by: INTERNAL MEDICINE

## 2019-04-08 PROCEDURE — 1101F PR PT FALLS ASSESS DOC 0-1 FALLS W/OUT INJ PAST YR: ICD-10-PCS | Mod: S$GLB,,, | Performed by: INTERNAL MEDICINE

## 2019-04-08 PROCEDURE — 3078F DIAST BP <80 MM HG: CPT | Mod: S$GLB,,, | Performed by: INTERNAL MEDICINE

## 2019-04-08 PROCEDURE — 3074F PR MOST RECENT SYSTOLIC BLOOD PRESSURE < 130 MM HG: ICD-10-PCS | Mod: S$GLB,,, | Performed by: INTERNAL MEDICINE

## 2019-04-08 PROCEDURE — 3288F PR FALLS RISK ASSESSMENT DOCUMENTED: ICD-10-PCS | Mod: S$GLB,,, | Performed by: INTERNAL MEDICINE

## 2019-04-08 PROCEDURE — 99999 PR PBB SHADOW E&M-EST. PATIENT-LVL IV: ICD-10-PCS | Mod: PBBFAC,,, | Performed by: INTERNAL MEDICINE

## 2019-04-08 RX ORDER — HEPARIN 100 UNIT/ML
500 SYRINGE INTRAVENOUS
Status: CANCELLED | OUTPATIENT
Start: 2019-04-08

## 2019-04-08 RX ORDER — FLUOROURACIL 50 MG/ML
400 INJECTION, SOLUTION INTRAVENOUS
Status: CANCELLED | OUTPATIENT
Start: 2019-04-08

## 2019-04-08 RX ORDER — SODIUM CHLORIDE 0.9 % (FLUSH) 0.9 %
10 SYRINGE (ML) INJECTION
Status: CANCELLED | OUTPATIENT
Start: 2019-04-08

## 2019-04-08 RX ORDER — SODIUM CHLORIDE 0.9 % (FLUSH) 0.9 %
10 SYRINGE (ML) INJECTION
Status: CANCELLED | OUTPATIENT
Start: 2019-04-10

## 2019-04-08 RX ORDER — HEPARIN 100 UNIT/ML
500 SYRINGE INTRAVENOUS
Status: CANCELLED | OUTPATIENT
Start: 2019-04-10

## 2019-04-08 RX ORDER — ATROPINE SULFATE 0.4 MG/ML
0.4 INJECTION, SOLUTION ENDOTRACHEAL; INTRAMEDULLARY; INTRAMUSCULAR; INTRAVENOUS; SUBCUTANEOUS ONCE AS NEEDED
Status: CANCELLED | OUTPATIENT
Start: 2019-04-08

## 2019-04-08 NOTE — PLAN OF CARE
ALERT: A disease instance has been permanently removed from this patient's   pathway record and replaced with a new disease instance. Information on the new   disease instance will be transmitted in a separate message.    Disease Being Removed: Colorectal    Reason for Removal: Previous diagnosis has morphed into a different diagnosis

## 2019-04-08 NOTE — PROGRESS NOTES
Cc I was hospitalized     Indigo Stuart is a 63 y.o.  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  Kras was MUTATED  Cycle 1  January 15 2019    Here for chemo but she has had liver progression and SBO treated medically   New onset blood in stool and pruritus of rectum   CHest port in place     Tolerating lopressor for htn and lipitor for dyslipidemia  Tolerating zofran for nausea chemo     Cycle one folfox avastin January 15 2019 \  C2 Feb 25   Failed regimen and admitted with SBO   Hepatic mets progressed  Past Medical History:   Diagnosis Date    Cancer     colon    Encounter for blood transfusion     Hypertension      Past Surgical History:   Procedure Laterality Date    CHOLECYSTECTOMY      COLON SURGERY      COLONOSCOPY N/A 3/30/2018    Performed by Daniel Magana MD at [facility] ENDO    EGD (ESOPHAGOGASTRODUODENOSCOPY) N/A 1/14/2019    Performed by Monserrat Lopez MD at [facility] ENDO (2ND FLR)    EGD (ESOPHAGOGASTRODUODENOSCOPY) N/A 1/3/2019    Performed by Adis Arguello MD at [facility] ENDO    ESOPHAGOGASTRODUODENOSCOPY (EGD) N/A 3/30/2018    Performed by Daniel Magana MD at [facility] ENDO    HYSTERECTOMY      [id number]-G-CATH N/A 1/4/2019    Performed by Emilio Romero MD at [facility] OR   She is on lopressor for HTn and lipitor for cholesterol     Current Outpatient Medications:     amLODIPine (NORVASC) 5 MG tablet, Take 5 mg by mouth once daily., Disp: , Rfl: 3    atorvastatin (LIPITOR) 20 MG tablet, Take 20 mg by mouth every evening., Disp: , Rfl:     ferrous sulfate (FEOSOL) 325 mg (65 mg iron) Tab tablet, Take 1 tablet by mouth 2 (two) times daily., Disp: , Rfl: 3    metoprolol succinate (TOPROL-XL) 25 MG 24 hr tablet, Take 25 mg by mouth every evening., Disp: , Rfl: 3    ondansetron (ZOFRAN-ODT) 8 MG TbDL, Take 1 tablet (8 mg total) by mouth every 12 (twelve) hours as needed., Disp: 30 tablet, Rfl:  1    potassium chloride (KLOR-CON) 8 MEQ TbSR, Take 8 mEq by mouth., Disp: , Rfl:     promethazine (PHENERGAN) 25 MG tablet, Take 1 tablet (25 mg total) by mouth every 6 (six) hours as needed for Nausea., Disp: 30 tablet, Rfl: 1  Review of patient's allergies indicates:   Allergen Reactions    Codeine Nausea And Vomiting    Erythromycin base Other (See Comments)    Lisinopril Palpitations     Cough      Social History     Tobacco Use    Smoking status: Never Smoker    Smokeless tobacco: Never Used   Substance Use Topics    Alcohol use: No    Drug use: No     Family History   Problem Relation Age of Onset    Cancer Mother         colon ca, liver ca    Cancer Father          Review of Systems:  General: Weight gain: No, Weight Loss: No, Fatigue: y  Fever: y Chills: No, Night Sweats: No, Insomnia: No, Excessive sleeping: No   Respiratory:  Cough: No, Shortness of Breath: No    Wheezing: No, Excessive Snoring: No, Coughing up blood: No  Endocrine: Heat Intolerance: No, Cold Intolerance: No,   Excessive Thirst: No, Excessive Hunger: No,   Eyes:  Blurred Vision: No, Double Vision: No,   Light Sensitivity: No, Eye pain: No  Musculoskeletal: Muscle Aches/Pain: No, Joint Pain/Swelling: No, Muscle Cramps:  no      Muscle Weakness:y, Neck Pain: No, Back Pain: y  Neurological: Difficulty Walking/Falls:  YES weakness   Headache Migraine: No, Dizziness/Vertigo: No, Fainting: No, Difficulty with Speech: No, Weakness: Yes     Cardiovascular: Chest Pain: No, Shortness of Breath: no   Gastrointestinal: Nausea/Vomiting: No, Constipation: No, Diarrhea: No  Psych/Cog:  Depression: No, Anxiety: No, Hallucinations: No, Problems Concentrating: No  : Frequent Urination: No, Incontinence: No, Blood of Urine: No, Urinary Infections: No  ENT:Hearing Loss: No, Earache: No, Ringing in Ears: No  Facial Pain: No, Chronic Congestion:No   Immune: Seasonal Allergies: No, Hives and/or Rashes: No  The remainder of the review of twelve body  "systems was reviewed and normal        /74   Pulse 88   Temp 98.7 °F (37.1 °C)   Resp 20   Ht 5' 2" (1.575 m)   Wt 100.1 kg (220 lb 10.9 oz)   SpO2 99%   BMI 40.36 kg/m²   Constitutional: oriented to person, place, and time.  Appears good today   HENT: anicteric sclera   Head: Normocephalic and atraumatic.   Right Ear: External ear normal.   Left Ear: External ear normal.   Nose: Nose normal.   Eyes: Conjunctivae PALE  EOM are normal. Pupils are equal, round anicteric sclera Neck: Normal range of motion. Neck supple. No thyromegaly present.   Cardiovascular: Normal rate, regular rhythm, normal heart sounds     No murmur heard.  Pulmonary/Chest: Effort normal and breath sounds normal.  no wheezes. No tachy    Abdominal: Soft. Bowel sounds are normal.  no mass.   Musculoskeletal: Normal range of motion. decreased strength   No further boggy turbinates  Lymphadenopathy:  no cervical adenopathy.   Neurological: alert and oriented to person, place, and time.   Skin: Skin is warm and dry. No rash noted.   Psychiatric: normal mood and affect.   behavior is normal.   Vitals reviewed.      Lab Results   Component Value Date    WBC 6.00 04/05/2019    HGB 12.1 04/05/2019    HCT 39.1 04/05/2019    MCV 90 04/05/2019     04/05/2019     CMP  Sodium   Date Value Ref Range Status   04/05/2019 140 136 - 145 mmol/L Final     Potassium   Date Value Ref Range Status   04/05/2019 3.9 3.5 - 5.1 mmol/L Final     Chloride   Date Value Ref Range Status   04/05/2019 105 95 - 110 mmol/L Final     CO2   Date Value Ref Range Status   04/05/2019 29 23 - 29 mmol/L Final     Glucose   Date Value Ref Range Status   04/05/2019 104 70 - 110 mg/dL Final     BUN, Bld   Date Value Ref Range Status   04/05/2019 8 8 - 23 mg/dL Final     Creatinine   Date Value Ref Range Status   04/05/2019 0.7 0.5 - 1.4 mg/dL Final     Calcium   Date Value Ref Range Status   04/05/2019 9.9 8.7 - 10.5 mg/dL Final     Total Protein   Date Value Ref Range " Status   04/05/2019 6.2 6.0 - 8.4 g/dL Final     Albumin   Date Value Ref Range Status   04/05/2019 2.8 (L) 3.5 - 5.2 g/dL Final     Total Bilirubin   Date Value Ref Range Status   04/05/2019 0.3 0.1 - 1.0 mg/dL Final     Comment:     For infants and newborns, interpretation of results should be based  on gestational age, weight and in agreement with clinical  observations.  Premature Infant recommended reference ranges:  Up to 24 hours.............<8.0 mg/dL  Up to 48 hours............<12.0 mg/dL  3-5 days..................<15.0 mg/dL  6-29 days.................<15.0 mg/dL       Alkaline Phosphatase   Date Value Ref Range Status   04/05/2019 210 (H) 55 - 135 U/L Final     AST   Date Value Ref Range Status   04/05/2019 45 (H) 10 - 40 U/L Final     ALT   Date Value Ref Range Status   04/05/2019 23 10 - 44 U/L Final     Anion Gap   Date Value Ref Range Status   04/05/2019 6 (L) 8 - 16 mmol/L Final     eGFR if    Date Value Ref Range Status   04/05/2019 >60 >60 mL/min/1.73 m^2 Final     eGFR if non    Date Value Ref Range Status   04/05/2019 >60 >60 mL/min/1.73 m^2 Final     Comment:     Calculation used to obtain the estimated glomerular filtration  rate (eGFR) is the CKD-EPI equation.        INICAL DIAGNOSIS/INFORMATION  Clinical information: possible liver mets- hx of colon cancer      NO falls and no pain       SPECIMEN  1) Liver Lesion Biopsy  FINAL PATHOLOGIC DIAGNOSIS  LIVER LESION, NEEDLE CORE BIOPSIES:  - Metastatic colonic adenocarcinoma.    CEA0.0 - 5.0 ng/zL6518.7 Abnormally high    CEA0.0 - 5.0 ng/iG0738.3 Abnormally high        Component 9d ago   HLA Celiac Specimen Whole Blood    Celiac (HLA-DQB1*02) Positive Abnormal     Celiac (HLA-DQ8) Negative    Celiac (HLA-DQA1*05) Positive Abnormal               Chemotherapy follow-up examination    Duodenal cancer    Encounter for antineoplastic chemotherapy    Hepatic metastases    History of colon cancer    Metastatic colon  cancer to liver    Malignant neoplasm metastatic to lung, unspecified laterality    Symptomatic anemia     Folfiri with avastin   For next week   Cleared for chemo new regimen    Failed   folfox and avastin for stage  4 disease   Do not resume HCTZ   Cont zofran prn nausea   Cont toprol for hR   No pain   Falls one after last chemo   Walking with walker     Current Outpatient Medications:     amLODIPine (NORVASC) 5 MG tablet, Take 5 mg by mouth once daily., Disp: , Rfl: 3    atorvastatin (LIPITOR) 20 MG tablet, Take 20 mg by mouth every evening., Disp: , Rfl:     ferrous sulfate (FEOSOL) 325 mg (65 mg iron) Tab tablet, Take 1 tablet by mouth 2 (two) times daily., Disp: , Rfl: 3    metoprolol succinate (TOPROL-XL) 25 MG 24 hr tablet, Take 25 mg by mouth every evening., Disp: , Rfl: 3    ondansetron (ZOFRAN-ODT) 8 MG TbDL, Take 1 tablet (8 mg total) by mouth every 12 (twelve) hours as needed., Disp: 30 tablet, Rfl: 1    potassium chloride (KLOR-CON) 8 MEQ TbSR, Take 8 mEq by mouth., Disp: , Rfl:     promethazine (PHENERGAN) 25 MG tablet, Take 1 tablet (25 mg total) by mouth every 6 (six) hours as needed for Nausea., Disp: 30 tablet, Rfl: 1    Advance Care Planning     Living Will  During this visit, I engaged the patient in the advance care planning process.  The patient and I reviewed the role for advance directives and their purpose in directing future healthcare if the patient's unable to speak for him/herself.  At this point in time, the patient does have full decision-making capacity.  We discussed different extreme health states that she could experience, and reviewed what kind of medical care she would want in those situations.  The patient communicated that if she were comatose and had little chance of a meaningful recovery, she would not want machines/life-sustaining treatments used.  5 The patient  Has not  completed a living will to reflect these preferences.  The patient   HAS NOT already designated  a healthcare power of  to make decisions on her behalf.   I spent a total of 5 minutes engaging the patient in this advance care planning discussion.                  Thank you for allowing me to evaluate and participate in the care of this pleasant patient. Please fell free to call me with any questions or concerns.    Warmly,  Karina Pettit MD    This note was dictated with Dragon and briefly proofread. Please excuse any sentences that may be unclear or words misspelled

## 2019-04-08 NOTE — PLAN OF CARE
START ON PATHWAY REGIMEN - Colorectal    [id number]        Irinotecan (Camptosar(R))       Leucovorin       5-Fluorouracil       5-Fluorouracil       Bevacizumab (Avastin(R))           Additional Orders: **Note: order sheet contains two q14 day cycles**    Loperamide 4 mg PO first dose then 2 mg PO with every loose bowel movement up to   a total of 16 mg/day (optional).    **Always confirm dose/schedule in your pharmacy ordering system**        Patient Characteristics:  Metastatic Colorectal, Second Line, KRAS Mutation Positive/Unknown, BRAF   Wild-Type/Unknown, Bevacizumab Eligible  Current evidence of distant metastases<= Yes  AJCC T Category: TX  AJCC N Category: NX  AJCC M Category: M1a  AJCC 8 Stage Grouping: FLO  BRAF Mutation Status: Quantity Not Sufficient  KRAS/NRAS Mutation Status: Mutation Positive  Line of therapy: Second Line  Intent of Therapy:  Non-Curative / Palliative Intent, Discussed with Patient

## 2019-04-10 ENCOUNTER — CLINICAL SUPPORT (OUTPATIENT)
Dept: HEMATOLOGY/ONCOLOGY | Facility: CLINIC | Age: 64
End: 2019-04-10
Payer: MEDICARE

## 2019-04-10 NOTE — PROGRESS NOTES
Indigo Stuart  [id number]    [facility]   [facility] Center    TITLE: PLAN OF CARE FOR THE CHEMOTHERAPY PATIENT / TEACHING PROTOCOL    PURPOSE: To involve the patient / significant other in the plan of care and to provide teaching to the significant other & patient receiving chemotherapy.    LEVEL: Independent.    CONTENT: The Plan of Care for the chemotherapy patient is individualized and appropriate to the patients needs, strengths, limitations, & goals.  Education includes information regarding chemotherapy side effects, the treatment itself, and self-care  Activities.    GOAL / OUTCOME STANDARDS    PHYSIOLOGIC: The client will remain free or experience minimal side effects or toxicities throughout the chemotherapy treatment period.     PSYCHOLOGIC: The client/significant others will demonstrate positive coping mechanisms in relation to chemotherapy and its side effects.      COGINITIVE: The client/significant others will verbalize understanding of self-care measure to avoid/minimize side effects of the chemotherapy regime.    EVALUATION / COMMENT KEY:    V = Audiovisual/Video  S = Successfully meets outcome  N = Needs further instruction  NA = Not applicable to the patient  P = Previous knowledge  U = Unable to comprehend  * = See progress notes          PLAN OF CARE  INFORMATION TO BE DELIVERED / NURSING INTERVENTIONS DATE EVALUATION   1. Assessment of client/caregiver,         knowledge of cancer diagnosis,         and chemotherapy as a treatment. 1a. Evaluate patient/caregiver learning ability    b. Plan teaching sessions with patient/caregiver according to needs and present anxiety level/ability to learn.    c. Provide Chemotherapy Education Packet,        Mouth Care Protocol,         Specific Patient Education Sheets. 04/10/2019 S   2. Individual chemotherapy treatment         plan. 2a. Review of Chemotherapy Education handout from [facility]            04/10/2019   S   3. Knowledge  Deficit & Self-Management of general side effects common to all chemotherapy:  a. Nausea/Vomiting  b.   Diarrhea  c. Mouth Care  d. Dental care  e. Constipation  f. Hair Loss  g. Potential for infection  h. Fatigue   3a. Reinforce that the majority of side effects from chemotherapy are reversible and are  controlled both in the hospital and at home        (blood counts recover, hair grows back).   b.  Refer to the following for reinforcement of         information post-treatment:  1. Mouth Care Protocol.  2. Bowel Protocol for constipation or diarrhea.  3.  Drug Specific Chemotherapy Information Sheets for each medication patient receiving.    04/10/2019     S     PLAN OF CARE  INFORMATION TO BE DELIVERED / NURSING INTERVENTIONS DATE EVALUATION   h. Potential for bleeding         i. Potential anemia/fatigue         j. Potential sunburn         k. Birth control measures  l. Safety measures post treatment 4.  Chemotherapy Home Care Instruction  and Safety Information Sheet.  A. patient/caregivers to thoroughly cook shellfish (shrimp, crab, etc) to decrease the chance of infection.    B.  Use sunscreen and protective clothing while in the sun.   04/10/2019      4. Knowledge deficit & Self Management of Drug Specific  Side Effects.    a. BLADDER EFFECTS        (Hemorrhagic Cystitis)                  Preventable with adequate hydration; occurs 2-3 days or more post treatment.   1.  Instruct patient to:  a.   Void at least every 2 hours; increase intake.  b.   DO NOT hold urine; go when urge is felt.  c.    Empty bladder at bedtime and on         awakening.  d.   Observe for color changes (red to tea           colored), amount and frequency changes.  e.   Notify oncologist of any abnormalities           in urine or voiding or if you cannot               drink adequate fluids.   04/10/2019   S   b.   CHANGES IN URINE        COLOR:      1.   Instruct patient:  a.   Most evident in first 2-3 voidings after            administration.  b. Lasts less than 24 hours.  c. If urine is discolored 2 or more days post- treatment, notify oncologist.      04/10/2019 S   c.    KIDNEY EFFECTS           (Nephrotoxicity)   1.  Instruct patient to:  a.   Drink 8-16 glasses of fluid/day the day   pre-treatment and 3-4 days post-treatment to maintain hydration; the best way to minimize kidney problems.  b.   Notify oncologist immediately if unable to drink fluids or if changes are noted in urinary elimination.     04/10/2019   S   a. PULMONARY TOXICITY    1. Instruct patient to report symptoms such as shortness of breath, chest pain, shallow breathing, or chest wall discomfort to physician.  2. Reinforce preventative measures used by the health care team.  a. Baseline and periodic PFT and chest x-ray.   04/10/2019   S     PLAN OF CARE INFORMATION TO BE DELIVERED / NURSING INTERVENTIONS DATE EVALUATION   b. NERVE & MUSCLE EFFECTS (neurotoxocity; neuropathy, possible visual/hearing changes)        3. Instruct patient to:    a. Report numbness or tingling of the hands/feet, loss of fine motor movement (buttoning shirt, tying shoelaces), or gait changes to your oncologist.  b. If numbness/tingling are present:  1. protect feet with shoes at all times.  2. Use gloves for washing dishes/gardening & potholders in kitchen.       04/10/2019   S   c. CARDIOTOXICITY  Decreased effectiveness of             cardiac function. Effective are                  cumulative and irreversible.                                    CARDIAC ARRYTHMIAS              4   Instruct:  a. Heart function may be tested before treatment and perdiocally during treatment.  b. Notify oncologist of irregular pulse, palpitations, shortness of breath, or swelling in lower extremities/feet.          Taxol and Taxotere can cause arrhythmias on infusion that resolve once infusion discontinued. Instruct nurse if any irregularity felt.    04/10/2019   S   d. EXTRAVASTION  Occurs when vesicants  leak outside of vein and cause damage to the skin and underlying tissues.   1. Reinforce preventive measures used to avoid complications.  a. Fresh IV site or central line monitored continuously with vesicant IVP.  b. Continuous infusion via central line site and blood return monitored periodically around the clock.  2. Instruct to:  a. Notify nurse of any discomfort, burning, stinging, etc. at IV site during chemotherapy administration.  b. Notify oncologist of any redness, pain, or swelling at IV site after discharge from hospital.   04/10/2019   S   e. HYPERSENSITIVITY can happen with any medication.   1. Instruct patient:  a. Nurse is with them during the initial part of treatment and will be close by to monitor.  b. Pre-medication ordered by the oncologist must be taken on time. If doses are missed, treatment will need to be re-scheduled.  c. Skin redness, itching, or hives appearing after discharge should be reported to oncologist. 04/10/2019   S       PLAN OF CARE INFORMATION TO BE DELIVERED / NURSING INTERVENTIONS DATE EVALUATION   f. FLU-LIKE SYNDROME      1. Instruct patient symptoms are hard to prevent and may include fever, shaking chills, muscle and body aches.  a. Taking prescribed medications from physician if needed.  b. Adequate fluids are important.    2. Reinforce the need to call if temperature is         elevated to 100.4 or more  04/10/2019   S   g. HAND-FOOT SYNDROME  causes painful, symmetric swelling and redness of palms and soles                  5. Instruct patient to report any numbness or tingling in the hands or feet.  6. Explain prevention techniques, such as     a. Use heavy moisturizers to lessen skin dryness and itching, but to avoid if skin is cracked or broken  b. Bathe in tepid water, use non-perfumed soap, and wash gently. Baths with oatmeal or diluted baking soda may be soothing.  c. Avoid tight fitting shoes and repetitive actions, such as rubbing hands or applying pressure to  hands/feet.  7. Review measures to take should syndrome occur:  a. Cold compresses and elevation for          edema  b. Pain medications and other measures as ordered by oncologist.   4.   Syndrome resolves few weeks after therapy. 04/10/2019   S   5. DISCHARGE PLANNING /        EDUCATION 1.    Explain importance of compliance with follow- up  tests (CBC, CMP).  2.    Verify patient/caregiver know:  a.    Oncologists office phone number.  b.    Dates of follow-up appointments.  c.    Prescriptions given for nausea  3.   Review side effects to monitor and notify          oncologist about.  4.   Reinforce the need for patient and caregivers to:  a.    Review information given.  b.    Call oncologists office with questions          or symptoms  5.   Provide [facility] Brochure make referrals if needed for financial or .   04/10/2019   S     PROGRESS NOTES: Indigo Stuart  [id number]    [facility]   Cancer Center    TITLE: PLAN OF CARE FOR THE CHEMOTHERAPY PATIENT / TEACHING PROTOCOL    PURPOSE: To involve the patient / significant other in the plan of care and to provide teaching to the significant other & patient receiving chemotherapy.    LEVEL: Independent.    CONTENT: The Plan of Care for the chemotherapy patient is individualized and appropriate to the patients needs, strengths, limitations, & goals.  Education includes information regarding chemotherapy side effects, the treatment itself, and self-care  Activities.    GOAL / OUTCOME STANDARDS    PHYSIOLOGIC: The client will remain free or experience minimal side effects or toxicities throughout the chemotherapy treatment period.     PSYCHOLOGIC: The client/significant others will demonstrate positive coping mechanisms in relation to chemotherapy and its side effects.      COGINITIVE: The client/significant others will verbalize understanding of self-care measure to avoid/minimize side effects of the chemotherapy  regime.    EVALUATION / COMMENT KEY:    V = Audiovisual/Video  S = Successfully meets outcome  N = Needs further instruction  NA = Not applicable to the patient  P = Previous knowledge  U = Unable to comprehend  * = See progress notes          PLAN OF CARE  INFORMATION TO BE DELIVERED / NURSING INTERVENTIONS DATE EVALUATION   8. Assessment of client/caregiver,         knowledge of cancer diagnosis,         and chemotherapy as a treatment. 1a. Evaluate patient/caregiver learning ability    b. Plan teaching sessions with patient/caregiver according to needs and present anxiety level/ability to learn.    c. Provide Chemotherapy Education Packet,        Mouth Care Protocol,         Specific Patient Education Sheets. 04/10/2019 S   9. Individual chemotherapy treatment         plan. 2a. Review of Chemotherapy Education handout from [employer]            04/10/2019   S   10. Knowledge Deficit & Self-Management of general side effects common to all chemotherapy:  b. Nausea/Vomiting  b.   Diarrhea  i. Mouth Care  j. Dental care  k. Constipation  l. Hair Loss  m. Potential for infection  n. Fatigue   3a. Reinforce that the majority of side effects from chemotherapy are reversible and are  controlled both in the hospital and at home        (blood counts recover, hair grows back).   b.  Refer to the following for reinforcement of         information post-treatment:  3. Mouth Care Protocol.  4. Bowel Protocol for constipation or diarrhea.  3.  Drug Specific Chemotherapy Information Sheets for each medication patient receiving.    04/10/2019     S     PLAN OF CARE  INFORMATION TO BE DELIVERED / NURSING INTERVENTIONS DATE EVALUATION   h. Potential for bleeding         i. Potential anemia/fatigue         j. Potential sunburn         k. Birth control measures  l. Safety measures post treatment 4.  Chemotherapy Home Care Instruction  and Safety Information Sheet.  A. patient/caregivers to thoroughly cook shellfish (shrimp, crab,  etc) to decrease the chance of infection.    B.  Use sunscreen and protective clothing while in the sun.   04/10/2019      11. Knowledge deficit & Self Management of Drug Specific  Side Effects.    d. BLADDER EFFECTS        (Hemorrhagic Cystitis)                  Preventable with adequate hydration; occurs 2-3 days or more post treatment.   1.  Instruct patient to:  a.   Void at least every 2 hours; increase intake.  b.   DO NOT hold urine; go when urge is felt.  c.    Empty bladder at bedtime and on         awakening.  d.   Observe for color changes (red to tea           colored), amount and frequency changes.  e.   Notify oncologist of any abnormalities           in urine or voiding or if you cannot               drink adequate fluids.   04/10/2019   S   b.   CHANGES IN URINE        COLOR:      1.   Instruct patient:  a.   Most evident in first 2-3 voidings after           administration.  e. Lasts less than 24 hours.  f. If urine is discolored 2 or more days post- treatment, notify oncologist.      04/10/2019 S   c.    KIDNEY EFFECTS           (Nephrotoxicity)   1.  Instruct patient to:  a.   Drink 8-16 glasses of fluid/day the day   pre-treatment and 3-4 days post-treatment to maintain hydration; the best way to minimize kidney problems.  b.   Notify oncologist immediately if unable to drink fluids or if changes are noted in urinary elimination.     04/10/2019   S   h. PULMONARY TOXICITY    4. Instruct patient to report symptoms such as shortness of breath, chest pain, shallow breathing, or chest wall discomfort to physician.  5. Reinforce preventative measures used by the health care team.  b. Baseline and periodic PFT and chest x-ray.   04/10/2019   S     PLAN OF CARE INFORMATION TO BE DELIVERED / NURSING INTERVENTIONS DATE EVALUATION   i. NERVE & MUSCLE EFFECTS (neurotoxocity; neuropathy, possible visual/hearing changes)        6. Instruct patient to:    c. Report numbness or tingling of the hands/feet, loss  of fine motor movement (buttoning shirt, tying shoelaces), or gait changes to your oncologist.  d. If numbness/tingling are present:  3. protect feet with shoes at all times.  4. Use gloves for washing dishes/gardening & potholders in kitchen.       04/10/2019   S   j. CARDIOTOXICITY  Decreased effectiveness of             cardiac function. Effective are                  cumulative and irreversible.                                    CARDIAC ARRYTHMIAS              4   Instruct:  c. Heart function may be tested before treatment and perdiocally during treatment.  d. Notify oncologist of irregular pulse, palpitations, shortness of breath, or swelling in lower extremities/feet.          Taxol and Taxotere can cause arrhythmias on infusion that resolve once infusion discontinued. Instruct nurse if any irregularity felt.    04/10/2019   S   k. EXTRAVASTION  Occurs when vesicants leak outside of vein and cause damage to the skin and underlying tissues.   3. Reinforce preventive measures used to avoid complications.  c. Fresh IV site or central line monitored continuously with vesicant IVP.  d. Continuous infusion via central line site and blood return monitored periodically around the clock.  4. Instruct to:  c. Notify nurse of any discomfort, burning, stinging, etc. at IV site during chemotherapy administration.  d. Notify oncologist of any redness, pain, or swelling at IV site after discharge from hospital.   04/10/2019   S   l. HYPERSENSITIVITY can happen with any medication.   2. Instruct patient:  d. Nurse is with them during the initial part of treatment and will be close by to monitor.  e. Pre-medication ordered by the oncologist must be taken on time. If doses are missed, treatment will need to be re-scheduled.  f. Skin redness, itching, or hives appearing after discharge should be reported to oncologist. 04/10/2019   S       PLAN OF CARE INFORMATION TO BE DELIVERED / NURSING INTERVENTIONS DATE EVALUATION    m. FLU-LIKE SYNDROME      3. Instruct patient symptoms are hard to prevent and may include fever, shaking chills, muscle and body aches.  c. Taking prescribed medications from physician if needed.  d. Adequate fluids are important.    4. Reinforce the need to call if temperature is         elevated to 100.4 or more  04/10/2019   S   n. HAND-FOOT SYNDROME  causes painful, symmetric swelling and redness of palms and soles                  12. Instruct patient to report any numbness or tingling in the hands or feet.  13. Explain prevention techniques, such as     d. Use heavy moisturizers to lessen skin dryness and itching, but to avoid if skin is cracked or broken  e. Bathe in tepid water, use non-perfumed soap, and wash gently. Baths with oatmeal or diluted baking soda may be soothing.  f. Avoid tight fitting shoes and repetitive actions, such as rubbing hands or applying pressure to hands/feet.  14. Review measures to take should syndrome occur:  c. Cold compresses and elevation for          edema  d. Pain medications and other measures as ordered by oncologist.   4.   Syndrome resolves few weeks after therapy. 04/10/2019   S   5. DISCHARGE PLANNING /        EDUCATION 1.    Explain importance of compliance with follow- up  tests (CBC, CMP).  2.    Verify patient/caregiver know:  a.    Oncologists office phone number.  b.    Dates of follow-up appointments.  c.    Prescriptions given for nausea  3.   Review side effects to monitor and notify          oncologist about.  4.   Reinforce the need for patient and caregivers to:  a.    Review information given.  b.    Call oncologists office with questions          or symptoms  5.   Provide [facility] Brochure make referrals if needed for financial or .   04/10/2019   S     PROGRESS NOTES: I met with the patient and her  today for chemotherapy education. she will be starting treatment with 5FU, Leucovorin, Irinotecan and Avastin . We  discussed the mechanism of action, potential side effects of this treatment as well as ways she can manage them at home. Some of these side effects include but or not limited to fever, nausea, vomiting, decreased appetite, fatigue, weakness, cytopenias, myalgia/arthralgia, constipation, diarrhea, bleeding, headache, shortness of breath, nail changes, taste change, hair thinning/loss, mood disturbances, or edema. We also discussed dietary modifications she should make although this will be discussed in more detail with the dietician. she was provided with a copy of all of the information we discussed today as well as Dr. Pettit's contact information. she will be provided a schedule on his first day of treatment. We will obtain labs and the patient will follow-up with the physician for toxicity monitoring throughout treatment. All questions were answered and an informed consent was obtained. she was reminded to certainly contact Dr. Pettit's sooner if needed. Patient is scheduled to start treatment on 4/15/19 and has schedule foir Labs 4/26/19 and MD follow up 4/29/19.

## 2019-04-12 ENCOUNTER — TELEPHONE (OUTPATIENT)
Dept: HEMATOLOGY/ONCOLOGY | Facility: CLINIC | Age: 64
End: 2019-04-12

## 2019-04-12 NOTE — TELEPHONE ENCOUNTER
----- Message from Delphine Infante sent at 4/12/2019 12:22 PM CDT -----  Contact: Doris w/ [facility]  Type: Needs Medical Advice    Who Called:  Doris  Symptoms (please be specific):  Coughing and respiratory issues yesterday. She started antibiotics yesterday. Doris believes that it is allergies  Best Call Back Number: Doris -   Additional Information: Calling to speak with the Nurse about the antibiotic. Call to pod. No answer. Please advise.

## 2019-04-12 NOTE — TELEPHONE ENCOUNTER
Home health nurse called in to notify us that patient is taking ABX on her own for a post nasal drip and just wanted us to be aware. Dr Pettit notified and will give recommendation.

## 2019-04-12 NOTE — TELEPHONE ENCOUNTER
Patient notified that Dr Pettit does not want her to take any antibiotics and to take mucinex d overt the counter twice a day fr her sinus drip.

## 2019-04-14 ENCOUNTER — PATIENT MESSAGE (OUTPATIENT)
Dept: GASTROENTEROLOGY | Facility: CLINIC | Age: 64
End: 2019-04-14

## 2019-04-15 ENCOUNTER — PATIENT MESSAGE (OUTPATIENT)
Dept: GASTROENTEROLOGY | Facility: CLINIC | Age: 64
End: 2019-04-15

## 2019-04-15 ENCOUNTER — TELEPHONE (OUTPATIENT)
Dept: HEMATOLOGY/ONCOLOGY | Facility: CLINIC | Age: 64
End: 2019-04-15

## 2019-04-15 DIAGNOSIS — C17.0 DUODENAL CANCER: Primary | ICD-10-CM

## 2019-04-15 NOTE — TELEPHONE ENCOUNTER
----- Message from Keara Orosco sent at 4/15/2019  1:36 PM CDT -----  Type: Needs Medical Advice    Who Called:  Patient  Best Call Back Number: [phone]  Additional Information: Patient has a 9:00am chemo appointment on 4/29/19. She has an appointment with office that same day at 9:20am. She needs to move appointment later. Please call to advise.

## 2019-04-15 NOTE — TELEPHONE ENCOUNTER
Spoke with pt to move her scheduled apt with dr. sauer to a earlier time due to chemo being scheduled at a early time. Pt confirmed apt date and time scheduled and verbalized understanding.

## 2019-04-17 ENCOUNTER — TELEPHONE (OUTPATIENT)
Dept: ADMINISTRATIVE | Facility: CLINIC | Age: 64
End: 2019-04-17

## 2019-04-26 ENCOUNTER — LAB VISIT (OUTPATIENT)
Dept: LAB | Facility: HOSPITAL | Age: 64
End: 2019-04-26
Attending: INTERNAL MEDICINE
Payer: MEDICARE

## 2019-04-26 DIAGNOSIS — C17.0 DUODENAL CANCER: ICD-10-CM

## 2019-04-26 LAB
ALBUMIN SERPL BCP-MCNC: 3 G/DL (ref 3.5–5.2)
ALP SERPL-CCNC: 209 U/L (ref 55–135)
ALT SERPL W/O P-5'-P-CCNC: 32 U/L (ref 10–44)
ANION GAP SERPL CALC-SCNC: 7 MMOL/L (ref 8–16)
AST SERPL-CCNC: 35 U/L (ref 10–40)
BASOPHILS # BLD AUTO: 0 K/UL (ref 0–0.2)
BASOPHILS NFR BLD: 0.9 % (ref 0–1.9)
BILIRUB SERPL-MCNC: 0.4 MG/DL (ref 0.1–1)
BUN SERPL-MCNC: 7 MG/DL (ref 8–23)
CALCIUM SERPL-MCNC: 9.7 MG/DL (ref 8.7–10.5)
CHLORIDE SERPL-SCNC: 105 MMOL/L (ref 95–110)
CO2 SERPL-SCNC: 28 MMOL/L (ref 23–29)
CREAT SERPL-MCNC: 0.6 MG/DL (ref 0.5–1.4)
DIFFERENTIAL METHOD: ABNORMAL
EOSINOPHIL # BLD AUTO: 0.1 K/UL (ref 0–0.5)
EOSINOPHIL NFR BLD: 2.1 % (ref 0–8)
ERYTHROCYTE [DISTWIDTH] IN BLOOD BY AUTOMATED COUNT: 22 % (ref 11.5–14.5)
EST. GFR  (AFRICAN AMERICAN): >60 ML/MIN/1.73 M^2
EST. GFR  (NON AFRICAN AMERICAN): >60 ML/MIN/1.73 M^2
GLUCOSE SERPL-MCNC: 104 MG/DL (ref 70–110)
HCT VFR BLD AUTO: 37.2 % (ref 37–48.5)
HGB BLD-MCNC: 12 G/DL (ref 12–16)
LYMPHOCYTES # BLD AUTO: 0.9 K/UL (ref 1–4.8)
LYMPHOCYTES NFR BLD: 26.1 % (ref 18–48)
MCH RBC QN AUTO: 29.3 PG (ref 27–31)
MCHC RBC AUTO-ENTMCNC: 32.2 G/DL (ref 32–36)
MCV RBC AUTO: 91 FL (ref 82–98)
MONOCYTES # BLD AUTO: 0.2 K/UL (ref 0.3–1)
MONOCYTES NFR BLD: 5.2 % (ref 4–15)
NEUTROPHILS # BLD AUTO: 2.3 K/UL (ref 1.8–7.7)
NEUTROPHILS NFR BLD: 65.7 % (ref 38–73)
PLATELET # BLD AUTO: 260 K/UL (ref 150–350)
PMV BLD AUTO: 6.9 FL (ref 9.2–12.9)
POTASSIUM SERPL-SCNC: 3.7 MMOL/L (ref 3.5–5.1)
PROT SERPL-MCNC: 6.4 G/DL (ref 6–8.4)
RBC # BLD AUTO: 4.09 M/UL (ref 4–5.4)
SODIUM SERPL-SCNC: 140 MMOL/L (ref 136–145)
WBC # BLD AUTO: 3.6 K/UL (ref 3.9–12.7)

## 2019-04-26 PROCEDURE — 80053 COMPREHEN METABOLIC PANEL: CPT

## 2019-04-26 PROCEDURE — 85025 COMPLETE CBC W/AUTO DIFF WBC: CPT

## 2019-04-26 PROCEDURE — 36415 COLL VENOUS BLD VENIPUNCTURE: CPT

## 2019-04-29 ENCOUNTER — OFFICE VISIT (OUTPATIENT)
Dept: HEMATOLOGY/ONCOLOGY | Facility: CLINIC | Age: 64
End: 2019-04-29
Payer: MEDICARE

## 2019-04-29 VITALS
HEART RATE: 93 BPM | OXYGEN SATURATION: 99 % | TEMPERATURE: 98 F | SYSTOLIC BLOOD PRESSURE: 133 MMHG | RESPIRATION RATE: 20 BRPM | HEIGHT: 67 IN | WEIGHT: 220 LBS | BODY MASS INDEX: 34.53 KG/M2 | DIASTOLIC BLOOD PRESSURE: 77 MMHG

## 2019-04-29 DIAGNOSIS — C17.0 DUODENAL CANCER: ICD-10-CM

## 2019-04-29 DIAGNOSIS — C78.7 HEPATIC METASTASES: ICD-10-CM

## 2019-04-29 DIAGNOSIS — Z09 CHEMOTHERAPY FOLLOW-UP EXAMINATION: Primary | ICD-10-CM

## 2019-04-29 DIAGNOSIS — R60.9 EDEMA, UNSPECIFIED TYPE: ICD-10-CM

## 2019-04-29 DIAGNOSIS — C78.00 MALIGNANT NEOPLASM METASTATIC TO LUNG, UNSPECIFIED LATERALITY: ICD-10-CM

## 2019-04-29 PROCEDURE — 3078F DIAST BP <80 MM HG: CPT | Mod: S$GLB,,, | Performed by: INTERNAL MEDICINE

## 2019-04-29 PROCEDURE — 99215 PR OFFICE/OUTPT VISIT, EST, LEVL V, 40-54 MIN: ICD-10-PCS | Mod: S$GLB,,, | Performed by: INTERNAL MEDICINE

## 2019-04-29 PROCEDURE — 99999 PR PBB SHADOW E&M-EST. PATIENT-LVL III: ICD-10-PCS | Mod: PBBFAC,,, | Performed by: INTERNAL MEDICINE

## 2019-04-29 PROCEDURE — 99999 PR PBB SHADOW E&M-EST. PATIENT-LVL III: CPT | Mod: PBBFAC,,, | Performed by: INTERNAL MEDICINE

## 2019-04-29 PROCEDURE — 3075F SYST BP GE 130 - 139MM HG: CPT | Mod: S$GLB,,, | Performed by: INTERNAL MEDICINE

## 2019-04-29 PROCEDURE — 3075F PR MOST RECENT SYSTOLIC BLOOD PRESS GE 130-139MM HG: ICD-10-PCS | Mod: S$GLB,,, | Performed by: INTERNAL MEDICINE

## 2019-04-29 PROCEDURE — 99215 OFFICE O/P EST HI 40 MIN: CPT | Mod: S$GLB,,, | Performed by: INTERNAL MEDICINE

## 2019-04-29 PROCEDURE — 3078F PR MOST RECENT DIASTOLIC BLOOD PRESSURE < 80 MM HG: ICD-10-PCS | Mod: S$GLB,,, | Performed by: INTERNAL MEDICINE

## 2019-04-29 RX ORDER — HEPARIN 100 UNIT/ML
500 SYRINGE INTRAVENOUS
Status: CANCELLED | OUTPATIENT
Start: 2019-04-29

## 2019-04-29 RX ORDER — ATROPINE SULFATE 0.4 MG/ML
0.4 INJECTION, SOLUTION ENDOTRACHEAL; INTRAMEDULLARY; INTRAMUSCULAR; INTRAVENOUS; SUBCUTANEOUS ONCE AS NEEDED
Status: CANCELLED | OUTPATIENT
Start: 2019-04-29

## 2019-04-29 RX ORDER — SODIUM CHLORIDE 0.9 % (FLUSH) 0.9 %
10 SYRINGE (ML) INJECTION
Status: CANCELLED | OUTPATIENT
Start: 2019-05-13

## 2019-04-29 RX ORDER — FLUOROURACIL 50 MG/ML
400 INJECTION, SOLUTION INTRAVENOUS
Status: CANCELLED | OUTPATIENT
Start: 2019-05-13

## 2019-04-29 RX ORDER — HEPARIN 100 UNIT/ML
500 SYRINGE INTRAVENOUS
Status: CANCELLED | OUTPATIENT
Start: 2019-05-13

## 2019-04-29 RX ORDER — ATROPINE SULFATE 0.4 MG/ML
0.4 INJECTION, SOLUTION ENDOTRACHEAL; INTRAMEDULLARY; INTRAMUSCULAR; INTRAVENOUS; SUBCUTANEOUS ONCE AS NEEDED
Status: CANCELLED | OUTPATIENT
Start: 2019-05-13

## 2019-04-29 RX ORDER — HEPARIN 100 UNIT/ML
500 SYRINGE INTRAVENOUS
Status: CANCELLED | OUTPATIENT
Start: 2019-05-01

## 2019-04-29 RX ORDER — SODIUM CHLORIDE 0.9 % (FLUSH) 0.9 %
10 SYRINGE (ML) INJECTION
Status: CANCELLED | OUTPATIENT
Start: 2019-05-01

## 2019-04-29 RX ORDER — FLUOROURACIL 50 MG/ML
400 INJECTION, SOLUTION INTRAVENOUS
Status: CANCELLED | OUTPATIENT
Start: 2019-04-29

## 2019-04-29 RX ORDER — SODIUM CHLORIDE 0.9 % (FLUSH) 0.9 %
10 SYRINGE (ML) INJECTION
Status: CANCELLED | OUTPATIENT
Start: 2019-04-29

## 2019-04-29 RX ORDER — HEPARIN 100 UNIT/ML
500 SYRINGE INTRAVENOUS
Status: CANCELLED | OUTPATIENT
Start: 2019-05-15

## 2019-04-29 RX ORDER — SODIUM CHLORIDE 0.9 % (FLUSH) 0.9 %
10 SYRINGE (ML) INJECTION
Status: CANCELLED | OUTPATIENT
Start: 2019-05-15

## 2019-04-29 NOTE — PROGRESS NOTES
Cc  I feel ok     Indigo Stuart is a 63 y.o.  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  Kras was MUTATED  Cycle 1  January 15 2019    Here for chemo but she has had liver progression and SBO treated medically   New onset blood in stool and pruritus of rectum   CHest port in place     Tolerating lopressor for htn and lipitor for dyslipidemia  Tolerating zofran for nausea chemo     Cycle one folfox avastin January 15 2019   Failed regimen and admitted with SBO   Hepatic mets progressed    Cycle one folfiri avastin   One episode of diarrhea no fever no pain  Past Medical History:   Diagnosis Date    Cancer     colon    Encounter for blood transfusion     Hypertension      She is on  lipitor for cholesterol     Current Outpatient Medications:     amLODIPine (NORVASC) 5 MG tablet, Take 5 mg by mouth once daily., Disp: , Rfl: 3    atorvastatin (LIPITOR) 20 MG tablet, Take 20 mg by mouth every evening., Disp: , Rfl:     ferrous sulfate (FEOSOL) 325 mg (65 mg iron) Tab tablet, Take 1 tablet by mouth 2 (two) times daily., Disp: , Rfl: 3    metoprolol succinate (TOPROL-XL) 25 MG 24 hr tablet, Take 25 mg by mouth every evening., Disp: , Rfl: 3    ondansetron (ZOFRAN-ODT) 8 MG TbDL, Take 1 tablet (8 mg total) by mouth every 12 (twelve) hours as needed., Disp: 30 tablet, Rfl: 1    potassium chloride (KLOR-CON) 8 MEQ TbSR, Take 8 mEq by mouth., Disp: , Rfl:     promethazine (PHENERGAN) 25 MG tablet, Take 1 tablet (25 mg total) by mouth every 6 (six) hours as needed for Nausea., Disp: 30 tablet, Rfl: 1  Review of patient's allergies indicates:   Allergen Reactions    Codeine Nausea And Vomiting    Erythromycin base Other (See Comments)    Lisinopril Palpitations     Cough      Social History     Tobacco Use    Smoking status: Never Smoker    Smokeless tobacco: Never Used   Substance Use Topics    Alcohol use: No    Drug use:  "No     Family History   Problem Relation Age of Onset    Cancer Mother         colon ca, liver ca    Cancer Father          Review of Systems:  General: Weight gain: No, Weight Loss: No, Fatigue: y  Fever: y Chills: No, Night Sweats: No, Insomnia: No, Excessive sleeping: No   Respiratory:  Cough: No, Shortness of Breath: No    Wheezing: No, Excessive Snoring: No, Coughing up blood: No  Endocrine: Heat Intolerance: No, Cold Intolerance: No,   Excessive Thirst: No, Excessive Hunger: No,   Eyes:  Blurred Vision: No, Double Vision: No,   Light Sensitivity: No, Eye pain: No  Musculoskeletal: Muscle Aches/Pain: No, Joint Pain/Swelling: No   Muscle Weakness:y, Neck Pain: No, Back Pain: y  Neurological: Difficulty Walking/Falls:  YES weakness   Headache Migraine: No, Dizziness/Vertigo: No, Fainting: No, Difficulty with Speech: No, Weakness: Yes     Cardiovascular: Chest Pain: No, Shortness of Breath: no   Gastrointestinal: Nausea/Vomiting: No, Constipation: No, Diarrhea:  ONE episode   Psych/Cog:  Depression: No, Anxiety: No, Hallucinations: No, Problems Concentrating: No  : Frequent Urination: No, Incontinence: No, Blood of Urine: No, Urinary Infections: No  ENT:Hearing Loss: No, Earache: No, Ringing in Ears: No  Facial Pain: No, Chronic Congestion:No   Immune: Seasonal Allergies: No, Hives and/or Rashes: No  The remainder of the review of twelve body systems was reviewed and normal        /77   Pulse 93   Temp 97.7 °F (36.5 °C)   Resp 20   Ht 5' 7" (1.702 m)   Wt 99.8 kg (220 lb 0.3 oz)   SpO2 99%   BMI 34.46 kg/m²   Constitutional: oriented to person, place, and time.  Appears good today   HENT: anicteric sclera   Head: Normocephalic and atraumatic.   Ears canal normal no discharge   Nose: Nose normal.   Eyes: Conjunctivae PALE  EOM are normal.Neck: . No thyromegaly present.   Cardiovascular: Normal rate, regular rhythm, normal heart sounds   +++ edema   No murmur heard.  Pulmonary/Chest: Effort normal " and breath sounds normal.  no wheezes.  Abdominal: Soft. Bowel sounds are normal.  no mass.   Musculoskeletal: Normal range of motion. decreased strength   No further boggy turbinates  Lymphadenopathy:  no cervical adenopathy.   Neurological: alert and oriented to person, place, and time.   Skin: Skin is warm and dry. No rash noted.   Psychiatric: normal mood and affect.   behavior is normal.   Vitals reviewed.      Lab Results   Component Value Date    WBC 3.60 (L) 04/26/2019    HGB 12.0 04/26/2019    HCT 37.2 04/26/2019    MCV 91 04/26/2019     04/26/2019     CMP  Sodium   Date Value Ref Range Status   04/26/2019 140 136 - 145 mmol/L Final     Potassium   Date Value Ref Range Status   04/26/2019 3.7 3.5 - 5.1 mmol/L Final     Chloride   Date Value Ref Range Status   04/26/2019 105 95 - 110 mmol/L Final     CO2   Date Value Ref Range Status   04/26/2019 28 23 - 29 mmol/L Final     Glucose   Date Value Ref Range Status   04/26/2019 104 70 - 110 mg/dL Final     BUN, Bld   Date Value Ref Range Status   04/26/2019 7 (L) 8 - 23 mg/dL Final     Creatinine   Date Value Ref Range Status   04/26/2019 0.6 0.5 - 1.4 mg/dL Final     Calcium   Date Value Ref Range Status   04/26/2019 9.7 8.7 - 10.5 mg/dL Final     Total Protein   Date Value Ref Range Status   04/26/2019 6.4 6.0 - 8.4 g/dL Final     Albumin   Date Value Ref Range Status   04/26/2019 3.0 (L) 3.5 - 5.2 g/dL Final     Total Bilirubin   Date Value Ref Range Status   04/26/2019 0.4 0.1 - 1.0 mg/dL Final     Comment:     For infants and newborns, interpretation of results should be based  on gestational age, weight and in agreement with clinical  observations.  Premature Infant recommended reference ranges:  Up to 24 hours.............<8.0 mg/dL  Up to 48 hours............<12.0 mg/dL  3-5 days..................<15.0 mg/dL  6-29 days.................<15.0 mg/dL       Alkaline Phosphatase   Date Value Ref Range Status   04/26/2019 209 (H) 55 - 135 U/L Final      AST   Date Value Ref Range Status   04/26/2019 35 10 - 40 U/L Final     ALT   Date Value Ref Range Status   04/26/2019 32 10 - 44 U/L Final     Anion Gap   Date Value Ref Range Status   04/26/2019 7 (L) 8 - 16 mmol/L Final     eGFR if    Date Value Ref Range Status   04/26/2019 >60 >60 mL/min/1.73 m^2 Final     eGFR if non    Date Value Ref Range Status   04/26/2019 >60 >60 mL/min/1.73 m^2 Final     Comment:     Calculation used to obtain the estimated glomerular filtration  rate (eGFR) is the CKD-EPI equation.        Clinical information: possible liver mets- hx of colon cancer    NO falls and no pain    SPECIMEN  1) Liver Lesion Biopsy  FINAL PATHOLOGIC DIAGNOSIS  LIVER LESION, NEEDLE CORE BIOPSIES:  - Metastatic colonic adenocarcinoma.    CEA0.0 - 5.0 ng/vD2735.7 Abnormally high    CEA0.0 - 5.0 ng/cQ2317.3 Abnormally high        Component 9d ago   HLA Celiac Specimen Whole Blood    Celiac (HLA-DQB1*02) Positive Abnormal     Celiac (HLA-DQ8) Negative    Celiac (HLA-DQA1*05) Positive Abnormal               Chemotherapy follow-up examination  -     CEA; Future; Expected date: 04/29/2019  -     CBC auto differential; Standing  -     CMP; Future; Expected date: 04/29/2019    Duodenal cancer  -     CEA; Future; Expected date: 04/29/2019  -     CBC auto differential; Standing  -     CMP; Future; Expected date: 04/29/2019    Hepatic metastases  -     CEA; Future; Expected date: 04/29/2019  -     CBC auto differential; Standing  -     CMP; Future; Expected date: 04/29/2019    Malignant neoplasm metastatic to lung, unspecified laterality    Edema, unspecified type  -     CEA; Future; Expected date: 04/29/2019  -     CBC auto differential; Standing  -     CMP; Future; Expected date: 04/29/2019     Folfiri with avastin here for second dose   Cleared for chemo   Failed   folfox and avastin for stage  4 disease   Do not resume HCTZ   Cont zofran prn nausea   Cont toprol for hR   No pain    Explained one dyaay she will need hospice   No further falls   No pain   Walking with walker     Current Outpatient Medications:     amLODIPine (NORVASC) 5 MG tablet, Take 5 mg by mouth once daily., Disp: , Rfl: 3    atorvastatin (LIPITOR) 20 MG tablet, Take 20 mg by mouth every evening., Disp: , Rfl:     ferrous sulfate (FEOSOL) 325 mg (65 mg iron) Tab tablet, Take 1 tablet by mouth 2 (two) times daily., Disp: , Rfl: 3    metoprolol succinate (TOPROL-XL) 25 MG 24 hr tablet, Take 25 mg by mouth every evening., Disp: , Rfl: 3    ondansetron (ZOFRAN-ODT) 8 MG TbDL, Take 1 tablet (8 mg total) by mouth every 12 (twelve) hours as needed., Disp: 30 tablet, Rfl: 1    potassium chloride (KLOR-CON) 8 MEQ TbSR, Take 8 mEq by mouth., Disp: , Rfl:     promethazine (PHENERGAN) 25 MG tablet, Take 1 tablet (25 mg total) by mouth every 6 (six) hours as needed for Nausea., Disp: 30 tablet, Rfl: 1    Advance Care Planning     Living Will  During this visit, I engaged the patient in the advance care planning process.  The patient and I reviewed the role for advance directives and their purpose in directing future healthcare if the patient's unable to speak for him/herself.  At this point in time, the patient does have full decision-making capacity.  We discussed different extreme health states that she could experience, and reviewed what kind of medical care she would want in those situations.  The patient communicated that if she were comatose and had little chance of a meaningful recovery, she would not want machines/life-sustaining treatments used.  5 The patient  Has not  completed a living will to reflect these preferences.  The patient   HAS NOT already designated a healthcare power of  to make decisions on her behalf.   I spent a total of 5 minutes engaging the patient in this advance care planning discussion.       Thank you for allowing me to evaluate and participate in the care of this pleasant patient.  Please fell free to call me with any questions or concerns.    Warmly,  Karina Pettit MD    This note was dictated with Dragon and briefly proofread. Please excuse any sentences that may be unclear or words misspelled

## 2019-05-10 ENCOUNTER — LAB VISIT (OUTPATIENT)
Dept: LAB | Facility: HOSPITAL | Age: 64
End: 2019-05-10
Attending: INTERNAL MEDICINE
Payer: MEDICARE

## 2019-05-10 DIAGNOSIS — R60.9 EDEMA, UNSPECIFIED TYPE: ICD-10-CM

## 2019-05-10 DIAGNOSIS — C78.7 HEPATIC METASTASES: ICD-10-CM

## 2019-05-10 DIAGNOSIS — C17.0 DUODENAL CANCER: ICD-10-CM

## 2019-05-10 DIAGNOSIS — Z09 CHEMOTHERAPY FOLLOW-UP EXAMINATION: ICD-10-CM

## 2019-05-10 LAB
ALBUMIN SERPL BCP-MCNC: 3.3 G/DL (ref 3.5–5.2)
ALP SERPL-CCNC: 220 U/L (ref 55–135)
ALT SERPL W/O P-5'-P-CCNC: 34 U/L (ref 10–44)
ANION GAP SERPL CALC-SCNC: 9 MMOL/L (ref 8–16)
AST SERPL-CCNC: 36 U/L (ref 10–40)
BASOPHILS # BLD AUTO: 0 K/UL (ref 0–0.2)
BASOPHILS NFR BLD: 0.5 % (ref 0–1.9)
BILIRUB SERPL-MCNC: 0.5 MG/DL (ref 0.1–1)
BUN SERPL-MCNC: 10 MG/DL (ref 8–23)
CALCIUM SERPL-MCNC: 10 MG/DL (ref 8.7–10.5)
CEA SERPL-MCNC: 76.6 NG/ML (ref 0–5)
CHLORIDE SERPL-SCNC: 106 MMOL/L (ref 95–110)
CO2 SERPL-SCNC: 25 MMOL/L (ref 23–29)
CREAT SERPL-MCNC: 0.7 MG/DL (ref 0.5–1.4)
DIFFERENTIAL METHOD: ABNORMAL
EOSINOPHIL # BLD AUTO: 0 K/UL (ref 0–0.5)
EOSINOPHIL NFR BLD: 0.7 % (ref 0–8)
ERYTHROCYTE [DISTWIDTH] IN BLOOD BY AUTOMATED COUNT: 20.4 % (ref 11.5–14.5)
EST. GFR  (AFRICAN AMERICAN): >60 ML/MIN/1.73 M^2
EST. GFR  (NON AFRICAN AMERICAN): >60 ML/MIN/1.73 M^2
GLUCOSE SERPL-MCNC: 98 MG/DL (ref 70–110)
HCT VFR BLD AUTO: 41.2 % (ref 37–48.5)
HGB BLD-MCNC: 13.1 G/DL (ref 12–16)
LYMPHOCYTES # BLD AUTO: 1 K/UL (ref 1–4.8)
LYMPHOCYTES NFR BLD: 25.9 % (ref 18–48)
MCH RBC QN AUTO: 29.5 PG (ref 27–31)
MCHC RBC AUTO-ENTMCNC: 31.9 G/DL (ref 32–36)
MCV RBC AUTO: 93 FL (ref 82–98)
MONOCYTES # BLD AUTO: 0.3 K/UL (ref 0.3–1)
MONOCYTES NFR BLD: 6.7 % (ref 4–15)
NEUTROPHILS # BLD AUTO: 2.5 K/UL (ref 1.8–7.7)
NEUTROPHILS NFR BLD: 66.2 % (ref 38–73)
PLATELET # BLD AUTO: 263 K/UL (ref 150–350)
PMV BLD AUTO: 6.4 FL (ref 9.2–12.9)
POTASSIUM SERPL-SCNC: 4 MMOL/L (ref 3.5–5.1)
PROT SERPL-MCNC: 7.1 G/DL (ref 6–8.4)
RBC # BLD AUTO: 4.45 M/UL (ref 4–5.4)
SODIUM SERPL-SCNC: 140 MMOL/L (ref 136–145)
WBC # BLD AUTO: 3.8 K/UL (ref 3.9–12.7)

## 2019-05-10 PROCEDURE — 80053 COMPREHEN METABOLIC PANEL: CPT

## 2019-05-10 PROCEDURE — 85025 COMPLETE CBC W/AUTO DIFF WBC: CPT

## 2019-05-10 PROCEDURE — 82378 CARCINOEMBRYONIC ANTIGEN: CPT

## 2019-05-10 PROCEDURE — 36415 COLL VENOUS BLD VENIPUNCTURE: CPT

## 2019-05-13 ENCOUNTER — OFFICE VISIT (OUTPATIENT)
Dept: HEMATOLOGY/ONCOLOGY | Facility: CLINIC | Age: 64
End: 2019-05-13
Payer: MEDICARE

## 2019-05-13 VITALS
TEMPERATURE: 98 F | HEART RATE: 78 BPM | BODY MASS INDEX: 34.16 KG/M2 | SYSTOLIC BLOOD PRESSURE: 135 MMHG | HEIGHT: 67 IN | RESPIRATION RATE: 20 BRPM | OXYGEN SATURATION: 99 % | WEIGHT: 217.63 LBS | DIASTOLIC BLOOD PRESSURE: 88 MMHG

## 2019-05-13 DIAGNOSIS — Z09 CHEMOTHERAPY FOLLOW-UP EXAMINATION: ICD-10-CM

## 2019-05-13 DIAGNOSIS — C17.0 DUODENAL CANCER: ICD-10-CM

## 2019-05-13 DIAGNOSIS — C78.00 MALIGNANT NEOPLASM METASTATIC TO LUNG, UNSPECIFIED LATERALITY: Primary | ICD-10-CM

## 2019-05-13 DIAGNOSIS — Z09 ONCOLOGY FOLLOW-UP ENCOUNTER: ICD-10-CM

## 2019-05-13 DIAGNOSIS — R63.4 WEIGHT LOSS: ICD-10-CM

## 2019-05-13 PROCEDURE — 3008F BODY MASS INDEX DOCD: CPT | Mod: S$GLB,,, | Performed by: INTERNAL MEDICINE

## 2019-05-13 PROCEDURE — 99999 PR PBB SHADOW E&M-EST. PATIENT-LVL III: ICD-10-PCS | Mod: PBBFAC,,, | Performed by: INTERNAL MEDICINE

## 2019-05-13 PROCEDURE — 99999 PR PBB SHADOW E&M-EST. PATIENT-LVL III: CPT | Mod: PBBFAC,,, | Performed by: INTERNAL MEDICINE

## 2019-05-13 PROCEDURE — 3079F PR MOST RECENT DIASTOLIC BLOOD PRESSURE 80-89 MM HG: ICD-10-PCS | Mod: S$GLB,,, | Performed by: INTERNAL MEDICINE

## 2019-05-13 PROCEDURE — 3008F PR BODY MASS INDEX (BMI) DOCUMENTED: ICD-10-PCS | Mod: S$GLB,,, | Performed by: INTERNAL MEDICINE

## 2019-05-13 PROCEDURE — 99215 OFFICE O/P EST HI 40 MIN: CPT | Mod: S$GLB,,, | Performed by: INTERNAL MEDICINE

## 2019-05-13 PROCEDURE — 1101F PT FALLS ASSESS-DOCD LE1/YR: CPT | Mod: S$GLB,,, | Performed by: INTERNAL MEDICINE

## 2019-05-13 PROCEDURE — 99215 PR OFFICE/OUTPT VISIT, EST, LEVL V, 40-54 MIN: ICD-10-PCS | Mod: S$GLB,,, | Performed by: INTERNAL MEDICINE

## 2019-05-13 PROCEDURE — 3075F SYST BP GE 130 - 139MM HG: CPT | Mod: S$GLB,,, | Performed by: INTERNAL MEDICINE

## 2019-05-13 PROCEDURE — 1101F PR PT FALLS ASSESS DOC 0-1 FALLS W/OUT INJ PAST YR: ICD-10-PCS | Mod: S$GLB,,, | Performed by: INTERNAL MEDICINE

## 2019-05-13 PROCEDURE — 3075F PR MOST RECENT SYSTOLIC BLOOD PRESS GE 130-139MM HG: ICD-10-PCS | Mod: S$GLB,,, | Performed by: INTERNAL MEDICINE

## 2019-05-13 PROCEDURE — 3079F DIAST BP 80-89 MM HG: CPT | Mod: S$GLB,,, | Performed by: INTERNAL MEDICINE

## 2019-05-13 PROCEDURE — 3288F PR FALLS RISK ASSESSMENT DOCUMENTED: ICD-10-PCS | Mod: S$GLB,,, | Performed by: INTERNAL MEDICINE

## 2019-05-13 PROCEDURE — 3288F FALL RISK ASSESSMENT DOCD: CPT | Mod: S$GLB,,, | Performed by: INTERNAL MEDICINE

## 2019-05-13 RX ORDER — AMLODIPINE BESYLATE 5 MG/1
5 TABLET ORAL
COMMUNITY
Start: 2018-12-03 | End: 2019-05-27

## 2019-05-13 RX ORDER — BIOTIN 1 MG
1000 TABLET ORAL
COMMUNITY
End: 2019-05-27

## 2019-05-13 RX ORDER — METOPROLOL SUCCINATE 25 MG/1
TABLET, EXTENDED RELEASE ORAL
COMMUNITY
Start: 2018-09-17 | End: 2019-05-27 | Stop reason: SDUPTHER

## 2019-05-13 RX ORDER — ATORVASTATIN CALCIUM 20 MG/1
20 TABLET, FILM COATED ORAL
COMMUNITY
Start: 2018-09-21 | End: 2019-05-27

## 2019-05-13 RX ORDER — HYDROCHLOROTHIAZIDE 12.5 MG/1
CAPSULE ORAL
COMMUNITY
Start: 2019-05-06 | End: 2019-05-27

## 2019-05-13 RX ORDER — PANTOPRAZOLE SODIUM 40 MG/1
40 TABLET, DELAYED RELEASE ORAL
COMMUNITY
Start: 2018-03-31 | End: 2019-05-27

## 2019-05-13 NOTE — PROGRESS NOTES
Cc  I feel tired    Indigo Stuart is a 63 y.o.  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  Kras was MUTATED  Cycle 1  January 15 2019    Here for chemo but she has had liver progression and SBO treated medically   New onset blood in stool and pruritus of rectum   CHest port in place     Tolerating lopressor for htn and lipitor for dyslipidemia  Tolerating zofran for nausea chemo     Cycle one folfox avastin January 15 2019   Failed regimen and admitted with SBO   Hepatic mets progressed hence changed to irinotecan ,drop  oxaliplatin   Diarrhea better with imodium   Cycle one folfiri avastin   Here to get clearance for next dose    to 74 NOW   Past Medical History:   Diagnosis Date    Cancer     colon    Encounter for blood transfusion     Hypertension      She is on  lipitor for cholesterol and has no myalgias    Current Outpatient Medications:     amLODIPine (NORVASC) 5 MG tablet, Take 5 mg by mouth once daily., Disp: , Rfl: 3    atorvastatin (LIPITOR) 20 MG tablet, Take 20 mg by mouth every evening., Disp: , Rfl:     ferrous sulfate (FEOSOL) 325 mg (65 mg iron) Tab tablet, Take 1 tablet by mouth 2 (two) times daily., Disp: , Rfl: 3    metoprolol succinate (TOPROL-XL) 25 MG 24 hr tablet, Take 25 mg by mouth every evening., Disp: , Rfl: 3    ondansetron (ZOFRAN-ODT) 8 MG TbDL, Take 1 tablet (8 mg total) by mouth every 12 (twelve) hours as needed., Disp: 30 tablet, Rfl: 1    potassium chloride (KLOR-CON) 8 MEQ TbSR, Take 8 mEq by mouth., Disp: , Rfl:     promethazine (PHENERGAN) 25 MG tablet, Take 1 tablet (25 mg total) by mouth every 6 (six) hours as needed for Nausea., Disp: 30 tablet, Rfl: 1        Review of Systems:  General: Weight gain: No, Weight Loss: No, Fatigue: Continues  Fever: y Chills: No, Night Sweats: No, Insomnia: No, Excessive sleeping: No   Respiratory:  Cough: No, Shortness of Breath:  "No  Wheezing: No, Excessive Snoring: No, Coughing up blood: No  Endocrine: Heat Intolerance: No, Cold Intolerance: No,   Excessive Thirst: No, Excessive Hunger: No  Eyes:  Blurred Vision: No, Double Vision: No   Light Sensitivity: No, Eye pain: No  Musculoskeletal: Muscle Aches/Pain: No, Joint Pain/Swelling: No   Muscle Weakness:no , Neck Pain: No, Back Pain:   Neurological: Difficulty Walking/Falls:  No  Further   Headache Migraine: No, Dizziness/Vertigo: No, Fainting: No, Difficulty with Speech: No, Weakness: Yes  Only after chemo     Cardiovascular: Chest Pain: No, Shortness of Breath: no   Gastrointestinal: Nausea/Vomiting: No, Constipation: No, Diarrhea:  ONE episode   Psych/Cog:  Depression: No, Anxiety: No, Hallucinations: No, Problems Concentrating: No  : Frequent Urination: No, Incontinence: No, Blood of Urine: No, Urinary Infections: No  ENT:Hearing Loss: No, Earache: No, Ringing in Ears: No  Facial Pain: No, Chronic Congestion:No   Immune: Seasonal Allergies: No, Hives and/or Rashes: No  The remainder of the review of twelve body systems was reviewed and normal        /88   Pulse 78   Temp 98.4 °F (36.9 °C)   Resp 20   Ht 5' 7" (1.702 m)   Wt 98.7 kg (217 lb 9.5 oz)   SpO2 99%   BMI 34.08 kg/m²   Constitutional: oriented to person, place, and time.  Appears good today   HENT: anicteric sclera   Head: Normocephalic and atraumatic.   Ears canal normal no discharge   Nose: Nose normal.   No lymphatics noted   Eyes: Conjunctivae PALE  EOM are normal.Neck: . No thyromegaly present.   Cardiovascular: Normal rate, regular rhythm, normal heart sounds + edema   No murmur heard.  Pulmonary/Chest: Effort normal and breath sounds normal.  no fremitus   Abdominal: Soft. Bowel sounds are normal.  no mass.   Musculoskeletal: Normal range of motion. decreased strength   No further boggy turbinates  Lymphadenopathy:  no cervical adenopathy.   Neurological: alert and oriented to person, place  Skin: Skin is " warm and dry. No rash noted.   Psychiatric: normal mood and affect.   behavior is normal.   Vitals reviewed.        Lab Results   Component Value Date    WBC 3.80 (L) 05/10/2019    RBC 4.45 05/10/2019    HGB 13.1 05/10/2019    HCT 41.2 05/10/2019    MCV 93 05/10/2019    MCH 29.5 05/10/2019    MCHC 31.9 (L) 05/10/2019    RDW 20.4 (H) 05/10/2019     05/10/2019    MPV 6.4 (L) 05/10/2019    GRAN 2.5 05/10/2019    GRAN 66.2 05/10/2019    LYMPH 1.0 05/10/2019    LYMPH 25.9 05/10/2019    MONO 0.3 05/10/2019    MONO 6.7 05/10/2019    EOS 0.0 05/10/2019    BASO 0.00 05/10/2019    EOSINOPHIL 0.7 05/10/2019    BASOPHIL 0.5 05/10/2019       CMP  Sodium   Date Value Ref Range Status   05/10/2019 140 136 - 145 mmol/L Final     Potassium   Date Value Ref Range Status   05/10/2019 4.0 3.5 - 5.1 mmol/L Final     Chloride   Date Value Ref Range Status   05/10/2019 106 95 - 110 mmol/L Final     CO2   Date Value Ref Range Status   05/10/2019 25 23 - 29 mmol/L Final     Glucose   Date Value Ref Range Status   05/10/2019 98 70 - 110 mg/dL Final     BUN, Bld   Date Value Ref Range Status   05/10/2019 10 8 - 23 mg/dL Final     Creatinine   Date Value Ref Range Status   05/10/2019 0.7 0.5 - 1.4 mg/dL Final     Calcium   Date Value Ref Range Status   05/10/2019 10.0 8.7 - 10.5 mg/dL Final     Total Protein   Date Value Ref Range Status   05/10/2019 7.1 6.0 - 8.4 g/dL Final     Albumin   Date Value Ref Range Status   05/10/2019 3.3 (L) 3.5 - 5.2 g/dL Final     Total Bilirubin   Date Value Ref Range Status   05/10/2019 0.5 0.1 - 1.0 mg/dL Final     Comment:     For infants and newborns, interpretation of results should be based  on gestational age, weight and in agreement with clinical  observations.  Premature Infant recommended reference ranges:  Up to 24 hours.............<8.0 mg/dL  Up to 48 hours............<12.0 mg/dL  3-5 days..................<15.0 mg/dL  6-29 days.................<15.0 mg/dL       Alkaline Phosphatase   Date  Value Ref Range Status   05/10/2019 220 (H) 55 - 135 U/L Final     AST   Date Value Ref Range Status   05/10/2019 36 10 - 40 U/L Final     ALT   Date Value Ref Range Status   05/10/2019 34 10 - 44 U/L Final     Anion Gap   Date Value Ref Range Status   05/10/2019 9 8 - 16 mmol/L Final     eGFR if    Date Value Ref Range Status   05/10/2019 >60 >60 mL/min/1.73 m^2 Final     eGFR if non    Date Value Ref Range Status   05/10/2019 >60 >60 mL/min/1.73 m^2 Final     Comment:     Calculation used to obtain the estimated glomerular filtration  rate (eGFR) is the CKD-EPI equation.        Clinical information: possible liver mets- hx of colon cancer    NO falls and no pain    SPECIMEN  1) Liver Lesion Biopsy  FINAL PATHOLOGIC DIAGNOSIS  LIVER LESION, NEEDLE CORE BIOPSIES:  - Metastatic colonic adenocarcinoma.    CEA0.0 - 5.0 ng/xG4764.7 Abnormally high    CEA0.0 - 5.0 ng/mL 2543.3 Abnormally high    Now 76.6    Component 9d ago   HLA Celiac Specimen Whole Blood    Celiac (HLA-DQB1*02) Positive Abnormal     Celiac (HLA-DQ8) Negative    Celiac (HLA-DQA1*05) Positive Abnormal               Malignant neoplasm metastatic to lung, unspecified laterality  -     CBC auto differential; Standing  -     CMP; Future; Expected date: 05/13/2019  -     CEA; Future; Expected date: 05/13/2019    Duodenal cancer    Chemotherapy follow-up examination    Oncology follow-up encounter  -     CBC auto differential; Standing  -     CMP; Future; Expected date: 05/13/2019  -     CEA; Future; Expected date: 05/13/2019    Weight loss  -     CBC auto differential; Standing  -     CMP; Future; Expected date: 05/13/2019  -     CEA; Future; Expected date: 05/13/2019    chemo follow up   Onc follow up   Weight loss    Folfiri with avastin here for cycle 3 of 12   Cleared for chemo   Failed   folfox and avastin for stage  4 disease   Leukopenia stable ANC   Increase intake folate rich foods   Cont zofran prn nausea   Cont  toprol for hR   No pain   End of life counseling to help prepare her for the inevitable future     Current Outpatient Medications:     amLODIPine (NORVASC) 5 MG tablet, Take 5 mg by mouth once daily., Disp: , Rfl: 3    atorvastatin (LIPITOR) 20 MG tablet, Take 20 mg by mouth every evening., Disp: , Rfl:     ferrous sulfate (FEOSOL) 325 mg (65 mg iron) Tab tablet, Take 1 tablet by mouth 2 (two) times daily., Disp: , Rfl: 3    metoprolol succinate (TOPROL-XL) 25 MG 24 hr tablet, Take 25 mg by mouth every evening., Disp: , Rfl: 3    ondansetron (ZOFRAN-ODT) 8 MG TbDL, Take 1 tablet (8 mg total) by mouth every 12 (twelve) hours as needed., Disp: 30 tablet, Rfl: 1    potassium chloride (KLOR-CON) 8 MEQ TbSR, Take 8 mEq by mouth., Disp: , Rfl:     promethazine (PHENERGAN) 25 MG tablet, Take 1 tablet (25 mg total) by mouth every 6 (six) hours as needed for Nausea., Disp: 30 tablet, Rfl: 1    Advance Care Planning     Living Will  During this visit, I engaged the patient in the advance care planning process.  The patient and I reviewed the role for advance directives and their purpose in directing future healthcare if the patient's unable to speak for him/herself.  At this point in time, the patient does have full decision-making capacity.  We discussed different extreme health states that she could experience, and reviewed what kind of medical care she would want in those situations.  The patient communicated that if she were comatose and had little chance of a meaningful recovery, she would not want machines/life-sustaining treatments used.  5 The patient  Has not  completed a living will to reflect these preferences.  The patient   HAS NOT already designated a healthcare power of  to make decisions on her behalf.   I spent a total of 5 minutes engaging the patient in this advance care planning discussion.       Thank you for allowing me to evaluate and participate in the care of this pleasant patient.  Please fell free to call me with any questions or concerns.    Warmly,  Karina Pettit MD    This note was dictated with Dragon and briefly proofread. Please excuse any sentences that may be unclear or words misspelled

## 2019-05-15 ENCOUNTER — TELEPHONE (OUTPATIENT)
Dept: HEMATOLOGY/ONCOLOGY | Facility: CLINIC | Age: 64
End: 2019-05-15

## 2019-05-24 ENCOUNTER — LAB VISIT (OUTPATIENT)
Dept: LAB | Facility: HOSPITAL | Age: 64
End: 2019-05-24
Attending: INTERNAL MEDICINE
Payer: MEDICARE

## 2019-05-24 DIAGNOSIS — C78.00 MALIGNANT NEOPLASM METASTATIC TO LUNG, UNSPECIFIED LATERALITY: ICD-10-CM

## 2019-05-24 DIAGNOSIS — Z09 ONCOLOGY FOLLOW-UP ENCOUNTER: ICD-10-CM

## 2019-05-24 DIAGNOSIS — R63.4 WEIGHT LOSS: ICD-10-CM

## 2019-05-24 LAB
ALBUMIN SERPL BCP-MCNC: 3.3 G/DL (ref 3.5–5.2)
ALP SERPL-CCNC: 194 U/L (ref 55–135)
ALT SERPL W/O P-5'-P-CCNC: 31 U/L (ref 10–44)
ANION GAP SERPL CALC-SCNC: 10 MMOL/L (ref 8–16)
AST SERPL-CCNC: 36 U/L (ref 10–40)
BASOPHILS # BLD AUTO: 0 K/UL (ref 0–0.2)
BASOPHILS NFR BLD: 1 % (ref 0–1.9)
BILIRUB SERPL-MCNC: 0.5 MG/DL (ref 0.1–1)
BUN SERPL-MCNC: 9 MG/DL (ref 8–23)
CALCIUM SERPL-MCNC: 10 MG/DL (ref 8.7–10.5)
CEA SERPL-MCNC: 58.5 NG/ML (ref 0–5)
CHLORIDE SERPL-SCNC: 103 MMOL/L (ref 95–110)
CO2 SERPL-SCNC: 27 MMOL/L (ref 23–29)
CREAT SERPL-MCNC: 0.7 MG/DL (ref 0.5–1.4)
DIFFERENTIAL METHOD: ABNORMAL
EOSINOPHIL # BLD AUTO: 0 K/UL (ref 0–0.5)
EOSINOPHIL NFR BLD: 0.7 % (ref 0–8)
ERYTHROCYTE [DISTWIDTH] IN BLOOD BY AUTOMATED COUNT: 17.9 % (ref 11.5–14.5)
EST. GFR  (AFRICAN AMERICAN): >60 ML/MIN/1.73 M^2
EST. GFR  (NON AFRICAN AMERICAN): >60 ML/MIN/1.73 M^2
GLUCOSE SERPL-MCNC: 98 MG/DL (ref 70–110)
HCT VFR BLD AUTO: 39.7 % (ref 37–48.5)
HGB BLD-MCNC: 12.9 G/DL (ref 12–16)
LYMPHOCYTES # BLD AUTO: 1.5 K/UL (ref 1–4.8)
LYMPHOCYTES NFR BLD: 45.1 % (ref 18–48)
MCH RBC QN AUTO: 29.9 PG (ref 27–31)
MCHC RBC AUTO-ENTMCNC: 32.5 G/DL (ref 32–36)
MCV RBC AUTO: 92 FL (ref 82–98)
MONOCYTES # BLD AUTO: 0.2 K/UL (ref 0.3–1)
MONOCYTES NFR BLD: 6.4 % (ref 4–15)
NEUTROPHILS # BLD AUTO: 1.6 K/UL (ref 1.8–7.7)
NEUTROPHILS NFR BLD: 46.8 % (ref 38–73)
PLATELET # BLD AUTO: 286 K/UL (ref 150–350)
PMV BLD AUTO: 6.5 FL (ref 9.2–12.9)
POTASSIUM SERPL-SCNC: 4.2 MMOL/L (ref 3.5–5.1)
PROT SERPL-MCNC: 6.9 G/DL (ref 6–8.4)
RBC # BLD AUTO: 4.3 M/UL (ref 4–5.4)
SODIUM SERPL-SCNC: 140 MMOL/L (ref 136–145)
WBC # BLD AUTO: 3.4 K/UL (ref 3.9–12.7)

## 2019-05-24 PROCEDURE — 80053 COMPREHEN METABOLIC PANEL: CPT

## 2019-05-24 PROCEDURE — 36415 COLL VENOUS BLD VENIPUNCTURE: CPT

## 2019-05-24 PROCEDURE — 85025 COMPLETE CBC W/AUTO DIFF WBC: CPT

## 2019-05-24 PROCEDURE — 82378 CARCINOEMBRYONIC ANTIGEN: CPT

## 2019-05-27 ENCOUNTER — OFFICE VISIT (OUTPATIENT)
Dept: HEMATOLOGY/ONCOLOGY | Facility: CLINIC | Age: 64
End: 2019-05-27
Payer: MEDICARE

## 2019-05-27 VITALS
HEIGHT: 67 IN | OXYGEN SATURATION: 98 % | SYSTOLIC BLOOD PRESSURE: 138 MMHG | TEMPERATURE: 98 F | DIASTOLIC BLOOD PRESSURE: 77 MMHG | HEART RATE: 93 BPM | BODY MASS INDEX: 33.74 KG/M2 | WEIGHT: 214.94 LBS | RESPIRATION RATE: 12 BRPM

## 2019-05-27 DIAGNOSIS — C17.0 DUODENAL CANCER: Primary | ICD-10-CM

## 2019-05-27 DIAGNOSIS — E66.01 MORBID OBESITY: ICD-10-CM

## 2019-05-27 DIAGNOSIS — C78.7 HEPATIC METASTASES: ICD-10-CM

## 2019-05-27 DIAGNOSIS — R53.83 FATIGUE, UNSPECIFIED TYPE: ICD-10-CM

## 2019-05-27 DIAGNOSIS — Z09 ONCOLOGY FOLLOW-UP ENCOUNTER: ICD-10-CM

## 2019-05-27 DIAGNOSIS — R97.0 ELEVATED CEA: ICD-10-CM

## 2019-05-27 DIAGNOSIS — Z85.038 HISTORY OF COLON CANCER: ICD-10-CM

## 2019-05-27 DIAGNOSIS — Z09 CHEMOTHERAPY FOLLOW-UP EXAMINATION: ICD-10-CM

## 2019-05-27 PROBLEM — D50.8 IRON DEFICIENCY ANEMIA SECONDARY TO INADEQUATE DIETARY IRON INTAKE: Status: RESOLVED | Noted: 2019-02-25 | Resolved: 2019-05-27

## 2019-05-27 PROBLEM — K31.89 DUODENAL MASS: Status: RESOLVED | Noted: 2019-01-03 | Resolved: 2019-05-27

## 2019-05-27 PROBLEM — A41.9 SEPSIS: Status: RESOLVED | Noted: 2019-03-24 | Resolved: 2019-05-27

## 2019-05-27 PROBLEM — K92.1 HEMATOCHEZIA: Status: RESOLVED | Noted: 2018-03-29 | Resolved: 2019-05-27

## 2019-05-27 PROBLEM — T45.1X5A CHEMOTHERAPY INDUCED NEUTROPENIA: Status: RESOLVED | Noted: 2019-02-01 | Resolved: 2019-05-27

## 2019-05-27 PROBLEM — D64.9 ANEMIA: Status: RESOLVED | Noted: 2018-12-04 | Resolved: 2019-05-27

## 2019-05-27 PROBLEM — E43 SEVERE MALNUTRITION: Status: RESOLVED | Noted: 2018-12-04 | Resolved: 2019-05-27

## 2019-05-27 PROBLEM — K56.609 SBO (SMALL BOWEL OBSTRUCTION): Status: RESOLVED | Noted: 2019-03-24 | Resolved: 2019-05-27

## 2019-05-27 PROBLEM — D70.1 CHEMOTHERAPY INDUCED NEUTROPENIA: Status: RESOLVED | Noted: 2019-02-01 | Resolved: 2019-05-27

## 2019-05-27 PROBLEM — R79.89 ELEVATED LFTS: Status: RESOLVED | Noted: 2019-01-01 | Resolved: 2019-05-27

## 2019-05-27 PROBLEM — C18.9 METASTATIC COLON CANCER TO LIVER: Status: RESOLVED | Noted: 2019-01-01 | Resolved: 2019-05-27

## 2019-05-27 PROBLEM — T45.1X5A ANEMIA ASSOCIATED WITH CHEMOTHERAPY: Status: RESOLVED | Noted: 2019-02-25 | Resolved: 2019-05-27

## 2019-05-27 PROBLEM — D64.81 ANEMIA ASSOCIATED WITH CHEMOTHERAPY: Status: RESOLVED | Noted: 2019-02-25 | Resolved: 2019-05-27

## 2019-05-27 PROCEDURE — 3075F SYST BP GE 130 - 139MM HG: CPT | Mod: S$GLB,,, | Performed by: INTERNAL MEDICINE

## 2019-05-27 PROCEDURE — 99999 PR PBB SHADOW E&M-EST. PATIENT-LVL IV: CPT | Mod: PBBFAC,,, | Performed by: INTERNAL MEDICINE

## 2019-05-27 PROCEDURE — 3008F BODY MASS INDEX DOCD: CPT | Mod: S$GLB,,, | Performed by: INTERNAL MEDICINE

## 2019-05-27 PROCEDURE — 1101F PT FALLS ASSESS-DOCD LE1/YR: CPT | Mod: S$GLB,,, | Performed by: INTERNAL MEDICINE

## 2019-05-27 PROCEDURE — 99215 OFFICE O/P EST HI 40 MIN: CPT | Mod: S$GLB,,, | Performed by: INTERNAL MEDICINE

## 2019-05-27 PROCEDURE — 3078F DIAST BP <80 MM HG: CPT | Mod: S$GLB,,, | Performed by: INTERNAL MEDICINE

## 2019-05-27 PROCEDURE — 99999 PR PBB SHADOW E&M-EST. PATIENT-LVL IV: ICD-10-PCS | Mod: PBBFAC,,, | Performed by: INTERNAL MEDICINE

## 2019-05-27 PROCEDURE — 3008F PR BODY MASS INDEX (BMI) DOCUMENTED: ICD-10-PCS | Mod: S$GLB,,, | Performed by: INTERNAL MEDICINE

## 2019-05-27 PROCEDURE — 99215 PR OFFICE/OUTPT VISIT, EST, LEVL V, 40-54 MIN: ICD-10-PCS | Mod: S$GLB,,, | Performed by: INTERNAL MEDICINE

## 2019-05-27 PROCEDURE — 1101F PR PT FALLS ASSESS DOC 0-1 FALLS W/OUT INJ PAST YR: ICD-10-PCS | Mod: S$GLB,,, | Performed by: INTERNAL MEDICINE

## 2019-05-27 PROCEDURE — 3078F PR MOST RECENT DIASTOLIC BLOOD PRESSURE < 80 MM HG: ICD-10-PCS | Mod: S$GLB,,, | Performed by: INTERNAL MEDICINE

## 2019-05-27 PROCEDURE — 3075F PR MOST RECENT SYSTOLIC BLOOD PRESS GE 130-139MM HG: ICD-10-PCS | Mod: S$GLB,,, | Performed by: INTERNAL MEDICINE

## 2019-05-27 RX ORDER — SODIUM CHLORIDE 0.9 % (FLUSH) 0.9 %
10 SYRINGE (ML) INJECTION
Status: CANCELLED | OUTPATIENT
Start: 2019-05-27

## 2019-05-27 RX ORDER — SODIUM CHLORIDE 0.9 % (FLUSH) 0.9 %
10 SYRINGE (ML) INJECTION
Status: CANCELLED | OUTPATIENT
Start: 2019-05-29

## 2019-05-27 RX ORDER — FLUOROURACIL 50 MG/ML
400 INJECTION, SOLUTION INTRAVENOUS
Status: CANCELLED | OUTPATIENT
Start: 2019-05-27

## 2019-05-27 RX ORDER — HEPARIN 100 UNIT/ML
500 SYRINGE INTRAVENOUS
Status: CANCELLED | OUTPATIENT
Start: 2019-05-29

## 2019-05-27 RX ORDER — HEPARIN 100 UNIT/ML
500 SYRINGE INTRAVENOUS
Status: CANCELLED | OUTPATIENT
Start: 2019-05-27

## 2019-05-27 RX ORDER — ATROPINE SULFATE 0.4 MG/ML
0.4 INJECTION, SOLUTION ENDOTRACHEAL; INTRAMEDULLARY; INTRAMUSCULAR; INTRAVENOUS; SUBCUTANEOUS ONCE AS NEEDED
Status: CANCELLED | OUTPATIENT
Start: 2019-05-27

## 2019-05-27 NOTE — PROGRESS NOTES
Cc  I am trying to gain my strength back    Indigo Stuart is a 63 y.o.  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  Kras was MUTATED  Cycle 1  January 15 2019    Here for chemo but she has had liver progression and SBO treated medically   New onset blood in stool and pruritus of rectum   CHest port in place     Tolerating lopressor for htn and lipitor for dyslipidemia  Tolerating zofran for nausea chemo     Cycle one folfox avastin January 15 2019   Failed regimen and admitted with SBO   Hepatic mets progressed hence changed to irinotecan drop  oxaliplatin   Diarrhea better with imodium   Cycle one folfiri avastin April 2019  Here to get clearance for next dose    to 76 decreased to the 50s    She is tired with new intermittent SOB   Past Medical History:   Diagnosis Date    Cancer     colon    Encounter for blood transfusion     Hypertension      She is on  lipitor for cholesterol     Current Outpatient Medications:     amLODIPine (NORVASC) 5 MG tablet, Take 5 mg by mouth once daily., Disp: , Rfl: 3    amLODIPine (NORVASC) 5 MG tablet, Take 5 mg by mouth., Disp: , Rfl:     atorvastatin (LIPITOR) 20 MG tablet, Take 20 mg by mouth every evening., Disp: , Rfl:     atorvastatin (LIPITOR) 20 MG tablet, Take 20 mg by mouth., Disp: , Rfl:     biotin 1 mg tablet, Take 1,000 mcg by mouth., Disp: , Rfl:     ferrous sulfate (FEOSOL) 325 mg (65 mg iron) Tab tablet, Take 1 tablet by mouth 2 (two) times daily., Disp: , Rfl: 3    hydroCHLOROthiazide (MICROZIDE) 12.5 mg capsule, TAKE 1 CAPSULE BY MOUTH EVERY DAY, Disp: , Rfl:     metoprolol succinate (TOPROL-XL) 25 MG 24 hr tablet, Take 25 mg by mouth every evening., Disp: , Rfl: 3    metoprolol succinate (TOPROL-XL) 25 MG 24 hr tablet, TAKE 1 TABLET BY MOUTH AT BEDTIME, Disp: , Rfl:     ondansetron (ZOFRAN-ODT) 8 MG TbDL, Take 1 tablet (8 mg total) by mouth every 12 (twelve)  "hours as needed., Disp: 30 tablet, Rfl: 1    pantoprazole (PROTONIX) 40 MG tablet, Take 40 mg by mouth., Disp: , Rfl:     potassium chloride (KLOR-CON) 8 MEQ TbSR, Take 8 mEq by mouth., Disp: , Rfl:     promethazine (PHENERGAN) 25 MG tablet, Take 1 tablet (25 mg total) by mouth every 6 (six) hours as needed for Nausea., Disp: 30 tablet, Rfl: 1        Review of Systems:  General: Weight gain: No, Weight Loss: No, Fatigue: Continues positive unintentional weight gain  Fever: y Chills: No, Night Sweats: No, Insomnia: No, Excessive sleeping: No   Respiratory:  Cough: No, Shortness of Breath:  new onset specially dyspnea with exertion  Wheezing: No, Excessive Snoring: No, Coughing up blood: No  Endocrine: Heat Intolerance: No, Cold Intolerance: No,   Excessive Thirst: No, Excessive Hunger: No  Eyes:  Blurred Vision: No, Double Vision: No   Light Sensitivity: No, Eye pain: No  Musculoskeletal: Muscle Aches/Pain: No, Joint Pain/Swelling: No   Muscle Weakness:no , Neck Pain: No, Back Pain:   Neurological: Difficulty Walking/Falls:  No  Further   Headache Migraine: No, Dizziness/Vertigo: No, Fainting: No, Weakness: Yes  Only after chemo   Cardiovascular: Chest Pain: No, Shortness of Breath: no   Gastrointestinal: Nausea/Vomiting: No, Constipation: No, Diarrhea:  ONE episode   Psych/Cog:  Depression: No, Anxiety: No, Hallucinations: No, Problems Concentrating: No  : Frequent Urination: No, Incontinence: No, Blood of Urine: No, Urinary Infections: No  ENT:Hearing Loss: No, Earache: No, Ringing in Ears: No  Facial Pain: No, Chronic Congestion:No   Immune: Seasonal Allergies: No, Hives and/or Rashes: No  The remainder of the review of twelve body systems was reviewed and normal        /77   Pulse 93   Temp 98.3 °F (36.8 °C)   Resp 12   Ht 5' 7" (1.702 m)   Wt 97.5 kg (214 lb 15.2 oz)   SpO2 98%   BMI 33.67 kg/m²   Constitutional: oriented to person, place, and time.  Appears Gy and fatigued  HENT: anicteric " sclera   Head: Normocephalic and atraumatic.   Ears canal normal no discharge   Nose: Nose normal.   No lymphatics noted   Eyes: Conjunctivae PALE  EOM are normal.Neck: . No thyromegaly present.   Cardiovascular: Normal rate, regular rhythm, normal heart sounds  No murmur heard.  Pulmonary/Chest:  Decreased breath sounds bilateral bases likely due to poor effort she appears extremely exhausted today no wheezes no rales no rhonchi  Abdominal: Soft. Bowel sounds are normal.  no mass.   Musculoskeletal: Normal range of motion. decreased strength   No further boggy turbinates  Lymphadenopathy:  no cervical adenopathy.   Neurological: alert and oriented to person, place  Skin: Skin is warm and dry. No rash noted.   Psychiatric: normal mood and affect.   behavior is normal.   Vitals reviewed.  Positive edema bilateral lower extremities      Lab Results   Component Value Date    WBC 3.40 (L) 05/24/2019    RBC 4.30 05/24/2019    HGB 12.9 05/24/2019    HCT 39.7 05/24/2019    MCV 92 05/24/2019    MCH 29.9 05/24/2019    MCHC 32.5 05/24/2019    RDW 17.9 (H) 05/24/2019     05/24/2019    MPV 6.5 (L) 05/24/2019    GRAN 1.6 (L) 05/24/2019    GRAN 46.8 05/24/2019    LYMPH 1.5 05/24/2019    LYMPH 45.1 05/24/2019    MONO 0.2 (L) 05/24/2019    MONO 6.4 05/24/2019    EOS 0.0 05/24/2019    BASO 0.00 05/24/2019    EOSINOPHIL 0.7 05/24/2019    BASOPHIL 1.0 05/24/2019       CMP  Sodium   Date Value Ref Range Status   05/24/2019 140 136 - 145 mmol/L Final     Potassium   Date Value Ref Range Status   05/24/2019 4.2 3.5 - 5.1 mmol/L Final     Chloride   Date Value Ref Range Status   05/24/2019 103 95 - 110 mmol/L Final     CO2   Date Value Ref Range Status   05/24/2019 27 23 - 29 mmol/L Final     Glucose   Date Value Ref Range Status   05/24/2019 98 70 - 110 mg/dL Final     BUN, Bld   Date Value Ref Range Status   05/24/2019 9 8 - 23 mg/dL Final     Creatinine   Date Value Ref Range Status   05/24/2019 0.7 0.5 - 1.4 mg/dL Final      Calcium   Date Value Ref Range Status   05/24/2019 10.0 8.7 - 10.5 mg/dL Final     Total Protein   Date Value Ref Range Status   05/24/2019 6.9 6.0 - 8.4 g/dL Final     Albumin   Date Value Ref Range Status   05/24/2019 3.3 (L) 3.5 - 5.2 g/dL Final     Total Bilirubin   Date Value Ref Range Status   05/24/2019 0.5 0.1 - 1.0 mg/dL Final     Comment:     For infants and newborns, interpretation of results should be based  on gestational age, weight and in agreement with clinical  observations.  Premature Infant recommended reference ranges:  Up to 24 hours.............<8.0 mg/dL  Up to 48 hours............<12.0 mg/dL  3-5 days..................<15.0 mg/dL  6-29 days.................<15.0 mg/dL       Alkaline Phosphatase   Date Value Ref Range Status   05/24/2019 194 (H) 55 - 135 U/L Final     AST   Date Value Ref Range Status   05/24/2019 36 10 - 40 U/L Final     ALT   Date Value Ref Range Status   05/24/2019 31 10 - 44 U/L Final     Anion Gap   Date Value Ref Range Status   05/24/2019 10 8 - 16 mmol/L Final     eGFR if    Date Value Ref Range Status   05/24/2019 >60 >60 mL/min/1.73 m^2 Final     eGFR if non    Date Value Ref Range Status   05/24/2019 >60 >60 mL/min/1.73 m^2 Final     Comment:     Calculation used to obtain the estimated glomerular filtration  rate (eGFR) is the CKD-EPI equation.         Ref Range & Units 3d ago 2wk ago   CEA 0.0 - 5.0 ng/mL 58.5High   76.6High  C       Clinical information: possible liver mets- hx of colon cancer    NO falls and no pain    SPECIMEN  1) Liver Lesion Biopsy  FINAL PATHOLOGIC DIAGNOSIS  LIVER LESION, NEEDLE CORE BIOPSIES:  - Metastatic colonic adenocarcinoma.    CEA0.0 - 5.0 ng/pK9510.7 Abnormally high    CEA0.0 - 5.0 ng/mL 2543.3 Abnormally high    Now 76.6    Component 9d ago   HLA Celiac Specimen Whole Blood    Celiac (HLA-DQB1*02) Positive Abnormal     Celiac (HLA-DQ8) Negative    Celiac (HLA-DQA1*05) Positive Abnormal                Duodenal cancer  -     CBC auto differential; Standing  -     CMP; Future; Expected date: 05/27/2019  -     CEA; Future; Expected date: 05/27/2019    Hepatic metastases  -     CBC auto differential; Standing  -     CMP; Future; Expected date: 05/27/2019  -     CEA; Future; Expected date: 05/27/2019    Morbid obesity    Fatigue, unspecified type    History of colon cancer    Chemotherapy follow-up examination    Oncology follow-up encounter    Elevated CEA     shortness of breath must increase her exercise tolerance this will help with such as well as diet   chemo follow up   No evidence of depression today and she is accompanied by her granddaughter  Onc follow up   Neutropenia due to chemotherapy however ANC is high enough for her to proceed with chemotherapy   Folfiri with avastin cleared  Failed   folfox and avastin for stage  4 disease   Increase intake folate rich foods   Cont zofran prn nausea   Cont toprol for hR   No pain   Patient must work on decreasing her body mass index.  I have encouraged her to start sit-ups and muscle strengthening exercises.  This will also help the disease in her liver she is able to reduce the fatty content  Diet explain for over 15 min  Patient verbalized understanding of such  End of life counseling to help prepare her for the inevitable future     Current Outpatient Medications:     amLODIPine (NORVASC) 5 MG tablet, Take 5 mg by mouth once daily., Disp: , Rfl: 3    amLODIPine (NORVASC) 5 MG tablet, Take 5 mg by mouth., Disp: , Rfl:     atorvastatin (LIPITOR) 20 MG tablet, Take 20 mg by mouth every evening., Disp: , Rfl:     atorvastatin (LIPITOR) 20 MG tablet, Take 20 mg by mouth., Disp: , Rfl:     biotin 1 mg tablet, Take 1,000 mcg by mouth., Disp: , Rfl:     ferrous sulfate (FEOSOL) 325 mg (65 mg iron) Tab tablet, Take 1 tablet by mouth 2 (two) times daily., Disp: , Rfl: 3    hydroCHLOROthiazide (MICROZIDE) 12.5 mg capsule, TAKE 1 CAPSULE BY MOUTH EVERY DAY,  Disp: , Rfl:     metoprolol succinate (TOPROL-XL) 25 MG 24 hr tablet, Take 25 mg by mouth every evening., Disp: , Rfl: 3    metoprolol succinate (TOPROL-XL) 25 MG 24 hr tablet, TAKE 1 TABLET BY MOUTH AT BEDTIME, Disp: , Rfl:     ondansetron (ZOFRAN-ODT) 8 MG TbDL, Take 1 tablet (8 mg total) by mouth every 12 (twelve) hours as needed., Disp: 30 tablet, Rfl: 1    pantoprazole (PROTONIX) 40 MG tablet, Take 40 mg by mouth., Disp: , Rfl:     potassium chloride (KLOR-CON) 8 MEQ TbSR, Take 8 mEq by mouth., Disp: , Rfl:     promethazine (PHENERGAN) 25 MG tablet, Take 1 tablet (25 mg total) by mouth every 6 (six) hours as needed for Nausea., Disp: 30 tablet, Rfl: 1  She is to continue Norvasc for blood pressure control which is stable and Lipitor for dyslipidemia which is followed by her primary care physician  Advance Care Planning     Living Will  During this visit, I engaged the patient in the advance care planning process.  The patient and I reviewed the role for advance directives and their purpose in directing future healthcare if the patient's unable to speak for him/herself.  At this point in time, the patient does have full decision-making capacity.  We discussed different extreme health states that she could experience, and reviewed what kind of medical care she would want in those situations.  The patient communicated that if she were comatose and had little chance of a meaningful recovery, she would not want machines/life-sustaining treatments used.  5 The patient  Has not  completed a living will to reflect these preferences.  The patient   HAS NOT already designated a healthcare power of  to make decisions on her behalf.   I spent a total of 5 minutes engaging the patient in this advance care planning discussion.       Thank you for allowing me to evaluate and participate in the care of this pleasant patient. Please fell free to call me with any questions or concerns.    EscobarlyKarina,  MD    This note was dictated with [place] and briefly proofread. Please excuse any sentences that may be unclear or words misspelled

## 2019-06-06 ENCOUNTER — PATIENT MESSAGE (OUTPATIENT)
Dept: HEMATOLOGY/ONCOLOGY | Facility: CLINIC | Age: 64
End: 2019-06-06

## 2019-06-07 ENCOUNTER — LAB VISIT (OUTPATIENT)
Dept: LAB | Facility: HOSPITAL | Age: 64
End: 2019-06-07
Attending: INTERNAL MEDICINE
Payer: MEDICARE

## 2019-06-07 DIAGNOSIS — C78.7 HEPATIC METASTASES: ICD-10-CM

## 2019-06-07 DIAGNOSIS — C17.0 DUODENAL CANCER: ICD-10-CM

## 2019-06-07 LAB
ALBUMIN SERPL BCP-MCNC: 3 G/DL (ref 3.5–5.2)
ALP SERPL-CCNC: 211 U/L (ref 55–135)
ALT SERPL W/O P-5'-P-CCNC: 44 U/L (ref 10–44)
ANION GAP SERPL CALC-SCNC: 6 MMOL/L (ref 8–16)
AST SERPL-CCNC: 34 U/L (ref 10–40)
BASOPHILS # BLD AUTO: 0 K/UL (ref 0–0.2)
BASOPHILS NFR BLD: 1.1 % (ref 0–1.9)
BILIRUB SERPL-MCNC: 0.5 MG/DL (ref 0.1–1)
BUN SERPL-MCNC: 8 MG/DL (ref 8–23)
CALCIUM SERPL-MCNC: 9.6 MG/DL (ref 8.7–10.5)
CEA SERPL-MCNC: 47.5 NG/ML (ref 0–5)
CHLORIDE SERPL-SCNC: 106 MMOL/L (ref 95–110)
CO2 SERPL-SCNC: 29 MMOL/L (ref 23–29)
CREAT SERPL-MCNC: 0.7 MG/DL (ref 0.5–1.4)
DIFFERENTIAL METHOD: ABNORMAL
EOSINOPHIL # BLD AUTO: 0 K/UL (ref 0–0.5)
EOSINOPHIL NFR BLD: 0.4 % (ref 0–8)
ERYTHROCYTE [DISTWIDTH] IN BLOOD BY AUTOMATED COUNT: 16.4 % (ref 11.5–14.5)
EST. GFR  (AFRICAN AMERICAN): >60 ML/MIN/1.73 M^2
EST. GFR  (NON AFRICAN AMERICAN): >60 ML/MIN/1.73 M^2
GLUCOSE SERPL-MCNC: 96 MG/DL (ref 70–110)
HCT VFR BLD AUTO: 34.6 % (ref 37–48.5)
HGB BLD-MCNC: 11.8 G/DL (ref 12–16)
LYMPHOCYTES # BLD AUTO: 1.2 K/UL (ref 1–4.8)
LYMPHOCYTES NFR BLD: 39.5 % (ref 18–48)
MCH RBC QN AUTO: 30.9 PG (ref 27–31)
MCHC RBC AUTO-ENTMCNC: 34.2 G/DL (ref 32–36)
MCV RBC AUTO: 90 FL (ref 82–98)
MONOCYTES # BLD AUTO: 0.2 K/UL (ref 0.3–1)
MONOCYTES NFR BLD: 8 % (ref 4–15)
NEUTROPHILS # BLD AUTO: 1.6 K/UL (ref 1.8–7.7)
NEUTROPHILS NFR BLD: 51 % (ref 38–73)
PLATELET # BLD AUTO: 224 K/UL (ref 150–350)
PMV BLD AUTO: 6.2 FL (ref 9.2–12.9)
POTASSIUM SERPL-SCNC: 4.2 MMOL/L (ref 3.5–5.1)
PROT SERPL-MCNC: 6 G/DL (ref 6–8.4)
RBC # BLD AUTO: 3.84 M/UL (ref 4–5.4)
SODIUM SERPL-SCNC: 141 MMOL/L (ref 136–145)
WBC # BLD AUTO: 3 K/UL (ref 3.9–12.7)

## 2019-06-07 PROCEDURE — 85025 COMPLETE CBC W/AUTO DIFF WBC: CPT

## 2019-06-07 PROCEDURE — 36415 COLL VENOUS BLD VENIPUNCTURE: CPT

## 2019-06-07 PROCEDURE — 82378 CARCINOEMBRYONIC ANTIGEN: CPT

## 2019-06-07 PROCEDURE — 80053 COMPREHEN METABOLIC PANEL: CPT

## 2019-06-11 ENCOUNTER — OFFICE VISIT (OUTPATIENT)
Dept: HEMATOLOGY/ONCOLOGY | Facility: CLINIC | Age: 64
End: 2019-06-11
Payer: MEDICARE

## 2019-06-11 ENCOUNTER — TELEPHONE (OUTPATIENT)
Dept: HEMATOLOGY/ONCOLOGY | Facility: CLINIC | Age: 64
End: 2019-06-11

## 2019-06-11 VITALS
OXYGEN SATURATION: 97 % | DIASTOLIC BLOOD PRESSURE: 74 MMHG | HEIGHT: 67 IN | SYSTOLIC BLOOD PRESSURE: 143 MMHG | RESPIRATION RATE: 20 BRPM | BODY MASS INDEX: 33.78 KG/M2 | HEART RATE: 82 BPM | WEIGHT: 215.19 LBS | TEMPERATURE: 99 F

## 2019-06-11 DIAGNOSIS — D64.9 SYMPTOMATIC ANEMIA: ICD-10-CM

## 2019-06-11 DIAGNOSIS — C17.0 DUODENAL CANCER: Primary | ICD-10-CM

## 2019-06-11 DIAGNOSIS — T45.1X5A CHEMOTHERAPY-INDUCED NAUSEA: ICD-10-CM

## 2019-06-11 DIAGNOSIS — K31.5 DUODENAL STENOSIS: Primary | ICD-10-CM

## 2019-06-11 DIAGNOSIS — C78.7 HEPATIC METASTASES: ICD-10-CM

## 2019-06-11 DIAGNOSIS — C17.0 DUODENAL CANCER: ICD-10-CM

## 2019-06-11 DIAGNOSIS — Z09 ONCOLOGY FOLLOW-UP ENCOUNTER: ICD-10-CM

## 2019-06-11 DIAGNOSIS — C78.00 MALIGNANT NEOPLASM METASTATIC TO LUNG, UNSPECIFIED LATERALITY: ICD-10-CM

## 2019-06-11 DIAGNOSIS — Z09 CHEMOTHERAPY FOLLOW-UP EXAMINATION: ICD-10-CM

## 2019-06-11 DIAGNOSIS — I10 HYPERTENSION, UNSPECIFIED TYPE: ICD-10-CM

## 2019-06-11 DIAGNOSIS — R11.0 CHEMOTHERAPY-INDUCED NAUSEA: ICD-10-CM

## 2019-06-11 PROCEDURE — 1101F PR PT FALLS ASSESS DOC 0-1 FALLS W/OUT INJ PAST YR: ICD-10-PCS | Mod: S$GLB,,, | Performed by: INTERNAL MEDICINE

## 2019-06-11 PROCEDURE — 99999 PR PBB SHADOW E&M-EST. PATIENT-LVL III: CPT | Mod: PBBFAC,,, | Performed by: INTERNAL MEDICINE

## 2019-06-11 PROCEDURE — 3078F PR MOST RECENT DIASTOLIC BLOOD PRESSURE < 80 MM HG: ICD-10-PCS | Mod: S$GLB,,, | Performed by: INTERNAL MEDICINE

## 2019-06-11 PROCEDURE — 3008F BODY MASS INDEX DOCD: CPT | Mod: S$GLB,,, | Performed by: INTERNAL MEDICINE

## 2019-06-11 PROCEDURE — 3288F FALL RISK ASSESSMENT DOCD: CPT | Mod: S$GLB,,, | Performed by: INTERNAL MEDICINE

## 2019-06-11 PROCEDURE — 3077F SYST BP >= 140 MM HG: CPT | Mod: S$GLB,,, | Performed by: INTERNAL MEDICINE

## 2019-06-11 PROCEDURE — 99215 PR OFFICE/OUTPT VISIT, EST, LEVL V, 40-54 MIN: ICD-10-PCS | Mod: S$GLB,,, | Performed by: INTERNAL MEDICINE

## 2019-06-11 PROCEDURE — 99999 PR PBB SHADOW E&M-EST. PATIENT-LVL III: ICD-10-PCS | Mod: PBBFAC,,, | Performed by: INTERNAL MEDICINE

## 2019-06-11 PROCEDURE — 3008F PR BODY MASS INDEX (BMI) DOCUMENTED: ICD-10-PCS | Mod: S$GLB,,, | Performed by: INTERNAL MEDICINE

## 2019-06-11 PROCEDURE — 99215 OFFICE O/P EST HI 40 MIN: CPT | Mod: S$GLB,,, | Performed by: INTERNAL MEDICINE

## 2019-06-11 PROCEDURE — 1101F PT FALLS ASSESS-DOCD LE1/YR: CPT | Mod: S$GLB,,, | Performed by: INTERNAL MEDICINE

## 2019-06-11 PROCEDURE — 3078F DIAST BP <80 MM HG: CPT | Mod: S$GLB,,, | Performed by: INTERNAL MEDICINE

## 2019-06-11 PROCEDURE — 3288F PR FALLS RISK ASSESSMENT DOCUMENTED: ICD-10-PCS | Mod: S$GLB,,, | Performed by: INTERNAL MEDICINE

## 2019-06-11 PROCEDURE — 3077F PR MOST RECENT SYSTOLIC BLOOD PRESSURE >= 140 MM HG: ICD-10-PCS | Mod: S$GLB,,, | Performed by: INTERNAL MEDICINE

## 2019-06-11 RX ORDER — HEPARIN 100 UNIT/ML
500 SYRINGE INTRAVENOUS
Status: CANCELLED | OUTPATIENT
Start: 2019-06-11

## 2019-06-11 RX ORDER — ATROPINE SULFATE 0.4 MG/ML
0.4 INJECTION, SOLUTION ENDOTRACHEAL; INTRAMEDULLARY; INTRAMUSCULAR; INTRAVENOUS; SUBCUTANEOUS ONCE AS NEEDED
Status: CANCELLED | OUTPATIENT
Start: 2019-06-11

## 2019-06-11 RX ORDER — FLUOROURACIL 50 MG/ML
400 INJECTION, SOLUTION INTRAVENOUS
Status: CANCELLED | OUTPATIENT
Start: 2019-06-11

## 2019-06-11 RX ORDER — SODIUM CHLORIDE 0.9 % (FLUSH) 0.9 %
10 SYRINGE (ML) INJECTION
Status: CANCELLED | OUTPATIENT
Start: 2019-06-11

## 2019-06-11 RX ORDER — FERROUS SULFATE 325(65) MG
TABLET ORAL
COMMUNITY
Start: 2019-06-05 | End: 2019-10-28 | Stop reason: SDUPTHER

## 2019-06-11 NOTE — PROGRESS NOTES
Cc  I am nervous    Indigo Stuart is a 63 y.o.  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  Kras was MUTATED  Cycle 1  January 15 2019    Here for chemo but she has had liver progression and SBO treated medically   New onset blood in stool and pruritus of rectum   CHest port in place     Tolerating lopressor for htn and lipitor for dyslipidemia  Tolerating zofran for nausea chemo     Cycle one folfox avastin January 15 2019   Failed regimen and admitted with SBO   Hepatic mets progressed hence changed to irinotecan drop  oxaliplatin   Diarrhea better with imodium   Cycle one folfiri avastin April 2019  Here to get clearance for next dose    to 76 decreased to the 50s now in the 40s     Past Medical History:   Diagnosis Date    Cancer     colon    Encounter for blood transfusion     Hypertension      She is on  lipitor for cholesterol     Current Outpatient Medications:     amLODIPine (NORVASC) 5 MG tablet, Take 5 mg by mouth once daily., Disp: , Rfl: 3    atorvastatin (LIPITOR) 20 MG tablet, Take 20 mg by mouth every evening., Disp: , Rfl:     ferrous sulfate (FEOSOL) 325 mg (65 mg iron) Tab tablet, Take 1 tablet by mouth 2 (two) times daily., Disp: , Rfl: 3    ferrous sulfate (FEOSOL) 325 mg (65 mg iron) Tab tablet, TAKE 1 TABLET BY MOUTH TWICE A DAY, Disp: , Rfl:     metoprolol succinate (TOPROL-XL) 25 MG 24 hr tablet, Take 25 mg by mouth every evening., Disp: , Rfl: 3    ondansetron (ZOFRAN-ODT) 8 MG TbDL, Take 1 tablet (8 mg total) by mouth every 12 (twelve) hours as needed., Disp: 30 tablet, Rfl: 1    potassium chloride (KLOR-CON) 8 MEQ TbSR, Take 8 mEq by mouth., Disp: , Rfl:     promethazine (PHENERGAN) 25 MG tablet, Take 1 tablet (25 mg total) by mouth every 6 (six) hours as needed for Nausea., Disp: 30 tablet, Rfl: 1        Review of Systems:  General: Weight gain: No, Weight Loss: No, Fatigue: NEW slow  "gait yet stweady   Fever: y Chills: No, Night Sweats: No, Insomnia: No, Excessive sleeping: No   Respiratory:  Cough: No, Shortness of Breath:  new onset specially dyspnea with exertion  Wheezing: No, Excessive Snoring: No, Coughing up blood: No  Endocrine: Heat Intolerance: No, Cold Intolerance: No,   Excessive Thirst: No, Excessive Hunger: No  Eyes:  Blurred Vision: No, Double Vision: No   Light Sensitivity: No, Eye pain: No  Musculoskeletal: Muscle Aches/Pain: No, Joint Pain/Swelling: No   Muscle Weakness:no , Neck Pain: No, Back Pain:   Neurological: Difficulty Walking/Falls:  No  Further   Headache Migraine: No, Dizziness/Vertigo: No, Fainting: No, Weakness: Better     Cardiovascular: Chest Pain: No, Shortness of Breath: no   Gastrointestinal: Nausea/Vomiting: No, Constipation: No, Diarrhea:  only after chemo for one week   Psych/Cog:  Depression: No, Anxiety: No, Hallucinations: No, Problems Concentrating: No  : Frequent Urination: No, Incontinence: No, Blood of Urine: No, Urinary Infections: No  ENT:Hearing Loss: No, Earache: No, Ringing in Ears: No  Facial Pain: No, Chronic Congestion:No   Immune: Seasonal Allergies: No, Hives and/or Rashes: No  The remainder of the review of twelve body systems was reviewed and normal  +_ epistaxis       BP (!) 143/74   Pulse 82   Temp 99 °F (37.2 °C)   Resp 20   Ht 5' 7" (1.702 m)   Wt 97.6 kg (215 lb 2.7 oz)   SpO2 97%   BMI 33.70 kg/m²   Constitutional: oriented to person, place, and time.  Appears tired   HENT: anicteric sclera   Head: Normocephalic and atraumatic.   Ears canal normal no discharge   Nose: Nose normal.   Dried blood in nares   No lymphatics noted   Eyes: Conjunctivae PALE  EOM are normal.  Neck: . No thyromegaly present.  No jvd   Cardiovascular: Normal rate, regular rhythm, normal heart sounds  No murmur heard.  Pulmonary/Chest:  Decreased breath sounds  Better today   Abdominal: Soft. Bowel sounds are normal.  no mass.   Musculoskeletal: " Normal range of motion. decreased strength   No further boggy turbinates  Lymphadenopathy:  no cervical adenopathy.   Neurological: alert and oriented to person, place  Skin: Skin is warm and dry. No rash noted.   Psychiatric: normal mood and affect.   behavior is normal.   Vitals reviewed.  Positive edema bilateral lower extremities      Lab Results   Component Value Date    WBC 3.00 (L) 06/07/2019    RBC 3.84 (L) 06/07/2019    HGB 11.8 (L) 06/07/2019    HCT 34.6 (L) 06/07/2019    MCV 90 06/07/2019    MCH 30.9 06/07/2019    MCHC 34.2 06/07/2019    RDW 16.4 (H) 06/07/2019     06/07/2019    MPV 6.2 (L) 06/07/2019    GRAN 1.6 (L) 06/07/2019    GRAN 51.0 06/07/2019    LYMPH 1.2 06/07/2019    LYMPH 39.5 06/07/2019    MONO 0.2 (L) 06/07/2019    MONO 8.0 06/07/2019    EOS 0.0 06/07/2019    BASO 0.00 06/07/2019    EOSINOPHIL 0.4 06/07/2019    BASOPHIL 1.1 06/07/2019       CMP  Sodium   Date Value Ref Range Status   06/07/2019 141 136 - 145 mmol/L Final     Potassium   Date Value Ref Range Status   06/07/2019 4.2 3.5 - 5.1 mmol/L Final     Chloride   Date Value Ref Range Status   06/07/2019 106 95 - 110 mmol/L Final     CO2   Date Value Ref Range Status   06/07/2019 29 23 - 29 mmol/L Final     Glucose   Date Value Ref Range Status   06/07/2019 96 70 - 110 mg/dL Final     BUN, Bld   Date Value Ref Range Status   06/07/2019 8 8 - 23 mg/dL Final     Creatinine   Date Value Ref Range Status   06/07/2019 0.7 0.5 - 1.4 mg/dL Final     Calcium   Date Value Ref Range Status   06/07/2019 9.6 8.7 - 10.5 mg/dL Final     Total Protein   Date Value Ref Range Status   06/07/2019 6.0 6.0 - 8.4 g/dL Final     Albumin   Date Value Ref Range Status   06/07/2019 3.0 (L) 3.5 - 5.2 g/dL Final     Total Bilirubin   Date Value Ref Range Status   06/07/2019 0.5 0.1 - 1.0 mg/dL Final     Comment:     For infants and newborns, interpretation of results should be based  on gestational age, weight and in agreement with  clinical  observations.  Premature Infant recommended reference ranges:  Up to 24 hours.............<8.0 mg/dL  Up to 48 hours............<12.0 mg/dL  3-5 days..................<15.0 mg/dL  6-29 days.................<15.0 mg/dL       Alkaline Phosphatase   Date Value Ref Range Status   06/07/2019 211 (H) 55 - 135 U/L Final     AST   Date Value Ref Range Status   06/07/2019 34 10 - 40 U/L Final     ALT   Date Value Ref Range Status   06/07/2019 44 10 - 44 U/L Final     Anion Gap   Date Value Ref Range Status   06/07/2019 6 (L) 8 - 16 mmol/L Final     eGFR if    Date Value Ref Range Status   06/07/2019 >60 >60 mL/min/1.73 m^2 Final     eGFR if non    Date Value Ref Range Status   06/07/2019 >60 >60 mL/min/1.73 m^2 Final     Comment:     Calculation used to obtain the estimated glomerular filtration  rate (eGFR) is the CKD-EPI equation.          NO falls and no pain    SPECIMEN  1) Liver Lesion Biopsy  FINAL PATHOLOGIC DIAGNOSIS  LIVER LESION, NEEDLE CORE BIOPSIES:  - Metastatic colonic adenocarcinoma.    CEA0.0 - 5.0 ng/xX4706.7 Abnormally high    CEA0.0 - 5.0 ng/mL 2543.3 Abnormally high    Now 76.6    Component 9d ago   HLA Celiac Specimen Whole Blood    Celiac (HLA-DQB1*02) Positive Abnormal     Celiac (HLA-DQ8) Negative    Celiac (HLA-DQA1*05) Positive Abnormal               Duodenal stenosis    Symptomatic anemia    Malignant neoplasm metastatic to lung, unspecified laterality    Hepatic metastases    Duodenal cancer    Hypertension, unspecified type    Chemotherapy follow-up examination    Oncology follow-up encounter    Chemotherapy-induced nausea         Increase folic acid to help stimulate marrow to make WBCs  Anemia stable No need for IV iron just yet   Neutropenia due to chemotherapy however ANC is high enough for her to proceed with chemotherapy   Folfiri with avastin cleared  Failed   folfox and avastin for stage  4 disease   Increase muscle mass   Patient verbalized  understanding of such  Epistaxis with avastin : no need to hold at this time   Due for a scan in two weeks     Current Outpatient Medications:     amLODIPine (NORVASC) 5 MG tablet, Take 5 mg by mouth once daily., Disp: , Rfl: 3    atorvastatin (LIPITOR) 20 MG tablet, Take 20 mg by mouth every evening., Disp: , Rfl:     ferrous sulfate (FEOSOL) 325 mg (65 mg iron) Tab tablet, Take 1 tablet by mouth 2 (two) times daily., Disp: , Rfl: 3    ferrous sulfate (FEOSOL) 325 mg (65 mg iron) Tab tablet, TAKE 1 TABLET BY MOUTH TWICE A DAY, Disp: , Rfl:     metoprolol succinate (TOPROL-XL) 25 MG 24 hr tablet, Take 25 mg by mouth every evening., Disp: , Rfl: 3    ondansetron (ZOFRAN-ODT) 8 MG TbDL, Take 1 tablet (8 mg total) by mouth every 12 (twelve) hours as needed., Disp: 30 tablet, Rfl: 1    potassium chloride (KLOR-CON) 8 MEQ TbSR, Take 8 mEq by mouth., Disp: , Rfl:     promethazine (PHENERGAN) 25 MG tablet, Take 1 tablet (25 mg total) by mouth every 6 (six) hours as needed for Nausea., Disp: 30 tablet, Rfl: 1  She is to continue Norvasc for blood pressure control which is stable and Lipitor for dyslipidemia which is followed by her primary care physician  Advance Care Planning     Living Will  During this visit, I engaged the patient in the advance care planning process.  The patient and I reviewed the role for advance directives and their purpose in directing future healthcare if the patient's unable to speak for him/herself.  At this point in time, the patient does have full decision-making capacity.  We discussed different extreme health states that she could experience, and reviewed what kind of medical care she would want in those situations.  The patient communicated that if she were comatose and had little chance of a meaningful recovery, she would not want machines/life-sustaining treatments used.  5 The patient  Has not  completed a living will to reflect these preferences.  The patient   HAS NOT already  designated a healthcare power of  to make decisions on her behalf.   I spent a total of 5 minutes engaging the patient in this advance care planning discussion.       Thank you for allowing me to evaluate and participate in the care of this pleasant patient. Please fell free to call me with any questions or concerns.    [place],  Karina Pettit MD    This note was dictated with Dragon and briefly proofread. Please excuse any sentences that may be unclear or words misspelled

## 2019-06-13 RX ORDER — SODIUM CHLORIDE 0.9 % (FLUSH) 0.9 %
10 SYRINGE (ML) INJECTION
Status: CANCELLED | OUTPATIENT
Start: 2019-06-13

## 2019-06-13 RX ORDER — SODIUM CHLORIDE 0.9 % (FLUSH) 0.9 %
10 SYRINGE (ML) INJECTION
Status: CANCELLED | OUTPATIENT
Start: 2019-06-27

## 2019-06-13 RX ORDER — HEPARIN 100 UNIT/ML
500 SYRINGE INTRAVENOUS
Status: CANCELLED | OUTPATIENT
Start: 2019-06-25

## 2019-06-13 RX ORDER — HEPARIN 100 UNIT/ML
500 SYRINGE INTRAVENOUS
Status: CANCELLED | OUTPATIENT
Start: 2019-06-27

## 2019-06-13 RX ORDER — HEPARIN 100 UNIT/ML
500 SYRINGE INTRAVENOUS
Status: CANCELLED | OUTPATIENT
Start: 2019-06-13

## 2019-06-13 RX ORDER — ATROPINE SULFATE 0.4 MG/ML
0.4 INJECTION, SOLUTION ENDOTRACHEAL; INTRAMEDULLARY; INTRAMUSCULAR; INTRAVENOUS; SUBCUTANEOUS ONCE AS NEEDED
Status: CANCELLED | OUTPATIENT
Start: 2019-06-25

## 2019-06-13 RX ORDER — FLUOROURACIL 50 MG/ML
400 INJECTION, SOLUTION INTRAVENOUS
Status: CANCELLED | OUTPATIENT
Start: 2019-06-25

## 2019-06-13 RX ORDER — SODIUM CHLORIDE 0.9 % (FLUSH) 0.9 %
10 SYRINGE (ML) INJECTION
Status: CANCELLED | OUTPATIENT
Start: 2019-06-25

## 2019-06-21 ENCOUNTER — HOSPITAL ENCOUNTER (OUTPATIENT)
Dept: RADIOLOGY | Facility: HOSPITAL | Age: 64
Discharge: HOME | End: 2019-06-21
Attending: INTERNAL MEDICINE
Payer: MEDICARE

## 2019-06-21 ENCOUNTER — DOCUMENTATION ONLY (OUTPATIENT)
Dept: PREADMISSION TESTING | Facility: HOSPITAL | Age: 64
End: 2019-06-21

## 2019-06-21 DIAGNOSIS — C78.7 HEPATIC METASTASES: ICD-10-CM

## 2019-06-21 DIAGNOSIS — C17.0 DUODENAL CANCER: ICD-10-CM

## 2019-06-21 DIAGNOSIS — C78.00 MALIGNANT NEOPLASM METASTATIC TO LUNG, UNSPECIFIED LATERALITY: ICD-10-CM

## 2019-06-21 PROCEDURE — 74177 CT ABD & PELVIS W/CONTRAST: CPT | Mod: 26,,, | Performed by: RADIOLOGY

## 2019-06-21 PROCEDURE — 74177 CT CHEST ABDOMEN PELVIS WITH CONTRAST (XPD): ICD-10-PCS | Mod: 26,,, | Performed by: RADIOLOGY

## 2019-06-21 PROCEDURE — 71260 CT CHEST ABDOMEN PELVIS WITH CONTRAST (XPD): ICD-10-PCS | Mod: 26,,, | Performed by: RADIOLOGY

## 2019-06-21 PROCEDURE — 71260 CT THORAX DX C+: CPT | Mod: 26,,, | Performed by: RADIOLOGY

## 2019-06-21 PROCEDURE — 74177 CT ABD & PELVIS W/CONTRAST: CPT | Mod: TC

## 2019-06-21 PROCEDURE — 63600175 PHARM REV CODE 636 W HCPCS: Performed by: INTERNAL MEDICINE

## 2019-06-21 PROCEDURE — 25500020 PHARM REV CODE 255: Performed by: INTERNAL MEDICINE

## 2019-06-21 RX ORDER — SODIUM CHLORIDE 0.9 % (FLUSH) 0.9 %
10 SYRINGE (ML) INJECTION
Status: CANCELLED | OUTPATIENT
Start: 2019-06-21

## 2019-06-21 RX ORDER — HEPARIN 100 UNIT/ML
500 SYRINGE INTRAVENOUS
Status: CANCELLED | OUTPATIENT
Start: 2019-06-21

## 2019-06-21 RX ORDER — HEPARIN 100 UNIT/ML
5 SYRINGE INTRAVENOUS ONCE
Status: COMPLETED | OUTPATIENT
Start: 2019-06-21 | End: 2019-06-21

## 2019-06-21 RX ADMIN — IOHEXOL 100 ML: 350 INJECTION, SOLUTION INTRAVENOUS at 08:06

## 2019-06-21 RX ADMIN — IOHEXOL 30 ML: 350 INJECTION, SOLUTION INTRAVENOUS at 08:06

## 2019-06-21 RX ADMIN — HEPARIN 500 UNITS: 100 SYRINGE at 09:06

## 2019-06-21 NOTE — PRE-PROCEDURE INSTRUCTIONS
"  Patient in CT for port access for ordered CT Scan.. Port accessed using 1" jha needle and sterile technique.  Good blood return. PT has CT scan done  After scan, Port flushed with 10ml n/s followed by 5ml heparin flush (100u/ml). Jha needle removed. Area dressed with Band aid. Pt left with no complaints.   "

## 2019-06-24 ENCOUNTER — OFFICE VISIT (OUTPATIENT)
Dept: HEMATOLOGY/ONCOLOGY | Facility: CLINIC | Age: 64
End: 2019-06-24
Payer: MEDICARE

## 2019-06-24 VITALS
BODY MASS INDEX: 33.56 KG/M2 | OXYGEN SATURATION: 96 % | HEART RATE: 79 BPM | DIASTOLIC BLOOD PRESSURE: 79 MMHG | TEMPERATURE: 99 F | WEIGHT: 214.31 LBS | SYSTOLIC BLOOD PRESSURE: 139 MMHG | RESPIRATION RATE: 20 BRPM

## 2019-06-24 DIAGNOSIS — Z09 CHEMOTHERAPY FOLLOW-UP EXAMINATION: Primary | ICD-10-CM

## 2019-06-24 DIAGNOSIS — I10 HYPERTENSION, UNSPECIFIED TYPE: ICD-10-CM

## 2019-06-24 DIAGNOSIS — C78.00 MALIGNANT NEOPLASM METASTATIC TO LUNG, UNSPECIFIED LATERALITY: ICD-10-CM

## 2019-06-24 DIAGNOSIS — C17.0 DUODENAL CANCER: ICD-10-CM

## 2019-06-24 DIAGNOSIS — R97.0 ELEVATED CEA: ICD-10-CM

## 2019-06-24 DIAGNOSIS — E66.01 MORBID OBESITY: ICD-10-CM

## 2019-06-24 DIAGNOSIS — C78.7 HEPATIC METASTASES: ICD-10-CM

## 2019-06-24 PROCEDURE — 99215 OFFICE O/P EST HI 40 MIN: CPT | Mod: S$GLB,,, | Performed by: INTERNAL MEDICINE

## 2019-06-24 PROCEDURE — 99215 PR OFFICE/OUTPT VISIT, EST, LEVL V, 40-54 MIN: ICD-10-PCS | Mod: S$GLB,,, | Performed by: INTERNAL MEDICINE

## 2019-06-24 PROCEDURE — 3078F DIAST BP <80 MM HG: CPT | Mod: S$GLB,,, | Performed by: INTERNAL MEDICINE

## 2019-06-24 PROCEDURE — 1101F PR PT FALLS ASSESS DOC 0-1 FALLS W/OUT INJ PAST YR: ICD-10-PCS | Mod: S$GLB,,, | Performed by: INTERNAL MEDICINE

## 2019-06-24 PROCEDURE — 1101F PT FALLS ASSESS-DOCD LE1/YR: CPT | Mod: S$GLB,,, | Performed by: INTERNAL MEDICINE

## 2019-06-24 PROCEDURE — 3075F PR MOST RECENT SYSTOLIC BLOOD PRESS GE 130-139MM HG: ICD-10-PCS | Mod: S$GLB,,, | Performed by: INTERNAL MEDICINE

## 2019-06-24 PROCEDURE — 3075F SYST BP GE 130 - 139MM HG: CPT | Mod: S$GLB,,, | Performed by: INTERNAL MEDICINE

## 2019-06-24 PROCEDURE — 3078F PR MOST RECENT DIASTOLIC BLOOD PRESSURE < 80 MM HG: ICD-10-PCS | Mod: S$GLB,,, | Performed by: INTERNAL MEDICINE

## 2019-06-24 PROCEDURE — 99999 PR PBB SHADOW E&M-EST. PATIENT-LVL III: CPT | Mod: PBBFAC,,, | Performed by: INTERNAL MEDICINE

## 2019-06-24 PROCEDURE — 99999 PR PBB SHADOW E&M-EST. PATIENT-LVL III: ICD-10-PCS | Mod: PBBFAC,,, | Performed by: INTERNAL MEDICINE

## 2019-06-24 PROCEDURE — 3008F BODY MASS INDEX DOCD: CPT | Mod: S$GLB,,, | Performed by: INTERNAL MEDICINE

## 2019-06-24 PROCEDURE — 3008F PR BODY MASS INDEX (BMI) DOCUMENTED: ICD-10-PCS | Mod: S$GLB,,, | Performed by: INTERNAL MEDICINE

## 2019-06-24 NOTE — PROGRESS NOTES
Cc  I am nervous about scan results     Indigo Stuart is a 63 y.o.  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  Kras was MUTATED  Cycle 1  January 15 2019    Here for chemo but she has had liver progression and SBO treated medically   New onset blood in stool and pruritus of rectum   CHest port in place     Tolerating lopressor for htn and lipitor for dyslipidemia  Tolerating zofran for nausea chemo     Cycle one folfox avastin January 15 2019   Failed regimen and admitted with SBO   Hepatic mets progressed hence changed to irinotecan drop  oxaliplatin   Diarrhea same   Cycle one folfiri avastin April 2019  Here to get clearance for next dose     to 76 decreased to the 50s now in the 40s  47.5     Past Medical History:   Diagnosis Date    Cancer     colon    Encounter for blood transfusion     Hypertension      She is on  lipitor for cholesterol     Current Outpatient Medications:     amLODIPine (NORVASC) 5 MG tablet, Take 5 mg by mouth once daily., Disp: , Rfl: 3    atorvastatin (LIPITOR) 20 MG tablet, Take 20 mg by mouth every evening., Disp: , Rfl:     ferrous sulfate (FEOSOL) 325 mg (65 mg iron) Tab tablet, Take 1 tablet by mouth 2 (two) times daily., Disp: , Rfl: 3    ferrous sulfate (FEOSOL) 325 mg (65 mg iron) Tab tablet, TAKE 1 TABLET BY MOUTH TWICE A DAY, Disp: , Rfl:     metoprolol succinate (TOPROL-XL) 25 MG 24 hr tablet, Take 25 mg by mouth every evening., Disp: , Rfl: 3    ondansetron (ZOFRAN-ODT) 8 MG TbDL, Take 1 tablet (8 mg total) by mouth every 12 (twelve) hours as needed., Disp: 30 tablet, Rfl: 1    potassium chloride (KLOR-CON) 8 MEQ TbSR, Take 8 mEq by mouth., Disp: , Rfl:     promethazine (PHENERGAN) 25 MG tablet, Take 1 tablet (25 mg total) by mouth every 6 (six) hours as needed for Nausea., Disp: 30 tablet, Rfl: 1        Review of Systems:  General: Weight gain: No, Weight Loss: No,  Fatigue: Gait ok with new swelling of legs   Fever: y Chills: No, Night Sweats: No, Insomnia: No, Excessive sleeping: No   Respiratory:  Cough: No, Shortness of Breath:  new onset specially dyspnea with exertion  Wheezing: No, Excessive Snoring: No, Coughing up blood: No  Endocrine: Heat Intolerance: No, Cold Intolerance: No,   Excessive Thirst: No, Excessive Hunger: No  Eyes:  Blurred Vision: No, Double Vision: No   Light Sensitivity: No, Eye pain: No  Musculoskeletal: Muscle Aches/Pain: No, Joint Pain/Swelling: yes ankles   Muscle Weakness:no , Neck Pain: No, Back Pain:   Neurological: Difficulty Walking/Falls:  No  Further   Headache Migraine: No, Dizziness/Vertigo: No, Fainting: No, Weakness: Better   Cardiovascular: Chest Pain: No, Shortness of Breath: no   Gastrointestinal: Nausea/Vomiting: No, Constipation: No, Diarrhea:  only after chemo for one week   Psych/Cog:  Depression: No, Anxiety: No, Hallucinations: No, Problems Concentrating: No  : Frequent Urination: No, Incontinence: No, Blood of Urine: No, Urinary Infections: No  ENT:Hearing Loss: No, Earache: No, Ringing in Ears: No  Facial Pain: No, Chronic Congestion:No   Immune: Seasonal Allergies: No, Hives and/or Rashes: No  The remainder of the review of twelve body systems was reviewed and normal        /79   Pulse 79   Temp 99.3 °F (37.4 °C)   Resp 20   Wt 97.2 kg (214 lb 4.6 oz)   SpO2 96%   BMI 33.56 kg/m²   Constitutional: oriented to person, place, and time.  Appears tired   HENT: anicteric sclera   Head: Normocephalic and atraumatic.   Ears canal normal no discharge non boggy turbinates  Nose: Nose normal.   Dried blood in nares   No lymphatics noted   Eyes: Conjunctivae PALE  EOM are normal.  Neck: . No thyromegaly present.  No bruits   Cardiovascular: Normal rate, regular rhythm, normal heart sounds  No murmur heard.  Pulmonary/Chest:  Decreased breath sounds  Better today   Abdominal: Soft. Bowel sounds are normal.  no mass.    Musculoskeletal: Normal range of motion. decreased strength   1 + edema of legs   No further boggy turbinates  Lymphadenopathy:  no cervical adenopathy.   Neurological: alert and oriented to person, place  Skin: Skin is warm and dry. No rash noted.   Psychiatric: normal mood and affect.   behavior is normal.   Vitals reviewed.  Positive edema bilateral lower extremities      Lab Results   Component Value Date    WBC 3.60 (L) 06/21/2019    RBC 4.04 06/21/2019    HGB 12.6 06/21/2019    HCT 37.8 06/21/2019    MCV 94 06/21/2019    MCH 31.1 (H) 06/21/2019    MCHC 33.3 06/21/2019    RDW 16.3 (H) 06/21/2019     06/21/2019    MPV 6.5 (L) 06/21/2019    GRAN 1.9 06/21/2019    GRAN 52.9 06/21/2019    LYMPH 1.5 06/21/2019    LYMPH 42.3 06/21/2019    MONO 0.1 (L) 06/21/2019    MONO 3.5 (L) 06/21/2019    EOS 0.0 06/21/2019    BASO 0.00 06/21/2019    EOSINOPHIL 0.6 06/21/2019    BASOPHIL 0.7 06/21/2019       CMP  Sodium   Date Value Ref Range Status   06/21/2019 139 136 - 145 mmol/L Final     Potassium   Date Value Ref Range Status   06/21/2019 3.8 3.5 - 5.1 mmol/L Final     Chloride   Date Value Ref Range Status   06/21/2019 105 95 - 110 mmol/L Final     CO2   Date Value Ref Range Status   06/21/2019 25 23 - 29 mmol/L Final     Glucose   Date Value Ref Range Status   06/21/2019 104 70 - 110 mg/dL Final     BUN, Bld   Date Value Ref Range Status   06/21/2019 9 8 - 23 mg/dL Final     Creatinine   Date Value Ref Range Status   06/21/2019 0.8 0.5 - 1.4 mg/dL Final     Calcium   Date Value Ref Range Status   06/21/2019 9.7 8.7 - 10.5 mg/dL Final     Total Protein   Date Value Ref Range Status   06/21/2019 6.4 6.0 - 8.4 g/dL Final     Albumin   Date Value Ref Range Status   06/21/2019 3.1 (L) 3.5 - 5.2 g/dL Final     Total Bilirubin   Date Value Ref Range Status   06/21/2019 0.6 0.1 - 1.0 mg/dL Final     Comment:     For infants and newborns, interpretation of results should be based  on gestational age, weight and in  agreement with clinical  observations.  Premature Infant recommended reference ranges:  Up to 24 hours.............<8.0 mg/dL  Up to 48 hours............<12.0 mg/dL  3-5 days..................<15.0 mg/dL  6-29 days.................<15.0 mg/dL       Alkaline Phosphatase   Date Value Ref Range Status   06/21/2019 173 (H) 55 - 135 U/L Final     AST   Date Value Ref Range Status   06/21/2019 22 10 - 40 U/L Final     ALT   Date Value Ref Range Status   06/21/2019 23 10 - 44 U/L Final     Anion Gap   Date Value Ref Range Status   06/21/2019 9 8 - 16 mmol/L Final     eGFR if    Date Value Ref Range Status   06/21/2019 >60 >60 mL/min/1.73 m^2 Final     eGFR if non    Date Value Ref Range Status   06/21/2019 >60 >60 mL/min/1.73 m^2 Final     Comment:     Calculation used to obtain the estimated glomerular filtration  rate (eGFR) is the CKD-EPI equation.            Chemotherapy follow-up examination  -     CBC auto differential; Standing  -     CMP; Future; Expected date: 06/24/2019  -     CEA; Future; Expected date: 06/24/2019  -     MRI Abdomen W WO Contrast; Future; Expected date: 06/24/2019    Hepatic metastases  -     MRI Abdomen W WO Contrast; Future; Expected date: 06/24/2019    Morbid obesity    Malignant neoplasm metastatic to lung, unspecified laterality    Duodenal cancer    Hypertension, unspecified type    Elevated CEA         Folfiri with avastin cleared  Failed  folfox and avastin for stage  4 disease   Patient verbalized understanding of such  Epistaxis with avastin : no need to hold at this time   Due for liver MRI   Please use port for scan   Stable disease  Pulmonary lesions improved   Liver lesions same ?? Necrotic tissue vs all mets   Liquid biopsy today   CEA elevated yet decreasing     Current Outpatient Medications:     amLODIPine (NORVASC) 5 MG tablet, Take 5 mg by mouth once daily., Disp: , Rfl: 3    atorvastatin (LIPITOR) 20 MG tablet, Take 20 mg by mouth every  evening., Disp: , Rfl:     ferrous sulfate (FEOSOL) 325 mg (65 mg iron) Tab tablet, Take 1 tablet by mouth 2 (two) times daily., Disp: , Rfl: 3    ferrous sulfate (FEOSOL) 325 mg (65 mg iron) Tab tablet, TAKE 1 TABLET BY MOUTH TWICE A DAY, Disp: , Rfl:     metoprolol succinate (TOPROL-XL) 25 MG 24 hr tablet, Take 25 mg by mouth every evening., Disp: , Rfl: 3    ondansetron (ZOFRAN-ODT) 8 MG TbDL, Take 1 tablet (8 mg total) by mouth every 12 (twelve) hours as needed., Disp: 30 tablet, Rfl: 1    potassium chloride (KLOR-CON) 8 MEQ TbSR, Take 8 mEq by mouth., Disp: , Rfl:     promethazine (PHENERGAN) 25 MG tablet, Take 1 tablet (25 mg total) by mouth every 6 (six) hours as needed for Nausea., Disp: 30 tablet, Rfl: 1  She is to continue Norvasc for blood pressure control which is stable and Lipitor for dyslipidemia which is followed by her primary care physician  Advance Care Planning     Living Will  During this visit, I engaged the patient in the advance care planning process.  The patient and I reviewed the role for advance directives and their purpose in directing future healthcare if the patient's unable to speak for him/herself.  At this point in time, the patient does have full decision-making capacity.  We discussed different extreme health states that she could experience, and reviewed what kind of medical care she would want in those situations.  The patient communicated that if she were comatose and had little chance of a meaningful recovery, she would not want machines/life-sustaining treatments used.  5 The patient  Has not  completed a living will to reflect these preferences.  The patient   HAS NOT already designated a healthcare power of  to make decisions on her behalf.   I spent a total of 5 minutes engaging the patient in this advance care planning discussion.       Thank you for allowing me to evaluate and participate in the care of this pleasant patient. Please fell free to call me  with any questions or concerns.    Warmly,  Karina Pettit MD    This note was dictated with Dragon and briefly proofread. Please excuse any sentences that may be unclear or words misspelled

## 2019-06-27 DIAGNOSIS — Z09 CHEMOTHERAPY FOLLOW-UP EXAMINATION: Primary | ICD-10-CM

## 2019-06-28 ENCOUNTER — TELEPHONE (OUTPATIENT)
Dept: HEMATOLOGY/ONCOLOGY | Facility: CLINIC | Age: 64
End: 2019-06-28

## 2019-06-28 NOTE — TELEPHONE ENCOUNTER
----- Message from Leticia Fan sent at 6/28/2019  4:16 PM CDT -----  Contact: Naila madrid/[facility] [phone] option 1  She needs the ICD 10 code for the diagnosis that was provided ;for the test.  Thank you!

## 2019-07-03 ENCOUNTER — TELEPHONE (OUTPATIENT)
Dept: HEMATOLOGY/ONCOLOGY | Facility: CLINIC | Age: 64
End: 2019-07-03

## 2019-07-03 NOTE — TELEPHONE ENCOUNTER
Shabnam notified that I will notify Dr Pettit of patient's concerns of feeling tired and weak after Chemotherapy last week. Patient called and instructed to drink plenty fluids.

## 2019-07-03 NOTE — TELEPHONE ENCOUNTER
----- Message from RT Anita sent at 7/3/2019  9:47 AM CDT -----  Contact: NOLBERTO Haque  [facility]  NOLBERTO Haque  [facility], requesting to inform the pt is not feeling well and would like to further discuss, thanks.

## 2019-07-05 ENCOUNTER — LAB VISIT (OUTPATIENT)
Dept: LAB | Facility: HOSPITAL | Age: 64
End: 2019-07-05
Attending: INTERNAL MEDICINE
Payer: MEDICARE

## 2019-07-05 DIAGNOSIS — Z09 CHEMOTHERAPY FOLLOW-UP EXAMINATION: ICD-10-CM

## 2019-07-05 LAB
ALBUMIN SERPL BCP-MCNC: 2.6 G/DL (ref 3.5–5.2)
ALP SERPL-CCNC: 150 U/L (ref 55–135)
ALT SERPL W/O P-5'-P-CCNC: 23 U/L (ref 10–44)
ANION GAP SERPL CALC-SCNC: 10 MMOL/L (ref 8–16)
AST SERPL-CCNC: 20 U/L (ref 10–40)
BASOPHILS NFR BLD: 0 % (ref 0–1.9)
BILIRUB SERPL-MCNC: 1.4 MG/DL (ref 0.1–1)
BUN SERPL-MCNC: 10 MG/DL (ref 8–23)
CALCIUM SERPL-MCNC: 9.4 MG/DL (ref 8.7–10.5)
CEA SERPL-MCNC: 22.1 NG/ML (ref 0–5)
CHLORIDE SERPL-SCNC: 100 MMOL/L (ref 95–110)
CO2 SERPL-SCNC: 23 MMOL/L (ref 23–29)
CREAT SERPL-MCNC: 0.9 MG/DL (ref 0.5–1.4)
DIFFERENTIAL METHOD: ABNORMAL
EOSINOPHIL NFR BLD: 0 % (ref 0–8)
ERYTHROCYTE [DISTWIDTH] IN BLOOD BY AUTOMATED COUNT: 14.2 % (ref 11.5–14.5)
EST. GFR  (AFRICAN AMERICAN): >60 ML/MIN/1.73 M^2
EST. GFR  (NON AFRICAN AMERICAN): >60 ML/MIN/1.73 M^2
GLUCOSE SERPL-MCNC: 149 MG/DL (ref 70–110)
HCT VFR BLD AUTO: 34.9 % (ref 37–48.5)
HGB BLD-MCNC: 11.4 G/DL (ref 12–16)
IMM GRANULOCYTES # BLD AUTO: ABNORMAL K/UL
LYMPHOCYTES NFR BLD: 75 % (ref 18–48)
MCH RBC QN AUTO: 29.7 PG (ref 27–31)
MCHC RBC AUTO-ENTMCNC: 32.7 G/DL (ref 32–36)
MCV RBC AUTO: 91 FL (ref 82–98)
MONOCYTES NFR BLD: 12 % (ref 4–15)
NEUTROPHILS NFR BLD: 12 % (ref 38–73)
NRBC BLD-RTO: 0 /100 WBC
PLATELET # BLD AUTO: 163 K/UL (ref 150–350)
PLATELET BLD QL SMEAR: ABNORMAL
PMV BLD AUTO: 8.5 FL (ref 9.2–12.9)
POTASSIUM SERPL-SCNC: 3.6 MMOL/L (ref 3.5–5.1)
PROT SERPL-MCNC: 6.6 G/DL (ref 6–8.4)
RBC # BLD AUTO: 3.84 M/UL (ref 4–5.4)
SODIUM SERPL-SCNC: 133 MMOL/L (ref 136–145)
WBC # BLD AUTO: 1.03 K/UL (ref 3.9–12.7)

## 2019-07-05 PROCEDURE — 36415 COLL VENOUS BLD VENIPUNCTURE: CPT

## 2019-07-05 PROCEDURE — 80053 COMPREHEN METABOLIC PANEL: CPT

## 2019-07-05 PROCEDURE — 82378 CARCINOEMBRYONIC ANTIGEN: CPT

## 2019-07-05 PROCEDURE — 85027 COMPLETE CBC AUTOMATED: CPT

## 2019-07-05 PROCEDURE — 85007 BL SMEAR W/DIFF WBC COUNT: CPT

## 2019-07-06 ENCOUNTER — HOSPITAL ENCOUNTER (EMERGENCY)
Facility: HOSPITAL | Age: 64
Discharge: HOME | End: 2019-07-06
Attending: EMERGENCY MEDICINE
Payer: MEDICARE

## 2019-07-06 VITALS
DIASTOLIC BLOOD PRESSURE: 55 MMHG | HEART RATE: 101 BPM | RESPIRATION RATE: 20 BRPM | OXYGEN SATURATION: 100 % | HEIGHT: 67 IN | WEIGHT: 214 LBS | SYSTOLIC BLOOD PRESSURE: 118 MMHG | BODY MASS INDEX: 33.59 KG/M2 | TEMPERATURE: 98 F

## 2019-07-06 DIAGNOSIS — E86.0 DEHYDRATION: Primary | ICD-10-CM

## 2019-07-06 DIAGNOSIS — E87.6 HYPOKALEMIA: ICD-10-CM

## 2019-07-06 LAB
ALBUMIN SERPL BCP-MCNC: 2.1 G/DL (ref 3.5–5.2)
ALP SERPL-CCNC: 120 U/L (ref 55–135)
ALT SERPL W/O P-5'-P-CCNC: 21 U/L (ref 10–44)
ANION GAP SERPL CALC-SCNC: 10 MMOL/L (ref 8–16)
AST SERPL-CCNC: 23 U/L (ref 10–40)
BASOPHILS # BLD AUTO: 0.03 K/UL (ref 0–0.2)
BASOPHILS NFR BLD: 2.7 % (ref 0–1.9)
BILIRUB SERPL-MCNC: 1 MG/DL (ref 0.1–1)
BUN SERPL-MCNC: 12 MG/DL (ref 8–23)
CALCIUM SERPL-MCNC: 8.3 MG/DL (ref 8.7–10.5)
CHLORIDE SERPL-SCNC: 103 MMOL/L (ref 95–110)
CO2 SERPL-SCNC: 22 MMOL/L (ref 23–29)
CREAT SERPL-MCNC: 0.9 MG/DL (ref 0.5–1.4)
DIFFERENTIAL METHOD: ABNORMAL
EOSINOPHIL # BLD AUTO: 0 K/UL (ref 0–0.5)
EOSINOPHIL NFR BLD: 0.9 % (ref 0–8)
ERYTHROCYTE [DISTWIDTH] IN BLOOD BY AUTOMATED COUNT: 14.2 % (ref 11.5–14.5)
EST. GFR  (AFRICAN AMERICAN): >60 ML/MIN/1.73 M^2
EST. GFR  (NON AFRICAN AMERICAN): >60 ML/MIN/1.73 M^2
GLUCOSE SERPL-MCNC: 132 MG/DL (ref 70–110)
HCT VFR BLD AUTO: 30.2 % (ref 37–48.5)
HGB BLD-MCNC: 9.9 G/DL (ref 12–16)
IMM GRANULOCYTES # BLD AUTO: 0 K/UL (ref 0–0.04)
LYMPHOCYTES # BLD AUTO: 0.6 K/UL (ref 1–4.8)
LYMPHOCYTES NFR BLD: 53.2 % (ref 18–48)
MAGNESIUM SERPL-MCNC: 1.6 MG/DL (ref 1.6–2.6)
MCH RBC QN AUTO: 29.4 PG (ref 27–31)
MCHC RBC AUTO-ENTMCNC: 32.8 G/DL (ref 32–36)
MCV RBC AUTO: 90 FL (ref 82–98)
MONOCYTES # BLD AUTO: 0.4 K/UL (ref 0.3–1)
MONOCYTES NFR BLD: 35.1 % (ref 4–15)
NEUTROPHILS # BLD AUTO: 0.1 K/UL (ref 1.8–7.7)
NEUTROPHILS NFR BLD: 8.1 % (ref 38–73)
NRBC BLD-RTO: 0 /100 WBC
PLATELET # BLD AUTO: 166 K/UL (ref 150–350)
PMV BLD AUTO: 8.8 FL (ref 9.2–12.9)
POTASSIUM SERPL-SCNC: 2.8 MMOL/L (ref 3.5–5.1)
PROT SERPL-MCNC: 5.6 G/DL (ref 6–8.4)
RBC # BLD AUTO: 3.37 M/UL (ref 4–5.4)
SODIUM SERPL-SCNC: 135 MMOL/L (ref 136–145)
WBC # BLD AUTO: 1.11 K/UL (ref 3.9–12.7)

## 2019-07-06 PROCEDURE — 36415 COLL VENOUS BLD VENIPUNCTURE: CPT

## 2019-07-06 PROCEDURE — 85025 COMPLETE CBC W/AUTO DIFF WBC: CPT

## 2019-07-06 PROCEDURE — 96360 HYDRATION IV INFUSION INIT: CPT

## 2019-07-06 PROCEDURE — 99284 EMERGENCY DEPT VISIT MOD MDM: CPT | Mod: 25

## 2019-07-06 PROCEDURE — 25000003 PHARM REV CODE 250: Performed by: EMERGENCY MEDICINE

## 2019-07-06 PROCEDURE — 80053 COMPREHEN METABOLIC PANEL: CPT

## 2019-07-06 PROCEDURE — 83735 ASSAY OF MAGNESIUM: CPT

## 2019-07-06 PROCEDURE — 96361 HYDRATE IV INFUSION ADD-ON: CPT

## 2019-07-06 RX ORDER — POTASSIUM CHLORIDE 20 MEQ/15ML
40 SOLUTION ORAL
Status: COMPLETED | OUTPATIENT
Start: 2019-07-06 | End: 2019-07-06

## 2019-07-06 RX ADMIN — SODIUM CHLORIDE, SODIUM LACTATE, POTASSIUM CHLORIDE, AND CALCIUM CHLORIDE 500 ML: .6; .31; .03; .02 INJECTION, SOLUTION INTRAVENOUS at 11:07

## 2019-07-06 RX ADMIN — POTASSIUM CHLORIDE 40 MEQ: 20 SOLUTION ORAL at 11:07

## 2019-07-06 RX ADMIN — SODIUM CHLORIDE 1000 ML: 0.9 INJECTION, SOLUTION INTRAVENOUS at 09:07

## 2019-07-06 NOTE — ED PROVIDER NOTES
Encounter Date: 7/6/2019    SCRIBE #1 NOTE: I, Aspen Garcia, am scribing for, and in the presence of, Mitchell Brennan MD.       History     Chief Complaint   Patient presents with    Weakness     poor appitite     Dehydration     Time seen by provider: 9:06 AM on 07/06/2019    Indigo Stuart is a 63 y.o. female with a PMHx of HTN, CA, and encounter for blood transfusion who presents to the ED with an onset of dehydration, decrease in appetite, weakness, and diarrhea, which began 1 week ago. Pt states her home health care nurse said she needed to intake more liquids because she's dehydrated. Her son analysed her lab work and noticed her white blood count was low. Her last dose of chemotherapy was 2 weeks ago. Her  states she has a hard time getting in and out of bed. Pt denies fever, dysuria, sore throat, cough, ear pain, nausea, vomiting, or any other symptoms at this time. PSHx of colon surgery, hysterectomy, cholecystectomy, esophagogastroduodenoscopy, and insertion of tunneled central venous catheter (CVC) with subcutaneous port noted. Codeine, Erythromycin Base, and Lisinopril drug allergies noted.              The history is provided by the patient and the spouse.     Review of patient's allergies indicates:   Allergen Reactions    Codeine Nausea And Vomiting    Erythromycin base Other (See Comments)    Lisinopril Palpitations     Cough      Past Medical History:   Diagnosis Date    Cancer     colon    Encounter for blood transfusion     Hypertension      Past Surgical History:   Procedure Laterality Date    CHOLECYSTECTOMY      COLON SURGERY      COLONOSCOPY N/A 3/30/2018    Performed by [facility] at [facility] ENDO    EGD (ESOPHAGOGASTRODUODENOSCOPY) N/A 1/14/2019    Performed by [facility]errat [facility] at [facility] (2ND FLR)    EGD (ESOPHAGOGASTRODUODENOSCOPY) N/A 1/3/2019    Performed by Adis [facility]guello [facility] at [facility] ENDO    ESOPHAGOGASTRODUODENOSCOPY (EGD) N/A 3/30/2018     Performed by [facility]ana MD at [facility] ENDO    HYSTERECTOMY      [id number] N/A 1/4/2019    Performed by Emilio Romero MD at [facility] OR     Family History   Problem Relation Age of Onset    Cancer Mother         colon ca, liver ca    Cancer Father      Social History     Tobacco Use    Smoking status: Never Smoker    Smokeless tobacco: Never Used   Substance Use Topics    Alcohol use: No    Drug use: No     Review of Systems   Constitutional: Positive for appetite change (decrease). Negative for fever.        + decrease in liquid intake   HENT: Negative for ear pain and sore throat.    Respiratory: Negative for cough and shortness of breath.    Cardiovascular: Negative for chest pain.   Gastrointestinal: Positive for diarrhea. Negative for nausea and vomiting.   Genitourinary: Negative for dysuria.   Musculoskeletal: Negative for back pain.   Skin: Negative for rash.   Neurological: Positive for weakness.   Hematological: Does not bruise/bleed easily.       Physical Exam     Initial Vitals [07/06/19 0855]   BP Pulse Resp Temp SpO2   115/76 (!) 131 20 98.2 °F (36.8 °C) 98 %      MAP       --         Physical Exam    Nursing note and vitals reviewed.  Constitutional: She appears well-developed and well-nourished. She is not diaphoretic. No distress.   HENT:   Head: Normocephalic and atraumatic.   Eyes: EOM are normal. Pupils are equal, round, and reactive to light.   Neck: Normal range of motion. Neck supple.   Cardiovascular: Regular rhythm, normal heart sounds and intact distal pulses. Tachycardia present.  Exam reveals no gallop and no friction rub.    No murmur heard.  Tachycardic.   Pulmonary/Chest: Breath sounds normal. No respiratory distress. She has no wheezes. She has no rhonchi. She has no rales.   Abdominal: Soft. Bowel sounds are normal. There is no tenderness. There is no rebound and no guarding.   Abdomen is soft and non tender.   Musculoskeletal: Normal range of motion.    Neurological: She is alert and oriented to person, place, and time.   Skin: Skin is warm and dry.   Skin is warm and dry.   Psychiatric: She has a normal mood and affect. Her behavior is normal. Judgment and thought content normal.         ED Course   Procedures  Labs Reviewed   COMPREHENSIVE METABOLIC PANEL - Abnormal; Notable for the following components:       Result Value    Sodium 135 (*)     Potassium 2.8 (*)     CO2 22 (*)     Glucose 132 (*)     Calcium 8.3 (*)     Total Protein 5.6 (*)     Albumin 2.1 (*)     All other components within normal limits   CBC W/ AUTO DIFFERENTIAL - Abnormal; Notable for the following components:    WBC 1.11 (*)     RBC 3.37 (*)     Hemoglobin 9.9 (*)     Hematocrit 30.2 (*)     MPV 8.8 (*)     Gran # (ANC) 0.1 (*)     Lymph # 0.6 (*)     Gran% 8.1 (*)     Lymph% 53.2 (*)     Mono% 35.1 (*)     Basophil% 2.7 (*)     All other components within normal limits    Narrative:     WBC critical result(s) called and verbal readback obtained from   Cesilia Weir RN, 07/06/2019 10:41   MAGNESIUM          Imaging Results    None          Medical Decision Making:   History:   Old Medical Records: I decided to obtain old medical records.  Initial Assessment:   63-year-old female presented with multiple complaints.  Differential Diagnosis:   Initial differential diagnosis included but not limited to severe anemia, dehydration, and electrolyte abnormality.  Clinical Tests:   Lab Tests: Ordered and Reviewed  ED Management:  The patient was emergently evaluated in the emergency department, her evaluation was significant for an elderly female who is tachycardic.  The patient's labs were significant for hypokalemia and mild dehydration.  The patient was treated with IV fluids and p.o. potassium.  The patient reports improvement in her symptoms after treatment.  The patient is stable for discharge home.  The patient also has neutropenia noted, likely secondary to her chemotherapy.  She has  been told to avoid any sick contacts as well. She is to otherwise follow up with her oncologist and PCP for further care.  She has been told to restart her potassium pills as well as increase her fluid intake at home as well.            Scribe Attestation:   Scribe #1: I performed the above scribed service and the documentation accurately describes the services I performed. I attest to the accuracy of the note.           I, Dr. Mitchell Brennan, personally performed the services described in this documentation. All medical record entries made by the scribe were at my direction and in my presence.  I have reviewed the chart and agree that the record reflects my personal performance and is accurate and complete. Mitchell Brennan MD.  1:16 PM 07/06/2019       Clinical Impression:       ICD-10-CM ICD-9-CM   1. Dehydration E86.0 276.51   2. Hypokalemia E87.6 276.8         Disposition:   Disposition: Discharged  Condition: Stable                        Mitchell Brennan MD  07/06/19 1397

## 2019-07-08 ENCOUNTER — OFFICE VISIT (OUTPATIENT)
Dept: HEMATOLOGY/ONCOLOGY | Facility: CLINIC | Age: 64
End: 2019-07-08
Payer: MEDICARE

## 2019-07-08 VITALS
DIASTOLIC BLOOD PRESSURE: 72 MMHG | BODY MASS INDEX: 32.39 KG/M2 | SYSTOLIC BLOOD PRESSURE: 118 MMHG | RESPIRATION RATE: 20 BRPM | HEIGHT: 67 IN | HEART RATE: 85 BPM | OXYGEN SATURATION: 98 % | WEIGHT: 206.38 LBS | TEMPERATURE: 99 F

## 2019-07-08 DIAGNOSIS — D70.1 CHEMOTHERAPY INDUCED NEUTROPENIA: ICD-10-CM

## 2019-07-08 DIAGNOSIS — C17.0 DUODENAL CANCER: ICD-10-CM

## 2019-07-08 DIAGNOSIS — C78.7 HEPATIC METASTASES: ICD-10-CM

## 2019-07-08 DIAGNOSIS — T45.1X5A CHEMOTHERAPY INDUCED NEUTROPENIA: ICD-10-CM

## 2019-07-08 DIAGNOSIS — C78.00 MALIGNANT NEOPLASM METASTATIC TO LUNG, UNSPECIFIED LATERALITY: Primary | ICD-10-CM

## 2019-07-08 PROCEDURE — 3074F PR MOST RECENT SYSTOLIC BLOOD PRESSURE < 130 MM HG: ICD-10-PCS | Mod: S$GLB,,, | Performed by: INTERNAL MEDICINE

## 2019-07-08 PROCEDURE — 3008F BODY MASS INDEX DOCD: CPT | Mod: S$GLB,,, | Performed by: INTERNAL MEDICINE

## 2019-07-08 PROCEDURE — 3008F PR BODY MASS INDEX (BMI) DOCUMENTED: ICD-10-PCS | Mod: S$GLB,,, | Performed by: INTERNAL MEDICINE

## 2019-07-08 PROCEDURE — 99999 PR PBB SHADOW E&M-EST. PATIENT-LVL III: ICD-10-PCS | Mod: PBBFAC,,, | Performed by: INTERNAL MEDICINE

## 2019-07-08 PROCEDURE — 3078F DIAST BP <80 MM HG: CPT | Mod: S$GLB,,, | Performed by: INTERNAL MEDICINE

## 2019-07-08 PROCEDURE — 99214 OFFICE O/P EST MOD 30 MIN: CPT | Mod: S$GLB,,, | Performed by: INTERNAL MEDICINE

## 2019-07-08 PROCEDURE — 1101F PR PT FALLS ASSESS DOC 0-1 FALLS W/OUT INJ PAST YR: ICD-10-PCS | Mod: S$GLB,,, | Performed by: INTERNAL MEDICINE

## 2019-07-08 PROCEDURE — 1101F PT FALLS ASSESS-DOCD LE1/YR: CPT | Mod: S$GLB,,, | Performed by: INTERNAL MEDICINE

## 2019-07-08 PROCEDURE — 3078F PR MOST RECENT DIASTOLIC BLOOD PRESSURE < 80 MM HG: ICD-10-PCS | Mod: S$GLB,,, | Performed by: INTERNAL MEDICINE

## 2019-07-08 PROCEDURE — 99214 PR OFFICE/OUTPT VISIT, EST, LEVL IV, 30-39 MIN: ICD-10-PCS | Mod: S$GLB,,, | Performed by: INTERNAL MEDICINE

## 2019-07-08 PROCEDURE — 3074F SYST BP LT 130 MM HG: CPT | Mod: S$GLB,,, | Performed by: INTERNAL MEDICINE

## 2019-07-08 PROCEDURE — 99999 PR PBB SHADOW E&M-EST. PATIENT-LVL III: CPT | Mod: PBBFAC,,, | Performed by: INTERNAL MEDICINE

## 2019-07-08 RX ORDER — HEPARIN 100 UNIT/ML
500 SYRINGE INTRAVENOUS
Status: CANCELLED | OUTPATIENT
Start: 2019-07-08

## 2019-07-08 RX ORDER — POTASSIUM CHLORIDE 20 MEQ/1
20 TABLET, EXTENDED RELEASE ORAL DAILY
Qty: 30 TABLET | Refills: 11 | Status: ON HOLD | OUTPATIENT
Start: 2019-07-08 | End: 2020-04-23 | Stop reason: SDUPTHER

## 2019-07-08 RX ORDER — SODIUM CHLORIDE 0.9 % (FLUSH) 0.9 %
10 SYRINGE (ML) INJECTION
Status: CANCELLED | OUTPATIENT
Start: 2019-07-08

## 2019-07-08 NOTE — PROGRESS NOTES
Subjective:       Patient ID: Indigo Stuart is a 63 y.o. female.    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  Patient is on FOLFOX plus Avastin here for therapy  This last week has been terribly difficult extremely weak hardly able to move from chair to the bathroom  Went to the emergency room and potassium was found to be low was given 1 oral dose and sent home she is not feeling well from it    HPI:     Social History     Socioeconomic History    Marital status:      Spouse name: Not on file    Number of children: Not on file    Years of education: Not on file    Highest education level: Not on file   Occupational History    Not on file   Social Needs    Financial resource strain: Not on file    Food insecurity:     Worry: Not on file     Inability: Not on file    Transportation needs:     Medical: Not on file     Non-medical: Not on file   Tobacco Use    Smoking status: Never Smoker    Smokeless tobacco: Never Used   Substance and Sexual Activity    Alcohol use: No    Drug use: No    Sexual activity: Not Currently   Lifestyle    Physical activity:     Days per week: Not on file     Minutes per session: Not on file    Stress: Not on file   Relationships    Social connections:     Talks on phone: Not on file     Gets together: Not on file     Attends Hinduism service: Not on file     Active member of club or organization: Not on file     Attends meetings of clubs or organizations: Not on file     Relationship status: Not on file   Other Topics Concern    Not on file   Social History Narrative    Not on file     Family History   Problem Relation Age of Onset    Cancer Mother         colon ca, liver ca    Cancer Father      Past Surgical History:   Procedure Laterality Date    CHOLECYSTECTOMY      COLON SURGERY      COLONOSCOPY N/A 3/30/2018    Performed by  Daniel Magana MD at [facility] ENDO    EGD (ESOPHAGOGASTRODUODENOSCOPY) N/A 1/14/2019    Performed by Monserrat Lopez MD at [facility] ENDO (2ND FLR)    EGD (ESOPHAGOGASTRODUODENOSCOPY) N/A 1/3/2019    Performed by Adis Arguello MD at [facility] ENDO    ESOPHAGOGASTRODUODENOSCOPY (EGD) N/A 3/30/2018    Performed by Daniel Magana MD at [facility] ENDO    HYSTERECTOMY      [id number]-CATH N/A 1/4/2019    Performed by Emilio Romero MD at [facility] OR     Past Medical History:   Diagnosis Date    Cancer     colon    Encounter for blood transfusion     Hypertension        Current Outpatient Medications:     amLODIPine (NORVASC) 5 MG tablet, Take 5 mg by mouth once daily., Disp: , Rfl: 3    atorvastatin (LIPITOR) 20 MG tablet, Take 20 mg by mouth every evening., Disp: , Rfl:     ferrous sulfate (FEOSOL) 325 mg (65 mg iron) Tab tablet, Take 1 tablet by mouth 2 (two) times daily., Disp: , Rfl: 3    ferrous sulfate (FEOSOL) 325 mg (65 mg iron) Tab tablet, TAKE 1 TABLET BY MOUTH TWICE A DAY, Disp: , Rfl:     metoprolol succinate (TOPROL-XL) 25 MG 24 hr tablet, Take 25 mg by mouth every evening., Disp: , Rfl: 3    ondansetron (ZOFRAN-ODT) 8 MG TbDL, Take 1 tablet (8 mg total) by mouth every 12 (twelve) hours as needed., Disp: 30 tablet, Rfl: 1    potassium chloride (KLOR-CON) 8 MEQ TbSR, Take 8 mEq by mouth., Disp: , Rfl:     promethazine (PHENERGAN) 25 MG tablet, Take 1 tablet (25 mg total) by mouth every 6 (six) hours as needed for Nausea., Disp: 30 tablet, Rfl: 1    potassium chloride SA (K-DUR,KLOR-CON) 20 MEQ tablet, Take 1 tablet (20 mEq total) by mouth once daily., Disp: 30 tablet, Rfl: 11  Review of patient's allergies indicates:   Allergen Reactions    Codeine Nausea And Vomiting    Erythromycin base Other (See Comments)    Lisinopril Palpitations     Cough        PHYSICAL EXAM:     Vitals:    07/08/19 1032   BP: 118/72   Pulse: 85   Resp: 20   Temp: 99.1 °F (37.3 °C)       GENERAL:  Chronically  ill-appearing woman in a wheelchair with her  and daughter Awake, alert and oriented to time, person and place.  No anxiety, or agitation.      HEENT: Normal conjunctivae and eyelids. WNL.  PERRLA 3 to 4 mm. No icterus, no pallor, no congestion, and no discharge noted.     NECK:  Supple. Trachea is central.  No crepitus.  No JVD or masses.    RESPIRATORY:  No intercostal retractions.  No dullness to percussion.  Chest is clear to auscultation.  No rales, rhonchi or wheezes.  No crepitus.  Good air entry bilaterally.    CARDIOVASCULAR:  S1 and S2 are normally heard without murmurs or gallops.  All peripheral pulses are present.    ABDOMEN:  Normal abdomen.  No hepatosplenomegaly.  No free fluid.  Bowel sounds are present.  No hernia noted. No masses.  No rebound or tenderness.  No guarding or rigidity.  Umbilicus is midline.    LYMPHATICS:  No axillary, cervical, supraclavicular, submental, or inguinal lymphadenopathy.    SKIN/MUSCULOSKELETAL:  There is no evidence of excoriation marks or ecchmosis.  No rashes.  No cyanosis.  No clubbing.  No joint or skeletal deformities noted.  Normal range of motion.    NEUROLOGIC:  Higher functions are appropriate.  No cranial nerve deficits.  Normal diana.  Normal strength.  Motor and sensory functions are normal.  Deep tendon reflexes are normal.    GENITAL/RECTAL:  Exams are deferred.      Laboratory:     CBC:  Lab Results   Component Value Date    WBC 1.11 (LL) 07/06/2019    RBC 3.37 (L) 07/06/2019    HGB 9.9 (L) 07/06/2019    HCT 30.2 (L) 07/06/2019    MCV 90 07/06/2019    MCH 29.4 07/06/2019    MCHC 32.8 07/06/2019    RDW 14.2 07/06/2019     07/06/2019    MPV 8.8 (L) 07/06/2019    GRAN 0.1 (L) 07/06/2019    GRAN 8.1 (L) 07/06/2019    LYMPH 0.6 (L) 07/06/2019    LYMPH 53.2 (H) 07/06/2019    MONO 0.4 07/06/2019    MONO 35.1 (H) 07/06/2019    EOS 0.0 07/06/2019    BASO 0.03 07/06/2019    EOSINOPHIL 0.9 07/06/2019    BASOPHIL 2.7 (H) 07/06/2019       BMP: BMP  Lab  Results   Component Value Date     (L) 07/06/2019    K 2.8 (L) 07/06/2019     07/06/2019    CO2 22 (L) 07/06/2019    BUN 12 07/06/2019    CREATININE 0.9 07/06/2019    CALCIUM 8.3 (L) 07/06/2019    ANIONGAP 10 07/06/2019    ESTGFRAFRICA >60 07/06/2019    EGFRNONAA >60 07/06/2019       LFT:   Lab Results   Component Value Date    ALT 21 07/06/2019    AST 23 07/06/2019    ALKPHOS 120 07/06/2019    BILITOT 1.0 07/06/2019         Assessment/Plan:       1. 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  CEA has improved indicating treatment response Neutropenic afebrile.  Start Neupogen daily x3 if febrile to 101  patient to present  herself  to the  Emergency  room  Delay treatment this week   Return to clinic to see Dr. sauer next week with CBC CMP  Hypokalemia; replace potassium IV today and take 2 additional doses today 20 mEq take it twice a day at 20 mEq still seen by Dr. Sauer next week  Continue hypertension management

## 2019-07-13 ENCOUNTER — LAB VISIT (OUTPATIENT)
Dept: LAB | Facility: HOSPITAL | Age: 64
End: 2019-07-13
Attending: INTERNAL MEDICINE
Payer: MEDICARE

## 2019-07-13 DIAGNOSIS — C78.00 MALIGNANT NEOPLASM METASTATIC TO LUNG, UNSPECIFIED LATERALITY: ICD-10-CM

## 2019-07-13 LAB
ALBUMIN SERPL BCP-MCNC: 2.3 G/DL (ref 3.5–5.2)
ALP SERPL-CCNC: 185 U/L (ref 55–135)
ALT SERPL W/O P-5'-P-CCNC: 17 U/L (ref 10–44)
ANION GAP SERPL CALC-SCNC: 13 MMOL/L (ref 8–16)
AST SERPL-CCNC: 24 U/L (ref 10–40)
BASOPHILS NFR BLD: 0 % (ref 0–1.9)
BILIRUB SERPL-MCNC: 0.4 MG/DL (ref 0.1–1)
BUN SERPL-MCNC: 5 MG/DL (ref 8–23)
CALCIUM SERPL-MCNC: 9.2 MG/DL (ref 8.7–10.5)
CHLORIDE SERPL-SCNC: 105 MMOL/L (ref 95–110)
CO2 SERPL-SCNC: 24 MMOL/L (ref 23–29)
CREAT SERPL-MCNC: 0.7 MG/DL (ref 0.5–1.4)
DIFFERENTIAL METHOD: ABNORMAL
EOSINOPHIL NFR BLD: 1 % (ref 0–8)
ERYTHROCYTE [DISTWIDTH] IN BLOOD BY AUTOMATED COUNT: 16 % (ref 11.5–14.5)
EST. GFR  (AFRICAN AMERICAN): >60 ML/MIN/1.73 M^2
EST. GFR  (NON AFRICAN AMERICAN): >60 ML/MIN/1.73 M^2
GLUCOSE SERPL-MCNC: 105 MG/DL (ref 70–110)
HCT VFR BLD AUTO: 34.7 % (ref 37–48.5)
HGB BLD-MCNC: 10.7 G/DL (ref 12–16)
IMM GRANULOCYTES # BLD AUTO: ABNORMAL K/UL
LYMPHOCYTES NFR BLD: 33 % (ref 18–48)
MCH RBC QN AUTO: 29.4 PG (ref 27–31)
MCHC RBC AUTO-ENTMCNC: 30.8 G/DL (ref 32–36)
MCV RBC AUTO: 95 FL (ref 82–98)
MONOCYTES NFR BLD: 3 % (ref 4–15)
NEUTROPHILS NFR BLD: 63 % (ref 38–73)
NRBC BLD-RTO: 2 /100 WBC
PLATELET # BLD AUTO: 222 K/UL (ref 150–350)
PMV BLD AUTO: 9.3 FL (ref 9.2–12.9)
POTASSIUM SERPL-SCNC: 3.7 MMOL/L (ref 3.5–5.1)
PROT SERPL-MCNC: 5.7 G/DL (ref 6–8.4)
RBC # BLD AUTO: 3.64 M/UL (ref 4–5.4)
SODIUM SERPL-SCNC: 142 MMOL/L (ref 136–145)
WBC # BLD AUTO: 10.9 K/UL (ref 3.9–12.7)

## 2019-07-13 PROCEDURE — 85007 BL SMEAR W/DIFF WBC COUNT: CPT

## 2019-07-13 PROCEDURE — 85027 COMPLETE CBC AUTOMATED: CPT

## 2019-07-13 PROCEDURE — 36415 COLL VENOUS BLD VENIPUNCTURE: CPT

## 2019-07-13 PROCEDURE — 80053 COMPREHEN METABOLIC PANEL: CPT

## 2019-07-15 ENCOUNTER — HOSPITAL ENCOUNTER (OUTPATIENT)
Dept: RADIOLOGY | Facility: HOSPITAL | Age: 64
Discharge: HOME | End: 2019-07-15
Attending: INTERNAL MEDICINE
Payer: MEDICARE

## 2019-07-15 ENCOUNTER — OFFICE VISIT (OUTPATIENT)
Dept: HEMATOLOGY/ONCOLOGY | Facility: CLINIC | Age: 64
End: 2019-07-15
Payer: MEDICARE

## 2019-07-15 VITALS
SYSTOLIC BLOOD PRESSURE: 126 MMHG | TEMPERATURE: 98 F | WEIGHT: 211.63 LBS | RESPIRATION RATE: 20 BRPM | HEIGHT: 67 IN | DIASTOLIC BLOOD PRESSURE: 63 MMHG | BODY MASS INDEX: 33.21 KG/M2 | HEART RATE: 87 BPM | OXYGEN SATURATION: 100 %

## 2019-07-15 DIAGNOSIS — C78.7 HEPATIC METASTASES: ICD-10-CM

## 2019-07-15 DIAGNOSIS — C17.0 DUODENAL CANCER: ICD-10-CM

## 2019-07-15 DIAGNOSIS — C78.00 MALIGNANT NEOPLASM METASTATIC TO LUNG, UNSPECIFIED LATERALITY: ICD-10-CM

## 2019-07-15 DIAGNOSIS — H10.9 CONJUNCTIVITIS, UNSPECIFIED CONJUNCTIVITIS TYPE, UNSPECIFIED LATERALITY: Primary | ICD-10-CM

## 2019-07-15 DIAGNOSIS — Z51.11 ENCOUNTER FOR ANTINEOPLASTIC CHEMOTHERAPY: ICD-10-CM

## 2019-07-15 DIAGNOSIS — Z09 CHEMOTHERAPY FOLLOW-UP EXAMINATION: ICD-10-CM

## 2019-07-15 PROCEDURE — 74183 MRI ABDOMEN W WO CONTRAST: ICD-10-PCS | Mod: 26,,, | Performed by: RADIOLOGY

## 2019-07-15 PROCEDURE — 3008F PR BODY MASS INDEX (BMI) DOCUMENTED: ICD-10-PCS | Mod: S$GLB,,, | Performed by: INTERNAL MEDICINE

## 2019-07-15 PROCEDURE — 99215 PR OFFICE/OUTPT VISIT, EST, LEVL V, 40-54 MIN: ICD-10-PCS | Mod: S$GLB,,, | Performed by: INTERNAL MEDICINE

## 2019-07-15 PROCEDURE — 1101F PR PT FALLS ASSESS DOC 0-1 FALLS W/OUT INJ PAST YR: ICD-10-PCS | Mod: S$GLB,,, | Performed by: INTERNAL MEDICINE

## 2019-07-15 PROCEDURE — 99999 PR PBB SHADOW E&M-EST. PATIENT-LVL IV: ICD-10-PCS | Mod: PBBFAC,,, | Performed by: INTERNAL MEDICINE

## 2019-07-15 PROCEDURE — 99215 OFFICE O/P EST HI 40 MIN: CPT | Mod: S$GLB,,, | Performed by: INTERNAL MEDICINE

## 2019-07-15 PROCEDURE — 3078F PR MOST RECENT DIASTOLIC BLOOD PRESSURE < 80 MM HG: ICD-10-PCS | Mod: S$GLB,,, | Performed by: INTERNAL MEDICINE

## 2019-07-15 PROCEDURE — 25500020 PHARM REV CODE 255: Performed by: INTERNAL MEDICINE

## 2019-07-15 PROCEDURE — 3008F BODY MASS INDEX DOCD: CPT | Mod: S$GLB,,, | Performed by: INTERNAL MEDICINE

## 2019-07-15 PROCEDURE — 1101F PT FALLS ASSESS-DOCD LE1/YR: CPT | Mod: S$GLB,,, | Performed by: INTERNAL MEDICINE

## 2019-07-15 PROCEDURE — 3074F PR MOST RECENT SYSTOLIC BLOOD PRESSURE < 130 MM HG: ICD-10-PCS | Mod: S$GLB,,, | Performed by: INTERNAL MEDICINE

## 2019-07-15 PROCEDURE — 3074F SYST BP LT 130 MM HG: CPT | Mod: S$GLB,,, | Performed by: INTERNAL MEDICINE

## 2019-07-15 PROCEDURE — A9585 GADOBUTROL INJECTION: HCPCS | Performed by: INTERNAL MEDICINE

## 2019-07-15 PROCEDURE — 74183 MRI ABD W/O CNTR FLWD CNTR: CPT | Mod: 26,,, | Performed by: RADIOLOGY

## 2019-07-15 PROCEDURE — 74183 MRI ABD W/O CNTR FLWD CNTR: CPT | Mod: TC

## 2019-07-15 PROCEDURE — 99999 PR PBB SHADOW E&M-EST. PATIENT-LVL IV: CPT | Mod: PBBFAC,,, | Performed by: INTERNAL MEDICINE

## 2019-07-15 PROCEDURE — 3078F DIAST BP <80 MM HG: CPT | Mod: S$GLB,,, | Performed by: INTERNAL MEDICINE

## 2019-07-15 RX ORDER — SODIUM CHLORIDE 0.9 % (FLUSH) 0.9 %
10 SYRINGE (ML) INJECTION
Status: CANCELLED | OUTPATIENT
Start: 2019-07-17

## 2019-07-15 RX ORDER — SODIUM CHLORIDE 0.9 % (FLUSH) 0.9 %
10 SYRINGE (ML) INJECTION
Status: CANCELLED | OUTPATIENT
Start: 2019-07-15

## 2019-07-15 RX ORDER — GADOBUTROL 604.72 MG/ML
9 INJECTION INTRAVENOUS
Status: COMPLETED | OUTPATIENT
Start: 2019-07-15 | End: 2019-07-15

## 2019-07-15 RX ORDER — HEPARIN 100 UNIT/ML
500 SYRINGE INTRAVENOUS
Status: CANCELLED | OUTPATIENT
Start: 2019-07-17

## 2019-07-15 RX ORDER — NEOMYCIN/POLYMYXIN B/HYDROCORT 3.5-10K-1
1 SUSPENSION, DROPS(FINAL DOSAGE FORM)(ML) OPHTHALMIC (EYE) 4 TIMES DAILY
Qty: 7.5 ML | Refills: 0 | Status: SHIPPED | OUTPATIENT
Start: 2019-07-15 | End: 2019-10-28 | Stop reason: ALTCHOICE

## 2019-07-15 RX ORDER — FLUOROURACIL 50 MG/ML
400 INJECTION, SOLUTION INTRAVENOUS
Status: CANCELLED | OUTPATIENT
Start: 2019-07-15

## 2019-07-15 RX ORDER — ATROPINE SULFATE 0.4 MG/ML
0.4 INJECTION, SOLUTION ENDOTRACHEAL; INTRAMEDULLARY; INTRAMUSCULAR; INTRAVENOUS; SUBCUTANEOUS ONCE AS NEEDED
Status: CANCELLED | OUTPATIENT
Start: 2019-07-15

## 2019-07-15 RX ORDER — HEPARIN 100 UNIT/ML
500 SYRINGE INTRAVENOUS
Status: CANCELLED | OUTPATIENT
Start: 2019-07-15

## 2019-07-15 RX ADMIN — GADOBUTROL 9 ML: 604.72 INJECTION INTRAVENOUS at 09:07

## 2019-07-15 NOTE — PROGRESS NOTES
Cc  I am nervous about my MRI results     Indigo Stuart is a 63 y.o.  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  Kras was MUTATED No MSI   Cycle 1  January 15 2019    Here for chemo but she has had liver progression and SBO treated medically   New onset blood in stool and pruritus of rectum   CHest port in place     Tolerating lopressor for htn and lipitor for dyslipidemia  Tolerating zofran for nausea chemo     Cycle one folfox avastin January 15 2019   Failed regimen and admitted with SBO   Hepatic mets progressed hence changed to irinotecan drop  oxaliplatin   Cycle one folfiri avastin April 2019  Here to get clearance for next dose    CEA    Past Medical History:   Diagnosis Date    Cancer     colon    Encounter for blood transfusion     Hypertension          Current Outpatient Medications:     amLODIPine (NORVASC) 5 MG tablet, Take 5 mg by mouth once daily., Disp: , Rfl: 3    atorvastatin (LIPITOR) 20 MG tablet, Take 20 mg by mouth every evening., Disp: , Rfl:     ferrous sulfate (FEOSOL) 325 mg (65 mg iron) Tab tablet, Take 1 tablet by mouth 2 (two) times daily., Disp: , Rfl: 3    ferrous sulfate (FEOSOL) 325 mg (65 mg iron) Tab tablet, TAKE 1 TABLET BY MOUTH TWICE A DAY, Disp: , Rfl:     metoprolol succinate (TOPROL-XL) 25 MG 24 hr tablet, Take 25 mg by mouth every evening., Disp: , Rfl: 3    ondansetron (ZOFRAN-ODT) 8 MG TbDL, Take 1 tablet (8 mg total) by mouth every 12 (twelve) hours as needed., Disp: 30 tablet, Rfl: 1    potassium chloride (KLOR-CON) 8 MEQ TbSR, Take 8 mEq by mouth., Disp: , Rfl:     potassium chloride SA (K-DUR,KLOR-CON) 20 MEQ tablet, Take 1 tablet (20 mEq total) by mouth once daily., Disp: 30 tablet, Rfl: 11    promethazine (PHENERGAN) 25 MG tablet, Take 1 tablet (25 mg total) by mouth every 6 (six) hours as needed for Nausea., Disp: 30 tablet, Rfl: 1  No current facility-administered  "medications for this visit.         Review of Systems:  General: Weight gain: No, Weight Loss: No, Fatigue  Fever: y Chills: No, Night Sweats: No, Insomnia: No, Excessive sleeping: No   Respiratory:  Cough: No, Shortness of Breath:  new onset specially dyspnea with exertion  Wheezing: No, Excessive Snoring: No, Coughing up blood: No  Endocrine: Heat Intolerance: No, Cold Intolerance: No,   Excessive Thirst: No, Excessive Hunger: No  Eyes:  Blurred Vision: No, Double Vision: No   Light Sensitivity: No, Eye pain: No  Musculoskeletal: Muscle Aches/Pain: No, Joint Pain/Swelling: yes ankles   Muscle Weakness:no , Neck Pain: No, Back Pain:   Neurological: Difficulty Walking/Falls:  No  Further   Headache Migraine: No, Dizziness/Vertigo: No, Fainting: No, Weakness: Better   Cardiovascular: Chest Pain: No, Shortness of Breath: no   Gastrointestinal: Nausea/Vomiting: No, Constipation: No, Diarrhea:stable   Psych/Cog:  Depression: No, Anxiety: No, Hallucinations: No, Problems Concentrating: No  : Frequent Urination: No, Incontinence: No, Blood of Urine: No, Urinary Infections: No  ENT:Hearing Loss: No, Earache: No, Ringing in Ears: No  Facial Pain: No, Chronic Congestion:No   Immune: Seasonal Allergies: No, Hives and/or Rashes: No  The remainder of the review of twelve body systems was reviewed and normal        /63   Pulse 87   Temp 98.3 °F (36.8 °C)   Resp 20   Ht 5' 7" (1.702 m)   Wt 96 kg (211 lb 10.3 oz)   SpO2 100%   BMI 33.15 kg/m²   Constitutional: oriented to person, place, and time.  Appears tired   HENT: anicteric sclera   Head: Normocephalic and atraumatic.   Ears canal normal no discharge non boggy turbinates  Nose: Nose normal.   Dried blood in nares   No lymphatics noted   Eyes: Conjunctivae PALE  EOM are normal.  Neck: . No thyromegaly present.  No bruits   Cardiovascular: Normal rate, regular rhythm, normal heart sounds  No murmur heard.  Pulmonary/Chest:  Decreased breath sounds  Better today "   Abdominal: Soft. Bowel sounds are normal.  no mass.   Musculoskeletal: Normal range of motion. decreased strength   1 + edema of legs   No further boggy turbinates  Lymphadenopathy:  no cervical adenopathy.   Neurological: alert and oriented to person, place  Skin: Skin is warm and dry. No rash noted.   Psychiatric: normal mood and affect.   behavior is normal.   Vitals reviewed.  Positive edema bilateral lower extremities      Lab Results   Component Value Date    WBC 10.90 07/13/2019    RBC 3.64 (L) 07/13/2019    HGB 10.7 (L) 07/13/2019    HCT 34.7 (L) 07/13/2019    MCV 95 07/13/2019    MCH 29.4 07/13/2019    MCHC 30.8 (L) 07/13/2019    RDW 16.0 (H) 07/13/2019     07/13/2019    MPV 9.3 07/13/2019    GRAN 63.0 07/13/2019    LYMPH 33.0 07/13/2019    MONO 3.0 (L) 07/13/2019    EOS 0.0 07/06/2019    BASO 0.03 07/06/2019    EOSINOPHIL 1.0 07/13/2019    BASOPHIL 0.0 07/13/2019       CMP  Sodium   Date Value Ref Range Status   07/13/2019 142 136 - 145 mmol/L Final     Potassium   Date Value Ref Range Status   07/13/2019 3.7 3.5 - 5.1 mmol/L Final     Chloride   Date Value Ref Range Status   07/13/2019 105 95 - 110 mmol/L Final     CO2   Date Value Ref Range Status   07/13/2019 24 23 - 29 mmol/L Final     Glucose   Date Value Ref Range Status   07/13/2019 105 70 - 110 mg/dL Final     BUN, Bld   Date Value Ref Range Status   07/13/2019 5 (L) 8 - 23 mg/dL Final     Creatinine   Date Value Ref Range Status   07/13/2019 0.7 0.5 - 1.4 mg/dL Final     Calcium   Date Value Ref Range Status   07/13/2019 9.2 8.7 - 10.5 mg/dL Final     Total Protein   Date Value Ref Range Status   07/13/2019 5.7 (L) 6.0 - 8.4 g/dL Final     Albumin   Date Value Ref Range Status   07/13/2019 2.3 (L) 3.5 - 5.2 g/dL Final     Total Bilirubin   Date Value Ref Range Status   07/13/2019 0.4 0.1 - 1.0 mg/dL Final     Comment:     For infants and newborns, interpretation of results should be based  on gestational age, weight and in agreement with  clinical  observations.  Premature Infant recommended reference ranges:  Up to 24 hours.............<8.0 mg/dL  Up to 48 hours............<12.0 mg/dL  3-5 days..................<15.0 mg/dL  6-29 days.................<15.0 mg/dL       Alkaline Phosphatase   Date Value Ref Range Status   07/13/2019 185 (H) 55 - 135 U/L Final     AST   Date Value Ref Range Status   07/13/2019 24 10 - 40 U/L Final     ALT   Date Value Ref Range Status   07/13/2019 17 10 - 44 U/L Final     Anion Gap   Date Value Ref Range Status   07/13/2019 13 8 - 16 mmol/L Final     eGFR if    Date Value Ref Range Status   07/13/2019 >60 >60 mL/min/1.73 m^2 Final     eGFR if non    Date Value Ref Range Status   07/13/2019 >60 >60 mL/min/1.73 m^2 Final     Comment:     Calculation used to obtain the estimated glomerular filtration  rate (eGFR) is the CKD-EPI equation.        No msi     Conjunctivitis, unspecified conjunctivitis type, unspecified laterality  -     neomycin-polymyxin-hydrocortisone (CORTISPORIN) 3.5-10,000-10 mg-unit-mg/mL ophthalmic suspension; Place 1 drop into both eyes 4 (four) times daily.  Dispense: 7.5 mL; Refill: 0    Malignant neoplasm metastatic to lung, unspecified laterality    Hepatic metastases    Encounter for antineoplastic chemotherapy    Duodenal cancer         Folfiri with avastin cleared  Failed  folfox and avastin for stage  4 disease   Patient verbalized understanding of such    Drops ordered for conjunctivitis  Pulmonary lesions improved   Liver lesions improved on MRI       Current Outpatient Medications:     amLODIPine (NORVASC) 5 MG tablet, Take 5 mg by mouth once daily., Disp: , Rfl: 3    atorvastatin (LIPITOR) 20 MG tablet, Take 20 mg by mouth every evening., Disp: , Rfl:     ferrous sulfate (FEOSOL) 325 mg (65 mg iron) Tab tablet, Take 1 tablet by mouth 2 (two) times daily., Disp: , Rfl: 3    ferrous sulfate (FEOSOL) 325 mg (65 mg iron) Tab tablet, TAKE 1 TABLET BY MOUTH  TWICE A DAY, Disp: , Rfl:     metoprolol succinate (TOPROL-XL) 25 MG 24 hr tablet, Take 25 mg by mouth every evening., Disp: , Rfl: 3    ondansetron (ZOFRAN-ODT) 8 MG TbDL, Take 1 tablet (8 mg total) by mouth every 12 (twelve) hours as needed., Disp: 30 tablet, Rfl: 1    potassium chloride (KLOR-CON) 8 MEQ TbSR, Take 8 mEq by mouth., Disp: , Rfl:     potassium chloride SA (K-DUR,KLOR-CON) 20 MEQ tablet, Take 1 tablet (20 mEq total) by mouth once daily., Disp: 30 tablet, Rfl: 11    promethazine (PHENERGAN) 25 MG tablet, Take 1 tablet (25 mg total) by mouth every 6 (six) hours as needed for Nausea., Disp: 30 tablet, Rfl: 1  No current facility-administered medications for this visit.   She is to continue Norvasc for blood pressure control which is stable and Lipitor for dyslipidemia which is followed by her primary care physician  Advance Care Planning     Living Will  During this visit, I engaged the patient in the advance care planning process.  The patient and I reviewed the role for advance directives and their purpose in directing future healthcare if the patient's unable to speak for him/herself.  At this point in time, the patient does have full decision-making capacity.  We discussed different extreme health states that she could experience, and reviewed what kind of medical care she would want in those situations.  The patient communicated that if she were comatose and had little chance of a meaningful recovery, she would not want machines/life-sustaining treatments used.  5 The patient  Has not  completed a living will to reflect these preferences.  The patient   HAS NOT already designated a healthcare power of  to make decisions on her behalf.   I spent a total of 5 minutes engaging the patient in this advance care planning discussion.       Thank you for allowing me to evaluate and participate in the care of this pleasant patient. Please fell free to call me with any questions or  concerns.    Warmly,  Karina Pettit MD    This note was dictated with Dragon and briefly proofread. Please excuse any sentences that may be unclear or words misspelled

## 2019-07-18 ENCOUNTER — PATIENT MESSAGE (OUTPATIENT)
Dept: HEMATOLOGY/ONCOLOGY | Facility: CLINIC | Age: 64
End: 2019-07-18

## 2019-07-19 ENCOUNTER — TELEPHONE (OUTPATIENT)
Dept: HEMATOLOGY/ONCOLOGY | Facility: CLINIC | Age: 64
End: 2019-07-19

## 2019-07-19 NOTE — TELEPHONE ENCOUNTER
----- Message from Tracey Holt sent at 7/19/2019  1:51 PM CDT -----  Contact: self  Type: Needs Medical Advice    Who Called:self  Best Call Back Number: [phone]  Additional Information: Patient is asking for a new Rx of a different medication be sent to the [facility] her eye is pink and infected needs something before the weekend said she has sent several message she is afraid to go to long without medication the first Rx that was sent no pharmacy had it no where. Thanks.

## 2019-07-19 NOTE — TELEPHONE ENCOUNTER
----- Message from Arianna Robles sent at 7/19/2019 12:55 PM CDT -----  Type: Needs Medical Advice    Who Called:  Patient  Best Call Back Number: [phone]  Additional Information: Patient calling back concerning medication for pink eye/has not heard from anyone/please call patient back to advise.

## 2019-07-19 NOTE — TELEPHONE ENCOUNTER
Spoke to pt to inform her that we have called the pharmacy to change her medication. Pt verbalized understanding.

## 2019-07-24 ENCOUNTER — PATIENT MESSAGE (OUTPATIENT)
Dept: GASTROENTEROLOGY | Facility: CLINIC | Age: 64
End: 2019-07-24

## 2019-07-26 ENCOUNTER — LAB VISIT (OUTPATIENT)
Dept: LAB | Facility: HOSPITAL | Age: 64
End: 2019-07-26
Attending: INTERNAL MEDICINE
Payer: MEDICARE

## 2019-07-26 DIAGNOSIS — C78.00 MALIGNANT NEOPLASM METASTATIC TO LUNG, UNSPECIFIED LATERALITY: ICD-10-CM

## 2019-07-26 LAB
ALBUMIN SERPL BCP-MCNC: 2.9 G/DL (ref 3.5–5.2)
ALP SERPL-CCNC: 205 U/L (ref 55–135)
ALT SERPL W/O P-5'-P-CCNC: 36 U/L (ref 10–44)
ANION GAP SERPL CALC-SCNC: 10 MMOL/L (ref 8–16)
ANISOCYTOSIS BLD QL SMEAR: SLIGHT
AST SERPL-CCNC: 42 U/L (ref 10–40)
BASOPHILS NFR BLD: 0 % (ref 0–1.9)
BILIRUB SERPL-MCNC: 0.9 MG/DL (ref 0.1–1)
BUN SERPL-MCNC: 9 MG/DL (ref 8–23)
CALCIUM SERPL-MCNC: 9.2 MG/DL (ref 8.7–10.5)
CHLORIDE SERPL-SCNC: 107 MMOL/L (ref 95–110)
CO2 SERPL-SCNC: 24 MMOL/L (ref 23–29)
CREAT SERPL-MCNC: 0.7 MG/DL (ref 0.5–1.4)
DIFFERENTIAL METHOD: ABNORMAL
EOSINOPHIL NFR BLD: 2 % (ref 0–8)
ERYTHROCYTE [DISTWIDTH] IN BLOOD BY AUTOMATED COUNT: 16 % (ref 11.5–14.5)
EST. GFR  (AFRICAN AMERICAN): >60 ML/MIN/1.73 M^2
EST. GFR  (NON AFRICAN AMERICAN): >60 ML/MIN/1.73 M^2
GLUCOSE SERPL-MCNC: 98 MG/DL (ref 70–110)
HCT VFR BLD AUTO: 31.8 % (ref 37–48.5)
HGB BLD-MCNC: 9.9 G/DL (ref 12–16)
HYPOCHROMIA BLD QL SMEAR: ABNORMAL
IMM GRANULOCYTES # BLD AUTO: ABNORMAL K/UL (ref 0–0.04)
LYMPHOCYTES NFR BLD: 49 % (ref 18–48)
MCH RBC QN AUTO: 29.7 PG (ref 27–31)
MCHC RBC AUTO-ENTMCNC: 31.1 G/DL (ref 32–36)
MCV RBC AUTO: 96 FL (ref 82–98)
MONOCYTES NFR BLD: 7 % (ref 4–15)
NEUTROPHILS NFR BLD: 40 % (ref 38–73)
NEUTS BAND NFR BLD MANUAL: 2 %
NRBC BLD-RTO: 0 /100 WBC
PLATELET # BLD AUTO: 154 K/UL (ref 150–350)
PLATELET BLD QL SMEAR: ABNORMAL
PMV BLD AUTO: 9 FL (ref 9.2–12.9)
POTASSIUM SERPL-SCNC: 3.8 MMOL/L (ref 3.5–5.1)
PROT SERPL-MCNC: 6 G/DL (ref 6–8.4)
RBC # BLD AUTO: 3.33 M/UL (ref 4–5.4)
SODIUM SERPL-SCNC: 141 MMOL/L (ref 136–145)
WBC # BLD AUTO: 2.11 K/UL (ref 3.9–12.7)

## 2019-07-26 PROCEDURE — 85027 COMPLETE CBC AUTOMATED: CPT

## 2019-07-26 PROCEDURE — 80053 COMPREHEN METABOLIC PANEL: CPT

## 2019-07-26 PROCEDURE — 36415 COLL VENOUS BLD VENIPUNCTURE: CPT

## 2019-07-26 PROCEDURE — 85007 BL SMEAR W/DIFF WBC COUNT: CPT

## 2019-07-29 ENCOUNTER — OFFICE VISIT (OUTPATIENT)
Dept: HEMATOLOGY/ONCOLOGY | Facility: CLINIC | Age: 64
End: 2019-07-29
Payer: MEDICARE

## 2019-07-29 VITALS
DIASTOLIC BLOOD PRESSURE: 66 MMHG | HEIGHT: 67 IN | TEMPERATURE: 100 F | SYSTOLIC BLOOD PRESSURE: 146 MMHG | HEART RATE: 78 BPM | RESPIRATION RATE: 20 BRPM | WEIGHT: 210.31 LBS | BODY MASS INDEX: 33.01 KG/M2 | OXYGEN SATURATION: 100 %

## 2019-07-29 DIAGNOSIS — I10 ESSENTIAL HYPERTENSION: ICD-10-CM

## 2019-07-29 DIAGNOSIS — C78.7 HEPATIC METASTASES: ICD-10-CM

## 2019-07-29 DIAGNOSIS — C17.0 DUODENAL CANCER: ICD-10-CM

## 2019-07-29 DIAGNOSIS — D64.9 SYMPTOMATIC ANEMIA: ICD-10-CM

## 2019-07-29 DIAGNOSIS — C78.00 MALIGNANT NEOPLASM METASTATIC TO LUNG, UNSPECIFIED LATERALITY: Primary | ICD-10-CM

## 2019-07-29 PROCEDURE — 3077F PR MOST RECENT SYSTOLIC BLOOD PRESSURE >= 140 MM HG: ICD-10-PCS | Mod: S$GLB,,, | Performed by: INTERNAL MEDICINE

## 2019-07-29 PROCEDURE — 99214 OFFICE O/P EST MOD 30 MIN: CPT | Mod: S$GLB,,, | Performed by: INTERNAL MEDICINE

## 2019-07-29 PROCEDURE — 1101F PR PT FALLS ASSESS DOC 0-1 FALLS W/OUT INJ PAST YR: ICD-10-PCS | Mod: S$GLB,,, | Performed by: INTERNAL MEDICINE

## 2019-07-29 PROCEDURE — 99999 PR PBB SHADOW E&M-EST. PATIENT-LVL IV: CPT | Mod: PBBFAC,,, | Performed by: INTERNAL MEDICINE

## 2019-07-29 PROCEDURE — 1101F PT FALLS ASSESS-DOCD LE1/YR: CPT | Mod: S$GLB,,, | Performed by: INTERNAL MEDICINE

## 2019-07-29 PROCEDURE — 3078F DIAST BP <80 MM HG: CPT | Mod: S$GLB,,, | Performed by: INTERNAL MEDICINE

## 2019-07-29 PROCEDURE — 99999 PR PBB SHADOW E&M-EST. PATIENT-LVL IV: ICD-10-PCS | Mod: PBBFAC,,, | Performed by: INTERNAL MEDICINE

## 2019-07-29 PROCEDURE — 3008F BODY MASS INDEX DOCD: CPT | Mod: S$GLB,,, | Performed by: INTERNAL MEDICINE

## 2019-07-29 PROCEDURE — 3008F PR BODY MASS INDEX (BMI) DOCUMENTED: ICD-10-PCS | Mod: S$GLB,,, | Performed by: INTERNAL MEDICINE

## 2019-07-29 PROCEDURE — 3078F PR MOST RECENT DIASTOLIC BLOOD PRESSURE < 80 MM HG: ICD-10-PCS | Mod: S$GLB,,, | Performed by: INTERNAL MEDICINE

## 2019-07-29 PROCEDURE — 99214 PR OFFICE/OUTPT VISIT, EST, LEVL IV, 30-39 MIN: ICD-10-PCS | Mod: S$GLB,,, | Performed by: INTERNAL MEDICINE

## 2019-07-29 PROCEDURE — 3077F SYST BP >= 140 MM HG: CPT | Mod: S$GLB,,, | Performed by: INTERNAL MEDICINE

## 2019-07-30 NOTE — PROGRESS NOTES
Subjective:       Patient ID: Indigo Stuart is a 63 y.o. female.    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  Patient is on FOLFOX plus Avastin here for therapy  This last week has been terribly difficult extremely weak hardly able to move from chair to the bathroom  Went to the emergency room and potassium was found to be low was given 1 oral dose and sent home she is not feeling well from it    HPI:     Social History     Socioeconomic History    Marital status:      Spouse name: Not on file    Number of children: Not on file    Years of education: Not on file    Highest education level: Not on file   Occupational History    Not on file   Social Needs    Financial resource strain: Not on file    Food insecurity:     Worry: Not on file     Inability: Not on file    Transportation needs:     Medical: Not on file     Non-medical: Not on file   Tobacco Use    Smoking status: Never Smoker    Smokeless tobacco: Never Used   Substance and Sexual Activity    Alcohol use: No    Drug use: No    Sexual activity: Not Currently   Lifestyle    Physical activity:     Days per week: Not on file     Minutes per session: Not on file    Stress: Not on file   Relationships    Social connections:     Talks on phone: Not on file     Gets together: Not on file     Attends [employer] service: Not on file     Active member of club or organization: Not on file     Attends meetings of clubs or organizations: Not on file     Relationship status: Not on file   Other Topics Concern    Not on file   Social History Narrative    Not on file     Family History   Problem Relation Age of Onset    Cancer Mother         colon ca, liver ca    Cancer Father      Past Surgical History:   Procedure Laterality Date    CHOLECYSTECTOMY      COLON SURGERY      COLONOSCOPY N/A 3/30/2018    Performed by  Daniel Magana MD at [facility] ENDO    EGD (ESOPHAGOGASTRODUODENOSCOPY) N/A 1/14/2019    Performed by Monserrat Lopez MD at [facility] ENDO (2ND FLR)    EGD (ESOPHAGOGASTRODUODENOSCOPY) N/A 1/3/2019    Performed by Adis Arguello MD at [facility] ENDO    ESOPHAGOGASTRODUODENOSCOPY (EGD) N/A 3/30/2018    Performed by Daniel Magana MD at [facility] ENDO    HYSTERECTOMY      [id number]-NYBQ-Q-CATH N/A 1/4/2019    Performed by Emilio Romero MD at [facility] OR     Past Medical History:   Diagnosis Date    Cancer     colon    Encounter for blood transfusion     Hypertension        Current Outpatient Medications:     amLODIPine (NORVASC) 5 MG tablet, Take 5 mg by mouth once daily., Disp: , Rfl: 3    atorvastatin (LIPITOR) 20 MG tablet, Take 20 mg by mouth every evening., Disp: , Rfl:     ferrous sulfate (FEOSOL) 325 mg (65 mg iron) Tab tablet, Take 1 tablet by mouth 2 (two) times daily., Disp: , Rfl: 3    ferrous sulfate (FEOSOL) 325 mg (65 mg iron) Tab tablet, TAKE 1 TABLET BY MOUTH TWICE A DAY, Disp: , Rfl:     metoprolol succinate (TOPROL-XL) 25 MG 24 hr tablet, Take 25 mg by mouth every evening., Disp: , Rfl: 3    neomycin-polymyxin-hydrocortisone (CORTISPORIN) 3.5-10,000-10 mg-unit-mg/mL ophthalmic suspension, Place 1 drop into both eyes 4 (four) times daily., Disp: 7.5 mL, Rfl: 0    ondansetron (ZOFRAN-ODT) 8 MG TbDL, Take 1 tablet (8 mg total) by mouth every 12 (twelve) hours as needed., Disp: 30 tablet, Rfl: 1    potassium chloride (KLOR-CON) 8 MEQ TbSR, Take 8 mEq by mouth., Disp: , Rfl:     potassium chloride SA (K-DUR,KLOR-CON) 20 MEQ tablet, Take 1 tablet (20 mEq total) by mouth once daily., Disp: 30 tablet, Rfl: 11    promethazine (PHENERGAN) 25 MG tablet, Take 1 tablet (25 mg total) by mouth every 6 (six) hours as needed for Nausea., Disp: 30 tablet, Rfl: 1  Review of patient's allergies indicates:   Allergen Reactions    Codeine Nausea And Vomiting    Erythromycin base Other (See Comments)     Lisinopril Palpitations     Cough        PHYSICAL EXAM:     Vitals:    07/29/19 0857   BP: (!) 146/66   Pulse: 78   Resp: 20   Temp: 99.5 °F (37.5 °C)       GENERAL:  Chronically ill-appearing woman in a wheelchair with her  and daughter Awake, alert and oriented to time, person and place.  No anxiety, or agitation.      HEENT: Normal conjunctivae and eyelids. WNL.  PERRLA 3 to 4 mm. No icterus, no pallor, no congestion, and no discharge noted.     NECK:  Supple. Trachea is central.  No crepitus.  No JVD or masses.    RESPIRATORY:  No intercostal retractions.  No dullness to percussion.  Chest is clear to auscultation.  No rales, rhonchi or wheezes.  No crepitus.  Good air entry bilaterally.    CARDIOVASCULAR:  S1 and S2 are normally heard without murmurs or gallops.  All peripheral pulses are present.    ABDOMEN:  Normal abdomen.  No hepatosplenomegaly.  No free fluid.  Bowel sounds are present.  No hernia noted. No masses.  No rebound or tenderness.  No guarding or rigidity.  Umbilicus is midline.    LYMPHATICS:  No axillary, cervical, supraclavicular, submental, or inguinal lymphadenopathy.    SKIN/MUSCULOSKELETAL:  There is no evidence of excoriation marks or ecchmosis.  No rashes.  No cyanosis.  No clubbing.  No joint or skeletal deformities noted.  Normal range of motion.    NEUROLOGIC:  Higher functions are appropriate.  No cranial nerve deficits.  Normal diana.  Normal strength.  Motor and sensory functions are normal.  Deep tendon reflexes are normal.    GENITAL/RECTAL:  Exams are deferred.      Laboratory:     CBC:  Lab Results   Component Value Date    WBC 2.11 (L) 07/26/2019    RBC 3.33 (L) 07/26/2019    HGB 9.9 (L) 07/26/2019    HCT 31.8 (L) 07/26/2019    MCV 96 07/26/2019    MCH 29.7 07/26/2019    MCHC 31.1 (L) 07/26/2019    RDW 16.0 (H) 07/26/2019     07/26/2019    MPV 9.0 (L) 07/26/2019    GRAN 40.0 07/26/2019    LYMPH 49.0 (H) 07/26/2019    MONO 7.0 07/26/2019    EOS 0.0 07/06/2019     BASO 0.03 07/06/2019    EOSINOPHIL 2.0 07/26/2019    BASOPHIL 0.0 07/26/2019       BMP: BMP  Lab Results   Component Value Date     07/26/2019    K 3.8 07/26/2019     07/26/2019    CO2 24 07/26/2019    BUN 9 07/26/2019    CREATININE 0.7 07/26/2019    CALCIUM 9.2 07/26/2019    ANIONGAP 10 07/26/2019    ESTGFRAFRICA >60 07/26/2019    EGFRNONAA >60 07/26/2019       LFT:   Lab Results   Component Value Date    ALT 36 07/26/2019    AST 42 (H) 07/26/2019    ALKPHOS 205 (H) 07/26/2019    BILITOT 0.9 07/26/2019         Assessment/Plan:       1. 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  CEA has improved indicating treatment response Neutropenic afebrile.  Add neulasta to regimen on day 3 and add retacrit for chemo induced anemia  Return to clinic to see Dr. sauer next week with CBC CMP  Continue hypertension management

## 2019-08-02 ENCOUNTER — INFUSION (OUTPATIENT)
Dept: INFUSION THERAPY | Facility: HOSPITAL | Age: 64
End: 2019-08-02
Attending: INTERNAL MEDICINE
Payer: MEDICARE

## 2019-08-02 VITALS
DIASTOLIC BLOOD PRESSURE: 84 MMHG | SYSTOLIC BLOOD PRESSURE: 131 MMHG | HEIGHT: 67 IN | HEART RATE: 78 BPM | RESPIRATION RATE: 18 BRPM | BODY MASS INDEX: 33.27 KG/M2 | WEIGHT: 212 LBS | TEMPERATURE: 99 F

## 2019-08-02 DIAGNOSIS — Z51.11 ENCOUNTER FOR ANTINEOPLASTIC CHEMOTHERAPY: Primary | ICD-10-CM

## 2019-08-02 PROCEDURE — 96372 THER/PROPH/DIAG INJ SC/IM: CPT

## 2019-08-02 PROCEDURE — 63600175 PHARM REV CODE 636 W HCPCS: Performed by: INTERNAL MEDICINE

## 2019-08-02 RX ORDER — SODIUM CHLORIDE 0.9 % (FLUSH) 0.9 %
10 SYRINGE (ML) INJECTION
Status: CANCELLED | OUTPATIENT
Start: 2019-08-02

## 2019-08-02 RX ORDER — HEPARIN 100 UNIT/ML
500 SYRINGE INTRAVENOUS
Status: CANCELLED | OUTPATIENT
Start: 2019-08-02

## 2019-08-02 RX ADMIN — EPOETIN ALFA-EPBX 40000 UNITS: 40000 INJECTION, SOLUTION INTRAVENOUS; SUBCUTANEOUS at 02:08

## 2019-08-05 ENCOUNTER — OFFICE VISIT (OUTPATIENT)
Dept: HEMATOLOGY/ONCOLOGY | Facility: CLINIC | Age: 64
End: 2019-08-05
Payer: MEDICARE

## 2019-08-05 ENCOUNTER — LAB VISIT (OUTPATIENT)
Dept: LAB | Facility: HOSPITAL | Age: 64
End: 2019-08-05
Attending: INTERNAL MEDICINE
Payer: MEDICARE

## 2019-08-05 VITALS
SYSTOLIC BLOOD PRESSURE: 119 MMHG | OXYGEN SATURATION: 98 % | HEIGHT: 67 IN | RESPIRATION RATE: 20 BRPM | WEIGHT: 209.44 LBS | HEART RATE: 93 BPM | DIASTOLIC BLOOD PRESSURE: 62 MMHG | BODY MASS INDEX: 32.87 KG/M2 | TEMPERATURE: 99 F

## 2019-08-05 DIAGNOSIS — D64.9 SYMPTOMATIC ANEMIA: ICD-10-CM

## 2019-08-05 DIAGNOSIS — Z09 CHEMOTHERAPY FOLLOW-UP EXAMINATION: ICD-10-CM

## 2019-08-05 DIAGNOSIS — I10 ESSENTIAL HYPERTENSION: ICD-10-CM

## 2019-08-05 DIAGNOSIS — Z51.11 ENCOUNTER FOR ANTINEOPLASTIC CHEMOTHERAPY: ICD-10-CM

## 2019-08-05 DIAGNOSIS — C78.00 MALIGNANT NEOPLASM METASTATIC TO LUNG, UNSPECIFIED LATERALITY: Primary | ICD-10-CM

## 2019-08-05 DIAGNOSIS — E66.01 MORBID OBESITY: ICD-10-CM

## 2019-08-05 DIAGNOSIS — M62.50 MUSCULAR ATROPHY, UNSPECIFIED SITE: ICD-10-CM

## 2019-08-05 DIAGNOSIS — C78.00 MALIGNANT NEOPLASM METASTATIC TO LUNG, UNSPECIFIED LATERALITY: ICD-10-CM

## 2019-08-05 DIAGNOSIS — C17.0 DUODENAL CANCER: ICD-10-CM

## 2019-08-05 DIAGNOSIS — C78.7 HEPATIC METASTASES: ICD-10-CM

## 2019-08-05 LAB
ALBUMIN SERPL BCP-MCNC: 2.8 G/DL (ref 3.5–5.2)
ALP SERPL-CCNC: 151 U/L (ref 55–135)
ALT SERPL W/O P-5'-P-CCNC: 18 U/L (ref 10–44)
ANION GAP SERPL CALC-SCNC: 10 MMOL/L (ref 8–16)
AST SERPL-CCNC: 24 U/L (ref 10–40)
BASOPHILS # BLD AUTO: 0.05 K/UL (ref 0–0.2)
BASOPHILS NFR BLD: 1 % (ref 0–1.9)
BILIRUB SERPL-MCNC: 0.4 MG/DL (ref 0.1–1)
BUN SERPL-MCNC: 5 MG/DL (ref 8–23)
CALCIUM SERPL-MCNC: 9.7 MG/DL (ref 8.7–10.5)
CHLORIDE SERPL-SCNC: 104 MMOL/L (ref 95–110)
CO2 SERPL-SCNC: 28 MMOL/L (ref 23–29)
CREAT SERPL-MCNC: 0.8 MG/DL (ref 0.5–1.4)
DIFFERENTIAL METHOD: ABNORMAL
EOSINOPHIL # BLD AUTO: 0.1 K/UL (ref 0–0.5)
EOSINOPHIL NFR BLD: 1.9 % (ref 0–8)
ERYTHROCYTE [DISTWIDTH] IN BLOOD BY AUTOMATED COUNT: 16.4 % (ref 11.5–14.5)
EST. GFR  (AFRICAN AMERICAN): >60 ML/MIN/1.73 M^2
EST. GFR  (NON AFRICAN AMERICAN): >60 ML/MIN/1.73 M^2
GLUCOSE SERPL-MCNC: 97 MG/DL (ref 70–110)
HCT VFR BLD AUTO: 35.9 % (ref 37–48.5)
HGB BLD-MCNC: 11 G/DL (ref 12–16)
IMM GRANULOCYTES # BLD AUTO: 0.14 K/UL (ref 0–0.04)
LYMPHOCYTES # BLD AUTO: 1.3 K/UL (ref 1–4.8)
LYMPHOCYTES NFR BLD: 26 % (ref 18–48)
MCH RBC QN AUTO: 30.6 PG (ref 27–31)
MCHC RBC AUTO-ENTMCNC: 30.6 G/DL (ref 32–36)
MCV RBC AUTO: 100 FL (ref 82–98)
MONOCYTES # BLD AUTO: 0.8 K/UL (ref 0.3–1)
MONOCYTES NFR BLD: 14.6 % (ref 4–15)
NEUTROPHILS # BLD AUTO: 2.8 K/UL (ref 1.8–7.7)
NEUTROPHILS NFR BLD: 53.8 % (ref 38–73)
NRBC BLD-RTO: 2 /100 WBC
PLATELET # BLD AUTO: 294 K/UL (ref 150–350)
PMV BLD AUTO: 8.3 FL (ref 9.2–12.9)
POTASSIUM SERPL-SCNC: 4.3 MMOL/L (ref 3.5–5.1)
PROT SERPL-MCNC: 6.3 G/DL (ref 6–8.4)
RBC # BLD AUTO: 3.6 M/UL (ref 4–5.4)
SODIUM SERPL-SCNC: 142 MMOL/L (ref 136–145)
WBC # BLD AUTO: 5.15 K/UL (ref 3.9–12.7)

## 2019-08-05 PROCEDURE — 1101F PR PT FALLS ASSESS DOC 0-1 FALLS W/OUT INJ PAST YR: ICD-10-PCS | Mod: S$GLB,,, | Performed by: INTERNAL MEDICINE

## 2019-08-05 PROCEDURE — 36415 COLL VENOUS BLD VENIPUNCTURE: CPT

## 2019-08-05 PROCEDURE — 99215 PR OFFICE/OUTPT VISIT, EST, LEVL V, 40-54 MIN: ICD-10-PCS | Mod: S$GLB,,, | Performed by: INTERNAL MEDICINE

## 2019-08-05 PROCEDURE — 3008F PR BODY MASS INDEX (BMI) DOCUMENTED: ICD-10-PCS | Mod: S$GLB,,, | Performed by: INTERNAL MEDICINE

## 2019-08-05 PROCEDURE — 99999 PR PBB SHADOW E&M-EST. PATIENT-LVL III: CPT | Mod: PBBFAC,,, | Performed by: INTERNAL MEDICINE

## 2019-08-05 PROCEDURE — 3008F BODY MASS INDEX DOCD: CPT | Mod: S$GLB,,, | Performed by: INTERNAL MEDICINE

## 2019-08-05 PROCEDURE — 3078F PR MOST RECENT DIASTOLIC BLOOD PRESSURE < 80 MM HG: ICD-10-PCS | Mod: S$GLB,,, | Performed by: INTERNAL MEDICINE

## 2019-08-05 PROCEDURE — 99999 PR PBB SHADOW E&M-EST. PATIENT-LVL III: ICD-10-PCS | Mod: PBBFAC,,, | Performed by: INTERNAL MEDICINE

## 2019-08-05 PROCEDURE — 1101F PT FALLS ASSESS-DOCD LE1/YR: CPT | Mod: S$GLB,,, | Performed by: INTERNAL MEDICINE

## 2019-08-05 PROCEDURE — 3074F PR MOST RECENT SYSTOLIC BLOOD PRESSURE < 130 MM HG: ICD-10-PCS | Mod: S$GLB,,, | Performed by: INTERNAL MEDICINE

## 2019-08-05 PROCEDURE — 99215 OFFICE O/P EST HI 40 MIN: CPT | Mod: S$GLB,,, | Performed by: INTERNAL MEDICINE

## 2019-08-05 PROCEDURE — 80053 COMPREHEN METABOLIC PANEL: CPT

## 2019-08-05 PROCEDURE — 3074F SYST BP LT 130 MM HG: CPT | Mod: S$GLB,,, | Performed by: INTERNAL MEDICINE

## 2019-08-05 PROCEDURE — 85025 COMPLETE CBC W/AUTO DIFF WBC: CPT

## 2019-08-05 PROCEDURE — 3078F DIAST BP <80 MM HG: CPT | Mod: S$GLB,,, | Performed by: INTERNAL MEDICINE

## 2019-08-05 RX ORDER — FLUOROURACIL 50 MG/ML
400 INJECTION, SOLUTION INTRAVENOUS
Status: CANCELLED | OUTPATIENT
Start: 2019-08-06

## 2019-08-05 RX ORDER — SODIUM CHLORIDE 0.9 % (FLUSH) 0.9 %
10 SYRINGE (ML) INJECTION
Status: CANCELLED | OUTPATIENT
Start: 2019-08-08

## 2019-08-05 RX ORDER — ATROPINE SULFATE 0.4 MG/ML
0.4 INJECTION, SOLUTION ENDOTRACHEAL; INTRAMEDULLARY; INTRAMUSCULAR; INTRAVENOUS; SUBCUTANEOUS ONCE AS NEEDED
Status: CANCELLED | OUTPATIENT
Start: 2019-08-06

## 2019-08-05 RX ORDER — SODIUM CHLORIDE 0.9 % (FLUSH) 0.9 %
10 SYRINGE (ML) INJECTION
Status: CANCELLED | OUTPATIENT
Start: 2019-08-06

## 2019-08-05 RX ORDER — HEPARIN 100 UNIT/ML
500 SYRINGE INTRAVENOUS
Status: CANCELLED | OUTPATIENT
Start: 2019-08-08

## 2019-08-05 RX ORDER — HEPARIN 100 UNIT/ML
500 SYRINGE INTRAVENOUS
Status: CANCELLED | OUTPATIENT
Start: 2019-08-06

## 2019-08-05 NOTE — PROGRESS NOTES
Cc  Sharif garcia     Indigo Stuart is a 63 y.o.  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  Kras was MUTATED No MSI   Cycle 1  January 15 2019  Here for chemo but she has had liver progression and SBO treated medically   New onset blood in stool and pruritus of rectum   CHest port in place   Tolerating lopressor for htn and lipitor for dyslipidemia  Tolerating zofran for nausea chemo     Cycle one folfox avastin January 15 2019   Failed regimen and admitted with SBO   Hepatic mets progressed hence changed to irinotecan drop  oxaliplatin   Cycle one folfiri avastin April 2019  Here to get clearance for next dose        Past Medical History:   Diagnosis Date    Cancer     colon    Encounter for blood transfusion     Hypertension          Current Outpatient Medications:     amLODIPine (NORVASC) 5 MG tablet, Take 5 mg by mouth once daily., Disp: , Rfl: 3    atorvastatin (LIPITOR) 20 MG tablet, Take 20 mg by mouth every evening., Disp: , Rfl:     ferrous sulfate (FEOSOL) 325 mg (65 mg iron) Tab tablet, Take 1 tablet by mouth 2 (two) times daily., Disp: , Rfl: 3    ferrous sulfate (FEOSOL) 325 mg (65 mg iron) Tab tablet, TAKE 1 TABLET BY MOUTH TWICE A DAY, Disp: , Rfl:     metoprolol succinate (TOPROL-XL) 25 MG 24 hr tablet, Take 25 mg by mouth every evening., Disp: , Rfl: 3    neomycin-polymyxin-hydrocortisone (CORTISPORIN) 3.5-10,000-10 mg-unit-mg/mL ophthalmic suspension, Place 1 drop into both eyes 4 (four) times daily., Disp: 7.5 mL, Rfl: 0    ondansetron (ZOFRAN-ODT) 8 MG TbDL, Take 1 tablet (8 mg total) by mouth every 12 (twelve) hours as needed., Disp: 30 tablet, Rfl: 1    potassium chloride SA (K-DUR,KLOR-CON) 20 MEQ tablet, Take 1 tablet (20 mEq total) by mouth once daily., Disp: 30 tablet, Rfl: 11    promethazine (PHENERGAN) 25 MG tablet, Take 1 tablet (25 mg total) by mouth every 6 (six) hours as needed for  "Nausea., Disp: 30 tablet, Rfl: 1        Review of Systems:  General: Weight gain: No, Weight Loss: No, Fatigue  Fever: y Chills: No, Night Sweats: No, Insomnia: No, Excessive sleeping: No   Respiratory:  Cough: No, Shortness of Breath:  new onset specially dyspnea with exertion  Wheezing: No, Excessive Snoring: No, Coughing up blood: No  Endocrine: Heat Intolerance: No, Cold Intolerance: No,   Excessive Thirst: No, Excessive Hunger: No  Eyes:  Blurred Vision: No, Double Vision: No   Light Sensitivity: No, Eye pain: No  Musculoskeletal: Muscle Aches/Pain: No, Joint Pain/Swelling: yes ankles   Muscle Weakness:no , Neck Pain: No, Back Pain:   Neurological: Difficulty Walking/Falls:  No  Further   Headache Migraine: No, Dizziness/Vertigo: No, Fainting: No, Weakness: Better   Cardiovascular: Chest Pain: No, Shortness of Breath: no   Gastrointestinal: Nausea/Vomiting: No, Constipation: No, Diarrhea:stable   Psych/Cog:  Depression: No, Anxiety: No, Hallucinations: No, Problems Concentrating: No  : Frequent Urination: No, Incontinence: No, Blood of Urine: No, Urinary Infections: No  ENT:Hearing Loss: No, Earache: No, Ringing in Ears: No  Facial Pain: No, Chronic Congestion:No   Immune: Seasonal Allergies: No, Hives and/or Rashes: No  The remainder of the review of twelve body systems was reviewed and normal        /62   Pulse 93   Temp 98.6 °F (37 °C)   Resp 20   Ht 5' 7" (1.702 m)   Wt 95 kg (209 lb 7 oz)   SpO2 98%   BMI 32.80 kg/m²   Constitutional: oriented to person, place, and time.  Conj pink   HENT: anicteric sclera   Head: Normocephalic and atraumatic.   Ears canal normal no discharge    Nose: Nose normal.   Non boggy turbinates  No lymphatics noted   Eyes: Conjunctivae PALE  EOM are normal.  Neck: . No thyromegaly present.  No bruits   Cardiovascular: Normal rate, regular rhythm, normal heart sounds  No murmur heard.  Pulmonary/Chest:  Decreased breath sounds  Better today   Abdominal: Soft. Bowel " sounds are normal.  no mass.   Musculoskeletal: Normal range of motion. decreased strength   1 + edema of legs   No further boggy turbinates  Lymphadenopathy:  no cervical adenopathy.   Neurological: alert and oriented to person, place  Skin: Skin is warm and dry. No rash noted.   Psychiatric: normal mood and affect.   behavior is normal.   Vitals reviewed.    Lab Results   Component Value Date    WBC 5.15 08/05/2019    RBC 3.60 (L) 08/05/2019    HGB 11.0 (L) 08/05/2019    HCT 35.9 (L) 08/05/2019     (H) 08/05/2019    MCH 30.6 08/05/2019    MCHC 30.6 (L) 08/05/2019    RDW 16.4 (H) 08/05/2019     08/05/2019    MPV 8.3 (L) 08/05/2019    GRAN 2.8 08/05/2019    GRAN 53.8 08/05/2019    LYMPH 1.3 08/05/2019    LYMPH 26.0 08/05/2019    MONO 0.8 08/05/2019    MONO 14.6 08/05/2019    EOS 0.1 08/05/2019    BASO 0.05 08/05/2019    EOSINOPHIL 1.9 08/05/2019    BASOPHIL 1.0 08/05/2019       CMP  Sodium   Date Value Ref Range Status   08/05/2019 142 136 - 145 mmol/L Final     Potassium   Date Value Ref Range Status   08/05/2019 4.3 3.5 - 5.1 mmol/L Final     Chloride   Date Value Ref Range Status   08/05/2019 104 95 - 110 mmol/L Final     CO2   Date Value Ref Range Status   08/05/2019 28 23 - 29 mmol/L Final     Glucose   Date Value Ref Range Status   08/05/2019 97 70 - 110 mg/dL Final     BUN, Bld   Date Value Ref Range Status   08/05/2019 5 (L) 8 - 23 mg/dL Final     Creatinine   Date Value Ref Range Status   08/05/2019 0.8 0.5 - 1.4 mg/dL Final     Calcium   Date Value Ref Range Status   08/05/2019 9.7 8.7 - 10.5 mg/dL Final     Total Protein   Date Value Ref Range Status   08/05/2019 6.3 6.0 - 8.4 g/dL Final     Albumin   Date Value Ref Range Status   08/05/2019 2.8 (L) 3.5 - 5.2 g/dL Final     Total Bilirubin   Date Value Ref Range Status   08/05/2019 0.4 0.1 - 1.0 mg/dL Final     Comment:     For infants and newborns, interpretation of results should be based  on gestational age, weight and in agreement with  clinical  observations.  Premature Infant recommended reference ranges:  Up to 24 hours.............<8.0 mg/dL  Up to 48 hours............<12.0 mg/dL  3-5 days..................<15.0 mg/dL  6-29 days.................<15.0 mg/dL       Alkaline Phosphatase   Date Value Ref Range Status   08/05/2019 151 (H) 55 - 135 U/L Final     AST   Date Value Ref Range Status   08/05/2019 24 10 - 40 U/L Final     ALT   Date Value Ref Range Status   08/05/2019 18 10 - 44 U/L Final     Anion Gap   Date Value Ref Range Status   08/05/2019 10 8 - 16 mmol/L Final     eGFR if    Date Value Ref Range Status   08/05/2019 >60 >60 mL/min/1.73 m^2 Final     eGFR if non    Date Value Ref Range Status   08/05/2019 >60 >60 mL/min/1.73 m^2 Final     Comment:     Calculation used to obtain the estimated glomerular filtration  rate (eGFR) is the CKD-EPI equation.        No msi     Malignant neoplasm metastatic to lung, unspecified laterality    Hepatic metastases    Encounter for antineoplastic chemotherapy    Duodenal cancer    Symptomatic anemia    Morbid obesity    Essential hypertension    Chemotherapy follow-up examination    Muscular atrophy, unspecified site         Folfiri with avastin cleared  Failed  folfox and avastin for stage  4 disease   Patient verbalized understanding of such  Pulmonary lesions improved   Liver lesions improved on MRI   Start working out and building up muscles mass   pt politely refusing Physical therapy at this time   Educated her about exercise   Tolerating lipitor for dyslipidemia    Current Outpatient Medications:     amLODIPine (NORVASC) 5 MG tablet, Take 5 mg by mouth once daily., Disp: , Rfl: 3    atorvastatin (LIPITOR) 20 MG tablet, Take 20 mg by mouth every evening., Disp: , Rfl:     ferrous sulfate (FEOSOL) 325 mg (65 mg iron) Tab tablet, Take 1 tablet by mouth 2 (two) times daily., Disp: , Rfl: 3    ferrous sulfate (FEOSOL) 325 mg (65 mg iron) Tab tablet, TAKE 1 TABLET  BY MOUTH TWICE A DAY, Disp: , Rfl:     metoprolol succinate (TOPROL-XL) 25 MG 24 hr tablet, Take 25 mg by mouth every evening., Disp: , Rfl: 3    neomycin-polymyxin-hydrocortisone (CORTISPORIN) 3.5-10,000-10 mg-unit-mg/mL ophthalmic suspension, Place 1 drop into both eyes 4 (four) times daily., Disp: 7.5 mL, Rfl: 0    ondansetron (ZOFRAN-ODT) 8 MG TbDL, Take 1 tablet (8 mg total) by mouth every 12 (twelve) hours as needed., Disp: 30 tablet, Rfl: 1    potassium chloride SA (K-DUR,KLOR-CON) 20 MEQ tablet, Take 1 tablet (20 mEq total) by mouth once daily., Disp: 30 tablet, Rfl: 11    promethazine (PHENERGAN) 25 MG tablet, Take 1 tablet (25 mg total) by mouth every 6 (six) hours as needed for Nausea., Disp: 30 tablet, Rfl: 1  She is to continue Norvasc for blood pressure control which is stable   Advance Care Planning     Living Will  During this visit, I engaged the patient in the advance care planning process.  The patient and I reviewed the role for advance directives and their purpose in directing future healthcare if the patient's unable to speak for him/herself.  At this point in time, the patient does have full decision-making capacity.  We discussed different extreme health states that she could experience, and reviewed what kind of medical care she would want in those situations.  The patient communicated that if she were comatose and had little chance of a meaningful recovery, she would not want machines/life-sustaining treatments used.  5 The patient  Has not  completed a living will to reflect these preferences.  The patient   HAS NOT already designated a healthcare power of  to make decisions on her behalf.   I spent a total of 5 minutes engaging the patient in this advance care planning discussion.       Thank you for allowing me to evaluate and participate in the care of this pleasant patient. Please fell free to call me with any questions or concerns.    Warmly,  Karina Pettit MD    This note  was dictated with Dragon and briefly proofread. Please excuse any sentences that may be unclear or words misspelled

## 2019-08-06 ENCOUNTER — INFUSION (OUTPATIENT)
Dept: INFUSION THERAPY | Facility: HOSPITAL | Age: 64
End: 2019-08-06
Attending: INTERNAL MEDICINE
Payer: MEDICARE

## 2019-08-06 ENCOUNTER — PATIENT MESSAGE (OUTPATIENT)
Dept: HEMATOLOGY/ONCOLOGY | Facility: CLINIC | Age: 64
End: 2019-08-06

## 2019-08-06 VITALS
WEIGHT: 209 LBS | TEMPERATURE: 98 F | DIASTOLIC BLOOD PRESSURE: 80 MMHG | HEART RATE: 73 BPM | BODY MASS INDEX: 38.46 KG/M2 | HEIGHT: 62 IN | OXYGEN SATURATION: 99 % | RESPIRATION RATE: 20 BRPM | SYSTOLIC BLOOD PRESSURE: 132 MMHG

## 2019-08-06 DIAGNOSIS — D70.1 CHEMOTHERAPY INDUCED NEUTROPENIA: ICD-10-CM

## 2019-08-06 DIAGNOSIS — Z51.11 ENCOUNTER FOR ANTINEOPLASTIC CHEMOTHERAPY: Primary | ICD-10-CM

## 2019-08-06 DIAGNOSIS — T45.1X5A CHEMOTHERAPY INDUCED NEUTROPENIA: ICD-10-CM

## 2019-08-06 DIAGNOSIS — C78.7 HEPATIC METASTASES: ICD-10-CM

## 2019-08-06 DIAGNOSIS — K31.5 DUODENAL STENOSIS: ICD-10-CM

## 2019-08-06 DIAGNOSIS — C78.00 MALIGNANT NEOPLASM METASTATIC TO LUNG, UNSPECIFIED LATERALITY: ICD-10-CM

## 2019-08-06 DIAGNOSIS — C17.0 DUODENAL CANCER: ICD-10-CM

## 2019-08-06 PROCEDURE — 96416 CHEMO PROLONG INFUSE W/PUMP: CPT

## 2019-08-06 PROCEDURE — 96411 CHEMO IV PUSH ADDL DRUG: CPT

## 2019-08-06 PROCEDURE — 96415 CHEMO IV INFUSION ADDL HR: CPT

## 2019-08-06 PROCEDURE — 96368 THER/DIAG CONCURRENT INF: CPT

## 2019-08-06 PROCEDURE — 96375 TX/PRO/DX INJ NEW DRUG ADDON: CPT

## 2019-08-06 PROCEDURE — 96367 TX/PROPH/DG ADDL SEQ IV INF: CPT

## 2019-08-06 PROCEDURE — 96413 CHEMO IV INFUSION 1 HR: CPT

## 2019-08-06 PROCEDURE — 96366 THER/PROPH/DIAG IV INF ADDON: CPT

## 2019-08-06 PROCEDURE — 96417 CHEMO IV INFUS EACH ADDL SEQ: CPT

## 2019-08-06 PROCEDURE — 63600175 PHARM REV CODE 636 W HCPCS: Performed by: INTERNAL MEDICINE

## 2019-08-06 RX ORDER — SODIUM CHLORIDE 0.9 % (FLUSH) 0.9 %
10 SYRINGE (ML) INJECTION
Status: DISCONTINUED | OUTPATIENT
Start: 2019-08-06 | End: 2019-08-06 | Stop reason: HOSPADM

## 2019-08-06 RX ORDER — ATROPINE SULFATE 0.4 MG/ML
0.4 INJECTION, SOLUTION ENDOTRACHEAL; INTRAMEDULLARY; INTRAMUSCULAR; INTRAVENOUS; SUBCUTANEOUS ONCE AS NEEDED
Status: COMPLETED | OUTPATIENT
Start: 2019-08-06 | End: 2019-08-06

## 2019-08-06 RX ORDER — HEPARIN 100 UNIT/ML
500 SYRINGE INTRAVENOUS
Status: DISCONTINUED | OUTPATIENT
Start: 2019-08-06 | End: 2019-08-06 | Stop reason: HOSPADM

## 2019-08-06 RX ORDER — FLUOROURACIL 50 MG/ML
400 INJECTION, SOLUTION INTRAVENOUS
Status: COMPLETED | OUTPATIENT
Start: 2019-08-06 | End: 2019-08-06

## 2019-08-06 RX ADMIN — FLUOROURACIL 5015 MG: 50 INJECTION, SOLUTION INTRAVENOUS at 11:08

## 2019-08-06 RX ADMIN — IRINOTECAN HYDROCHLORIDE 376 MG: 20 INJECTION, SOLUTION INTRAVENOUS at 09:08

## 2019-08-06 RX ADMIN — FLUOROURACIL 835 MG: 50 INJECTION, SOLUTION INTRAVENOUS at 11:08

## 2019-08-06 RX ADMIN — DEXTROSE 835 MG: 5 SOLUTION INTRAVENOUS at 09:08

## 2019-08-06 RX ADMIN — ATROPINE SULFATE 0.4 MG: 0.4 INJECTION, SOLUTION INTRAMUSCULAR; INTRAVENOUS; SUBCUTANEOUS at 09:08

## 2019-08-06 RX ADMIN — DEXAMETHASONE SODIUM PHOSPHATE: 10 INJECTION, SOLUTION INTRAMUSCULAR; INTRAVENOUS at 08:08

## 2019-08-06 RX ADMIN — BEVACIZUMAB 500 MG: 400 INJECTION, SOLUTION INTRAVENOUS at 09:08

## 2019-08-06 RX ADMIN — SODIUM CHLORIDE: 0.9 INJECTION, SOLUTION INTRAVENOUS at 08:08

## 2019-08-06 NOTE — PLAN OF CARE
Problem: Nausea and Vomiting (Chemotherapy Effects)  Goal: Fluid and Electrolyte Balance  Outcome: Ongoing (interventions implemented as appropriate)  Pt given antiemetics and educated on nausea and vomiting chemo effects

## 2019-08-08 ENCOUNTER — INFUSION (OUTPATIENT)
Dept: INFUSION THERAPY | Facility: HOSPITAL | Age: 64
End: 2019-08-08
Attending: INTERNAL MEDICINE
Payer: MEDICARE

## 2019-08-08 VITALS
DIASTOLIC BLOOD PRESSURE: 89 MMHG | WEIGHT: 209 LBS | HEART RATE: 78 BPM | RESPIRATION RATE: 18 BRPM | SYSTOLIC BLOOD PRESSURE: 134 MMHG | TEMPERATURE: 98 F | OXYGEN SATURATION: 99 % | BODY MASS INDEX: 38.46 KG/M2 | HEIGHT: 62 IN

## 2019-08-08 DIAGNOSIS — K31.5 DUODENAL STENOSIS: ICD-10-CM

## 2019-08-08 DIAGNOSIS — C17.0 DUODENAL CANCER: ICD-10-CM

## 2019-08-08 DIAGNOSIS — D70.1 CHEMOTHERAPY INDUCED NEUTROPENIA: ICD-10-CM

## 2019-08-08 DIAGNOSIS — C78.00 MALIGNANT NEOPLASM METASTATIC TO LUNG, UNSPECIFIED LATERALITY: ICD-10-CM

## 2019-08-08 DIAGNOSIS — T45.1X5A CHEMOTHERAPY INDUCED NEUTROPENIA: ICD-10-CM

## 2019-08-08 DIAGNOSIS — C78.7 HEPATIC METASTASES: ICD-10-CM

## 2019-08-08 DIAGNOSIS — Z51.11 ENCOUNTER FOR ANTINEOPLASTIC CHEMOTHERAPY: Primary | ICD-10-CM

## 2019-08-08 PROCEDURE — 96523 IRRIG DRUG DELIVERY DEVICE: CPT

## 2019-08-08 PROCEDURE — 96377 APPLICATON ON-BODY INJECTOR: CPT

## 2019-08-08 PROCEDURE — 63600175 PHARM REV CODE 636 W HCPCS: Performed by: INTERNAL MEDICINE

## 2019-08-08 PROCEDURE — 96372 THER/PROPH/DIAG INJ SC/IM: CPT

## 2019-08-08 PROCEDURE — 63600175 PHARM REV CODE 636 W HCPCS: Mod: JG | Performed by: INTERNAL MEDICINE

## 2019-08-08 RX ORDER — HEPARIN 100 UNIT/ML
500 SYRINGE INTRAVENOUS
Status: DISCONTINUED | OUTPATIENT
Start: 2019-08-08 | End: 2019-08-08 | Stop reason: HOSPADM

## 2019-08-08 RX ORDER — HEPARIN 100 UNIT/ML
500 SYRINGE INTRAVENOUS
Status: CANCELLED | OUTPATIENT
Start: 2019-08-08

## 2019-08-08 RX ORDER — SODIUM CHLORIDE 0.9 % (FLUSH) 0.9 %
10 SYRINGE (ML) INJECTION
Status: CANCELLED | OUTPATIENT
Start: 2019-08-08

## 2019-08-08 RX ORDER — SODIUM CHLORIDE 0.9 % (FLUSH) 0.9 %
10 SYRINGE (ML) INJECTION
Status: DISCONTINUED | OUTPATIENT
Start: 2019-08-08 | End: 2019-08-08 | Stop reason: HOSPADM

## 2019-08-08 RX ADMIN — HEPARIN 500 UNITS: 100 SYRINGE at 10:08

## 2019-08-08 RX ADMIN — PEGFILGRASTIM 6 MG: KIT SUBCUTANEOUS at 11:08

## 2019-08-08 RX ADMIN — EPOETIN ALFA-EPBX 40000 UNITS: 40000 INJECTION, SOLUTION INTRAVENOUS; SUBCUTANEOUS at 11:08

## 2019-08-08 NOTE — PLAN OF CARE
Problem: Anemia (Chemotherapy Effects)  Goal: Anemia Symptom Improvement  Outcome: Ongoing (interventions implemented as appropriate)  Procrit given per MD order

## 2019-08-08 NOTE — PLAN OF CARE
Problem: Neutropenia (Chemotherapy Effects)  Goal: Absence of Infection  Outcome: Ongoing (interventions implemented as appropriate)  Neulasta on-pro applied per MD order

## 2019-08-16 ENCOUNTER — LAB VISIT (OUTPATIENT)
Dept: LAB | Facility: HOSPITAL | Age: 64
End: 2019-08-16
Attending: INTERNAL MEDICINE
Payer: MEDICARE

## 2019-08-16 DIAGNOSIS — Z51.11 ENCOUNTER FOR ANTINEOPLASTIC CHEMOTHERAPY: ICD-10-CM

## 2019-08-16 DIAGNOSIS — C17.0 DUODENAL CANCER: ICD-10-CM

## 2019-08-16 LAB
ALBUMIN SERPL BCP-MCNC: 3.2 G/DL (ref 3.5–5.2)
ALP SERPL-CCNC: 171 U/L (ref 55–135)
ALT SERPL W/O P-5'-P-CCNC: 18 U/L (ref 10–44)
ANION GAP SERPL CALC-SCNC: 11 MMOL/L (ref 8–16)
ANISOCYTOSIS BLD QL SMEAR: SLIGHT
AST SERPL-CCNC: 17 U/L (ref 10–40)
BASOPHILS NFR BLD: 0 % (ref 0–1.9)
BILIRUB SERPL-MCNC: 0.6 MG/DL (ref 0.1–1)
BUN SERPL-MCNC: 11 MG/DL (ref 8–23)
CALCIUM SERPL-MCNC: 9.3 MG/DL (ref 8.7–10.5)
CANCER AG19-9 SERPL-ACNC: 33 U/ML (ref 2–40)
CEA SERPL-MCNC: 12.2 NG/ML (ref 0–5)
CHLORIDE SERPL-SCNC: 107 MMOL/L (ref 95–110)
CO2 SERPL-SCNC: 23 MMOL/L (ref 23–29)
CREAT SERPL-MCNC: 0.7 MG/DL (ref 0.5–1.4)
DACRYOCYTES BLD QL SMEAR: ABNORMAL
DIFFERENTIAL METHOD: ABNORMAL
EOSINOPHIL NFR BLD: 4 % (ref 0–8)
ERYTHROCYTE [DISTWIDTH] IN BLOOD BY AUTOMATED COUNT: 16.3 % (ref 11.5–14.5)
EST. GFR  (AFRICAN AMERICAN): >60 ML/MIN/1.73 M^2
EST. GFR  (NON AFRICAN AMERICAN): >60 ML/MIN/1.73 M^2
GLUCOSE SERPL-MCNC: 109 MG/DL (ref 70–110)
HCT VFR BLD AUTO: 36 % (ref 37–48.5)
HGB BLD-MCNC: 10.9 G/DL (ref 12–16)
IMM GRANULOCYTES # BLD AUTO: ABNORMAL K/UL
LYMPHOCYTES NFR BLD: 50 % (ref 18–48)
MCH RBC QN AUTO: 30.6 PG (ref 27–31)
MCHC RBC AUTO-ENTMCNC: 30.3 G/DL (ref 32–36)
MCV RBC AUTO: 101 FL (ref 82–98)
METAMYELOCYTES NFR BLD MANUAL: 2 %
MONOCYTES NFR BLD: 2 % (ref 4–15)
NEUTROPHILS NFR BLD: 26 % (ref 38–73)
NEUTS BAND NFR BLD MANUAL: 16 %
NRBC BLD-RTO: 0 /100 WBC
PLATELET # BLD AUTO: 149 K/UL (ref 150–350)
PLATELET BLD QL SMEAR: ABNORMAL
PMV BLD AUTO: 9.1 FL (ref 9.2–12.9)
POIKILOCYTOSIS BLD QL SMEAR: SLIGHT
POLYCHROMASIA BLD QL SMEAR: ABNORMAL
POTASSIUM SERPL-SCNC: 3.5 MMOL/L (ref 3.5–5.1)
PROT SERPL-MCNC: 6.4 G/DL (ref 6–8.4)
RBC # BLD AUTO: 3.56 M/UL (ref 4–5.4)
SODIUM SERPL-SCNC: 141 MMOL/L (ref 136–145)
WBC # BLD AUTO: 1.89 K/UL (ref 3.9–12.7)

## 2019-08-16 PROCEDURE — 36415 COLL VENOUS BLD VENIPUNCTURE: CPT

## 2019-08-16 PROCEDURE — 85007 BL SMEAR W/DIFF WBC COUNT: CPT

## 2019-08-16 PROCEDURE — 86301 IMMUNOASSAY TUMOR CA 19-9: CPT

## 2019-08-16 PROCEDURE — 85027 COMPLETE CBC AUTOMATED: CPT

## 2019-08-16 PROCEDURE — 82378 CARCINOEMBRYONIC ANTIGEN: CPT

## 2019-08-16 PROCEDURE — 80053 COMPREHEN METABOLIC PANEL: CPT

## 2019-08-19 ENCOUNTER — TELEPHONE (OUTPATIENT)
Dept: HEMATOLOGY/ONCOLOGY | Facility: CLINIC | Age: 64
End: 2019-08-19

## 2019-08-19 ENCOUNTER — INFUSION (OUTPATIENT)
Dept: INFUSION THERAPY | Facility: HOSPITAL | Age: 64
End: 2019-08-19
Attending: INTERNAL MEDICINE
Payer: MEDICARE

## 2019-08-19 ENCOUNTER — OFFICE VISIT (OUTPATIENT)
Dept: HEMATOLOGY/ONCOLOGY | Facility: CLINIC | Age: 64
End: 2019-08-19
Payer: MEDICARE

## 2019-08-19 VITALS
HEART RATE: 86 BPM | WEIGHT: 206 LBS | DIASTOLIC BLOOD PRESSURE: 80 MMHG | TEMPERATURE: 98 F | BODY MASS INDEX: 37.91 KG/M2 | HEIGHT: 62 IN | RESPIRATION RATE: 16 BRPM | SYSTOLIC BLOOD PRESSURE: 114 MMHG

## 2019-08-19 DIAGNOSIS — T45.1X5A CHEMOTHERAPY-INDUCED NEUTROPENIA: ICD-10-CM

## 2019-08-19 DIAGNOSIS — C78.7 HEPATIC METASTASES: ICD-10-CM

## 2019-08-19 DIAGNOSIS — Z09 CHEMOTHERAPY FOLLOW-UP EXAMINATION: ICD-10-CM

## 2019-08-19 DIAGNOSIS — C78.00 MALIGNANT NEOPLASM METASTATIC TO LUNG, UNSPECIFIED LATERALITY: ICD-10-CM

## 2019-08-19 DIAGNOSIS — Z09 ONCOLOGY FOLLOW-UP ENCOUNTER: ICD-10-CM

## 2019-08-19 DIAGNOSIS — R50.81 NEUTROPENIC FEVER: Primary | ICD-10-CM

## 2019-08-19 DIAGNOSIS — D64.9 SYMPTOMATIC ANEMIA: ICD-10-CM

## 2019-08-19 DIAGNOSIS — D70.9 NEUTROPENIC FEVER: Primary | ICD-10-CM

## 2019-08-19 DIAGNOSIS — C17.0 DUODENAL CANCER: Primary | ICD-10-CM

## 2019-08-19 DIAGNOSIS — D70.1 CHEMOTHERAPY-INDUCED NEUTROPENIA: ICD-10-CM

## 2019-08-19 PROCEDURE — 99999 PR PBB SHADOW E&M-EST. PATIENT-LVL III: ICD-10-PCS | Mod: PBBFAC,,, | Performed by: INTERNAL MEDICINE

## 2019-08-19 PROCEDURE — 99215 PR OFFICE/OUTPT VISIT, EST, LEVL V, 40-54 MIN: ICD-10-PCS | Mod: S$GLB,,, | Performed by: INTERNAL MEDICINE

## 2019-08-19 PROCEDURE — 3074F SYST BP LT 130 MM HG: CPT | Mod: S$GLB,,, | Performed by: INTERNAL MEDICINE

## 2019-08-19 PROCEDURE — 96372 THER/PROPH/DIAG INJ SC/IM: CPT

## 2019-08-19 PROCEDURE — 3078F DIAST BP <80 MM HG: CPT | Mod: S$GLB,,, | Performed by: INTERNAL MEDICINE

## 2019-08-19 PROCEDURE — 1101F PT FALLS ASSESS-DOCD LE1/YR: CPT | Mod: S$GLB,,, | Performed by: INTERNAL MEDICINE

## 2019-08-19 PROCEDURE — 99999 PR PBB SHADOW E&M-EST. PATIENT-LVL III: CPT | Mod: PBBFAC,,, | Performed by: INTERNAL MEDICINE

## 2019-08-19 PROCEDURE — 99215 OFFICE O/P EST HI 40 MIN: CPT | Mod: S$GLB,,, | Performed by: INTERNAL MEDICINE

## 2019-08-19 PROCEDURE — 1101F PR PT FALLS ASSESS DOC 0-1 FALLS W/OUT INJ PAST YR: ICD-10-PCS | Mod: S$GLB,,, | Performed by: INTERNAL MEDICINE

## 2019-08-19 PROCEDURE — 3078F PR MOST RECENT DIASTOLIC BLOOD PRESSURE < 80 MM HG: ICD-10-PCS | Mod: S$GLB,,, | Performed by: INTERNAL MEDICINE

## 2019-08-19 PROCEDURE — 3074F PR MOST RECENT SYSTOLIC BLOOD PRESSURE < 130 MM HG: ICD-10-PCS | Mod: S$GLB,,, | Performed by: INTERNAL MEDICINE

## 2019-08-19 PROCEDURE — 63600175 PHARM REV CODE 636 W HCPCS: Mod: JG | Performed by: INTERNAL MEDICINE

## 2019-08-19 RX ORDER — ATROPINE SULFATE 0.4 MG/ML
0.4 INJECTION, SOLUTION ENDOTRACHEAL; INTRAMEDULLARY; INTRAMUSCULAR; INTRAVENOUS; SUBCUTANEOUS ONCE AS NEEDED
Status: CANCELLED | OUTPATIENT
Start: 2019-08-27

## 2019-08-19 RX ORDER — SODIUM CHLORIDE 0.9 % (FLUSH) 0.9 %
10 SYRINGE (ML) INJECTION
Status: CANCELLED | OUTPATIENT
Start: 2019-08-29

## 2019-08-19 RX ORDER — SODIUM CHLORIDE 0.9 % (FLUSH) 0.9 %
10 SYRINGE (ML) INJECTION
Status: CANCELLED | OUTPATIENT
Start: 2019-08-27

## 2019-08-19 RX ORDER — HEPARIN 100 UNIT/ML
500 SYRINGE INTRAVENOUS
Status: CANCELLED | OUTPATIENT
Start: 2019-08-27

## 2019-08-19 RX ORDER — HEPARIN 100 UNIT/ML
500 SYRINGE INTRAVENOUS
Status: CANCELLED | OUTPATIENT
Start: 2019-08-29

## 2019-08-19 RX ORDER — FLUOROURACIL 50 MG/ML
400 INJECTION, SOLUTION INTRAVENOUS
Status: CANCELLED | OUTPATIENT
Start: 2019-08-27

## 2019-08-19 RX ADMIN — FILGRASTIM 300 MCG: 300 INJECTION, SOLUTION INTRAVENOUS; SUBCUTANEOUS at 11:08

## 2019-08-19 NOTE — PROGRESS NOTES
Cc  I am exhausted    Indigo Stuart is a 63 y.o.  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  Kras was MUTATED No MSI   Cycle 1  January 15 2019  Here for chemo but she has had liver progression and SBO treated medically   New onset blood in stool and pruritus of rectum   CHest port in place   Tolerating lopressor for htn and lipitor for dyslipidemia  Tolerating zofran for nausea chemo   PND positie and sob  Cycle one folfox avastin January 15 2019   Failed regimen and admitted with SBO   Hepatic mets progressed hence changed to irinotecan drop  oxaliplatin   Cycle one folfiri avastin April 2019  Here to get clearance for next dose        Past Medical History:   Diagnosis Date    Cancer     colon    Encounter for blood transfusion     Hypertension          Current Outpatient Medications:     amLODIPine (NORVASC) 5 MG tablet, Take 5 mg by mouth once daily., Disp: , Rfl: 3    atorvastatin (LIPITOR) 20 MG tablet, Take 20 mg by mouth every evening., Disp: , Rfl:     ferrous sulfate (FEOSOL) 325 mg (65 mg iron) Tab tablet, Take 1 tablet by mouth 2 (two) times daily., Disp: , Rfl: 3    ferrous sulfate (FEOSOL) 325 mg (65 mg iron) Tab tablet, TAKE 1 TABLET BY MOUTH TWICE A DAY, Disp: , Rfl:     metoprolol succinate (TOPROL-XL) 25 MG 24 hr tablet, Take 25 mg by mouth every evening., Disp: , Rfl: 3    neomycin-polymyxin-hydrocortisone (CORTISPORIN) 3.5-10,000-10 mg-unit-mg/mL ophthalmic suspension, Place 1 drop into both eyes 4 (four) times daily., Disp: 7.5 mL, Rfl: 0    ondansetron (ZOFRAN-ODT) 8 MG TbDL, Take 1 tablet (8 mg total) by mouth every 12 (twelve) hours as needed., Disp: 30 tablet, Rfl: 1    potassium chloride SA (K-DUR,KLOR-CON) 20 MEQ tablet, Take 1 tablet (20 mEq total) by mouth once daily., Disp: 30 tablet, Rfl: 11    promethazine (PHENERGAN) 25 MG tablet, Take 1 tablet (25 mg total) by mouth every 6 (six)  "hours as needed for Nausea., Disp: 30 tablet, Rfl: 1        Review of Systems:  General: Weight gain: No, Weight Loss: No, Fatigue  Fever: y Chills: No, Night Sweats: No, Insomnia: No, Excessive sleeping: No   Respiratory:  Cough: No, Shortness of Breath:  yes  Wheezing: No, Excessive Snoring: No, Coughing up blood: No  Endocrine: Heat Intolerance: No, Cold Intolerance: No,   Excessive Thirst: No, Excessive Hunger: No  Eyes:  Blurred Vision: No, Double Vision: No   Light Sensitivity: No, Eye pain: No  Musculoskeletal: Muscle Aches/Pain: No, Joint Pain/Swelling: yes ankles   Muscle Weakness:no , Neck Pain: No, Back Pain:   Neurological: Difficulty Walking/Falls:  No  Further   Headache Migraine: No, Dizziness/Vertigo: No, Fainting: No, Weakness: Better   Cardiovascular: Chest Pain: No, Shortness of Breath: no   Gastrointestinal: Nausea/Vomiting: No, Constipation: No, Diarrhea:stable   Psych/Cog:  Depression: No, Anxiety: No, Hallucinations: No, Problems Concentrating: No  : Frequent Urination: No, Incontinence: No, Blood of Urine: No, Urinary Infections: No  ENT:Hearing Loss: No, Earache: No, Ringing in Ears: No  Facial Pain: No, Chronic Congestion:No   Immune: Seasonal Allergies: No, Hives and/or Rashes: No  The remainder of the review of twelve body systems was reviewed and normal        BP (!) (P) 161/70   Pulse (P) 80   Temp (P) 98.5 °F (36.9 °C)   Resp (P) 20   Ht (P) 5' 2" (1.575 m)   Wt (P) 93.6 kg (206 lb 5.6 oz)   BMI (P) 37.74 kg/m²   Constitutional: oriented to person, place, and time.  Conj pink   HENT: anicteric sclera   Head: Normocephalic and atraumatic.   Ears canal normal no discharge    Nose: Nose normal.   Non boggy turbinates  No lymphatics noted   Eyes: Conjunctivae PALE  EOM are normal.  Neck: . No thyromegaly present.  No bruits   Cardiovascular: Normal rate, regular rhythm, normal heart sounds  No murmur heard.  Pulmonary/Chest:  Decreased breath sounds  Better today  Int " SOB  Abdominal: Soft. Bowel sounds are normal.  no mass.   Musculoskeletal: Normal range of motion. decreased strength   1 + edema of legs   No further boggy turbinates  Lymphadenopathy:  no cervical adenopathy.   Neurological: alert and oriented to person, place  Skin: Skin is warm and dry. No rash noted.   Psychiatric: normal mood and affect.   behavior is normal.   Vitals reviewed.    Lab Results   Component Value Date    WBC 1.89 (LL) 08/16/2019    RBC 3.56 (L) 08/16/2019    HGB 10.9 (L) 08/16/2019    HCT 36.0 (L) 08/16/2019     (H) 08/16/2019    MCH 30.6 08/16/2019    MCHC 30.3 (L) 08/16/2019    RDW 16.3 (H) 08/16/2019     (L) 08/16/2019    MPV 9.1 (L) 08/16/2019    GRAN 26.0 (L) 08/16/2019    LYMPH 50.0 (H) 08/16/2019    MONO 2.0 (L) 08/16/2019    EOS 0.1 08/05/2019    BASO 0.05 08/05/2019    EOSINOPHIL 4.0 08/16/2019    BASOPHIL 0.0 08/16/2019       CMP  Sodium   Date Value Ref Range Status   08/16/2019 141 136 - 145 mmol/L Final     Potassium   Date Value Ref Range Status   08/16/2019 3.5 3.5 - 5.1 mmol/L Final     Chloride   Date Value Ref Range Status   08/16/2019 107 95 - 110 mmol/L Final     CO2   Date Value Ref Range Status   08/16/2019 23 23 - 29 mmol/L Final     Glucose   Date Value Ref Range Status   08/16/2019 109 70 - 110 mg/dL Final     BUN, Bld   Date Value Ref Range Status   08/16/2019 11 8 - 23 mg/dL Final     Creatinine   Date Value Ref Range Status   08/16/2019 0.7 0.5 - 1.4 mg/dL Final     Calcium   Date Value Ref Range Status   08/16/2019 9.3 8.7 - 10.5 mg/dL Final     Total Protein   Date Value Ref Range Status   08/16/2019 6.4 6.0 - 8.4 g/dL Final     Albumin   Date Value Ref Range Status   08/16/2019 3.2 (L) 3.5 - 5.2 g/dL Final     Total Bilirubin   Date Value Ref Range Status   08/16/2019 0.6 0.1 - 1.0 mg/dL Final     Comment:     For infants and newborns, interpretation of results should be based  on gestational age, weight and in agreement with  clinical  observations.  Premature Infant recommended reference ranges:  Up to 24 hours.............<8.0 mg/dL  Up to 48 hours............<12.0 mg/dL  3-5 days..................<15.0 mg/dL  6-29 days.................<15.0 mg/dL       Alkaline Phosphatase   Date Value Ref Range Status   08/16/2019 171 (H) 55 - 135 U/L Final     AST   Date Value Ref Range Status   08/16/2019 17 10 - 40 U/L Final     ALT   Date Value Ref Range Status   08/16/2019 18 10 - 44 U/L Final     Anion Gap   Date Value Ref Range Status   08/16/2019 11 8 - 16 mmol/L Final     eGFR if    Date Value Ref Range Status   08/16/2019 >60 >60 mL/min/1.73 m^2 Final     eGFR if non    Date Value Ref Range Status   08/16/2019 >60 >60 mL/min/1.73 m^2 Final     Comment:     Calculation used to obtain the estimated glomerular filtration  rate (eGFR) is the CKD-EPI equation.      Impression:           - Normal esophagus.                        - Normal stomach.                        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 Prosthesis placed. The                         proximal end of the stent is distal to the ampulla                         (refer to images).  No msi     Duodenal cancer    Hepatic metastases    Malignant neoplasm metastatic to lung, unspecified laterality    Symptomatic anemia    Chemotherapy-induced neutropenia    Chemotherapy follow-up examination    Oncology follow-up encounter         Folfiri with avastin postpone and reschedule for next week   Cbc next week No office visit needed  Failed  folfox and avastin for stage  4 disease   Proceed with injections neupogen   Pulmonary lesions improved   Liver lesions improved on MRI   Start working out and building up muscles mass   pt politely refusing Physical therapy at this time   Educated her about exercise   Tolerating lipitor for dyslipidemia    Current Outpatient Medications:     amLODIPine (NORVASC) 5  MG tablet, Take 5 mg by mouth once daily., Disp: , Rfl: 3    atorvastatin (LIPITOR) 20 MG tablet, Take 20 mg by mouth every evening., Disp: , Rfl:     ferrous sulfate (FEOSOL) 325 mg (65 mg iron) Tab tablet, Take 1 tablet by mouth 2 (two) times daily., Disp: , Rfl: 3    ferrous sulfate (FEOSOL) 325 mg (65 mg iron) Tab tablet, TAKE 1 TABLET BY MOUTH TWICE A DAY, Disp: , Rfl:     metoprolol succinate (TOPROL-XL) 25 MG 24 hr tablet, Take 25 mg by mouth every evening., Disp: , Rfl: 3    neomycin-polymyxin-hydrocortisone (CORTISPORIN) 3.5-10,000-10 mg-unit-mg/mL ophthalmic suspension, Place 1 drop into both eyes 4 (four) times daily., Disp: 7.5 mL, Rfl: 0    ondansetron (ZOFRAN-ODT) 8 MG TbDL, Take 1 tablet (8 mg total) by mouth every 12 (twelve) hours as needed., Disp: 30 tablet, Rfl: 1    potassium chloride SA (K-DUR,KLOR-CON) 20 MEQ tablet, Take 1 tablet (20 mEq total) by mouth once daily., Disp: 30 tablet, Rfl: 11    promethazine (PHENERGAN) 25 MG tablet, Take 1 tablet (25 mg total) by mouth every 6 (six) hours as needed for Nausea., Disp: 30 tablet, Rfl: 1  She is to continue Norvasc for blood pressure control which is stable   Advance Care Planning     Living Will  During this visit, I engaged the patient in the advance care planning process.  The patient and I reviewed the role for advance directives and their purpose in directing future healthcare if the patient's unable to speak for him/herself.  At this point in time, the patient does have full decision-making capacity.  We discussed different extreme health states that she could experience, and reviewed what kind of medical care she would want in those situations.  The patient communicated that if she were comatose and had little chance of a meaningful recovery, she would not want machines/life-sustaining treatments used.  5 The patient  Has not  completed a living will to reflect these preferences.  The patient   HAS NOT already designated a  healthcare power of  to make decisions on her behalf.   I spent a total of 5 minutes engaging the patient in this advance care planning discussion.     Advance Care Planning     Power of   I initiated the process of advance care planning today and explained the importance of this process to the patient.  I introduced the concept of advance directives to the patient, as well. Then the patient received detailed information about the importance of designating a Health Care Power of  (HCPOA). She was also instructed to communicate with this person about their wishes for future healthcare, should she become sick and lose decision-making capacity. The patient has not previously appointed a HCPOA. Pt will agree to have her spouse    make decisions for her       Thank you for allowing me to evaluate and participate in the care of this pleasant patient. Please fell free to call me with any questions or concerns.    Warmly,  Karina Pettit MD    This note was dictated with Dragon and briefly proofread. Please excuse any sentences that may be unclear or words misspelled

## 2019-08-20 ENCOUNTER — TELEPHONE (OUTPATIENT)
Dept: HEMATOLOGY/ONCOLOGY | Facility: CLINIC | Age: 64
End: 2019-08-20

## 2019-08-20 ENCOUNTER — INFUSION (OUTPATIENT)
Dept: INFUSION THERAPY | Facility: HOSPITAL | Age: 64
End: 2019-08-20
Attending: INTERNAL MEDICINE
Payer: MEDICARE

## 2019-08-20 VITALS
DIASTOLIC BLOOD PRESSURE: 68 MMHG | HEART RATE: 90 BPM | BODY MASS INDEX: 38.03 KG/M2 | SYSTOLIC BLOOD PRESSURE: 115 MMHG | RESPIRATION RATE: 16 BRPM | TEMPERATURE: 99 F | WEIGHT: 206.69 LBS | HEIGHT: 62 IN

## 2019-08-20 DIAGNOSIS — C17.0 DUODENAL CANCER: Primary | ICD-10-CM

## 2019-08-20 DIAGNOSIS — D70.9 NEUTROPENIC FEVER: Primary | ICD-10-CM

## 2019-08-20 DIAGNOSIS — R50.81 NEUTROPENIC FEVER: Primary | ICD-10-CM

## 2019-08-20 PROCEDURE — 96372 THER/PROPH/DIAG INJ SC/IM: CPT

## 2019-08-20 PROCEDURE — 63600175 PHARM REV CODE 636 W HCPCS: Mod: JG,PO | Performed by: INTERNAL MEDICINE

## 2019-08-20 RX ADMIN — FILGRASTIM 300 MCG: 300 INJECTION, SOLUTION INTRAVENOUS; SUBCUTANEOUS at 08:08

## 2019-08-20 NOTE — PLAN OF CARE
Problem: Neutropenia  Goal: Absence of Infection  Outcome: Ongoing (interventions implemented as appropriate)  Neupogen given per MD order

## 2019-08-21 ENCOUNTER — INFUSION (OUTPATIENT)
Dept: INFUSION THERAPY | Facility: HOSPITAL | Age: 64
End: 2019-08-21
Attending: INTERNAL MEDICINE
Payer: MEDICARE

## 2019-08-21 VITALS
DIASTOLIC BLOOD PRESSURE: 58 MMHG | TEMPERATURE: 98 F | WEIGHT: 207.31 LBS | RESPIRATION RATE: 18 BRPM | HEIGHT: 62 IN | SYSTOLIC BLOOD PRESSURE: 137 MMHG | HEART RATE: 92 BPM | BODY MASS INDEX: 38.15 KG/M2

## 2019-08-21 DIAGNOSIS — R50.81 NEUTROPENIC FEVER: Primary | ICD-10-CM

## 2019-08-21 DIAGNOSIS — D70.9 NEUTROPENIC FEVER: Primary | ICD-10-CM

## 2019-08-21 PROCEDURE — 96372 THER/PROPH/DIAG INJ SC/IM: CPT | Mod: PO

## 2019-08-21 PROCEDURE — 63600175 PHARM REV CODE 636 W HCPCS: Mod: JG | Performed by: INTERNAL MEDICINE

## 2019-08-21 RX ADMIN — FILGRASTIM 300 MCG: 300 INJECTION, SOLUTION INTRAVENOUS; SUBCUTANEOUS at 09:08

## 2019-08-22 ENCOUNTER — INFUSION (OUTPATIENT)
Dept: INFUSION THERAPY | Facility: HOSPITAL | Age: 64
End: 2019-08-22
Attending: INTERNAL MEDICINE
Payer: MEDICARE

## 2019-08-22 VITALS
WEIGHT: 208.31 LBS | HEART RATE: 86 BPM | BODY MASS INDEX: 38.33 KG/M2 | SYSTOLIC BLOOD PRESSURE: 122 MMHG | RESPIRATION RATE: 18 BRPM | HEIGHT: 62 IN | TEMPERATURE: 99 F | DIASTOLIC BLOOD PRESSURE: 63 MMHG

## 2019-08-22 DIAGNOSIS — D70.9 NEUTROPENIC FEVER: Primary | ICD-10-CM

## 2019-08-22 DIAGNOSIS — R50.81 NEUTROPENIC FEVER: Primary | ICD-10-CM

## 2019-08-22 PROCEDURE — 63600175 PHARM REV CODE 636 W HCPCS: Mod: JG | Performed by: INTERNAL MEDICINE

## 2019-08-22 PROCEDURE — 96372 THER/PROPH/DIAG INJ SC/IM: CPT

## 2019-08-22 RX ADMIN — FILGRASTIM 300 MCG: 300 INJECTION, SOLUTION INTRAVENOUS; SUBCUTANEOUS at 10:08

## 2019-08-23 ENCOUNTER — INFUSION (OUTPATIENT)
Dept: INFUSION THERAPY | Facility: HOSPITAL | Age: 64
End: 2019-08-23
Attending: INTERNAL MEDICINE
Payer: MEDICARE

## 2019-08-23 VITALS
RESPIRATION RATE: 18 BRPM | WEIGHT: 208.25 LBS | HEIGHT: 62 IN | BODY MASS INDEX: 38.32 KG/M2 | DIASTOLIC BLOOD PRESSURE: 70 MMHG | SYSTOLIC BLOOD PRESSURE: 116 MMHG | TEMPERATURE: 98 F | HEART RATE: 98 BPM

## 2019-08-23 DIAGNOSIS — D70.9 NEUTROPENIC FEVER: Primary | ICD-10-CM

## 2019-08-23 DIAGNOSIS — R50.81 NEUTROPENIC FEVER: Primary | ICD-10-CM

## 2019-08-23 PROCEDURE — 63600175 PHARM REV CODE 636 W HCPCS: Mod: JG,PO | Performed by: INTERNAL MEDICINE

## 2019-08-23 PROCEDURE — 96372 THER/PROPH/DIAG INJ SC/IM: CPT

## 2019-08-23 RX ADMIN — FILGRASTIM 300 MCG: 300 INJECTION, SOLUTION INTRAVENOUS; SUBCUTANEOUS at 10:08

## 2019-08-26 ENCOUNTER — TELEPHONE (OUTPATIENT)
Dept: HEMATOLOGY/ONCOLOGY | Facility: CLINIC | Age: 64
End: 2019-08-26

## 2019-08-26 ENCOUNTER — LAB VISIT (OUTPATIENT)
Dept: LAB | Facility: HOSPITAL | Age: 64
End: 2019-08-26
Attending: INTERNAL MEDICINE
Payer: MEDICARE

## 2019-08-26 DIAGNOSIS — C17.0 DUODENAL CANCER: ICD-10-CM

## 2019-08-26 LAB
ALBUMIN SERPL BCP-MCNC: 3.2 G/DL (ref 3.5–5.2)
ALP SERPL-CCNC: 178 U/L (ref 55–135)
ALT SERPL W/O P-5'-P-CCNC: 17 U/L (ref 10–44)
ANION GAP SERPL CALC-SCNC: 11 MMOL/L (ref 8–16)
AST SERPL-CCNC: 23 U/L (ref 10–40)
BILIRUB SERPL-MCNC: 0.5 MG/DL (ref 0.1–1)
BUN SERPL-MCNC: 12 MG/DL (ref 8–23)
CALCIUM SERPL-MCNC: 9.9 MG/DL (ref 8.7–10.5)
CHLORIDE SERPL-SCNC: 103 MMOL/L (ref 95–110)
CO2 SERPL-SCNC: 26 MMOL/L (ref 23–29)
CREAT SERPL-MCNC: 0.7 MG/DL (ref 0.5–1.4)
ERYTHROCYTE [DISTWIDTH] IN BLOOD BY AUTOMATED COUNT: 15.7 % (ref 11.5–14.5)
EST. GFR  (AFRICAN AMERICAN): >60 ML/MIN/1.73 M^2
EST. GFR  (NON AFRICAN AMERICAN): >60 ML/MIN/1.73 M^2
GLUCOSE SERPL-MCNC: 96 MG/DL (ref 70–110)
HCT VFR BLD AUTO: 39.1 % (ref 37–48.5)
HGB BLD-MCNC: 12 G/DL (ref 12–16)
IMM GRANULOCYTES # BLD AUTO: 0.88 K/UL (ref 0–0.04)
MCH RBC QN AUTO: 31.3 PG (ref 27–31)
MCHC RBC AUTO-ENTMCNC: 30.7 G/DL (ref 32–36)
MCV RBC AUTO: 102 FL (ref 82–98)
NEUTROPHILS # BLD AUTO: 6.6 K/UL (ref 1.8–7.7)
PLATELET # BLD AUTO: 256 K/UL (ref 150–350)
PMV BLD AUTO: 9 FL (ref 9.2–12.9)
POTASSIUM SERPL-SCNC: 4.1 MMOL/L (ref 3.5–5.1)
PROT SERPL-MCNC: 6.5 G/DL (ref 6–8.4)
RBC # BLD AUTO: 3.84 M/UL (ref 4–5.4)
SODIUM SERPL-SCNC: 140 MMOL/L (ref 136–145)
WBC # BLD AUTO: 10.63 K/UL (ref 3.9–12.7)

## 2019-08-26 PROCEDURE — 36415 COLL VENOUS BLD VENIPUNCTURE: CPT

## 2019-08-26 PROCEDURE — 85027 COMPLETE CBC AUTOMATED: CPT

## 2019-08-26 PROCEDURE — 80053 COMPREHEN METABOLIC PANEL: CPT

## 2019-08-26 NOTE — TELEPHONE ENCOUNTER
----- Message from Lyssa Diana sent at 8/26/2019  3:43 PM CDT -----  Contact: Jacqueline hickey  Type: Needs Medical Advice    Who Called:  Jacqueline Rodríguez Call Back Number: [phone]  Additional Information: Dr Pettit was supposed to call her back regarding going to chemo or not tomorrow. It is very important that Dr Pettit call her back today.

## 2019-08-26 NOTE — TELEPHONE ENCOUNTER
Spoke to pt to inform her that per Dr. Pettit, she is cleared to her her chemo tomorrow at 8am. Pt verbalized understanding.

## 2019-08-26 NOTE — TELEPHONE ENCOUNTER
----- Message from Leticia Bergeronza sent at 8/26/2019  3:37 PM CDT -----  Contact: self [phone] or [phone]  She is awaiting your call regarding the infusion appt, should she go?  It is scheduled for 8 am.  Thank you!

## 2019-08-27 ENCOUNTER — INFUSION (OUTPATIENT)
Dept: INFUSION THERAPY | Facility: HOSPITAL | Age: 64
End: 2019-08-27
Attending: INTERNAL MEDICINE
Payer: MEDICARE

## 2019-08-27 VITALS
TEMPERATURE: 98 F | BODY MASS INDEX: 38.62 KG/M2 | HEIGHT: 62 IN | OXYGEN SATURATION: 98 % | DIASTOLIC BLOOD PRESSURE: 77 MMHG | HEART RATE: 74 BPM | SYSTOLIC BLOOD PRESSURE: 135 MMHG | RESPIRATION RATE: 18 BRPM | WEIGHT: 209.88 LBS

## 2019-08-27 DIAGNOSIS — C17.0 DUODENAL CANCER: ICD-10-CM

## 2019-08-27 DIAGNOSIS — K31.5 DUODENAL STENOSIS: ICD-10-CM

## 2019-08-27 DIAGNOSIS — C78.00 MALIGNANT NEOPLASM METASTATIC TO LUNG, UNSPECIFIED LATERALITY: ICD-10-CM

## 2019-08-27 DIAGNOSIS — T45.1X5A CHEMOTHERAPY INDUCED NEUTROPENIA: ICD-10-CM

## 2019-08-27 DIAGNOSIS — Z51.11 ENCOUNTER FOR ANTINEOPLASTIC CHEMOTHERAPY: Primary | ICD-10-CM

## 2019-08-27 DIAGNOSIS — C78.7 HEPATIC METASTASES: ICD-10-CM

## 2019-08-27 DIAGNOSIS — D70.1 CHEMOTHERAPY INDUCED NEUTROPENIA: ICD-10-CM

## 2019-08-27 PROCEDURE — 63600175 PHARM REV CODE 636 W HCPCS: Mod: JG | Performed by: INTERNAL MEDICINE

## 2019-08-27 PROCEDURE — 96409 CHEMO IV PUSH SNGL DRUG: CPT

## 2019-08-27 PROCEDURE — 96415 CHEMO IV INFUSION ADDL HR: CPT

## 2019-08-27 PROCEDURE — 96411 CHEMO IV PUSH ADDL DRUG: CPT

## 2019-08-27 PROCEDURE — 96368 THER/DIAG CONCURRENT INF: CPT

## 2019-08-27 PROCEDURE — 96375 TX/PRO/DX INJ NEW DRUG ADDON: CPT

## 2019-08-27 PROCEDURE — 96367 TX/PROPH/DG ADDL SEQ IV INF: CPT

## 2019-08-27 PROCEDURE — 96413 CHEMO IV INFUSION 1 HR: CPT

## 2019-08-27 PROCEDURE — 96365 THER/PROPH/DIAG IV INF INIT: CPT

## 2019-08-27 PROCEDURE — 96417 CHEMO IV INFUS EACH ADDL SEQ: CPT

## 2019-08-27 PROCEDURE — 96416 CHEMO PROLONG INFUSE W/PUMP: CPT

## 2019-08-27 PROCEDURE — 96366 THER/PROPH/DIAG IV INF ADDON: CPT

## 2019-08-27 RX ORDER — FLUOROURACIL 50 MG/ML
400 INJECTION, SOLUTION INTRAVENOUS
Status: COMPLETED | OUTPATIENT
Start: 2019-08-27 | End: 2019-08-27

## 2019-08-27 RX ORDER — HEPARIN 100 UNIT/ML
500 SYRINGE INTRAVENOUS
Status: DISCONTINUED | OUTPATIENT
Start: 2019-08-27 | End: 2019-08-27 | Stop reason: HOSPADM

## 2019-08-27 RX ORDER — ATROPINE SULFATE 0.4 MG/ML
0.4 INJECTION, SOLUTION ENDOTRACHEAL; INTRAMEDULLARY; INTRAMUSCULAR; INTRAVENOUS; SUBCUTANEOUS ONCE AS NEEDED
Status: COMPLETED | OUTPATIENT
Start: 2019-08-27 | End: 2019-08-27

## 2019-08-27 RX ORDER — SODIUM CHLORIDE 0.9 % (FLUSH) 0.9 %
10 SYRINGE (ML) INJECTION
Status: DISCONTINUED | OUTPATIENT
Start: 2019-08-27 | End: 2019-08-27 | Stop reason: HOSPADM

## 2019-08-27 RX ADMIN — PALONOSETRON HYDROCHLORIDE: 0.25 INJECTION, SOLUTION INTRAVENOUS at 08:08

## 2019-08-27 RX ADMIN — DEXTROSE 835 MG: 5 SOLUTION INTRAVENOUS at 09:08

## 2019-08-27 RX ADMIN — ATROPINE SULFATE 0.4 MG: 0.4 INJECTION, SOLUTION INTRAMUSCULAR; INTRAVENOUS; SUBCUTANEOUS at 10:08

## 2019-08-27 RX ADMIN — BEVACIZUMAB 500 MG: 400 INJECTION, SOLUTION INTRAVENOUS at 08:08

## 2019-08-27 RX ADMIN — FLUOROURACIL 835 MG: 50 INJECTION, SOLUTION INTRAVENOUS at 11:08

## 2019-08-27 RX ADMIN — FLUOROURACIL 5015 MG: 50 INJECTION, SOLUTION INTRAVENOUS at 11:08

## 2019-08-27 RX ADMIN — IRINOTECAN HYDROCHLORIDE 376 MG: 20 INJECTION, SOLUTION INTRAVENOUS at 09:08

## 2019-08-29 ENCOUNTER — TELEPHONE (OUTPATIENT)
Dept: HEMATOLOGY/ONCOLOGY | Facility: CLINIC | Age: 64
End: 2019-08-29

## 2019-08-29 ENCOUNTER — INFUSION (OUTPATIENT)
Dept: INFUSION THERAPY | Facility: HOSPITAL | Age: 64
End: 2019-08-29
Attending: INTERNAL MEDICINE
Payer: MEDICARE

## 2019-08-29 VITALS
HEIGHT: 62 IN | TEMPERATURE: 98 F | BODY MASS INDEX: 38.53 KG/M2 | HEART RATE: 76 BPM | DIASTOLIC BLOOD PRESSURE: 74 MMHG | WEIGHT: 209.38 LBS | RESPIRATION RATE: 20 BRPM | SYSTOLIC BLOOD PRESSURE: 115 MMHG

## 2019-08-29 DIAGNOSIS — Z51.11 ENCOUNTER FOR ANTINEOPLASTIC CHEMOTHERAPY: Primary | ICD-10-CM

## 2019-08-29 DIAGNOSIS — C78.7 HEPATIC METASTASES: ICD-10-CM

## 2019-08-29 DIAGNOSIS — C78.00 MALIGNANT NEOPLASM METASTATIC TO LUNG, UNSPECIFIED LATERALITY: ICD-10-CM

## 2019-08-29 DIAGNOSIS — T45.1X5A CHEMOTHERAPY INDUCED NEUTROPENIA: ICD-10-CM

## 2019-08-29 DIAGNOSIS — D70.1 CHEMOTHERAPY INDUCED NEUTROPENIA: ICD-10-CM

## 2019-08-29 DIAGNOSIS — C17.0 DUODENAL CANCER: ICD-10-CM

## 2019-08-29 DIAGNOSIS — K31.5 DUODENAL STENOSIS: ICD-10-CM

## 2019-08-29 PROCEDURE — 96523 IRRIG DRUG DELIVERY DEVICE: CPT

## 2019-08-29 PROCEDURE — 96377 APPLICATON ON-BODY INJECTOR: CPT

## 2019-08-29 PROCEDURE — 63600175 PHARM REV CODE 636 W HCPCS: Mod: JG | Performed by: INTERNAL MEDICINE

## 2019-08-29 RX ORDER — HEPARIN 100 UNIT/ML
500 SYRINGE INTRAVENOUS
Status: CANCELLED | OUTPATIENT
Start: 2019-08-29

## 2019-08-29 RX ORDER — HEPARIN 100 UNIT/ML
500 SYRINGE INTRAVENOUS
Status: DISCONTINUED | OUTPATIENT
Start: 2019-08-29 | End: 2019-08-29 | Stop reason: HOSPADM

## 2019-08-29 RX ORDER — SODIUM CHLORIDE 0.9 % (FLUSH) 0.9 %
10 SYRINGE (ML) INJECTION
Status: DISCONTINUED | OUTPATIENT
Start: 2019-08-29 | End: 2019-08-29 | Stop reason: HOSPADM

## 2019-08-29 RX ORDER — SODIUM CHLORIDE 0.9 % (FLUSH) 0.9 %
10 SYRINGE (ML) INJECTION
Status: CANCELLED | OUTPATIENT
Start: 2019-08-29

## 2019-08-29 RX ADMIN — PEGFILGRASTIM 6 MG: KIT SUBCUTANEOUS at 10:08

## 2019-08-29 RX ADMIN — HEPARIN 500 UNITS: 100 SYRINGE at 10:08

## 2019-08-29 NOTE — TELEPHONE ENCOUNTER
Spoke to pt to confirm apt date and time prior to her next chemo tx. Pt apt dates were never adjusted from last visit when pt was held. I fixed pt next apt, pt confirmed apt date and time and verbalized understanding.

## 2019-08-29 NOTE — NURSING
1015 spoke with Pharmacist Perla Thao regarding order for Procit and Pt Hgb 12.0. Pt does not meet criteria and Procrit held

## 2019-08-29 NOTE — TELEPHONE ENCOUNTER
----- Message from Alec Shrestha sent at 8/29/2019 11:43 AM CDT -----  Contact: Patient  Type: Appointment Access    Who Called:  Patient   Best Call Back Number: [phone]  Additional Information: Patient is requesting to changed upcoming appointment with Wilver to 9/9/19 and move labs to 9/6/19 before infusion on 9/10/19. Please advise

## 2019-09-06 ENCOUNTER — LAB VISIT (OUTPATIENT)
Dept: LAB | Facility: HOSPITAL | Age: 64
End: 2019-09-06
Attending: INTERNAL MEDICINE
Payer: MEDICARE

## 2019-09-06 DIAGNOSIS — C17.0 DUODENAL CANCER: ICD-10-CM

## 2019-09-06 LAB
ALBUMIN SERPL BCP-MCNC: 3.3 G/DL (ref 3.5–5.2)
ALP SERPL-CCNC: 214 U/L (ref 55–135)
ALT SERPL W/O P-5'-P-CCNC: 21 U/L (ref 10–44)
ANION GAP SERPL CALC-SCNC: 10 MMOL/L (ref 8–16)
AST SERPL-CCNC: 18 U/L (ref 10–40)
BILIRUB SERPL-MCNC: 0.9 MG/DL (ref 0.1–1)
BUN SERPL-MCNC: 9 MG/DL (ref 8–23)
CALCIUM SERPL-MCNC: 9.1 MG/DL (ref 8.7–10.5)
CEA SERPL-MCNC: 14 NG/ML (ref 0–5)
CHLORIDE SERPL-SCNC: 105 MMOL/L (ref 95–110)
CO2 SERPL-SCNC: 25 MMOL/L (ref 23–29)
CREAT SERPL-MCNC: 0.7 MG/DL (ref 0.5–1.4)
ERYTHROCYTE [DISTWIDTH] IN BLOOD BY AUTOMATED COUNT: 15.4 % (ref 11.5–14.5)
EST. GFR  (AFRICAN AMERICAN): >60 ML/MIN/1.73 M^2
EST. GFR  (NON AFRICAN AMERICAN): >60 ML/MIN/1.73 M^2
GLUCOSE SERPL-MCNC: 91 MG/DL (ref 70–110)
HCT VFR BLD AUTO: 34.6 % (ref 37–48.5)
HGB BLD-MCNC: 10.5 G/DL (ref 12–16)
IMM GRANULOCYTES # BLD AUTO: 0.03 K/UL (ref 0–0.04)
MCH RBC QN AUTO: 30.6 PG (ref 27–31)
MCHC RBC AUTO-ENTMCNC: 30.3 G/DL (ref 32–36)
MCV RBC AUTO: 101 FL (ref 82–98)
NEUTROPHILS # BLD AUTO: 1.1 K/UL (ref 1.8–7.7)
PLATELET # BLD AUTO: 107 K/UL (ref 150–350)
PMV BLD AUTO: 10.3 FL (ref 9.2–12.9)
POTASSIUM SERPL-SCNC: 3.8 MMOL/L (ref 3.5–5.1)
PROT SERPL-MCNC: 6.2 G/DL (ref 6–8.4)
RBC # BLD AUTO: 3.43 M/UL (ref 4–5.4)
SODIUM SERPL-SCNC: 140 MMOL/L (ref 136–145)
WBC # BLD AUTO: 2.75 K/UL (ref 3.9–12.7)

## 2019-09-06 PROCEDURE — 85027 COMPLETE CBC AUTOMATED: CPT

## 2019-09-06 PROCEDURE — 36415 COLL VENOUS BLD VENIPUNCTURE: CPT

## 2019-09-06 PROCEDURE — 80053 COMPREHEN METABOLIC PANEL: CPT

## 2019-09-06 PROCEDURE — 82378 CARCINOEMBRYONIC ANTIGEN: CPT

## 2019-09-09 ENCOUNTER — OFFICE VISIT (OUTPATIENT)
Dept: HEMATOLOGY/ONCOLOGY | Facility: CLINIC | Age: 64
End: 2019-09-09
Payer: MEDICARE

## 2019-09-09 ENCOUNTER — LAB VISIT (OUTPATIENT)
Dept: LAB | Facility: HOSPITAL | Age: 64
End: 2019-09-09
Attending: INTERNAL MEDICINE
Payer: MEDICARE

## 2019-09-09 ENCOUNTER — TELEPHONE (OUTPATIENT)
Dept: HEMATOLOGY/ONCOLOGY | Facility: CLINIC | Age: 64
End: 2019-09-09

## 2019-09-09 VITALS
TEMPERATURE: 99 F | BODY MASS INDEX: 38.54 KG/M2 | DIASTOLIC BLOOD PRESSURE: 96 MMHG | SYSTOLIC BLOOD PRESSURE: 134 MMHG | HEIGHT: 62 IN | OXYGEN SATURATION: 97 % | WEIGHT: 209.44 LBS | HEART RATE: 80 BPM | RESPIRATION RATE: 20 BRPM

## 2019-09-09 DIAGNOSIS — C78.7 HEPATIC METASTASES: ICD-10-CM

## 2019-09-09 DIAGNOSIS — C17.0 DUODENAL CANCER: ICD-10-CM

## 2019-09-09 DIAGNOSIS — C17.0 DUODENAL CANCER: Primary | ICD-10-CM

## 2019-09-09 DIAGNOSIS — I10 ESSENTIAL HYPERTENSION: ICD-10-CM

## 2019-09-09 LAB
BASOPHILS # BLD AUTO: 0.09 K/UL (ref 0–0.2)
BASOPHILS NFR BLD: 1.3 % (ref 0–1.9)
DIFFERENTIAL METHOD: ABNORMAL
EOSINOPHIL # BLD AUTO: 0.1 K/UL (ref 0–0.5)
EOSINOPHIL NFR BLD: 1.9 % (ref 0–8)
ERYTHROCYTE [DISTWIDTH] IN BLOOD BY AUTOMATED COUNT: 16.1 % (ref 11.5–14.5)
HCT VFR BLD AUTO: 38.5 % (ref 37–48.5)
HGB BLD-MCNC: 11.8 G/DL (ref 12–16)
IMM GRANULOCYTES # BLD AUTO: 0.09 K/UL (ref 0–0.04)
IMM GRANULOCYTES NFR BLD AUTO: 1.3 % (ref 0–0.5)
LYMPHOCYTES # BLD AUTO: 1.3 K/UL (ref 1–4.8)
LYMPHOCYTES NFR BLD: 19.5 % (ref 18–48)
MCH RBC QN AUTO: 30.7 PG (ref 27–31)
MCHC RBC AUTO-ENTMCNC: 30.6 G/DL (ref 32–36)
MCV RBC AUTO: 100 FL (ref 82–98)
MONOCYTES # BLD AUTO: 0.7 K/UL (ref 0.3–1)
MONOCYTES NFR BLD: 9.8 % (ref 4–15)
NEUTROPHILS # BLD AUTO: 4.5 K/UL (ref 1.8–7.7)
NEUTROPHILS NFR BLD: 66.2 % (ref 38–73)
NRBC BLD-RTO: 0 /100 WBC
PLATELET # BLD AUTO: 242 K/UL (ref 150–350)
PMV BLD AUTO: 9.1 FL (ref 9.2–12.9)
RBC # BLD AUTO: 3.84 M/UL (ref 4–5.4)
WBC # BLD AUTO: 6.86 K/UL (ref 3.9–12.7)

## 2019-09-09 PROCEDURE — 3075F PR MOST RECENT SYSTOLIC BLOOD PRESS GE 130-139MM HG: ICD-10-PCS | Mod: S$GLB,,, | Performed by: INTERNAL MEDICINE

## 2019-09-09 PROCEDURE — 99999 PR PBB SHADOW E&M-EST. PATIENT-LVL IV: CPT | Mod: PBBFAC,,, | Performed by: INTERNAL MEDICINE

## 2019-09-09 PROCEDURE — 1101F PR PT FALLS ASSESS DOC 0-1 FALLS W/OUT INJ PAST YR: ICD-10-PCS | Mod: S$GLB,,, | Performed by: INTERNAL MEDICINE

## 2019-09-09 PROCEDURE — 3008F PR BODY MASS INDEX (BMI) DOCUMENTED: ICD-10-PCS | Mod: S$GLB,,, | Performed by: INTERNAL MEDICINE

## 2019-09-09 PROCEDURE — 3080F PR MOST RECENT DIASTOLIC BLOOD PRESSURE >= 90 MM HG: ICD-10-PCS | Mod: S$GLB,,, | Performed by: INTERNAL MEDICINE

## 2019-09-09 PROCEDURE — 85025 COMPLETE CBC W/AUTO DIFF WBC: CPT

## 2019-09-09 PROCEDURE — 99215 OFFICE O/P EST HI 40 MIN: CPT | Mod: S$GLB,,, | Performed by: INTERNAL MEDICINE

## 2019-09-09 PROCEDURE — 1101F PT FALLS ASSESS-DOCD LE1/YR: CPT | Mod: S$GLB,,, | Performed by: INTERNAL MEDICINE

## 2019-09-09 PROCEDURE — 99999 PR PBB SHADOW E&M-EST. PATIENT-LVL IV: ICD-10-PCS | Mod: PBBFAC,,, | Performed by: INTERNAL MEDICINE

## 2019-09-09 PROCEDURE — 99215 PR OFFICE/OUTPT VISIT, EST, LEVL V, 40-54 MIN: ICD-10-PCS | Mod: S$GLB,,, | Performed by: INTERNAL MEDICINE

## 2019-09-09 PROCEDURE — 3008F BODY MASS INDEX DOCD: CPT | Mod: S$GLB,,, | Performed by: INTERNAL MEDICINE

## 2019-09-09 PROCEDURE — 3075F SYST BP GE 130 - 139MM HG: CPT | Mod: S$GLB,,, | Performed by: INTERNAL MEDICINE

## 2019-09-09 PROCEDURE — 3080F DIAST BP >= 90 MM HG: CPT | Mod: S$GLB,,, | Performed by: INTERNAL MEDICINE

## 2019-09-09 RX ORDER — HEPARIN 100 UNIT/ML
500 SYRINGE INTRAVENOUS
Status: CANCELLED | OUTPATIENT
Start: 2019-09-12

## 2019-09-09 RX ORDER — FLUOROURACIL 50 MG/ML
400 INJECTION, SOLUTION INTRAVENOUS
Status: CANCELLED | OUTPATIENT
Start: 2019-09-10

## 2019-09-09 RX ORDER — HEPARIN 100 UNIT/ML
500 SYRINGE INTRAVENOUS
Status: CANCELLED | OUTPATIENT
Start: 2019-09-10

## 2019-09-09 RX ORDER — SODIUM CHLORIDE 0.9 % (FLUSH) 0.9 %
10 SYRINGE (ML) INJECTION
Status: CANCELLED | OUTPATIENT
Start: 2019-09-12

## 2019-09-09 RX ORDER — SODIUM CHLORIDE 0.9 % (FLUSH) 0.9 %
10 SYRINGE (ML) INJECTION
Status: CANCELLED | OUTPATIENT
Start: 2019-09-10

## 2019-09-09 RX ORDER — ATROPINE SULFATE 0.4 MG/ML
0.4 INJECTION, SOLUTION ENDOTRACHEAL; INTRAMEDULLARY; INTRAMUSCULAR; INTRAVENOUS; SUBCUTANEOUS ONCE AS NEEDED
Status: CANCELLED | OUTPATIENT
Start: 2019-09-10

## 2019-09-09 NOTE — PROGRESS NOTES
Subjective:       Patient ID: Indigo Stuart is a 63 y.o. female.    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  Patient is on FOLFOX plus Avastin here for therapy      HPI:     Social History     Socioeconomic History    Marital status:      Spouse name: Not on file    Number of children: Not on file    Years of education: Not on file    Highest education level: Not on file   Occupational History    Not on file   Social Needs    Financial resource strain: Not on file    Food insecurity:     Worry: Not on file     Inability: Not on file    Transportation needs:     Medical: Not on file     Non-medical: Not on file   Tobacco Use    Smoking status: Never Smoker    Smokeless tobacco: Never Used   Substance and Sexual Activity    Alcohol use: No    Drug use: No    Sexual activity: Not Currently   Lifestyle    Physical activity:     Days per week: Not on file     Minutes per session: Not on file    Stress: Not on file   Relationships    Social connections:     Talks on phone: Not on file     Gets together: Not on file     Attends Rastafarian service: Not on file     Active member of club or organization: Not on file     Attends meetings of clubs or organizations: Not on file     Relationship status: Not on file   Other Topics Concern    Not on file   Social History Narrative    Not on file     Family History   Problem Relation Age of Onset    Cancer Mother         colon ca, liver ca    Cancer Father      Past Surgical History:   Procedure Laterality Date    CHOLECYSTECTOMY      COLON SURGERY      COLONOSCOPY N/A 3/30/2018    [facility]formed [facility] at [facility]    EGD (ESOPHAGOGASTRODUODENOSCOPY) N/A 1/14/2019    Performed [facility]errat Lopez [facility] at [facility] ([facility])    EGD (ESOPHAGOGASTRODUODENOSCOPY) N/A 1/3/2019    Performed by Adis Arguello,  MD at [facility] ENDO    ESOPHAGOGASTRODUODENOSCOPY (EGD) N/A 3/30/2018    Performed by Daniel [facility]ana MD at [facility] ENDO    HYSTERECTOMY      [id number]-ASNG-C-CATH N/A 1/4/2019    Performed by Emilio Romero MD at [facility] OR     Past Medical History:   Diagnosis Date    Cancer     colon    Encounter for blood transfusion     Hypertension        Current Outpatient Medications:     amLODIPine (NORVASC) 5 MG tablet, Take 5 mg by mouth once daily., Disp: , Rfl: 3    atorvastatin (LIPITOR) 20 MG tablet, Take 20 mg by mouth every evening., Disp: , Rfl:     ferrous sulfate (FEOSOL) 325 mg (65 mg iron) Tab tablet, Take 1 tablet by mouth 2 (two) times daily., Disp: , Rfl: 3    ferrous sulfate (FEOSOL) 325 mg (65 mg iron) Tab tablet, TAKE 1 TABLET BY MOUTH TWICE A DAY, Disp: , Rfl:     metoprolol succinate (TOPROL-XL) 25 MG 24 hr tablet, Take 25 mg by mouth every evening., Disp: , Rfl: 3    neomycin-polymyxin-hydrocortisone (CORTISPORIN) 3.5-10,000-10 mg-unit-mg/mL ophthalmic suspension, Place 1 drop into both eyes 4 (four) times daily., Disp: 7.5 mL, Rfl: 0    ondansetron (ZOFRAN-ODT) 8 MG TbDL, Take 1 tablet (8 mg total) by mouth every 12 (twelve) hours as needed., Disp: 30 tablet, Rfl: 1    potassium chloride SA (K-DUR,KLOR-CON) 20 MEQ tablet, Take 1 tablet (20 mEq total) by mouth once daily., Disp: 30 tablet, Rfl: 11    promethazine (PHENERGAN) 25 MG tablet, Take 1 tablet (25 mg total) by mouth every 6 (six) hours as needed for Nausea., Disp: 30 tablet, Rfl: 1  Review of patient's allergies indicates:   Allergen Reactions    Codeine Nausea And Vomiting    Erythromycin base Other (See Comments)    Lisinopril Palpitations     Cough        PHYSICAL EXAM:     Wt Readings from Last 3 Encounters:   09/09/19 95 kg (209 lb 7 oz)   08/29/19 95 kg (209 lb 6.4 oz)   08/27/19 95.2 kg (209 lb 14.4 oz)     Temp Readings from Last 3 Encounters:   09/09/19 98.8 °F (37.1 °C)   08/29/19 97.5 °F (36.4 °C)   08/27/19 97.6 °F  (36.4 °C)     BP Readings from Last 3 Encounters:   09/09/19 (!) 134/96   08/29/19 115/74   08/27/19 135/77     Pulse Readings from Last 3 Encounters:   09/09/19 80   08/29/19 76   08/27/19 74     GENERAL:  Chronically ill-appearing woman in a wheelchair with her  and daughter Awake, alert and oriented to time, person and place.  No anxiety, or agitation.      HEENT: Normal conjunctivae and eyelids. WNL.  PERRLA 3 to 4 mm. No icterus, no pallor, no congestion, and no discharge noted.     NECK:  Supple. Trachea is central.  No crepitus.  No JVD or masses.    RESPIRATORY:  No intercostal retractions.  No dullness to percussion.  Chest is clear to auscultation.  No rales, rhonchi or wheezes.  No crepitus.  Good air entry bilaterally.    CARDIOVASCULAR:  S1 and S2 are normally heard without murmurs or gallops.  All peripheral pulses are present.    ABDOMEN:  Normal abdomen.  No hepatosplenomegaly.  No free fluid.  Bowel sounds are present.  No hernia noted. No masses.  No rebound or tenderness.  No guarding or rigidity.  Umbilicus is midline.    LYMPHATICS:  No axillary, cervical, supraclavicular, submental, or inguinal lymphadenopathy.    SKIN/MUSCULOSKELETAL:  There is no evidence of excoriation marks or ecchmosis.  No rashes.  No cyanosis.  No clubbing.  No joint or skeletal deformities noted.  Normal range of motion.    NEUROLOGIC:  Higher functions are appropriate.  No cranial nerve deficits.  Normal diana.  Normal strength.  Motor and sensory functions are normal.  Deep tendon reflexes are normal.    GENITAL/RECTAL:  Exams are deferred.      Laboratory:     Lab Results   Component Value Date    WBC 6.86 09/09/2019    HGB 11.8 (L) 09/09/2019    HCT 38.5 09/09/2019     (H) 09/09/2019     09/09/2019     CMP  Sodium   Date Value Ref Range Status   09/06/2019 140 136 - 145 mmol/L Final     Potassium   Date Value Ref Range Status   09/06/2019 3.8 3.5 - 5.1 mmol/L Final     Chloride   Date Value Ref  Range Status   09/06/2019 105 95 - 110 mmol/L Final     CO2   Date Value Ref Range Status   09/06/2019 25 23 - 29 mmol/L Final     Glucose   Date Value Ref Range Status   09/06/2019 91 70 - 110 mg/dL Final     BUN, Bld   Date Value Ref Range Status   09/06/2019 9 8 - 23 mg/dL Final     Creatinine   Date Value Ref Range Status   09/06/2019 0.7 0.5 - 1.4 mg/dL Final     Calcium   Date Value Ref Range Status   09/06/2019 9.1 8.7 - 10.5 mg/dL Final     Total Protein   Date Value Ref Range Status   09/06/2019 6.2 6.0 - 8.4 g/dL Final     Albumin   Date Value Ref Range Status   09/06/2019 3.3 (L) 3.5 - 5.2 g/dL Final     Total Bilirubin   Date Value Ref Range Status   09/06/2019 0.9 0.1 - 1.0 mg/dL Final     Comment:     For infants and newborns, interpretation of results should be based  on gestational age, weight and in agreement with clinical  observations.  Premature Infant recommended reference ranges:  Up to 24 hours.............<8.0 mg/dL  Up to 48 hours............<12.0 mg/dL  3-5 days..................<15.0 mg/dL  6-29 days.................<15.0 mg/dL       Alkaline Phosphatase   Date Value Ref Range Status   09/06/2019 214 (H) 55 - 135 U/L Final     AST   Date Value Ref Range Status   09/06/2019 18 10 - 40 U/L Final     ALT   Date Value Ref Range Status   09/06/2019 21 10 - 44 U/L Final     Anion Gap   Date Value Ref Range Status   09/06/2019 10 8 - 16 mmol/L Final     eGFR if    Date Value Ref Range Status   09/06/2019 >60 >60 mL/min/1.73 m^2 Final     eGFR if non    Date Value Ref Range Status   09/06/2019 >60 >60 mL/min/1.73 m^2 Final     Comment:     Calculation used to obtain the estimated glomerular filtration  rate (eGFR) is the CKD-EPI equation.            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  CEA has mariginally increased cont to monitor    afebrile.   no cytopenias   proceed with rx    neulasta to regimen on day 3 and add retacrit for chemo  induced anemia for chemo induced cytopenias  Return to clinic to see Dr. sauer next 2 weeks with CBC CMP  Continue hypertension management

## 2019-09-10 ENCOUNTER — INFUSION (OUTPATIENT)
Dept: INFUSION THERAPY | Facility: HOSPITAL | Age: 64
End: 2019-09-10
Attending: INTERNAL MEDICINE
Payer: MEDICARE

## 2019-09-10 VITALS
DIASTOLIC BLOOD PRESSURE: 72 MMHG | SYSTOLIC BLOOD PRESSURE: 136 MMHG | WEIGHT: 209 LBS | RESPIRATION RATE: 18 BRPM | HEART RATE: 84 BPM | TEMPERATURE: 99 F | BODY MASS INDEX: 38.46 KG/M2 | HEIGHT: 62 IN

## 2019-09-10 DIAGNOSIS — D70.1 CHEMOTHERAPY INDUCED NEUTROPENIA: ICD-10-CM

## 2019-09-10 DIAGNOSIS — C17.0 DUODENAL CANCER: ICD-10-CM

## 2019-09-10 DIAGNOSIS — Z51.11 ENCOUNTER FOR ANTINEOPLASTIC CHEMOTHERAPY: Primary | ICD-10-CM

## 2019-09-10 DIAGNOSIS — C78.7 HEPATIC METASTASES: ICD-10-CM

## 2019-09-10 DIAGNOSIS — K31.5 DUODENAL STENOSIS: ICD-10-CM

## 2019-09-10 DIAGNOSIS — T45.1X5A CHEMOTHERAPY INDUCED NEUTROPENIA: ICD-10-CM

## 2019-09-10 DIAGNOSIS — C78.00 MALIGNANT NEOPLASM METASTATIC TO LUNG, UNSPECIFIED LATERALITY: ICD-10-CM

## 2019-09-10 PROCEDURE — 63600175 PHARM REV CODE 636 W HCPCS: Performed by: INTERNAL MEDICINE

## 2019-09-10 PROCEDURE — 96415 CHEMO IV INFUSION ADDL HR: CPT

## 2019-09-10 PROCEDURE — 96375 TX/PRO/DX INJ NEW DRUG ADDON: CPT

## 2019-09-10 PROCEDURE — 96366 THER/PROPH/DIAG IV INF ADDON: CPT

## 2019-09-10 PROCEDURE — 96417 CHEMO IV INFUS EACH ADDL SEQ: CPT

## 2019-09-10 PROCEDURE — 96411 CHEMO IV PUSH ADDL DRUG: CPT

## 2019-09-10 PROCEDURE — 96367 TX/PROPH/DG ADDL SEQ IV INF: CPT

## 2019-09-10 PROCEDURE — 96413 CHEMO IV INFUSION 1 HR: CPT

## 2019-09-10 PROCEDURE — 96416 CHEMO PROLONG INFUSE W/PUMP: CPT

## 2019-09-10 PROCEDURE — 96368 THER/DIAG CONCURRENT INF: CPT

## 2019-09-10 RX ORDER — SODIUM CHLORIDE 0.9 % (FLUSH) 0.9 %
10 SYRINGE (ML) INJECTION
Status: DISCONTINUED | OUTPATIENT
Start: 2019-09-10 | End: 2019-09-10 | Stop reason: HOSPADM

## 2019-09-10 RX ORDER — FLUOROURACIL 50 MG/ML
400 INJECTION, SOLUTION INTRAVENOUS
Status: COMPLETED | OUTPATIENT
Start: 2019-09-10 | End: 2019-09-10

## 2019-09-10 RX ORDER — ATROPINE SULFATE 0.4 MG/ML
0.4 INJECTION, SOLUTION ENDOTRACHEAL; INTRAMEDULLARY; INTRAMUSCULAR; INTRAVENOUS; SUBCUTANEOUS ONCE AS NEEDED
Status: COMPLETED | OUTPATIENT
Start: 2019-09-10 | End: 2019-09-10

## 2019-09-10 RX ORDER — HEPARIN 100 UNIT/ML
500 SYRINGE INTRAVENOUS
Status: DISCONTINUED | OUTPATIENT
Start: 2019-09-10 | End: 2019-09-10 | Stop reason: HOSPADM

## 2019-09-10 RX ADMIN — SODIUM CHLORIDE: 0.9 INJECTION, SOLUTION INTRAVENOUS at 08:09

## 2019-09-10 RX ADMIN — DEXTROSE 835 MG: 5 SOLUTION INTRAVENOUS at 09:09

## 2019-09-10 RX ADMIN — BEVACIZUMAB 500 MG: 400 INJECTION, SOLUTION INTRAVENOUS at 09:09

## 2019-09-10 RX ADMIN — IRINOTECAN HYDROCHLORIDE 376 MG: 20 INJECTION, SOLUTION INTRAVENOUS at 09:09

## 2019-09-10 RX ADMIN — ATROPINE SULFATE 0.4 MG: 0.4 INJECTION, SOLUTION INTRAMUSCULAR; INTRAVENOUS; SUBCUTANEOUS at 09:09

## 2019-09-10 RX ADMIN — FLUOROURACIL 835 MG: 50 INJECTION, SOLUTION INTRAVENOUS at 11:09

## 2019-09-10 RX ADMIN — FLUOROURACIL 5015 MG: 50 INJECTION, SOLUTION INTRAVENOUS at 11:09

## 2019-09-10 RX ADMIN — PALONOSETRON HYDROCHLORIDE: 0.25 INJECTION INTRAVENOUS at 08:09

## 2019-09-12 ENCOUNTER — INFUSION (OUTPATIENT)
Dept: INFUSION THERAPY | Facility: HOSPITAL | Age: 64
End: 2019-09-12
Attending: INTERNAL MEDICINE
Payer: MEDICARE

## 2019-09-12 VITALS
HEART RATE: 73 BPM | DIASTOLIC BLOOD PRESSURE: 78 MMHG | WEIGHT: 209 LBS | BODY MASS INDEX: 38.23 KG/M2 | TEMPERATURE: 98 F | RESPIRATION RATE: 18 BRPM | SYSTOLIC BLOOD PRESSURE: 121 MMHG

## 2019-09-12 DIAGNOSIS — C78.7 HEPATIC METASTASES: ICD-10-CM

## 2019-09-12 DIAGNOSIS — D70.1 CHEMOTHERAPY INDUCED NEUTROPENIA: ICD-10-CM

## 2019-09-12 DIAGNOSIS — C17.0 DUODENAL CANCER: ICD-10-CM

## 2019-09-12 DIAGNOSIS — K31.5 DUODENAL STENOSIS: ICD-10-CM

## 2019-09-12 DIAGNOSIS — Z51.11 ENCOUNTER FOR ANTINEOPLASTIC CHEMOTHERAPY: Primary | ICD-10-CM

## 2019-09-12 DIAGNOSIS — T45.1X5A CHEMOTHERAPY INDUCED NEUTROPENIA: ICD-10-CM

## 2019-09-12 DIAGNOSIS — C78.00 MALIGNANT NEOPLASM METASTATIC TO LUNG, UNSPECIFIED LATERALITY: ICD-10-CM

## 2019-09-12 PROCEDURE — 63600175 PHARM REV CODE 636 W HCPCS: Mod: JG | Performed by: INTERNAL MEDICINE

## 2019-09-12 PROCEDURE — 96377 APPLICATON ON-BODY INJECTOR: CPT

## 2019-09-12 PROCEDURE — 25000003 PHARM REV CODE 250: Performed by: INTERNAL MEDICINE

## 2019-09-12 PROCEDURE — A4216 STERILE WATER/SALINE, 10 ML: HCPCS | Performed by: INTERNAL MEDICINE

## 2019-09-12 PROCEDURE — 96523 IRRIG DRUG DELIVERY DEVICE: CPT

## 2019-09-12 RX ORDER — SODIUM CHLORIDE 0.9 % (FLUSH) 0.9 %
10 SYRINGE (ML) INJECTION
Status: DISCONTINUED | OUTPATIENT
Start: 2019-09-12 | End: 2019-09-12 | Stop reason: HOSPADM

## 2019-09-12 RX ORDER — HEPARIN 100 UNIT/ML
500 SYRINGE INTRAVENOUS
Status: DISCONTINUED | OUTPATIENT
Start: 2019-09-12 | End: 2019-09-12 | Stop reason: HOSPADM

## 2019-09-12 RX ADMIN — SODIUM CHLORIDE, PRESERVATIVE FREE 10 ML: 5 INJECTION INTRAVENOUS at 08:09

## 2019-09-12 RX ADMIN — PEGFILGRASTIM 6 MG: KIT SUBCUTANEOUS at 09:09

## 2019-09-12 RX ADMIN — HEPARIN 500 UNITS: 100 SYRINGE at 08:09

## 2019-09-22 RX ORDER — SODIUM CHLORIDE 0.9 % (FLUSH) 0.9 %
10 SYRINGE (ML) INJECTION
Status: CANCELLED | OUTPATIENT
Start: 2019-09-24

## 2019-09-22 RX ORDER — FLUOROURACIL 50 MG/ML
400 INJECTION, SOLUTION INTRAVENOUS
Status: CANCELLED | OUTPATIENT
Start: 2019-09-24

## 2019-09-22 RX ORDER — ATROPINE SULFATE 0.4 MG/ML
0.4 INJECTION, SOLUTION ENDOTRACHEAL; INTRAMEDULLARY; INTRAMUSCULAR; INTRAVENOUS; SUBCUTANEOUS ONCE AS NEEDED
Status: CANCELLED | OUTPATIENT
Start: 2019-09-24

## 2019-09-22 RX ORDER — HEPARIN 100 UNIT/ML
500 SYRINGE INTRAVENOUS
Status: CANCELLED | OUTPATIENT
Start: 2019-09-24

## 2019-09-23 ENCOUNTER — LAB VISIT (OUTPATIENT)
Dept: LAB | Facility: HOSPITAL | Age: 64
End: 2019-09-23
Attending: INTERNAL MEDICINE
Payer: MEDICARE

## 2019-09-23 ENCOUNTER — OFFICE VISIT (OUTPATIENT)
Dept: HEMATOLOGY/ONCOLOGY | Facility: CLINIC | Age: 64
End: 2019-09-23
Payer: MEDICARE

## 2019-09-23 VITALS
BODY MASS INDEX: 37.56 KG/M2 | DIASTOLIC BLOOD PRESSURE: 58 MMHG | HEART RATE: 78 BPM | RESPIRATION RATE: 20 BRPM | TEMPERATURE: 99 F | HEIGHT: 62 IN | OXYGEN SATURATION: 100 % | WEIGHT: 204.13 LBS | SYSTOLIC BLOOD PRESSURE: 123 MMHG

## 2019-09-23 DIAGNOSIS — C78.00 MALIGNANT NEOPLASM METASTATIC TO LUNG, UNSPECIFIED LATERALITY: ICD-10-CM

## 2019-09-23 DIAGNOSIS — C17.0 DUODENAL CANCER: Primary | ICD-10-CM

## 2019-09-23 DIAGNOSIS — Z79.899 ON STATIN THERAPY: ICD-10-CM

## 2019-09-23 DIAGNOSIS — E66.01 MORBID OBESITY: ICD-10-CM

## 2019-09-23 DIAGNOSIS — C17.0 DUODENAL CANCER: ICD-10-CM

## 2019-09-23 DIAGNOSIS — Z51.11 ENCOUNTER FOR ANTINEOPLASTIC CHEMOTHERAPY: ICD-10-CM

## 2019-09-23 DIAGNOSIS — C78.7 HEPATIC METASTASES: ICD-10-CM

## 2019-09-23 DIAGNOSIS — D64.9 ANEMIA, UNSPECIFIED TYPE: ICD-10-CM

## 2019-09-23 LAB
ALBUMIN SERPL BCP-MCNC: 3.2 G/DL (ref 3.5–5.2)
ALP SERPL-CCNC: 207 U/L (ref 55–135)
ALT SERPL W/O P-5'-P-CCNC: 20 U/L (ref 10–44)
ANION GAP SERPL CALC-SCNC: 11 MMOL/L (ref 8–16)
AST SERPL-CCNC: 23 U/L (ref 10–40)
BASOPHILS NFR BLD: 0 % (ref 0–1.9)
BILIRUB SERPL-MCNC: 0.5 MG/DL (ref 0.1–1)
BUN SERPL-MCNC: 7 MG/DL (ref 8–23)
CALCIUM SERPL-MCNC: 9.4 MG/DL (ref 8.7–10.5)
CHLORIDE SERPL-SCNC: 105 MMOL/L (ref 95–110)
CO2 SERPL-SCNC: 26 MMOL/L (ref 23–29)
CREAT SERPL-MCNC: 0.8 MG/DL (ref 0.5–1.4)
DIFFERENTIAL METHOD: ABNORMAL
EOSINOPHIL NFR BLD: 0 % (ref 0–8)
ERYTHROCYTE [DISTWIDTH] IN BLOOD BY AUTOMATED COUNT: 16.1 % (ref 11.5–14.5)
EST. GFR  (AFRICAN AMERICAN): >60 ML/MIN/1.73 M^2
EST. GFR  (NON AFRICAN AMERICAN): >60 ML/MIN/1.73 M^2
GLUCOSE SERPL-MCNC: 100 MG/DL (ref 70–110)
HCT VFR BLD AUTO: 36.8 % (ref 37–48.5)
HGB BLD-MCNC: 11.1 G/DL (ref 12–16)
IMM GRANULOCYTES # BLD AUTO: ABNORMAL K/UL
LYMPHOCYTES NFR BLD: 15 % (ref 18–48)
MCH RBC QN AUTO: 30.6 PG (ref 27–31)
MCHC RBC AUTO-ENTMCNC: 30.2 G/DL (ref 32–36)
MCV RBC AUTO: 101 FL (ref 82–98)
MONOCYTES NFR BLD: 4 % (ref 4–15)
NEUTROPHILS NFR BLD: 76 % (ref 38–73)
NEUTS BAND NFR BLD MANUAL: 5 %
NRBC BLD-RTO: 0 /100 WBC
PLATELET # BLD AUTO: 221 K/UL (ref 150–350)
PLATELET BLD QL SMEAR: ABNORMAL
PMV BLD AUTO: 9.2 FL (ref 9.2–12.9)
POTASSIUM SERPL-SCNC: 3.8 MMOL/L (ref 3.5–5.1)
PROT SERPL-MCNC: 6.2 G/DL (ref 6–8.4)
RBC # BLD AUTO: 3.63 M/UL (ref 4–5.4)
SODIUM SERPL-SCNC: 142 MMOL/L (ref 136–145)
WBC # BLD AUTO: 11.75 K/UL (ref 3.9–12.7)

## 2019-09-23 PROCEDURE — 3288F PR FALLS RISK ASSESSMENT DOCUMENTED: ICD-10-PCS | Mod: S$GLB,,, | Performed by: INTERNAL MEDICINE

## 2019-09-23 PROCEDURE — 80053 COMPREHEN METABOLIC PANEL: CPT

## 2019-09-23 PROCEDURE — 3008F BODY MASS INDEX DOCD: CPT | Mod: S$GLB,,, | Performed by: INTERNAL MEDICINE

## 2019-09-23 PROCEDURE — 3288F FALL RISK ASSESSMENT DOCD: CPT | Mod: S$GLB,,, | Performed by: INTERNAL MEDICINE

## 2019-09-23 PROCEDURE — 99215 PR OFFICE/OUTPT VISIT, EST, LEVL V, 40-54 MIN: ICD-10-PCS | Mod: S$GLB,,, | Performed by: INTERNAL MEDICINE

## 2019-09-23 PROCEDURE — 3078F DIAST BP <80 MM HG: CPT | Mod: S$GLB,,, | Performed by: INTERNAL MEDICINE

## 2019-09-23 PROCEDURE — 99999 PR PBB SHADOW E&M-EST. PATIENT-LVL III: ICD-10-PCS | Mod: PBBFAC,,, | Performed by: INTERNAL MEDICINE

## 2019-09-23 PROCEDURE — 3074F PR MOST RECENT SYSTOLIC BLOOD PRESSURE < 130 MM HG: ICD-10-PCS | Mod: S$GLB,,, | Performed by: INTERNAL MEDICINE

## 2019-09-23 PROCEDURE — 3078F PR MOST RECENT DIASTOLIC BLOOD PRESSURE < 80 MM HG: ICD-10-PCS | Mod: S$GLB,,, | Performed by: INTERNAL MEDICINE

## 2019-09-23 PROCEDURE — 3008F PR BODY MASS INDEX (BMI) DOCUMENTED: ICD-10-PCS | Mod: S$GLB,,, | Performed by: INTERNAL MEDICINE

## 2019-09-23 PROCEDURE — 1101F PR PT FALLS ASSESS DOC 0-1 FALLS W/OUT INJ PAST YR: ICD-10-PCS | Mod: S$GLB,,, | Performed by: INTERNAL MEDICINE

## 2019-09-23 PROCEDURE — 85027 COMPLETE CBC AUTOMATED: CPT

## 2019-09-23 PROCEDURE — 85007 BL SMEAR W/DIFF WBC COUNT: CPT

## 2019-09-23 PROCEDURE — 3074F SYST BP LT 130 MM HG: CPT | Mod: S$GLB,,, | Performed by: INTERNAL MEDICINE

## 2019-09-23 PROCEDURE — 36415 COLL VENOUS BLD VENIPUNCTURE: CPT

## 2019-09-23 PROCEDURE — 99215 OFFICE O/P EST HI 40 MIN: CPT | Mod: S$GLB,,, | Performed by: INTERNAL MEDICINE

## 2019-09-23 PROCEDURE — 99999 PR PBB SHADOW E&M-EST. PATIENT-LVL III: CPT | Mod: PBBFAC,,, | Performed by: INTERNAL MEDICINE

## 2019-09-23 PROCEDURE — 1101F PT FALLS ASSESS-DOCD LE1/YR: CPT | Mod: S$GLB,,, | Performed by: INTERNAL MEDICINE

## 2019-09-23 NOTE — PROGRESS NOTES
Cc How are my counts     Indigo Stuart is a 63 y.o.  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  Kras was MUTATED No MSI   Cycle 1  January 15 2019  Here for chemo but she has had liver progression and SBO treated medically   New onset blood in stool and pruritus of rectum   CHest port in place   Tolerating lopressor for htn and lipitor for dyslipidemia  Tolerating zofran for nausea chemo   PND positie and sob  Cycle one folfox avastin January 15 2019   Failed regimen and admitted with SBO   Hepatic mets progressed hence changed to irinotecan drop  oxaliplatin   Cycle one folfiri avastin April 2019  Here to get clearance for next dose      Her last chemo was held because of leukopenia     Past Medical History:   Diagnosis Date    Cancer     colon    Encounter for blood transfusion     Hypertension          Current Outpatient Medications:     amLODIPine (NORVASC) 5 MG tablet, Take 5 mg by mouth once daily., Disp: , Rfl: 3    atorvastatin (LIPITOR) 20 MG tablet, Take 20 mg by mouth every evening., Disp: , Rfl:     ferrous sulfate (FEOSOL) 325 mg (65 mg iron) Tab tablet, Take 1 tablet by mouth 2 (two) times daily., Disp: , Rfl: 3    ferrous sulfate (FEOSOL) 325 mg (65 mg iron) Tab tablet, TAKE 1 TABLET BY MOUTH TWICE A DAY, Disp: , Rfl:     metoprolol succinate (TOPROL-XL) 25 MG 24 hr tablet, Take 25 mg by mouth every evening., Disp: , Rfl: 3    neomycin-polymyxin-hydrocortisone (CORTISPORIN) 3.5-10,000-10 mg-unit-mg/mL ophthalmic suspension, Place 1 drop into both eyes 4 (four) times daily., Disp: 7.5 mL, Rfl: 0    ondansetron (ZOFRAN-ODT) 8 MG TbDL, Take 1 tablet (8 mg total) by mouth every 12 (twelve) hours as needed., Disp: 30 tablet, Rfl: 1    potassium chloride SA (K-DUR,KLOR-CON) 20 MEQ tablet, Take 1 tablet (20 mEq total) by mouth once daily., Disp: 30 tablet, Rfl: 11    promethazine (PHENERGAN) 25 MG tablet, Take  "1 tablet (25 mg total) by mouth every 6 (six) hours as needed for Nausea., Disp: 30 tablet, Rfl: 1        Review of Systems:  General: Weight gain: No, Weight Loss: No, Fatigue YES   Fever: y Chills: No, Night Sweats: No, Insomnia: No, Excessive sleeping: No   Respiratory:  Cough: No, Shortness of Breath:  yes  Wheezing: No, Excessive Snoring: No, Coughing up blood: No  Endocrine: Heat Intolerance: No, Cold Intolerance: No,   Excessive Thirst: No, Excessive Hunger: No  Eyes:  Blurred Vision: No, Double Vision: No   Light Sensitivity: No, Eye pain: No  Musculoskeletal: Muscle Aches/Pain: No, Joint Pain/Swelling: yes ankles   Muscle Weakness:no , Neck Pain: No, Back Pain:   Neurological: Difficulty Walking/Falls:  No  Further   Headache Migraine: No, Dizziness/Vertigo: No, Fainting: No, Weakness: Better   Cardiovascular: Chest Pain: No, Shortness of Breath: no   Gastrointestinal: Nausea/Vomiting: No, Constipation: No, Diarrhea:stable increasing protuberant  Psych/Cog:  Depression: No, Anxiety: No, Hallucinations: No   : Frequent Urination: No, Incontinence: No, Blood of Urine: No, Urinary Infections: No  ENT:Hearing Loss: No, Earache: No, Ringing in Ears: No  Facial Pain: No, Chronic Congestion:No   Immune: Seasonal Allergies: No, Hives and/or Rashes: No  The remainder of the review of twelve body systems was reviewed and normal        BP (!) 123/58   Pulse 78   Temp 99 °F (37.2 °C)   Resp 20   Ht 5' 2" (1.575 m)   Wt 92.6 kg (204 lb 2.3 oz)   SpO2 100%   BMI 37.34 kg/m²   Constitutional: oriented to person, place, and time.  Conj pink   HENT: anicteric sclera   Head: Normocephalic and atraumatic.   Ears canal normal no discharge    Nose: Nose normal.   Non boggy turbinates  No lymphatics noted   Eyes: Conjunctivae PALE  EOM are normal.  Neck: . No thyromegaly present.  No bruits   Cardiovascular: Normal rate, regular rhythm, normal heart sounds  No murmur heard.  Pulmonary/Chest:  Decreased breath sounds  " Better today  Int SOB  Abdominal: Soft. Bowel sounds are normal.  no mass.   Musculoskeletal: Normal range of motion. decreased strength   1 + edema of legs   No further boggy turbinates  Lymphadenopathy:  no cervical adenopathy.   Neurological: alert and oriented to person, place  Skin: Skin is warm and dry. No rash noted.   Psychiatric: normal mood and affect.   Vitals reviewed.    Lab Results   Component Value Date    WBC 6.86 09/09/2019    RBC 3.84 (L) 09/09/2019    HGB 11.8 (L) 09/09/2019    HCT 38.5 09/09/2019     (H) 09/09/2019    MCH 30.7 09/09/2019    MCHC 30.6 (L) 09/09/2019    RDW 16.1 (H) 09/09/2019     09/09/2019    MPV 9.1 (L) 09/09/2019    GRAN 4.5 09/09/2019    GRAN 66.2 09/09/2019    LYMPH 1.3 09/09/2019    LYMPH 19.5 09/09/2019    MONO 0.7 09/09/2019    MONO 9.8 09/09/2019    EOS 0.1 09/09/2019    BASO 0.09 09/09/2019    EOSINOPHIL 1.9 09/09/2019    BASOPHIL 1.3 09/09/2019       CMP  Sodium   Date Value Ref Range Status   09/06/2019 140 136 - 145 mmol/L Final     Potassium   Date Value Ref Range Status   09/06/2019 3.8 3.5 - 5.1 mmol/L Final     Chloride   Date Value Ref Range Status   09/06/2019 105 95 - 110 mmol/L Final     CO2   Date Value Ref Range Status   09/06/2019 25 23 - 29 mmol/L Final     Glucose   Date Value Ref Range Status   09/06/2019 91 70 - 110 mg/dL Final     BUN, Bld   Date Value Ref Range Status   09/06/2019 9 8 - 23 mg/dL Final     Creatinine   Date Value Ref Range Status   09/06/2019 0.7 0.5 - 1.4 mg/dL Final     Calcium   Date Value Ref Range Status   09/06/2019 9.1 8.7 - 10.5 mg/dL Final     Total Protein   Date Value Ref Range Status   09/06/2019 6.2 6.0 - 8.4 g/dL Final     Albumin   Date Value Ref Range Status   09/06/2019 3.3 (L) 3.5 - 5.2 g/dL Final     Total Bilirubin   Date Value Ref Range Status   09/06/2019 0.9 0.1 - 1.0 mg/dL Final     Comment:     For infants and newborns, interpretation of results should be based  on gestational age, weight and in  agreement with clinical  observations.  Premature Infant recommended reference ranges:  Up to 24 hours.............<8.0 mg/dL  Up to 48 hours............<12.0 mg/dL  3-5 days..................<15.0 mg/dL  6-29 days.................<15.0 mg/dL       Alkaline Phosphatase   Date Value Ref Range Status   09/06/2019 214 (H) 55 - 135 U/L Final     AST   Date Value Ref Range Status   09/06/2019 18 10 - 40 U/L Final     ALT   Date Value Ref Range Status   09/06/2019 21 10 - 44 U/L Final     Anion Gap   Date Value Ref Range Status   09/06/2019 10 8 - 16 mmol/L Final     eGFR if    Date Value Ref Range Status   09/06/2019 >60 >60 mL/min/1.73 m^2 Final     eGFR if non    Date Value Ref Range Status   09/06/2019 >60 >60 mL/min/1.73 m^2 Final     Comment:     Calculation used to obtain the estimated glomerular filtration  rate (eGFR) is the CKD-EPI equation.      Impression:           - Normal esophagus.                        - Normal stomach.                        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 Prosthesis placed. The                         proximal end of the stent is distal to the ampulla                         (refer to images).  No msi     Duodenal cancer  -     CBC auto differential; Standing  -     CEA; Future; Expected date: 09/23/2019  -     CMP; Future; Expected date: 09/23/2019    Encounter for antineoplastic chemotherapy    Morbid obesity    Anemia, unspecified type    Malignant neoplasm metastatic to lung, unspecified laterality    Hepatic metastases    On statin therapy       Imaging studies to in approximately 3 months  Folfiri with avastin CLEARED   Failed  folfox and avastin for stage  4 disease   No more neupogen necessary   Pulmonary lesions improved   Liver lesions improved on MRI   Discussed diet   Macrocytic anemia increase B vitamins with folate and iron as well  Educated her about exercise   Tolerating  lipitor for dyslipidemia    Current Outpatient Medications:     amLODIPine (NORVASC) 5 MG tablet, Take 5 mg by mouth once daily., Disp: , Rfl: 3    atorvastatin (LIPITOR) 20 MG tablet, Take 20 mg by mouth every evening., Disp: , Rfl:     ferrous sulfate (FEOSOL) 325 mg (65 mg iron) Tab tablet, Take 1 tablet by mouth 2 (two) times daily., Disp: , Rfl: 3    ferrous sulfate (FEOSOL) 325 mg (65 mg iron) Tab tablet, TAKE 1 TABLET BY MOUTH TWICE A DAY, Disp: , Rfl:     metoprolol succinate (TOPROL-XL) 25 MG 24 hr tablet, Take 25 mg by mouth every evening., Disp: , Rfl: 3    neomycin-polymyxin-hydrocortisone (CORTISPORIN) 3.5-10,000-10 mg-unit-mg/mL ophthalmic suspension, Place 1 drop into both eyes 4 (four) times daily., Disp: 7.5 mL, Rfl: 0    ondansetron (ZOFRAN-ODT) 8 MG TbDL, Take 1 tablet (8 mg total) by mouth every 12 (twelve) hours as needed., Disp: 30 tablet, Rfl: 1    potassium chloride SA (K-DUR,KLOR-CON) 20 MEQ tablet, Take 1 tablet (20 mEq total) by mouth once daily., Disp: 30 tablet, Rfl: 11    promethazine (PHENERGAN) 25 MG tablet, Take 1 tablet (25 mg total) by mouth every 6 (six) hours as needed for Nausea., Disp: 30 tablet, Rfl: 1  She is to continue Norvasc for blood pressure control which is stable   Will need vitamin-D levels checked soon  Advance Care Planning     Living Will  During this visit, I engaged the patient in the advance care planning process.  The patient and I reviewed the role for advance directives and their purpose in directing future healthcare if the patient's unable to speak for him/herself.  At this point in time, the patient does have full decision-making capacity.  We discussed different extreme health states that she could experience, and reviewed what kind of medical care she would want in those situations.  The patient communicated that if she were comatose and had little chance of a meaningful recovery, she would not want machines/life-sustaining treatments used.  5  The patient  Has not  completed a living will to reflect these preferences.  The patient   HAS NOT already designated a healthcare power of  to make decisions on her behalf.   I spent a total of 5 minutes engaging the patient in this advance care planning discussion.     Advance Care Planning     Power of   I initiated the process of advance care planning today and explained the importance of this process to the patient.  I introduced the concept of advance directives to the patient, as well. Then the patient received detailed information about the importance of designating a Health Care Power of  (HCPOA). She was also instructed to communicate with this person about their wishes for future healthcare, should she become sick and lose decision-making capacity. The patient has not previously appointed a HCPOA. Pt will agree to have her spouse    make decisions for her       Thank you for allowing me to evaluate and participate in the care of this pleasant patient. Please fell free to call me with any questions or concerns.    Warmly,  Karina Pettit MD    This note was dictated with Dragon and briefly proofread. Please excuse any sentences that may be unclear or words misspelled

## 2019-09-24 ENCOUNTER — INFUSION (OUTPATIENT)
Dept: INFUSION THERAPY | Facility: HOSPITAL | Age: 64
End: 2019-09-24
Attending: INTERNAL MEDICINE
Payer: MEDICARE

## 2019-09-24 VITALS
WEIGHT: 204 LBS | SYSTOLIC BLOOD PRESSURE: 117 MMHG | TEMPERATURE: 99 F | HEIGHT: 62 IN | DIASTOLIC BLOOD PRESSURE: 61 MMHG | BODY MASS INDEX: 37.54 KG/M2 | HEART RATE: 62 BPM | RESPIRATION RATE: 20 BRPM

## 2019-09-24 DIAGNOSIS — D70.1 CHEMOTHERAPY INDUCED NEUTROPENIA: ICD-10-CM

## 2019-09-24 DIAGNOSIS — C78.00 MALIGNANT NEOPLASM METASTATIC TO LUNG, UNSPECIFIED LATERALITY: ICD-10-CM

## 2019-09-24 DIAGNOSIS — K31.5 DUODENAL STENOSIS: ICD-10-CM

## 2019-09-24 DIAGNOSIS — T45.1X5A CHEMOTHERAPY INDUCED NEUTROPENIA: ICD-10-CM

## 2019-09-24 DIAGNOSIS — Z51.11 ENCOUNTER FOR ANTINEOPLASTIC CHEMOTHERAPY: Primary | ICD-10-CM

## 2019-09-24 DIAGNOSIS — C78.7 HEPATIC METASTASES: ICD-10-CM

## 2019-09-24 DIAGNOSIS — C17.0 DUODENAL CANCER: ICD-10-CM

## 2019-09-24 PROCEDURE — 96375 TX/PRO/DX INJ NEW DRUG ADDON: CPT

## 2019-09-24 PROCEDURE — 96417 CHEMO IV INFUS EACH ADDL SEQ: CPT

## 2019-09-24 PROCEDURE — 96366 THER/PROPH/DIAG IV INF ADDON: CPT

## 2019-09-24 PROCEDURE — 96367 TX/PROPH/DG ADDL SEQ IV INF: CPT

## 2019-09-24 PROCEDURE — 96368 THER/DIAG CONCURRENT INF: CPT

## 2019-09-24 PROCEDURE — 96416 CHEMO PROLONG INFUSE W/PUMP: CPT

## 2019-09-24 PROCEDURE — 96415 CHEMO IV INFUSION ADDL HR: CPT

## 2019-09-24 PROCEDURE — 96411 CHEMO IV PUSH ADDL DRUG: CPT

## 2019-09-24 PROCEDURE — 63600175 PHARM REV CODE 636 W HCPCS: Performed by: INTERNAL MEDICINE

## 2019-09-24 PROCEDURE — 96413 CHEMO IV INFUSION 1 HR: CPT

## 2019-09-24 RX ORDER — HEPARIN 100 UNIT/ML
500 SYRINGE INTRAVENOUS
Status: DISCONTINUED | OUTPATIENT
Start: 2019-09-24 | End: 2019-09-25 | Stop reason: HOSPADM

## 2019-09-24 RX ORDER — ATROPINE SULFATE 0.4 MG/ML
0.4 INJECTION, SOLUTION ENDOTRACHEAL; INTRAMEDULLARY; INTRAMUSCULAR; INTRAVENOUS; SUBCUTANEOUS ONCE AS NEEDED
Status: COMPLETED | OUTPATIENT
Start: 2019-09-24 | End: 2019-09-24

## 2019-09-24 RX ORDER — FLUOROURACIL 50 MG/ML
400 INJECTION, SOLUTION INTRAVENOUS
Status: COMPLETED | OUTPATIENT
Start: 2019-09-24 | End: 2019-09-24

## 2019-09-24 RX ORDER — SODIUM CHLORIDE 0.9 % (FLUSH) 0.9 %
10 SYRINGE (ML) INJECTION
Status: DISCONTINUED | OUTPATIENT
Start: 2019-09-24 | End: 2019-09-25 | Stop reason: HOSPADM

## 2019-09-24 RX ADMIN — IRINOTECAN HYDROCHLORIDE 376 MG: 20 INJECTION, SOLUTION INTRAVENOUS at 09:09

## 2019-09-24 RX ADMIN — ATROPINE SULFATE 0.4 MG: 0.4 INJECTION, SOLUTION INTRAMUSCULAR; INTRAVENOUS; SUBCUTANEOUS at 09:09

## 2019-09-24 RX ADMIN — FLUOROURACIL 5015 MG: 50 INJECTION, SOLUTION INTRAVENOUS at 11:09

## 2019-09-24 RX ADMIN — DEXTROSE 835 MG: 5 SOLUTION INTRAVENOUS at 09:09

## 2019-09-24 RX ADMIN — PALONOSETRON HYDROCHLORIDE: 0.25 INJECTION, SOLUTION INTRAVENOUS at 08:09

## 2019-09-24 RX ADMIN — SODIUM CHLORIDE: 0.9 INJECTION, SOLUTION INTRAVENOUS at 08:09

## 2019-09-24 RX ADMIN — FLUOROURACIL 835 MG: 50 INJECTION, SOLUTION INTRAVENOUS at 11:09

## 2019-09-24 RX ADMIN — BEVACIZUMAB 500 MG: 400 INJECTION, SOLUTION INTRAVENOUS at 08:09

## 2019-09-26 ENCOUNTER — INFUSION (OUTPATIENT)
Dept: INFUSION THERAPY | Facility: HOSPITAL | Age: 64
End: 2019-09-26
Attending: INTERNAL MEDICINE
Payer: MEDICARE

## 2019-09-26 VITALS
RESPIRATION RATE: 20 BRPM | DIASTOLIC BLOOD PRESSURE: 75 MMHG | TEMPERATURE: 98 F | HEART RATE: 81 BPM | SYSTOLIC BLOOD PRESSURE: 117 MMHG

## 2019-09-26 DIAGNOSIS — T45.1X5A CHEMOTHERAPY INDUCED NEUTROPENIA: ICD-10-CM

## 2019-09-26 DIAGNOSIS — C78.7 HEPATIC METASTASES: ICD-10-CM

## 2019-09-26 DIAGNOSIS — Z51.11 ENCOUNTER FOR ANTINEOPLASTIC CHEMOTHERAPY: Primary | ICD-10-CM

## 2019-09-26 DIAGNOSIS — D70.1 CHEMOTHERAPY INDUCED NEUTROPENIA: ICD-10-CM

## 2019-09-26 DIAGNOSIS — C78.00 MALIGNANT NEOPLASM METASTATIC TO LUNG, UNSPECIFIED LATERALITY: ICD-10-CM

## 2019-09-26 DIAGNOSIS — C17.0 DUODENAL CANCER: ICD-10-CM

## 2019-09-26 DIAGNOSIS — K31.5 DUODENAL STENOSIS: ICD-10-CM

## 2019-09-26 PROCEDURE — 25000003 PHARM REV CODE 250: Performed by: INTERNAL MEDICINE

## 2019-09-26 PROCEDURE — 96377 APPLICATON ON-BODY INJECTOR: CPT

## 2019-09-26 PROCEDURE — A4216 STERILE WATER/SALINE, 10 ML: HCPCS | Performed by: INTERNAL MEDICINE

## 2019-09-26 PROCEDURE — 63600175 PHARM REV CODE 636 W HCPCS: Performed by: INTERNAL MEDICINE

## 2019-09-26 PROCEDURE — 96523 IRRIG DRUG DELIVERY DEVICE: CPT

## 2019-09-26 RX ORDER — SODIUM CHLORIDE 0.9 % (FLUSH) 0.9 %
10 SYRINGE (ML) INJECTION
Status: DISCONTINUED | OUTPATIENT
Start: 2019-09-26 | End: 2019-09-26 | Stop reason: HOSPADM

## 2019-09-26 RX ORDER — SODIUM CHLORIDE 0.9 % (FLUSH) 0.9 %
10 SYRINGE (ML) INJECTION
Status: CANCELLED | OUTPATIENT
Start: 2019-09-26

## 2019-09-26 RX ORDER — HEPARIN 100 UNIT/ML
500 SYRINGE INTRAVENOUS
Status: CANCELLED | OUTPATIENT
Start: 2019-09-26

## 2019-09-26 RX ORDER — HEPARIN 100 UNIT/ML
500 SYRINGE INTRAVENOUS
Status: DISCONTINUED | OUTPATIENT
Start: 2019-09-26 | End: 2019-09-26 | Stop reason: HOSPADM

## 2019-09-26 RX ADMIN — HEPARIN 500 UNITS: 100 SYRINGE at 09:09

## 2019-09-26 RX ADMIN — PEGFILGRASTIM 6 MG: KIT SUBCUTANEOUS at 09:09

## 2019-09-26 RX ADMIN — SODIUM CHLORIDE, PRESERVATIVE FREE 10 ML: 5 INJECTION INTRAVENOUS at 09:09

## 2019-10-07 ENCOUNTER — OFFICE VISIT (OUTPATIENT)
Dept: HEMATOLOGY/ONCOLOGY | Facility: CLINIC | Age: 64
End: 2019-10-07
Payer: MEDICARE

## 2019-10-07 ENCOUNTER — LAB VISIT (OUTPATIENT)
Dept: LAB | Facility: HOSPITAL | Age: 64
End: 2019-10-07
Attending: INTERNAL MEDICINE
Payer: MEDICARE

## 2019-10-07 VITALS
BODY MASS INDEX: 37.56 KG/M2 | SYSTOLIC BLOOD PRESSURE: 130 MMHG | OXYGEN SATURATION: 97 % | DIASTOLIC BLOOD PRESSURE: 74 MMHG | HEIGHT: 62 IN | HEART RATE: 93 BPM | RESPIRATION RATE: 12 BRPM | TEMPERATURE: 98 F | WEIGHT: 204.13 LBS

## 2019-10-07 DIAGNOSIS — R97.0 ELEVATED CEA: ICD-10-CM

## 2019-10-07 DIAGNOSIS — R53.1 WEAKNESS: ICD-10-CM

## 2019-10-07 DIAGNOSIS — R63.0 DECREASED APPETITE: ICD-10-CM

## 2019-10-07 DIAGNOSIS — C78.00 MALIGNANT NEOPLASM METASTATIC TO LUNG, UNSPECIFIED LATERALITY: ICD-10-CM

## 2019-10-07 DIAGNOSIS — C17.0 DUODENAL CANCER: Primary | ICD-10-CM

## 2019-10-07 DIAGNOSIS — C17.0 DUODENAL CANCER: ICD-10-CM

## 2019-10-07 DIAGNOSIS — C78.7 HEPATIC METASTASES: ICD-10-CM

## 2019-10-07 DIAGNOSIS — M79.89 ARM SWELLING: ICD-10-CM

## 2019-10-07 DIAGNOSIS — Z51.11 ENCOUNTER FOR CHEMOTHERAPY MANAGEMENT: ICD-10-CM

## 2019-10-07 LAB
ALBUMIN SERPL BCP-MCNC: 3 G/DL (ref 3.5–5.2)
ALP SERPL-CCNC: 174 U/L (ref 55–135)
ALT SERPL W/O P-5'-P-CCNC: 21 U/L (ref 10–44)
ANION GAP SERPL CALC-SCNC: 10 MMOL/L (ref 8–16)
ANISOCYTOSIS BLD QL SMEAR: SLIGHT
AST SERPL-CCNC: 17 U/L (ref 10–40)
BASOPHILS NFR BLD: 0 % (ref 0–1.9)
BILIRUB SERPL-MCNC: 0.5 MG/DL (ref 0.1–1)
BUN SERPL-MCNC: 9 MG/DL (ref 8–23)
CALCIUM SERPL-MCNC: 9.5 MG/DL (ref 8.7–10.5)
CEA SERPL-MCNC: 14.5 NG/ML (ref 0–5)
CHLORIDE SERPL-SCNC: 104 MMOL/L (ref 95–110)
CO2 SERPL-SCNC: 26 MMOL/L (ref 23–29)
CREAT SERPL-MCNC: 0.8 MG/DL (ref 0.5–1.4)
DIFFERENTIAL METHOD: ABNORMAL
EOSINOPHIL NFR BLD: 0 % (ref 0–8)
ERYTHROCYTE [DISTWIDTH] IN BLOOD BY AUTOMATED COUNT: 15.8 % (ref 11.5–14.5)
EST. GFR  (AFRICAN AMERICAN): >60 ML/MIN/1.73 M^2
EST. GFR  (NON AFRICAN AMERICAN): >60 ML/MIN/1.73 M^2
GLUCOSE SERPL-MCNC: 113 MG/DL (ref 70–110)
HCT VFR BLD AUTO: 35.2 % (ref 37–48.5)
HGB BLD-MCNC: 10.6 G/DL (ref 12–16)
HYPOCHROMIA BLD QL SMEAR: ABNORMAL
IMM GRANULOCYTES # BLD AUTO: ABNORMAL K/UL
LYMPHOCYTES NFR BLD: 13 % (ref 18–48)
MCH RBC QN AUTO: 30.1 PG (ref 27–31)
MCHC RBC AUTO-ENTMCNC: 30.1 G/DL (ref 32–36)
MCV RBC AUTO: 100 FL (ref 82–98)
METAMYELOCYTES NFR BLD MANUAL: 1 %
MONOCYTES NFR BLD: 7 % (ref 4–15)
NEUTROPHILS NFR BLD: 58 % (ref 38–73)
NEUTS BAND NFR BLD MANUAL: 21 %
NRBC BLD-RTO: 1 /100 WBC
OVALOCYTES BLD QL SMEAR: ABNORMAL
PLATELET # BLD AUTO: 240 K/UL (ref 150–350)
PLATELET BLD QL SMEAR: ABNORMAL
PMV BLD AUTO: 9.1 FL (ref 9.2–12.9)
POIKILOCYTOSIS BLD QL SMEAR: SLIGHT
POLYCHROMASIA BLD QL SMEAR: ABNORMAL
POTASSIUM SERPL-SCNC: 3.7 MMOL/L (ref 3.5–5.1)
PROT SERPL-MCNC: 6 G/DL (ref 6–8.4)
RBC # BLD AUTO: 3.52 M/UL (ref 4–5.4)
SODIUM SERPL-SCNC: 140 MMOL/L (ref 136–145)
WBC # BLD AUTO: 18.28 K/UL (ref 3.9–12.7)

## 2019-10-07 PROCEDURE — 80053 COMPREHEN METABOLIC PANEL: CPT

## 2019-10-07 PROCEDURE — 82378 CARCINOEMBRYONIC ANTIGEN: CPT

## 2019-10-07 PROCEDURE — 85027 COMPLETE CBC AUTOMATED: CPT

## 2019-10-07 PROCEDURE — 99215 OFFICE O/P EST HI 40 MIN: CPT | Mod: S$GLB,,, | Performed by: INTERNAL MEDICINE

## 2019-10-07 PROCEDURE — 99999 PR PBB SHADOW E&M-EST. PATIENT-LVL III: CPT | Mod: PBBFAC,,, | Performed by: INTERNAL MEDICINE

## 2019-10-07 PROCEDURE — 99999 PR PBB SHADOW E&M-EST. PATIENT-LVL III: ICD-10-PCS | Mod: PBBFAC,,, | Performed by: INTERNAL MEDICINE

## 2019-10-07 PROCEDURE — 36415 COLL VENOUS BLD VENIPUNCTURE: CPT

## 2019-10-07 PROCEDURE — 99215 PR OFFICE/OUTPT VISIT, EST, LEVL V, 40-54 MIN: ICD-10-PCS | Mod: S$GLB,,, | Performed by: INTERNAL MEDICINE

## 2019-10-07 PROCEDURE — 85007 BL SMEAR W/DIFF WBC COUNT: CPT

## 2019-10-07 RX ORDER — ATROPINE SULFATE 0.4 MG/ML
0.4 INJECTION, SOLUTION ENDOTRACHEAL; INTRAMEDULLARY; INTRAMUSCULAR; INTRAVENOUS; SUBCUTANEOUS ONCE AS NEEDED
Status: CANCELLED | OUTPATIENT
Start: 2019-10-08

## 2019-10-07 RX ORDER — HEPARIN 100 UNIT/ML
500 SYRINGE INTRAVENOUS
Status: CANCELLED | OUTPATIENT
Start: 2019-10-10

## 2019-10-07 RX ORDER — MEGESTROL ACETATE 40 MG/ML
400 SUSPENSION ORAL DAILY
Qty: 240 ML | Refills: 2 | Status: SHIPPED | OUTPATIENT
Start: 2019-10-07 | End: 2019-10-15

## 2019-10-07 RX ORDER — SODIUM CHLORIDE 0.9 % (FLUSH) 0.9 %
10 SYRINGE (ML) INJECTION
Status: CANCELLED | OUTPATIENT
Start: 2019-10-08

## 2019-10-07 RX ORDER — SODIUM CHLORIDE 0.9 % (FLUSH) 0.9 %
10 SYRINGE (ML) INJECTION
Status: CANCELLED | OUTPATIENT
Start: 2019-10-10

## 2019-10-07 RX ORDER — HEPARIN 100 UNIT/ML
500 SYRINGE INTRAVENOUS
Status: CANCELLED | OUTPATIENT
Start: 2019-10-08

## 2019-10-07 RX ORDER — FLUOROURACIL 50 MG/ML
400 INJECTION, SOLUTION INTRAVENOUS
Status: CANCELLED | OUTPATIENT
Start: 2019-10-08

## 2019-10-07 NOTE — PROGRESS NOTES
CC I am so nervous     Indigo Stuart is a 64 y.o.  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  Kras was MUTATED No MSI   Cycle 1  January 15 2019  Here for chemo with chest port in place     Tolerating lopressor for htn and lipitor for dyslipidemia  Tolerating zofran for nausea chemo   PND positie and sob  Cycle one folfox avastin January 15 2019   Failed regimen and admitted with SBO   Hepatic mets progressed hence changed to irinotecan drop  oxaliplatin   Cycle one folfiri avastin April 2019  Here to get clearance for next dose        No change in PMH, PFH, PSH since last OV     Current Outpatient Medications:     amLODIPine (NORVASC) 5 MG tablet, Take 5 mg by mouth once daily., Disp: , Rfl: 3    atorvastatin (LIPITOR) 20 MG tablet, Take 20 mg by mouth every evening., Disp: , Rfl:     ferrous sulfate (FEOSOL) 325 mg (65 mg iron) Tab tablet, Take 1 tablet by mouth 2 (two) times daily., Disp: , Rfl: 3    ferrous sulfate (FEOSOL) 325 mg (65 mg iron) Tab tablet, TAKE 1 TABLET BY MOUTH TWICE A DAY, Disp: , Rfl:     metoprolol succinate (TOPROL-XL) 25 MG 24 hr tablet, Take 25 mg by mouth every evening., Disp: , Rfl: 3    neomycin-polymyxin-hydrocortisone (CORTISPORIN) 3.5-10,000-10 mg-unit-mg/mL ophthalmic suspension, Place 1 drop into both eyes 4 (four) times daily., Disp: 7.5 mL, Rfl: 0    ondansetron (ZOFRAN-ODT) 8 MG TbDL, Take 1 tablet (8 mg total) by mouth every 12 (twelve) hours as needed., Disp: 30 tablet, Rfl: 1    potassium chloride SA (K-DUR,KLOR-CON) 20 MEQ tablet, Take 1 tablet (20 mEq total) by mouth once daily., Disp: 30 tablet, Rfl: 11    promethazine (PHENERGAN) 25 MG tablet, Take 1 tablet (25 mg total) by mouth every 6 (six) hours as needed for Nausea., Disp: 30 tablet, Rfl: 1      Review of Systems:  General: Weight gain: No, Weight Loss: No, ( despite pt not eating because so fatigued ) Fatigue worsening  "  Fever: y Chills: No, Night Sweats: No, Insomnia: No, Excessive sleeping: No   Respiratory:  Cough: No, Shortness of Breath:  yes  Wheezing: No, Excessive Snoring: No, Coughing up blood: No  Endocrine: Heat Intolerance: No, Cold Intolerance: No,   Excessive Thirst: No, Excessive Hunger: No  Eyes:  Blurred Vision: No, Double Vision: No   Light Sensitivity: No, Eye pain: No  Musculoskeletal: Muscle Aches/Pain: No, Joint Pain/Swelling: yes ankles   Muscle Weakness:no , Neck Pain: No, Back Pain:   Neurological: Difficulty Walking/Falls:  No  Further   Headache Migraine: No, Dizziness/Vertigo: No, Fainting: No, Weakness: Better   Cardiovascular: Chest Pain: No, Shortness of Breath: no   Gastrointestinal: Nausea/Vomiting: No, Constipation: No, Diarrhea:stable increasing protuberant  Psych/Cog:  Depression: No, Anxiety: No, Hallucinations: No   : Frequent Urination: No, Incontinence: No, Blood of Urine: No, Urinary Infections: No  ENT:Hearing Loss: No, Earache: No, Ringing in Ears: No  Facial Pain: No, Chronic Congestion:No   Immune: Seasonal Allergies: No, Hives and/or Rashes: No  The remainder of the review of twelve body systems was reviewed and normal        /74   Pulse 93   Temp 98.4 °F (36.9 °C)   Resp 12   Ht 5' 2" (1.575 m)   Wt 92.6 kg (204 lb 2.3 oz)   SpO2 97%   BMI 37.34 kg/m²   Constitutional: oriented to person, place, and time.  Conj pink   HENT: anicteric sclera   Head: Normocephalic and atraumatic. Ears canal normal no discharge    Nose: Nose normal.   Non boggy turbinates  No lymphatics noted   Eyes: Conjunctivae PALE  EOM are normal.  Neck: . No thyromegaly present.  No bruits   Cardiovascular: Normal rate, regular rhythm, normal heart sounds  No murmur heard.  Pulmonary/Chest:  Decreased breath sounds  Better today  Int SOB  Abdominal: Soft. Bowel sounds are normal.  no mass.   Musculoskeletal: Normal range of motion. decreased strength   1 + edema of legs   No further boggy " turbinates  Lymphadenopathy:  no cervical adenopathy.   Neurological: alert and oriented to person, place  Slight weakness today   Skin: Skin is warm and dry. No rash noted.   Psychiatric: normal mood and affect.   Vitals reviewed.    Lab Results   Component Value Date    WBC 18.28 (H) 10/07/2019    RBC 3.52 (L) 10/07/2019    HGB 10.6 (L) 10/07/2019    HCT 35.2 (L) 10/07/2019     (H) 10/07/2019    MCH 30.1 10/07/2019    MCHC 30.1 (L) 10/07/2019    RDW 15.8 (H) 10/07/2019     10/07/2019    MPV 9.1 (L) 10/07/2019    GRAN 58.0 10/07/2019    LYMPH 13.0 (L) 10/07/2019    MONO 7.0 10/07/2019    EOS 0.1 09/09/2019    BASO 0.09 09/09/2019    EOSINOPHIL 0.0 10/07/2019    BASOPHIL 0.0 10/07/2019       CMP  Sodium   Date Value Ref Range Status   10/07/2019 140 136 - 145 mmol/L Final     Potassium   Date Value Ref Range Status   10/07/2019 3.7 3.5 - 5.1 mmol/L Final     Chloride   Date Value Ref Range Status   10/07/2019 104 95 - 110 mmol/L Final     CO2   Date Value Ref Range Status   10/07/2019 26 23 - 29 mmol/L Final     Glucose   Date Value Ref Range Status   10/07/2019 113 (H) 70 - 110 mg/dL Final     BUN, Bld   Date Value Ref Range Status   10/07/2019 9 8 - 23 mg/dL Final     Creatinine   Date Value Ref Range Status   10/07/2019 0.8 0.5 - 1.4 mg/dL Final     Calcium   Date Value Ref Range Status   10/07/2019 9.5 8.7 - 10.5 mg/dL Final     Total Protein   Date Value Ref Range Status   10/07/2019 6.0 6.0 - 8.4 g/dL Final     Albumin   Date Value Ref Range Status   10/07/2019 3.0 (L) 3.5 - 5.2 g/dL Final     Total Bilirubin   Date Value Ref Range Status   10/07/2019 0.5 0.1 - 1.0 mg/dL Final     Comment:     For infants and newborns, interpretation of results should be based  on gestational age, weight and in agreement with clinical  observations.  Premature Infant recommended reference ranges:  Up to 24 hours.............<8.0 mg/dL  Up to 48 hours............<12.0 mg/dL  3-5 days..................<15.0  mg/dL  6-29 days.................<15.0 mg/dL       Alkaline Phosphatase   Date Value Ref Range Status   10/07/2019 174 (H) 55 - 135 U/L Final     AST   Date Value Ref Range Status   10/07/2019 17 10 - 40 U/L Final     ALT   Date Value Ref Range Status   10/07/2019 21 10 - 44 U/L Final     Anion Gap   Date Value Ref Range Status   10/07/2019 10 8 - 16 mmol/L Final     eGFR if    Date Value Ref Range Status   10/07/2019 >60 >60 mL/min/1.73 m^2 Final     eGFR if non    Date Value Ref Range Status   10/07/2019 >60 >60 mL/min/1.73 m^2 Final     Comment:     Calculation used to obtain the estimated glomerular filtration  rate (eGFR) is the CKD-EPI equation.      Impression:           - Normal esophagus.                        - Normal stomach.                        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 Prosthesis placed. The                         proximal end of the stent is distal to the ampulla                         (refer to images).  No msi     Duodenal cancer   Cleared for chemo   Hold neulasta as her WBC is high already : Leujocytosis   Malignant neoplasm metastatic to lung, unspecified laterality  -     NM PET CT Routine Skull to Mid Thigh  -     CBC auto differential; Future; Expected date: 10/07/2019  -     CMP; Future; Expected date: 10/07/2019   -iron TIBC   Hepatic metastases and lung mets    Anemia stable check iron levels next visit   Encounter for chemotherapy management  -     NM PET CT Routine Skull to Mid Thigh    Decreased appetite   Ad low dose megace to help stimulate her appetite  Elevated CEA   Level today is pending   -     CBC auto differential; Future; Expected date: 10/07/2019  -     CMP; Future; Expected date: 10/07/2019    Elevated ALK phos   Check pet ct scan       Folfiri with avastin CLEARED   Failed  folfox and avastin for stage  4 disease   Cont norvasc for hTn monitored by Dr Conner    Tolerating lipitor for dyslipidemia  On statin this can increase her alk phos or bone mets can or liver mets can     She is to continue Norvasc for blood pressure control which is stable   Will need vitamin-D levels checked soon    acp documented    RTC after PET CT   adendum Oct 8 2019  Infusion called  Pt with acute swelling left arm   Sending for imaging  apppreciate leo

## 2019-10-08 ENCOUNTER — HOSPITAL ENCOUNTER (OUTPATIENT)
Dept: RADIOLOGY | Facility: HOSPITAL | Age: 64
Discharge: HOME | End: 2019-10-08
Attending: INTERNAL MEDICINE
Payer: MEDICARE

## 2019-10-08 ENCOUNTER — TELEPHONE (OUTPATIENT)
Dept: HEMATOLOGY/ONCOLOGY | Facility: CLINIC | Age: 64
End: 2019-10-08

## 2019-10-08 ENCOUNTER — INFUSION (OUTPATIENT)
Dept: INFUSION THERAPY | Facility: HOSPITAL | Age: 64
End: 2019-10-08
Attending: INTERNAL MEDICINE
Payer: MEDICARE

## 2019-10-08 VITALS
WEIGHT: 204 LBS | TEMPERATURE: 98 F | SYSTOLIC BLOOD PRESSURE: 124 MMHG | OXYGEN SATURATION: 99 % | DIASTOLIC BLOOD PRESSURE: 81 MMHG | RESPIRATION RATE: 18 BRPM | HEIGHT: 62 IN | BODY MASS INDEX: 37.54 KG/M2 | HEART RATE: 88 BPM

## 2019-10-08 DIAGNOSIS — M79.89 SWELLING OF ARM: Primary | ICD-10-CM

## 2019-10-08 DIAGNOSIS — C78.7 HEPATIC METASTASES: ICD-10-CM

## 2019-10-08 DIAGNOSIS — M79.89 ARM SWELLING: ICD-10-CM

## 2019-10-08 DIAGNOSIS — T45.1X5A CHEMOTHERAPY INDUCED NEUTROPENIA: ICD-10-CM

## 2019-10-08 DIAGNOSIS — Z51.11 ENCOUNTER FOR ANTINEOPLASTIC CHEMOTHERAPY: Primary | ICD-10-CM

## 2019-10-08 DIAGNOSIS — C17.0 DUODENAL CANCER: ICD-10-CM

## 2019-10-08 DIAGNOSIS — D70.1 CHEMOTHERAPY INDUCED NEUTROPENIA: ICD-10-CM

## 2019-10-08 DIAGNOSIS — K31.5 DUODENAL STENOSIS: ICD-10-CM

## 2019-10-08 DIAGNOSIS — C78.00 MALIGNANT NEOPLASM METASTATIC TO LUNG, UNSPECIFIED LATERALITY: ICD-10-CM

## 2019-10-08 PROCEDURE — 93971 EXTREMITY STUDY: CPT | Mod: 26,LT,, | Performed by: RADIOLOGY

## 2019-10-08 PROCEDURE — G0463 HOSPITAL OUTPT CLINIC VISIT: HCPCS

## 2019-10-08 PROCEDURE — 93971 EXTREMITY STUDY: CPT | Mod: TC,LT

## 2019-10-08 PROCEDURE — 93971 US UPPER EXTREMITY VEINS LEFT: ICD-10-PCS | Mod: 26,LT,, | Performed by: RADIOLOGY

## 2019-10-08 RX ORDER — HEPARIN 100 UNIT/ML
500 SYRINGE INTRAVENOUS
Status: DISCONTINUED | OUTPATIENT
Start: 2019-10-08 | End: 2019-10-08 | Stop reason: HOSPADM

## 2019-10-08 RX ORDER — FLUOROURACIL 50 MG/ML
400 INJECTION, SOLUTION INTRAVENOUS
Status: DISCONTINUED | OUTPATIENT
Start: 2019-10-08 | End: 2019-10-08

## 2019-10-08 RX ORDER — SODIUM CHLORIDE 0.9 % (FLUSH) 0.9 %
10 SYRINGE (ML) INJECTION
Status: DISCONTINUED | OUTPATIENT
Start: 2019-10-08 | End: 2019-10-08 | Stop reason: HOSPADM

## 2019-10-08 RX ORDER — ATROPINE SULFATE 0.4 MG/ML
0.4 INJECTION, SOLUTION ENDOTRACHEAL; INTRAMEDULLARY; INTRAMUSCULAR; INTRAVENOUS; SUBCUTANEOUS ONCE AS NEEDED
Status: DISCONTINUED | OUTPATIENT
Start: 2019-10-08 | End: 2019-10-08

## 2019-10-08 NOTE — TELEPHONE ENCOUNTER
----- Message from Andreea  sent at 10/8/2019 10:07 AM CDT -----  Contact:   Type: Needs Medical Advice    Who Called:    Best Call Back Number:   Additional Information: Requesting a call back from  PER  Request, regarding pt is with Him in office with abnormal results the Nurses or  could not be reached and  said it is Urgent I called the back lines and reached out no respond  cell is going Straight to  and nurses were unavailable

## 2019-10-08 NOTE — NURSING
Arrived c/o weakness using walker. Noted left arm swollen good pulses , Dr Pettit notified, to schedule ultrasound.

## 2019-10-08 NOTE — TELEPHONE ENCOUNTER
Lovenox 1mg/kg  Sub Q BID called in to [facility] with 92.5 kg weight given. Qty of 10 with 2 refills. Pt to return to clinic 5 days.

## 2019-10-09 ENCOUNTER — TELEPHONE (OUTPATIENT)
Dept: HEMATOLOGY/ONCOLOGY | Facility: HOSPITAL | Age: 64
End: 2019-10-09

## 2019-10-14 ENCOUNTER — TELEPHONE (OUTPATIENT)
Dept: HEMATOLOGY/ONCOLOGY | Facility: CLINIC | Age: 64
End: 2019-10-14

## 2019-10-15 ENCOUNTER — PATIENT MESSAGE (OUTPATIENT)
Dept: HEMATOLOGY/ONCOLOGY | Facility: CLINIC | Age: 64
End: 2019-10-15

## 2019-10-15 ENCOUNTER — TELEPHONE (OUTPATIENT)
Dept: PHARMACY | Facility: AMBULARY SURGERY CENTER | Age: 64
End: 2019-10-15

## 2019-10-15 ENCOUNTER — OFFICE VISIT (OUTPATIENT)
Dept: HEMATOLOGY/ONCOLOGY | Facility: CLINIC | Age: 64
End: 2019-10-15
Payer: MEDICARE

## 2019-10-15 VITALS
HEART RATE: 89 BPM | SYSTOLIC BLOOD PRESSURE: 124 MMHG | DIASTOLIC BLOOD PRESSURE: 73 MMHG | WEIGHT: 206.56 LBS | BODY MASS INDEX: 38.01 KG/M2 | HEIGHT: 62 IN | OXYGEN SATURATION: 99 % | TEMPERATURE: 99 F | RESPIRATION RATE: 16 BRPM

## 2019-10-15 DIAGNOSIS — C78.7 HEPATIC METASTASES: ICD-10-CM

## 2019-10-15 DIAGNOSIS — I82.629 ACUTE VENOUS EMBOLISM AND THROMBOSIS OF DEEP VEINS OF UPPER EXTREMITY, UNSPECIFIED LATERALITY: ICD-10-CM

## 2019-10-15 DIAGNOSIS — C17.0 DUODENAL CANCER: Primary | ICD-10-CM

## 2019-10-15 DIAGNOSIS — C78.00 MALIGNANT NEOPLASM METASTATIC TO LUNG, UNSPECIFIED LATERALITY: ICD-10-CM

## 2019-10-15 DIAGNOSIS — I82.629 ACUTE VENOUS EMBOLISM AND THROMBOSIS OF DEEP VEINS OF UPPER EXTREMITY, UNSPECIFIED LATERALITY: Primary | ICD-10-CM

## 2019-10-15 PROCEDURE — 3078F DIAST BP <80 MM HG: CPT | Mod: S$GLB,,, | Performed by: INTERNAL MEDICINE

## 2019-10-15 PROCEDURE — 3078F PR MOST RECENT DIASTOLIC BLOOD PRESSURE < 80 MM HG: ICD-10-PCS | Mod: S$GLB,,, | Performed by: INTERNAL MEDICINE

## 2019-10-15 PROCEDURE — 3074F SYST BP LT 130 MM HG: CPT | Mod: S$GLB,,, | Performed by: INTERNAL MEDICINE

## 2019-10-15 PROCEDURE — 3074F PR MOST RECENT SYSTOLIC BLOOD PRESSURE < 130 MM HG: ICD-10-PCS | Mod: S$GLB,,, | Performed by: INTERNAL MEDICINE

## 2019-10-15 PROCEDURE — 1101F PR PT FALLS ASSESS DOC 0-1 FALLS W/OUT INJ PAST YR: ICD-10-PCS | Mod: S$GLB,,, | Performed by: INTERNAL MEDICINE

## 2019-10-15 PROCEDURE — 1101F PT FALLS ASSESS-DOCD LE1/YR: CPT | Mod: S$GLB,,, | Performed by: INTERNAL MEDICINE

## 2019-10-15 PROCEDURE — 99999 PR PBB SHADOW E&M-EST. PATIENT-LVL IV: CPT | Mod: PBBFAC,,, | Performed by: INTERNAL MEDICINE

## 2019-10-15 PROCEDURE — 99215 OFFICE O/P EST HI 40 MIN: CPT | Mod: S$GLB,,, | Performed by: INTERNAL MEDICINE

## 2019-10-15 PROCEDURE — 99215 PR OFFICE/OUTPT VISIT, EST, LEVL V, 40-54 MIN: ICD-10-PCS | Mod: S$GLB,,, | Performed by: INTERNAL MEDICINE

## 2019-10-15 PROCEDURE — 99999 PR PBB SHADOW E&M-EST. PATIENT-LVL IV: ICD-10-PCS | Mod: PBBFAC,,, | Performed by: INTERNAL MEDICINE

## 2019-10-15 NOTE — PROGRESS NOTES
CC I need my chemo      Indigo Stuart is a 64 y.o.  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  Kras was MUTATED No MSI   Cycle 1  January 15 2019  Here for chemo with chest port in place     Tolerating lopressor for htn and lipitor for dyslipidemia  Tolerating zofran for nausea chemo   PND positie and sob  Cycle one folfox avastin January 15 2019   Failed regimen and admitted with SBO   Hepatic mets progressed hence changed to irinotecan drop  oxaliplatin   Cycle one folfiri avastin April 2019  Here to get clearance for next dose      Pt was seen for left arm swelling and ultrasound revealed a DVT in her arm she is on lovenox     No change in PMH, PFH, PSH since last OV     Current Outpatient Medications:     amLODIPine (NORVASC) 5 MG tablet, Take 5 mg by mouth once daily., Disp: , Rfl: 3    atorvastatin (LIPITOR) 20 MG tablet, Take 20 mg by mouth every evening., Disp: , Rfl:     ferrous sulfate (FEOSOL) 325 mg (65 mg iron) Tab tablet, Take 1 tablet by mouth 2 (two) times daily., Disp: , Rfl: 3    ferrous sulfate (FEOSOL) 325 mg (65 mg iron) Tab tablet, TAKE 1 TABLET BY MOUTH TWICE A DAY, Disp: , Rfl:     megestrol (MEGACE) 400 mg/10 mL (40 mg/mL) Susp, Take 10 mLs (400 mg total) by mouth once daily., Disp: 240 mL, Rfl: 2    metoprolol succinate (TOPROL-XL) 25 MG 24 hr tablet, Take 25 mg by mouth every evening., Disp: , Rfl: 3    neomycin-polymyxin-hydrocortisone (CORTISPORIN) 3.5-10,000-10 mg-unit-mg/mL ophthalmic suspension, Place 1 drop into both eyes 4 (four) times daily., Disp: 7.5 mL, Rfl: 0    ondansetron (ZOFRAN-ODT) 8 MG TbDL, Take 1 tablet (8 mg total) by mouth every 12 (twelve) hours as needed., Disp: 30 tablet, Rfl: 1    potassium chloride SA (K-DUR,KLOR-CON) 20 MEQ tablet, Take 1 tablet (20 mEq total) by mouth once daily., Disp: 30 tablet, Rfl: 11    promethazine (PHENERGAN) 25 MG tablet, Take 1 tablet (25  "mg total) by mouth every 6 (six) hours as needed for Nausea., Disp: 30 tablet, Rfl: 1      Review of Systems:  General: Weight gain: No, Weight Loss: No,  Fatigue   Fever: y Chills: No, Night Sweats: No, Insomnia: No, Excessive sleeping: No   Respiratory:  Cough: No, Shortness of Breath:  no   Wheezing: No, Excessive Snoring: No, Coughing up blood: No  Endocrine: Heat Intolerance: No, Cold Intolerance: No,   Excessive Thirst: No, Excessive Hunger: No  Eyes:  Blurred Vision: No, Double Vision: No   Light Sensitivity: No, Eye pain: No  Musculoskeletal: Muscle Aches/Pain: No, Joint Pain/Swelling: yes  And arms    Muscle Weakness:no , Neck Pain: No, Back Pain:   Neurological: Difficulty Walking/Falls:  No  Further   Headache Migraine: No, Dizziness/Vertigo: No, Fainting: No, Weakness: Better   Cardiovascular: Chest Pain: No, Shortness of Breath: no   Gastrointestinal: Nausea/Vomiting: No, Constipation: No, Diarrhea:stable increasing protuberant  Psych/Cog:  Depression: No, Anxiety: No, Hallucinations: No   : Frequent Urination: No, Incontinence: No, Blood of Urine: No, Urinary Infections: No  ENT:Hearing Loss: No, Earache: No, Ringing in Ears: No  Facial Pain: No, Chronic Congestion:No   Immune: Seasonal Allergies: No, Hives and/or Rashes: No  The remainder of the review of twelve body systems was reviewed and normal        /73   Pulse 89   Temp 98.7 °F (37.1 °C)   Resp 16   Ht 5' 2" (1.575 m)   Wt 93.7 kg (206 lb 9.1 oz)   SpO2 99%   BMI 37.78 kg/m²   Constitutional: oriented to person, place, and time.  Conj pink   HENT: anicteric sclera   Head: Normocephalic and atraumatic. Ears canal normal no discharge    Nose: Nose normal.   Non boggy turbinates  No lymphatics noted   Eyes: Conjunctivae PALE  EOM are normal.  Neck: . No thyromegaly present.  No bruits   Cardiovascular: Normal rate, regular rhythm, normal heart sounds  No murmur heard.  Pulmonary/Chest:  Clear bilaterally   Abdominal: Soft. Bowel " sounds are normal.  no mass.   Musculoskeletal: Normal range of motion. decreased strength   1 + edema of legs   No further boggy turbinates  Lymphadenopathy:  no cervical adenopathy.   Neurological: alert and oriented to person, place  Slight weakness today   Skin: Skin is warm and dry. No rash noted.   Psychiatric: normal mood and affect.   Vitals reviewed.    Lab Results   Component Value Date    WBC 18.28 (H) 10/07/2019    RBC 3.52 (L) 10/07/2019    HGB 10.6 (L) 10/07/2019    HCT 35.2 (L) 10/07/2019     (H) 10/07/2019    MCH 30.1 10/07/2019    MCHC 30.1 (L) 10/07/2019    RDW 15.8 (H) 10/07/2019     10/07/2019    MPV 9.1 (L) 10/07/2019    GRAN 58.0 10/07/2019    LYMPH 13.0 (L) 10/07/2019    MONO 7.0 10/07/2019    EOS 0.1 09/09/2019    BASO 0.09 09/09/2019    EOSINOPHIL 0.0 10/07/2019    BASOPHIL 0.0 10/07/2019       CMP  Sodium   Date Value Ref Range Status   10/07/2019 140 136 - 145 mmol/L Final     Potassium   Date Value Ref Range Status   10/07/2019 3.7 3.5 - 5.1 mmol/L Final     Chloride   Date Value Ref Range Status   10/07/2019 104 95 - 110 mmol/L Final     CO2   Date Value Ref Range Status   10/07/2019 26 23 - 29 mmol/L Final     Glucose   Date Value Ref Range Status   10/07/2019 113 (H) 70 - 110 mg/dL Final     BUN, Bld   Date Value Ref Range Status   10/07/2019 9 8 - 23 mg/dL Final     Creatinine   Date Value Ref Range Status   10/07/2019 0.8 0.5 - 1.4 mg/dL Final     Calcium   Date Value Ref Range Status   10/07/2019 9.5 8.7 - 10.5 mg/dL Final     Total Protein   Date Value Ref Range Status   10/07/2019 6.0 6.0 - 8.4 g/dL Final     Albumin   Date Value Ref Range Status   10/07/2019 3.0 (L) 3.5 - 5.2 g/dL Final     Total Bilirubin   Date Value Ref Range Status   10/07/2019 0.5 0.1 - 1.0 mg/dL Final     Comment:     For infants and newborns, interpretation of results should be based  on gestational age, weight and in agreement with clinical  observations.  Premature Infant recommended  reference ranges:  Up to 24 hours.............<8.0 mg/dL  Up to 48 hours............<12.0 mg/dL  3-5 days..................<15.0 mg/dL  6-29 days.................<15.0 mg/dL       Alkaline Phosphatase   Date Value Ref Range Status   10/07/2019 174 (H) 55 - 135 U/L Final     AST   Date Value Ref Range Status   10/07/2019 17 10 - 40 U/L Final     ALT   Date Value Ref Range Status   10/07/2019 21 10 - 44 U/L Final     Anion Gap   Date Value Ref Range Status   10/07/2019 10 8 - 16 mmol/L Final     eGFR if    Date Value Ref Range Status   10/07/2019 >60 >60 mL/min/1.73 m^2 Final     eGFR if non    Date Value Ref Range Status   10/07/2019 >60 >60 mL/min/1.73 m^2 Final     Comment:     Calculation used to obtain the estimated glomerular filtration  rate (eGFR) is the CKD-EPI equation.      Impression:           - Normal esophagus.                        - Normal stomach.                        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 Prosthesis placed. The                         proximal end of the stent is distal to the ampulla                         (refer to images).  No msi        Duodenal cancer    Hepatic metastases    Malignant neoplasm metastatic to lung, unspecified laterality    Acute venous embolism and thrombosis of deep veins of upper extremity, unspecified laterality  -     apixaban (ELIQUIS) 5 mg Tab; Take 1 tablet (5 mg total) by mouth 2 (two) times daily.  Dispense: 60 tablet; Refill: 2      1. Proceed with pet ct at [facility]   2. Call [facility] infusion and hold chemo and reschedule for in 2-3 weeks  3. Authoriztion is needed : obtain this for eliquis before the patient leaves the office SHE NEEDS THIS STAT   Get her to  for copay assistance card   4. Increase protein in diet   5. Make appt for her and send her to lymphedema clinic with a script for a lymphedema fitted sleeve NOT therapy because she has a clot        RTC after scan and labs   Folfiri with avastin Not cleared   Failed  folfox and avastin for stage  4 disease   Cont norvasc for HTN monitored by Dr Conner   Tolerating lipitor for dyslipidemia  On statin monitoring counts closely  DO not take megace with a blood clot   She is to continue Norvasc for blood pressure control which is stable   Will need vitamin-D levels checked soon    Explained that I want to reassess her port after she is on eliquis for a week   acp documented

## 2019-10-16 ENCOUNTER — TELEPHONE (OUTPATIENT)
Dept: HEMATOLOGY/ONCOLOGY | Facility: CLINIC | Age: 64
End: 2019-10-16

## 2019-10-16 ENCOUNTER — PATIENT MESSAGE (OUTPATIENT)
Dept: HEMATOLOGY/ONCOLOGY | Facility: CLINIC | Age: 64
End: 2019-10-16

## 2019-10-16 NOTE — TELEPHONE ENCOUNTER
Spoke with patient and gave her number to call to set up fitting for sleeve.     ----- Message from Delphine Infante sent at 10/16/2019 10:32 AM CDT -----  Contact: Patient  Type: Needs Medical Advice    Who Called:  Patient  Best Call Back Number:   Additional Information: Calling to get a number for the place for her to get fitted for a sleeve.

## 2019-10-22 ENCOUNTER — LAB VISIT (OUTPATIENT)
Dept: LAB | Facility: HOSPITAL | Age: 64
End: 2019-10-22
Attending: INTERNAL MEDICINE
Payer: MEDICARE

## 2019-10-22 DIAGNOSIS — R97.0 ELEVATED CEA: ICD-10-CM

## 2019-10-22 DIAGNOSIS — C78.00 MALIGNANT NEOPLASM METASTATIC TO LUNG, UNSPECIFIED LATERALITY: ICD-10-CM

## 2019-10-22 LAB
ALBUMIN SERPL BCP-MCNC: 3 G/DL (ref 3.5–5.2)
ALP SERPL-CCNC: 137 U/L (ref 55–135)
ALT SERPL W/O P-5'-P-CCNC: 22 U/L (ref 10–44)
ANION GAP SERPL CALC-SCNC: 11 MMOL/L (ref 8–16)
AST SERPL-CCNC: 26 U/L (ref 10–40)
BASOPHILS # BLD AUTO: 0.14 K/UL (ref 0–0.2)
BASOPHILS NFR BLD: 1.5 % (ref 0–1.9)
BILIRUB SERPL-MCNC: 0.4 MG/DL (ref 0.1–1)
BUN SERPL-MCNC: 11 MG/DL (ref 8–23)
CALCIUM SERPL-MCNC: 10.2 MG/DL (ref 8.7–10.5)
CHLORIDE SERPL-SCNC: 103 MMOL/L (ref 95–110)
CO2 SERPL-SCNC: 26 MMOL/L (ref 23–29)
CREAT SERPL-MCNC: 0.7 MG/DL (ref 0.5–1.4)
DIFFERENTIAL METHOD: ABNORMAL
EOSINOPHIL # BLD AUTO: 0.1 K/UL (ref 0–0.5)
EOSINOPHIL NFR BLD: 1 % (ref 0–8)
ERYTHROCYTE [DISTWIDTH] IN BLOOD BY AUTOMATED COUNT: 15.8 % (ref 11.5–14.5)
EST. GFR  (AFRICAN AMERICAN): >60 ML/MIN/1.73 M^2
EST. GFR  (NON AFRICAN AMERICAN): >60 ML/MIN/1.73 M^2
GLUCOSE SERPL-MCNC: 99 MG/DL (ref 70–110)
HCT VFR BLD AUTO: 37.3 % (ref 37–48.5)
HGB BLD-MCNC: 11 G/DL (ref 12–16)
IMM GRANULOCYTES # BLD AUTO: 0.04 K/UL (ref 0–0.04)
IRON SERPL-MCNC: 44 UG/DL (ref 30–160)
LYMPHOCYTES # BLD AUTO: 1.6 K/UL (ref 1–4.8)
LYMPHOCYTES NFR BLD: 16.4 % (ref 18–48)
MCH RBC QN AUTO: 29.7 PG (ref 27–31)
MCHC RBC AUTO-ENTMCNC: 29.5 G/DL (ref 32–36)
MCV RBC AUTO: 101 FL (ref 82–98)
MONOCYTES # BLD AUTO: 1.1 K/UL (ref 0.3–1)
MONOCYTES NFR BLD: 11.9 % (ref 4–15)
NEUTROPHILS # BLD AUTO: 6.5 K/UL (ref 1.8–7.7)
NEUTROPHILS NFR BLD: 68.8 % (ref 38–73)
NRBC BLD-RTO: 0 /100 WBC
PLATELET # BLD AUTO: 465 K/UL (ref 150–350)
PMV BLD AUTO: 8.4 FL (ref 9.2–12.9)
POTASSIUM SERPL-SCNC: 4.2 MMOL/L (ref 3.5–5.1)
PROT SERPL-MCNC: 6.9 G/DL (ref 6–8.4)
RBC # BLD AUTO: 3.7 M/UL (ref 4–5.4)
SATURATED IRON: 13 % (ref 20–50)
SODIUM SERPL-SCNC: 140 MMOL/L (ref 136–145)
TOTAL IRON BINDING CAPACITY: 343 UG/DL (ref 250–450)
TRANSFERRIN SERPL-MCNC: 232 MG/DL (ref 200–375)
WBC # BLD AUTO: 9.45 K/UL (ref 3.9–12.7)

## 2019-10-22 PROCEDURE — 85025 COMPLETE CBC W/AUTO DIFF WBC: CPT

## 2019-10-22 PROCEDURE — 36415 COLL VENOUS BLD VENIPUNCTURE: CPT

## 2019-10-22 PROCEDURE — 83540 ASSAY OF IRON: CPT

## 2019-10-22 PROCEDURE — 80053 COMPREHEN METABOLIC PANEL: CPT

## 2019-10-23 ENCOUNTER — HOSPITAL ENCOUNTER (OUTPATIENT)
Dept: RADIOLOGY | Facility: HOSPITAL | Age: 64
Discharge: HOME | End: 2019-10-23
Attending: INTERNAL MEDICINE
Payer: MEDICARE

## 2019-10-23 VITALS — HEIGHT: 62 IN | BODY MASS INDEX: 37.73 KG/M2 | WEIGHT: 205 LBS

## 2019-10-23 LAB — GLUCOSE SERPL-MCNC: 79 MG/DL (ref 70–110)

## 2019-10-23 PROCEDURE — 78815 PET IMAGE W/CT SKULL-THIGH: CPT | Mod: TC,PO

## 2019-10-23 PROCEDURE — A9552 F18 FDG: HCPCS | Mod: PO

## 2019-10-24 ENCOUNTER — OFFICE VISIT (OUTPATIENT)
Dept: HEMATOLOGY/ONCOLOGY | Facility: CLINIC | Age: 64
End: 2019-10-24
Payer: MEDICARE

## 2019-10-24 ENCOUNTER — HOSPITAL ENCOUNTER (OUTPATIENT)
Dept: RADIOLOGY | Facility: HOSPITAL | Age: 64
Discharge: HOME | End: 2019-10-24
Attending: INTERNAL MEDICINE
Payer: MEDICARE

## 2019-10-24 VITALS
WEIGHT: 205.69 LBS | TEMPERATURE: 99 F | SYSTOLIC BLOOD PRESSURE: 139 MMHG | HEIGHT: 62 IN | BODY MASS INDEX: 37.85 KG/M2 | DIASTOLIC BLOOD PRESSURE: 72 MMHG | OXYGEN SATURATION: 96 % | HEART RATE: 80 BPM | RESPIRATION RATE: 20 BRPM

## 2019-10-24 DIAGNOSIS — D75.839 THROMBOCYTOSIS: ICD-10-CM

## 2019-10-24 DIAGNOSIS — I82.629 ACUTE VENOUS EMBOLISM AND THROMBOSIS OF DEEP VEINS OF UPPER EXTREMITY, UNSPECIFIED LATERALITY: Primary | ICD-10-CM

## 2019-10-24 DIAGNOSIS — E66.01 MORBID OBESITY: ICD-10-CM

## 2019-10-24 DIAGNOSIS — K31.5 DUODENAL STENOSIS: ICD-10-CM

## 2019-10-24 DIAGNOSIS — I82.629 ACUTE VENOUS EMBOLISM AND THROMBOSIS OF DEEP VEINS OF UPPER EXTREMITY, UNSPECIFIED LATERALITY: ICD-10-CM

## 2019-10-24 DIAGNOSIS — C78.00 MALIGNANT NEOPLASM METASTATIC TO LUNG, UNSPECIFIED LATERALITY: ICD-10-CM

## 2019-10-24 DIAGNOSIS — C78.7 HEPATIC METASTASES: ICD-10-CM

## 2019-10-24 PROCEDURE — 93971 EXTREMITY STUDY: CPT | Mod: 26,LT,, | Performed by: RADIOLOGY

## 2019-10-24 PROCEDURE — 99215 PR OFFICE/OUTPT VISIT, EST, LEVL V, 40-54 MIN: ICD-10-PCS | Mod: S$GLB,,, | Performed by: INTERNAL MEDICINE

## 2019-10-24 PROCEDURE — 1101F PR PT FALLS ASSESS DOC 0-1 FALLS W/OUT INJ PAST YR: ICD-10-PCS | Mod: S$GLB,,, | Performed by: INTERNAL MEDICINE

## 2019-10-24 PROCEDURE — 99999 PR PBB SHADOW E&M-EST. PATIENT-LVL IV: CPT | Mod: PBBFAC,,, | Performed by: INTERNAL MEDICINE

## 2019-10-24 PROCEDURE — 93971 US UPPER EXTREMITY VEINS LEFT: ICD-10-PCS | Mod: 26,LT,, | Performed by: RADIOLOGY

## 2019-10-24 PROCEDURE — 3078F PR MOST RECENT DIASTOLIC BLOOD PRESSURE < 80 MM HG: ICD-10-PCS | Mod: S$GLB,,, | Performed by: INTERNAL MEDICINE

## 2019-10-24 PROCEDURE — 99215 OFFICE O/P EST HI 40 MIN: CPT | Mod: S$GLB,,, | Performed by: INTERNAL MEDICINE

## 2019-10-24 PROCEDURE — 93971 EXTREMITY STUDY: CPT | Mod: TC,LT

## 2019-10-24 PROCEDURE — 3075F PR MOST RECENT SYSTOLIC BLOOD PRESS GE 130-139MM HG: ICD-10-PCS | Mod: S$GLB,,, | Performed by: INTERNAL MEDICINE

## 2019-10-24 PROCEDURE — 1101F PT FALLS ASSESS-DOCD LE1/YR: CPT | Mod: S$GLB,,, | Performed by: INTERNAL MEDICINE

## 2019-10-24 PROCEDURE — 3075F SYST BP GE 130 - 139MM HG: CPT | Mod: S$GLB,,, | Performed by: INTERNAL MEDICINE

## 2019-10-24 PROCEDURE — 99999 PR PBB SHADOW E&M-EST. PATIENT-LVL IV: ICD-10-PCS | Mod: PBBFAC,,, | Performed by: INTERNAL MEDICINE

## 2019-10-24 PROCEDURE — 3078F DIAST BP <80 MM HG: CPT | Mod: S$GLB,,, | Performed by: INTERNAL MEDICINE

## 2019-10-24 NOTE — PROGRESS NOTES
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  - Clinical: Stage IV (cTX, cNX, cM1) - Signed by Karina Pettit MD on 3/11/2019       Chemotherapy     Treatment Summary   Plan Name: OP COLORECTAL FOLFOX + BEVACIZUMAB Q2W  Treatment Goal: Maintenance  Status: Active  Start Date: 1/21/2019  End Date: 7/3/2019 (Planned)  Provider: Karina Pettit MD  Chemotherapy: fluorouracil injection 835 mg, 400 mg/m2 = 835 mg, Intravenous, Clinic/HOD 1 time, 4 of 12 cycles    fluorouracil (ADRUCIL) 2,400 mg/m2 = 5,015 mg in sodium chloride 0.9% 200.3 mL chemo infusion, 2,400 mg/m2 = 5,015 mg, Intravenous, Over 46 hours, 4 of 12 cycles    bevacizumab (AVASTIN) 5 mg/kg = 500 mg in sodium chloride 0.9% 100 mL chemo infusion, 5 mg/kg = 500 mg, Intravenous, Clinic/HOD 1 time, 4 of 12 cycles    leucovorin calcium 400 mg/m2 = 835 mg in dextrose 5 % 250 mL infusion, 400 mg/m2 = 835 mg, Intravenous, Clinic/HOD 1 time, 4 of 12 cycles    oxaliplatin (ELOXATIN) 85 mg/m2 = 178 mg in dextrose 5 % 500 mL chemo infusion, 85 mg/m2 = 178 mg, Intravenous, Clinic/HOD 1 time, 4 of 12 cycles          4/8/2019 - 4/8/2019 Chemotherapy     Treatment Summary   Plan Name: OP COLORECTAL FOLFIRI + BEVACIZUMAB Q2W  Treatment Goal: Control  Status: Inactive  Start Date: [No treatment day found]  End Date: [No treatment day found]  Provider: Karina Pettit MD  Chemotherapy: fluorouracil injection 835 mg, 400 mg/m2 = 835 mg, Intravenous, Clinic/HOD 1 time, 0 of 12 cycles  fluorouracil (ADRUCIL) 2,400 mg/m2 = 5,015 mg in sodium chloride 0.9% 200.3 mL chemo infusion, 2,400 mg/m2 = 5,015 mg, Intravenous, Over 46 hours, 0 of 12 cycles  bevacizumab (AVASTIN) 5 mg/kg = 500 mg in sodium chloride 0.9% 100 mL chemo infusion, 5 mg/kg = 500 mg, Intravenous, Clinic/HOD 1 time, 0 of 12 cycles  irinotecan  (CAMPTOSAR) 180 mg/m2 = 376 mg in sodium chloride 0.9% 500 mL chemo infusion, 180 mg/m2 = 376 mg, Intravenous, Clinic/HOD 1 time, 0 of 12 cycles  leucovorin calcium 400 mg/m2 = 835 mg in dextrose 5 % 250 mL infusion, 400 mg/m2 = 835 mg, Intravenous, Clinic/HOD 1 time, 0 of 12 cycles        Lung metastases    1/15/2019 Initial Diagnosis     Lung metastases      4/8/2019 - 4/8/2019 Chemotherapy     Treatment Summary   Plan Name: OP COLORECTAL FOLFIRI + BEVACIZUMAB Q2W  Treatment Goal: Control  Status: Inactive  Start Date: [No treatment day found]  End Date: [No treatment day found]  Provider: Karina Pettit MD  Chemotherapy: fluorouracil injection 835 mg, 400 mg/m2 = 835 mg, Intravenous, Clinic/HOD 1 time, 0 of 12 cycles  fluorouracil (ADRUCIL) 2,400 mg/m2 = 5,015 mg in sodium chloride 0.9% 200.3 mL chemo infusion, 2,400 mg/m2 = 5,015 mg, Intravenous, Over 46 hours, 0 of 12 cycles  bevacizumab (AVASTIN) 5 mg/kg = 500 mg in sodium chloride 0.9% 100 mL chemo infusion, 5 mg/kg = 500 mg, Intravenous, Clinic/HOD 1 time, 0 of 12 cycles  irinotecan (CAMPTOSAR) 180 mg/m2 = 376 mg in sodium chloride 0.9% 500 mL chemo infusion, 180 mg/m2 = 376 mg, Intravenous, Clinic/HOD 1 time, 0 of 12 cycles  leucovorin calcium 400 mg/m2 = 835 mg in dextrose 5 % 250 mL infusion, 400 mg/m2 = 835 mg, Intravenous, Clinic/HOD 1 time, 0 of 12 cycles      4/8/2019 -  Chemotherapy     Treatment Summary   Plan Name: OP COLORECTAL FOLFIRI + BEVACIZUMAB Q2W  Treatment Goal: Control  Status: Active  Start Date: 4/8/2019  End Date: 10/10/2019 (Planned)  Provider: Karina Pettit MD  Chemotherapy: fluorouracil injection 835 mg, 400 mg/m2 = 835 mg, Intravenous, Clinic/HOD 1 time, 12 of 12 cycles  Administration: 835 mg (8/6/2019), 835 mg (8/27/2019), 835 mg (8/27/2019), 835 mg (9/10/2019), 835 mg (9/24/2019)  fluorouracil (ADRUCIL) 2,400 mg/m2 = 5,015 mg in sodium chloride 0.9% 200.3 mL chemo infusion, 2,400 mg/m2 = 5,015 mg, Intravenous, Over 46 hours,  12 of 12 cycles  Administration: 5,015 mg (8/6/2019), 5,015 mg (8/27/2019), 5,015 mg (9/10/2019), 5,015 mg (9/24/2019)  bevacizumab (AVASTIN) 5 mg/kg = 500 mg in sodium chloride 0.9% 100 mL chemo infusion, 5 mg/kg = 500 mg, Intravenous, Clinic/HOD 1 time, 12 of 12 cycles  Administration: 500 mg (8/6/2019), 500 mg (8/27/2019), 500 mg (9/10/2019), 500 mg (9/24/2019)  irinotecan (CAMPTOSAR) 180 mg/m2 = 376 mg in sodium chloride 0.9% 500 mL chemo infusion, 180 mg/m2 = 376 mg, Intravenous, Clinic/HOD 1 time, 12 of 12 cycles  Administration: 376 mg (8/6/2019), 376 mg (8/27/2019), 376 mg (9/10/2019), 376 mg (9/24/2019)  leucovorin calcium 400 mg/m2 = 835 mg in dextrose 5 % 250 mL infusion, 400 mg/m2 = 835 mg, Intravenous, Clinic/HOD 1 time, 12 of 12 cycles  Administration: 835 mg (8/6/2019), 835 mg (8/27/2019), 835 mg (9/10/2019), 835 mg (9/24/2019)        Hepatic metastases    1/15/2019 Initial Diagnosis     Hepatic metastases      4/8/2019 - 4/8/2019 Chemotherapy     Treatment Summary   Plan Name: OP COLORECTAL FOLFIRI + BEVACIZUMAB Q2W  Treatment Goal: Control  Status: Inactive  Start Date: [No treatment day found]  End Date: [No treatment day found]  Provider: Karina Pettit MD  Chemotherapy: fluorouracil injection 835 mg, 400 mg/m2 = 835 mg, Intravenous, Clinic/HOD 1 time, 0 of 12 cycles  fluorouracil (ADRUCIL) 2,400 mg/m2 = 5,015 mg in sodium chloride 0.9% 200.3 mL chemo infusion, 2,400 mg/m2 = 5,015 mg, Intravenous, Over 46 hours, 0 of 12 cycles  bevacizumab (AVASTIN) 5 mg/kg = 500 mg in sodium chloride 0.9% 100 mL chemo infusion, 5 mg/kg = 500 mg, Intravenous, Clinic/HOD 1 time, 0 of 12 cycles  irinotecan (CAMPTOSAR) 180 mg/m2 = 376 mg in sodium chloride 0.9% 500 mL chemo infusion, 180 mg/m2 = 376 mg, Intravenous, Clinic/HOD 1 time, 0 of 12 cycles  leucovorin calcium 400 mg/m2 = 835 mg in dextrose 5 % 250 mL infusion, 400 mg/m2 = 835 mg, Intravenous, Clinic/HOD 1 time, 0 of 12 cycles      4/8/2019 -  Chemotherapy      Treatment Summary   Plan Name: OP COLORECTAL FOLFIRI + BEVACIZUMAB Q2W  Treatment Goal: Control  Status: Active  Start Date: 4/8/2019  End Date: 10/10/2019 (Planned)  Provider: Karina Pettit MD  Chemotherapy: fluorouracil injection 835 mg, 400 mg/m2 = 835 mg, Intravenous, Clinic/HOD 1 time, 12 of 12 cycles  Administration: 835 mg (8/6/2019), 835 mg (8/27/2019), 835 mg (8/27/2019), 835 mg (9/10/2019), 835 mg (9/24/2019)  fluorouracil (ADRUCIL) 2,400 mg/m2 = 5,015 mg in sodium chloride 0.9% 200.3 mL chemo infusion, 2,400 mg/m2 = 5,015 mg, Intravenous, Over 46 hours, 12 of 12 cycles  Administration: 5,015 mg (8/6/2019), 5,015 mg (8/27/2019), 5,015 mg (9/10/2019), 5,015 mg (9/24/2019)  bevacizumab (AVASTIN) 5 mg/kg = 500 mg in sodium chloride 0.9% 100 mL chemo infusion, 5 mg/kg = 500 mg, Intravenous, Clinic/HOD 1 time, 12 of 12 cycles  Administration: 500 mg (8/6/2019), 500 mg (8/27/2019), 500 mg (9/10/2019), 500 mg (9/24/2019)  irinotecan (CAMPTOSAR) 180 mg/m2 = 376 mg in sodium chloride 0.9% 500 mL chemo infusion, 180 mg/m2 = 376 mg, Intravenous, Clinic/HOD 1 time, 12 of 12 cycles  Administration: 376 mg (8/6/2019), 376 mg (8/27/2019), 376 mg (9/10/2019), 376 mg (9/24/2019)  leucovorin calcium 400 mg/m2 = 835 mg in dextrose 5 % 250 mL infusion, 400 mg/m2 = 835 mg, Intravenous, Clinic/HOD 1 time, 12 of 12 cycles  Administration: 835 mg (8/6/2019), 835 mg (8/27/2019), 835 mg (9/10/2019), 835 mg (9/24/2019)           Indigo Stuart is a 64 y.o.  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  Kras was MUTATED No MSI   Cycle 1  January 15 2019  Here for chemo with chest port in place      Tolerating lopressor for htn and lipitor for dyslipidemia  Tolerating zofran for nausea chemo   PND positie and sob  Cycle one folfox avastin January 15 2019   Failed regimen and admitted with SBO   Hepatic mets progressed hence changed to irinotecan  drop  oxaliplatin   Cycle one folfiri avastin April 2019       Pt was seen for left arm swelling and ultrasound revealed a DVT in her arm , post lovenox , now on eliquis   Here to review her PET-CT scan    No change in PMH, PFH, PSH since last OV     Current Outpatient Medications:     amLODIPine (NORVASC) 5 MG tablet, Take 5 mg by mouth once daily., Disp: , Rfl: 3    apixaban (ELIQUIS) 5 mg Tab, Take 1 tablet (5 mg total) by mouth 2 (two) times daily., Disp: 60 tablet, Rfl: 2    atorvastatin (LIPITOR) 20 MG tablet, Take 20 mg by mouth every evening., Disp: , Rfl:     ferrous sulfate (FEOSOL) 325 mg (65 mg iron) Tab tablet, Take 1 tablet by mouth 2 (two) times daily., Disp: , Rfl: 3    ferrous sulfate (FEOSOL) 325 mg (65 mg iron) Tab tablet, TAKE 1 TABLET BY MOUTH TWICE A DAY, Disp: , Rfl:     metoprolol succinate (TOPROL-XL) 25 MG 24 hr tablet, Take 25 mg by mouth every evening., Disp: , Rfl: 3    neomycin-polymyxin-hydrocortisone (CORTISPORIN) 3.5-10,000-10 mg-unit-mg/mL ophthalmic suspension, Place 1 drop into both eyes 4 (four) times daily., Disp: 7.5 mL, Rfl: 0    ondansetron (ZOFRAN-ODT) 8 MG TbDL, Take 1 tablet (8 mg total) by mouth every 12 (twelve) hours as needed., Disp: 30 tablet, Rfl: 1    potassium chloride SA (K-DUR,KLOR-CON) 20 MEQ tablet, Take 1 tablet (20 mEq total) by mouth once daily., Disp: 30 tablet, Rfl: 11    promethazine (PHENERGAN) 25 MG tablet, Take 1 tablet (25 mg total) by mouth every 6 (six) hours as needed for Nausea., Disp: 30 tablet, Rfl: 1      Review of Systems:  General: Weight gain: No, Weight Loss: No,  Fatigue   Fever: y Chills: No, Night Sweats: No, Insomnia: No, Excessive sleeping: No   Respiratory:  Cough: No, Shortness of Breath:  no   Wheezing: No, Excessive Snoring: No, Coughing up blood: No  Endocrine: Heat Intolerance: No, Cold Intolerance: No,   Excessive Thirst: No, Excessive Hunger: No  Eyes:  Blurred Vision: No, Double Vision: No   Light Sensitivity: No,  "Eye pain: No  Musculoskeletal: Muscle Aches/Pain: No, Joint Pain/Swelling: yes  And arms    Muscle Weakness:no , Neck Pain: No, Back Pain:   Neurological: Difficulty Walking/Falls:  No  Further   Headache Migraine: No, Dizziness/Vertigo: No, Fainting: No, Weakness: Better   Cardiovascular: Chest Pain: No, Shortness of Breath: no   Gastrointestinal: Nausea/Vomiting: No, Constipation: No, Diarrhea:stable increasing protuberant  Psych/Cog:  Depression: No, Anxiety: No, Hallucinations: No   : Frequent Urination: No, Incontinence: No, Blood of Urine: No, Urinary Infections: No  ENT:Hearing Loss: No, Earache: No, Ringing in Ears: No  Facial Pain: No, Chronic Congestion:No   Immune: Seasonal Allergies: No, Hives and/or Rashes: No  The remainder of the review of twelve body systems was reviewed and normal        /72   Pulse 80   Temp 99 °F (37.2 °C)   Resp 20   Ht 5' 2" (1.575 m)   Wt 93.3 kg (205 lb 11 oz)   SpO2 96%   BMI 37.62 kg/m²   Constitutional: oriented to person, place, and time.  Conj pink   HENT: anicteric sclera   Head: Normocephalic and atraumatic. Ears canal normal no discharge    Nose: Nose normal.   Non boggy turbinates  No lymphatics noted   Eyes: Conjunctivae PALE  EOM are normal.  Neck: . No thyromegaly present.  No bruits   Cardiovascular: Normal rate, regular rhythm, normal heart sounds  No murmur heard.  Pulmonary/Chest:  Clear bilaterally   Abdominal: Soft. Bowel sounds are normal.  no mass.   Musculoskeletal: Normal range of motion. decreased strength   Left arm remains swollen yet better no discoloration pulses are good  No further boggy turbinates  Lymphadenopathy:  no cervical adenopathy.   Neurological: alert and oriented to person, place  Slight weakness today   Skin: Skin is warm and dry. No rash noted.   Psychiatric: normal mood and affect.   Vitals reviewed.    Lab Results   Component Value Date    WBC 9.45 10/22/2019    RBC 3.70 (L) 10/22/2019    HGB 11.0 (L) 10/22/2019    " HCT 37.3 10/22/2019     (H) 10/22/2019    MCH 29.7 10/22/2019    MCHC 29.5 (L) 10/22/2019    RDW 15.8 (H) 10/22/2019     (H) 10/22/2019    MPV 8.4 (L) 10/22/2019    GRAN 6.5 10/22/2019    GRAN 68.8 10/22/2019    LYMPH 1.6 10/22/2019    LYMPH 16.4 (L) 10/22/2019    MONO 1.1 (H) 10/22/2019    MONO 11.9 10/22/2019    EOS 0.1 10/22/2019    BASO 0.14 10/22/2019    EOSINOPHIL 1.0 10/22/2019    BASOPHIL 1.5 10/22/2019       CMP  Sodium   Date Value Ref Range Status   10/22/2019 140 136 - 145 mmol/L Final     Potassium   Date Value Ref Range Status   10/22/2019 4.2 3.5 - 5.1 mmol/L Final     Chloride   Date Value Ref Range Status   10/22/2019 103 95 - 110 mmol/L Final     CO2   Date Value Ref Range Status   10/22/2019 26 23 - 29 mmol/L Final     Glucose   Date Value Ref Range Status   10/22/2019 99 70 - 110 mg/dL Final     BUN, Bld   Date Value Ref Range Status   10/22/2019 11 8 - 23 mg/dL Final     Creatinine   Date Value Ref Range Status   10/22/2019 0.7 0.5 - 1.4 mg/dL Final     Calcium   Date Value Ref Range Status   10/22/2019 10.2 8.7 - 10.5 mg/dL Final     Total Protein   Date Value Ref Range Status   10/22/2019 6.9 6.0 - 8.4 g/dL Final     Albumin   Date Value Ref Range Status   10/22/2019 3.0 (L) 3.5 - 5.2 g/dL Final     Total Bilirubin   Date Value Ref Range Status   10/22/2019 0.4 0.1 - 1.0 mg/dL Final     Comment:     For infants and newborns, interpretation of results should be based  on gestational age, weight and in agreement with clinical  observations.  Premature Infant recommended reference ranges:  Up to 24 hours.............<8.0 mg/dL  Up to 48 hours............<12.0 mg/dL  3-5 days..................<15.0 mg/dL  6-29 days.................<15.0 mg/dL       Alkaline Phosphatase   Date Value Ref Range Status   10/22/2019 137 (H) 55 - 135 U/L Final     AST   Date Value Ref Range Status   10/22/2019 26 10 - 40 U/L Final     ALT   Date Value Ref Range Status   10/22/2019 22 10 - 44 U/L Final      Anion Gap   Date Value Ref Range Status   10/22/2019 11 8 - 16 mmol/L Final     eGFR if    Date Value Ref Range Status   10/22/2019 >60 >60 mL/min/1.73 m^2 Final     eGFR if non    Date Value Ref Range Status   10/22/2019 >60 >60 mL/min/1.73 m^2 Final     Comment:     Calculation used to obtain the estimated glomerular filtration  rate (eGFR) is the CKD-EPI equation.      Impression:           - Normal esophagus.                        - Normal stomach.                        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 Prosthesis placed. The                         proximal end of the stent is distal to the ampulla                         (refer to images).  No msi        Acute venous embolism and thrombosis of deep veins of upper extremity, unspecified laterality  -     US LUE VENOUS; Future; Expected date: 10/24/2019    Hepatic metastases    Malignant neoplasm metastatic to lung, unspecified laterality    Thrombocytosis    Morbid obesity    Duodenal stenosis      1. PET CT reviewed showed marked improvement   2.  Anemia is stable  3.  Reactive thrombocytosis due to anemia and iron deficiency  4.  Must re-evaluate thrombosis involving left upper extremity prior to starting chemotherapy since her port is in the left chest wall:  Will call with results  5.  If thrombosis is under control she may proceed with chemotherapy  6.  Long-term plan if she responds well enough to chemotherapy would be to send her to Interventional Radiology for evaluation with Dr Del Rosario      need to reasssess thrombosis in arm before she resumes chemo   She is responding to chemo   Folfiri with avastin Not cleared   Failed  folfox and avastin for stage  4 disease   Cont norvasc for HTN monitored by Dr Conner   Tolerating lipitor for dyslipidemia  On statin monitoring counts closely  DO not take megace with a blood clot   She is to continue Norvasc for  blood pressure control which is stable   Will need vitamin-D levels checked soon    Explained that I want to reassess her port now   acp documented

## 2019-10-25 ENCOUNTER — TELEPHONE (OUTPATIENT)
Dept: HEMATOLOGY/ONCOLOGY | Facility: CLINIC | Age: 64
End: 2019-10-25

## 2019-10-25 DIAGNOSIS — I82.629 ACUTE VENOUS EMBOLISM AND THROMBOSIS OF DEEP VEINS OF UPPER EXTREMITY, UNSPECIFIED LATERALITY: Primary | ICD-10-CM

## 2019-10-25 NOTE — TELEPHONE ENCOUNTER
Patient notified that her clot has not resolved and that she will need a picc line to get her chemotherapy. Patient notified that [facility] will call her to schedule the placement.

## 2019-10-25 NOTE — TELEPHONE ENCOUNTER
----- Message from Karina Pettit MD sent at 10/25/2019 12:41 PM CDT -----  Can you help me let her clot is still there and she cannot get chemo through her port   She will need a picc line or another port yet she CANNOT stop the blood thinner just yet   She has to wait 4 weeks from now  Will adjust chemo to be given peripherally weekly for now

## 2019-10-26 ENCOUNTER — PATIENT MESSAGE (OUTPATIENT)
Dept: HEMATOLOGY/ONCOLOGY | Facility: CLINIC | Age: 64
End: 2019-10-26

## 2019-10-26 ENCOUNTER — HOSPITAL ENCOUNTER (OUTPATIENT)
Dept: RADIOLOGY | Facility: HOSPITAL | Age: 64
Discharge: HOME | End: 2019-10-26
Attending: INTERNAL MEDICINE
Payer: MEDICARE

## 2019-10-26 DIAGNOSIS — I82.629 ACUTE VENOUS EMBOLISM AND THROMBOSIS OF DEEP VEINS OF UPPER EXTREMITY, UNSPECIFIED LATERALITY: ICD-10-CM

## 2019-10-28 ENCOUNTER — PATIENT MESSAGE (OUTPATIENT)
Dept: HEMATOLOGY/ONCOLOGY | Facility: CLINIC | Age: 64
End: 2019-10-28

## 2019-10-28 ENCOUNTER — PROCEDURE VISIT (OUTPATIENT)
Dept: NURSING | Facility: HOSPITAL | Age: 64
End: 2019-10-28
Attending: INTERNAL MEDICINE
Payer: MEDICARE

## 2019-10-28 ENCOUNTER — TELEPHONE (OUTPATIENT)
Dept: HEMATOLOGY/ONCOLOGY | Facility: CLINIC | Age: 64
End: 2019-10-28

## 2019-10-28 ENCOUNTER — OFFICE VISIT (OUTPATIENT)
Dept: FAMILY MEDICINE | Facility: CLINIC | Age: 64
End: 2019-10-28
Payer: MEDICARE

## 2019-10-28 ENCOUNTER — HOSPITAL ENCOUNTER (OUTPATIENT)
Dept: RADIOLOGY | Facility: HOSPITAL | Age: 64
Discharge: HOME | End: 2019-10-28
Attending: INTERNAL MEDICINE
Payer: MEDICARE

## 2019-10-28 VITALS
HEART RATE: 78 BPM | DIASTOLIC BLOOD PRESSURE: 70 MMHG | TEMPERATURE: 98 F | BODY MASS INDEX: 37.85 KG/M2 | HEIGHT: 62 IN | WEIGHT: 205.69 LBS | SYSTOLIC BLOOD PRESSURE: 128 MMHG

## 2019-10-28 DIAGNOSIS — C78.7 HEPATIC METASTASES: ICD-10-CM

## 2019-10-28 DIAGNOSIS — E66.01 SEVERE OBESITY (BMI 35.0-39.9) WITH COMORBIDITY: ICD-10-CM

## 2019-10-28 DIAGNOSIS — I82.622 ACUTE EMBOLISM AND THROMBOSIS OF DEEP VEIN OF LEFT UPPER EXTREMITY: ICD-10-CM

## 2019-10-28 DIAGNOSIS — I10 ESSENTIAL HYPERTENSION: ICD-10-CM

## 2019-10-28 DIAGNOSIS — Z11.59 NEED FOR HEPATITIS C SCREENING TEST: Primary | ICD-10-CM

## 2019-10-28 DIAGNOSIS — K31.5 DUODENAL STENOSIS: ICD-10-CM

## 2019-10-28 DIAGNOSIS — C17.0 DUODENAL CANCER: ICD-10-CM

## 2019-10-28 DIAGNOSIS — Z23 NEEDS FLU SHOT: ICD-10-CM

## 2019-10-28 DIAGNOSIS — I82.629 ACUTE VENOUS EMBOLISM AND THROMBOSIS OF DEEP VEINS OF UPPER EXTREMITY, UNSPECIFIED LATERALITY: ICD-10-CM

## 2019-10-28 DIAGNOSIS — C78.00 MALIGNANT NEOPLASM METASTATIC TO LUNG, UNSPECIFIED LATERALITY: ICD-10-CM

## 2019-10-28 DIAGNOSIS — E78.5 HYPERLIPIDEMIA, UNSPECIFIED HYPERLIPIDEMIA TYPE: ICD-10-CM

## 2019-10-28 PROCEDURE — 1101F PR PT FALLS ASSESS DOC 0-1 FALLS W/OUT INJ PAST YR: ICD-10-PCS | Mod: S$GLB,,, | Performed by: FAMILY MEDICINE

## 2019-10-28 PROCEDURE — 99999 PR PBB SHADOW E&M-EST. PATIENT-LVL III: CPT | Mod: PBBFAC,,, | Performed by: FAMILY MEDICINE

## 2019-10-28 PROCEDURE — G0008 FLU VACCINE - HIGH DOSE (65+) PRESERVATIVE FREE IM: ICD-10-PCS | Mod: S$GLB,,, | Performed by: FAMILY MEDICINE

## 2019-10-28 PROCEDURE — 99203 OFFICE O/P NEW LOW 30 MIN: CPT | Mod: 25,S$GLB,, | Performed by: FAMILY MEDICINE

## 2019-10-28 PROCEDURE — 1101F PT FALLS ASSESS-DOCD LE1/YR: CPT | Mod: S$GLB,,, | Performed by: FAMILY MEDICINE

## 2019-10-28 PROCEDURE — 36573 INSJ PICC RS&I 5 YR+: CPT

## 2019-10-28 PROCEDURE — 90662 IIV NO PRSV INCREASED AG IM: CPT | Mod: S$GLB,,, | Performed by: FAMILY MEDICINE

## 2019-10-28 PROCEDURE — 3074F PR MOST RECENT SYSTOLIC BLOOD PRESSURE < 130 MM HG: ICD-10-PCS | Mod: S$GLB,,, | Performed by: FAMILY MEDICINE

## 2019-10-28 PROCEDURE — 71045 X-RAY EXAM CHEST 1 VIEW: CPT | Mod: TC,FY

## 2019-10-28 PROCEDURE — 71045 X-RAY EXAM CHEST 1 VIEW: CPT | Mod: 26,,, | Performed by: RADIOLOGY

## 2019-10-28 PROCEDURE — G0008 ADMIN INFLUENZA VIRUS VAC: HCPCS | Mod: S$GLB,,, | Performed by: FAMILY MEDICINE

## 2019-10-28 PROCEDURE — 71045 XR CHEST 1 VIEW: ICD-10-PCS | Mod: 26,,, | Performed by: RADIOLOGY

## 2019-10-28 PROCEDURE — 3078F PR MOST RECENT DIASTOLIC BLOOD PRESSURE < 80 MM HG: ICD-10-PCS | Mod: S$GLB,,, | Performed by: FAMILY MEDICINE

## 2019-10-28 PROCEDURE — C1751 CATH, INF, PER/CENT/MIDLINE: HCPCS

## 2019-10-28 PROCEDURE — 99999 PR PBB SHADOW E&M-EST. PATIENT-LVL III: ICD-10-PCS | Mod: PBBFAC,,, | Performed by: FAMILY MEDICINE

## 2019-10-28 PROCEDURE — 3074F SYST BP LT 130 MM HG: CPT | Mod: S$GLB,,, | Performed by: FAMILY MEDICINE

## 2019-10-28 PROCEDURE — 99203 PR OFFICE/OUTPT VISIT, NEW, LEVL III, 30-44 MIN: ICD-10-PCS | Mod: 25,S$GLB,, | Performed by: FAMILY MEDICINE

## 2019-10-28 PROCEDURE — 90662 FLU VACCINE - HIGH DOSE (65+) PRESERVATIVE FREE IM: ICD-10-PCS | Mod: S$GLB,,, | Performed by: FAMILY MEDICINE

## 2019-10-28 PROCEDURE — 3078F DIAST BP <80 MM HG: CPT | Mod: S$GLB,,, | Performed by: FAMILY MEDICINE

## 2019-10-28 RX ORDER — AMLODIPINE BESYLATE 5 MG/1
5 TABLET ORAL DAILY
Qty: 90 TABLET | Refills: 3 | Status: SHIPPED | OUTPATIENT
Start: 2019-10-28

## 2019-10-28 RX ORDER — ATORVASTATIN CALCIUM 20 MG/1
20 TABLET, FILM COATED ORAL NIGHTLY
Qty: 90 TABLET | Refills: 3 | Status: SHIPPED | OUTPATIENT
Start: 2019-10-28

## 2019-10-28 RX ORDER — METOPROLOL SUCCINATE 25 MG/1
25 TABLET, EXTENDED RELEASE ORAL NIGHTLY
Qty: 90 TABLET | Refills: 3 | Status: SHIPPED | OUTPATIENT
Start: 2019-10-28

## 2019-10-28 NOTE — PATIENT INSTRUCTIONS
If not up to date with the tetanus vaccine go to the pharmacy for this.  You can also get the Shingrix and pneumococcal 23 vaccine at the pharmacy.

## 2019-10-28 NOTE — TELEPHONE ENCOUNTER
----- Message from Kellen Gomez sent at 10/28/2019  1:41 PM CDT -----  Contact: Patient  Type: Needs Medical Advice    Who Called:  Patient  Best Call Back Number:   Additional Information: Patient got picc line in today, calling to speak to the nurse to fine out when she needs to come in again.

## 2019-10-28 NOTE — PROGRESS NOTES
Identified pt using name and ..Procedure was explained to pt and VIS was given. Flu zone HD was administered IM in right deltoid using sterile technique. No bleeding noted at injection site.

## 2019-10-28 NOTE — PROCEDURES
Indigo Stuart is a 64 y.o. female patient.         PICC  Date/Time: 10/28/2019 1100AM Performed by: Sophia Harrison RN  Consent Done: Yes  Time out: Immediately prior to procedure a time out was called to verify the correct patient, procedure, equipment, support staff and site/side marked as required  Indications: chemo  Anesthesia: local infiltration  Local anesthetic: lidocaine 1% without epinephrine  Anesthetic Total (mL): 3  Preparation: skin prepped with ChloraPrep  Skin prep agent dried: skin prep agent completely dried prior to procedure  Sterile barriers: all five maximum sterile barriers used - cap, mask, sterile gown, sterile gloves, and large sterile sheet  Hand hygiene: hand hygiene performed prior to central venous catheter insertion  Location details: right brachial   Catheter type: double lumen  Catheter size: 5 Fr  Catheter Length: 38cm    Ultrasound guidance: yes  Vessel Caliber: medium and patent, compressibility normal  Needle advanced into vessel with real time Ultrasound guidance.  Guidewire confirmed in vessel.  Image recorded and saved.  Sterile sheath used.  Number of attempts: 1  Post-procedure: blood return through all ports, chlorhexidine patch and sterile dressing applied  Technical procedures used: BENNY HOLLEY 3CG    Assessment: placement verified by x-ray, no pneumothorax on x-ray, tip termination and successful placement  Tip Termination Explanation: SVC  Complications: none             Sophia Harrison  10/28/2019

## 2019-10-28 NOTE — PROGRESS NOTES
Subjective:       Patient ID: Indigo Stuart is a 64 y.o. female.    Chief Complaint: Establish Care    HPI   Patient presents to establish care with a new family doctor, for flu shot and for medication refills.  She was previously followed by Dr. Conner but was last seen nearly a year ago and wants all of her care in the [facility] system now.  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  post lovenox she is now on Eliquis and has had significant improvement in left upper extremity swelling and reports no pain, increased warmth, erythema or other skin changes.  Hypertension and lipid values well controlled chronically on her current medications.  She tells me she is good today and has no new concerns or complaints.  She notes she has to have a quick visit as she has an appointment at the hospital to have her right-sided PICC line placed within the hour as her left-sided Port-A-Cath was not suitable for chemotherapy.    Review of Systems   Constitutional: Negative for activity change, appetite change, fatigue and unexpected weight change.   HENT: Negative for hearing loss, rhinorrhea and trouble swallowing.    Eyes: Negative for discharge and visual disturbance.   Respiratory: Negative for cough, chest tightness, shortness of breath and wheezing.    Cardiovascular: Negative for chest pain, palpitations and leg swelling.   Gastrointestinal: Negative for abdominal pain, blood in stool, constipation, diarrhea, nausea and vomiting.   Endocrine: Negative for polydipsia and polyuria.   Genitourinary: Negative for difficulty urinating, dysuria, hematuria and menstrual problem.   Musculoskeletal: Negative for arthralgias, joint swelling and neck pain.   Skin:  "Negative for rash and wound.   Neurological: Negative for dizziness, tremors, syncope, weakness, light-headedness and headaches.   Hematological: Negative for adenopathy. Does not bruise/bleed easily.   Psychiatric/Behavioral: Negative for confusion, dysphoric mood and sleep disturbance. The patient is not nervous/anxious.          Objective:      Vitals:    10/28/19 0851   BP: 128/70   Pulse: 78   Temp: 97.9 °F (36.6 °C)   TempSrc: Oral   Weight: 93.3 kg (205 lb 11 oz)   Height: 5' 2" (1.575 m)   PainSc: 0-No pain     Physical Exam   Constitutional: She is oriented to person, place, and time. She appears well-developed and well-nourished. No distress.   HENT:   Head: Normocephalic and atraumatic.   Cardiovascular: Normal rate, regular rhythm and normal heart sounds. Exam reveals no gallop and no friction rub.   No murmur heard.  Pulmonary/Chest: Effort normal and breath sounds normal. No respiratory distress. She has no wheezes. She exhibits no tenderness.   Musculoskeletal: Normal range of motion. She exhibits no edema, tenderness or deformity.   Neurological: She is alert and oriented to person, place, and time.   Skin: Skin is warm and dry. She is not diaphoretic.   Psychiatric: She has a normal mood and affect. Her behavior is normal. Judgment and thought content normal.   Nursing note and vitals reviewed.    Only brief physical exam allowed secondary to time concerns today.      Assessment:       1. Need for hepatitis C screening test    2. Essential hypertension    3. Hyperlipidemia, unspecified hyperlipidemia type    4. Duodenal cancer    5. Malignant neoplasm metastatic to lung, unspecified laterality    6. Hepatic metastases    7. Severe obesity (BMI 35.0-39.9) with comorbidity    8. Acute embolism and thrombosis of deep vein of left upper extremity    9. Duodenal stenosis    10. Needs flu shot          Plan:   Need for hepatitis C screening test  -     Hepatitis C antibody; Future; Expected date: " 10/28/2019    Essential hypertension  -     TSH; Future; Expected date: 10/28/2019  -     Microalbumin/creatinine urine ratio; Future  -     metoprolol succinate (TOPROL-XL) 25 MG 24 hr tablet; Take 1 tablet (25 mg total) by mouth every evening.  Dispense: 90 tablet; Refill: 3  -     amLODIPine (NORVASC) 5 MG tablet; Take 1 tablet (5 mg total) by mouth once daily.  Dispense: 90 tablet; Refill: 3    Hyperlipidemia, unspecified hyperlipidemia type  -     atorvastatin (LIPITOR) 20 MG tablet; Take 1 tablet (20 mg total) by mouth every evening.  Dispense: 90 tablet; Refill: 3    Duodenal cancer    Malignant neoplasm metastatic to lung, unspecified laterality    Hepatic metastases    Severe obesity (BMI 35.0-39.9) with comorbidity    Acute embolism and thrombosis of deep vein of left upper extremity    Duodenal stenosis    Needs flu shot  -     Influenza - High Dose (65+) (PF) (IM)      Follow up in about 6 months (around 4/28/2020).    Jacqueline appears to be stable and doing well today on her current medications.  I discussed the risks and benefits of her Norvasc, metoprolol and Lipitor and agree to provide refills of these.  Reviewing past labs she only had a slightly low HDL on her last lipid panel in December of 2018.  I recommended rechecking this at her convenience in the coming months and she was interested in this.  She has not had a hepatitis C antibody, TSH or microalbumin to creatinine ratio checked previously with her history to her knowledge and I discussed these as well.  She was interested in all the above I advised me she would schedule these with follow-up blood work with [facility] that she is following with us.  I did review health maintenance issues with her and she advised me she thinks she may be up-to-date with a tetanus vaccine through of Dr. Conner but was unsure of this.  She tells me she has not completed mammograms for many years as she decided she did not want to complete these any longer  secondary to discomfort with the screening.  I did discuss the risks and benefits of mammograms as well as the risks of avoiding these.  She feels her frequent PET scans at this time are enough and had no interest in breast cancer screening currently.  She tells me she thinks she previously had a DEXA scan completed but this was well over 3 years ago and was normal.  She had no interest in completing a DEXA scan at this time.  I advised her to alert me at any time if she is interested in these screenings as I am happy to place orders for these.  She is due for pneumococcal 23 vaccination as she tells me she had her 1st pneumonia vaccine (pneumococcal 13) last year as documented within our system.  She has never had shingles vaccination.  I discussed the influenza, Tdap, Shingrix and pneumococcal 23 vaccines in detail with risks and benefits and she advised me she just wanted a flu shot today and would think about the others.  I advised her she could return here for pneumonia vaccination but shingles and tetanus vaccination if necessary would have to be obtained through the pharmacy as her insurance does not cover them here.  She did not have time to discuss her weight in detail today but advised her in general to make sure she is as active with low-impact aerobic exercise as possible and to follow a lower calorie, lower cholesterol and higher fiber diet in general.  Discussing follow-up with her, she initially wanted to be seen back at yearly interval but I recommended 6 month follow-up and she was in agreement with this.  I will see her back sooner if she has any problems.  I have asked my nurse to obtain past records of a tetanus vaccine or DEXA scan if available through Dr. Conner's office.

## 2019-10-29 ENCOUNTER — TELEPHONE (OUTPATIENT)
Dept: PHARMACY | Facility: CLINIC | Age: 64
End: 2019-10-29

## 2019-10-29 DIAGNOSIS — C78.00 MALIGNANT NEOPLASM METASTATIC TO LUNG, UNSPECIFIED LATERALITY: Primary | ICD-10-CM

## 2019-10-29 DIAGNOSIS — C17.0 DUODENAL CANCER: ICD-10-CM

## 2019-10-29 RX ORDER — CAPECITABINE 500 MG/1
600 TABLET, FILM COATED ORAL 2 TIMES DAILY
Qty: 70 TABLET | Refills: 2 | Status: SHIPPED | OUTPATIENT
Start: 2019-10-29 | End: 2019-12-27

## 2019-10-29 NOTE — TELEPHONE ENCOUNTER
Informed Patient  that [facility] received prescription for Capecitabine and benefits investigation is required.  [facility] will be back in touch once insurance determination is received.

## 2019-10-30 ENCOUNTER — TELEPHONE (OUTPATIENT)
Dept: HEMATOLOGY/ONCOLOGY | Facility: CLINIC | Age: 64
End: 2019-10-30

## 2019-10-30 ENCOUNTER — PATIENT MESSAGE (OUTPATIENT)
Dept: HEMATOLOGY/ONCOLOGY | Facility: CLINIC | Age: 64
End: 2019-10-30

## 2019-10-30 NOTE — TELEPHONE ENCOUNTER
----- Message from Kellen Gomez sent at 10/30/2019 10:26 AM CDT -----  Contact: Deepthi with [facility]  Type: Needs Medical Advice    Who Called: Deepthi with [facility]  Best Call Back Number: , option 2  Additional Information: Calling to speak to Sugar. Has some questions about the referral that was sent over for the patient.

## 2019-10-30 NOTE — TELEPHONE ENCOUNTER
Spoke with Deepthi and she stated that referral needs to be sent to [facility].

## 2019-10-31 ENCOUNTER — TELEPHONE (OUTPATIENT)
Dept: HEMATOLOGY/ONCOLOGY | Facility: CLINIC | Age: 64
End: 2019-10-31

## 2019-11-01 ENCOUNTER — PATIENT MESSAGE (OUTPATIENT)
Dept: HEMATOLOGY/ONCOLOGY | Facility: CLINIC | Age: 64
End: 2019-11-01

## 2019-11-01 RX ORDER — SODIUM CHLORIDE 0.9 % (FLUSH) 0.9 %
10 SYRINGE (ML) INJECTION
Status: CANCELLED | OUTPATIENT
Start: 2019-11-01

## 2019-11-01 RX ORDER — HEPARIN 100 UNIT/ML
500 SYRINGE INTRAVENOUS
Status: CANCELLED | OUTPATIENT
Start: 2019-11-01

## 2019-11-03 ENCOUNTER — PATIENT MESSAGE (OUTPATIENT)
Dept: HEMATOLOGY/ONCOLOGY | Facility: CLINIC | Age: 64
End: 2019-11-03

## 2019-11-04 ENCOUNTER — TELEPHONE (OUTPATIENT)
Dept: HEMATOLOGY/ONCOLOGY | Facility: CLINIC | Age: 64
End: 2019-11-04

## 2019-11-04 NOTE — TELEPHONE ENCOUNTER
----- Message from Tracey Holt sent at 11/4/2019  9:04 AM CST -----  Contact: Patient  Type: Needs Medical Advice    Who Called:  Patient  Best Call Back Number: [phone] (home)   Additional Information: Patient said the insurance said the medication may be sent to her pharmacy the first time then after that then [facility] will have to fill it and then mail it to her.  Thanks.

## 2019-11-04 NOTE — TELEPHONE ENCOUNTER
Spoke with patient and she was notified that we are still awaiting authorization and the medication should be delivered next day once approved.

## 2019-11-05 ENCOUNTER — INFUSION (OUTPATIENT)
Dept: INFUSION THERAPY | Facility: HOSPITAL | Age: 64
End: 2019-11-05
Attending: INTERNAL MEDICINE
Payer: MEDICARE

## 2019-11-05 VITALS
BODY MASS INDEX: 37.81 KG/M2 | RESPIRATION RATE: 18 BRPM | HEART RATE: 112 BPM | WEIGHT: 205.5 LBS | DIASTOLIC BLOOD PRESSURE: 92 MMHG | TEMPERATURE: 99 F | SYSTOLIC BLOOD PRESSURE: 125 MMHG | HEIGHT: 62 IN

## 2019-11-05 DIAGNOSIS — Z51.11 ENCOUNTER FOR ANTINEOPLASTIC CHEMOTHERAPY: Primary | ICD-10-CM

## 2019-11-05 PROCEDURE — 25000003 PHARM REV CODE 250: Performed by: INTERNAL MEDICINE

## 2019-11-05 PROCEDURE — A4216 STERILE WATER/SALINE, 10 ML: HCPCS | Performed by: INTERNAL MEDICINE

## 2019-11-05 PROCEDURE — G0463 HOSPITAL OUTPT CLINIC VISIT: HCPCS

## 2019-11-05 RX ORDER — SODIUM CHLORIDE 0.9 % (FLUSH) 0.9 %
10 SYRINGE (ML) INJECTION
Status: CANCELLED | OUTPATIENT
Start: 2019-11-05

## 2019-11-05 RX ORDER — SODIUM CHLORIDE 0.9 % (FLUSH) 0.9 %
10 SYRINGE (ML) INJECTION
Status: COMPLETED | OUTPATIENT
Start: 2019-11-05 | End: 2019-11-05

## 2019-11-05 RX ORDER — HEPARIN 100 UNIT/ML
500 SYRINGE INTRAVENOUS
Status: CANCELLED | OUTPATIENT
Start: 2019-11-05

## 2019-11-05 RX ADMIN — SODIUM CHLORIDE, PRESERVATIVE FREE 10 ML: 5 INJECTION INTRAVENOUS at 09:11

## 2019-11-05 NOTE — TELEPHONE ENCOUNTER
Capecitabine is approved by the patients insurance with high copay. Patient may be eligible for a CAGNO assistance for Capecitabine copay assistance. We will be assisting the patient in the application process. CAGNO application requires prescriber consent and signature. Sending a staff message to Dr. Pettit regarding [employer] assistance for Capecitabine copay assistance and faxing the application for her review and signature.

## 2019-11-05 NOTE — TELEPHONE ENCOUNTER
DOCUMENTATION ONLY:  Capecitabine 500 mg #70/21 does not require a prior authorization through the patient's insurance.     Co-pay: $306.17    Patient Assistance IS required. Sending to the financial assistance team to investigate assistance options - JESSICA

## 2019-11-06 ENCOUNTER — TELEPHONE (OUTPATIENT)
Dept: HEMATOLOGY/ONCOLOGY | Facility: CLINIC | Age: 64
End: 2019-11-06

## 2019-11-06 NOTE — TELEPHONE ENCOUNTER
----- Message from Ragini Coronado sent at 11/6/2019  7:49 AM CST -----  Contact: self  Patient need to speak to nurse regarding she had bandage  Changed and pix line cleaned with saline on 11/05 ,patient will like to know should she clean pix line daily with saline that insurance  Company sent her per patient         Please call to advice [phone] (home)

## 2019-11-07 ENCOUNTER — LAB VISIT (OUTPATIENT)
Dept: LAB | Facility: HOSPITAL | Age: 64
End: 2019-11-07
Attending: INTERNAL MEDICINE
Payer: MEDICARE

## 2019-11-07 DIAGNOSIS — C17.0 DUODENAL CANCER: Primary | ICD-10-CM

## 2019-11-07 DIAGNOSIS — C17.0 DUODENAL CANCER: ICD-10-CM

## 2019-11-07 LAB
ALBUMIN SERPL BCP-MCNC: 3.6 G/DL (ref 3.5–5.2)
ALP SERPL-CCNC: 148 U/L (ref 55–135)
ALT SERPL W/O P-5'-P-CCNC: 30 U/L (ref 10–44)
ANION GAP SERPL CALC-SCNC: 14 MMOL/L (ref 8–16)
AST SERPL-CCNC: 36 U/L (ref 10–40)
BASOPHILS # BLD AUTO: 0.09 K/UL (ref 0–0.2)
BASOPHILS NFR BLD: 1.2 % (ref 0–1.9)
BILIRUB SERPL-MCNC: 0.3 MG/DL (ref 0.1–1)
BUN SERPL-MCNC: 12 MG/DL (ref 8–23)
CALCIUM SERPL-MCNC: 10.3 MG/DL (ref 8.7–10.5)
CHLORIDE SERPL-SCNC: 106 MMOL/L (ref 95–110)
CO2 SERPL-SCNC: 21 MMOL/L (ref 23–29)
CREAT SERPL-MCNC: 0.7 MG/DL (ref 0.5–1.4)
DIFFERENTIAL METHOD: ABNORMAL
EOSINOPHIL # BLD AUTO: 0.4 K/UL (ref 0–0.5)
EOSINOPHIL NFR BLD: 4.7 % (ref 0–8)
ERYTHROCYTE [DISTWIDTH] IN BLOOD BY AUTOMATED COUNT: 14.5 % (ref 11.5–14.5)
EST. GFR  (AFRICAN AMERICAN): >60 ML/MIN/1.73 M^2
EST. GFR  (NON AFRICAN AMERICAN): >60 ML/MIN/1.73 M^2
GLUCOSE SERPL-MCNC: 85 MG/DL (ref 70–110)
HCT VFR BLD AUTO: 39.3 % (ref 37–48.5)
HGB BLD-MCNC: 12 G/DL (ref 12–16)
IMM GRANULOCYTES # BLD AUTO: 0 K/UL (ref 0–0.04)
LYMPHOCYTES # BLD AUTO: 2.2 K/UL (ref 1–4.8)
LYMPHOCYTES NFR BLD: 28.9 % (ref 18–48)
MCH RBC QN AUTO: 29.8 PG (ref 27–31)
MCHC RBC AUTO-ENTMCNC: 30.5 G/DL (ref 32–36)
MCV RBC AUTO: 98 FL (ref 82–98)
MONOCYTES # BLD AUTO: 0.7 K/UL (ref 0.3–1)
MONOCYTES NFR BLD: 8.7 % (ref 4–15)
NEUTROPHILS # BLD AUTO: 4.3 K/UL (ref 1.8–7.7)
NEUTROPHILS NFR BLD: 56.5 % (ref 38–73)
NRBC BLD-RTO: 0 /100 WBC
PLATELET # BLD AUTO: 280 K/UL (ref 150–350)
PMV BLD AUTO: 8.6 FL (ref 9.2–12.9)
POTASSIUM SERPL-SCNC: 4.7 MMOL/L (ref 3.5–5.1)
PROT SERPL-MCNC: 7.7 G/DL (ref 6–8.4)
RBC # BLD AUTO: 4.03 M/UL (ref 4–5.4)
SODIUM SERPL-SCNC: 141 MMOL/L (ref 136–145)
WBC # BLD AUTO: 7.51 K/UL (ref 3.9–12.7)

## 2019-11-07 PROCEDURE — 80053 COMPREHEN METABOLIC PANEL: CPT

## 2019-11-07 PROCEDURE — 36415 COLL VENOUS BLD VENIPUNCTURE: CPT

## 2019-11-07 PROCEDURE — 85025 COMPLETE CBC W/AUTO DIFF WBC: CPT

## 2019-11-07 NOTE — TELEPHONE ENCOUNTER
Contacted the patient in regards to initial capecitabine consult on 11/7. Capecitabine will be shipped on 11/7 to arrive at patient's home on 11/8 via [employer]. $30 copay. Patient plans to start capecitabine with other chemo - provider's office will schedule. Address confirmed, CC on file.    Patient was counseled on the administration directions:  -Take 2 tablets (1000 mg) by mouth in the morning and 3 tablets (1500mg) in the evening. Take 12h apart within 30 minutes of a meal. Take for 14 days on, 7 days off.  -Reviewed handling precautions.  -Store at room temp.    In-depth side effect review was declined. Did not address comorbidities. Patient was given Eucerin cream for Hand-Foot Syndrome, and hydrocortisone cream for dermatitis with first fill only.    DDIs:  Medication list reviewed, None expected with Xeloda.    Patient was given 2 patient education handouts on how to handle oral chemotherapy and specific recommendations- do's and don'ts. We reviewed [facility] background including hours, RPh on call, [employer] shipping - no signature required, refill process - no auto refills, Welcome Packet, insurance benefits explanation upon request, charting system and communication with provider's offices.    RPh will f/u with patient in 1 week from start, [facility] to contact patient in 3 weeks for refills.

## 2019-11-08 ENCOUNTER — OFFICE VISIT (OUTPATIENT)
Dept: HEMATOLOGY/ONCOLOGY | Facility: CLINIC | Age: 64
End: 2019-11-08
Payer: MEDICARE

## 2019-11-08 VITALS
OXYGEN SATURATION: 99 % | DIASTOLIC BLOOD PRESSURE: 62 MMHG | HEIGHT: 62 IN | TEMPERATURE: 98 F | BODY MASS INDEX: 37.29 KG/M2 | HEART RATE: 88 BPM | SYSTOLIC BLOOD PRESSURE: 113 MMHG | RESPIRATION RATE: 20 BRPM | WEIGHT: 202.63 LBS

## 2019-11-08 DIAGNOSIS — I82.629 ACUTE VENOUS EMBOLISM AND THROMBOSIS OF DEEP VEINS OF UPPER EXTREMITY, UNSPECIFIED LATERALITY: ICD-10-CM

## 2019-11-08 DIAGNOSIS — M79.89 SWELLING OF ARM: ICD-10-CM

## 2019-11-08 DIAGNOSIS — C78.00 MALIGNANT NEOPLASM METASTATIC TO LUNG, UNSPECIFIED LATERALITY: ICD-10-CM

## 2019-11-08 DIAGNOSIS — C78.7 HEPATIC METASTASES: ICD-10-CM

## 2019-11-08 DIAGNOSIS — C17.0 DUODENAL CANCER: Primary | ICD-10-CM

## 2019-11-08 DIAGNOSIS — Z51.11 ENCOUNTER FOR CHEMOTHERAPY MANAGEMENT: ICD-10-CM

## 2019-11-08 PROCEDURE — 3288F FALL RISK ASSESSMENT DOCD: CPT | Mod: S$GLB,,, | Performed by: INTERNAL MEDICINE

## 2019-11-08 PROCEDURE — 99999 PR PBB SHADOW E&M-EST. PATIENT-LVL III: ICD-10-PCS | Mod: PBBFAC,,, | Performed by: INTERNAL MEDICINE

## 2019-11-08 PROCEDURE — 3078F PR MOST RECENT DIASTOLIC BLOOD PRESSURE < 80 MM HG: ICD-10-PCS | Mod: S$GLB,,, | Performed by: INTERNAL MEDICINE

## 2019-11-08 PROCEDURE — 3288F PR FALLS RISK ASSESSMENT DOCUMENTED: ICD-10-PCS | Mod: S$GLB,,, | Performed by: INTERNAL MEDICINE

## 2019-11-08 PROCEDURE — 1101F PR PT FALLS ASSESS DOC 0-1 FALLS W/OUT INJ PAST YR: ICD-10-PCS | Mod: S$GLB,,, | Performed by: INTERNAL MEDICINE

## 2019-11-08 PROCEDURE — 99999 PR PBB SHADOW E&M-EST. PATIENT-LVL III: CPT | Mod: PBBFAC,,, | Performed by: INTERNAL MEDICINE

## 2019-11-08 PROCEDURE — 1101F PT FALLS ASSESS-DOCD LE1/YR: CPT | Mod: S$GLB,,, | Performed by: INTERNAL MEDICINE

## 2019-11-08 PROCEDURE — 3074F PR MOST RECENT SYSTOLIC BLOOD PRESSURE < 130 MM HG: ICD-10-PCS | Mod: S$GLB,,, | Performed by: INTERNAL MEDICINE

## 2019-11-08 PROCEDURE — 3078F DIAST BP <80 MM HG: CPT | Mod: S$GLB,,, | Performed by: INTERNAL MEDICINE

## 2019-11-08 PROCEDURE — 3074F SYST BP LT 130 MM HG: CPT | Mod: S$GLB,,, | Performed by: INTERNAL MEDICINE

## 2019-11-08 PROCEDURE — 99215 PR OFFICE/OUTPT VISIT, EST, LEVL V, 40-54 MIN: ICD-10-PCS | Mod: S$GLB,,, | Performed by: INTERNAL MEDICINE

## 2019-11-08 PROCEDURE — 99215 OFFICE O/P EST HI 40 MIN: CPT | Mod: S$GLB,,, | Performed by: INTERNAL MEDICINE

## 2019-11-08 RX ORDER — SODIUM CHLORIDE 0.9 % (FLUSH) 0.9 %
10 SYRINGE (ML) INJECTION
Status: CANCELLED | OUTPATIENT
Start: 2019-11-08

## 2019-11-08 RX ORDER — HEPARIN 100 UNIT/ML
500 SYRINGE INTRAVENOUS
Status: CANCELLED
Start: 2019-11-08

## 2019-11-08 RX ORDER — HEPARIN 100 UNIT/ML
500 SYRINGE INTRAVENOUS
Status: CANCELLED | OUTPATIENT
Start: 2019-11-08

## 2019-11-08 RX ORDER — ATROPINE SULFATE 0.4 MG/ML
0.4 INJECTION, SOLUTION ENDOTRACHEAL; INTRAMEDULLARY; INTRAMUSCULAR; INTRAVENOUS; SUBCUTANEOUS ONCE AS NEEDED
Status: CANCELLED | OUTPATIENT
Start: 2019-11-08

## 2019-11-08 NOTE — PROGRESS NOTES
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  - Clinical: Stage IV (cTX, cNX, cM1) - Signed by Karina Pettit MD on 3/11/2019       Chemotherapy     Treatment Summary   Plan Name: OP COLORECTAL FOLFOX + BEVACIZUMAB Q2W  Treatment Goal: Maintenance  Status: Active  Start Date: 1/21/2019  End Date: 7/3/2019 (Planned)  Provider: Karina Pettit MD  Chemotherapy: fluorouracil injection 835 mg, 400 mg/m2 = 835 mg, Intravenous, Clinic/HOD 1 time, 4 of 12 cycles    fluorouracil (ADRUCIL) 2,400 mg/m2 = 5,015 mg in sodium chloride 0.9% 200.3 mL chemo infusion, 2,400 mg/m2 = 5,015 mg, Intravenous, Over 46 hours, 4 of 12 cycles    bevacizumab (AVASTIN) 5 mg/kg = 500 mg in sodium chloride 0.9% 100 mL chemo infusion, 5 mg/kg = 500 mg, Intravenous, Clinic/HOD 1 time, 4 of 12 cycles    leucovorin calcium 400 mg/m2 = 835 mg in dextrose 5 % 250 mL infusion, 400 mg/m2 = 835 mg, Intravenous, Clinic/HOD 1 time, 4 of 12 cycles    oxaliplatin (ELOXATIN) 85 mg/m2 = 178 mg in dextrose 5 % 500 mL chemo infusion, 85 mg/m2 = 178 mg, Intravenous, Clinic/HOD 1 time, 4 of 12 cycles          4/8/2019 - 4/8/2019 Chemotherapy     Treatment Summary   Plan Name: OP COLORECTAL FOLFIRI + BEVACIZUMAB Q2W  Treatment Goal: Control  Status: Inactive  Start Date: [No treatment day found]  End Date: [No treatment day found]  Provider: Karina Pettit MD  Chemotherapy: fluorouracil injection 835 mg, 400 mg/m2 = 835 mg, Intravenous, Clinic/HOD 1 time, 0 of 12 cycles  fluorouracil (ADRUCIL) 2,400 mg/m2 = 5,015 mg in sodium chloride 0.9% 200.3 mL chemo infusion, 2,400 mg/m2 = 5,015 mg, Intravenous, Over 46 hours, 0 of 12 cycles  bevacizumab (AVASTIN) 5 mg/kg = 500 mg in sodium chloride 0.9% 100 mL chemo infusion, 5 mg/kg = 500 mg, Intravenous, Clinic/HOD 1 time, 0 of 12 cycles  irinotecan  (CAMPTOSAR) 180 mg/m2 = 376 mg in sodium chloride 0.9% 500 mL chemo infusion, 180 mg/m2 = 376 mg, Intravenous, Clinic/HOD 1 time, 0 of 12 cycles  leucovorin calcium 400 mg/m2 = 835 mg in dextrose 5 % 250 mL infusion, 400 mg/m2 = 835 mg, Intravenous, Clinic/HOD 1 time, 0 of 12 cycles        Lung metastases    1/15/2019 Initial Diagnosis     Lung metastases      4/8/2019 - 4/8/2019 Chemotherapy     Treatment Summary   Plan Name: OP COLORECTAL FOLFIRI + BEVACIZUMAB Q2W  Treatment Goal: Control  Status: Inactive  Start Date: [No treatment day found]  End Date: [No treatment day found]  Provider: Karina Pettit MD  Chemotherapy: fluorouracil injection 835 mg, 400 mg/m2 = 835 mg, Intravenous, Clinic/HOD 1 time, 0 of 12 cycles  fluorouracil (ADRUCIL) 2,400 mg/m2 = 5,015 mg in sodium chloride 0.9% 200.3 mL chemo infusion, 2,400 mg/m2 = 5,015 mg, Intravenous, Over 46 hours, 0 of 12 cycles  bevacizumab (AVASTIN) 5 mg/kg = 500 mg in sodium chloride 0.9% 100 mL chemo infusion, 5 mg/kg = 500 mg, Intravenous, Clinic/HOD 1 time, 0 of 12 cycles  irinotecan (CAMPTOSAR) 180 mg/m2 = 376 mg in sodium chloride 0.9% 500 mL chemo infusion, 180 mg/m2 = 376 mg, Intravenous, Clinic/HOD 1 time, 0 of 12 cycles  leucovorin calcium 400 mg/m2 = 835 mg in dextrose 5 % 250 mL infusion, 400 mg/m2 = 835 mg, Intravenous, Clinic/HOD 1 time, 0 of 12 cycles      4/8/2019 -  Chemotherapy     Treatment Summary   Plan Name: OP COLORECTAL FOLFIRI + BEVACIZUMAB Q2W  Treatment Goal: Control  Status: Active  Start Date: 4/8/2019  End Date: 11/5/2019 (Planned)  Provider: Karina Pettit MD  Chemotherapy: fluorouracil injection 835 mg, 400 mg/m2 = 835 mg, Intravenous, Clinic/HOD 1 time, 12 of 12 cycles  Administration: 835 mg (8/6/2019), 835 mg (8/27/2019), 835 mg (8/27/2019), 835 mg (9/10/2019), 835 mg (9/24/2019)  fluorouracil (ADRUCIL) 2,400 mg/m2 = 5,015 mg in sodium chloride 0.9% 200.3 mL chemo infusion, 2,400 mg/m2 = 5,015 mg, Intravenous, Over 46 hours,  12 of 12 cycles  Administration: 5,015 mg (8/6/2019), 5,015 mg (8/27/2019), 5,015 mg (9/10/2019), 5,015 mg (9/24/2019)  bevacizumab (AVASTIN) 5 mg/kg = 500 mg in sodium chloride 0.9% 100 mL chemo infusion, 5 mg/kg = 500 mg, Intravenous, Clinic/HOD 1 time, 12 of 14 cycles  Administration: 500 mg (8/6/2019), 500 mg (8/27/2019), 500 mg (9/10/2019), 500 mg (9/24/2019)  irinotecan (CAMPTOSAR) 180 mg/m2 = 376 mg in sodium chloride 0.9% 500 mL chemo infusion, 180 mg/m2 = 376 mg, Intravenous, Clinic/HOD 1 time, 12 of 14 cycles  Administration: 376 mg (8/6/2019), 376 mg (8/27/2019), 376 mg (9/10/2019), 376 mg (9/24/2019)  leucovorin calcium 400 mg/m2 = 835 mg in dextrose 5 % 250 mL infusion, 400 mg/m2 = 835 mg, Intravenous, Clinic/HOD 1 time, 12 of 12 cycles  Administration: 835 mg (8/6/2019), 835 mg (8/27/2019), 835 mg (9/10/2019), 835 mg (9/24/2019)        Hepatic metastases    1/15/2019 Initial Diagnosis     Hepatic metastases      4/8/2019 - 4/8/2019 Chemotherapy     Treatment Summary   Plan Name: OP COLORECTAL FOLFIRI + BEVACIZUMAB Q2W  Treatment Goal: Control  Status: Inactive  Start Date: [No treatment day found]  End Date: [No treatment day found]  Provider: Karina Pettit MD  Chemotherapy: fluorouracil injection 835 mg, 400 mg/m2 = 835 mg, Intravenous, Clinic/HOD 1 time, 0 of 12 cycles  fluorouracil (ADRUCIL) 2,400 mg/m2 = 5,015 mg in sodium chloride 0.9% 200.3 mL chemo infusion, 2,400 mg/m2 = 5,015 mg, Intravenous, Over 46 hours, 0 of 12 cycles  bevacizumab (AVASTIN) 5 mg/kg = 500 mg in sodium chloride 0.9% 100 mL chemo infusion, 5 mg/kg = 500 mg, Intravenous, Clinic/HOD 1 time, 0 of 12 cycles  irinotecan (CAMPTOSAR) 180 mg/m2 = 376 mg in sodium chloride 0.9% 500 mL chemo infusion, 180 mg/m2 = 376 mg, Intravenous, Clinic/HOD 1 time, 0 of 12 cycles  leucovorin calcium 400 mg/m2 = 835 mg in dextrose 5 % 250 mL infusion, 400 mg/m2 = 835 mg, Intravenous, Clinic/HOD 1 time, 0 of 12 cycles      4/8/2019 -  Chemotherapy      Treatment Summary   Plan Name: OP COLORECTAL FOLFIRI + BEVACIZUMAB Q2W  Treatment Goal: Control  Status: Active  Start Date: 4/8/2019  End Date: 11/5/2019 (Planned)  Provider: Karina Pettit MD  Chemotherapy: fluorouracil injection 835 mg, 400 mg/m2 = 835 mg, Intravenous, Clinic/HOD 1 time, 12 of 12 cycles  Administration: 835 mg (8/6/2019), 835 mg (8/27/2019), 835 mg (8/27/2019), 835 mg (9/10/2019), 835 mg (9/24/2019)  fluorouracil (ADRUCIL) 2,400 mg/m2 = 5,015 mg in sodium chloride 0.9% 200.3 mL chemo infusion, 2,400 mg/m2 = 5,015 mg, Intravenous, Over 46 hours, 12 of 12 cycles  Administration: 5,015 mg (8/6/2019), 5,015 mg (8/27/2019), 5,015 mg (9/10/2019), 5,015 mg (9/24/2019)  bevacizumab (AVASTIN) 5 mg/kg = 500 mg in sodium chloride 0.9% 100 mL chemo infusion, 5 mg/kg = 500 mg, Intravenous, Clinic/HOD 1 time, 12 of 14 cycles  Administration: 500 mg (8/6/2019), 500 mg (8/27/2019), 500 mg (9/10/2019), 500 mg (9/24/2019)  irinotecan (CAMPTOSAR) 180 mg/m2 = 376 mg in sodium chloride 0.9% 500 mL chemo infusion, 180 mg/m2 = 376 mg, Intravenous, Clinic/HOD 1 time, 12 of 14 cycles  Administration: 376 mg (8/6/2019), 376 mg (8/27/2019), 376 mg (9/10/2019), 376 mg (9/24/2019)  leucovorin calcium 400 mg/m2 = 835 mg in dextrose 5 % 250 mL infusion, 400 mg/m2 = 835 mg, Intravenous, Clinic/HOD 1 time, 12 of 12 cycles  Administration: 835 mg (8/6/2019), 835 mg (8/27/2019), 835 mg (9/10/2019), 835 mg (9/24/2019)           Indigo Stuart is a 64 y.o.  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  Kras was MUTATED No MSI   Cycle 1  January 15 2019  Here for chemo with chest port in place      Tolerating lopressor for htn and lipitor for dyslipidemia  Tolerating zofran for nausea prn chemo     Cycle one folfox avastin January 15 2019   Failed regimen and admitted with SBO   Hepatic mets progressed hence changed to irinotecan drop   oxaliplatin   Cycle one folfiri avastin April 2019     Pt was seen for left arm swelling and ultrasound revealed a DVT in her arm , post lovenox , now on eliquis   Scan reviewed again  Here to start Xeliri Nov 8 2019       No change in PMH, PFH, PSH since last OV     Current Outpatient Medications:     amLODIPine (NORVASC) 5 MG tablet, Take 1 tablet (5 mg total) by mouth once daily., Disp: 90 tablet, Rfl: 3    apixaban (ELIQUIS) 5 mg Tab, Take 1 tablet (5 mg total) by mouth 2 (two) times daily., Disp: 60 tablet, Rfl: 2    atorvastatin (LIPITOR) 20 MG tablet, Take 1 tablet (20 mg total) by mouth every evening., Disp: 90 tablet, Rfl: 3    capecitabine (XELODA) 500 MG Tab, Take 2 tablets in the morning and 3 tablets in the evening for days 1-14 of a 21 day cycle., Disp: 70 tablet, Rfl: 2    ferrous sulfate (FEOSOL) 325 mg (65 mg iron) Tab tablet, Take 1 tablet by mouth 2 (two) times daily., Disp: , Rfl: 3    metoprolol succinate (TOPROL-XL) 25 MG 24 hr tablet, Take 1 tablet (25 mg total) by mouth every evening., Disp: 90 tablet, Rfl: 3    ondansetron (ZOFRAN-ODT) 8 MG TbDL, Take 1 tablet (8 mg total) by mouth every 12 (twelve) hours as needed., Disp: 30 tablet, Rfl: 1    potassium chloride SA (K-DUR,KLOR-CON) 20 MEQ tablet, Take 1 tablet (20 mEq total) by mouth once daily., Disp: 30 tablet, Rfl: 11    promethazine (PHENERGAN) 25 MG tablet, Take 1 tablet (25 mg total) by mouth every 6 (six) hours as needed for Nausea., Disp: 30 tablet, Rfl: 1      Review of Systems:  General: Weight gain: No, Weight Loss: No,  Fatigue yes   Chills: No, Night Sweats: No, Insomnia: No, Excessive sleeping: No   Respiratory:  Cough: No, Shortness of Breath:  no   Wheezing: No, Excessive Snoring: No, Coughing up blood: No  Endocrine: Heat Intolerance: No, Cold Intolerance: No,   Excessive Thirst: No, Excessive Hunger: No  Eyes:  Blurred Vision: No, Double Vision: No   Light Sensitivity: No, Eye pain: No  Musculoskeletal: Muscle  "Aches/Pain: No, Joint Pain/Swelling: yes  But stable    Muscle Weakness:no , Neck Pain: No, Back Pain:   Neurological: Difficulty Walking/Falls:  No  Further   Headache Migraine: No, Dizziness/Vertigo: No, Fainting: No, Weakness: Better   Cardiovascular: Chest Pain: No, Shortness of Breath: no   Gastrointestinal: Nausea/Vomiting: No, Constipation: No, Diarrhea:stable increasing protuberant  Psych/Cog:  Depression: No, Anxiety: No, Hallucinations: No   : Frequent Urination: No, Incontinence: No, Blood of Urine: No, Urinary Infections: No  ENT:Hearing Loss: No, Earache: No, Ringing in Ears: No  Facial Pain: No, Chronic Congestion:No   Immune: Seasonal Allergies: No, Hives and/or Rashes: No  The remainder of the review of twelve body systems was reviewed and normal        /62   Pulse 88   Temp 98.4 °F (36.9 °C)   Resp 20   Ht 5' 2" (1.575 m)   Wt 91.9 kg (202 lb 9.6 oz)   SpO2 99%   BMI 37.06 kg/m²   Constitutional: oriented to person, place, and time.  Conj pink   HENT: anicteric sclera   Head: Normocephalic and atraumatic. Ears canal normal no discharge    Nose: Nose normal.   Non boggy turbinates  No lymphatics noted   Eyes: Conjunctivae PALE  EOM are normal.  Neck: . No thyromegaly present.  No bruits   Cardiovascular: Normal rate, regular rhythm, normal heart sounds  No murmur heard.  Pulmonary/Chest:  Clear bilaterally no fremitus    Abdominal: Soft. Bowel sounds are normal.  no mass.   Musculoskeletal: Normal range of motion. decreased strength   Left arm remains swollen yet better no discoloration pulses are good  No further boggy turbinates  Lymphadenopathy:  no cervical adenopathy.   Neurological: alert and oriented to person, place  Slight weakness today   Skin: Skin is warm and dry. No rash noted.   Psychiatric: normal mood and affect.   Vitals reviewed.  Chest port removed     Lab Results   Component Value Date    WBC 7.51 11/07/2019    RBC 4.03 11/07/2019    HGB 12.0 11/07/2019    HCT 39.3 " 11/07/2019    MCV 98 11/07/2019    MCH 29.8 11/07/2019    MCHC 30.5 (L) 11/07/2019    RDW 14.5 11/07/2019     11/07/2019    MPV 8.6 (L) 11/07/2019    GRAN 4.3 11/07/2019    GRAN 56.5 11/07/2019    LYMPH 2.2 11/07/2019    LYMPH 28.9 11/07/2019    MONO 0.7 11/07/2019    MONO 8.7 11/07/2019    EOS 0.4 11/07/2019    BASO 0.09 11/07/2019    EOSINOPHIL 4.7 11/07/2019    BASOPHIL 1.2 11/07/2019       CMP  Sodium   Date Value Ref Range Status   11/07/2019 141 136 - 145 mmol/L Final     Potassium   Date Value Ref Range Status   11/07/2019 4.7 3.5 - 5.1 mmol/L Final     Chloride   Date Value Ref Range Status   11/07/2019 106 95 - 110 mmol/L Final     CO2   Date Value Ref Range Status   11/07/2019 21 (L) 23 - 29 mmol/L Final     Glucose   Date Value Ref Range Status   11/07/2019 85 70 - 110 mg/dL Final     BUN, Bld   Date Value Ref Range Status   11/07/2019 12 8 - 23 mg/dL Final     Creatinine   Date Value Ref Range Status   11/07/2019 0.7 0.5 - 1.4 mg/dL Final     Calcium   Date Value Ref Range Status   11/07/2019 10.3 8.7 - 10.5 mg/dL Final     Total Protein   Date Value Ref Range Status   11/07/2019 7.7 6.0 - 8.4 g/dL Final     Albumin   Date Value Ref Range Status   11/07/2019 3.6 3.5 - 5.2 g/dL Final     Total Bilirubin   Date Value Ref Range Status   11/07/2019 0.3 0.1 - 1.0 mg/dL Final     Comment:     For infants and newborns, interpretation of results should be based  on gestational age, weight and in agreement with clinical  observations.  Premature Infant recommended reference ranges:  Up to 24 hours.............<8.0 mg/dL  Up to 48 hours............<12.0 mg/dL  3-5 days..................<15.0 mg/dL  6-29 days.................<15.0 mg/dL       Alkaline Phosphatase   Date Value Ref Range Status   11/07/2019 148 (H) 55 - 135 U/L Final     AST   Date Value Ref Range Status   11/07/2019 36 10 - 40 U/L Final     ALT   Date Value Ref Range Status   11/07/2019 30 10 - 44 U/L Final     Anion Gap   Date Value Ref  Range Status   11/07/2019 14 8 - 16 mmol/L Final     eGFR if    Date Value Ref Range Status   11/07/2019 >60 >60 mL/min/1.73 m^2 Final     eGFR if non    Date Value Ref Range Status   11/07/2019 >60 >60 mL/min/1.73 m^2 Final     Comment:     Calculation used to obtain the estimated glomerular filtration  rate (eGFR) is the CKD-EPI equation.      Impression:           - Normal esophagus.                        - Normal stomach.                        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 Prosthesis placed. The                         proximal end of the stent is distal to the ampulla                         (refer to images).  No msi        Duodenal cancer  -     CBC auto differential; Future; Expected date: 11/08/2019  -     CEA; Future; Expected date: 11/08/2019  -     CMP; Future; Expected date: 11/08/2019    Malignant neoplasm metastatic to lung, unspecified laterality  -     CBC auto differential; Future; Expected date: 11/08/2019  -     CEA; Future; Expected date: 11/08/2019  -     CMP; Future; Expected date: 11/08/2019    Hepatic metastases    Acute venous embolism and thrombosis of deep veins of upper extremity, unspecified laterality    Encounter for chemotherapy management  -     CBC auto differential; Future; Expected date: 11/08/2019  -     CEA; Future; Expected date: 11/08/2019  -     CMP; Future; Expected date: 11/08/2019    Swelling of arm    Other orders  -     palonosetron 0.25 mg, dexamethasone 20 mg in sodium chloride 0.9% 50 mL  -     irinotecan (CAMPTOSAR) 180 mg/m2 = 376 mg in sodium chloride 0.9% 500 mL chemo infusion  -     bevacizumab (AVASTIN) 5 mg/kg = 500 mg in sodium chloride 0.9% 120 mL chemo infusion  -     atropine injection 0.4 mg  -     sodium chloride 0.9% 250 mL flush bag  -     sodium chloride 0.9% flush 10 mL  -     heparin, porcine (PF) 100 unit/mL injection flush 500 Units  -     alteplase  injection 2 mg  -     heparin, porcine (PF) 100 unit/mL injection flush 500 Units      1. PET CT reviewed showed marked improvement   4.  thrombosis involving left upper extremity prior to starting chemotherapy since her port is in the left chest wall:  Will call with results  5.  If thrombosis is under control she may proceed with chemotherapy  6.  Long-term plan if she responds well enough to chemotherapy would be to send her to Interventional Radiology for evaluation with Dr Del Rosario     Flush port with heparin today  Use picc for chemo  Antinausea meds should help prior to chemo check cea next visit    need to reasssess thrombosis in arm before she resumes chemo   She is responding to chemo   Folfiri with avastin  cleared   Failed  folfox and avastin for stage  4 disease   Cont norvasc for HTN monitored by Dr Conner   Tolerating lipitor for dyslipidemia  On statin monitoring counts closely        acp documented

## 2019-11-12 ENCOUNTER — PATIENT MESSAGE (OUTPATIENT)
Dept: HEMATOLOGY/ONCOLOGY | Facility: CLINIC | Age: 64
End: 2019-11-12

## 2019-11-12 ENCOUNTER — INFUSION (OUTPATIENT)
Dept: INFUSION THERAPY | Facility: HOSPITAL | Age: 64
End: 2019-11-12
Attending: INTERNAL MEDICINE
Payer: MEDICARE

## 2019-11-12 ENCOUNTER — TELEPHONE (OUTPATIENT)
Dept: HEMATOLOGY/ONCOLOGY | Facility: CLINIC | Age: 64
End: 2019-11-12

## 2019-11-12 VITALS
WEIGHT: 202.38 LBS | HEIGHT: 62 IN | SYSTOLIC BLOOD PRESSURE: 127 MMHG | RESPIRATION RATE: 18 BRPM | DIASTOLIC BLOOD PRESSURE: 85 MMHG | BODY MASS INDEX: 37.24 KG/M2 | HEART RATE: 71 BPM | TEMPERATURE: 98 F

## 2019-11-12 DIAGNOSIS — C78.00 MALIGNANT NEOPLASM METASTATIC TO LUNG, UNSPECIFIED LATERALITY: ICD-10-CM

## 2019-11-12 DIAGNOSIS — C17.0 DUODENAL CANCER: ICD-10-CM

## 2019-11-12 DIAGNOSIS — T45.1X5A CHEMOTHERAPY INDUCED NEUTROPENIA: ICD-10-CM

## 2019-11-12 DIAGNOSIS — Z51.11 ENCOUNTER FOR ANTINEOPLASTIC CHEMOTHERAPY: Primary | ICD-10-CM

## 2019-11-12 DIAGNOSIS — D70.1 CHEMOTHERAPY INDUCED NEUTROPENIA: ICD-10-CM

## 2019-11-12 DIAGNOSIS — C78.7 HEPATIC METASTASES: ICD-10-CM

## 2019-11-12 DIAGNOSIS — K31.5 DUODENAL STENOSIS: ICD-10-CM

## 2019-11-12 PROCEDURE — 96413 CHEMO IV INFUSION 1 HR: CPT

## 2019-11-12 PROCEDURE — 63600175 PHARM REV CODE 636 W HCPCS: Performed by: INTERNAL MEDICINE

## 2019-11-12 PROCEDURE — 96415 CHEMO IV INFUSION ADDL HR: CPT

## 2019-11-12 PROCEDURE — 96367 TX/PROPH/DG ADDL SEQ IV INF: CPT

## 2019-11-12 PROCEDURE — 25000003 PHARM REV CODE 250: Performed by: INTERNAL MEDICINE

## 2019-11-12 PROCEDURE — 96375 TX/PRO/DX INJ NEW DRUG ADDON: CPT

## 2019-11-12 PROCEDURE — A4216 STERILE WATER/SALINE, 10 ML: HCPCS | Performed by: INTERNAL MEDICINE

## 2019-11-12 PROCEDURE — 96417 CHEMO IV INFUS EACH ADDL SEQ: CPT

## 2019-11-12 RX ORDER — SODIUM CHLORIDE 0.9 % (FLUSH) 0.9 %
10 SYRINGE (ML) INJECTION
Status: DISCONTINUED | OUTPATIENT
Start: 2019-11-12 | End: 2019-11-12 | Stop reason: HOSPADM

## 2019-11-12 RX ORDER — HEPARIN 100 UNIT/ML
500 SYRINGE INTRAVENOUS
Status: DISCONTINUED | OUTPATIENT
Start: 2019-11-12 | End: 2019-11-12 | Stop reason: HOSPADM

## 2019-11-12 RX ORDER — ATROPINE SULFATE 0.4 MG/ML
0.4 INJECTION, SOLUTION ENDOTRACHEAL; INTRAMEDULLARY; INTRAMUSCULAR; INTRAVENOUS; SUBCUTANEOUS ONCE AS NEEDED
Status: COMPLETED | OUTPATIENT
Start: 2019-11-12 | End: 2019-11-12

## 2019-11-12 RX ADMIN — BEVACIZUMAB 500 MG: 400 INJECTION, SOLUTION INTRAVENOUS at 10:11

## 2019-11-12 RX ADMIN — PALONOSETRON HYDROCHLORIDE: 0.25 INJECTION, SOLUTION INTRAVENOUS at 09:11

## 2019-11-12 RX ADMIN — ATROPINE SULFATE 0.4 MG: 0.4 INJECTION, SOLUTION INTRAMUSCULAR; INTRAVENOUS; SUBCUTANEOUS at 11:11

## 2019-11-12 RX ADMIN — SODIUM CHLORIDE: 0.9 INJECTION, SOLUTION INTRAVENOUS at 09:11

## 2019-11-12 RX ADMIN — SODIUM CHLORIDE, PRESERVATIVE FREE 10 ML: 5 INJECTION INTRAVENOUS at 12:11

## 2019-11-12 RX ADMIN — IRINOTECAN HYDROCHLORIDE 376 MG: 20 INJECTION, SOLUTION INTRAVENOUS at 10:11

## 2019-11-12 NOTE — TELEPHONE ENCOUNTER
Noted will check with Dr Pettit regarding diagnosis.       ----- Message from Shantell Miguel sent at 11/12/2019  3:45 PM CST -----  Clarification order request for pet scan denied, date of service 10-23-19  Please submit clarification order with diagnosis code that will meet medical necessity.  Please call me at [phone] with any questions or if I can assist you.    Thank you.

## 2019-11-13 ENCOUNTER — PATIENT MESSAGE (OUTPATIENT)
Dept: HEMATOLOGY/ONCOLOGY | Facility: CLINIC | Age: 64
End: 2019-11-13

## 2019-11-14 DIAGNOSIS — D49.89 NEOPLASM OF ABDOMEN: ICD-10-CM

## 2019-11-14 DIAGNOSIS — C17.0 DUODENAL CANCER: Primary | ICD-10-CM

## 2019-11-19 ENCOUNTER — PATIENT MESSAGE (OUTPATIENT)
Dept: HEMATOLOGY/ONCOLOGY | Facility: CLINIC | Age: 64
End: 2019-11-19

## 2019-11-19 ENCOUNTER — INFUSION (OUTPATIENT)
Dept: INFUSION THERAPY | Facility: HOSPITAL | Age: 64
End: 2019-11-19
Attending: INTERNAL MEDICINE
Payer: MEDICARE

## 2019-11-19 VITALS
SYSTOLIC BLOOD PRESSURE: 133 MMHG | HEART RATE: 87 BPM | DIASTOLIC BLOOD PRESSURE: 81 MMHG | TEMPERATURE: 98 F | RESPIRATION RATE: 18 BRPM

## 2019-11-19 DIAGNOSIS — Z51.11 ENCOUNTER FOR ANTINEOPLASTIC CHEMOTHERAPY: Primary | ICD-10-CM

## 2019-11-19 PROCEDURE — 25000003 PHARM REV CODE 250: Performed by: INTERNAL MEDICINE

## 2019-11-19 PROCEDURE — G0463 HOSPITAL OUTPT CLINIC VISIT: HCPCS

## 2019-11-19 PROCEDURE — A4216 STERILE WATER/SALINE, 10 ML: HCPCS | Performed by: INTERNAL MEDICINE

## 2019-11-19 RX ORDER — SODIUM CHLORIDE 0.9 % (FLUSH) 0.9 %
10 SYRINGE (ML) INJECTION
Status: COMPLETED | OUTPATIENT
Start: 2019-11-19 | End: 2019-11-19

## 2019-11-19 RX ORDER — SODIUM CHLORIDE 0.9 % (FLUSH) 0.9 %
10 SYRINGE (ML) INJECTION
Status: CANCELLED | OUTPATIENT
Start: 2019-11-19

## 2019-11-19 RX ORDER — HEPARIN 100 UNIT/ML
500 SYRINGE INTRAVENOUS
Status: CANCELLED | OUTPATIENT
Start: 2019-11-19

## 2019-11-19 RX ADMIN — SODIUM CHLORIDE, PRESERVATIVE FREE 10 ML: 5 INJECTION INTRAVENOUS at 08:11

## 2019-11-25 ENCOUNTER — TELEPHONE (OUTPATIENT)
Dept: PHARMACY | Facility: CLINIC | Age: 64
End: 2019-11-25

## 2019-11-25 NOTE — TELEPHONE ENCOUNTER
"Patient transferred to me for Xeloda 7-day touchbase/clinical follow up.     Dosing, how taking: Xeloda 500 mg - takes 2 tablets in AM and 3 tablets in PM with food for 14 days on and 7 days off. No missed doses. Will start next cycle on 12/3.   Storage: Stores in kitchen cabinet in basket with other medications at room temperature.   Handling: Does not handle medication directly - pour into cap of bottle. Aware of handling precautions.   Side effects: Reviewed side effects as patient declined at initial consult: fatigue (monitor); HF syndrome (eucerin provided for dry/peeling skin; if blistering & crackers, aware to notify MD); infection (monitor for fever, achy chills, wet persistent cough); rash (hydrocortisone provided; do not use on open wounds/cuts); nausea (no issues currently; has PRN Zofran & Phenergan on hand); diarrhea; loss of appetite (monitor); mouth sores (home remedy: 1 cup water + 1/2 tsp salt + 1/2 tsp baking soda - rinse and spit); heart toxicity (underlying BP and HLD - recommended to monitor BP few times/week and emphasized importance of monthly labs) . Patient denies side effects at this time.   Recent infections: No signs of infection - no fever, achy chills, wet persistent cough.  Pain: 0/10 - denies pain.  Appetite: Usually able to eat 3 meals/day. Yesterday, was only able to eat 2 meals, but otherwise has no issues.   Energy, fatigue: Patient reports usually needing a walker to get around due to weak "leg muscles". States she is now able to walk without walker into store - "almost speedy heidy". Able to do daily activities and social activities - haven't attempted to do household chores yet.   Health, mood, QOL: Denies depression, anxiety, and stress. Good family and friend support. States she is "fighting for my granddaughter" - raising her since 1 years old. She's now 11 years old - "everyone just loves her". She is very motivated and on top of her therapy to be there for her " "granddaughter.   ED/UC visits: No   Next clinical follow up: 3 months  Labs reviewed. 11/7 creatinine 0.7;     BP: Patient does not monitor BP currently. BP at 11/8 office visit: 113/62 (well controlled - on amlodipine and metoprolol succinate). Encouraged patient to monitor BP few times/week. Will message provider to send Rx for blood pressure cuff to local pharmacy for patient to monitor.     Patient has underlying HTN and HLD. Patient advised to monitor for CP, SOB, dizziness, sweating, etc. - seek medical attention immediately.    Patient is Child Reyes Class B - advised to monitor for signs of liver disease (dark urine, diarrhea fatigue, yellowing of skin, christopher-colored stools, etc.).     Patient aware of importance of lab monitoring to ensure therapy is appropriate and safe.     Medication list reviewed. Patient uses heparin flush for PICC line.   Ondansetron (Cat B) - QT-prolonging Agents (Indeterminate Risk - Caution) may enhance the QTc-prolonging effect of Ondansetron. No action is required for the majority of patients. This interaction likely only applies to use of IV ondansetron, which appears to have a higher risk for prolonging the QT interval. [Patient was counseled - currently has no issues with nausea].    Refill: Refill has already been set up. Per notes in refill activity: "Patient returned call & confirmed shipment of capecitabine refill. Verified 2 patient identifiers. Patient informed of $306.17 co pay. She states that she has a daryl on file & is only able to pay $30. Confirmed that patient was previously receiving copay assistance through [employer]. Payment info on file confirmed for $30 copay-ccof#[id number]. No new medications, allergies, or health conditions. Confirmed that dosage has not changed. No missed doses. Patient confirmed that she has enough medication for today & then will have 7 days off. Next refill will be needed by Tues 12/3. Shipping address confirmed. Signature requirement declined. " "Medication will ship Tues 11/26 for Wed 11/27 delivery. Patient voiced understanding. call transferred to clinical pharmacist for follow up...CEB"    Patient very appreciative of all care received. She is aware to contact [facility] with further questions and concerns.   "

## 2019-11-26 ENCOUNTER — INFUSION (OUTPATIENT)
Dept: INFUSION THERAPY | Facility: HOSPITAL | Age: 64
End: 2019-11-26
Attending: INTERNAL MEDICINE
Payer: MEDICARE

## 2019-11-26 VITALS
TEMPERATURE: 98 F | WEIGHT: 197.31 LBS | RESPIRATION RATE: 18 BRPM | SYSTOLIC BLOOD PRESSURE: 131 MMHG | HEIGHT: 62 IN | DIASTOLIC BLOOD PRESSURE: 102 MMHG | BODY MASS INDEX: 36.31 KG/M2 | HEART RATE: 79 BPM

## 2019-11-26 DIAGNOSIS — Z51.11 ENCOUNTER FOR ANTINEOPLASTIC CHEMOTHERAPY: Primary | ICD-10-CM

## 2019-11-26 PROCEDURE — A4216 STERILE WATER/SALINE, 10 ML: HCPCS | Performed by: INTERNAL MEDICINE

## 2019-11-26 PROCEDURE — G0463 HOSPITAL OUTPT CLINIC VISIT: HCPCS

## 2019-11-26 PROCEDURE — 25000003 PHARM REV CODE 250: Performed by: INTERNAL MEDICINE

## 2019-11-26 RX ORDER — SODIUM CHLORIDE 0.9 % (FLUSH) 0.9 %
10 SYRINGE (ML) INJECTION
Status: COMPLETED | OUTPATIENT
Start: 2019-11-26 | End: 2019-11-26

## 2019-11-26 RX ORDER — HEPARIN 100 UNIT/ML
500 SYRINGE INTRAVENOUS
Status: CANCELLED | OUTPATIENT
Start: 2019-11-26

## 2019-11-26 RX ORDER — SODIUM CHLORIDE 0.9 % (FLUSH) 0.9 %
10 SYRINGE (ML) INJECTION
Status: CANCELLED | OUTPATIENT
Start: 2019-11-26

## 2019-11-26 RX ORDER — ACETAMINOPHEN 500 MG
1 TABLET ORAL DAILY
Qty: 1 EACH | Refills: 12 | OUTPATIENT
Start: 2019-11-26 | End: 2019-12-26

## 2019-11-26 RX ADMIN — SODIUM CHLORIDE, PRESERVATIVE FREE 10 ML: 5 INJECTION INTRAVENOUS at 09:11

## 2019-11-26 NOTE — TELEPHONE ENCOUNTER
----- Message from Siva Chapman PharmD sent at 11/25/2019 10:25 AM CST -----  Regarding: Xeloda - Blood Pressure Monitoring  Good Morning,    I spoke with Ms. Stuart today regarding a follow up and refill for her Xeloda therapy. We were discussing the importance of monitoring blood pressure. She currently does not monitor her blood pressure, but it appears to be well controlled on amlodipine and metoprolol succinate. She was advised to monitor her blood pressure a few times a week due to the potential of cardiac toxicity with Xeloda. She verbalized understanding, but states that she does not have a blood pressure monitor. If appropriate, would you send a prescription to her local pharmacy for a blood pressure monitor? Finances appear to be an issue for the patient, so we would like to see if her insurance will cover the expense.     Thank you,  Siva Chapman, PharmD  [facility]   [phone]

## 2019-12-02 ENCOUNTER — LAB VISIT (OUTPATIENT)
Dept: LAB | Facility: HOSPITAL | Age: 64
End: 2019-12-02
Attending: INTERNAL MEDICINE
Payer: MEDICARE

## 2019-12-02 ENCOUNTER — OFFICE VISIT (OUTPATIENT)
Dept: HEMATOLOGY/ONCOLOGY | Facility: CLINIC | Age: 64
End: 2019-12-02
Payer: MEDICARE

## 2019-12-02 VITALS
HEART RATE: 79 BPM | RESPIRATION RATE: 16 BRPM | BODY MASS INDEX: 36.44 KG/M2 | WEIGHT: 198 LBS | SYSTOLIC BLOOD PRESSURE: 145 MMHG | OXYGEN SATURATION: 98 % | DIASTOLIC BLOOD PRESSURE: 70 MMHG | TEMPERATURE: 98 F | HEIGHT: 62 IN

## 2019-12-02 DIAGNOSIS — C17.0 DUODENAL CANCER: ICD-10-CM

## 2019-12-02 DIAGNOSIS — C78.00 MALIGNANT NEOPLASM METASTATIC TO LUNG, UNSPECIFIED LATERALITY: ICD-10-CM

## 2019-12-02 DIAGNOSIS — Z51.11 ENCOUNTER FOR ANTINEOPLASTIC CHEMOTHERAPY: ICD-10-CM

## 2019-12-02 DIAGNOSIS — C17.0 DUODENAL CANCER: Primary | ICD-10-CM

## 2019-12-02 DIAGNOSIS — E66.01 SEVERE OBESITY (BMI 35.0-39.9) WITH COMORBIDITY: ICD-10-CM

## 2019-12-02 DIAGNOSIS — Z09 CHEMOTHERAPY FOLLOW-UP EXAMINATION: ICD-10-CM

## 2019-12-02 DIAGNOSIS — C78.7 HEPATIC METASTASES: ICD-10-CM

## 2019-12-02 DIAGNOSIS — I82.722 CHRONIC EMBOLISM AND THROMBOSIS OF DEEP VEIN OF LEFT UPPER EXTREMITY: ICD-10-CM

## 2019-12-02 DIAGNOSIS — Z51.11 ENCOUNTER FOR CHEMOTHERAPY MANAGEMENT: ICD-10-CM

## 2019-12-02 LAB
ALBUMIN SERPL BCP-MCNC: 3.7 G/DL (ref 3.5–5.2)
ALP SERPL-CCNC: 155 U/L (ref 55–135)
ALT SERPL W/O P-5'-P-CCNC: 24 U/L (ref 10–44)
ANION GAP SERPL CALC-SCNC: 12 MMOL/L (ref 8–16)
AST SERPL-CCNC: 34 U/L (ref 10–40)
BASOPHILS # BLD AUTO: 0.04 K/UL (ref 0–0.2)
BASOPHILS NFR BLD: 0.9 % (ref 0–1.9)
BILIRUB SERPL-MCNC: 0.6 MG/DL (ref 0.1–1)
BUN SERPL-MCNC: 6 MG/DL (ref 8–23)
CALCIUM SERPL-MCNC: 10.1 MG/DL (ref 8.7–10.5)
CHLORIDE SERPL-SCNC: 105 MMOL/L (ref 95–110)
CO2 SERPL-SCNC: 25 MMOL/L (ref 23–29)
CREAT SERPL-MCNC: 0.7 MG/DL (ref 0.5–1.4)
DIFFERENTIAL METHOD: ABNORMAL
EOSINOPHIL # BLD AUTO: 0.2 K/UL (ref 0–0.5)
EOSINOPHIL NFR BLD: 3.4 % (ref 0–8)
ERYTHROCYTE [DISTWIDTH] IN BLOOD BY AUTOMATED COUNT: 15.7 % (ref 11.5–14.5)
EST. GFR  (AFRICAN AMERICAN): >60 ML/MIN/1.73 M^2
EST. GFR  (NON AFRICAN AMERICAN): >60 ML/MIN/1.73 M^2
GLUCOSE SERPL-MCNC: 90 MG/DL (ref 70–110)
HCT VFR BLD AUTO: 41.8 % (ref 37–48.5)
HGB BLD-MCNC: 12.7 G/DL (ref 12–16)
IMM GRANULOCYTES # BLD AUTO: 0.01 K/UL (ref 0–0.04)
LYMPHOCYTES # BLD AUTO: 1.4 K/UL (ref 1–4.8)
LYMPHOCYTES NFR BLD: 30.6 % (ref 18–48)
MCH RBC QN AUTO: 30 PG (ref 27–31)
MCHC RBC AUTO-ENTMCNC: 30.4 G/DL (ref 32–36)
MCV RBC AUTO: 99 FL (ref 82–98)
MONOCYTES # BLD AUTO: 0.6 K/UL (ref 0.3–1)
MONOCYTES NFR BLD: 14.4 % (ref 4–15)
NEUTROPHILS # BLD AUTO: 2.3 K/UL (ref 1.8–7.7)
NEUTROPHILS NFR BLD: 50.5 % (ref 38–73)
NRBC BLD-RTO: 0 /100 WBC
PLATELET # BLD AUTO: 229 K/UL (ref 150–350)
PMV BLD AUTO: 8.5 FL (ref 9.2–12.9)
POTASSIUM SERPL-SCNC: 3.9 MMOL/L (ref 3.5–5.1)
PROT SERPL-MCNC: 7.2 G/DL (ref 6–8.4)
RBC # BLD AUTO: 4.24 M/UL (ref 4–5.4)
SODIUM SERPL-SCNC: 142 MMOL/L (ref 136–145)
WBC # BLD AUTO: 4.45 K/UL (ref 3.9–12.7)

## 2019-12-02 PROCEDURE — 3077F SYST BP >= 140 MM HG: CPT | Mod: S$GLB,,, | Performed by: INTERNAL MEDICINE

## 2019-12-02 PROCEDURE — 1101F PT FALLS ASSESS-DOCD LE1/YR: CPT | Mod: S$GLB,,, | Performed by: INTERNAL MEDICINE

## 2019-12-02 PROCEDURE — 85025 COMPLETE CBC W/AUTO DIFF WBC: CPT

## 2019-12-02 PROCEDURE — 3288F PR FALLS RISK ASSESSMENT DOCUMENTED: ICD-10-PCS | Mod: S$GLB,,, | Performed by: INTERNAL MEDICINE

## 2019-12-02 PROCEDURE — 80053 COMPREHEN METABOLIC PANEL: CPT

## 2019-12-02 PROCEDURE — 82378 CARCINOEMBRYONIC ANTIGEN: CPT

## 2019-12-02 PROCEDURE — 36415 COLL VENOUS BLD VENIPUNCTURE: CPT

## 2019-12-02 PROCEDURE — 3078F PR MOST RECENT DIASTOLIC BLOOD PRESSURE < 80 MM HG: ICD-10-PCS | Mod: S$GLB,,, | Performed by: INTERNAL MEDICINE

## 2019-12-02 PROCEDURE — 1126F PR PAIN SEVERITY QUANTIFIED, NO PAIN PRESENT: ICD-10-PCS | Mod: S$GLB,,, | Performed by: INTERNAL MEDICINE

## 2019-12-02 PROCEDURE — 3288F FALL RISK ASSESSMENT DOCD: CPT | Mod: S$GLB,,, | Performed by: INTERNAL MEDICINE

## 2019-12-02 PROCEDURE — 99999 PR PBB SHADOW E&M-EST. PATIENT-LVL IV: ICD-10-PCS | Mod: PBBFAC,,, | Performed by: INTERNAL MEDICINE

## 2019-12-02 PROCEDURE — 1159F MED LIST DOCD IN RCRD: CPT | Mod: S$GLB,,, | Performed by: INTERNAL MEDICINE

## 2019-12-02 PROCEDURE — 3077F PR MOST RECENT SYSTOLIC BLOOD PRESSURE >= 140 MM HG: ICD-10-PCS | Mod: S$GLB,,, | Performed by: INTERNAL MEDICINE

## 2019-12-02 PROCEDURE — 99999 PR PBB SHADOW E&M-EST. PATIENT-LVL IV: CPT | Mod: PBBFAC,,, | Performed by: INTERNAL MEDICINE

## 2019-12-02 PROCEDURE — 1159F PR MEDICATION LIST DOCUMENTED IN MEDICAL RECORD: ICD-10-PCS | Mod: S$GLB,,, | Performed by: INTERNAL MEDICINE

## 2019-12-02 PROCEDURE — 1126F AMNT PAIN NOTED NONE PRSNT: CPT | Mod: S$GLB,,, | Performed by: INTERNAL MEDICINE

## 2019-12-02 PROCEDURE — 1101F PR PT FALLS ASSESS DOC 0-1 FALLS W/OUT INJ PAST YR: ICD-10-PCS | Mod: S$GLB,,, | Performed by: INTERNAL MEDICINE

## 2019-12-02 PROCEDURE — 3078F DIAST BP <80 MM HG: CPT | Mod: S$GLB,,, | Performed by: INTERNAL MEDICINE

## 2019-12-02 PROCEDURE — 99215 PR OFFICE/OUTPT VISIT, EST, LEVL V, 40-54 MIN: ICD-10-PCS | Mod: S$GLB,,, | Performed by: INTERNAL MEDICINE

## 2019-12-02 PROCEDURE — 99215 OFFICE O/P EST HI 40 MIN: CPT | Mod: S$GLB,,, | Performed by: INTERNAL MEDICINE

## 2019-12-02 RX ORDER — SODIUM CHLORIDE 0.9 % (FLUSH) 0.9 %
10 SYRINGE (ML) INJECTION
Status: CANCELLED | OUTPATIENT
Start: 2019-12-03

## 2019-12-02 RX ORDER — HEPARIN 100 UNIT/ML
500 SYRINGE INTRAVENOUS
Status: CANCELLED | OUTPATIENT
Start: 2019-12-03

## 2019-12-02 RX ORDER — ATROPINE SULFATE 0.4 MG/ML
0.4 INJECTION, SOLUTION ENDOTRACHEAL; INTRAMEDULLARY; INTRAMUSCULAR; INTRAVENOUS; SUBCUTANEOUS ONCE AS NEEDED
Status: CANCELLED | OUTPATIENT
Start: 2019-12-03

## 2019-12-02 RX ORDER — HEPARIN 100 UNIT/ML
500 SYRINGE INTRAVENOUS
Status: CANCELLED
Start: 2019-12-03

## 2019-12-02 NOTE — PROGRESS NOTES
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  - Clinical: Stage IV (cTX, cNX, cM1) - Signed by Karina Pettit MD on 3/11/2019       Chemotherapy     Treatment Summary   Plan Name: OP COLORECTAL FOLFOX + BEVACIZUMAB Q2W  Treatment Goal: Maintenance  Status: Active  Start Date: 1/21/2019  End Date: 7/3/2019 (Planned)  Provider: Karina Pettit MD  Chemotherapy: fluorouracil injection 835 mg, 400 mg/m2 = 835 mg, Intravenous, Clinic/HOD 1 time, 4 of 12 cycles    fluorouracil (ADRUCIL) 2,400 mg/m2 = 5,015 mg in sodium chloride 0.9% 200.3 mL chemo infusion, 2,400 mg/m2 = 5,015 mg, Intravenous, Over 46 hours, 4 of 12 cycles    bevacizumab (AVASTIN) 5 mg/kg = 500 mg in sodium chloride 0.9% 100 mL chemo infusion, 5 mg/kg = 500 mg, Intravenous, Clinic/HOD 1 time, 4 of 12 cycles    leucovorin calcium 400 mg/m2 = 835 mg in dextrose 5 % 250 mL infusion, 400 mg/m2 = 835 mg, Intravenous, Clinic/HOD 1 time, 4 of 12 cycles    oxaliplatin (ELOXATIN) 85 mg/m2 = 178 mg in dextrose 5 % 500 mL chemo infusion, 85 mg/m2 = 178 mg, Intravenous, Clinic/HOD 1 time, 4 of 12 cycles          4/8/2019 - 4/8/2019 Chemotherapy     Treatment Summary   Plan Name: OP COLORECTAL FOLFIRI + BEVACIZUMAB Q2W  Treatment Goal: Control  Status: Inactive  Start Date: [No treatment day found]  End Date: [No treatment day found]  Provider: Karina Pettit MD  Chemotherapy: fluorouracil injection 835 mg, 400 mg/m2 = 835 mg, Intravenous, Clinic/HOD 1 time, 0 of 12 cycles  fluorouracil (ADRUCIL) 2,400 mg/m2 = 5,015 mg in sodium chloride 0.9% 200.3 mL chemo infusion, 2,400 mg/m2 = 5,015 mg, Intravenous, Over 46 hours, 0 of 12 cycles  bevacizumab (AVASTIN) 5 mg/kg = 500 mg in sodium chloride 0.9% 100 mL chemo infusion, 5 mg/kg = 500 mg, Intravenous, Clinic/HOD 1 time, 0 of 12  cycles  irinotecan (CAMPTOSAR) 180 mg/m2 = 376 mg in sodium chloride 0.9% 500 mL chemo infusion, 180 mg/m2 = 376 mg, Intravenous, Clinic/HOD 1 time, 0 of 12 cycles  leucovorin calcium 400 mg/m2 = 835 mg in dextrose 5 % 250 mL infusion, 400 mg/m2 = 835 mg, Intravenous, Clinic/HOD 1 time, 0 of 12 cycles        Lung metastases    1/15/2019 Initial Diagnosis     Lung metastases      4/8/2019 - 4/8/2019 Chemotherapy     Treatment Summary   Plan Name: OP COLORECTAL FOLFIRI + BEVACIZUMAB Q2W  Treatment Goal: Control  Status: Inactive  Start Date: [No treatment day found]  End Date: [No treatment day found]  Provider: Karina Pettit MD  Chemotherapy: fluorouracil injection 835 mg, 400 mg/m2 = 835 mg, Intravenous, Clinic/HOD 1 time, 0 of 12 cycles  fluorouracil (ADRUCIL) 2,400 mg/m2 = 5,015 mg in sodium chloride 0.9% 200.3 mL chemo infusion, 2,400 mg/m2 = 5,015 mg, Intravenous, Over 46 hours, 0 of 12 cycles  bevacizumab (AVASTIN) 5 mg/kg = 500 mg in sodium chloride 0.9% 100 mL chemo infusion, 5 mg/kg = 500 mg, Intravenous, Clinic/HOD 1 time, 0 of 12 cycles  irinotecan (CAMPTOSAR) 180 mg/m2 = 376 mg in sodium chloride 0.9% 500 mL chemo infusion, 180 mg/m2 = 376 mg, Intravenous, Clinic/HOD 1 time, 0 of 12 cycles  leucovorin calcium 400 mg/m2 = 835 mg in dextrose 5 % 250 mL infusion, 400 mg/m2 = 835 mg, Intravenous, Clinic/HOD 1 time, 0 of 12 cycles      4/8/2019 -  Chemotherapy     Treatment Summary   Plan Name: OP COLORECTAL FOLFIRI + BEVACIZUMAB Q2W  Treatment Goal: Control  Status: Active  Start Date: 4/8/2019  End Date: 12/3/2019 (Planned)  Provider: Karina Pettit MD  Chemotherapy: fluorouracil injection 835 mg, 400 mg/m2 = 835 mg, Intravenous, Clinic/HOD 1 time, 12 of 12 cycles  Administration: 835 mg (8/6/2019), 835 mg (8/27/2019), 835 mg (8/27/2019), 835 mg (9/10/2019), 835 mg (9/24/2019)  fluorouracil (ADRUCIL) 2,400 mg/m2 = 5,015 mg in sodium chloride 0.9% 200.3 mL chemo infusion, 2,400 mg/m2 = 5,015 mg,  Intravenous, Over 46 hours, 12 of 12 cycles  Administration: 5,015 mg (8/6/2019), 5,015 mg (8/27/2019), 5,015 mg (9/10/2019), 5,015 mg (9/24/2019)  bevacizumab (AVASTIN) 5 mg/kg = 500 mg in sodium chloride 0.9% 100 mL chemo infusion, 5 mg/kg = 500 mg, Intravenous, Clinic/HOD 1 time, 13 of 14 cycles  Administration: 500 mg (8/6/2019), 500 mg (8/27/2019), 500 mg (9/10/2019), 500 mg (9/24/2019), 500 mg (11/12/2019)  irinotecan (CAMPTOSAR) 180 mg/m2 = 376 mg in sodium chloride 0.9% 500 mL chemo infusion, 180 mg/m2 = 376 mg, Intravenous, Clinic/HOD 1 time, 13 of 14 cycles  Administration: 376 mg (8/6/2019), 376 mg (8/27/2019), 376 mg (9/10/2019), 376 mg (9/24/2019), 376 mg (11/12/2019)  leucovorin calcium 400 mg/m2 = 835 mg in dextrose 5 % 250 mL infusion, 400 mg/m2 = 835 mg, Intravenous, Clinic/HOD 1 time, 12 of 12 cycles  Administration: 835 mg (8/6/2019), 835 mg (8/27/2019), 835 mg (9/10/2019), 835 mg (9/24/2019)        Hepatic metastases    1/15/2019 Initial Diagnosis     Hepatic metastases      4/8/2019 - 4/8/2019 Chemotherapy     Treatment Summary   Plan Name: OP COLORECTAL FOLFIRI + BEVACIZUMAB Q2W  Treatment Goal: Control  Status: Inactive  Start Date: [No treatment day found]  End Date: [No treatment day found]  Provider: Karina Pettit MD  Chemotherapy: fluorouracil injection 835 mg, 400 mg/m2 = 835 mg, Intravenous, Clinic/HOD 1 time, 0 of 12 cycles  fluorouracil (ADRUCIL) 2,400 mg/m2 = 5,015 mg in sodium chloride 0.9% 200.3 mL chemo infusion, 2,400 mg/m2 = 5,015 mg, Intravenous, Over 46 hours, 0 of 12 cycles  bevacizumab (AVASTIN) 5 mg/kg = 500 mg in sodium chloride 0.9% 100 mL chemo infusion, 5 mg/kg = 500 mg, Intravenous, Clinic/HOD 1 time, 0 of 12 cycles  irinotecan (CAMPTOSAR) 180 mg/m2 = 376 mg in sodium chloride 0.9% 500 mL chemo infusion, 180 mg/m2 = 376 mg, Intravenous, Clinic/HOD 1 time, 0 of 12 cycles  leucovorin calcium 400 mg/m2 = 835 mg in dextrose 5 % 250 mL infusion, 400 mg/m2 = 835 mg,  Intravenous, Clinic/HOD 1 time, 0 of 12 cycles      4/8/2019 -  Chemotherapy     Treatment Summary   Plan Name: OP COLORECTAL FOLFIRI + BEVACIZUMAB Q2W  Treatment Goal: Control  Status: Active  Start Date: 4/8/2019  End Date: 12/3/2019 (Planned)  Provider: Karina Pettit MD  Chemotherapy: fluorouracil injection 835 mg, 400 mg/m2 = 835 mg, Intravenous, Clinic/HOD 1 time, 12 of 12 cycles  Administration: 835 mg (8/6/2019), 835 mg (8/27/2019), 835 mg (8/27/2019), 835 mg (9/10/2019), 835 mg (9/24/2019)  fluorouracil (ADRUCIL) 2,400 mg/m2 = 5,015 mg in sodium chloride 0.9% 200.3 mL chemo infusion, 2,400 mg/m2 = 5,015 mg, Intravenous, Over 46 hours, 12 of 12 cycles  Administration: 5,015 mg (8/6/2019), 5,015 mg (8/27/2019), 5,015 mg (9/10/2019), 5,015 mg (9/24/2019)  bevacizumab (AVASTIN) 5 mg/kg = 500 mg in sodium chloride 0.9% 100 mL chemo infusion, 5 mg/kg = 500 mg, Intravenous, Clinic/HOD 1 time, 13 of 14 cycles  Administration: 500 mg (8/6/2019), 500 mg (8/27/2019), 500 mg (9/10/2019), 500 mg (9/24/2019), 500 mg (11/12/2019)  irinotecan (CAMPTOSAR) 180 mg/m2 = 376 mg in sodium chloride 0.9% 500 mL chemo infusion, 180 mg/m2 = 376 mg, Intravenous, Clinic/HOD 1 time, 13 of 14 cycles  Administration: 376 mg (8/6/2019), 376 mg (8/27/2019), 376 mg (9/10/2019), 376 mg (9/24/2019), 376 mg (11/12/2019)  leucovorin calcium 400 mg/m2 = 835 mg in dextrose 5 % 250 mL infusion, 400 mg/m2 = 835 mg, Intravenous, Clinic/HOD 1 time, 12 of 12 cycles  Administration: 835 mg (8/6/2019), 835 mg (8/27/2019), 835 mg (9/10/2019), 835 mg (9/24/2019)           Indigo Jacqueline Narciso is a 64 y.o.  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  Kras was MUTATED No MSI   Cycle 1  January 15 2019  Here for chemo with chest port in place      Tolerating lopressor for htn and lipitor for dyslipidemia  Tolerating zofran for nausea prn chemo     Cycle one folfox avastin  January 15 2019   Failed regimen and admitted with SBO   Hepatic mets progressed hence changed to irinotecan drop  oxaliplatin   Cycle one folfiri avastin April 2019     Pt was seen for left arm swelling and ultrasound revealed a DVT in her arm , post lovenox , now on eliquis   Scan reviewed again  Here to resume Xeliri which was started Nov 8 2019       No change in PMH, PFH, PSH since last OV     Current Outpatient Medications:     amLODIPine (NORVASC) 5 MG tablet, Take 1 tablet (5 mg total) by mouth once daily., Disp: 90 tablet, Rfl: 3    apixaban (ELIQUIS) 5 mg Tab, Take 1 tablet (5 mg total) by mouth 2 (two) times daily., Disp: 60 tablet, Rfl: 2    atorvastatin (LIPITOR) 20 MG tablet, Take 1 tablet (20 mg total) by mouth every evening., Disp: 90 tablet, Rfl: 3    capecitabine (XELODA) 500 MG Tab, Take 2 tablets in the morning and 3 tablets in the evening for days 1-14 of a 21 day cycle., Disp: 70 tablet, Rfl: 2    ferrous sulfate (FEOSOL) 325 mg (65 mg iron) Tab tablet, Take 1 tablet by mouth 2 (two) times daily., Disp: , Rfl: 3    metoprolol succinate (TOPROL-XL) 25 MG 24 hr tablet, Take 1 tablet (25 mg total) by mouth every evening., Disp: 90 tablet, Rfl: 3    ondansetron (ZOFRAN-ODT) 8 MG TbDL, Take 1 tablet (8 mg total) by mouth every 12 (twelve) hours as needed., Disp: 30 tablet, Rfl: 1    potassium chloride SA (K-DUR,KLOR-CON) 20 MEQ tablet, Take 1 tablet (20 mEq total) by mouth once daily., Disp: 30 tablet, Rfl: 11    promethazine (PHENERGAN) 25 MG tablet, Take 1 tablet (25 mg total) by mouth every 6 (six) hours as needed for Nausea., Disp: 30 tablet, Rfl: 1      Review of Systems:  General: Weight gain: No, Weight Loss: No,  Fatigue yes   Chills: No, Night Sweats: No, Insomnia: No, Excessive sleeping: No   Respiratory:  Cough: No, Shortness of Breath:  no   Wheezing: No, Excessive Snoring: No, Coughing up blood: No  Endocrine: Heat Intolerance: No, Cold Intolerance: No,   Excessive Thirst: No,  "Excessive Hunger: No  Eyes:  Blurred Vision: No, Double Vision: No   Light Sensitivity: No, Eye pain: No  Musculoskeletal: Muscle Aches/Pain: No, Joint Pain/Swelling: yes  But stable    Muscle Weakness:no , Neck Pain: No, Back Pain:   Neurological: Difficulty Walking/Falls:  No  Further   Headache Migraine: No, Dizziness/Vertigo: No, Fainting: No, Weakness: Better   Cardiovascular: Chest Pain: No, Shortness of Breath: no   Gastrointestinal: Nausea/Vomiting: No, Constipation: No, Diarrhea:stable increasing protuberant  Psych/Cog:  Depression: No, Anxiety: No, Hallucinations: No   : Frequent Urination: No, Incontinence: No, Blood of Urine: No, Urinary Infections: No  ENT:Hearing Loss: No, Earache: No, Ringing in Ears: No  Facial Pain: No, Chronic Congestion:No   Immune: Seasonal Allergies: No, Hives and/or Rashes: No  The remainder of the review of twelve body systems was reviewed and normal        BP (!) 145/70   Pulse 79   Temp 97.8 °F (36.6 °C) (Oral)   Resp 16   Ht 5' 2" (1.575 m)   Wt 89.8 kg (197 lb 15.6 oz)   SpO2 98%   BMI 36.21 kg/m²   Constitutional: oriented to person, place, and time.  Conj pink   HENT: anicteric sclera   Head: Normocephalic and atraumatic. Ears canal normal no discharge    Nose: Nose normal.   Non boggy turbinates  No lymphatics noted   Eyes: Conjunctivae PALE  EOM are normal.  Neck: . No thyromegaly present.  No bruits   Cardiovascular: Normal rate, regular rhythm, normal heart sounds  No murmur heard.  Pulmonary/Chest:  Clear bilaterally no fremitus  Abdominal: Soft. Bowel sounds are normal.  no mass.   Musculoskeletal: Normal range of motion. decreased strength   Left arm remains swollen yet better no discoloration pulses are good  No further boggy turbinates  Lymphadenopathy:  no cervical adenopathy.   Neurological: alert and oriented to person, place  Slight weakness today   Skin: Skin is warm and dry. No rash noted.   Psychiatric: normal mood and affect.   Vitals " reviewed.  Chest port removed     Lab Results   Component Value Date    WBC 4.45 12/02/2019    RBC 4.24 12/02/2019    HGB 12.7 12/02/2019    HCT 41.8 12/02/2019    MCV 99 (H) 12/02/2019    MCH 30.0 12/02/2019    MCHC 30.4 (L) 12/02/2019    RDW 15.7 (H) 12/02/2019     12/02/2019    MPV 8.5 (L) 12/02/2019    GRAN 2.3 12/02/2019    GRAN 50.5 12/02/2019    LYMPH 1.4 12/02/2019    LYMPH 30.6 12/02/2019    MONO 0.6 12/02/2019    MONO 14.4 12/02/2019    EOS 0.2 12/02/2019    BASO 0.04 12/02/2019    EOSINOPHIL 3.4 12/02/2019    BASOPHIL 0.9 12/02/2019       CMP  Sodium   Date Value Ref Range Status   12/02/2019 142 136 - 145 mmol/L Final     Potassium   Date Value Ref Range Status   12/02/2019 3.9 3.5 - 5.1 mmol/L Final     Chloride   Date Value Ref Range Status   12/02/2019 105 95 - 110 mmol/L Final     CO2   Date Value Ref Range Status   12/02/2019 25 23 - 29 mmol/L Final     Glucose   Date Value Ref Range Status   12/02/2019 90 70 - 110 mg/dL Final     BUN, Bld   Date Value Ref Range Status   12/02/2019 6 (L) 8 - 23 mg/dL Final     Creatinine   Date Value Ref Range Status   12/02/2019 0.7 0.5 - 1.4 mg/dL Final     Calcium   Date Value Ref Range Status   12/02/2019 10.1 8.7 - 10.5 mg/dL Final     Total Protein   Date Value Ref Range Status   12/02/2019 7.2 6.0 - 8.4 g/dL Final     Albumin   Date Value Ref Range Status   12/02/2019 3.7 3.5 - 5.2 g/dL Final     Total Bilirubin   Date Value Ref Range Status   12/02/2019 0.6 0.1 - 1.0 mg/dL Final     Comment:     For infants and newborns, interpretation of results should be based  on gestational age, weight and in agreement with clinical  observations.  Premature Infant recommended reference ranges:  Up to 24 hours.............<8.0 mg/dL  Up to 48 hours............<12.0 mg/dL  3-5 days..................<15.0 mg/dL  6-29 days.................<15.0 mg/dL       Alkaline Phosphatase   Date Value Ref Range Status   12/02/2019 155 (H) 55 - 135 U/L Final     AST   Date Value  Ref Range Status   12/02/2019 34 10 - 40 U/L Final     ALT   Date Value Ref Range Status   12/02/2019 24 10 - 44 U/L Final     Anion Gap   Date Value Ref Range Status   12/02/2019 12 8 - 16 mmol/L Final     eGFR if    Date Value Ref Range Status   12/02/2019 >60 >60 mL/min/1.73 m^2 Final     eGFR if non    Date Value Ref Range Status   12/02/2019 >60 >60 mL/min/1.73 m^2 Final     Comment:     Calculation used to obtain the estimated glomerular filtration  rate (eGFR) is the CKD-EPI equation.      Impression:           - Normal esophagus.                        - Normal stomach.                        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 Prosthesis placed. The                         proximal end of the stent is distal to the ampulla                         (refer to images).  No msi        Duodenal cancer  -     US LUE VENOUS; Future; Expected date: 12/02/2019    Hepatic metastases  -     US LUE VENOUS; Future; Expected date: 12/02/2019    Malignant neoplasm metastatic to lung, unspecified laterality    Encounter for antineoplastic chemotherapy    Severe obesity (BMI 35.0-39.9) with comorbidity    Chemotherapy follow-up examination    Chronic embolism and thrombosis of deep vein of left upper extremity  -     US LUE VENOUS; Future; Expected date: 12/02/2019    Other orders  -     heparin, porcine (PF) 100 unit/mL injection flush 500 Units  -     palonosetron 0.25 mg, dexamethasone 20 mg in sodium chloride 0.9% 50 mL  -     Cancel: irinotecan (CAMPTOSAR) 180 mg/m2 = 376 mg in sodium chloride 0.9% 500 mL chemo infusion  -     Cancel: bevacizumab (AVASTIN) 5 mg/kg = 500 mg in sodium chloride 0.9% 120 mL chemo infusion  -     Cancel: irinotecan (CAMPTOSAR) 180 mg/m2 = 376 mg in sodium chloride 0.9% 500 mL chemo infusion  -     atropine injection 0.4 mg  -     sodium chloride 0.9% 250 mL flush bag  -     sodium chloride 0.9% flush  10 mL  -     heparin, porcine (PF) 100 unit/mL injection flush 500 Units  -     alteplase injection 2 mg  -     irinotecan (CAMPTOSAR) 200 mg/m2 = 418 mg in sodium chloride 0.9% 500 mL chemo infusion  -     bevacizumab (AVASTIN) 7.5 mg/kg = 750 mg in sodium chloride 0.9% 130 mL chemo infusion      1. PET CT in past reviewed showed marked improvement  : next scan due December 11 2019 11 am   2.  thrombosis involving left upper extremity  Tolerating anticoagulation  3.  Long-term plan if she responds well enough to chemotherapy would be to send her to Interventional Radiology for evaluation with Dr Del Rosario   4. CRC on chemo     Flush port with heparin regularly   Use picc for chemo  Recheck thrombosis with repeat US   Antinausea meds should help prior to chemo check cea next visit   Was receiving Folfiri with avastin  NOW change to xeloda with avastin and irinotecan via picc line for now   Failed  folfox and avastin for stage  4 disease   Cont norvasc for HTN monitored by Dr Conner   Tolerating lipitor for dyslipidemia  On statin monitoring counts closely    Chemo, ultrasound and pet ct due       acp documented

## 2019-12-03 ENCOUNTER — INFUSION (OUTPATIENT)
Dept: INFUSION THERAPY | Facility: HOSPITAL | Age: 64
End: 2019-12-03
Attending: INTERNAL MEDICINE
Payer: MEDICARE

## 2019-12-03 VITALS
HEIGHT: 62 IN | OXYGEN SATURATION: 98 % | WEIGHT: 197 LBS | BODY MASS INDEX: 36.25 KG/M2 | DIASTOLIC BLOOD PRESSURE: 71 MMHG | SYSTOLIC BLOOD PRESSURE: 128 MMHG | TEMPERATURE: 98 F | HEART RATE: 65 BPM | RESPIRATION RATE: 18 BRPM

## 2019-12-03 DIAGNOSIS — T45.1X5A CHEMOTHERAPY INDUCED NEUTROPENIA: ICD-10-CM

## 2019-12-03 DIAGNOSIS — C78.7 HEPATIC METASTASES: ICD-10-CM

## 2019-12-03 DIAGNOSIS — C78.00 MALIGNANT NEOPLASM METASTATIC TO LUNG, UNSPECIFIED LATERALITY: ICD-10-CM

## 2019-12-03 DIAGNOSIS — D70.1 CHEMOTHERAPY INDUCED NEUTROPENIA: ICD-10-CM

## 2019-12-03 DIAGNOSIS — C17.0 DUODENAL CANCER: ICD-10-CM

## 2019-12-03 DIAGNOSIS — K31.5 DUODENAL STENOSIS: ICD-10-CM

## 2019-12-03 DIAGNOSIS — Z51.11 ENCOUNTER FOR ANTINEOPLASTIC CHEMOTHERAPY: Primary | ICD-10-CM

## 2019-12-03 PROCEDURE — 63600175 PHARM REV CODE 636 W HCPCS: Performed by: INTERNAL MEDICINE

## 2019-12-03 PROCEDURE — 96417 CHEMO IV INFUS EACH ADDL SEQ: CPT

## 2019-12-03 PROCEDURE — 96413 CHEMO IV INFUSION 1 HR: CPT

## 2019-12-03 PROCEDURE — 96375 TX/PRO/DX INJ NEW DRUG ADDON: CPT

## 2019-12-03 PROCEDURE — 96367 TX/PROPH/DG ADDL SEQ IV INF: CPT

## 2019-12-03 PROCEDURE — 96415 CHEMO IV INFUSION ADDL HR: CPT

## 2019-12-03 RX ORDER — SODIUM CHLORIDE 0.9 % (FLUSH) 0.9 %
10 SYRINGE (ML) INJECTION
Status: DISCONTINUED | OUTPATIENT
Start: 2019-12-03 | End: 2019-12-03 | Stop reason: HOSPADM

## 2019-12-03 RX ORDER — HEPARIN 100 UNIT/ML
500 SYRINGE INTRAVENOUS
Status: DISCONTINUED | OUTPATIENT
Start: 2019-12-03 | End: 2019-12-03 | Stop reason: HOSPADM

## 2019-12-03 RX ORDER — ATROPINE SULFATE 0.4 MG/ML
0.4 INJECTION, SOLUTION ENDOTRACHEAL; INTRAMEDULLARY; INTRAMUSCULAR; INTRAVENOUS; SUBCUTANEOUS ONCE AS NEEDED
Status: COMPLETED | OUTPATIENT
Start: 2019-12-03 | End: 2019-12-03

## 2019-12-03 RX ADMIN — PALONOSETRON HYDROCHLORIDE: 0.25 INJECTION, SOLUTION INTRAVENOUS at 09:12

## 2019-12-03 RX ADMIN — BEVACIZUMAB 750 MG: 400 INJECTION, SOLUTION INTRAVENOUS at 11:12

## 2019-12-03 RX ADMIN — ATROPINE SULFATE 0.4 MG: 0.4 INJECTION, SOLUTION INTRAMUSCULAR; INTRAVENOUS; SUBCUTANEOUS at 09:12

## 2019-12-03 RX ADMIN — IRINOTECAN HYDROCHLORIDE 418 MG: 20 INJECTION, SOLUTION INTRAVENOUS at 09:12

## 2019-12-04 LAB — CEA SERPL-MCNC: 51 NG/ML (ref 0–5)

## 2019-12-05 ENCOUNTER — HOSPITAL ENCOUNTER (OUTPATIENT)
Dept: RADIOLOGY | Facility: HOSPITAL | Age: 64
Discharge: HOME | End: 2019-12-05
Attending: INTERNAL MEDICINE
Payer: MEDICARE

## 2019-12-05 DIAGNOSIS — C78.7 HEPATIC METASTASES: ICD-10-CM

## 2019-12-05 DIAGNOSIS — C17.0 DUODENAL CANCER: ICD-10-CM

## 2019-12-05 DIAGNOSIS — I82.722 CHRONIC EMBOLISM AND THROMBOSIS OF DEEP VEIN OF LEFT UPPER EXTREMITY: ICD-10-CM

## 2019-12-05 PROCEDURE — 93971 EXTREMITY STUDY: CPT | Mod: 26,LT,, | Performed by: RADIOLOGY

## 2019-12-05 PROCEDURE — 93971 US UPPER EXTREMITY VEINS LEFT: ICD-10-PCS | Mod: 26,LT,, | Performed by: RADIOLOGY

## 2019-12-05 PROCEDURE — 93971 EXTREMITY STUDY: CPT | Mod: TC,LT

## 2019-12-10 ENCOUNTER — INFUSION (OUTPATIENT)
Dept: INFUSION THERAPY | Facility: HOSPITAL | Age: 64
End: 2019-12-10
Attending: INTERNAL MEDICINE
Payer: MEDICARE

## 2019-12-10 VITALS
WEIGHT: 195.69 LBS | DIASTOLIC BLOOD PRESSURE: 84 MMHG | HEIGHT: 62 IN | TEMPERATURE: 99 F | SYSTOLIC BLOOD PRESSURE: 137 MMHG | RESPIRATION RATE: 18 BRPM | BODY MASS INDEX: 36.01 KG/M2 | HEART RATE: 70 BPM

## 2019-12-10 DIAGNOSIS — Z51.11 ENCOUNTER FOR ANTINEOPLASTIC CHEMOTHERAPY: Primary | ICD-10-CM

## 2019-12-10 PROCEDURE — 63600175 PHARM REV CODE 636 W HCPCS: Performed by: INTERNAL MEDICINE

## 2019-12-10 PROCEDURE — 96523 IRRIG DRUG DELIVERY DEVICE: CPT

## 2019-12-10 RX ORDER — SODIUM CHLORIDE 0.9 % (FLUSH) 0.9 %
10 SYRINGE (ML) INJECTION
Status: CANCELLED | OUTPATIENT
Start: 2019-12-10

## 2019-12-10 RX ORDER — HEPARIN 100 UNIT/ML
500 SYRINGE INTRAVENOUS
Status: COMPLETED | OUTPATIENT
Start: 2019-12-10 | End: 2019-12-10

## 2019-12-10 RX ORDER — SODIUM CHLORIDE 0.9 % (FLUSH) 0.9 %
10 SYRINGE (ML) INJECTION
Status: DISCONTINUED | OUTPATIENT
Start: 2019-12-10 | End: 2019-12-10 | Stop reason: HOSPADM

## 2019-12-10 RX ORDER — HEPARIN 100 UNIT/ML
500 SYRINGE INTRAVENOUS
Status: CANCELLED | OUTPATIENT
Start: 2019-12-10

## 2019-12-10 RX ADMIN — HEPARIN 500 UNITS: 100 SYRINGE at 08:12

## 2019-12-11 ENCOUNTER — HOSPITAL ENCOUNTER (OUTPATIENT)
Dept: RADIOLOGY | Facility: HOSPITAL | Age: 64
Discharge: HOME | End: 2019-12-11
Attending: INTERNAL MEDICINE
Payer: MEDICARE

## 2019-12-11 VITALS — HEIGHT: 61 IN | BODY MASS INDEX: 36.82 KG/M2 | WEIGHT: 195 LBS

## 2019-12-11 DIAGNOSIS — C17.0 DUODENAL CANCER: ICD-10-CM

## 2019-12-11 DIAGNOSIS — D49.89 NEOPLASM OF ABDOMEN: ICD-10-CM

## 2019-12-11 LAB — GLUCOSE SERPL-MCNC: 98 MG/DL (ref 70–110)

## 2019-12-11 PROCEDURE — 78815 PET IMAGE W/CT SKULL-THIGH: CPT | Mod: TC,PO,PS

## 2019-12-11 PROCEDURE — A9552 F18 FDG: HCPCS | Mod: PO

## 2019-12-13 ENCOUNTER — OFFICE VISIT (OUTPATIENT)
Dept: HEMATOLOGY/ONCOLOGY | Facility: CLINIC | Age: 64
End: 2019-12-13
Payer: MEDICARE

## 2019-12-13 VITALS
SYSTOLIC BLOOD PRESSURE: 116 MMHG | DIASTOLIC BLOOD PRESSURE: 60 MMHG | WEIGHT: 194 LBS | BODY MASS INDEX: 35.7 KG/M2 | HEIGHT: 62 IN | TEMPERATURE: 99 F | RESPIRATION RATE: 12 BRPM | OXYGEN SATURATION: 95 % | HEART RATE: 67 BPM

## 2019-12-13 DIAGNOSIS — C17.0 DUODENAL CANCER: Primary | ICD-10-CM

## 2019-12-13 DIAGNOSIS — C78.00 MALIGNANT NEOPLASM METASTATIC TO LUNG, UNSPECIFIED LATERALITY: ICD-10-CM

## 2019-12-13 DIAGNOSIS — C78.7 HEPATIC METASTASES: ICD-10-CM

## 2019-12-13 PROBLEM — I82.622 ACUTE EMBOLISM AND THROMBOSIS OF DEEP VEIN OF LEFT UPPER EXTREMITY: Status: RESOLVED | Noted: 2019-10-28 | Resolved: 2019-12-13

## 2019-12-13 PROCEDURE — 1101F PR PT FALLS ASSESS DOC 0-1 FALLS W/OUT INJ PAST YR: ICD-10-PCS | Mod: S$GLB,,, | Performed by: INTERNAL MEDICINE

## 2019-12-13 PROCEDURE — 1126F AMNT PAIN NOTED NONE PRSNT: CPT | Mod: S$GLB,,, | Performed by: INTERNAL MEDICINE

## 2019-12-13 PROCEDURE — 3078F DIAST BP <80 MM HG: CPT | Mod: S$GLB,,, | Performed by: INTERNAL MEDICINE

## 2019-12-13 PROCEDURE — 99999 PR PBB SHADOW E&M-EST. PATIENT-LVL III: ICD-10-PCS | Mod: PBBFAC,,, | Performed by: INTERNAL MEDICINE

## 2019-12-13 PROCEDURE — 1126F PR PAIN SEVERITY QUANTIFIED, NO PAIN PRESENT: ICD-10-PCS | Mod: S$GLB,,, | Performed by: INTERNAL MEDICINE

## 2019-12-13 PROCEDURE — 99215 PR OFFICE/OUTPT VISIT, EST, LEVL V, 40-54 MIN: ICD-10-PCS | Mod: S$GLB,,, | Performed by: INTERNAL MEDICINE

## 2019-12-13 PROCEDURE — 1159F PR MEDICATION LIST DOCUMENTED IN MEDICAL RECORD: ICD-10-PCS | Mod: S$GLB,,, | Performed by: INTERNAL MEDICINE

## 2019-12-13 PROCEDURE — 1101F PT FALLS ASSESS-DOCD LE1/YR: CPT | Mod: S$GLB,,, | Performed by: INTERNAL MEDICINE

## 2019-12-13 PROCEDURE — 3288F FALL RISK ASSESSMENT DOCD: CPT | Mod: S$GLB,,, | Performed by: INTERNAL MEDICINE

## 2019-12-13 PROCEDURE — 3078F PR MOST RECENT DIASTOLIC BLOOD PRESSURE < 80 MM HG: ICD-10-PCS | Mod: S$GLB,,, | Performed by: INTERNAL MEDICINE

## 2019-12-13 PROCEDURE — 99215 OFFICE O/P EST HI 40 MIN: CPT | Mod: S$GLB,,, | Performed by: INTERNAL MEDICINE

## 2019-12-13 PROCEDURE — 3288F PR FALLS RISK ASSESSMENT DOCUMENTED: ICD-10-PCS | Mod: S$GLB,,, | Performed by: INTERNAL MEDICINE

## 2019-12-13 PROCEDURE — 3074F PR MOST RECENT SYSTOLIC BLOOD PRESSURE < 130 MM HG: ICD-10-PCS | Mod: S$GLB,,, | Performed by: INTERNAL MEDICINE

## 2019-12-13 PROCEDURE — 1159F MED LIST DOCD IN RCRD: CPT | Mod: S$GLB,,, | Performed by: INTERNAL MEDICINE

## 2019-12-13 PROCEDURE — 3074F SYST BP LT 130 MM HG: CPT | Mod: S$GLB,,, | Performed by: INTERNAL MEDICINE

## 2019-12-13 PROCEDURE — 99999 PR PBB SHADOW E&M-EST. PATIENT-LVL III: CPT | Mod: PBBFAC,,, | Performed by: INTERNAL MEDICINE

## 2019-12-13 NOTE — PROGRESS NOTES
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  - Clinical: Stage IV (cTX, cNX, cM1) - Signed by Karina Pettit MD on 3/11/2019       Chemotherapy     Treatment Summary   Plan Name: OP COLORECTAL FOLFOX + BEVACIZUMAB Q2W  Treatment Goal: Maintenance  Status: Active  Start Date: 1/21/2019  End Date: 7/3/2019 (Planned)  Provider: Karina Pettit MD  Chemotherapy: fluorouracil injection 835 mg, 400 mg/m2 = 835 mg, Intravenous, Clinic/H[facility] 1 time, 4 of 12 cycles    fluorouracil (ADRUCIL) 2,400 mg/m2 = 5,015 mg in sodium chloride 0.9% 200.3 mL chemo infusion, 2,400 mg/m2 = 5,015 mg, Intravenous, Over 46 hours, 4 of 12 cycles    bevacizumab (AVASTIN) 5 mg/kg = 500 mg in sodium chloride 0.9% 100 mL chemo infusion, 5 mg/kg = 500 mg, Intravenous, Clinic/[facility] 1 time, 4 of 12 cycles    leucovorin calcium 400 mg/m2 = 835 mg in dextrose 5 % 250 mL infusion, 400 mg/m2 = 835 mg, Intravenous, Clinic/[facility] 1 time, 4 of 12 cycles    oxaliplatin (ELOXATIN) 85 mg/m2 = 178 mg in dextrose 5 % 500 mL chemo infusion, 85 mg/m2 = 178 mg, Intravenous, Clinic/[facility] 1 time, 4 of 12 cycles          4/8/2019 - 4/8/2019 Chemotherapy     Treatment Summary   Plan Name: OP COLORECTAL FOLFIRI + BEVACIZUMAB Q2W  Treatment Goal: Control  Status: Inactive  Start Date: [No treatment day found]  End Date: [No treatment day found]  Provider: Karina Pettit MD  Chemotherapy: fluorouracil injection 835 mg, 400 mg/m2 = 835 mg, Intravenous, Clinic/[facility] 1 time, 0 of 12 cycles  fluorouracil (ADRUCIL) 2,400 mg/m2 = 5,015 mg in sodium chloride 0.9% 200.3 mL chemo infusion, 2,400 mg/m2 = 5,015 mg, Intravenous, Over 46 hours, 0 of 12 cycles  bevacizumab (AVASTIN) 5 mg/kg = 500 mg in sodium chloride 0.9% 100 mL chemo infusion, 5 mg/kg = 500 mg, Intravenous, Clinic/[facility] 1 time, 0 of 12  cycles  irinotecan (CAMPTOSAR) 180 mg/m2 = 376 mg in sodium chloride 0.9% 500 mL chemo infusion, 180 mg/m2 = 376 mg, Intravenous, Clinic/[facility] 1 time, 0 of 12 cycles  leucovorin calcium 400 mg/m2 = 835 mg in dextrose 5 % 250 mL infusion, 400 mg/m2 = 835 mg, Intravenous, Clinic/[facility] 1 time, 0 of 12 cycles        Lung metastases    1/15/2019 Initial Diagnosis     Lung metastases      4/8/2019 - 4/8/2019 Chemotherapy     Treatment Summary   Plan Name: OP COLORECTAL FOLFIRI + BEVACIZUMAB Q2W  Treatment Goal: Control  Status: Inactive  Start Date: [No treatment day found]  End Date: [No treatment day found]  Provider: Karina Pettit MD  Chemotherapy: fluorouracil injection 835 mg, 400 mg/m2 = 835 mg, Intravenous, Clinic/[facility] 1 time, 0 of 12 cycles  fluorouracil (ADRUCIL) 2,400 mg/m2 = 5,015 mg in sodium chloride 0.9% 200.3 mL chemo infusion, 2,400 mg/m2 = 5,015 mg, Intravenous, Over 46 hours, 0 of 12 cycles  bevacizumab (AVASTIN) 5 mg/kg = 500 mg in sodium chloride 0.9% 100 mL chemo infusion, 5 mg/kg = 500 mg, Intravenous, Clinic/[facility] 1 time, 0 of 12 cycles  irinotecan (CAMPTOSAR) 180 mg/m2 = 376 mg in sodium chloride 0.9% 500 mL chemo infusion, 180 mg/m2 = 376 mg, Intravenous, Clinic/[facility] 1 time, 0 of 12 cycles  leucovorin calcium 400 mg/m2 = 835 mg in dextrose 5 % 250 mL infusion, 400 mg/m2 = 835 mg, Intravenous, Clinic/[facility] 1 time, 0 of 12 cycles      4/8/2019 -  Chemotherapy     Treatment Summary   Plan Name: OP COLORECTAL FOLFIRI + BEVACIZUMAB Q2W  Treatment Goal: Control  Status: Active  Start Date: 4/8/2019  End Date: 12/3/2019  Provider: Karina Pettit MD  Chemotherapy: fluorouracil injection 835 mg, 400 mg/m2 = 835 mg, Intravenous, Clinic/[facility] 1 time, 12 of 12 cycles  Administration: 835 mg (8/6/2019), 835 mg (8/27/2019), 835 mg (8/27/2019), 835 mg (9/10/2019), 835 mg (9/24/2019)  fluorouracil (ADRUCIL) 2,400 mg/m2 = 5,015 mg in sodium chloride 0.9% 200.3 mL chemo infusion, 2,400 mg/m2 = 5,015 mg, Intravenous, Over 46  hours, 12 of 12 cycles  Administration: 5,015 mg (8/6/2019), 5,015 mg (8/27/2019), 5,015 mg (9/10/2019), 5,015 mg (9/24/2019)  bevacizumab (AVASTIN) 5 mg/kg = 500 mg in sodium chloride 0.9% 100 mL chemo infusion, 5 mg/kg = 500 mg, Intravenous, Clinic/HOD 1 time, 14 of 14 cycles  Dose modification: 7.5 mg/kg (original dose 5 mg/kg, Cycle 14)  Administration: 500 mg (8/6/2019), 500 mg (8/27/2019), 500 mg (9/10/2019), 500 mg (9/24/2019), 500 mg (11/12/2019), 750 mg (12/3/2019)  irinotecan (CAMPTOSAR) 180 mg/m2 = 376 mg in sodium chloride 0.9% 500 mL chemo infusion, 180 mg/m2 = 376 mg, Intravenous, Clinic/HOD 1 time, 14 of 14 cycles  Dose modification: 200 mg/m2 (original dose 180 mg/m2, Cycle 14)  Administration: 376 mg (8/6/2019), 376 mg (8/27/2019), 376 mg (9/10/2019), 376 mg (9/24/2019), 376 mg (11/12/2019), 418 mg (12/3/2019)  leucovorin calcium 400 mg/m2 = 835 mg in dextrose 5 % 250 mL infusion, 400 mg/m2 = 835 mg, Intravenous, Clinic/HOD 1 time, 12 of 12 cycles  Administration: 835 mg (8/6/2019), 835 mg (8/27/2019), 835 mg (9/10/2019), 835 mg (9/24/2019)        Hepatic metastases    1/15/2019 Initial Diagnosis     Hepatic metastases      4/8/2019 - 4/8/2019 Chemotherapy     Treatment Summary   Plan Name: OP COLORECTAL FOLFIRI + BEVACIZUMAB Q2W  Treatment Goal: Control  Status: Inactive  Start Date: [No treatment day found]  End Date: [No treatment day found]  Provider: Karina Pettit MD  Chemotherapy: fluorouracil injection 835 mg, 400 mg/m2 = 835 mg, Intravenous, Clinic/HOD 1 time, 0 of 12 cycles  fluorouracil (ADRUCIL) 2,400 mg/m2 = 5,015 mg in sodium chloride 0.9% 200.3 mL chemo infusion, 2,400 mg/m2 = 5,015 mg, Intravenous, Over 46 hours, 0 of 12 cycles  bevacizumab (AVASTIN) 5 mg/kg = 500 mg in sodium chloride 0.9% 100 mL chemo infusion, 5 mg/kg = 500 mg, Intravenous, [facility] 1 time, 0 of 12 cycles  irinotecan (CAMPTOSAR) 180 mg/m2 = 376 mg in sodium chloride 0.9% 500 mL chemo infusion, 180 mg/m2 = 376  mg, Intravenous, Clinic/HOD 1 time, 0 of 12 cycles  leucovorin calcium 400 mg/m2 = 835 mg in dextrose 5 % 250 mL infusion, 400 mg/m2 = 835 mg, Intravenous, Clinic/HOD 1 time, 0 of 12 cycles      4/8/2019 -  Chemotherapy     Treatment Summary   Plan Name: OP COLORECTAL FOLFIRI + BEVACIZUMAB Q2W  Treatment Goal: Control  Status: Active  Start Date: 4/8/2019  End Date: 12/3/2019  Provider: Karina Pettit MD  Chemotherapy: fluorouracil injection 835 mg, 400 mg/m2 = 835 mg, Intravenous, Clinic/HOD 1 time, 12 of 12 cycles  Administration: 835 mg (8/6/2019), 835 mg (8/27/2019), 835 mg (8/27/2019), 835 mg (9/10/2019), 835 mg (9/24/2019)  fluorouracil (ADRUCIL) 2,400 mg/m2 = 5,015 mg in sodium chloride 0.9% 200.3 mL chemo infusion, 2,400 mg/m2 = 5,015 mg, Intravenous, Over 46 hours, 12 of 12 cycles  Administration: 5,015 mg (8/6/2019), 5,015 mg (8/27/2019), 5,015 mg (9/10/2019), 5,015 mg (9/24/2019)  bevacizumab (AVASTIN) 5 mg/kg = 500 mg in sodium chloride 0.9% 100 mL chemo infusion, 5 mg/kg = 500 mg, Intravenous, Clinic/HOD 1 time, 14 of 14 cycles  Dose modification: 7.5 mg/kg (original dose 5 mg/kg, Cycle 14)  Administration: 500 mg (8/6/2019), 500 mg (8/27/2019), 500 mg (9/10/2019), 500 mg (9/24/2019), 500 mg (11/12/2019), 750 mg (12/3/2019)  irinotecan (CAMPTOSAR) 180 mg/m2 = 376 mg in sodium chloride 0.9% 500 mL chemo infusion, 180 mg/m2 = 376 mg, Intravenous, Clinic/HOD 1 time, 14 of 14 cycles  Dose modification: 200 mg/m2 (original dose 180 mg/m2, Cycle 14)  Administration: 376 mg (8/6/2019), 376 mg (8/27/2019), 376 mg (9/10/2019), 376 mg (9/24/2019), 376 mg (11/12/2019), 418 mg (12/3/2019)  leucovorin calcium 400 mg/m2 = 835 mg in dextrose 5 % 250 mL infusion, 400 mg/m2 = 835 mg, Intravenous, [facility] time, 12 of 12 cycles  Administration: 835 mg (8/6/2019), 835 mg (8/27/2019), 835 mg (9/10/2019), 835 mg (9/24/2019)           Indigo Stuart is a 64 y.o.  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  Kras was MUTATED No MSI : EGFR no overexpression  Cycle 1  January 15 2019     Tolerating lopressor for htn and lipitor for dyslipidemia  Tolerating zofran for nausea prn chemo     Cycle one folfox avastin January 15 2019   Failed regimen and admitted with SBO and found to have duodenal cancer   Hepatic mets progressed hence changed to irinotecan dropped  oxaliplatin   Cycle one folfiri avastin April 2019     Pt was seen for left arm swelling and ultrasound revealed a DVT in her arm , post lovenox , now on eliquis   Scan reviewed again  Here to resume Xeliri which was started Nov 8 2019 December 13 2019   DVT left internal jugular vein noted October 8 19 patient started on Eliquis  Here for evaluation of her imaging studies and scans  PET-CT with increasing SUV and increasing activity the cancer no evidence of new disease  Ultrasound revealed resolution of the blood clots in her left upper extremity    No change in PMH, PFH, PSH since last OV     Current Outpatient Medications:     amLODIPine (NORVASC) 5 MG tablet, Take 1 tablet (5 mg total) by mouth once daily., Disp: 90 tablet, Rfl: 3    apixaban (ELIQUIS) 5 mg Tab, Take 1 tablet (5 mg total) by mouth 2 (two) times daily., Disp: 60 tablet, Rfl: 2    atorvastatin (LIPITOR) 20 MG tablet, Take 1 tablet (20 mg total) by mouth every evening., Disp: 90 tablet, Rfl: 3    capecitabine (XELODA) 500 MG Tab, Take 2 tablets in the morning and 3 tablets in the evening for days 1-14 of a 21 day cycle., Disp: 70 tablet, Rfl: 2    ferrous sulfate (FEOSOL) 325 mg (65 mg iron) Tab tablet, Take 1 tablet by mouth 2 (two) times daily., Disp: , Rfl: 3    metoprolol succinate (TOPROL-XL) 25 MG 24 hr tablet, Take 1 tablet (25 mg total) by mouth every evening., Disp: 90 tablet, Rfl: 3    ondansetron (ZOFRAN-ODT) 8 MG TbDL, Take 1 tablet (8 mg total) by mouth every 12 (twelve) hours as needed., Disp: 30  "tablet, Rfl: 1    potassium chloride SA (K-DUR,KLOR-CON) 20 MEQ tablet, Take 1 tablet (20 mEq total) by mouth once daily., Disp: 30 tablet, Rfl: 11    promethazine (PHENERGAN) 25 MG tablet, Take 1 tablet (25 mg total) by mouth every 6 (six) hours as needed for Nausea., Disp: 30 tablet, Rfl: 1      Review of Systems:  General: Weight gain: No, Weight Loss: No,  Fatigue yes   Chills: No, Night Sweats: No, Insomnia: No, Excessive sleeping: No   Respiratory:  Cough: No, Shortness of Breath:  no   Wheezing: No, Excessive Snoring: No, Coughing up blood: No  Endocrine: Heat Intolerance: No, Cold Intolerance: No,   Excessive Thirst: No, Excessive Hunger: No  Eyes:  Blurred Vision: No, Double Vision: No   Light Sensitivity: No, Eye pain: No  Musculoskeletal: Muscle Aches/Pain: No, Joint Pain/Swelling: yes  But stable    Muscle Weakness:no , Neck Pain: No, Back Pain:   Neurological: Difficulty Walking/Falls:  No  Further   Headache Migraine: No, Dizziness/Vertigo: No, Fainting: No, Weakness: Better   Cardiovascular: Chest Pain: No, Shortness of Breath: no   Gastrointestinal: Nausea/Vomiting: No, Constipation: No, Diarrhea:stable increasing protuberant  Psych/Cog:  Depression: No, Anxiety: No, Hallucinations: No   : Frequent Urination: No, Incontinence: No, Blood of Urine: No, Urinary Infections: No  ENT:Hearing Loss: No, Earache: No, Ringing in Ears: No  Facial Pain: No, Chronic Congestion:No   Immune: Seasonal Allergies: No, Hives and/or Rashes: No  The remainder of the review of twelve body systems was reviewed and normal        /60   Pulse 67   Temp 99 °F (37.2 °C) (Oral)   Resp 12   Ht 5' 2" (1.575 m)   Wt 88 kg (194 lb 0.1 oz)   SpO2 95%   BMI 35.48 kg/m²   Constitutional: oriented to person, place, and time.  Conj pink   HENT: anicteric sclera   Head: Normocephalic and atraumatic. Ears canal normal no discharge    Nose: Nose normal.   Non boggy turbinates  No lymphatics noted   Eyes: Conjunctivae PALE  " EOM are normal.  Neck: . No thyromegaly present.  No bruits   Cardiovascular: Normal rate, regular rhythm, normal heart sounds  No murmur heard.  Pulmonary/Chest:  Clear bilaterally no fremitus  Abdominal: Soft. Bowel sounds are normal.  no mass.   Musculoskeletal: Normal range of motion. decreased strength   Left arm remains swollen yet better no discoloration pulses are good  No further boggy turbinates  Lymphadenopathy:  no cervical adenopathy.   Neurological: alert and oriented to person, place  Slight weakness today   Skin: Skin is warm and dry. No rash noted.   Psychiatric: normal mood and affect.   Vitals reviewed.  Chest port removed     Lab Results   Component Value Date    WBC 4.45 12/02/2019    RBC 4.24 12/02/2019    HGB 12.7 12/02/2019    HCT 41.8 12/02/2019    MCV 99 (H) 12/02/2019    MCH 30.0 12/02/2019    MCHC 30.4 (L) 12/02/2019    RDW 15.7 (H) 12/02/2019     12/02/2019    MPV 8.5 (L) 12/02/2019    GRAN 2.3 12/02/2019    GRAN 50.5 12/02/2019    LYMPH 1.4 12/02/2019    LYMPH 30.6 12/02/2019    MONO 0.6 12/02/2019    MONO 14.4 12/02/2019    EOS 0.2 12/02/2019    BASO 0.04 12/02/2019    EOSINOPHIL 3.4 12/02/2019    BASOPHIL 0.9 12/02/2019       CMP  Sodium   Date Value Ref Range Status   12/02/2019 142 136 - 145 mmol/L Final     Potassium   Date Value Ref Range Status   12/02/2019 3.9 3.5 - 5.1 mmol/L Final     Chloride   Date Value Ref Range Status   12/02/2019 105 95 - 110 mmol/L Final     CO2   Date Value Ref Range Status   12/02/2019 25 23 - 29 mmol/L Final     Glucose   Date Value Ref Range Status   12/02/2019 90 70 - 110 mg/dL Final     BUN, Bld   Date Value Ref Range Status   12/02/2019 6 (L) 8 - 23 mg/dL Final     Creatinine   Date Value Ref Range Status   12/02/2019 0.7 0.5 - 1.4 mg/dL Final     Calcium   Date Value Ref Range Status   12/02/2019 10.1 8.7 - 10.5 mg/dL Final     Total Protein   Date Value Ref Range Status   12/02/2019 7.2 6.0 - 8.4 g/dL Final     Albumin   Date Value Ref  Range Status   12/02/2019 3.7 3.5 - 5.2 g/dL Final     Total Bilirubin   Date Value Ref Range Status   12/02/2019 0.6 0.1 - 1.0 mg/dL Final     Comment:     For infants and newborns, interpretation of results should be based  on gestational age, weight and in agreement with clinical  observations.  Premature Infant recommended reference ranges:  Up to 24 hours.............<8.0 mg/dL  Up to 48 hours............<12.0 mg/dL  3-5 days..................<15.0 mg/dL  6-29 days.................<15.0 mg/dL       Alkaline Phosphatase   Date Value Ref Range Status   12/02/2019 155 (H) 55 - 135 U/L Final     AST   Date Value Ref Range Status   12/02/2019 34 10 - 40 U/L Final     ALT   Date Value Ref Range Status   12/02/2019 24 10 - 44 U/L Final     Anion Gap   Date Value Ref Range Status   12/02/2019 12 8 - 16 mmol/L Final     eGFR if    Date Value Ref Range Status   12/02/2019 >60 >60 mL/min/1.73 m^2 Final     eGFR if non    Date Value Ref Range Status   12/02/2019 >60 >60 mL/min/1.73 m^2 Final     Comment:     Calculation used to obtain the estimated glomerular filtration  rate (eGFR) is the CKD-EPI equation.      Impression:           - Normal esophagus.                        - Normal stomach.                        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 Prosthesis placed. The                         proximal end of the stent is distal to the ampulla                         (refer to images).  No msi        Duodenal cancer    Hepatic metastases    Malignant neoplasm metastatic to lung, unspecified laterality      Duodenal cancer    Hepatic metastases    Malignant neoplasm metastatic to lung, unspecified laterality    Other orders  -     diphenhydrAMINE (BENADRYL) 12.5 mg in sodium chloride 0.9% 50 mL IVPB  -     acetaminophen tablet 650 mg  -     dexamethasone (DECADRON) 10 mg in sodium chloride 0.9% 50 mL IVPB  -     palonosetron  injection 0.25 mg  -     PACLitaxel-protein bound (ABRAXANE) 100 mg/m2 = 200 mg in 40 mL infusion  -     gemcitabine (GEMZAR) 790 mg in sodium chloride 0.9% 250 mL chemo infusion  -     sodium chloride 0.9% 250 mL flush bag  -     sodium chloride 0.9% flush 10 mL  -     heparin, porcine (PF) 100 unit/mL injection flush 500 Units  -     alteplase injection 2 mg  -     diphenhydrAMINE (BENADRYL) 12.5 mg in sodium chloride 0.9% 50 mL IVPB  -     acetaminophen tablet 650 mg  -     dexamethasone (DECADRON) 10 mg in sodium chloride 0.9% 50 mL IVPB  -     palonosetron injection 0.25 mg  -     PACLitaxel-protein bound (ABRAXANE) 100 mg/m2 = 200 mg in 40 mL infusion  -     gemcitabine (GEMZAR) 790 mg in sodium chloride 0.9% 250 mL chemo infusion  -     sodium chloride 0.9% 250 mL flush bag  -     sodium chloride 0.9% flush 10 mL  -     heparin, porcine (PF) 100 unit/mL injection flush 500 Units  -     alteplase injection 2 mg  -     diphenhydrAMINE (BENADRYL) 12.5 mg in sodium chloride 0.9% 50 mL IVPB  -     acetaminophen tablet 650 mg  -     dexamethasone (DECADRON) 10 mg in sodium chloride 0.9% 50 mL IVPB  -     palonosetron injection 0.25 mg  -     PACLitaxel-protein bound (ABRAXANE) 100 mg/m2 = 200 mg in 40 mL infusion  -     gemcitabine (GEMZAR) 790 mg in sodium chloride 0.9% 250 mL chemo infusion  -     sodium chloride 0.9% 250 mL flush bag  -     sodium chloride 0.9% flush 10 mL  -     heparin, porcine (PF) 100 unit/mL injection flush 500 Units  -     alteplase injection 2 mg      1. PET CT in past reviewed showed progression with increasing activity of malignancy and CEA rising most likely due to duodenal cancer which I tried to explain to her is different from colon cancer   2.  Thrombosis resolved on eliquis  left upper extremity  Tolerating anticoagulation  3.  Long-term plan if she responds well enough to chemotherapy would be to send her to Interventional Radiology for evaluation with Dr Del Rosario   4. CRC on chemo      Flush port with heparin regularly   Discontinue PICC port is safe to use  Has been receiving Xeloda plus Avastin plus irinotecan   Must change to a different agent chemotherapy as CEA is rising and SUV is rising indicating increased activity of her cancer  Will ask for irinotecan  And  Gemzar for now patient will be placed on observation until insurance authorize is her new chemo regimen    After lengthy hours of research overall survival has been shown to be beneficial for CK7 positive duodenal cancers with Gemzar plus Abraxane  article in cancer medicine called durable response for ampullary and duodenal adenocarcinoma with a nap a paclitaxel plus gemcitabine plus or minus cisplatin combination  Also referencing article inch a [place] [employer] Oncology title a randomized phase 3 trial of weekly or 3 weekly doses of Nab paclitaxel versus weekly doses of paclitaxel in patients with previously treated advanced gastric cancer.  This is verifying that most patients can tolerate 100 mix per meter squared of Nab paclitaxel weekly every 28 days.  If patient tolerates I may increase to 128 per the original article.  Gemzar dose will started 400 makes her meter sq which most patient has handled okay and she may be weaned up to 500 makes per meter squared.  Antinausea meds may be continued as needed  Cont norvasc for HTN monitored by Dr Conner   Tolerating lipitor for dyslipidemia  On statin monitoring counts closely      acp documented

## 2019-12-17 ENCOUNTER — INFUSION (OUTPATIENT)
Dept: INFUSION THERAPY | Facility: HOSPITAL | Age: 64
End: 2019-12-17
Attending: INTERNAL MEDICINE
Payer: MEDICARE

## 2019-12-17 VITALS
SYSTOLIC BLOOD PRESSURE: 122 MMHG | TEMPERATURE: 99 F | BODY MASS INDEX: 35.91 KG/M2 | HEIGHT: 62 IN | DIASTOLIC BLOOD PRESSURE: 62 MMHG | RESPIRATION RATE: 19 BRPM | HEART RATE: 73 BPM | WEIGHT: 195.13 LBS

## 2019-12-17 DIAGNOSIS — Z51.11 ENCOUNTER FOR ANTINEOPLASTIC CHEMOTHERAPY: Primary | ICD-10-CM

## 2019-12-17 PROCEDURE — 96523 IRRIG DRUG DELIVERY DEVICE: CPT

## 2019-12-17 PROCEDURE — 63600175 PHARM REV CODE 636 W HCPCS: Performed by: INTERNAL MEDICINE

## 2019-12-17 PROCEDURE — A4216 STERILE WATER/SALINE, 10 ML: HCPCS | Performed by: INTERNAL MEDICINE

## 2019-12-17 PROCEDURE — 25000003 PHARM REV CODE 250: Performed by: INTERNAL MEDICINE

## 2019-12-17 RX ORDER — SODIUM CHLORIDE 0.9 % (FLUSH) 0.9 %
10 SYRINGE (ML) INJECTION
Status: COMPLETED | OUTPATIENT
Start: 2019-12-17 | End: 2019-12-17

## 2019-12-17 RX ORDER — HEPARIN 100 UNIT/ML
500 SYRINGE INTRAVENOUS
Status: COMPLETED | OUTPATIENT
Start: 2019-12-17 | End: 2019-12-17

## 2019-12-17 RX ORDER — HEPARIN 100 UNIT/ML
500 SYRINGE INTRAVENOUS
Status: CANCELLED | OUTPATIENT
Start: 2019-12-17

## 2019-12-17 RX ORDER — SODIUM CHLORIDE 0.9 % (FLUSH) 0.9 %
10 SYRINGE (ML) INJECTION
Status: CANCELLED | OUTPATIENT
Start: 2019-12-17

## 2019-12-17 RX ADMIN — HEPARIN 500 UNITS: 100 SYRINGE at 09:12

## 2019-12-17 RX ADMIN — SODIUM CHLORIDE, PRESERVATIVE FREE 10 ML: 5 INJECTION INTRAVENOUS at 09:12

## 2019-12-17 NOTE — PLAN OF CARE
Problem: Fatigue  Goal: Improved Activity Tolerance  Outcome: Ongoing, Progressing  Intervention: Promote Energy Conservation  Flowsheets (Taken 12/17/2019 7495)  Fatigue Management: fatigue-related activity identified  Sleep/Rest Enhancement: family presence promoted  Activity Management: ambulated - L4; activity encouraged

## 2019-12-18 ENCOUNTER — PATIENT MESSAGE (OUTPATIENT)
Dept: HEMATOLOGY/ONCOLOGY | Facility: CLINIC | Age: 64
End: 2019-12-18

## 2019-12-19 ENCOUNTER — TELEPHONE (OUTPATIENT)
Dept: PHARMACY | Facility: CLINIC | Age: 64
End: 2019-12-19

## 2019-12-19 NOTE — TELEPHONE ENCOUNTER
Contacted patient for a Xeloda refill and intervention. Patient states that her doctor told her to stop taking Xeloda as it is not working for her. She suggested we reach out to her provider to confirm. Will messaged provider to see if patient is just currently holding therapy or discontinuing permanently. Once a response is received, will reach out to patient to discuss and if appropriate, complete a discontinuation survey and close out of OSP.

## 2019-12-20 ENCOUNTER — LAB VISIT (OUTPATIENT)
Dept: LAB | Facility: HOSPITAL | Age: 64
End: 2019-12-20
Attending: INTERNAL MEDICINE
Payer: MEDICARE

## 2019-12-20 DIAGNOSIS — C17.0 DUODENAL CANCER: ICD-10-CM

## 2019-12-20 LAB
ALBUMIN SERPL BCP-MCNC: 3.2 G/DL (ref 3.5–5.2)
ALP SERPL-CCNC: 123 U/L (ref 55–135)
ALT SERPL W/O P-5'-P-CCNC: 16 U/L (ref 10–44)
ANION GAP SERPL CALC-SCNC: 11 MMOL/L (ref 8–16)
AST SERPL-CCNC: 17 U/L (ref 10–40)
BASOPHILS # BLD AUTO: 0.04 K/UL (ref 0–0.2)
BASOPHILS NFR BLD: 0.9 % (ref 0–1.9)
BILIRUB SERPL-MCNC: 0.7 MG/DL (ref 0.1–1)
BUN SERPL-MCNC: 7 MG/DL (ref 8–23)
CALCIUM SERPL-MCNC: 9.3 MG/DL (ref 8.7–10.5)
CEA SERPL-MCNC: 2.9 NG/ML (ref 0–5)
CHLORIDE SERPL-SCNC: 102 MMOL/L (ref 95–110)
CO2 SERPL-SCNC: 28 MMOL/L (ref 23–29)
CREAT SERPL-MCNC: 0.7 MG/DL (ref 0.5–1.4)
DIFFERENTIAL METHOD: ABNORMAL
EOSINOPHIL # BLD AUTO: 0.1 K/UL (ref 0–0.5)
EOSINOPHIL NFR BLD: 2.5 % (ref 0–8)
ERYTHROCYTE [DISTWIDTH] IN BLOOD BY AUTOMATED COUNT: 15.9 % (ref 11.5–14.5)
EST. GFR  (AFRICAN AMERICAN): >60 ML/MIN/1.73 M^2
EST. GFR  (NON AFRICAN AMERICAN): >60 ML/MIN/1.73 M^2
GLUCOSE SERPL-MCNC: 105 MG/DL (ref 70–110)
HCT VFR BLD AUTO: 37.7 % (ref 37–48.5)
HGB BLD-MCNC: 11.6 G/DL (ref 12–16)
IMM GRANULOCYTES # BLD AUTO: 0.01 K/UL (ref 0–0.04)
LYMPHOCYTES # BLD AUTO: 1.2 K/UL (ref 1–4.8)
LYMPHOCYTES NFR BLD: 26.5 % (ref 18–48)
MCH RBC QN AUTO: 30.1 PG (ref 27–31)
MCHC RBC AUTO-ENTMCNC: 30.8 G/DL (ref 32–36)
MCV RBC AUTO: 98 FL (ref 82–98)
MONOCYTES # BLD AUTO: 0.8 K/UL (ref 0.3–1)
MONOCYTES NFR BLD: 17.3 % (ref 4–15)
NEUTROPHILS # BLD AUTO: 2.3 K/UL (ref 1.8–7.7)
NEUTROPHILS NFR BLD: 52.6 % (ref 38–73)
NRBC BLD-RTO: 0 /100 WBC
PLATELET # BLD AUTO: 239 K/UL (ref 150–350)
PMV BLD AUTO: 8.3 FL (ref 9.2–12.9)
POTASSIUM SERPL-SCNC: 3.2 MMOL/L (ref 3.5–5.1)
PROT SERPL-MCNC: 6.5 G/DL (ref 6–8.4)
RBC # BLD AUTO: 3.85 M/UL (ref 4–5.4)
SODIUM SERPL-SCNC: 141 MMOL/L (ref 136–145)
WBC # BLD AUTO: 4.45 K/UL (ref 3.9–12.7)

## 2019-12-20 PROCEDURE — 82378 CARCINOEMBRYONIC ANTIGEN: CPT

## 2019-12-20 PROCEDURE — 85025 COMPLETE CBC W/AUTO DIFF WBC: CPT

## 2019-12-20 PROCEDURE — 36415 COLL VENOUS BLD VENIPUNCTURE: CPT

## 2019-12-20 PROCEDURE — 80053 COMPREHEN METABOLIC PANEL: CPT

## 2019-12-22 RX ORDER — SODIUM CHLORIDE 0.9 % (FLUSH) 0.9 %
10 SYRINGE (ML) INJECTION
Status: CANCELLED | OUTPATIENT
Start: 2020-01-07

## 2019-12-22 RX ORDER — HEPARIN 100 UNIT/ML
500 SYRINGE INTRAVENOUS
Status: CANCELLED | OUTPATIENT
Start: 2019-12-22

## 2019-12-22 RX ORDER — SODIUM CHLORIDE 0.9 % (FLUSH) 0.9 %
10 SYRINGE (ML) INJECTION
Status: CANCELLED | OUTPATIENT
Start: 2020-01-14

## 2019-12-22 RX ORDER — ACETAMINOPHEN 325 MG/1
650 TABLET ORAL
Status: CANCELLED
Start: 2020-01-14

## 2019-12-22 RX ORDER — PALONOSETRON 0.05 MG/ML
0.25 INJECTION, SOLUTION INTRAVENOUS
Status: CANCELLED | OUTPATIENT
Start: 2020-01-07

## 2019-12-22 RX ORDER — PALONOSETRON 0.05 MG/ML
0.25 INJECTION, SOLUTION INTRAVENOUS
Status: CANCELLED | OUTPATIENT
Start: 2020-01-14

## 2019-12-22 RX ORDER — ACETAMINOPHEN 325 MG/1
650 TABLET ORAL
Status: CANCELLED
Start: 2019-12-22

## 2019-12-22 RX ORDER — ACETAMINOPHEN 325 MG/1
650 TABLET ORAL
Status: CANCELLED
Start: 2020-01-07

## 2019-12-22 RX ORDER — SODIUM CHLORIDE 0.9 % (FLUSH) 0.9 %
10 SYRINGE (ML) INJECTION
Status: CANCELLED | OUTPATIENT
Start: 2019-12-22

## 2019-12-22 RX ORDER — HEPARIN 100 UNIT/ML
500 SYRINGE INTRAVENOUS
Status: CANCELLED | OUTPATIENT
Start: 2020-01-07

## 2019-12-22 RX ORDER — PALONOSETRON 0.05 MG/ML
0.25 INJECTION, SOLUTION INTRAVENOUS
Status: CANCELLED | OUTPATIENT
Start: 2019-12-22

## 2019-12-22 RX ORDER — HEPARIN 100 UNIT/ML
500 SYRINGE INTRAVENOUS
Status: CANCELLED | OUTPATIENT
Start: 2020-01-14

## 2019-12-22 NOTE — PLAN OF CARE
DISCONTINUE ON PATHWAY REGIMEN - Colorectal    [id number]        Irinotecan (Camptosar(R))       Leucovorin       5-Fluorouracil       5-Fluorouracil       Bevacizumab (Avastin(R))           Additional Orders: **Note: order sheet contains two q14 day cycles**    Loperamide 4 mg PO first dose then 2 mg PO with every loose bowel movement up to   a total of 16 mg/day (optional).    **Always confirm dose/schedule in your pharmacy ordering system**    REASON: Disease Progression  PRIOR TREATMENT: [id number]: FOLFIRI + Bevacizumab q14 Days  TREATMENT RESPONSE: Partial Response (CO)          Patient Characteristics:  Distant Metastases, Third Line, KRAS Mutation Positive/Unknown, BRAF   Wild-Type/Unknown  Therapeutic Status: Distant Metastases  BRAF Mutation Status: Quantity Not Sufficient  KRAS/NRAS Mutation Status: Mutation Positive  Line of Therapy: Third Line

## 2019-12-22 NOTE — PLAN OF CARE
START OFF PATHWAY REGIMEN - [Other Dx]            Nab-paclitaxel (protein bound) (Abraxane(R))       Gemcitabine (Gemzar(R))           Additional Orders: Hold therapy and notify physician if ANC < 1500.    **Always confirm dose/schedule in your pharmacy ordering system**    Patient Characteristics:  Intent of Therapy:  Non-Curative / Palliative Intent, Discussed with Patient

## 2019-12-23 ENCOUNTER — TELEPHONE (OUTPATIENT)
Dept: HEMATOLOGY/ONCOLOGY | Facility: CLINIC | Age: 64
End: 2019-12-23

## 2019-12-23 ENCOUNTER — OFFICE VISIT (OUTPATIENT)
Dept: HEMATOLOGY/ONCOLOGY | Facility: CLINIC | Age: 64
End: 2019-12-23
Payer: MEDICARE

## 2019-12-23 VITALS
HEIGHT: 62 IN | WEIGHT: 193.81 LBS | HEART RATE: 84 BPM | TEMPERATURE: 99 F | SYSTOLIC BLOOD PRESSURE: 119 MMHG | BODY MASS INDEX: 35.66 KG/M2 | OXYGEN SATURATION: 98 % | RESPIRATION RATE: 16 BRPM | DIASTOLIC BLOOD PRESSURE: 57 MMHG

## 2019-12-23 DIAGNOSIS — C78.7 HEPATIC METASTASES: ICD-10-CM

## 2019-12-23 DIAGNOSIS — C17.0 DUODENAL CANCER: Primary | ICD-10-CM

## 2019-12-23 DIAGNOSIS — C78.00 MALIGNANT NEOPLASM METASTATIC TO LUNG, UNSPECIFIED LATERALITY: Primary | ICD-10-CM

## 2019-12-23 DIAGNOSIS — E66.01 SEVERE OBESITY (BMI 35.0-39.9) WITH COMORBIDITY: ICD-10-CM

## 2019-12-23 DIAGNOSIS — C17.0 DUODENAL CANCER: ICD-10-CM

## 2019-12-23 DIAGNOSIS — Z51.11 ENCOUNTER FOR ANTINEOPLASTIC CHEMOTHERAPY: ICD-10-CM

## 2019-12-23 PROCEDURE — 1101F PR PT FALLS ASSESS DOC 0-1 FALLS W/OUT INJ PAST YR: ICD-10-PCS | Mod: S$GLB,,, | Performed by: INTERNAL MEDICINE

## 2019-12-23 PROCEDURE — 1126F PR PAIN SEVERITY QUANTIFIED, NO PAIN PRESENT: ICD-10-PCS | Mod: S$GLB,,, | Performed by: INTERNAL MEDICINE

## 2019-12-23 PROCEDURE — 99215 OFFICE O/P EST HI 40 MIN: CPT | Mod: S$GLB,,, | Performed by: INTERNAL MEDICINE

## 2019-12-23 PROCEDURE — 1101F PT FALLS ASSESS-DOCD LE1/YR: CPT | Mod: S$GLB,,, | Performed by: INTERNAL MEDICINE

## 2019-12-23 PROCEDURE — 99999 PR PBB SHADOW E&M-EST. PATIENT-LVL IV: CPT | Mod: PBBFAC,,, | Performed by: INTERNAL MEDICINE

## 2019-12-23 PROCEDURE — 3074F SYST BP LT 130 MM HG: CPT | Mod: S$GLB,,, | Performed by: INTERNAL MEDICINE

## 2019-12-23 PROCEDURE — 99999 PR PBB SHADOW E&M-EST. PATIENT-LVL IV: ICD-10-PCS | Mod: PBBFAC,,, | Performed by: INTERNAL MEDICINE

## 2019-12-23 PROCEDURE — 1159F MED LIST DOCD IN RCRD: CPT | Mod: S$GLB,,, | Performed by: INTERNAL MEDICINE

## 2019-12-23 PROCEDURE — 1126F AMNT PAIN NOTED NONE PRSNT: CPT | Mod: S$GLB,,, | Performed by: INTERNAL MEDICINE

## 2019-12-23 PROCEDURE — 3078F DIAST BP <80 MM HG: CPT | Mod: S$GLB,,, | Performed by: INTERNAL MEDICINE

## 2019-12-23 PROCEDURE — 3074F PR MOST RECENT SYSTOLIC BLOOD PRESSURE < 130 MM HG: ICD-10-PCS | Mod: S$GLB,,, | Performed by: INTERNAL MEDICINE

## 2019-12-23 PROCEDURE — 3078F PR MOST RECENT DIASTOLIC BLOOD PRESSURE < 80 MM HG: ICD-10-PCS | Mod: S$GLB,,, | Performed by: INTERNAL MEDICINE

## 2019-12-23 PROCEDURE — 1159F PR MEDICATION LIST DOCUMENTED IN MEDICAL RECORD: ICD-10-PCS | Mod: S$GLB,,, | Performed by: INTERNAL MEDICINE

## 2019-12-23 PROCEDURE — 99215 PR OFFICE/OUTPT VISIT, EST, LEVL V, 40-54 MIN: ICD-10-PCS | Mod: S$GLB,,, | Performed by: INTERNAL MEDICINE

## 2019-12-23 NOTE — PROGRESS NOTES
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  - Clinical: Stage IV (cTX, cNX, cM1) - Signed by Karina Pettit MD on 3/11/2019       Chemotherapy     Treatment Summary   Plan Name: OP COLORECTAL FOLFOX + BEVACIZUMAB Q2W  Treatment Goal: Maintenance  Status: Active  Start Date: 1/21/2019  End Date: 7/3/2019 (Planned)  Provider: Karina Pettit MD  Chemotherapy: fluorouracil injection 835 mg, 400 mg/m2 = 835 mg, Intravenous, Clinic/HOD 1 time, 4 of 12 cycles    fluorouracil (ADRUCIL) 2,400 mg/m2 = 5,015 mg in sodium chloride 0.9% 200.3 mL chemo infusion, 2,400 mg/m2 = 5,015 mg, Intravenous, Over 46 hours, 4 of 12 cycles    bevacizumab (AVASTIN) 5 mg/kg = 500 mg in sodium chloride 0.9% 100 mL chemo infusion, 5 mg/kg = 500 mg, Intravenous, Clinic/HOD 1 time, 4 of 12 cycles    leucovorin calcium 400 mg/m2 = 835 mg in dextrose 5 % 250 mL infusion, 400 mg/m2 = 835 mg, Intravenous, Clinic/HOD 1 time, 4 of 12 cycles    oxaliplatin (ELOXATIN) 85 mg/m2 = 178 mg in dextrose 5 % 500 mL chemo infusion, 85 mg/m2 = 178 mg, Intravenous, Clinic/HOD 1 time, 4 of 12 cycles          4/8/2019 - 4/8/2019 Chemotherapy     Treatment Summary   Plan Name: OP COLORECTAL FOLFIRI + BEVACIZUMAB Q2W  Treatment Goal: Control  Status: Inactive  Start Date: [No treatment day found]  End Date: [No treatment day found]  Provider: Karina Pettit MD  Chemotherapy: fluorouracil injection 835 mg, 400 mg/m2 = 835 mg, Intravenous, Clinic/HOD 1 time, 0 of 12 cycles  fluorouracil (ADRUCIL) 2,400 mg/m2 = 5,015 mg in sodium chloride 0.9% 200.3 mL chemo infusion, 2,400 mg/m2 = 5,015 mg, Intravenous, Over 46 hours, 0 of 12 cycles  bevacizumab (AVASTIN) 5 mg/kg = 500 mg in sodium chloride 0.9% 100 mL chemo infusion, 5 mg/kg = 500 mg, Intravenous, Clinic/HOD 1  time, 0 of 12 cycles  irinotecan (CAMPTOSAR) 180 mg/m2 = 376 mg in sodium chloride 0.9% 500 mL chemo infusion, 180 mg/m2 = 376 mg, Intravenous, Clinic/HOD 1 time, 0 of 12 cycles  leucovorin calcium 400 mg/m2 = 835 mg in dextrose 5 % 250 mL infusion, 400 mg/m2 = 835 mg, Intravenous, Clinic/HOD 1 time, 0 of 12 cycles      12/22/2019 -  Chemotherapy     Treatment Summary   Plan Name: OP PANC NAB-PACLITAXEL + GEMCITABINE Day 1, 8 , 15 every 28 days  Treatment Goal: Maintenance  Status: Active  Start Date: 12/22/2019 (Planned)  End Date: 3/29/2020 (Planned)  Provider: Karina Pettit MD  Chemotherapy: PACLitaxel-protein bound (ABRAXANE) 100 mg/m2 = 200 mg in 40 mL infusion, 100 mg/m2 = 200 mg (original dose ), Intravenous, Clinic/HOD 1 time, 0 of 4 cycles  Dose modification: 100 mg/m2 (Cycle 1)  gemcitabine (GEMZAR) 790 mg in sodium chloride 0.9% 250 mL chemo infusion, 400 mg/m2 = 790 mg (original dose ), Intravenous, Clinic/HOD 1 time, 0 of 4 cycles  Dose modification: 400 mg/m2 (Cycle 1)        Lung metastases    1/15/2019 Initial Diagnosis     Lung metastases      4/8/2019 - 4/8/2019 Chemotherapy     Treatment Summary   Plan Name: OP COLORECTAL FOLFIRI + BEVACIZUMAB Q2W  Treatment Goal: Control  Status: Inactive  Start Date: [No treatment day found]  End Date: [No treatment day found]  Provider: Karina Pettit MD  Chemotherapy: fluorouracil injection 835 mg, 400 mg/m2 = 835 mg, Intravenous, Clinic/HOD 1 time, 0 of 12 cycles  fluorouracil (ADRUCIL) 2,400 mg/m2 = 5,015 mg in sodium chloride 0.9% 200.3 mL chemo infusion, 2,400 mg/m2 = 5,015 mg, Intravenous, Over 46 hours, 0 of 12 cycles  bevacizumab (AVASTIN) 5 mg/kg = 500 mg in sodium chloride 0.9% 100 mL chemo infusion, 5 mg/kg = 500 mg, Intravenous, Clinic/HOD 1 time, 0 of 12 cycles  irinotecan (CAMPTOSAR) 180 mg/m2 = 376 mg in sodium chloride 0.9% 500 mL chemo infusion, 180 mg/m2 = 376 mg, Intravenous, Clinic/[facility] 1 time, 0 of 12 cycles  leucovorin calcium 400 mg/m2  = 835 mg in dextrose 5 % 250 mL infusion, 400 mg/m2 = 835 mg, Intravenous, Clinic/HOD 1 time, 0 of 12 cycles      4/8/2019 - 12/23/2019 Chemotherapy     Treatment Summary   Plan Name: OP COLORECTAL FOLFIRI + BEVACIZUMAB Q2W  Treatment Goal: Control  Status: Inactive  Start Date: 4/8/2019  End Date: 12/3/2019  Provider: Karina Pettit MD  Chemotherapy: fluorouracil injection 835 mg, 400 mg/m2 = 835 mg, Intravenous, Clinic/HOD 1 time, 12 of 12 cycles  Administration: 835 mg (8/6/2019), 835 mg (8/27/2019), 835 mg (8/27/2019), 835 mg (9/10/2019), 835 mg (9/24/2019)  fluorouracil (ADRUCIL) 2,400 mg/m2 = 5,015 mg in sodium chloride 0.9% 200.3 mL chemo infusion, 2,400 mg/m2 = 5,015 mg, Intravenous, Over 46 hours, 12 of 12 cycles  Administration: 5,015 mg (8/6/2019), 5,015 mg (8/27/2019), 5,015 mg (9/10/2019), 5,015 mg (9/24/2019)  bevacizumab (AVASTIN) 5 mg/kg = 500 mg in sodium chloride 0.9% 100 mL chemo infusion, 5 mg/kg = 500 mg, Intravenous, Clinic/HOD 1 time, 14 of 14 cycles  Dose modification: 7.5 mg/kg (original dose 5 mg/kg, Cycle 14)  Administration: 500 mg (8/6/2019), 500 mg (8/27/2019), 500 mg (9/10/2019), 500 mg (9/24/2019), 500 mg (11/12/2019), 750 mg (12/3/2019)  irinotecan (CAMPTOSAR) 180 mg/m2 = 376 mg in sodium chloride 0.9% 500 mL chemo infusion, 180 mg/m2 = 376 mg, Intravenous, Clinic/HOD 1 time, 14 of 14 cycles  Dose modification: 200 mg/m2 (original dose 180 mg/m2, Cycle 14)  Administration: 376 mg (8/6/2019), 376 mg (8/27/2019), 376 mg (9/10/2019), 376 mg (9/24/2019), 376 mg (11/12/2019), 418 mg (12/3/2019)  leucovorin calcium 400 mg/m2 = 835 mg in dextrose 5 % 250 mL infusion, 400 mg/m2 = 835 mg, Intravenous, [facility] 1 time, 12 of 12 cycles  Administration: 835 mg (8/6/2019), 835 mg (8/27/2019), 835 mg (9/10/2019), 835 mg (9/24/2019)      12/22/2019 -  Chemotherapy     Treatment Summary   Plan Name: OP PANC NAB-PACLITAXEL + GEMCITABINE Day 1, 8 , 15 every 28 days  Treatment Goal:  Maintenance  Status: Active  Start Date: 12/22/2019 (Planned)  End Date: 3/29/2020 (Planned)  Provider: Karina Pettit MD  Chemotherapy: PACLitaxel-protein bound (ABRAXANE) 100 mg/m2 = 200 mg in 40 mL infusion, 100 mg/m2 = 200 mg (original dose ), Intravenous, Clinic/HOD 1 time, 0 of 4 cycles  Dose modification: 100 mg/m2 (Cycle 1)  gemcitabine (GEMZAR) 790 mg in sodium chloride 0.9% 250 mL chemo infusion, 400 mg/m2 = 790 mg (original dose ), Intravenous, Clinic/HOD 1 time, 0 of 4 cycles  Dose modification: 400 mg/m2 (Cycle 1)        Hepatic metastases    1/15/2019 Initial Diagnosis     Hepatic metastases      4/8/2019 - 4/8/2019 Chemotherapy     Treatment Summary   Plan Name: OP COLORECTAL FOLFIRI + BEVACIZUMAB Q2W  Treatment Goal: Control  Status: Inactive  Start Date: [No treatment day found]  End Date: [No treatment day found]  Provider: Karina Pettit MD  Chemotherapy: fluorouracil injection 835 mg, 400 mg/m2 = 835 mg, Intravenous, Clinic/HOD 1 time, 0 of 12 cycles  fluorouracil (ADRUCIL) 2,400 mg/m2 = 5,015 mg in sodium chloride 0.9% 200.3 mL chemo infusion, 2,400 mg/m2 = 5,015 mg, Intravenous, Over 46 hours, 0 of 12 cycles  bevacizumab (AVASTIN) 5 mg/kg = 500 mg in sodium chloride 0.9% 100 mL chemo infusion, 5 mg/kg = 500 mg, Intravenous, Clinic/HOD 1 time, 0 of 12 cycles  irinotecan (CAMPTOSAR) 180 mg/m2 = 376 mg in sodium chloride 0.9% 500 mL chemo infusion, 180 mg/m2 = 376 mg, Intravenous, Clinic/HOD 1 time, 0 of 12 cycles  leucovorin calcium 400 mg/m2 = 835 mg in dextrose 5 % 250 mL infusion, 400 mg/m2 = 835 mg, Intravenous, Clinic/HOD 1 time, 0 of 12 cycles      4/8/2019 - 12/23/2019 Chemotherapy     Treatment Summary   Plan Name: OP COLORECTAL FOLFIRI + BEVACIZUMAB Q2W  Treatment Goal: Control  Status: Inactive  Start Date: 4/8/2019  End Date: 12/3/2019  Provider: Karina Pettit MD  Chemotherapy: fluorouracil injection 835 mg, 400 mg/m2 = 835 mg, Intravenous, Clinic/HOD 1 time, 12 of 12  cycles  Administration: 835 mg (8/6/2019), 835 mg (8/27/2019), 835 mg (8/27/2019), 835 mg (9/10/2019), 835 mg (9/24/2019)  fluorouracil (ADRUCIL) 2,400 mg/m2 = 5,015 mg in sodium chloride 0.9% 200.3 mL chemo infusion, 2,400 mg/m2 = 5,015 mg, Intravenous, Over 46 hours, 12 of 12 cycles  Administration: 5,015 mg (8/6/2019), 5,015 mg (8/27/2019), 5,015 mg (9/10/2019), 5,015 mg (9/24/2019)  bevacizumab (AVASTIN) 5 mg/kg = 500 mg in sodium chloride 0.9% 100 mL chemo infusion, 5 mg/kg = 500 mg, Intravenous, Clinic/HOD 1 time, 14 of 14 cycles  Dose modification: 7.5 mg/kg (original dose 5 mg/kg, Cycle 14)  Administration: 500 mg (8/6/2019), 500 mg (8/27/2019), 500 mg (9/10/2019), 500 mg (9/24/2019), 500 mg (11/12/2019), 750 mg (12/3/2019)  irinotecan (CAMPTOSAR) 180 mg/m2 = 376 mg in sodium chloride 0.9% 500 mL chemo infusion, 180 mg/m2 = 376 mg, Intravenous, Clinic/HOD 1 time, 14 of 14 cycles  Dose modification: 200 mg/m2 (original dose 180 mg/m2, Cycle 14)  Administration: 376 mg (8/6/2019), 376 mg (8/27/2019), 376 mg (9/10/2019), 376 mg (9/24/2019), 376 mg (11/12/2019), 418 mg (12/3/2019)  leucovorin calcium 400 mg/m2 = 835 mg in dextrose 5 % 250 mL infusion, 400 mg/m2 = 835 mg, Intravenous, Clinic/HOD 1 time, 12 of 12 cycles  Administration: 835 mg (8/6/2019), 835 mg (8/27/2019), 835 mg (9/10/2019), 835 mg (9/24/2019)      12/22/2019 -  Chemotherapy     Treatment Summary   Plan Name: OP PANC NAB-PACLITAXEL + GEMCITABINE Day 1, 8 , 15 every 28 days  Treatment Goal: Maintenance  Status: Active  Start Date: 12/22/2019 (Planned)  End Date: 3/29/2020 (Planned)  Provider: Karina Pettit MD  Chemotherapy: PACLitaxel-protein bound (ABRAXANE) 100 mg/m2 = 200 mg in 40 mL infusion, 100 mg/m2 = 200 mg (original dose ), Intravenous, Clinic/HOD 1 time, 0 of 4 cycles  Dose modification: 100 mg/m2 (Cycle 1)  gemcitabine (GEMZAR) 790 mg in sodium chloride 0.9% 250 mL chemo infusion, 400 mg/m2 = 790 mg (original dose ), Intravenous,  Clinic/HOD 1 time, 0 of 4 cycles  Dose modification: 400 mg/m2 (Cycle 1)           Indigo Stuart is a 64 y.o.  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  Kras was MUTATED No MSI : EGFR no overexpression  Cycle 1  January 15 2019     Tolerating lopressor for htn and lipitor for dyslipidemia  Tolerating zofran for nausea prn chemo     Cycle one folfox avastin January 15 2019   Failed regimen and admitted with SBO and found to have duodenal cancer   Hepatic mets progressed hence changed to irinotecan dropped  oxaliplatin   Cycle one folfiri avastin April 2019     Pt was seen for left arm swelling and ultrasound revealed a DVT in her arm , post lovenox , now on eliquis   Scan reviewed again  Here to resume Xeliri which was started Nov 8 2019 December 13 2019   DVT left internal jugular vein noted October 8 19 patient started on Eliquis  Here for evaluation of her imaging studies and scans  PET-CT with increasing SUV and increasing activity the cancer no evidence of new disease  Ultrasound revealed resolution of the blood clots in her left upper extremity    December 23 2019   PET CT mixed response explained again   cea now has normalized       No change in PMH, PFH, PSH since last OV     Current Outpatient Medications:     amLODIPine (NORVASC) 5 MG tablet, Take 1 tablet (5 mg total) by mouth once daily., Disp: 90 tablet, Rfl: 3    apixaban (ELIQUIS) 5 mg Tab, Take 1 tablet (5 mg total) by mouth 2 (two) times daily., Disp: 60 tablet, Rfl: 2    atorvastatin (LIPITOR) 20 MG tablet, Take 1 tablet (20 mg total) by mouth every evening., Disp: 90 tablet, Rfl: 3    ferrous sulfate (FEOSOL) 325 mg (65 mg iron) Tab tablet, Take 1 tablet by mouth 2 (two) times daily., Disp: , Rfl: 3    metoprolol succinate (TOPROL-XL) 25 MG 24 hr tablet, Take 1 tablet (25 mg total) by mouth every evening., Disp: 90 tablet, Rfl: 3    ondansetron  "(ZOFRAN-ODT) 8 MG TbDL, Take 1 tablet (8 mg total) by mouth every 12 (twelve) hours as needed., Disp: 30 tablet, Rfl: 1    potassium chloride SA (K-DUR,KLOR-CON) 20 MEQ tablet, Take 1 tablet (20 mEq total) by mouth once daily., Disp: 30 tablet, Rfl: 11    promethazine (PHENERGAN) 25 MG tablet, Take 1 tablet (25 mg total) by mouth every 6 (six) hours as needed for Nausea., Disp: 30 tablet, Rfl: 1    capecitabine (XELODA) 500 MG Tab, Take 2 tablets in the morning and 3 tablets in the evening for days 1-14 of a 21 day cycle. (Patient not taking: Reported on 12/23/2019.), Disp: 70 tablet, Rfl: 2      Review of Systems:  General: Weight gain: No, Weight Loss: No,  Fatigue yes   Chills: No, Night Sweats: No, Insomnia: No, Excessive sleeping: No   Respiratory:  Cough: No, Shortness of Breath:  no   Wheezing: No, Excessive Snoring: No, Coughing up blood: No  Endocrine: Heat Intolerance: No, Cold Intolerance: No,   Excessive Thirst: No, Excessive Hunger: No  Eyes:  Blurred Vision: No, Double Vision: No   Light Sensitivity: No, Eye pain: No  Musculoskeletal: Muscle Aches/Pain: No, Joint Pain/Swelling: yes  But stable    Muscle Weakness:no , Neck Pain: No, Back Pain:   Neurological: Difficulty Walking/Falls:  No  Further   Headache Migraine: No, Dizziness/Vertigo: No, Fainting: No, Weakness: Better   Cardiovascular: Chest Pain: No, Shortness of Breath: no   Gastrointestinal: Nausea/Vomiting: No, Constipation: No, Diarrhea:stable increasing protuberant  Psych/Cog:  Depression: No, Anxiety: No, Hallucinations: No   : Frequent Urination: No, Incontinence: No, Blood of Urine: No, Urinary Infections: No  ENT:Hearing Loss: No, Earache: No, Ringing in Ears: No  Facial Pain: No, Chronic Congestion:No   Immune: Seasonal Allergies: No, Hives and/or Rashes: No  The remainder of the review of twelve body systems was reviewed and normal        BP (!) 119/57   Pulse 84   Temp 99.2 °F (37.3 °C) (Oral)   Resp 16   Ht 5' 2" (1.575 m)  "  Wt 87.9 kg (193 lb 12.6 oz)   SpO2 98%   BMI 35.44 kg/m²   Constitutional: oriented to person, place, and time.  Conj pink   HENT: anicteric sclera   Head: Normocephalic and atraumatic. Ears canal normal no discharge    Nose: Nose normal.   Non boggy turbinates  No lymphatics noted   Eyes: Conjunctivae PALE  EOM are normal.  Neck: . No thyromegaly present.  No bruits   Cardiovascular: Normal rate, regular rhythm, normal heart sounds  No murmur heard.  Pulmonary/Chest:  Clear bilaterally no fremitus  Abdominal: Soft. Bowel sounds are normal.  no mass.   Musculoskeletal: Normal range of motion. decreased strength   Left arm remains swollen yet better no discoloration pulses are good  No further boggy turbinates  Lymphadenopathy:  no cervical adenopathy.   Neurological: alert and oriented to person, place  Slight weakness today   Skin: Skin is warm and dry. No rash noted.   Psychiatric: normal mood and affect.   Vitals reviewed.  Chest port removed     Lab Results   Component Value Date    WBC 4.45 12/20/2019    RBC 3.85 (L) 12/20/2019    HGB 11.6 (L) 12/20/2019    HCT 37.7 12/20/2019    MCV 98 12/20/2019    MCH 30.1 12/20/2019    MCHC 30.8 (L) 12/20/2019    RDW 15.9 (H) 12/20/2019     12/20/2019    MPV 8.3 (L) 12/20/2019    GRAN 2.3 12/20/2019    GRAN 52.6 12/20/2019    LYMPH 1.2 12/20/2019    LYMPH 26.5 12/20/2019    MONO 0.8 12/20/2019    MONO 17.3 (H) 12/20/2019    EOS 0.1 12/20/2019    BASO 0.04 12/20/2019    EOSINOPHIL 2.5 12/20/2019    BASOPHIL 0.9 12/20/2019       CMP  Sodium   Date Value Ref Range Status   12/20/2019 141 136 - 145 mmol/L Final     Potassium   Date Value Ref Range Status   12/20/2019 3.2 (L) 3.5 - 5.1 mmol/L Final     Chloride   Date Value Ref Range Status   12/20/2019 102 95 - 110 mmol/L Final     CO2   Date Value Ref Range Status   12/20/2019 28 23 - 29 mmol/L Final     Glucose   Date Value Ref Range Status   12/20/2019 105 70 - 110 mg/dL Final     BUN, Bld   Date Value Ref Range  Status   12/20/2019 7 (L) 8 - 23 mg/dL Final     Creatinine   Date Value Ref Range Status   12/20/2019 0.7 0.5 - 1.4 mg/dL Final     Calcium   Date Value Ref Range Status   12/20/2019 9.3 8.7 - 10.5 mg/dL Final     Total Protein   Date Value Ref Range Status   12/20/2019 6.5 6.0 - 8.4 g/dL Final     Albumin   Date Value Ref Range Status   12/20/2019 3.2 (L) 3.5 - 5.2 g/dL Final     Total Bilirubin   Date Value Ref Range Status   12/20/2019 0.7 0.1 - 1.0 mg/dL Final     Comment:     For infants and newborns, interpretation of results should be based  on gestational age, weight and in agreement with clinical  observations.  Premature Infant recommended reference ranges:  Up to 24 hours.............<8.0 mg/dL  Up to 48 hours............<12.0 mg/dL  3-5 days..................<15.0 mg/dL  6-29 days.................<15.0 mg/dL       Alkaline Phosphatase   Date Value Ref Range Status   12/20/2019 123 55 - 135 U/L Final     AST   Date Value Ref Range Status   12/20/2019 17 10 - 40 U/L Final     ALT   Date Value Ref Range Status   12/20/2019 16 10 - 44 U/L Final     Anion Gap   Date Value Ref Range Status   12/20/2019 11 8 - 16 mmol/L Final     eGFR if    Date Value Ref Range Status   12/20/2019 >60 >60 mL/min/1.73 m^2 Final     eGFR if non    Date Value Ref Range Status   12/20/2019 >60 >60 mL/min/1.73 m^2 Final     Comment:     Calculation used to obtain the estimated glomerular filtration  rate (eGFR) is the CKD-EPI equation.      Impression:           - Normal esophagus.                        - Normal stomach.                        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 Prosthesis placed. The                         proximal end of the stent is distal to the ampulla                         (refer to images).  No msi          Malignant neoplasm metastatic to lung, unspecified laterality    Hepatic metastases    Encounter for  antineoplastic chemotherapy    Duodenal cancer    Severe obesity (BMI 35.0-39.9) with comorbidity      1. PET CT in past reviewed showed progression with increasing activity of malignancy and CEA rising most likely due to duodenal cancer which I tried to explain to her is different from colon cancer   2.  Thrombosis resolved on eliquis  left upper extremity  Tolerating anticoagulation  3.  Long-term plan if she responds well enough to chemotherapy would be to send her to Interventional Radiology for evaluation with Dr Del Rosario   4.  CRC on chemo CEA normalized   5. Duodenal cancer changing regimens     Flush port with heparin regularly   Has been receiving Xeloda plus Avastin plus irinotecan   Must change to a different agent chemotherapy as CEA is rising and SUV is rising indicating increased activity of her cancer  Will ask for irinotecan  And  Gemzar for now patient will be placed on observation until insurance authorize is her new chemo regimen    After lengthy hours of research overall survival has been shown to be beneficial for CK7 positive duodenal cancers with Gemzar plus Abraxane  article in cancer medicine called durable response for ampullary and duodenal adenocarcinoma with a nap a paclitaxel plus gemcitabine plus or minus cisplatin combination  Also referencing article inch a [place] [employer] of Clinical Oncology title a randomized phase 3 trial of weekly or 3 weekly doses of Nab paclitaxel versus weekly doses of paclitaxel in patients with previously treated advanced gastric cancer.  This is verifying that most patients can tolerate 100 mix per meter squared of Nab paclitaxel weekly every 28 days.  If patient tolerates I may increase to 128 per the original article.  Gemzar dose will started 400 makes her meter sq which most patient has handled okay and she may be weaned up to 500 makes per meter squared.  Antinausea meds may be continued as needed  Cont norvasc for HTN monitored by Dr Conner    Tolerating lipitor for dyslipidemia  On statin monitoring counts closely    Cleared to start chemo and RTC in one week   acp documented

## 2019-12-23 NOTE — TELEPHONE ENCOUNTER
----- Message from Margo Benedict sent at 12/23/2019 12:36 PM CST -----  Regarding: chemo scheduled  Sugar is informing patient that chemo is now scheduled to start 12/31 at 11am.   Next cycle is added as well.      Thank you!  Kush Benedict      ----- Message -----  From: Radha Ferguson LPN  Sent: 12/23/2019  11:09 AM CST  To: Sugar Allen RN, Karina Pettit MD, #    Approved    ----- Message -----  From: Karina Pettit MD  Sent: 12/22/2019   2:43 PM CST  To: Sugar Allen RN, #    High all her orders are in for chemo  I had to research which gave a better overall survival  Articles are referenced in my note     LM

## 2019-12-24 ENCOUNTER — PATIENT MESSAGE (OUTPATIENT)
Dept: HEMATOLOGY/ONCOLOGY | Facility: CLINIC | Age: 64
End: 2019-12-24

## 2019-12-24 NOTE — TELEPHONE ENCOUNTER
Contacted patient to clarify the status of her Xeloda therapy. Per Sugar ARVIZU on 12/19, Xeloda was just on hold. However, patient was seen in clinic yesterday, 12/23 and it appears her Xeloda may be discontinued. Patient was not sure as to whether she is just holding therapy or has been completed stopped. Will messaged provider again for updates to see if patient is just currently holding therapy or discontinuing permanently. Once a response is received, will reach out to patient to discuss and if appropriate, complete a discontinuation survey and close out of OSP.

## 2019-12-30 ENCOUNTER — TELEPHONE (OUTPATIENT)
Dept: HEMATOLOGY/ONCOLOGY | Facility: CLINIC | Age: 64
End: 2019-12-30

## 2019-12-31 ENCOUNTER — CLINICAL SUPPORT (OUTPATIENT)
Dept: HEMATOLOGY/ONCOLOGY | Facility: CLINIC | Age: 64
End: 2019-12-31
Payer: MEDICARE

## 2019-12-31 ENCOUNTER — INFUSION (OUTPATIENT)
Dept: INFUSION THERAPY | Facility: HOSPITAL | Age: 64
End: 2019-12-31
Attending: INTERNAL MEDICINE
Payer: MEDICARE

## 2019-12-31 VITALS
BODY MASS INDEX: 35.38 KG/M2 | TEMPERATURE: 98 F | DIASTOLIC BLOOD PRESSURE: 65 MMHG | RESPIRATION RATE: 18 BRPM | SYSTOLIC BLOOD PRESSURE: 105 MMHG | HEIGHT: 62 IN | HEART RATE: 69 BPM | WEIGHT: 192.25 LBS

## 2019-12-31 DIAGNOSIS — Z51.11 ENCOUNTER FOR ANTINEOPLASTIC CHEMOTHERAPY: Primary | ICD-10-CM

## 2019-12-31 DIAGNOSIS — T45.1X5A CHEMOTHERAPY INDUCED NEUTROPENIA: ICD-10-CM

## 2019-12-31 DIAGNOSIS — C78.7 HEPATIC METASTASES: ICD-10-CM

## 2019-12-31 DIAGNOSIS — C17.0 DUODENAL CANCER: ICD-10-CM

## 2019-12-31 DIAGNOSIS — D70.1 CHEMOTHERAPY INDUCED NEUTROPENIA: ICD-10-CM

## 2019-12-31 DIAGNOSIS — K31.5 DUODENAL STENOSIS: ICD-10-CM

## 2019-12-31 DIAGNOSIS — C78.00 MALIGNANT NEOPLASM METASTATIC TO LUNG, UNSPECIFIED LATERALITY: ICD-10-CM

## 2019-12-31 PROCEDURE — 96367 TX/PROPH/DG ADDL SEQ IV INF: CPT

## 2019-12-31 PROCEDURE — 96375 TX/PRO/DX INJ NEW DRUG ADDON: CPT

## 2019-12-31 PROCEDURE — 96413 CHEMO IV INFUSION 1 HR: CPT

## 2019-12-31 PROCEDURE — 25000003 PHARM REV CODE 250: Performed by: INTERNAL MEDICINE

## 2019-12-31 PROCEDURE — 63600175 PHARM REV CODE 636 W HCPCS: Mod: JG | Performed by: INTERNAL MEDICINE

## 2019-12-31 PROCEDURE — 96417 CHEMO IV INFUS EACH ADDL SEQ: CPT

## 2019-12-31 PROCEDURE — A4216 STERILE WATER/SALINE, 10 ML: HCPCS | Performed by: INTERNAL MEDICINE

## 2019-12-31 RX ORDER — HEPARIN 100 UNIT/ML
500 SYRINGE INTRAVENOUS
Status: DISCONTINUED | OUTPATIENT
Start: 2019-12-31 | End: 2019-12-31 | Stop reason: HOSPADM

## 2019-12-31 RX ORDER — SODIUM CHLORIDE 0.9 % (FLUSH) 0.9 %
10 SYRINGE (ML) INJECTION
Status: DISCONTINUED | OUTPATIENT
Start: 2019-12-31 | End: 2019-12-31 | Stop reason: HOSPADM

## 2019-12-31 RX ORDER — PALONOSETRON 0.05 MG/ML
0.25 INJECTION, SOLUTION INTRAVENOUS
Status: COMPLETED | OUTPATIENT
Start: 2019-12-31 | End: 2019-12-31

## 2019-12-31 RX ORDER — ACETAMINOPHEN 325 MG/1
650 TABLET ORAL
Status: COMPLETED | OUTPATIENT
Start: 2019-12-31 | End: 2019-12-31

## 2019-12-31 RX ADMIN — SODIUM CHLORIDE, PRESERVATIVE FREE 10 ML: 5 INJECTION INTRAVENOUS at 02:12

## 2019-12-31 RX ADMIN — ACETAMINOPHEN 650 MG: 325 TABLET ORAL at 11:12

## 2019-12-31 RX ADMIN — PACLITAXEL 200 MG: 100 INJECTION, POWDER, LYOPHILIZED, FOR SUSPENSION INTRAVENOUS at 12:12

## 2019-12-31 RX ADMIN — DIPHENHYDRAMINE HYDROCHLORIDE 12.5 MG: 50 INJECTION INTRAMUSCULAR; INTRAVENOUS at 12:12

## 2019-12-31 RX ADMIN — HEPARIN 500 UNITS: 100 SYRINGE at 02:12

## 2019-12-31 RX ADMIN — PALONOSETRON HYDROCHLORIDE 0.25 MG: 0.25 INJECTION, SOLUTION INTRAVENOUS at 11:12

## 2019-12-31 RX ADMIN — DEXAMETHASONE SODIUM PHOSPHATE: 4 INJECTION, SOLUTION INTRA-ARTICULAR; INTRALESIONAL; INTRAMUSCULAR; INTRAVENOUS; SOFT TISSUE at 11:12

## 2019-12-31 RX ADMIN — SODIUM CHLORIDE: 0.9 INJECTION, SOLUTION INTRAVENOUS at 11:12

## 2019-12-31 RX ADMIN — GEMCITABINE HYDROCHLORIDE 790 MG: 2 INJECTION, SOLUTION INTRAVENOUS at 12:12

## 2019-12-31 NOTE — PROGRESS NOTES
CHEMO SCHOOL- NUTRITION    Indigo Stuart is a 64 y.o. 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 Pt will be receiving abraxane, gemzar. I met with pt while in infusion for chemo school today. Pt reported that she has a stent placed in her small intestine & thus is restricted from certain foods such as starches, nuts/seeds, etc. Pt reported good appetite. No nutrition-related concerns at this time.     CW: [id number]#.     Plan:   1. Reviewed chemo school packet & provided copy to pt.   2. Discussed importance of maintaining wt & staying hydrated.   3. Reviewed food safety guidelines recommended during treatment.   4. Provided RD contact info & encouraged pt to call with any questions/concerns.   5. Will f/u prn.      Electronically signed by: Cesilia Paulino MS, RDN, LDN

## 2019-12-31 NOTE — PROGRESS NOTES
Indigo Stuart  [id number]    [facility]   Cancer Center    TITLE: PLAN OF CARE FOR THE CHEMOTHERAPY PATIENT / TEACHING PROTOCOL    PURPOSE: To involve the patient / significant other in the plan of care and to provide teaching to the significant other & patient receiving chemotherapy.    LEVEL: Independent.    CONTENT: The Plan of Care for the chemotherapy patient is individualized and appropriate to the patients needs, strengths, limitations, & goals.  Education includes information regarding chemotherapy side effects, the treatment itself, and self-care  Activities.    GOAL / OUTCOME STANDARDS    PHYSIOLOGIC: The client will remain free or experience minimal side effects or toxicities throughout the chemotherapy treatment period.     PSYCHOLOGIC: The client/significant others will demonstrate positive coping mechanisms in relation to chemotherapy and its side effects.      COGINITIVE: The client/significant others will verbalize understanding of self-care measure to avoid/minimize side effects of the chemotherapy regime.    EVALUATION / COMMENT KEY:    V = Audiovisual/Video  S = Successfully meets outcome  N = Needs further instruction  NA = Not applicable to the patient  P = Previous knowledge  U = Unable to comprehend  * = See progress notes          PLAN OF CARE  INFORMATION TO BE DELIVERED / NURSING INTERVENTIONS DATE EVALUATION   1. Assessment of client/caregiver,         knowledge of cancer diagnosis,         and chemotherapy as a treatment. 1a. Evaluate patient/caregiver learning ability    b. Plan teaching sessions with patient/caregiver according to needs and present anxiety level/ability to learn.    c. Provide Chemotherapy Education Packet,        Mouth Care Protocol,         Specific Patient Education Sheets. 12/31/2019 S   2. Individual chemotherapy treatment         plan. 2a. Review of Chemotherapy Education handout from [employer]            12/31/2019   S   3. Knowledge  Deficit & Self-Management of general side effects common to all chemotherapy:  a. Nausea/Vomiting  b.   Diarrhea  c. Mouth Care  d. Dental care  e. Constipation  f. Hair Loss  g. Potential for infection  h. Fatigue   3a. Reinforce that the majority of side effects from chemotherapy are reversible and are  controlled both in the hospital and at home        (blood counts recover, hair grows back).   b.  Refer to the following for reinforcement of         information post-treatment:  1. Mouth Care Protocol.  2. Bowel Protocol for constipation or diarrhea.  3.  Drug Specific Chemotherapy Information Sheets for each medication patient receiving.    12/31/2019     S     PLAN OF CARE  INFORMATION TO BE DELIVERED / NURSING INTERVENTIONS DATE EVALUATION   h. Potential for bleeding         i. Potential anemia/fatigue         j. Potential sunburn         k. Birth control measures  l. Safety measures post treatment 4.  Chemotherapy Home Care Instruction  and Safety Information Sheet.  A. patient/caregivers to thoroughly cook shellfish (shrimp, crab, etc) to decrease the chance of infection.    B.  Use sunscreen and protective clothing while in the sun.   12/31/2019      4. Knowledge deficit & Self Management of Drug Specific  Side Effects.    a. BLADDER EFFECTS        (Hemorrhagic Cystitis)                  Preventable with adequate hydration; occurs 2-3 days or more post treatment.   1.  Instruct patient to:  a.   Void at least every 2 hours; increase intake.  b.   DO NOT hold urine; go when urge is felt.  c.    Empty bladder at bedtime and on         awakening.  d.   Observe for color changes (red to tea           colored), amount and frequency changes.  e.   Notify oncologist of any abnormalities           in urine or voiding or if you cannot               drink adequate fluids.   12/31/2019   S   b.   CHANGES IN URINE        COLOR:      1.   Instruct patient:  a.   Most evident in first 2-3 voidings after            administration.  b. Lasts less than 24 hours.  c. If urine is discolored 2 or more days post- treatment, notify oncologist.      12/31/2019 S   c.    KIDNEY EFFECTS           (Nephrotoxicity)   1.  Instruct patient to:  a.   Drink 8-16 glasses of fluid/day the day   pre-treatment and 3-4 days post-treatment to maintain hydration; the best way to minimize kidney problems.  b.   Notify oncologist immediately if unable to drink fluids or if changes are noted in urinary elimination.     12/31/2019   S   a. PULMONARY TOXICITY    1. Instruct patient to report symptoms such as shortness of breath, chest pain, shallow breathing, or chest wall discomfort to physician.  2. Reinforce preventative measures used by the health care team.  a. Baseline and periodic PFT and chest x-ray.   12/31/2019   S     PLAN OF CARE INFORMATION TO BE DELIVERED / NURSING INTERVENTIONS DATE EVALUATION   b. NERVE & MUSCLE EFFECTS (neurotoxocity; neuropathy, possible visual/hearing changes)        3. Instruct patient to:    a. Report numbness or tingling of the hands/feet, loss of fine motor movement (buttoning shirt, tying shoelaces), or gait changes to your oncologist.  b. If numbness/tingling are present:  1. protect feet with shoes at all times.  2. Use gloves for washing dishes/gardening & potholders in kitchen.       12/31/2019   S   c. CARDIOTOXICITY  Decreased effectiveness of             cardiac function. Effective are                  cumulative and irreversible.                                    CARDIAC ARRYTHMIAS              4   Instruct:  a. Heart function may be tested before treatment and perdiocally during treatment.  b. Notify oncologist of irregular pulse, palpitations, shortness of breath, or swelling in lower extremities/feet.          Taxol and Taxotere can cause arrhythmias on infusion that resolve once infusion discontinued. Instruct nurse if any irregularity felt.    12/31/2019   S   d. EXTRAVASTION  Occurs when vesicants  leak outside of vein and cause damage to the skin and underlying tissues.   1. Reinforce preventive measures used to avoid complications.  a. Fresh IV site or central line monitored continuously with vesicant IVP.  b. Continuous infusion via central line site and blood return monitored periodically around the clock.  2. Instruct to:  a. Notify nurse of any discomfort, burning, stinging, etc. at IV site during chemotherapy administration.  b. Notify oncologist of any redness, pain, or swelling at IV site after discharge from hospital.   12/31/2019   S   e. HYPERSENSITIVITY can happen with any medication.   1. Instruct patient:  a. Nurse is with them during the initial part of treatment and will be close by to monitor.  b. Pre-medication ordered by the oncologist must be taken on time. If doses are missed, treatment will need to be re-scheduled.  c. Skin redness, itching, or hives appearing after discharge should be reported to oncologist. 12/31/2019   S       PLAN OF CARE INFORMATION TO BE DELIVERED / NURSING INTERVENTIONS DATE EVALUATION   f. FLU-LIKE SYNDROME      1. Instruct patient symptoms are hard to prevent and may include fever, shaking chills, muscle and body aches.  a. Taking prescribed medications from physician if needed.  b. Adequate fluids are important.    2. Reinforce the need to call if temperature is         elevated to 100.4 or more  12/31/2019   S   g. HAND-FOOT SYNDROME  causes painful, symmetric swelling and redness of palms and soles                  5. Instruct patient to report any numbness or tingling in the hands or feet.  6. Explain prevention techniques, such as     a. Use heavy moisturizers to lessen skin dryness and itching, but to avoid if skin is cracked or broken  b. Bathe in tepid water, use non-perfumed soap, and wash gently. Baths with oatmeal or diluted baking soda may be soothing.  c. Avoid tight fitting shoes and repetitive actions, such as rubbing hands or applying pressure to  hands/feet.  7. Review measures to take should syndrome occur:  a. Cold compresses and elevation for          edema  b. Pain medications and other measures as ordered by oncologist.   4.   Syndrome resolves few weeks after therapy. 12/31/2019   S   5. DISCHARGE PLANNING /        EDUCATION 1.    Explain importance of compliance with follow- up  tests (CBC, CMP).  2.    Verify patient/caregiver know:  a.    Oncologists office phone number.  b.    Dates of follow-up appointments.  c.    Prescriptions given for nausea  3.   Review side effects to monitor and notify          oncologist about.  4.   Reinforce the need for patient and caregivers to:  a.    Review information given.  b.    Call oncologists office with questions          or symptoms  5.   Provide [employer]chure make referrals if needed for financial or .   12/31/2019   S     PROGRESS NOTES:     I met with the patient today for chemotherapy education. she will be starting treatment with Abraxane, Gemzar. We discussed the mechanism of action, potential side effects of this treatment as well as ways she can manage them at home. Some of these side effects include but or not limited to fever, nausea, vomiting, decreased appetite, fatigue, weakness, cytopenias, myalgia/arthralgia, constipation, diarrhea, bleeding, headache, shortness of breath, nail changes, taste change, hair thinning/loss, mood disturbances, or edema. We also discussed dietary modifications she should make although this will be discussed in more detail with the dietician. she was provided with a copy of all of the information we discussed today as well as our contact information. she will be provided a schedule on his first day of treatment. We will obtain labs on a weekly basis and the patient will follow-up with the physician for toxicity monitoring throughout treatment. All questions were answered and an informed consent was obtained. she was reminded to  certainly contact us sooner if needed.  Attached to the patients folder and discussed with the patient the 24 hour/ 7 days a week after hours telephone number for the physician.  Patient notified to call anytime 24/7 because their is a physician on call for any problems that may arise.  Patient also notified to report to [facility] if they can not get in touch with a physician after hours.  Discussed the five wishes booklet with the patient and their family.

## 2020-01-06 ENCOUNTER — LAB VISIT (OUTPATIENT)
Dept: LAB | Facility: HOSPITAL | Age: 65
End: 2020-01-06
Attending: INTERNAL MEDICINE
Payer: MEDICARE

## 2020-01-06 DIAGNOSIS — C17.0 DUODENAL CANCER: ICD-10-CM

## 2020-01-06 LAB
ALBUMIN SERPL BCP-MCNC: 3.4 G/DL (ref 3.5–5.2)
ALP SERPL-CCNC: 176 U/L (ref 55–135)
ALT SERPL W/O P-5'-P-CCNC: 51 U/L (ref 10–44)
ANION GAP SERPL CALC-SCNC: 10 MMOL/L (ref 8–16)
AST SERPL-CCNC: 56 U/L (ref 10–40)
BASOPHILS # BLD AUTO: 0.06 K/UL (ref 0–0.2)
BASOPHILS NFR BLD: 1.3 % (ref 0–1.9)
BILIRUB SERPL-MCNC: 0.4 MG/DL (ref 0.1–1)
BUN SERPL-MCNC: 10 MG/DL (ref 8–23)
CALCIUM SERPL-MCNC: 9.9 MG/DL (ref 8.7–10.5)
CHLORIDE SERPL-SCNC: 104 MMOL/L (ref 95–110)
CO2 SERPL-SCNC: 27 MMOL/L (ref 23–29)
CREAT SERPL-MCNC: 0.7 MG/DL (ref 0.5–1.4)
DIFFERENTIAL METHOD: ABNORMAL
EOSINOPHIL # BLD AUTO: 0.1 K/UL (ref 0–0.5)
EOSINOPHIL NFR BLD: 2.9 % (ref 0–8)
ERYTHROCYTE [DISTWIDTH] IN BLOOD BY AUTOMATED COUNT: 15.8 % (ref 11.5–14.5)
EST. GFR  (AFRICAN AMERICAN): >60 ML/MIN/1.73 M^2
EST. GFR  (NON AFRICAN AMERICAN): >60 ML/MIN/1.73 M^2
GLUCOSE SERPL-MCNC: 106 MG/DL (ref 70–110)
HCT VFR BLD AUTO: 38.4 % (ref 37–48.5)
HGB BLD-MCNC: 11.7 G/DL (ref 12–16)
IMM GRANULOCYTES # BLD AUTO: 0.01 K/UL (ref 0–0.04)
LYMPHOCYTES # BLD AUTO: 1.1 K/UL (ref 1–4.8)
LYMPHOCYTES NFR BLD: 25.6 % (ref 18–48)
MCH RBC QN AUTO: 30.2 PG (ref 27–31)
MCHC RBC AUTO-ENTMCNC: 30.5 G/DL (ref 32–36)
MCV RBC AUTO: 99 FL (ref 82–98)
MONOCYTES # BLD AUTO: 0.1 K/UL (ref 0.3–1)
MONOCYTES NFR BLD: 2.5 % (ref 4–15)
NEUTROPHILS # BLD AUTO: 3 K/UL (ref 1.8–7.7)
NEUTROPHILS NFR BLD: 67.5 % (ref 38–73)
NRBC BLD-RTO: 0 /100 WBC
PLATELET # BLD AUTO: 280 K/UL (ref 150–350)
PMV BLD AUTO: 8.8 FL (ref 9.2–12.9)
POTASSIUM SERPL-SCNC: 4.2 MMOL/L (ref 3.5–5.1)
PROT SERPL-MCNC: 6.9 G/DL (ref 6–8.4)
RBC # BLD AUTO: 3.88 M/UL (ref 4–5.4)
SODIUM SERPL-SCNC: 141 MMOL/L (ref 136–145)
WBC # BLD AUTO: 4.45 K/UL (ref 3.9–12.7)

## 2020-01-06 PROCEDURE — 85025 COMPLETE CBC W/AUTO DIFF WBC: CPT

## 2020-01-06 PROCEDURE — 80053 COMPREHEN METABOLIC PANEL: CPT

## 2020-01-06 PROCEDURE — 36415 COLL VENOUS BLD VENIPUNCTURE: CPT

## 2020-01-07 ENCOUNTER — PATIENT MESSAGE (OUTPATIENT)
Dept: HEMATOLOGY/ONCOLOGY | Facility: CLINIC | Age: 65
End: 2020-01-07

## 2020-01-07 ENCOUNTER — INFUSION (OUTPATIENT)
Dept: INFUSION THERAPY | Facility: HOSPITAL | Age: 65
End: 2020-01-07
Attending: INTERNAL MEDICINE
Payer: MEDICARE

## 2020-01-07 ENCOUNTER — OFFICE VISIT (OUTPATIENT)
Dept: HEMATOLOGY/ONCOLOGY | Facility: CLINIC | Age: 65
End: 2020-01-07
Payer: MEDICARE

## 2020-01-07 VITALS
TEMPERATURE: 98 F | HEIGHT: 62 IN | OXYGEN SATURATION: 100 % | HEART RATE: 82 BPM | RESPIRATION RATE: 18 BRPM | SYSTOLIC BLOOD PRESSURE: 131 MMHG | DIASTOLIC BLOOD PRESSURE: 70 MMHG | BODY MASS INDEX: 35.06 KG/M2 | WEIGHT: 190.5 LBS

## 2020-01-07 VITALS
BODY MASS INDEX: 34.69 KG/M2 | WEIGHT: 188.5 LBS | DIASTOLIC BLOOD PRESSURE: 74 MMHG | HEIGHT: 62 IN | OXYGEN SATURATION: 99 % | HEART RATE: 81 BPM | SYSTOLIC BLOOD PRESSURE: 131 MMHG | TEMPERATURE: 98 F | RESPIRATION RATE: 12 BRPM

## 2020-01-07 DIAGNOSIS — D70.1 CHEMOTHERAPY INDUCED NEUTROPENIA: ICD-10-CM

## 2020-01-07 DIAGNOSIS — C17.0 DUODENAL CANCER: ICD-10-CM

## 2020-01-07 DIAGNOSIS — Z51.11 ENCOUNTER FOR ANTINEOPLASTIC CHEMOTHERAPY: Primary | ICD-10-CM

## 2020-01-07 DIAGNOSIS — K31.5 DUODENAL STENOSIS: ICD-10-CM

## 2020-01-07 DIAGNOSIS — T45.1X5A CHEMOTHERAPY INDUCED NEUTROPENIA: ICD-10-CM

## 2020-01-07 DIAGNOSIS — C78.7 HEPATIC METASTASES: ICD-10-CM

## 2020-01-07 DIAGNOSIS — C78.00 MALIGNANT NEOPLASM METASTATIC TO LUNG, UNSPECIFIED LATERALITY: ICD-10-CM

## 2020-01-07 DIAGNOSIS — C17.0 DUODENAL CANCER: Primary | ICD-10-CM

## 2020-01-07 DIAGNOSIS — I82.629 ACUTE VENOUS EMBOLISM AND THROMBOSIS OF DEEP VEINS OF UPPER EXTREMITY, UNSPECIFIED LATERALITY: ICD-10-CM

## 2020-01-07 DIAGNOSIS — Z51.11 ENCOUNTER FOR ANTINEOPLASTIC CHEMOTHERAPY: ICD-10-CM

## 2020-01-07 PROCEDURE — 96417 CHEMO IV INFUS EACH ADDL SEQ: CPT

## 2020-01-07 PROCEDURE — 3078F DIAST BP <80 MM HG: CPT | Mod: S$GLB,,, | Performed by: INTERNAL MEDICINE

## 2020-01-07 PROCEDURE — 25000003 PHARM REV CODE 250: Performed by: INTERNAL MEDICINE

## 2020-01-07 PROCEDURE — 99999 PR PBB SHADOW E&M-EST. PATIENT-LVL III: ICD-10-PCS | Mod: PBBFAC,,, | Performed by: INTERNAL MEDICINE

## 2020-01-07 PROCEDURE — 96367 TX/PROPH/DG ADDL SEQ IV INF: CPT

## 2020-01-07 PROCEDURE — 1159F MED LIST DOCD IN RCRD: CPT | Mod: S$GLB,,, | Performed by: INTERNAL MEDICINE

## 2020-01-07 PROCEDURE — 99215 PR OFFICE/OUTPT VISIT, EST, LEVL V, 40-54 MIN: ICD-10-PCS | Mod: S$GLB,,, | Performed by: INTERNAL MEDICINE

## 2020-01-07 PROCEDURE — 96413 CHEMO IV INFUSION 1 HR: CPT

## 2020-01-07 PROCEDURE — 96375 TX/PRO/DX INJ NEW DRUG ADDON: CPT

## 2020-01-07 PROCEDURE — 63600175 PHARM REV CODE 636 W HCPCS: Mod: JG | Performed by: INTERNAL MEDICINE

## 2020-01-07 PROCEDURE — 1101F PT FALLS ASSESS-DOCD LE1/YR: CPT | Mod: S$GLB,,, | Performed by: INTERNAL MEDICINE

## 2020-01-07 PROCEDURE — 3075F PR MOST RECENT SYSTOLIC BLOOD PRESS GE 130-139MM HG: ICD-10-PCS | Mod: S$GLB,,, | Performed by: INTERNAL MEDICINE

## 2020-01-07 PROCEDURE — 99215 OFFICE O/P EST HI 40 MIN: CPT | Mod: S$GLB,,, | Performed by: INTERNAL MEDICINE

## 2020-01-07 PROCEDURE — 1159F PR MEDICATION LIST DOCUMENTED IN MEDICAL RECORD: ICD-10-PCS | Mod: S$GLB,,, | Performed by: INTERNAL MEDICINE

## 2020-01-07 PROCEDURE — 1101F PR PT FALLS ASSESS DOC 0-1 FALLS W/OUT INJ PAST YR: ICD-10-PCS | Mod: S$GLB,,, | Performed by: INTERNAL MEDICINE

## 2020-01-07 PROCEDURE — 3078F PR MOST RECENT DIASTOLIC BLOOD PRESSURE < 80 MM HG: ICD-10-PCS | Mod: S$GLB,,, | Performed by: INTERNAL MEDICINE

## 2020-01-07 PROCEDURE — 99999 PR PBB SHADOW E&M-EST. PATIENT-LVL III: CPT | Mod: PBBFAC,,, | Performed by: INTERNAL MEDICINE

## 2020-01-07 PROCEDURE — 1126F PR PAIN SEVERITY QUANTIFIED, NO PAIN PRESENT: ICD-10-PCS | Mod: S$GLB,,, | Performed by: INTERNAL MEDICINE

## 2020-01-07 PROCEDURE — 3075F SYST BP GE 130 - 139MM HG: CPT | Mod: S$GLB,,, | Performed by: INTERNAL MEDICINE

## 2020-01-07 PROCEDURE — 1126F AMNT PAIN NOTED NONE PRSNT: CPT | Mod: S$GLB,,, | Performed by: INTERNAL MEDICINE

## 2020-01-07 RX ORDER — HEPARIN 100 UNIT/ML
500 SYRINGE INTRAVENOUS
Status: DISCONTINUED | OUTPATIENT
Start: 2020-01-07 | End: 2020-01-07 | Stop reason: HOSPADM

## 2020-01-07 RX ORDER — SODIUM CHLORIDE 0.9 % (FLUSH) 0.9 %
10 SYRINGE (ML) INJECTION
Status: DISCONTINUED | OUTPATIENT
Start: 2020-01-07 | End: 2020-01-07 | Stop reason: HOSPADM

## 2020-01-07 RX ORDER — PALONOSETRON 0.05 MG/ML
0.25 INJECTION, SOLUTION INTRAVENOUS
Status: COMPLETED | OUTPATIENT
Start: 2020-01-07 | End: 2020-01-07

## 2020-01-07 RX ORDER — ACETAMINOPHEN 325 MG/1
650 TABLET ORAL
Status: COMPLETED | OUTPATIENT
Start: 2020-01-07 | End: 2020-01-07

## 2020-01-07 RX ADMIN — GEMCITABINE 790 MG: 38 INJECTION, SOLUTION INTRAVENOUS at 03:01

## 2020-01-07 RX ADMIN — DEXAMETHASONE SODIUM PHOSPHATE: 4 INJECTION, SOLUTION INTRA-ARTICULAR; INTRALESIONAL; INTRAMUSCULAR; INTRAVENOUS; SOFT TISSUE at 01:01

## 2020-01-07 RX ADMIN — HEPARIN 500 UNITS: 100 SYRINGE at 03:01

## 2020-01-07 RX ADMIN — DIPHENHYDRAMINE HYDROCHLORIDE 12.5 MG: 50 INJECTION INTRAMUSCULAR; INTRAVENOUS at 01:01

## 2020-01-07 RX ADMIN — PACLITAXEL 200 MG: 100 INJECTION, POWDER, LYOPHILIZED, FOR SUSPENSION INTRAVENOUS at 02:01

## 2020-01-07 RX ADMIN — ACETAMINOPHEN 650 MG: 325 TABLET ORAL at 01:01

## 2020-01-07 RX ADMIN — PALONOSETRON HYDROCHLORIDE 0.25 MG: 0.25 INJECTION, SOLUTION INTRAVENOUS at 02:01

## 2020-01-07 NOTE — PROGRESS NOTES
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  - Clinical: Stage IV (cTX, cNX, cM1) - Signed by Karina Pettit MD on 3/11/2019       Chemotherapy     Treatment Summary   Plan Name: OP COLORECTAL FOLFOX + BEVACIZUMAB Q2W  Treatment Goal: Maintenance  Status: Active  Start Date: 1/21/2019  End Date: 7/3/2019 (Planned)  Provider: Karina Pettit MD  Chemotherapy: fluorouracil injection 835 mg, 400 mg/m2 = 835 mg, Intravenous, Clinic/HOD 1 time, 4 of 12 cycles    fluorouracil (ADRUCIL) 2,400 mg/m2 = 5,015 mg in sodium chloride 0.9% 200.3 mL chemo infusion, 2,400 mg/m2 = 5,015 mg, Intravenous, Over 46 hours, 4 of 12 cycles    bevacizumab (AVASTIN) 5 mg/kg = 500 mg in sodium chloride 0.9% 100 mL chemo infusion, 5 mg/kg = 500 mg, Intravenous, Clinic/HOD 1 time, 4 of 12 cycles    leucovorin calcium 400 mg/m2 = 835 mg in dextrose 5 % 250 mL infusion, 400 mg/m2 = 835 mg, Intravenous, Clinic/HOD 1 time, 4 of 12 cycles    oxaliplatin (ELOXATIN) 85 mg/m2 = 178 mg in dextrose 5 % 500 mL chemo infusion, 85 mg/m2 = 178 mg, Intravenous, Clinic/HOD 1 time, 4 of 12 cycles          4/8/2019 - 4/8/2019 Chemotherapy     Treatment Summary   Plan Name: OP COLORECTAL FOLFIRI + BEVACIZUMAB Q2W  Treatment Goal: Control  Status: Inactive  Start Date: [No treatment day found]  End Date: [No treatment day found]  Provider: Karina Pettit MD  Chemotherapy: fluorouracil injection 835 mg, 400 mg/m2 = 835 mg, Intravenous, Clinic/HOD 1 time, 0 of 12 cycles  fluorouracil (ADRUCIL) 2,400 mg/m2 = 5,015 mg in sodium chloride 0.9% 200.3 mL chemo infusion, 2,400 mg/m2 = 5,015 mg, Intravenous, Over 46 hours, 0 of 12 cycles  bevacizumab (AVASTIN) 5 mg/kg = 500 mg in sodium chloride 0.9% 100 mL chemo infusion, 5 mg/kg = 500 mg, Intravenous, Clinic/HOD 1 time, 0 of 12 cycles  irinotecan  (CAMPTOSAR) 180 mg/m2 = 376 mg in sodium chloride 0.9% 500 mL chemo infusion, 180 mg/m2 = 376 mg, Intravenous, Clinic/H[facility] 1 time, 0 of 12 cycles  leucovorin calcium 400 mg/m2 = 835 mg in dextrose 5 % 250 mL infusion, 400 mg/m2 = 835 mg, Intravenous, Clinic/H[facility] 1 time, 0 of 12 cycles      12/22/2019 -  Chemotherapy     Treatment Summary   Plan Name: OP PANC NAB-PACLITAXEL + GEMCITABINE Day 1, 8 , 15 every 28 days  Treatment Goal: Maintenance  Status: Active  Start Date: 12/31/2019  End Date: 4/1/2020 (Planned)  Provider: Karina Pettit MD  Chemotherapy: PACLitaxel-protein bound (ABRAXANE) 100 mg/m2 = 200 mg in 40 mL infusion, 100 mg/m2 = 200 mg (100 % of original dose 100 mg/m2), Intravenous, Clinic/HOD 1 time, 1 of 4 cycles  Dose modification: 100 mg/m2 (original dose 100 mg/m2, Cycle 1)  Administration: 200 mg (12/31/2019)  gemcitabine 790 mg in sodium chloride 0.9% 250 mL chemo infusion, 400 mg/m2 = 790 mg (100 % of original dose 400 mg/m2), Intravenous, Clinic/HOD 1 time, 1 of 4 cycles  Dose modification: 400 mg/m2 (original dose 400 mg/m2, Cycle 1)  Administration: 790 mg (12/31/2019)        Lung metastases    1/15/2019 Initial Diagnosis     Lung metastases      4/8/2019 - 4/8/2019 Chemotherapy     Treatment Summary   Plan Name: OP COLORECTAL FOLFIRI + BEVACIZUMAB Q2W  Treatment Goal: Control  Status: Inactive  Start Date: [No treatment day found]  End Date: [No treatment day found]  Provider: Karina Pettit MD  Chemotherapy: fluorouracil injection 835 mg, 400 mg/m2 = 835 mg, Intravenous, Clinic/H[facility] 1 time, 0 of 12 cycles  fluorouracil (ADRUCIL) 2,400 mg/m2 = 5,015 mg in sodium chloride 0.9% 200.3 mL chemo infusion, 2,400 mg/m2 = 5,015 mg, Intravenous, Over 46 hours, 0 of 12 cycles  bevacizumab (AVASTIN) 5 mg/kg = 500 mg in sodium chloride 0.9% 100 mL chemo infusion, 5 mg/kg = 500 mg, Intravenous, Clinic/HOD 1 time, 0 of 12 cycles  irinotecan (CAMPTOSAR) 180 mg/m2 = 376 mg in sodium chloride 0.9% 500 mL chemo  infusion, 180 mg/m2 = 376 mg, Intravenous, Clinic/HOD 1 time, 0 of 12 cycles  leucovorin calcium 400 mg/m2 = 835 mg in dextrose 5 % 250 mL infusion, 400 mg/m2 = 835 mg, Intravenous, Clinic/HOD 1 time, 0 of 12 cycles      4/8/2019 - 12/23/2019 Chemotherapy     Treatment Summary   Plan Name: OP COLORECTAL FOLFIRI + BEVACIZUMAB Q2W  Treatment Goal: Control  Status: Inactive  Start Date: 4/8/2019  End Date: 12/3/2019  Provider: Karina Pettit MD  Chemotherapy: fluorouracil injection 835 mg, 400 mg/m2 = 835 mg, Intravenous, Clinic/HOD 1 time, 12 of 12 cycles  Administration: 835 mg (8/6/2019), 835 mg (8/27/2019), 835 mg (8/27/2019), 835 mg (9/10/2019), 835 mg (9/24/2019)  fluorouracil (ADRUCIL) 2,400 mg/m2 = 5,015 mg in sodium chloride 0.9% 200.3 mL chemo infusion, 2,400 mg/m2 = 5,015 mg, Intravenous, Over 46 hours, 12 of 12 cycles  Administration: 5,015 mg (8/6/2019), 5,015 mg (8/27/2019), 5,015 mg (9/10/2019), 5,015 mg (9/24/2019)  bevacizumab (AVASTIN) 5 mg/kg = 500 mg in sodium chloride 0.9% 100 mL chemo infusion, 5 mg/kg = 500 mg, Intravenous, Clinic/HOD 1 time, 14 of 14 cycles  Dose modification: 7.5 mg/kg (original dose 5 mg/kg, Cycle 14)  Administration: 500 mg (8/6/2019), 500 mg (8/27/2019), 500 mg (9/10/2019), 500 mg (9/24/2019), 500 mg (11/12/2019), 750 mg (12/3/2019)  irinotecan (CAMPTOSAR) 180 mg/m2 = 376 mg in sodium chloride 0.9% 500 mL chemo infusion, 180 mg/m2 = 376 mg, Intravenous, Clinic/HOD 1 time, 14 of 14 cycles  Dose modification: 200 mg/m2 (original dose 180 mg/m2, Cycle 14)  Administration: 376 mg (8/6/2019), 376 mg (8/27/2019), 376 mg (9/10/2019), 376 mg (9/24/2019), 376 mg (11/12/2019), 418 mg (12/3/2019)  leucovorin calcium 400 mg/m2 = 835 mg in dextrose 5 % 250 mL infusion, 400 mg/m2 = 835 mg, Intravenous, Clinic/[facility] 1 time, 12 of 12 cycles  Administration: 835 mg (8/6/2019), 835 mg (8/27/2019), 835 mg (9/10/2019), 835 mg (9/24/2019)      12/22/2019 -  Chemotherapy     Treatment Summary    Plan Name: OP PANC NAB-PACLITAXEL + GEMCITABINE Day 1, 8 , 15 every 28 days  Treatment Goal: Maintenance  Status: Active  Start Date: 12/31/2019  End Date: 4/1/2020 (Planned)  Provider: Karina Pettit MD  Chemotherapy: PACLitaxel-protein bound (ABRAXANE) 100 mg/m2 = 200 mg in 40 mL infusion, 100 mg/m2 = 200 mg (100 % of original dose 100 mg/m2), Intravenous, Clinic/HOD 1 time, 1 of 4 cycles  Dose modification: 100 mg/m2 (original dose 100 mg/m2, Cycle 1)  Administration: 200 mg (12/31/2019)  gemcitabine 790 mg in sodium chloride 0.9% 250 mL chemo infusion, 400 mg/m2 = 790 mg (100 % of original dose 400 mg/m2), Intravenous, Clinic/HOD 1 time, 1 of 4 cycles  Dose modification: 400 mg/m2 (original dose 400 mg/m2, Cycle 1)  Administration: 790 mg (12/31/2019)        Hepatic metastases    1/15/2019 Initial Diagnosis     Hepatic metastases      4/8/2019 - 4/8/2019 Chemotherapy     Treatment Summary   Plan Name: OP COLORECTAL FOLFIRI + BEVACIZUMAB Q2W  Treatment Goal: Control  Status: Inactive  Start Date: [No treatment day found]  End Date: [No treatment day found]  Provider: Karina Pettit MD  Chemotherapy: fluorouracil injection 835 mg, 400 mg/m2 = 835 mg, Intravenous, Clinic/HOD 1 time, 0 of 12 cycles  fluorouracil (ADRUCIL) 2,400 mg/m2 = 5,015 mg in sodium chloride 0.9% 200.3 mL chemo infusion, 2,400 mg/m2 = 5,015 mg, Intravenous, Over 46 hours, 0 of 12 cycles  bevacizumab (AVASTIN) 5 mg/kg = 500 mg in sodium chloride 0.9% 100 mL chemo infusion, 5 mg/kg = 500 mg, Intravenous, Clinic/HOD 1 time, 0 of 12 cycles  irinotecan (CAMPTOSAR) 180 mg/m2 = 376 mg in sodium chloride 0.9% 500 mL chemo infusion, 180 mg/m2 = 376 mg, Intravenous, Clinic/HOD 1 time, 0 of 12 cycles  leucovorin calcium 400 mg/m2 = 835 mg in dextrose 5 % 250 mL infusion, 400 mg/m2 = 835 mg, Intravenous, Clinic/HOD 1 time, 0 of 12 cycles      4/8/2019 - 12/23/2019 Chemotherapy     Treatment Summary   Plan Name: OP COLORECTAL FOLFIRI + BEVACIZUMAB  Q2W  Treatment Goal: Control  Status: Inactive  Start Date: 4/8/2019  End Date: 12/3/2019  Provider: Karina Pettit MD  Chemotherapy: fluorouracil injection 835 mg, 400 mg/m2 = 835 mg, Intravenous, Clinic/HOD 1 time, 12 of 12 cycles  Administration: 835 mg (8/6/2019), 835 mg (8/27/2019), 835 mg (8/27/2019), 835 mg (9/10/2019), 835 mg (9/24/2019)  fluorouracil (ADRUCIL) 2,400 mg/m2 = 5,015 mg in sodium chloride 0.9% 200.3 mL chemo infusion, 2,400 mg/m2 = 5,015 mg, Intravenous, Over 46 hours, 12 of 12 cycles  Administration: 5,015 mg (8/6/2019), 5,015 mg (8/27/2019), 5,015 mg (9/10/2019), 5,015 mg (9/24/2019)  bevacizumab (AVASTIN) 5 mg/kg = 500 mg in sodium chloride 0.9% 100 mL chemo infusion, 5 mg/kg = 500 mg, Intravenous, Clinic/HOD 1 time, 14 of 14 cycles  Dose modification: 7.5 mg/kg (original dose 5 mg/kg, Cycle 14)  Administration: 500 mg (8/6/2019), 500 mg (8/27/2019), 500 mg (9/10/2019), 500 mg (9/24/2019), 500 mg (11/12/2019), 750 mg (12/3/2019)  irinotecan (CAMPTOSAR) 180 mg/m2 = 376 mg in sodium chloride 0.9% 500 mL chemo infusion, 180 mg/m2 = 376 mg, Intravenous, Clinic/HOD 1 time, 14 of 14 cycles  Dose modification: 200 mg/m2 (original dose 180 mg/m2, Cycle 14)  Administration: 376 mg (8/6/2019), 376 mg (8/27/2019), 376 mg (9/10/2019), 376 mg (9/24/2019), 376 mg (11/12/2019), 418 mg (12/3/2019)  leucovorin calcium 400 mg/m2 = 835 mg in dextrose 5 % 250 mL infusion, 400 mg/m2 = 835 mg, Intravenous, Clinic/HOD 1 time, 12 of 12 cycles  Administration: 835 mg (8/6/2019), 835 mg (8/27/2019), 835 mg (9/10/2019), 835 mg (9/24/2019)      12/22/2019 -  Chemotherapy     Treatment Summary   Plan Name: OP PANC NAB-PACLITAXEL + GEMCITABINE Day 1, 8 , 15 every 28 days  Treatment Goal: Maintenance  Status: Active  Start Date: 12/31/2019  End Date: 4/1/2020 (Planned)  Provider: Karina Pettit MD  Chemotherapy: PACLitaxel-protein bound (ABRAXANE) 100 mg/m2 = 200 mg in 40 mL infusion, 100 mg/m2 = 200 mg (100 % of  original dose 100 mg/m2), Intravenous, Clinic/HOD 1 time, 1 of 4 cycles  Dose modification: 100 mg/m2 (original dose 100 mg/m2, Cycle 1)  Administration: 200 mg (12/31/2019)  gemcitabine 790 mg in sodium chloride 0.9% 250 mL chemo infusion, 400 mg/m2 = 790 mg (100 % of original dose 400 mg/m2), Intravenous, Clinic/HOD 1 time, 1 of 4 cycles  Dose modification: 400 mg/m2 (original dose 400 mg/m2, Cycle 1)  Administration: 790 mg (12/31/2019)           Indigo Stuart is a 64 y.o.  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  Kras was MUTATED No MSI : EGFR no overexpression  Cycle 1  January 15 2019     Tolerating lopressor for htn and lipitor for dyslipidemia  Tolerating zofran for nausea prn chemo     Cycle one folfox avastin January 15 2019   Failed regimen and admitted with SBO and found to have duodenal cancer   Hepatic mets progressed hence changed to irinotecan dropped  oxaliplatin   Cycle one folfiri avastin April 2019     Pt was seen for left arm swelling and ultrasound revealed a DVT in her arm , post lovenox , now on eliquis   Scan reviewed again  Here to resume Xeliri which was started Nov 8 2019 December 13 2019   DVT left internal jugular vein noted October 8 19 patient started on Eliquis  Here for evaluation of her imaging studies and scans  PET-CT with increasing SUV and increasing activity the cancer no evidence of new disease  Ultrasound revealed resolution of the blood clots in her left upper extremity    December 23 2019   PET CT mixed response explained again   cea now has normalized     January 7 2020  For chemo clearance   New rash to trunk and axilla   No change in PMH, PFH, PSH since last OV     Current Outpatient Medications:     amLODIPine (NORVASC) 5 MG tablet, Take 1 tablet (5 mg total) by mouth once daily., Disp: 90 tablet, Rfl: 3    apixaban (ELIQUIS) 5 mg Tab, Take 1 tablet (5 mg total) by mouth 2  (two) times daily., Disp: 60 tablet, Rfl: 2    atorvastatin (LIPITOR) 20 MG tablet, Take 1 tablet (20 mg total) by mouth every evening., Disp: 90 tablet, Rfl: 3    ferrous sulfate (FEOSOL) 325 mg (65 mg iron) Tab tablet, Take 1 tablet by mouth 2 (two) times daily., Disp: , Rfl: 3    metoprolol succinate (TOPROL-XL) 25 MG 24 hr tablet, Take 1 tablet (25 mg total) by mouth every evening., Disp: 90 tablet, Rfl: 3    ondansetron (ZOFRAN-ODT) 8 MG TbDL, Take 1 tablet (8 mg total) by mouth every 12 (twelve) hours as needed., Disp: 30 tablet, Rfl: 1    potassium chloride SA (K-DUR,KLOR-CON) 20 MEQ tablet, Take 1 tablet (20 mEq total) by mouth once daily., Disp: 30 tablet, Rfl: 11    promethazine (PHENERGAN) 25 MG tablet, Take 1 tablet (25 mg total) by mouth every 6 (six) hours as needed for Nausea., Disp: 30 tablet, Rfl: 1      Review of Systems:  General: Weight gain: No, Weight Loss: No,  Fatigue yes   Chills: No, Night Sweats: No, Insomnia: No, Excessive sleeping: No   Respiratory:  Cough: No, Shortness of Breath:  no   Wheezing: No, Excessive Snoring: No, Coughing up blood: No  Endocrine: Heat Intolerance: No, Cold Intolerance: No,   Excessive Thirst: No, Excessive Hunger: No  Eyes:  Blurred Vision: No, Double Vision: No   Light Sensitivity: No, Eye pain: No  Musculoskeletal: Muscle Aches/Pain: No, Joint Pain/Swelling: yes  But stable    Muscle Weakness:no , Neck Pain: No, Back Pain:   Neurological: Difficulty Walking/Falls:  No  Further   Headache Migraine: No, Dizziness/Vertigo: No, Fainting: No, Weakness: Better   Cardiovascular: Chest Pain: No, Shortness of Breath: no   Gastrointestinal: Nausea/Vomiting: No, Constipation: No, Diarrhea:stable increasing protuberant  Psych/Cog:  Depression: No, Anxiety: No, Hallucinations: No   : Frequent Urination: No, Incontinence: No, Blood of Urine: No, Urinary Infections: No  ENT:Hearing Loss: No, Earache: No, Ringing in Ears: No  Facial Pain: No, Chronic Congestion:No  "  Immune: Seasonal Allergies: No, Hives and/or Rashes: No  The remainder of the review of twelve body systems was reviewed and normal  Skin + rash       /74   Pulse 81   Temp 98.2 °F (36.8 °C) (Oral)   Resp 12   Ht 5' 2" (1.575 m)   Wt 85.5 kg (188 lb 7.9 oz)   SpO2 99%   BMI 34.48 kg/m²   Constitutional: oriented to person, place, and time.  Conj pink   HENT: anicteric sclera   Head: Normocephalic and atraumatic. Ears canal normal no discharge    Nose: Nose normal.   Non boggy turbinates  No lymphatics noted   Eyes: Conjunctivae PALE  EOM are normal.  Neck: . No thyromegaly present.  No bruits   Cardiovascular: Normal rate, regular rhythm, normal heart sounds  No murmur heard.  Pulmonary/Chest:  Clear bilaterally no fremitus  Abdominal: Soft. Bowel sounds are normal.  no mass.   Musculoskeletal: Normal range of motion. decreased strength   Left arm remains swollen yet better no discoloration pulses are good  No further boggy turbinates  Lymphadenopathy:  no cervical adenopathy.   Neurological: alert and oriented to person, place  Slight weakness today   Skin: Skin is warm and dry. Rash to stomach and excoriations to axilla   Psychiatric: normal mood and affect.   Vitals reviewed.  Chest port removed     Lab Results   Component Value Date    WBC 4.45 01/06/2020    RBC 3.88 (L) 01/06/2020    HGB 11.7 (L) 01/06/2020    HCT 38.4 01/06/2020    MCV 99 (H) 01/06/2020    MCH 30.2 01/06/2020    MCHC 30.5 (L) 01/06/2020    RDW 15.8 (H) 01/06/2020     01/06/2020    MPV 8.8 (L) 01/06/2020    GRAN 3.0 01/06/2020    GRAN 67.5 01/06/2020    LYMPH 1.1 01/06/2020    LYMPH 25.6 01/06/2020    MONO 0.1 (L) 01/06/2020    MONO 2.5 (L) 01/06/2020    EOS 0.1 01/06/2020    BASO 0.06 01/06/2020    EOSINOPHIL 2.9 01/06/2020    BASOPHIL 1.3 01/06/2020       CMP  Sodium   Date Value Ref Range Status   01/06/2020 141 136 - 145 mmol/L Final     Potassium   Date Value Ref Range Status   01/06/2020 4.2 3.5 - 5.1 mmol/L Final "     Chloride   Date Value Ref Range Status   01/06/2020 104 95 - 110 mmol/L Final     CO2   Date Value Ref Range Status   01/06/2020 27 23 - 29 mmol/L Final     Glucose   Date Value Ref Range Status   01/06/2020 106 70 - 110 mg/dL Final     BUN, Bld   Date Value Ref Range Status   01/06/2020 10 8 - 23 mg/dL Final     Creatinine   Date Value Ref Range Status   01/06/2020 0.7 0.5 - 1.4 mg/dL Final     Calcium   Date Value Ref Range Status   01/06/2020 9.9 8.7 - 10.5 mg/dL Final     Total Protein   Date Value Ref Range Status   01/06/2020 6.9 6.0 - 8.4 g/dL Final     Albumin   Date Value Ref Range Status   01/06/2020 3.4 (L) 3.5 - 5.2 g/dL Final     Total Bilirubin   Date Value Ref Range Status   01/06/2020 0.4 0.1 - 1.0 mg/dL Final     Comment:     For infants and newborns, interpretation of results should be based  on gestational age, weight and in agreement with clinical  observations.  Premature Infant recommended reference ranges:  Up to 24 hours.............<8.0 mg/dL  Up to 48 hours............<12.0 mg/dL  3-5 days..................<15.0 mg/dL  6-29 days.................<15.0 mg/dL       Alkaline Phosphatase   Date Value Ref Range Status   01/06/2020 176 (H) 55 - 135 U/L Final     AST   Date Value Ref Range Status   01/06/2020 56 (H) 10 - 40 U/L Final     ALT   Date Value Ref Range Status   01/06/2020 51 (H) 10 - 44 U/L Final     Anion Gap   Date Value Ref Range Status   01/06/2020 10 8 - 16 mmol/L Final     eGFR if    Date Value Ref Range Status   01/06/2020 >60 >60 mL/min/1.73 m^2 Final     eGFR if non    Date Value Ref Range Status   01/06/2020 >60 >60 mL/min/1.73 m^2 Final     Comment:     Calculation used to obtain the estimated glomerular filtration  rate (eGFR) is the CKD-EPI equation.      Impression:           - Normal esophagus.                        - Normal stomach.                        - Likely malignant duodenal mass in the third                         portion of  the duodenum. Prosthesis placed. The                         proximal end of the stent is distal to the ampulla                         (refer to images).  No msi   There are no osseous abnormalities.      Impression PET CT December 2019       Continued improvement of the hepatic metastasis with decrease in size of the multifocal hepatic lesions    Stable subcentimeter mesenteric lymph nodes    No evidence of new metastatic disease      Electronically signed by: Charissa Marroquin MD  Date: 12/11/2019  Time: 11:03            Duodenal cancer    Malignant neoplasm metastatic to lung, unspecified laterality  -     CBC auto differential; Standing  -     CEA; Future; Expected date: 01/07/2020  -     CMP; Future; Expected date: 01/07/2020    Hepatic metastases    Encounter for antineoplastic chemotherapy  -     CBC auto differential; Standing  -     CEA; Future; Expected date: 01/07/2020  -     CMP; Future; Expected date: 01/07/2020    Other orders  -     dexamethasone 10 mg in sodium chloride 0.9% 50 mL      1. PET CT in past reviewed showed progression with increasing activity of malignancy and CEA rising most likely due to duodenal cancer which I tried to explain to her is different from colon cancer tolerating chemo   2.  Thrombosis resolved on eliquis  left upper extremity  Tolerating anticoagulation  3.  Long-term plan if she responds well enough to chemotherapy would be to send her to Interventional Radiology for evaluation with Dr Del Rosario   4.  CRC on chemo CEA normalized   5. Duodenal cancer changing regimens   For cycle 1 day 8  RTC one week for day 15   Flush port with heparin regularly   Had been receiving Xeloda plus Avastin plus irinotecan    change soap to cetaphil   Taxol  And gemzar resume   After lengthy hours of research overall survival has been shown to be beneficial for CK7 positive duodenal cancers with Gemzar plus Abraxane  article in cancer medicine called durable response for ampullary and duodenal  adenocarcinoma with a nap a paclitaxel plus gemcitabine plus or minus cisplatin combination  Also referencing article inch a [employer] Oncology title a randomized phase 3 trial of weekly or 3 weekly doses of Nab paclitaxel versus weekly doses of paclitaxel in patients with previously treated advanced gastric cancer.  This is verifying that most patients can tolerate 100 mix per meter squared of Nab paclitaxel weekly every 28 days.  If patient tolerates I may increase to 128 per the original article.  Gemzar dose will started 400 makes her meter sq which most patient has handled okay and she may be weaned up to 500 makes per meter squared.  Antinausea meds may be continued as needed  Cont norvasc for HTN monitored by Dr Conner   Tolerating lipitor for dyslipidemia  On statin monitoring counts closely    Cleared  chemo and RTC in one week   acp documented

## 2020-01-08 DIAGNOSIS — C17.0 DUODENAL CANCER: Primary | ICD-10-CM

## 2020-01-10 ENCOUNTER — LAB VISIT (OUTPATIENT)
Dept: LAB | Facility: HOSPITAL | Age: 65
End: 2020-01-10
Attending: INTERNAL MEDICINE
Payer: MEDICARE

## 2020-01-10 DIAGNOSIS — C78.00 MALIGNANT NEOPLASM METASTATIC TO LUNG, UNSPECIFIED LATERALITY: ICD-10-CM

## 2020-01-10 DIAGNOSIS — Z51.11 ENCOUNTER FOR ANTINEOPLASTIC CHEMOTHERAPY: ICD-10-CM

## 2020-01-10 LAB
ALBUMIN SERPL BCP-MCNC: 3.3 G/DL (ref 3.5–5.2)
ALP SERPL-CCNC: 153 U/L (ref 55–135)
ALT SERPL W/O P-5'-P-CCNC: 66 U/L (ref 10–44)
ANION GAP SERPL CALC-SCNC: 8 MMOL/L (ref 8–16)
AST SERPL-CCNC: 56 U/L (ref 10–40)
BASOPHILS # BLD AUTO: 0.02 K/UL (ref 0–0.2)
BASOPHILS NFR BLD: 0.5 % (ref 0–1.9)
BILIRUB SERPL-MCNC: 0.9 MG/DL (ref 0.1–1)
BUN SERPL-MCNC: 17 MG/DL (ref 8–23)
CALCIUM SERPL-MCNC: 9.4 MG/DL (ref 8.7–10.5)
CEA SERPL-MCNC: 61.1 NG/ML (ref 0–5)
CHLORIDE SERPL-SCNC: 104 MMOL/L (ref 95–110)
CO2 SERPL-SCNC: 28 MMOL/L (ref 23–29)
CREAT SERPL-MCNC: 0.6 MG/DL (ref 0.5–1.4)
DIFFERENTIAL METHOD: ABNORMAL
EOSINOPHIL # BLD AUTO: 0.1 K/UL (ref 0–0.5)
EOSINOPHIL NFR BLD: 2 % (ref 0–8)
ERYTHROCYTE [DISTWIDTH] IN BLOOD BY AUTOMATED COUNT: 16 % (ref 11.5–14.5)
EST. GFR  (AFRICAN AMERICAN): >60 ML/MIN/1.73 M^2
EST. GFR  (NON AFRICAN AMERICAN): >60 ML/MIN/1.73 M^2
GLUCOSE SERPL-MCNC: 84 MG/DL (ref 70–110)
HCT VFR BLD AUTO: 35.2 % (ref 37–48.5)
HGB BLD-MCNC: 10.7 G/DL (ref 12–16)
IMM GRANULOCYTES # BLD AUTO: 0.01 K/UL (ref 0–0.04)
LYMPHOCYTES # BLD AUTO: 1.1 K/UL (ref 1–4.8)
LYMPHOCYTES NFR BLD: 24.9 % (ref 18–48)
MCH RBC QN AUTO: 29.8 PG (ref 27–31)
MCHC RBC AUTO-ENTMCNC: 30.4 G/DL (ref 32–36)
MCV RBC AUTO: 98 FL (ref 82–98)
MONOCYTES # BLD AUTO: 0 K/UL (ref 0.3–1)
MONOCYTES NFR BLD: 0.7 % (ref 4–15)
NEUTROPHILS # BLD AUTO: 3.2 K/UL (ref 1.8–7.7)
NEUTROPHILS NFR BLD: 71.7 % (ref 38–73)
NRBC BLD-RTO: 0 /100 WBC
PLATELET # BLD AUTO: 224 K/UL (ref 150–350)
PMV BLD AUTO: 9.4 FL (ref 9.2–12.9)
POTASSIUM SERPL-SCNC: 4.1 MMOL/L (ref 3.5–5.1)
PROT SERPL-MCNC: 6.6 G/DL (ref 6–8.4)
RBC # BLD AUTO: 3.59 M/UL (ref 4–5.4)
SODIUM SERPL-SCNC: 140 MMOL/L (ref 136–145)
WBC # BLD AUTO: 4.42 K/UL (ref 3.9–12.7)

## 2020-01-10 PROCEDURE — 80053 COMPREHEN METABOLIC PANEL: CPT

## 2020-01-10 PROCEDURE — 85025 COMPLETE CBC W/AUTO DIFF WBC: CPT

## 2020-01-10 PROCEDURE — 82378 CARCINOEMBRYONIC ANTIGEN: CPT

## 2020-01-10 PROCEDURE — 36415 COLL VENOUS BLD VENIPUNCTURE: CPT

## 2020-01-10 RX ORDER — APIXABAN 5 MG/1
TABLET, FILM COATED ORAL
Qty: 60 TABLET | Refills: 2 | Status: SHIPPED | OUTPATIENT
Start: 2020-01-10

## 2020-01-13 ENCOUNTER — OFFICE VISIT (OUTPATIENT)
Dept: HEMATOLOGY/ONCOLOGY | Facility: CLINIC | Age: 65
End: 2020-01-13
Payer: MEDICARE

## 2020-01-13 VITALS
DIASTOLIC BLOOD PRESSURE: 72 MMHG | SYSTOLIC BLOOD PRESSURE: 140 MMHG | BODY MASS INDEX: 35.06 KG/M2 | HEART RATE: 82 BPM | WEIGHT: 190.5 LBS | HEIGHT: 62 IN | TEMPERATURE: 98 F | RESPIRATION RATE: 20 BRPM

## 2020-01-13 DIAGNOSIS — Z51.11 ENCOUNTER FOR ANTINEOPLASTIC CHEMOTHERAPY: ICD-10-CM

## 2020-01-13 DIAGNOSIS — C17.0 DUODENAL CANCER: Primary | ICD-10-CM

## 2020-01-13 DIAGNOSIS — E66.01 SEVERE OBESITY (BMI 35.0-39.9) WITH COMORBIDITY: ICD-10-CM

## 2020-01-13 DIAGNOSIS — C78.7 HEPATIC METASTASES: ICD-10-CM

## 2020-01-13 DIAGNOSIS — R97.0 ELEVATED CEA: ICD-10-CM

## 2020-01-13 DIAGNOSIS — C78.00 MALIGNANT NEOPLASM METASTATIC TO LUNG, UNSPECIFIED LATERALITY: ICD-10-CM

## 2020-01-13 PROCEDURE — 1101F PT FALLS ASSESS-DOCD LE1/YR: CPT | Mod: S$GLB,,, | Performed by: INTERNAL MEDICINE

## 2020-01-13 PROCEDURE — 99215 PR OFFICE/OUTPT VISIT, EST, LEVL V, 40-54 MIN: ICD-10-PCS | Mod: S$GLB,,, | Performed by: INTERNAL MEDICINE

## 2020-01-13 PROCEDURE — 3077F SYST BP >= 140 MM HG: CPT | Mod: S$GLB,,, | Performed by: INTERNAL MEDICINE

## 2020-01-13 PROCEDURE — 1159F PR MEDICATION LIST DOCUMENTED IN MEDICAL RECORD: ICD-10-PCS | Mod: S$GLB,,, | Performed by: INTERNAL MEDICINE

## 2020-01-13 PROCEDURE — 3078F PR MOST RECENT DIASTOLIC BLOOD PRESSURE < 80 MM HG: ICD-10-PCS | Mod: S$GLB,,, | Performed by: INTERNAL MEDICINE

## 2020-01-13 PROCEDURE — 1159F MED LIST DOCD IN RCRD: CPT | Mod: S$GLB,,, | Performed by: INTERNAL MEDICINE

## 2020-01-13 PROCEDURE — 99215 OFFICE O/P EST HI 40 MIN: CPT | Mod: S$GLB,,, | Performed by: INTERNAL MEDICINE

## 2020-01-13 PROCEDURE — 1126F PR PAIN SEVERITY QUANTIFIED, NO PAIN PRESENT: ICD-10-PCS | Mod: S$GLB,,, | Performed by: INTERNAL MEDICINE

## 2020-01-13 PROCEDURE — 3078F DIAST BP <80 MM HG: CPT | Mod: S$GLB,,, | Performed by: INTERNAL MEDICINE

## 2020-01-13 PROCEDURE — 3077F PR MOST RECENT SYSTOLIC BLOOD PRESSURE >= 140 MM HG: ICD-10-PCS | Mod: S$GLB,,, | Performed by: INTERNAL MEDICINE

## 2020-01-13 PROCEDURE — 99999 PR PBB SHADOW E&M-EST. PATIENT-LVL III: CPT | Mod: PBBFAC,,, | Performed by: INTERNAL MEDICINE

## 2020-01-13 PROCEDURE — 99999 PR PBB SHADOW E&M-EST. PATIENT-LVL III: ICD-10-PCS | Mod: PBBFAC,,, | Performed by: INTERNAL MEDICINE

## 2020-01-13 PROCEDURE — 1101F PR PT FALLS ASSESS DOC 0-1 FALLS W/OUT INJ PAST YR: ICD-10-PCS | Mod: S$GLB,,, | Performed by: INTERNAL MEDICINE

## 2020-01-13 PROCEDURE — 1126F AMNT PAIN NOTED NONE PRSNT: CPT | Mod: S$GLB,,, | Performed by: INTERNAL MEDICINE

## 2020-01-13 NOTE — PROGRESS NOTES
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  - Clinical: Stage IV (cTX, cNX, cM1) - Signed by Karina Pettit MD on 3/11/2019       Chemotherapy     Treatment Summary   Plan Name: OP COLORECTAL FOLFOX + BEVACIZUMAB Q2W  Treatment Goal: Maintenance  Status: Active  Start Date: 1/21/2019  End Date: 7/3/2019 (Planned)  Provider: Karina Pettit MD  Chemotherapy: fluorouracil injection 835 mg, 400 mg/m2 = 835 mg, Intravenous, Clinic/HOD 1 time, 4 of 12 cycles    fluorouracil (ADRUCIL) 2,400 mg/m2 = 5,015 mg in sodium chloride 0.9% 200.3 mL chemo infusion, 2,400 mg/m2 = 5,015 mg, Intravenous, Over 46 hours, 4 of 12 cycles    bevacizumab (AVASTIN) 5 mg/kg = 500 mg in sodium chloride 0.9% 100 mL chemo infusion, 5 mg/kg = 500 mg, Intravenous, Clinic/HOD 1 time, 4 of 12 cycles    leucovorin calcium 400 mg/m2 = 835 mg in dextrose 5 % 250 mL infusion, 400 mg/m2 = 835 mg, Intravenous, Clinic/HOD 1 time, 4 of 12 cycles    oxaliplatin (ELOXATIN) 85 mg/m2 = 178 mg in dextrose 5 % 500 mL chemo infusion, 85 mg/m2 = 178 mg, Intravenous, Clinic/HOD 1 time, 4 of 12 cycles          4/8/2019 - 4/8/2019 Chemotherapy     Treatment Summary   Plan Name: OP COLORECTAL FOLFIRI + BEVACIZUMAB Q2W  Treatment Goal: Control  Status: Inactive  Start Date: [No treatment day found]  End Date: [No treatment day found]  Provider: Karina Pettit MD  Chemotherapy: fluorouracil injection 835 mg, 400 mg/m2 = 835 mg, Intravenous, Clinic/HOD 1 time, 0 of 12 cycles  fluorouracil (ADRUCIL) 2,400 mg/m2 = 5,015 mg in sodium chloride 0.9% 200.3 mL chemo infusion, 2,400 mg/m2 = 5,015 mg, Intravenous, Over 46 hours, 0 of 12 cycles  bevacizumab (AVASTIN) 5 mg/kg = 500 mg in sodium chloride 0.9% 100 mL chemo infusion, 5 mg/kg = 500 mg, Intravenous, Clinic/HOD 1 time, 0 of 12  cycles  irinotecan (CAMPTOSAR) 180 mg/m2 = 376 mg in sodium chloride 0.9% 500 mL chemo infusion, 180 mg/m2 = 376 mg, Intravenous, Clinic/HOD 1 time, 0 of 12 cycles  leucovorin calcium 400 mg/m2 = 835 mg in dextrose 5 % 250 mL infusion, 400 mg/m2 = 835 mg, Intravenous, Clinic/HOD 1 time, 0 of 12 cycles      12/22/2019 -  Chemotherapy     Treatment Summary   Plan Name: OP PANC NAB-PACLITAXEL + GEMCITABINE Day 1, 8 , 15 every 28 days  Treatment Goal: Maintenance  Status: Active  Start Date: 12/31/2019  End Date: 4/7/2020 (Planned)  Provider: Karina Pettit MD  Chemotherapy: PACLitaxel-protein bound (ABRAXANE) 100 mg/m2 = 200 mg in 40 mL infusion, 100 mg/m2 = 200 mg (100 % of original dose 100 mg/m2), Intravenous, Clinic/HOD 1 time, 1 of 4 cycles  Dose modification: 100 mg/m2 (original dose 100 mg/m2, Cycle 1), 125 mg/m2 (original dose 100 mg/m2, Cycle 1)  Administration: 200 mg (12/31/2019), 200 mg (1/7/2020)  gemcitabine 790 mg in sodium chloride 0.9% 250 mL chemo infusion, 400 mg/m2 = 790 mg (100 % of original dose 400 mg/m2), Intravenous, Clinic/HOD 1 time, 1 of 4 cycles  Dose modification: 400 mg/m2 (original dose 400 mg/m2, Cycle 1), 800 mg/m2 (original dose 400 mg/m2, Cycle 1)  Administration: 790 mg (12/31/2019), 790 mg (1/7/2020)        Lung metastases    1/15/2019 Initial Diagnosis     Lung metastases      4/8/2019 - 4/8/2019 Chemotherapy     Treatment Summary   Plan Name: OP COLORECTAL FOLFIRI + BEVACIZUMAB Q2W  Treatment Goal: Control  Status: Inactive  Start Date: [No treatment day found]  End Date: [No treatment day found]  Provider: Karina Pettit MD  Chemotherapy: fluorouracil injection 835 mg, 400 mg/m2 = 835 mg, Intravenous, Clinic/HOD 1 time, 0 of 12 cycles  fluorouracil (ADRUCIL) 2,400 mg/m2 = 5,015 mg in sodium chloride 0.9% 200.3 mL chemo infusion, 2,400 mg/m2 = 5,015 mg, Intravenous, Over 46 hours, 0 of 12 cycles  bevacizumab (AVASTIN) 5 mg/kg = 500 mg in sodium chloride 0.9% 100 mL chemo  infusion, 5 mg/kg = 500 mg, Intravenous, Clinic/HOD 1 time, 0 of 12 cycles  irinotecan (CAMPTOSAR) 180 mg/m2 = 376 mg in sodium chloride 0.9% 500 mL chemo infusion, 180 mg/m2 = 376 mg, Intravenous, Clinic/HOD 1 time, 0 of 12 cycles  leucovorin calcium 400 mg/m2 = 835 mg in dextrose 5 % 250 mL infusion, 400 mg/m2 = 835 mg, Intravenous, Clinic/HOD 1 time, 0 of 12 cycles      4/8/2019 - 12/23/2019 Chemotherapy     Treatment Summary   Plan Name: OP COLORECTAL FOLFIRI + BEVACIZUMAB Q2W  Treatment Goal: Control  Status: Inactive  Start Date: 4/8/2019  End Date: 12/3/2019  Provider: Karina Pettit MD  Chemotherapy: fluorouracil injection 835 mg, 400 mg/m2 = 835 mg, Intravenous, Clinic/HOD 1 time, 12 of 12 cycles  Administration: 835 mg (8/6/2019), 835 mg (8/27/2019), 835 mg (8/27/2019), 835 mg (9/10/2019), 835 mg (9/24/2019)  fluorouracil (ADRUCIL) 2,400 mg/m2 = 5,015 mg in sodium chloride 0.9% 200.3 mL chemo infusion, 2,400 mg/m2 = 5,015 mg, Intravenous, Over 46 hours, 12 of 12 cycles  Administration: 5,015 mg (8/6/2019), 5,015 mg (8/27/2019), 5,015 mg (9/10/2019), 5,015 mg (9/24/2019)  bevacizumab (AVASTIN) 5 mg/kg = 500 mg in sodium chloride 0.9% 100 mL chemo infusion, 5 mg/kg = 500 mg, Intravenous, Clinic/HOD 1 time, 14 of 14 cycles  Dose modification: 7.5 mg/kg (original dose 5 mg/kg, Cycle 14)  Administration: 500 mg (8/6/2019), 500 mg (8/27/2019), 500 mg (9/10/2019), 500 mg (9/24/2019), 500 mg (11/12/2019), 750 mg (12/3/2019)  irinotecan (CAMPTOSAR) 180 mg/m2 = 376 mg in sodium chloride 0.9% 500 mL chemo infusion, 180 mg/m2 = 376 mg, Intravenous, Clinic/HOD 1 time, 14 of 14 cycles  Dose modification: 200 mg/m2 (original dose 180 mg/m2, Cycle 14)  Administration: 376 mg (8/6/2019), 376 mg (8/27/2019), 376 mg (9/10/2019), 376 mg (9/24/2019), 376 mg (11/12/2019), 418 mg (12/3/2019)  leucovorin calcium 400 mg/m2 = 835 mg in dextrose 5 % 250 mL infusion, 400 mg/m2 = 835 mg, Intravenous, Clinic/HOD 1 time, 12 of 12  cycles  Administration: 835 mg (8/6/2019), 835 mg (8/27/2019), 835 mg (9/10/2019), 835 mg (9/24/2019)      12/22/2019 -  Chemotherapy     Treatment Summary   Plan Name: OP PANC NAB-PACLITAXEL + GEMCITABINE Day 1, 8 , 15 every 28 days  Treatment Goal: Maintenance  Status: Active  Start Date: 12/31/2019  End Date: 4/7/2020 (Planned)  Provider: Karina Pettit MD  Chemotherapy: PACLitaxel-protein bound (ABRAXANE) 100 mg/m2 = 200 mg in 40 mL infusion, 100 mg/m2 = 200 mg (100 % of original dose 100 mg/m2), Intravenous, Clinic/HOD 1 time, 1 of 4 cycles  Dose modification: 100 mg/m2 (original dose 100 mg/m2, Cycle 1), 125 mg/m2 (original dose 100 mg/m2, Cycle 1)  Administration: 200 mg (12/31/2019), 200 mg (1/7/2020)  gemcitabine 790 mg in sodium chloride 0.9% 250 mL chemo infusion, 400 mg/m2 = 790 mg (100 % of original dose 400 mg/m2), Intravenous, Clinic/HOD 1 time, 1 of 4 cycles  Dose modification: 400 mg/m2 (original dose 400 mg/m2, Cycle 1), 800 mg/m2 (original dose 400 mg/m2, Cycle 1)  Administration: 790 mg (12/31/2019), 790 mg (1/7/2020)        Hepatic metastases    1/15/2019 Initial Diagnosis     Hepatic metastases      4/8/2019 - 4/8/2019 Chemotherapy     Treatment Summary   Plan Name: OP COLORECTAL FOLFIRI + BEVACIZUMAB Q2W  Treatment Goal: Control  Status: Inactive  Start Date: [No treatment day found]  End Date: [No treatment day found]  Provider: Karina Pettit MD  Chemotherapy: fluorouracil injection 835 mg, 400 mg/m2 = 835 mg, Intravenous, Clinic/HOD 1 time, 0 of 12 cycles  fluorouracil (ADRUCIL) 2,400 mg/m2 = 5,015 mg in sodium chloride 0.9% 200.3 mL chemo infusion, 2,400 mg/m2 = 5,015 mg, Intravenous, Over 46 hours, 0 of 12 cycles  bevacizumab (AVASTIN) 5 mg/kg = 500 mg in sodium chloride 0.9% 100 mL chemo infusion, 5 mg/kg = 500 mg, Intravenous, [facility]\"" 1 time, 0 of 12 cycles  irinotecan (CAMPTOSAR) 180 mg/m2 = 376 mg in sodium chloride 0.9% 500 mL chemo infusion, 180 mg/m2 = 376 mg, Intravenous,  Clinic/HOD 1 time, 0 of 12 cycles  leucovorin calcium 400 mg/m2 = 835 mg in dextrose 5 % 250 mL infusion, 400 mg/m2 = 835 mg, Intravenous, Clinic/HOD 1 time, 0 of 12 cycles      4/8/2019 - 12/23/2019 Chemotherapy     Treatment Summary   Plan Name: OP COLORECTAL FOLFIRI + BEVACIZUMAB Q2W  Treatment Goal: Control  Status: Inactive  Start Date: 4/8/2019  End Date: 12/3/2019  Provider: Karina Pettit MD  Chemotherapy: fluorouracil injection 835 mg, 400 mg/m2 = 835 mg, Intravenous, Clinic/HOD 1 time, 12 of 12 cycles  Administration: 835 mg (8/6/2019), 835 mg (8/27/2019), 835 mg (8/27/2019), 835 mg (9/10/2019), 835 mg (9/24/2019)  fluorouracil (ADRUCIL) 2,400 mg/m2 = 5,015 mg in sodium chloride 0.9% 200.3 mL chemo infusion, 2,400 mg/m2 = 5,015 mg, Intravenous, Over 46 hours, 12 of 12 cycles  Administration: 5,015 mg (8/6/2019), 5,015 mg (8/27/2019), 5,015 mg (9/10/2019), 5,015 mg (9/24/2019)  bevacizumab (AVASTIN) 5 mg/kg = 500 mg in sodium chloride 0.9% 100 mL chemo infusion, 5 mg/kg = 500 mg, Intravenous, Clinic/HOD 1 time, 14 of 14 cycles  Dose modification: 7.5 mg/kg (original dose 5 mg/kg, Cycle 14)  Administration: 500 mg (8/6/2019), 500 mg (8/27/2019), 500 mg (9/10/2019), 500 mg (9/24/2019), 500 mg (11/12/2019), 750 mg (12/3/2019)  irinotecan (CAMPTOSAR) 180 mg/m2 = 376 mg in sodium chloride 0.9% 500 mL chemo infusion, 180 mg/m2 = 376 mg, Intravenous, Clinic/HOD 1 time, 14 of 14 cycles  Dose modification: 200 mg/m2 (original dose 180 mg/m2, Cycle 14)  Administration: 376 mg (8/6/2019), 376 mg (8/27/2019), 376 mg (9/10/2019), 376 mg (9/24/2019), 376 mg (11/12/2019), 418 mg (12/3/2019)  leucovorin calcium 400 mg/m2 = 835 mg in dextrose 5 % 250 mL infusion, 400 mg/m2 = 835 mg, Intravenous, [facility] [place] 1 time, 12 of 12 cycles  Administration: 835 mg (8/6/2019), 835 mg (8/27/2019), 835 mg (9/10/2019), 835 mg (9/24/2019)      12/22/2019 -  Chemotherapy     Treatment Summary   Plan Name: OP PANC NAB-PACLITAXEL +  GEMCITABINE Day 1, 8 , 15 every 28 days  Treatment Goal: Maintenance  Status: Active  Start Date: 12/31/2019  End Date: 4/7/2020 (Planned)  Provider: Karina Pettit MD  Chemotherapy: PACLitaxel-protein bound (ABRAXANE) 100 mg/m2 = 200 mg in 40 mL infusion, 100 mg/m2 = 200 mg (100 % of original dose 100 mg/m2), Intravenous, Clinic/HOD 1 time, 1 of 4 cycles  Dose modification: 100 mg/m2 (original dose 100 mg/m2, Cycle 1), 125 mg/m2 (original dose 100 mg/m2, Cycle 1)  Administration: 200 mg (12/31/2019), 200 mg (1/7/2020)  gemcitabine 790 mg in sodium chloride 0.9% 250 mL chemo infusion, 400 mg/m2 = 790 mg (100 % of original dose 400 mg/m2), Intravenous, Clinic/HOD 1 time, 1 of 4 cycles  Dose modification: 400 mg/m2 (original dose 400 mg/m2, Cycle 1), 800 mg/m2 (original dose 400 mg/m2, Cycle 1)  Administration: 790 mg (12/31/2019), 790 mg (1/7/2020)           Indigo Stuart is a 64 y.o.  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  Kras was MUTATED No MSI : EGFR no overexpression     Tolerating lopressor for htn and lipitor for dyslipidemia  Tolerating zofran for nausea prn chemo     Cycle one folfox avastin January 15 2019   Failed regimen and admitted with SBO and found to have duodenal cancer   Hepatic mets progressed hence changed to irinotecan dropped  oxaliplatin   Cycle one folfiri avastin April 2019     Pt was seen for left arm swelling and ultrasound revealed a DVT in her arm , post lovenox , now on eliquis   Scan reviewed again  Here to resume Xeliri which was started Nov 8 2019 December 13 2019   DVT left internal jugular vein noted October 8 19 patient started on Eliquis  Here for evaluation of her imaging studies and scans  PET-CT with increasing SUV and increasing activity the cancer no evidence of new disease  Ultrasound revealed resolution of the blood clots in her left upper extremity    December 23 2019   PET  CT mixed response explained again   cea now has normalized     January 7 2020  For chemo clearance   New rash to trunk and axilla     January 13 2020  For cycle 1 day 15  Rash better but remains   CEA has increased again   No change in PMH, PFH, PSH since last OV     Current Outpatient Medications:     amLODIPine (NORVASC) 5 MG tablet, Take 1 tablet (5 mg total) by mouth once daily., Disp: 90 tablet, Rfl: 3    atorvastatin (LIPITOR) 20 MG tablet, Take 1 tablet (20 mg total) by mouth every evening., Disp: 90 tablet, Rfl: 3    ELIQUIS 5 mg Tab, TAKE 1 TABLET BY MOUTH TWICE A DAY, Disp: 60 tablet, Rfl: 2    ferrous sulfate (FEOSOL) 325 mg (65 mg iron) Tab tablet, Take 1 tablet by mouth 2 (two) times daily., Disp: , Rfl: 3    metoprolol succinate (TOPROL-XL) 25 MG 24 hr tablet, Take 1 tablet (25 mg total) by mouth every evening., Disp: 90 tablet, Rfl: 3    ondansetron (ZOFRAN-ODT) 8 MG TbDL, Take 1 tablet (8 mg total) by mouth every 12 (twelve) hours as needed., Disp: 30 tablet, Rfl: 1    potassium chloride SA (K-DUR,KLOR-CON) 20 MEQ tablet, Take 1 tablet (20 mEq total) by mouth once daily., Disp: 30 tablet, Rfl: 11    promethazine (PHENERGAN) 25 MG tablet, Take 1 tablet (25 mg total) by mouth every 6 (six) hours as needed for Nausea., Disp: 30 tablet, Rfl: 1      Review of Systems:  General: Weight gain: No, Weight Loss: No,  Fatigue yes   Chills: No, Night Sweats: No, Insomnia: No, Excessive sleeping: No   Respiratory:  Cough: No, Shortness of Breath:  no   Wheezing: No, Excessive Snoring: No, Coughing up blood: No  Endocrine: Heat Intolerance: No, Cold Intolerance: No,   Excessive Thirst: No, Excessive Hunger: No  Eyes:  Blurred Vision: No, Double Vision: No   Light Sensitivity: No, Eye pain: No  Musculoskeletal: Muscle Aches/Pain: No, Joint Pain/Swelling: yes     Muscle Weakness:no , Neck Pain: No, Back Pain:   Neurological: Difficulty Walking/Falls:  No  Further   Headache Migraine: No, Dizziness/Vertigo:  "No, Fainting: No, Weakness: Better   Cardiovascular: Chest Pain: No, Shortness of Breath: no   Gastrointestinal: Nausea/Vomiting: No, Constipation: No, Diarrhea: no   Psych/Cog:  Depression: No, Anxiety: No, Hallucinations: No   : Frequent Urination: No, Incontinence: No, Blood of Urine: No, Urinary Infections: No  ENT:Hearing Loss: No, Earache: No, Ringing in Ears: No  Facial Pain: No, Chronic Congestion:No   Immune: Seasonal Allergies: No, Hives and/or Rashes: No  The remainder of the review of twelve body systems was reviewed and normal  Skin + rash       BP (!) 140/72   Pulse 82   Temp 98.2 °F (36.8 °C)   Resp 20   Ht 5' 2" (1.575 m)   Wt 86.4 kg (190 lb 7.6 oz)   BMI 34.84 kg/m²   Constitutional: oriented to person, place, and time.  Conj pink   HENT: anicteric sclera   Head: Normocephalic and atraumatic. Ears canal normal no discharge    Nose: Nose normal.   ++boggy turbinates  No lymphatics noted   Eyes: Conjunctivae PALE  EOM are normal.  Neck: . No thyromegaly present.  No bruits   Cardiovascular: Normal rate, regular rhythm, normal heart sounds  No murmur heard.  Pulmonary/Chest:  Clear bilaterally no fremitus  Abdominal: Soft. Bowel sounds are normal.  no mass.   Musculoskeletal: Normal range of motion. decreased strength   Left arm remains swollen yet better no discoloration pulses are good  No further boggy turbinates  Lymphadenopathy:  no cervical adenopathy.   Neurological: alert and oriented to person, place  Slight weakness today   Skin: Skin is warm and dry. Rash to stomach continues yet better  Psychiatric: normal mood and affect.   Vitals reviewed.  Chest port removed     Lab Results   Component Value Date    WBC 4.42 01/10/2020    RBC 3.59 (L) 01/10/2020    HGB 10.7 (L) 01/10/2020    HCT 35.2 (L) 01/10/2020    MCV 98 01/10/2020    MCH 29.8 01/10/2020    MCHC 30.4 (L) 01/10/2020    RDW 16.0 (H) 01/10/2020     01/10/2020    MPV 9.4 01/10/2020    GRAN 3.2 01/10/2020    GRAN 71.7 " 01/10/2020    LYMPH 1.1 01/10/2020    LYMPH 24.9 01/10/2020    MONO 0.0 (L) 01/10/2020    MONO 0.7 (L) 01/10/2020    EOS 0.1 01/10/2020    BASO 0.02 01/10/2020    EOSINOPHIL 2.0 01/10/2020    BASOPHIL 0.5 01/10/2020       CMP  Sodium   Date Value Ref Range Status   01/10/2020 140 136 - 145 mmol/L Final     Potassium   Date Value Ref Range Status   01/10/2020 4.1 3.5 - 5.1 mmol/L Final     Chloride   Date Value Ref Range Status   01/10/2020 104 95 - 110 mmol/L Final     CO2   Date Value Ref Range Status   01/10/2020 28 23 - 29 mmol/L Final     Glucose   Date Value Ref Range Status   01/10/2020 84 70 - 110 mg/dL Final     BUN, Bld   Date Value Ref Range Status   01/10/2020 17 8 - 23 mg/dL Final     Creatinine   Date Value Ref Range Status   01/10/2020 0.6 0.5 - 1.4 mg/dL Final     Calcium   Date Value Ref Range Status   01/10/2020 9.4 8.7 - 10.5 mg/dL Final     Total Protein   Date Value Ref Range Status   01/10/2020 6.6 6.0 - 8.4 g/dL Final     Albumin   Date Value Ref Range Status   01/10/2020 3.3 (L) 3.5 - 5.2 g/dL Final     Total Bilirubin   Date Value Ref Range Status   01/10/2020 0.9 0.1 - 1.0 mg/dL Final     Comment:     For infants and newborns, interpretation of results should be based  on gestational age, weight and in agreement with clinical  observations.  Premature Infant recommended reference ranges:  Up to 24 hours.............<8.0 mg/dL  Up to 48 hours............<12.0 mg/dL  3-5 days..................<15.0 mg/dL  6-29 days.................<15.0 mg/dL       Alkaline Phosphatase   Date Value Ref Range Status   01/10/2020 153 (H) 55 - 135 U/L Final     AST   Date Value Ref Range Status   01/10/2020 56 (H) 10 - 40 U/L Final     ALT   Date Value Ref Range Status   01/10/2020 66 (H) 10 - 44 U/L Final     Anion Gap   Date Value Ref Range Status   01/10/2020 8 8 - 16 mmol/L Final     eGFR if    Date Value Ref Range Status   01/10/2020 >60 >60 mL/min/1.73 m^2 Final     eGFR if non African  American   Date Value Ref Range Status   01/10/2020 >60 >60 mL/min/1.73 m^2 Final     Comment:     Calculation used to obtain the estimated glomerular filtration  rate (eGFR) is the CKD-EPI equation.      Impression:           - Normal esophagus.                        - Normal stomach.                        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 Prosthesis placed. The                         proximal end of the stent is distal to the ampulla                         (refer to images).  No msi   There are no osseous abnormalities.      Impression PET CT December 2019       Continued improvement of the hepatic metastasis with decrease in size of the multifocal hepatic lesions    Stable subcentimeter mesenteric lymph nodes    No evidence of new metastatic disease      Electronically signed by: Charissa Marroquin MD  Date: 12/11/2019  Time: 11:03            Duodenal cancer  -     CBC auto differential; Future; Expected date: 01/13/2020  -     CEA; Future; Expected date: 01/13/2020  -     CMP; Future; Expected date: 01/13/2020  -     Iron and TIBC; Future; Expected date: 01/13/2020  -     Beta 2 Microglobulin, Serum; Future; Expected date: 01/13/2020    Hepatic metastases  -     CBC auto differential; Future; Expected date: 01/13/2020  -     CEA; Future; Expected date: 01/13/2020  -     CMP; Future; Expected date: 01/13/2020  -     Iron and TIBC; Future; Expected date: 01/13/2020  -     Beta 2 Microglobulin, Serum; Future; Expected date: 01/13/2020    Malignant neoplasm metastatic to lung, unspecified laterality  -     CBC auto differential; Future; Expected date: 01/13/2020  -     CEA; Future; Expected date: 01/13/2020  -     CMP; Future; Expected date: 01/13/2020  -     Iron and TIBC; Future; Expected date: 01/13/2020  -     Beta 2 Microglobulin, Serum; Future; Expected date: 01/13/2020    Encounter for antineoplastic chemotherapy  -     CBC auto differential;  Future; Expected date: 01/13/2020  -     CEA; Future; Expected date: 01/13/2020  -     CMP; Future; Expected date: 01/13/2020  -     Iron and TIBC; Future; Expected date: 01/13/2020  -     Beta 2 Microglobulin, Serum; Future; Expected date: 01/13/2020    Severe obesity (BMI 35.0-39.9) with comorbidity    Elevated CEA    Other orders  -     dexamethasone 10 mg in sodium chloride 0.9% 50 mL  -     PACLitaxel-protein bound (ABRAXANE) 125 mg/m2 = 250 mg in 50 mL infusion  -     gemcitabine (GEMZAR) 1,575 mg in sodium chloride 0.9% 250 mL chemo infusion      1. PET CT in past reviewed showed progression with increasing activity of malignancy and CEA rising most likely due to duodenal cancer which I tried to explain to her is different from colon cancer tolerating chemo   2.  Thrombosis resolved on eliquis  left upper extremity  Tolerating anticoagulation  3.  Long-term plan if she responds well enough to chemotherapy would be to send her to Interventional Radiology for evaluation with Dr Del Rosario   4.  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  This is verifying that most patients can tolerate 100 mix per meter squared of Nab  paclitaxel weekly every 28 days.  If patient tolerates I may increase to 128 per the original article.  Gemzar dose will started 400 makes her meter sq which most patient has handled okay and she may be weaned up to 500 makes per meter squared.  Antinausea meds may be continued as needed  Cont to yu cortisone for her rash   Cont norvasc for HTN monitored by Dr Conner   Tolerating lipitor for dyslipidemia  On statin monitoring counts closely    Cleared  chemo    acp documented

## 2020-01-14 ENCOUNTER — INFUSION (OUTPATIENT)
Dept: INFUSION THERAPY | Facility: HOSPITAL | Age: 65
End: 2020-01-14
Attending: INTERNAL MEDICINE
Payer: MEDICARE

## 2020-01-14 VITALS
BODY MASS INDEX: 35 KG/M2 | HEART RATE: 80 BPM | SYSTOLIC BLOOD PRESSURE: 126 MMHG | RESPIRATION RATE: 20 BRPM | TEMPERATURE: 98 F | WEIGHT: 190.19 LBS | HEIGHT: 62 IN | DIASTOLIC BLOOD PRESSURE: 72 MMHG

## 2020-01-14 DIAGNOSIS — D70.1 CHEMOTHERAPY INDUCED NEUTROPENIA: ICD-10-CM

## 2020-01-14 DIAGNOSIS — Z51.11 ENCOUNTER FOR ANTINEOPLASTIC CHEMOTHERAPY: Primary | ICD-10-CM

## 2020-01-14 DIAGNOSIS — C78.00 MALIGNANT NEOPLASM METASTATIC TO LUNG, UNSPECIFIED LATERALITY: ICD-10-CM

## 2020-01-14 DIAGNOSIS — C17.0 DUODENAL CANCER: ICD-10-CM

## 2020-01-14 DIAGNOSIS — C78.7 HEPATIC METASTASES: ICD-10-CM

## 2020-01-14 DIAGNOSIS — K31.5 DUODENAL STENOSIS: ICD-10-CM

## 2020-01-14 DIAGNOSIS — T45.1X5A CHEMOTHERAPY INDUCED NEUTROPENIA: ICD-10-CM

## 2020-01-14 PROCEDURE — 96367 TX/PROPH/DG ADDL SEQ IV INF: CPT

## 2020-01-14 PROCEDURE — 96375 TX/PRO/DX INJ NEW DRUG ADDON: CPT

## 2020-01-14 PROCEDURE — 25000003 PHARM REV CODE 250: Performed by: INTERNAL MEDICINE

## 2020-01-14 PROCEDURE — 96417 CHEMO IV INFUS EACH ADDL SEQ: CPT

## 2020-01-14 PROCEDURE — 63600175 PHARM REV CODE 636 W HCPCS: Performed by: INTERNAL MEDICINE

## 2020-01-14 PROCEDURE — 96413 CHEMO IV INFUSION 1 HR: CPT

## 2020-01-14 RX ORDER — HEPARIN 100 UNIT/ML
500 SYRINGE INTRAVENOUS
Status: DISCONTINUED | OUTPATIENT
Start: 2020-01-14 | End: 2020-01-14 | Stop reason: HOSPADM

## 2020-01-14 RX ORDER — ACETAMINOPHEN 325 MG/1
650 TABLET ORAL
Status: COMPLETED | OUTPATIENT
Start: 2020-01-14 | End: 2020-01-14

## 2020-01-14 RX ORDER — SODIUM CHLORIDE 0.9 % (FLUSH) 0.9 %
10 SYRINGE (ML) INJECTION
Status: DISCONTINUED | OUTPATIENT
Start: 2020-01-14 | End: 2020-01-14 | Stop reason: HOSPADM

## 2020-01-14 RX ORDER — PALONOSETRON 0.05 MG/ML
0.25 INJECTION, SOLUTION INTRAVENOUS
Status: COMPLETED | OUTPATIENT
Start: 2020-01-14 | End: 2020-01-14

## 2020-01-14 RX ADMIN — GEMCITABINE HYDROCHLORIDE 1575 MG: 2 INJECTION, SOLUTION INTRAVENOUS at 10:01

## 2020-01-14 RX ADMIN — DIPHENHYDRAMINE HYDROCHLORIDE 12.5 MG: 50 INJECTION INTRAMUSCULAR; INTRAVENOUS at 08:01

## 2020-01-14 RX ADMIN — PALONOSETRON 0.25 MG: 0.05 INJECTION, SOLUTION INTRAVENOUS at 08:01

## 2020-01-14 RX ADMIN — PACLITAXEL 250 MG: 100 INJECTION, POWDER, LYOPHILIZED, FOR SUSPENSION INTRAVENOUS at 09:01

## 2020-01-14 RX ADMIN — DEXAMETHASONE SODIUM PHOSPHATE 10 MG: 4 INJECTION, SOLUTION INTRA-ARTICULAR; INTRALESIONAL; INTRAMUSCULAR; INTRAVENOUS; SOFT TISSUE at 09:01

## 2020-01-14 RX ADMIN — ACETAMINOPHEN 650 MG: 325 TABLET ORAL at 08:01

## 2020-01-14 RX ADMIN — HEPARIN 500 UNITS: 100 SYRINGE at 10:01

## 2020-01-14 NOTE — PLAN OF CARE
Problem: Fatigue  Goal: Improved Activity Tolerance  Outcome: Ongoing, Progressing  Intervention: Promote Energy Conservation  Flowsheets (Taken 1/14/2020 2216)  Fatigue Management: frequent rest breaks encouraged  Sleep/Rest Enhancement: regular sleep/rest pattern promoted  Activity Management: ambulated - L4; activity encouraged

## 2020-01-20 ENCOUNTER — PATIENT MESSAGE (OUTPATIENT)
Dept: HEMATOLOGY/ONCOLOGY | Facility: CLINIC | Age: 65
End: 2020-01-20

## 2020-01-21 ENCOUNTER — PATIENT MESSAGE (OUTPATIENT)
Dept: HEMATOLOGY/ONCOLOGY | Facility: CLINIC | Age: 65
End: 2020-01-21

## 2020-01-21 ENCOUNTER — LAB VISIT (OUTPATIENT)
Dept: LAB | Facility: HOSPITAL | Age: 65
End: 2020-01-21
Attending: INTERNAL MEDICINE
Payer: MEDICARE

## 2020-01-21 DIAGNOSIS — C17.0 DUODENAL CANCER: ICD-10-CM

## 2020-01-21 LAB
BASOPHILS # BLD AUTO: 0.03 K/UL (ref 0–0.2)
BASOPHILS NFR BLD: 1.7 % (ref 0–1.9)
DIFFERENTIAL METHOD: ABNORMAL
EOSINOPHIL # BLD AUTO: 0.1 K/UL (ref 0–0.5)
EOSINOPHIL NFR BLD: 3.4 % (ref 0–8)
ERYTHROCYTE [DISTWIDTH] IN BLOOD BY AUTOMATED COUNT: 16.9 % (ref 11.5–14.5)
HCT VFR BLD AUTO: 35.7 % (ref 37–48.5)
HGB BLD-MCNC: 11.2 G/DL (ref 12–16)
IMM GRANULOCYTES # BLD AUTO: 0.01 K/UL (ref 0–0.04)
LYMPHOCYTES # BLD AUTO: 0.8 K/UL (ref 1–4.8)
LYMPHOCYTES NFR BLD: 43.4 % (ref 18–48)
MCH RBC QN AUTO: 30.1 PG (ref 27–31)
MCHC RBC AUTO-ENTMCNC: 31.4 G/DL (ref 32–36)
MCV RBC AUTO: 96 FL (ref 82–98)
MONOCYTES # BLD AUTO: 0.2 K/UL (ref 0.3–1)
MONOCYTES NFR BLD: 13.1 % (ref 4–15)
NEUTROPHILS # BLD AUTO: 0.7 K/UL (ref 1.8–7.7)
NEUTROPHILS NFR BLD: 37.8 % (ref 38–73)
NRBC BLD-RTO: 0 /100 WBC
PLATELET # BLD AUTO: 163 K/UL (ref 150–350)
PMV BLD AUTO: 8.9 FL (ref 9.2–12.9)
RBC # BLD AUTO: 3.72 M/UL (ref 4–5.4)
WBC # BLD AUTO: 1.75 K/UL (ref 3.9–12.7)

## 2020-01-21 PROCEDURE — 36415 COLL VENOUS BLD VENIPUNCTURE: CPT

## 2020-01-21 PROCEDURE — 85027 COMPLETE CBC AUTOMATED: CPT

## 2020-01-21 PROCEDURE — 85007 BL SMEAR W/DIFF WBC COUNT: CPT

## 2020-01-22 ENCOUNTER — PATIENT MESSAGE (OUTPATIENT)
Dept: HEMATOLOGY/ONCOLOGY | Facility: CLINIC | Age: 65
End: 2020-01-22

## 2020-01-23 ENCOUNTER — LAB VISIT (OUTPATIENT)
Dept: LAB | Facility: HOSPITAL | Age: 65
End: 2020-01-23
Attending: INTERNAL MEDICINE
Payer: MEDICARE

## 2020-01-23 DIAGNOSIS — C17.0 DUODENAL CANCER: ICD-10-CM

## 2020-01-23 LAB
ALBUMIN SERPL BCP-MCNC: 3.3 G/DL (ref 3.5–5.2)
ALP SERPL-CCNC: 362 U/L (ref 55–135)
ALT SERPL W/O P-5'-P-CCNC: 75 U/L (ref 10–44)
ANION GAP SERPL CALC-SCNC: 11 MMOL/L (ref 8–16)
AST SERPL-CCNC: 55 U/L (ref 10–40)
BASOPHILS NFR BLD: 1 % (ref 0–1.9)
BILIRUB SERPL-MCNC: 0.6 MG/DL (ref 0.1–1)
BUN SERPL-MCNC: 8 MG/DL (ref 8–23)
CALCIUM SERPL-MCNC: 10 MG/DL (ref 8.7–10.5)
CHLORIDE SERPL-SCNC: 103 MMOL/L (ref 95–110)
CO2 SERPL-SCNC: 25 MMOL/L (ref 23–29)
CREAT SERPL-MCNC: 0.7 MG/DL (ref 0.5–1.4)
DIFFERENTIAL METHOD: ABNORMAL
EOSINOPHIL NFR BLD: 3 % (ref 0–8)
ERYTHROCYTE [DISTWIDTH] IN BLOOD BY AUTOMATED COUNT: 16.9 % (ref 11.5–14.5)
EST. GFR  (AFRICAN AMERICAN): >60 ML/MIN/1.73 M^2
EST. GFR  (NON AFRICAN AMERICAN): >60 ML/MIN/1.73 M^2
GLUCOSE SERPL-MCNC: 98 MG/DL (ref 70–110)
HCT VFR BLD AUTO: 35 % (ref 37–48.5)
HGB BLD-MCNC: 11.2 G/DL (ref 12–16)
IMM GRANULOCYTES # BLD AUTO: ABNORMAL K/UL
LYMPHOCYTES NFR BLD: 60 % (ref 18–48)
MCH RBC QN AUTO: 30.3 PG (ref 27–31)
MCHC RBC AUTO-ENTMCNC: 32 G/DL (ref 32–36)
MCV RBC AUTO: 95 FL (ref 82–98)
MONOCYTES NFR BLD: 15 % (ref 4–15)
NEUTROPHILS NFR BLD: 21 % (ref 38–73)
NRBC BLD-RTO: 1 /100 WBC
PLATELET # BLD AUTO: 241 K/UL (ref 150–350)
PLATELET BLD QL SMEAR: ABNORMAL
PMV BLD AUTO: 8.7 FL (ref 9.2–12.9)
POLYCHROMASIA BLD QL SMEAR: ABNORMAL
POTASSIUM SERPL-SCNC: 4.6 MMOL/L (ref 3.5–5.1)
PROT SERPL-MCNC: 7 G/DL (ref 6–8.4)
RBC # BLD AUTO: 3.7 M/UL (ref 4–5.4)
SODIUM SERPL-SCNC: 139 MMOL/L (ref 136–145)
WBC # BLD AUTO: 2.97 K/UL (ref 3.9–12.7)

## 2020-01-23 PROCEDURE — 85025 COMPLETE CBC W/AUTO DIFF WBC: CPT

## 2020-01-23 PROCEDURE — 80053 COMPREHEN METABOLIC PANEL: CPT

## 2020-01-23 PROCEDURE — 36415 COLL VENOUS BLD VENIPUNCTURE: CPT

## 2020-01-27 ENCOUNTER — OFFICE VISIT (OUTPATIENT)
Dept: HEMATOLOGY/ONCOLOGY | Facility: CLINIC | Age: 65
End: 2020-01-27
Payer: MEDICARE

## 2020-01-27 VITALS
OXYGEN SATURATION: 98 % | TEMPERATURE: 98 F | BODY MASS INDEX: 34.69 KG/M2 | HEART RATE: 96 BPM | DIASTOLIC BLOOD PRESSURE: 72 MMHG | HEIGHT: 62 IN | RESPIRATION RATE: 16 BRPM | SYSTOLIC BLOOD PRESSURE: 124 MMHG | WEIGHT: 188.5 LBS

## 2020-01-27 DIAGNOSIS — C17.0 DUODENAL CANCER: ICD-10-CM

## 2020-01-27 DIAGNOSIS — C78.00 MALIGNANT NEOPLASM METASTATIC TO LUNG, UNSPECIFIED LATERALITY: ICD-10-CM

## 2020-01-27 DIAGNOSIS — I10 ESSENTIAL HYPERTENSION: ICD-10-CM

## 2020-01-27 DIAGNOSIS — T45.1X5A CHEMOTHERAPY INDUCED NEUTROPENIA: ICD-10-CM

## 2020-01-27 DIAGNOSIS — D70.1 CHEMOTHERAPY INDUCED NEUTROPENIA: ICD-10-CM

## 2020-01-27 DIAGNOSIS — C78.7 HEPATIC METASTASES: Primary | ICD-10-CM

## 2020-01-27 DIAGNOSIS — Z51.11 ENCOUNTER FOR CHEMOTHERAPY MANAGEMENT: ICD-10-CM

## 2020-01-27 DIAGNOSIS — E78.5 HYPERLIPIDEMIA, UNSPECIFIED HYPERLIPIDEMIA TYPE: ICD-10-CM

## 2020-01-27 DIAGNOSIS — K31.5 DUODENAL STENOSIS: ICD-10-CM

## 2020-01-27 PROCEDURE — 99215 PR OFFICE/OUTPT VISIT, EST, LEVL V, 40-54 MIN: ICD-10-PCS | Mod: S$GLB,,, | Performed by: INTERNAL MEDICINE

## 2020-01-27 PROCEDURE — 1126F AMNT PAIN NOTED NONE PRSNT: CPT | Mod: S$GLB,,, | Performed by: INTERNAL MEDICINE

## 2020-01-27 PROCEDURE — 1101F PT FALLS ASSESS-DOCD LE1/YR: CPT | Mod: S$GLB,,, | Performed by: INTERNAL MEDICINE

## 2020-01-27 PROCEDURE — 3074F PR MOST RECENT SYSTOLIC BLOOD PRESSURE < 130 MM HG: ICD-10-PCS | Mod: S$GLB,,, | Performed by: INTERNAL MEDICINE

## 2020-01-27 PROCEDURE — 99999 PR PBB SHADOW E&M-EST. PATIENT-LVL III: ICD-10-PCS | Mod: PBBFAC,,, | Performed by: INTERNAL MEDICINE

## 2020-01-27 PROCEDURE — 99215 OFFICE O/P EST HI 40 MIN: CPT | Mod: S$GLB,,, | Performed by: INTERNAL MEDICINE

## 2020-01-27 PROCEDURE — 99999 PR PBB SHADOW E&M-EST. PATIENT-LVL III: CPT | Mod: PBBFAC,,, | Performed by: INTERNAL MEDICINE

## 2020-01-27 PROCEDURE — 3074F SYST BP LT 130 MM HG: CPT | Mod: S$GLB,,, | Performed by: INTERNAL MEDICINE

## 2020-01-27 PROCEDURE — 3078F DIAST BP <80 MM HG: CPT | Mod: S$GLB,,, | Performed by: INTERNAL MEDICINE

## 2020-01-27 PROCEDURE — 1126F PR PAIN SEVERITY QUANTIFIED, NO PAIN PRESENT: ICD-10-PCS | Mod: S$GLB,,, | Performed by: INTERNAL MEDICINE

## 2020-01-27 PROCEDURE — 1101F PR PT FALLS ASSESS DOC 0-1 FALLS W/OUT INJ PAST YR: ICD-10-PCS | Mod: S$GLB,,, | Performed by: INTERNAL MEDICINE

## 2020-01-27 PROCEDURE — 3078F PR MOST RECENT DIASTOLIC BLOOD PRESSURE < 80 MM HG: ICD-10-PCS | Mod: S$GLB,,, | Performed by: INTERNAL MEDICINE

## 2020-01-27 PROCEDURE — 1159F MED LIST DOCD IN RCRD: CPT | Mod: S$GLB,,, | Performed by: INTERNAL MEDICINE

## 2020-01-27 PROCEDURE — 1159F PR MEDICATION LIST DOCUMENTED IN MEDICAL RECORD: ICD-10-PCS | Mod: S$GLB,,, | Performed by: INTERNAL MEDICINE

## 2020-01-27 NOTE — PROGRESS NOTES
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  - Clinical: Stage IV (cTX, cNX, cM1) - Signed by Karina Pettit MD on 3/11/2019       Chemotherapy     Treatment Summary   Plan Name: OP COLORECTAL FOLFOX + BEVACIZUMAB Q2W  Treatment Goal: Maintenance  Status: Active  Start Date: 1/21/2019  End Date: 7/3/2019 (Planned)  Provider: Karina Pettit MD  Chemotherapy: fluorouracil injection 835 mg, 400 mg/m2 = 835 mg, Intravenous, Clinic/[facility] 1 time, 4 of 12 cycles    fluorouracil (ADRUCIL) 2,400 mg/m2 = 5,015 mg in sodium chloride 0.9% 200.3 mL chemo infusion, 2,400 mg/m2 = 5,015 mg, Intravenous, Over 46 hours, 4 of 12 cycles    bevacizumab (AVASTIN) 5 mg/kg = 500 mg in sodium chloride 0.9% 100 mL chemo infusion, 5 mg/kg = 500 mg, Intravenous, Clinic/[facility] 1 time, 4 of 12 cycles    leucovorin calcium 400 mg/m2 = 835 mg in dextrose 5 % 250 mL infusion, 400 mg/m2 = 835 mg, Intravenous, Clinic/[facility] 1 time, 4 of 12 cycles    oxaliplatin (ELOXATIN) 85 mg/m2 = 178 mg in dextrose 5 % 500 mL chemo infusion, 85 mg/m2 = 178 mg, Intravenous, Clinic/[facility] 1 time, 4 of 12 cycles          4/8/2019 - 4/8/2019 Chemotherapy     Treatment Summary   Plan Name: OP COLORECTAL FOLFIRI + BEVACIZUMAB Q2W  Treatment Goal: Control  Status: Inactive  Start Date: [No treatment day found]  End Date: [No treatment day found]  Provider: Karina Pettit MD  Chemotherapy: fluorouracil injection 835 mg, 400 mg/m2 = 835 mg, Intravenous, Clinic/[facility] 1 time, 0 of 12 cycles  fluorouracil (ADRUCIL) 2,400 mg/m2 = 5,015 mg in sodium chloride 0.9% 200.3 mL chemo infusion, 2,400 mg/m2 = 5,015 mg, Intravenous, Over 46 hours, 0 of 12 cycles  bevacizumab (AVASTIN) 5 mg/kg = 500 mg in sodium chloride 0.9% 100 mL chemo infusion, 5 mg/kg = 500 mg, Intravenous, Clinic/[facility] 1 time, 0 of 12  cycles  irinotecan (CAMPTOSAR) 180 mg/m2 = 376 mg in sodium chloride 0.9% 500 mL chemo infusion, 180 mg/m2 = 376 mg, Intravenous, Clinic/[facility] 1 time, 0 of 12 cycles  leucovorin calcium 400 mg/m2 = 835 mg in dextrose 5 % 250 mL infusion, 400 mg/m2 = 835 mg, Intravenous, Clinic/[facility] 1 time, 0 of 12 cycles      12/22/2019 -  Chemotherapy     Treatment Summary   Plan Name: OP PANC NAB-PACLITAXEL + GEMCITABINE Day 1, 8 , 15 every 28 days  Treatment Goal: Maintenance  Status: Active  Start Date: 12/31/2019  End Date: 4/7/2020 (Planned)  Provider: Karina Pettit MD  Chemotherapy: PACLitaxel-protein bound (ABRAXANE) 100 mg/m2 = 200 mg in 40 mL infusion, 100 mg/m2 = 200 mg (100 % of original dose 100 mg/m2), Intravenous, Clinic/HOD 1 time, 1 of 4 cycles  Dose modification: 100 mg/m2 (original dose 100 mg/m2, Cycle 1), 125 mg/m2 (original dose 100 mg/m2, Cycle 1)  Administration: 200 mg (12/31/2019), 200 mg (1/7/2020), 250 mg (1/14/2020)  gemcitabine 790 mg in sodium chloride 0.9% 250 mL chemo infusion, 400 mg/m2 = 790 mg (100 % of original dose 400 mg/m2), Intravenous, Clinic/H[facility] 1 time, 1 of 4 cycles  Dose modification: 400 mg/m2 (original dose 400 mg/m2, Cycle 1), 800 mg/m2 (original dose 400 mg/m2, Cycle 1)  Administration: 790 mg (12/31/2019), 790 mg (1/7/2020), 1,575 mg (1/14/2020)        Lung metastases    1/15/2019 Initial Diagnosis     Lung metastases      4/8/2019 - 4/8/2019 Chemotherapy     Treatment Summary   Plan Name: OP COLORECTAL FOLFIRI + BEVACIZUMAB Q2W  Treatment Goal: Control  Status: Inactive  Start Date: [No treatment day found]  End Date: [No treatment day found]  Provider: Karina Pettit MD  Chemotherapy: fluorouracil injection 835 mg, 400 mg/m2 = 835 mg, Intravenous, Clinic/HOD 1 time, 0 of 12 cycles  fluorouracil (ADRUCIL) 2,400 mg/m2 = 5,015 mg in sodium chloride 0.9% 200.3 mL chemo infusion, 2,400 mg/m2 = 5,015 mg, Intravenous, Over 46 hours, 0 of 12 cycles  bevacizumab (AVASTIN) 5 mg/kg = 500 mg  in sodium chloride 0.9% 100 mL chemo infusion, 5 mg/kg = 500 mg, Intravenous, Clinic/H[facility] 1 time, 0 of 12 cycles  irinotecan (CAMPTOSAR) 180 mg/m2 = 376 mg in sodium chloride 0.9% 500 mL chemo infusion, 180 mg/m2 = 376 mg, Intravenous, Clinic/HOD 1 time, 0 of 12 cycles  leucovorin calcium 400 mg/m2 = 835 mg in dextrose 5 % 250 mL infusion, 400 mg/m2 = 835 mg, Intravenous, Clinic/HOD 1 time, 0 of 12 cycles      4/8/2019 - 12/23/2019 Chemotherapy     Treatment Summary   Plan Name: OP COLORECTAL FOLFIRI + BEVACIZUMAB Q2W  Treatment Goal: Control  Status: Inactive  Start Date: 4/8/2019  End Date: 12/3/2019  Provider: Karina Pettit MD  Chemotherapy: fluorouracil injection 835 mg, 400 mg/m2 = 835 mg, Intravenous, Clinic/HOD 1 time, 12 of 12 cycles  Administration: 835 mg (8/6/2019), 835 mg (8/27/2019), 835 mg (8/27/2019), 835 mg (9/10/2019), 835 mg (9/24/2019)  fluorouracil (ADRUCIL) 2,400 mg/m2 = 5,015 mg in sodium chloride 0.9% 200.3 mL chemo infusion, 2,400 mg/m2 = 5,015 mg, Intravenous, Over 46 hours, 12 of 12 cycles  Administration: 5,015 mg (8/6/2019), 5,015 mg (8/27/2019), 5,015 mg (9/10/2019), 5,015 mg (9/24/2019)  bevacizumab (AVASTIN) 5 mg/kg = 500 mg in sodium chloride 0.9% 100 mL chemo infusion, 5 mg/kg = 500 mg, Intravenous, Clinic/HOD 1 time, 14 of 14 cycles  Dose modification: 7.5 mg/kg (original dose 5 mg/kg, Cycle 14)  Administration: 500 mg (8/6/2019), 500 mg (8/27/2019), 500 mg (9/10/2019), 500 mg (9/24/2019), 500 mg (11/12/2019), 750 mg (12/3/2019)  irinotecan (CAMPTOSAR) 180 mg/m2 = 376 mg in sodium chloride 0.9% 500 mL chemo infusion, 180 mg/m2 = 376 mg, Intravenous, Clinic/[facility] 1 time, 14 of 14 cycles  Dose modification: 200 mg/m2 (original dose 180 mg/m2, Cycle 14)  Administration: 376 mg (8/6/2019), 376 mg (8/27/2019), 376 mg (9/10/2019), 376 mg (9/24/2019), 376 mg (11/12/2019), 418 mg (12/3/2019)  leucovorin calcium 400 mg/m2 = 835 mg in dextrose 5 % 250 mL infusion, 400 mg/m2 = 835 mg,  Intravenous, Clinic/HOD 1 time, 12 of 12 cycles  Administration: 835 mg (8/6/2019), 835 mg (8/27/2019), 835 mg (9/10/2019), 835 mg (9/24/2019)      12/22/2019 -  Chemotherapy     Treatment Summary   Plan Name: OP PANC NAB-PACLITAXEL + GEMCITABINE Day 1, 8 , 15 every 28 days  Treatment Goal: Maintenance  Status: Active  Start Date: 12/31/2019  End Date: 4/7/2020 (Planned)  Provider: Karina Pettit MD  Chemotherapy: PACLitaxel-protein bound (ABRAXANE) 100 mg/m2 = 200 mg in 40 mL infusion, 100 mg/m2 = 200 mg (100 % of original dose 100 mg/m2), Intravenous, Clinic/HOD 1 time, 1 of 4 cycles  Dose modification: 100 mg/m2 (original dose 100 mg/m2, Cycle 1), 125 mg/m2 (original dose 100 mg/m2, Cycle 1)  Administration: 200 mg (12/31/2019), 200 mg (1/7/2020), 250 mg (1/14/2020)  gemcitabine 790 mg in sodium chloride 0.9% 250 mL chemo infusion, 400 mg/m2 = 790 mg (100 % of original dose 400 mg/m2), Intravenous, Clinic/HOD 1 time, 1 of 4 cycles  Dose modification: 400 mg/m2 (original dose 400 mg/m2, Cycle 1), 800 mg/m2 (original dose 400 mg/m2, Cycle 1)  Administration: 790 mg (12/31/2019), 790 mg (1/7/2020), 1,575 mg (1/14/2020)        Hepatic metastases    1/15/2019 Initial Diagnosis     Hepatic metastases      4/8/2019 - 4/8/2019 Chemotherapy     Treatment Summary   Plan Name: OP COLORECTAL FOLFIRI + BEVACIZUMAB Q2W  Treatment Goal: Control  Status: Inactive  Start Date: [No treatment day found]  End Date: [No treatment day found]  Provider: Karina Pettit MD  Chemotherapy: fluorouracil injection 835 mg, 400 mg/m2 = 835 mg, Intravenous, Clinic/HOD 1 time, 0 of 12 cycles  fluorouracil (ADRUCIL) 2,400 mg/m2 = 5,015 mg in sodium chloride 0.9% 200.3 mL chemo infusion, 2,400 mg/m2 = 5,015 mg, Intravenous, Over 46 hours, 0 of 12 cycles  bevacizumab (AVASTIN) 5 mg/kg = 500 mg in sodium chloride 0.9% 100 mL chemo infusion, 5 mg/kg = 500 mg, Intravenous, [facility] 1 time, 0 of 12 cycles  irinotecan (CAMPTOSAR) 180 mg/m2 = 376 mg  in sodium chloride 0.9% 500 mL chemo infusion, 180 mg/m2 = 376 mg, Intravenous, Clinic/HOD 1 time, 0 of 12 cycles  leucovorin calcium 400 mg/m2 = 835 mg in dextrose 5 % 250 mL infusion, 400 mg/m2 = 835 mg, Intravenous, Clinic/HOD 1 time, 0 of 12 cycles      4/8/2019 - 12/23/2019 Chemotherapy     Treatment Summary   Plan Name: OP COLORECTAL FOLFIRI + BEVACIZUMAB Q2W  Treatment Goal: Control  Status: Inactive  Start Date: 4/8/2019  End Date: 12/3/2019  Provider: Karina Pettit MD  Chemotherapy: fluorouracil injection 835 mg, 400 mg/m2 = 835 mg, Intravenous, Clinic/HOD 1 time, 12 of 12 cycles  Administration: 835 mg (8/6/2019), 835 mg (8/27/2019), 835 mg (8/27/2019), 835 mg (9/10/2019), 835 mg (9/24/2019)  fluorouracil (ADRUCIL) 2,400 mg/m2 = 5,015 mg in sodium chloride 0.9% 200.3 mL chemo infusion, 2,400 mg/m2 = 5,015 mg, Intravenous, Over 46 hours, 12 of 12 cycles  Administration: 5,015 mg (8/6/2019), 5,015 mg (8/27/2019), 5,015 mg (9/10/2019), 5,015 mg (9/24/2019)  bevacizumab (AVASTIN) 5 mg/kg = 500 mg in sodium chloride 0.9% 100 mL chemo infusion, 5 mg/kg = 500 mg, Intravenous, Clinic/HOD 1 time, 14 of 14 cycles  Dose modification: 7.5 mg/kg (original dose 5 mg/kg, Cycle 14)  Administration: 500 mg (8/6/2019), 500 mg (8/27/2019), 500 mg (9/10/2019), 500 mg (9/24/2019), 500 mg (11/12/2019), 750 mg (12/3/2019)  irinotecan (CAMPTOSAR) 180 mg/m2 = 376 mg in sodium chloride 0.9% 500 mL chemo infusion, 180 mg/m2 = 376 mg, Intravenous, Clinic/HOD 1 time, 14 of 14 cycles  Dose modification: 200 mg/m2 (original dose 180 mg/m2, Cycle 14)  Administration: 376 mg (8/6/2019), 376 mg (8/27/2019), 376 mg (9/10/2019), 376 mg (9/24/2019), 376 mg (11/12/2019), 418 mg (12/3/2019)  leucovorin calcium 400 mg/m2 = 835 mg in dextrose 5 % 250 mL infusion, 400 mg/m2 = 835 mg, Intravenous, [facility], 12 of 12 cycles  Administration: 835 mg (8/6/2019), 835 mg (8/27/2019), 835 mg (9/10/2019), 835 mg (9/24/2019)      12/22/2019 -   Chemotherapy     Treatment Summary   Plan Name: OP PANC NAB-PACLITAXEL + GEMCITABINE Day 1, 8 , 15 every 28 days  Treatment Goal: Maintenance  Status: Active  Start Date: 12/31/2019  End Date: 4/7/2020 (Planned)  Provider: Karina Pettit MD  Chemotherapy: PACLitaxel-protein bound (ABRAXANE) 100 mg/m2 = 200 mg in 40 mL infusion, 100 mg/m2 = 200 mg (100 % of original dose 100 mg/m2), Intravenous, Clinic/HOD 1 time, 1 of 4 cycles  Dose modification: 100 mg/m2 (original dose 100 mg/m2, Cycle 1), 125 mg/m2 (original dose 100 mg/m2, Cycle 1)  Administration: 200 mg (12/31/2019), 200 mg (1/7/2020), 250 mg (1/14/2020)  gemcitabine 790 mg in sodium chloride 0.9% 250 mL chemo infusion, 400 mg/m2 = 790 mg (100 % of original dose 400 mg/m2), Intravenous, Clinic/HOD 1 time, 1 of 4 cycles  Dose modification: 400 mg/m2 (original dose 400 mg/m2, Cycle 1), 800 mg/m2 (original dose 400 mg/m2, Cycle 1)  Administration: 790 mg (12/31/2019), 790 mg (1/7/2020), 1,575 mg (1/14/2020)           Indigo Stuart is a 64 y.o.  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  Kras was MUTATED No MSI : EGFR no overexpression     Tolerating lopressor for htn and lipitor for dyslipidemia  Tolerating zofran for nausea prn chemo     Cycle one folfox avastin January 15 2019   Failed regimen and admitted with SBO and found to have duodenal cancer   Hepatic mets progressed hence changed to irinotecan dropped  oxaliplatin   Cycle one folfiri avastin April 2019     Pt was seen for left arm swelling and ultrasound revealed a DVT in her arm , post lovenox , now on eliquis   Scan reviewed again  Here to resume Xeliri which was started Nov 8 2019 December 13 2019   DVT left internal jugular vein noted October 8 19 patient started on Eliquis  Here for evaluation of her imaging studies and scans  PET-CT with increasing SUV and increasing activity the cancer no evidence of new  disease  Ultrasound revealed resolution of the blood clots in her left upper extremity    December 23 2019   PET CT mixed response explained again   cea now has normalized     January 7 2020  For chemo clearance   New rash to trunk and axilla     January 13 2020  For cycle 1 day 15  Rash better but remains   CEA has increased again   No change in PMH, PFH, PSH since last OV     Current Outpatient Medications:     amLODIPine (NORVASC) 5 MG tablet, Take 1 tablet (5 mg total) by mouth once daily., Disp: 90 tablet, Rfl: 3    atorvastatin (LIPITOR) 20 MG tablet, Take 1 tablet (20 mg total) by mouth every evening., Disp: 90 tablet, Rfl: 3    ELIQUIS 5 mg Tab, TAKE 1 TABLET BY MOUTH TWICE A DAY, Disp: 60 tablet, Rfl: 2    ferrous sulfate (FEOSOL) 325 mg (65 mg iron) Tab tablet, Take 1 tablet by mouth 2 (two) times daily., Disp: , Rfl: 3    metoprolol succinate (TOPROL-XL) 25 MG 24 hr tablet, Take 1 tablet (25 mg total) by mouth every evening., Disp: 90 tablet, Rfl: 3    potassium chloride SA (K-DUR,KLOR-CON) 20 MEQ tablet, Take 1 tablet (20 mEq total) by mouth once daily., Disp: 30 tablet, Rfl: 11    promethazine (PHENERGAN) 25 MG tablet, Take 1 tablet (25 mg total) by mouth every 6 (six) hours as needed for Nausea., Disp: 30 tablet, Rfl: 1      Review of Systems:  General: Weight gain: No, Weight Loss: No,  Fatigue yes   Chills: No, Night Sweats: No, Insomnia: No, Excessive sleeping: No   Respiratory:  Cough: No, Shortness of Breath:  no   Wheezing: No, Excessive Snoring: No, Coughing up blood: No  Endocrine: Heat Intolerance: No, Cold Intolerance: No,   Excessive Thirst: No, Excessive Hunger: No  Eyes:  Blurred Vision: No, Double Vision: No   Light Sensitivity: No, Eye pain: No  Musculoskeletal: Muscle Aches/Pain: No, Joint Pain/Swelling: yes     Muscle Weakness:no , Neck Pain: No, Back Pain:   Neurological: Difficulty Walking/Falls:  No  Further   Headache Migraine: No, Dizziness/Vertigo: No, Fainting: No,  "Weakness: Better   Cardiovascular: Chest Pain: No, Shortness of Breath: no   Gastrointestinal: Nausea/Vomiting: No, Constipation: No, Diarrhea: no   Psych/Cog:  Depression: No, Anxiety: No, Hallucinations: No   : Frequent Urination: No, Incontinence: No, Blood of Urine: No, Urinary Infections: No  ENT:Hearing Loss: No, Earache: No, Ringing in Ears: No  Facial Pain: No, Chronic Congestion:No   Immune: Seasonal Allergies: No, Hives and/or Rashes: No  The remainder of the review of twelve body systems was reviewed and normal  Skin + rash       /72   Pulse 96   Temp 98.2 °F (36.8 °C) (Oral)   Resp 16   Ht 5' 2" (1.575 m)   Wt 85.5 kg (188 lb 7.9 oz)   SpO2 98%   BMI 34.48 kg/m²   Constitutional: oriented to person, place, and time.  Conj pink   HENT: anicteric sclera   Head: Normocephalic and atraumatic. Ears canal normal no discharge    Nose: Nose normal.   ++boggy turbinates  No lymphatics noted   Eyes: Conjunctivae PALE  EOM are normal.  Neck: . No thyromegaly present.  No bruits   Cardiovascular: Normal rate, regular rhythm, normal heart sounds  No murmur heard.  Pulmonary/Chest:  Clear bilaterally no fremitus  Abdominal: Soft. Bowel sounds are normal.  no mass.   Musculoskeletal: Normal range of motion. decreased strength   Left arm remains swollen yet better no discoloration pulses are good  No further boggy turbinates  Lymphadenopathy:  no cervical adenopathy.   Neurological: alert and oriented to person, place  Slight weakness today   Skin: Skin is warm and dry. Rash to stomach continues yet better  Psychiatric: normal mood and affect.   Vitals reviewed.  Chest port removed     Lab Results   Component Value Date    WBC 2.97 (L) 01/23/2020    RBC 3.70 (L) 01/23/2020    HGB 11.2 (L) 01/23/2020    HCT 35.0 (L) 01/23/2020    MCV 95 01/23/2020    MCH 30.3 01/23/2020    MCHC 32.0 01/23/2020    RDW 16.9 (H) 01/23/2020     01/23/2020    MPV 8.7 (L) 01/23/2020    GRAN 21.0 (L) 01/23/2020    LYMPH " 60.0 (H) 01/23/2020    MONO 15.0 01/23/2020    EOS 0.1 01/21/2020    BASO 0.03 01/21/2020    EOSINOPHIL 3.0 01/23/2020    BASOPHIL 1.0 01/23/2020       CMP  Sodium   Date Value Ref Range Status   01/23/2020 139 136 - 145 mmol/L Final     Potassium   Date Value Ref Range Status   01/23/2020 4.6 3.5 - 5.1 mmol/L Final     Chloride   Date Value Ref Range Status   01/23/2020 103 95 - 110 mmol/L Final     CO2   Date Value Ref Range Status   01/23/2020 25 23 - 29 mmol/L Final     Glucose   Date Value Ref Range Status   01/23/2020 98 70 - 110 mg/dL Final     BUN, Bld   Date Value Ref Range Status   01/23/2020 8 8 - 23 mg/dL Final     Creatinine   Date Value Ref Range Status   01/23/2020 0.7 0.5 - 1.4 mg/dL Final     Calcium   Date Value Ref Range Status   01/23/2020 10.0 8.7 - 10.5 mg/dL Final     Total Protein   Date Value Ref Range Status   01/23/2020 7.0 6.0 - 8.4 g/dL Final     Albumin   Date Value Ref Range Status   01/23/2020 3.3 (L) 3.5 - 5.2 g/dL Final     Total Bilirubin   Date Value Ref Range Status   01/23/2020 0.6 0.1 - 1.0 mg/dL Final     Comment:     For infants and newborns, interpretation of results should be based  on gestational age, weight and in agreement with clinical  observations.  Premature Infant recommended reference ranges:  Up to 24 hours.............<8.0 mg/dL  Up to 48 hours............<12.0 mg/dL  3-5 days..................<15.0 mg/dL  6-29 days.................<15.0 mg/dL       Alkaline Phosphatase   Date Value Ref Range Status   01/23/2020 362 (H) 55 - 135 U/L Final     AST   Date Value Ref Range Status   01/23/2020 55 (H) 10 - 40 U/L Final     ALT   Date Value Ref Range Status   01/23/2020 75 (H) 10 - 44 U/L Final     Anion Gap   Date Value Ref Range Status   01/23/2020 11 8 - 16 mmol/L Final     eGFR if    Date Value Ref Range Status   01/23/2020 >60 >60 mL/min/1.73 m^2 Final     eGFR if non    Date Value Ref Range Status   01/23/2020 >60 >60 mL/min/1.73  m^2 Final     Comment:     Calculation used to obtain the estimated glomerular filtration  rate (eGFR) is the CKD-EPI equation.      Impression:           - Normal esophagus.                        - Normal stomach.                        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 Prosthesis placed. The                         proximal end of the stent is distal to the ampulla                         (refer to images).  No msi   There are no osseous abnormalities.      Impression PET CT December 2019       Continued improvement of the hepatic metastasis with decrease in size of the multifocal hepatic lesions    Stable subcentimeter mesenteric lymph nodes    No evidence of new metastatic disease      Electronically signed by: Charissa Marroquin MD  Date: 12/11/2019  Time: 11:03            Hepatic metastases    Malignant neoplasm metastatic to lung, unspecified laterality  -     CBC auto differential; Standing  -     CEA; Future; Expected date: 01/27/2020  -     CMP; Future; Expected date: 01/27/2020    Duodenal cancer    Essential hypertension    Hyperlipidemia, unspecified hyperlipidemia type    Duodenal stenosis    Chemotherapy induced neutropenia    Encounter for chemotherapy management    Other orders  -     filgrastim (NEUPOGEN) injection 300 mcg/mL      1. PET CT in past reviewed showed progression with increasing activity of malignancy and CEA rising most likely due to duodenal cancer which I tried to explain to her is different from colon cancer tolerating chemo   2.  Thrombosis resolved on eliquis  left upper extremity  Tolerating anticoagulation  3.  Long-term plan if she responds well enough to chemotherapy would be to send her to Interventional Radiology for evaluation with Dr Del Rosario   4.  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  This is verifying that most patients can tolerate 100 mg per meter squared of Nab paclitaxel weekly every 28 days.  If patient tolerates I may increase to 125 per the original article.  Gemzar dose will started 400 makes her meter sq which most patient has handled okay and she may be weaned up to 500 mg per meter squared or 800 mg/m2 every other week   She cannot tolerate higher dosages   Cont 125 mg/m2 of abraxane and 400 mg/m2 of gemzar     Antinausea meds may be continued as needed  Cont to use cortisone for her rash   Cont norvasc for HTN monitored by Dr Conner   Tolerating lipitor for dyslipidemia  On statin monitoring counts closely  Cleared  chemo   acp documented

## 2020-01-30 ENCOUNTER — INFUSION (OUTPATIENT)
Dept: INFUSION THERAPY | Facility: HOSPITAL | Age: 65
End: 2020-01-30
Attending: INTERNAL MEDICINE
Payer: MEDICARE

## 2020-01-30 ENCOUNTER — PATIENT MESSAGE (OUTPATIENT)
Dept: HEMATOLOGY/ONCOLOGY | Facility: CLINIC | Age: 65
End: 2020-01-30

## 2020-01-30 ENCOUNTER — TELEPHONE (OUTPATIENT)
Dept: INFUSION THERAPY | Facility: HOSPITAL | Age: 65
End: 2020-01-30

## 2020-01-30 VITALS
DIASTOLIC BLOOD PRESSURE: 79 MMHG | RESPIRATION RATE: 18 BRPM | HEART RATE: 89 BPM | WEIGHT: 188.31 LBS | BODY MASS INDEX: 34.44 KG/M2 | TEMPERATURE: 97 F | SYSTOLIC BLOOD PRESSURE: 117 MMHG

## 2020-01-30 DIAGNOSIS — D70.9 NEUTROPENIC FEVER: Primary | ICD-10-CM

## 2020-01-30 DIAGNOSIS — R50.81 NEUTROPENIC FEVER: Primary | ICD-10-CM

## 2020-01-30 PROCEDURE — 96372 THER/PROPH/DIAG INJ SC/IM: CPT

## 2020-01-30 PROCEDURE — 63600175 PHARM REV CODE 636 W HCPCS: Mod: JG | Performed by: INTERNAL MEDICINE

## 2020-01-30 RX ORDER — SODIUM CHLORIDE 0.9 % (FLUSH) 0.9 %
10 SYRINGE (ML) INJECTION
Status: CANCELLED | OUTPATIENT
Start: 2020-01-30

## 2020-01-30 RX ORDER — HEPARIN 100 UNIT/ML
500 SYRINGE INTRAVENOUS
Status: CANCELLED | OUTPATIENT
Start: 2020-01-30

## 2020-01-30 RX ADMIN — FILGRASTIM 300 MCG: 300 INJECTION, SOLUTION INTRAVENOUS; SUBCUTANEOUS at 10:01

## 2020-01-30 NOTE — TELEPHONE ENCOUNTER
----- Message from Lucretia Rousseau RN sent at 1/29/2020  4:56 PM CST -----  Can you please get patient in for [facility] tomorrow??  1/30/20      Thanks

## 2020-01-31 ENCOUNTER — LAB VISIT (OUTPATIENT)
Dept: LAB | Facility: HOSPITAL | Age: 65
End: 2020-01-31
Attending: INTERNAL MEDICINE
Payer: MEDICARE

## 2020-01-31 ENCOUNTER — DOCUMENTATION ONLY (OUTPATIENT)
Dept: HEMATOLOGY/ONCOLOGY | Facility: CLINIC | Age: 65
End: 2020-01-31

## 2020-01-31 DIAGNOSIS — C17.0 DUODENAL CANCER: ICD-10-CM

## 2020-01-31 LAB
ALBUMIN SERPL BCP-MCNC: 3.1 G/DL (ref 3.5–5.2)
ALP SERPL-CCNC: 223 U/L (ref 55–135)
ALT SERPL W/O P-5'-P-CCNC: 28 U/L (ref 10–44)
ANION GAP SERPL CALC-SCNC: 13 MMOL/L (ref 8–16)
AST SERPL-CCNC: 29 U/L (ref 10–40)
BASOPHILS NFR BLD: 1 % (ref 0–1.9)
BILIRUB SERPL-MCNC: 0.3 MG/DL (ref 0.1–1)
BUN SERPL-MCNC: 6 MG/DL (ref 8–23)
CALCIUM SERPL-MCNC: 10 MG/DL (ref 8.7–10.5)
CHLORIDE SERPL-SCNC: 103 MMOL/L (ref 95–110)
CO2 SERPL-SCNC: 25 MMOL/L (ref 23–29)
CREAT SERPL-MCNC: 0.7 MG/DL (ref 0.5–1.4)
DIFFERENTIAL METHOD: ABNORMAL
EOSINOPHIL NFR BLD: 1 % (ref 0–8)
ERYTHROCYTE [DISTWIDTH] IN BLOOD BY AUTOMATED COUNT: 17.1 % (ref 11.5–14.5)
EST. GFR  (AFRICAN AMERICAN): >60 ML/MIN/1.73 M^2
EST. GFR  (NON AFRICAN AMERICAN): >60 ML/MIN/1.73 M^2
GLUCOSE SERPL-MCNC: 101 MG/DL (ref 70–110)
HCT VFR BLD AUTO: 35.5 % (ref 37–48.5)
HGB BLD-MCNC: 11.2 G/DL (ref 12–16)
IMM GRANULOCYTES # BLD AUTO: ABNORMAL K/UL
LYMPHOCYTES NFR BLD: 10 % (ref 18–48)
MCH RBC QN AUTO: 30.7 PG (ref 27–31)
MCHC RBC AUTO-ENTMCNC: 31.5 G/DL (ref 32–36)
MCV RBC AUTO: 97 FL (ref 82–98)
METAMYELOCYTES NFR BLD MANUAL: 1 %
MONOCYTES NFR BLD: 0 % (ref 4–15)
NEUTROPHILS NFR BLD: 81 % (ref 38–73)
NEUTS BAND NFR BLD MANUAL: 6 %
NRBC BLD-RTO: 0 /100 WBC
PLATELET # BLD AUTO: 513 K/UL (ref 150–350)
PLATELET BLD QL SMEAR: ABNORMAL
PMV BLD AUTO: 8.1 FL (ref 9.2–12.9)
POTASSIUM SERPL-SCNC: 4.2 MMOL/L (ref 3.5–5.1)
PROT SERPL-MCNC: 6.8 G/DL (ref 6–8.4)
RBC # BLD AUTO: 3.65 M/UL (ref 4–5.4)
SODIUM SERPL-SCNC: 141 MMOL/L (ref 136–145)
WBC # BLD AUTO: 52.53 K/UL (ref 3.9–12.7)

## 2020-01-31 PROCEDURE — 80053 COMPREHEN METABOLIC PANEL: CPT

## 2020-01-31 PROCEDURE — 85007 BL SMEAR W/DIFF WBC COUNT: CPT

## 2020-01-31 PROCEDURE — 85027 COMPLETE CBC AUTOMATED: CPT

## 2020-01-31 PROCEDURE — 36415 COLL VENOUS BLD VENIPUNCTURE: CPT

## 2020-02-03 ENCOUNTER — OFFICE VISIT (OUTPATIENT)
Dept: HEMATOLOGY/ONCOLOGY | Facility: CLINIC | Age: 65
End: 2020-02-03
Payer: MEDICARE

## 2020-02-03 VITALS
OXYGEN SATURATION: 97 % | DIASTOLIC BLOOD PRESSURE: 64 MMHG | BODY MASS INDEX: 34.4 KG/M2 | WEIGHT: 186.94 LBS | SYSTOLIC BLOOD PRESSURE: 139 MMHG | TEMPERATURE: 99 F | HEIGHT: 62 IN | HEART RATE: 83 BPM | RESPIRATION RATE: 18 BRPM

## 2020-02-03 DIAGNOSIS — R97.0 ELEVATED CEA: ICD-10-CM

## 2020-02-03 DIAGNOSIS — C17.0 DUODENAL CANCER: ICD-10-CM

## 2020-02-03 DIAGNOSIS — Z51.11 ENCOUNTER FOR CHEMOTHERAPY MANAGEMENT: ICD-10-CM

## 2020-02-03 DIAGNOSIS — C78.00 MALIGNANT NEOPLASM METASTATIC TO LUNG, UNSPECIFIED LATERALITY: ICD-10-CM

## 2020-02-03 DIAGNOSIS — Z09 CHEMOTHERAPY FOLLOW-UP EXAMINATION: ICD-10-CM

## 2020-02-03 DIAGNOSIS — C78.7 HEPATIC METASTASES: Primary | ICD-10-CM

## 2020-02-03 PROCEDURE — 99999 PR PBB SHADOW E&M-EST. PATIENT-LVL IV: ICD-10-PCS | Mod: PBBFAC,,, | Performed by: INTERNAL MEDICINE

## 2020-02-03 PROCEDURE — 1126F PR PAIN SEVERITY QUANTIFIED, NO PAIN PRESENT: ICD-10-PCS | Mod: S$GLB,,, | Performed by: INTERNAL MEDICINE

## 2020-02-03 PROCEDURE — 3075F PR MOST RECENT SYSTOLIC BLOOD PRESS GE 130-139MM HG: ICD-10-PCS | Mod: S$GLB,,, | Performed by: INTERNAL MEDICINE

## 2020-02-03 PROCEDURE — 1159F MED LIST DOCD IN RCRD: CPT | Mod: S$GLB,,, | Performed by: INTERNAL MEDICINE

## 2020-02-03 PROCEDURE — 99215 OFFICE O/P EST HI 40 MIN: CPT | Mod: S$GLB,,, | Performed by: INTERNAL MEDICINE

## 2020-02-03 PROCEDURE — 3288F PR FALLS RISK ASSESSMENT DOCUMENTED: ICD-10-PCS | Mod: S$GLB,,, | Performed by: INTERNAL MEDICINE

## 2020-02-03 PROCEDURE — 3288F FALL RISK ASSESSMENT DOCD: CPT | Mod: S$GLB,,, | Performed by: INTERNAL MEDICINE

## 2020-02-03 PROCEDURE — 3078F PR MOST RECENT DIASTOLIC BLOOD PRESSURE < 80 MM HG: ICD-10-PCS | Mod: S$GLB,,, | Performed by: INTERNAL MEDICINE

## 2020-02-03 PROCEDURE — 3075F SYST BP GE 130 - 139MM HG: CPT | Mod: S$GLB,,, | Performed by: INTERNAL MEDICINE

## 2020-02-03 PROCEDURE — 1100F PR PT FALLS ASSESS DOC 2+ FALLS/FALL W/INJURY/YR: ICD-10-PCS | Mod: S$GLB,,, | Performed by: INTERNAL MEDICINE

## 2020-02-03 PROCEDURE — 1100F PTFALLS ASSESS-DOCD GE2>/YR: CPT | Mod: S$GLB,,, | Performed by: INTERNAL MEDICINE

## 2020-02-03 PROCEDURE — 99215 PR OFFICE/OUTPT VISIT, EST, LEVL V, 40-54 MIN: ICD-10-PCS | Mod: S$GLB,,, | Performed by: INTERNAL MEDICINE

## 2020-02-03 PROCEDURE — 1159F PR MEDICATION LIST DOCUMENTED IN MEDICAL RECORD: ICD-10-PCS | Mod: S$GLB,,, | Performed by: INTERNAL MEDICINE

## 2020-02-03 PROCEDURE — 3078F DIAST BP <80 MM HG: CPT | Mod: S$GLB,,, | Performed by: INTERNAL MEDICINE

## 2020-02-03 PROCEDURE — 99999 PR PBB SHADOW E&M-EST. PATIENT-LVL IV: CPT | Mod: PBBFAC,,, | Performed by: INTERNAL MEDICINE

## 2020-02-03 PROCEDURE — 1126F AMNT PAIN NOTED NONE PRSNT: CPT | Mod: S$GLB,,, | Performed by: INTERNAL MEDICINE

## 2020-02-03 RX ORDER — HEPARIN 100 UNIT/ML
500 SYRINGE INTRAVENOUS
Status: CANCELLED | OUTPATIENT
Start: 2020-02-04

## 2020-02-03 RX ORDER — ACETAMINOPHEN 325 MG/1
650 TABLET ORAL
Status: CANCELLED
Start: 2020-02-04

## 2020-02-03 RX ORDER — SODIUM CHLORIDE 0.9 % (FLUSH) 0.9 %
10 SYRINGE (ML) INJECTION
Status: CANCELLED | OUTPATIENT
Start: 2020-02-04

## 2020-02-03 RX ORDER — HEPARIN 100 UNIT/ML
500 SYRINGE INTRAVENOUS
Status: CANCELLED | OUTPATIENT
Start: 2020-02-18

## 2020-02-03 RX ORDER — PALONOSETRON 0.05 MG/ML
0.25 INJECTION, SOLUTION INTRAVENOUS
Status: CANCELLED | OUTPATIENT
Start: 2020-02-18

## 2020-02-03 RX ORDER — SODIUM CHLORIDE 0.9 % (FLUSH) 0.9 %
10 SYRINGE (ML) INJECTION
Status: CANCELLED | OUTPATIENT
Start: 2020-02-11

## 2020-02-03 RX ORDER — PALONOSETRON 0.05 MG/ML
0.25 INJECTION, SOLUTION INTRAVENOUS
Status: CANCELLED | OUTPATIENT
Start: 2020-02-11

## 2020-02-03 RX ORDER — HEPARIN 100 UNIT/ML
500 SYRINGE INTRAVENOUS
Status: CANCELLED | OUTPATIENT
Start: 2020-02-11

## 2020-02-03 RX ORDER — SODIUM CHLORIDE 0.9 % (FLUSH) 0.9 %
10 SYRINGE (ML) INJECTION
Status: CANCELLED | OUTPATIENT
Start: 2020-02-18

## 2020-02-03 RX ORDER — PALONOSETRON 0.05 MG/ML
0.25 INJECTION, SOLUTION INTRAVENOUS
Status: CANCELLED | OUTPATIENT
Start: 2020-02-04

## 2020-02-03 RX ORDER — ACETAMINOPHEN 325 MG/1
650 TABLET ORAL
Status: CANCELLED
Start: 2020-02-18

## 2020-02-03 RX ORDER — ACETAMINOPHEN 325 MG/1
650 TABLET ORAL
Status: CANCELLED
Start: 2020-02-11

## 2020-02-03 NOTE — PROGRESS NOTES
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  - Clinical: Stage IV (cTX, cNX, cM1) - Signed by Karina Pettit MD on 3/11/2019       Chemotherapy     Treatment Summary   Plan Name: OP COLORECTAL FOLFOX + BEVACIZUMAB Q2W  Treatment Goal: Maintenance  Status: Active  Start Date: 1/21/2019  End Date: 7/3/2019 (Planned)  Provider: Karina Pettit MD  Chemotherapy: fluorouracil injection 835 mg, 400 mg/m2 = 835 mg, Intravenous, Clinic/[facility] 1 time, 4 of 12 cycles    fluorouracil (ADRUCIL) 2,400 mg/m2 = 5,015 mg in sodium chloride 0.9% 200.3 mL chemo infusion, 2,400 mg/m2 = 5,015 mg, Intravenous, Over 46 hours, 4 of 12 cycles    bevacizumab (AVASTIN) 5 mg/kg = 500 mg in sodium chloride 0.9% 100 mL chemo infusion, 5 mg/kg = 500 mg, Intravenous, Clinic/[facility] 1 time, 4 of 12 cycles    leucovorin calcium 400 mg/m2 = 835 mg in dextrose 5 % 250 mL infusion, 400 mg/m2 = 835 mg, Intravenous, Clinic/[facility] 1 time, 4 of 12 cycles    oxaliplatin (ELOXATIN) 85 mg/m2 = 178 mg in dextrose 5 % 500 mL chemo infusion, 85 mg/m2 = 178 mg, Intravenous, Clinic/[facility] 1 time, 4 of 12 cycles          4/8/2019 - 4/8/2019 Chemotherapy     Treatment Summary   Plan Name: OP COLORECTAL FOLFIRI + BEVACIZUMAB Q2W  Treatment Goal: Control  Status: Inactive  Start Date: [No treatment day found]  End Date: [No treatment day found]  Provider: Karina Pettit MD  Chemotherapy: fluorouracil injection 835 mg, 400 mg/m2 = 835 mg, Intravenous, Clinic/[facility] 1 time, 0 of 12 cycles  fluorouracil (ADRUCIL) 2,400 mg/m2 = 5,015 mg in sodium chloride 0.9% 200.3 mL chemo infusion, 2,400 mg/m2 = 5,015 mg, Intravenous, Over 46 hours, 0 of 12 cycles  bevacizumab (AVASTIN) 5 mg/kg = 500 mg in sodium chloride 0.9% 100 mL chemo infusion, 5 mg/kg = 500 mg, Intravenous, Clinic/[facility] 1 time, 0 of 12  cycles  irinotecan (CAMPTOSAR) 180 mg/m2 = 376 mg in sodium chloride 0.9% 500 mL chemo infusion, 180 mg/m2 = 376 mg, Intravenous, Clinic/[facility] 1 time, 0 of 12 cycles  leucovorin calcium 400 mg/m2 = 835 mg in dextrose 5 % 250 mL infusion, 400 mg/m2 = 835 mg, Intravenous, Clinic/[facility] 1 time, 0 of 12 cycles      12/22/2019 -  Chemotherapy     Treatment Summary   Plan Name: OP PANC NAB-PACLITAXEL + GEMCITABINE Day 1, 8 , 15 every 28 days  Treatment Goal: Maintenance  Status: Active  Start Date: 12/31/2019  End Date: 4/14/2020 (Planned)  Provider: Karina Pettit MD  Chemotherapy: PACLitaxel-protein bound (ABRAXANE) 100 mg/m2 = 200 mg in 40 mL infusion, 100 mg/m2 = 200 mg (100 % of original dose 100 mg/m2), Intravenous, Clinic/[facility] 1 time, 1 of 4 cycles  Dose modification: 100 mg/m2 (original dose 100 mg/m2, Cycle 1), 125 mg/m2 (original dose 100 mg/m2, Cycle 1)  Administration: 200 mg (12/31/2019), 200 mg (1/7/2020), 250 mg (1/14/2020)  gemcitabine 790 mg in sodium chloride 0.9% 250 mL chemo infusion, 400 mg/m2 = 790 mg (100 % of original dose 400 mg/m2), Intravenous, Clinic/[facility] 1 time, 1 of 4 cycles  Dose modification: 400 mg/m2 (original dose 400 mg/m2, Cycle 1), 800 mg/m2 (original dose 400 mg/m2, Cycle 1), 400 mg/m2 (original dose 400 mg/m2, Cycle 2)  Administration: 790 mg (12/31/2019), 790 mg (1/7/2020), 1,575 mg (1/14/2020)        Lung metastases    1/15/2019 Initial Diagnosis     Lung metastases      4/8/2019 - 4/8/2019 Chemotherapy     Treatment Summary   Plan Name: OP COLORECTAL FOLFIRI + BEVACIZUMAB Q2W  Treatment Goal: Control  Status: Inactive  Start Date: [No treatment day found]  End Date: [No treatment day found]  Provider: Karina Pettit MD  Chemotherapy: fluorouracil injection 835 mg, 400 mg/m2 = 835 mg, Intravenous, Clinic/[facility] 1 time, 0 of 12 cycles  fluorouracil (ADRUCIL) 2,400 mg/m2 = 5,015 mg in sodium chloride 0.9% 200.3 mL chemo infusion, 2,400 mg/m2 = 5,015 mg, Intravenous, Over 46 hours, 0 of 12  cycles  bevacizumab (AVASTIN) 5 mg/kg = 500 mg in sodium chloride 0.9% 100 mL chemo infusion, 5 mg/kg = 500 mg, Intravenous, Clinic/[facility] 1 time, 0 of 12 cycles  irinotecan (CAMPTOSAR) 180 mg/m2 = 376 mg in sodium chloride 0.9% 500 mL chemo infusion, 180 mg/m2 = 376 mg, Intravenous, Clinic/[facility] 1 time, 0 of 12 cycles  leucovorin calcium 400 mg/m2 = 835 mg in dextrose 5 % 250 mL infusion, 400 mg/m2 = 835 mg, Intravenous, Clinic/H[facility] 1 time, 0 of 12 cycles      4/8/2019 - 12/23/2019 Chemotherapy     Treatment Summary   Plan Name: OP COLORECTAL FOLFIRI + BEVACIZUMAB Q2W  Treatment Goal: Control  Status: Inactive  Start Date: 4/8/2019  End Date: 12/3/2019  Provider: Karina Pettit MD  Chemotherapy: fluorouracil injection 835 mg, 400 mg/m2 = 835 mg, Intravenous, Clinic/[facility] 1 time, 12 of 12 cycles  Administration: 835 mg (8/6/2019), 835 mg (8/27/2019), 835 mg (8/27/2019), 835 mg (9/10/2019), 835 mg (9/24/2019)  fluorouracil (ADRUCIL) 2,400 mg/m2 = 5,015 mg in sodium chloride 0.9% 200.3 mL chemo infusion, 2,400 mg/m2 = 5,015 mg, Intravenous, Over 46 hours, 12 of 12 cycles  Administration: 5,015 mg (8/6/2019), 5,015 mg (8/27/2019), 5,015 mg (9/10/2019), 5,015 mg (9/24/2019)  bevacizumab (AVASTIN) 5 mg/kg = 500 mg in sodium chloride 0.9% 100 mL chemo infusion, 5 mg/kg = 500 mg, Intravenous, Clinic/HOD 1 time, 14 of 14 cycles  Dose modification: 7.5 mg/kg (original dose 5 mg/kg, Cycle 14)  Administration: 500 mg (8/6/2019), 500 mg (8/27/2019), 500 mg (9/10/2019), 500 mg (9/24/2019), 500 mg (11/12/2019), 750 mg (12/3/2019)  irinotecan (CAMPTOSAR) 180 mg/m2 = 376 mg in sodium chloride 0.9% 500 mL chemo infusion, 180 mg/m2 = 376 mg, Intravenous, Clinic/[facility] 1 time, 14 of 14 cycles  Dose modification: 200 mg/m2 (original dose 180 mg/m2, Cycle 14)  Administration: 376 mg (8/6/2019), 376 mg (8/27/2019), 376 mg (9/10/2019), 376 mg (9/24/2019), 376 mg (11/12/2019), 418 mg (12/3/2019)  leucovorin calcium 400 mg/m2 = 835 mg in dextrose 5 %  250 mL infusion, 400 mg/m2 = 835 mg, Intravenous, Clinic/HOD 1 time, 12 of 12 cycles  Administration: 835 mg (8/6/2019), 835 mg (8/27/2019), 835 mg (9/10/2019), 835 mg (9/24/2019)      12/22/2019 -  Chemotherapy     Treatment Summary   Plan Name: OP PANC NAB-PACLITAXEL + GEMCITABINE Day 1, 8 , 15 every 28 days  Treatment Goal: Maintenance  Status: Active  Start Date: 12/31/2019  End Date: 4/14/2020 (Planned)  Provider: Karina Pettit MD  Chemotherapy: PACLitaxel-protein bound (ABRAXANE) 100 mg/m2 = 200 mg in 40 mL infusion, 100 mg/m2 = 200 mg (100 % of original dose 100 mg/m2), Intravenous, Clinic/HOD 1 time, 1 of 4 cycles  Dose modification: 100 mg/m2 (original dose 100 mg/m2, Cycle 1), 125 mg/m2 (original dose 100 mg/m2, Cycle 1)  Administration: 200 mg (12/31/2019), 200 mg (1/7/2020), 250 mg (1/14/2020)  gemcitabine 790 mg in sodium chloride 0.9% 250 mL chemo infusion, 400 mg/m2 = 790 mg (100 % of original dose 400 mg/m2), Intravenous, Clinic/HOD 1 time, 1 of 4 cycles  Dose modification: 400 mg/m2 (original dose 400 mg/m2, Cycle 1), 800 mg/m2 (original dose 400 mg/m2, Cycle 1), 400 mg/m2 (original dose 400 mg/m2, Cycle 2)  Administration: 790 mg (12/31/2019), 790 mg (1/7/2020), 1,575 mg (1/14/2020)        Hepatic metastases    1/15/2019 Initial Diagnosis     Hepatic metastases      4/8/2019 - 4/8/2019 Chemotherapy     Treatment Summary   Plan Name: OP COLORECTAL FOLFIRI + BEVACIZUMAB Q2W  Treatment Goal: Control  Status: Inactive  Start Date: [No treatment day found]  End Date: [No treatment day found]  Provider: Karina Pettit MD  Chemotherapy: fluorouracil injection 835 mg, 400 mg/m2 = 835 mg, Intravenous, Clinic/[facility]OD 1 time, 0 of 12 cycles  fluorouracil (ADRUCIL) 2,400 mg/m2 = 5,015 mg in sodium chloride 0.9% 200.3 mL chemo infusion, 2,400 mg/m2 = 5,015 mg, Intravenous, Over 46 hours, 0 of 12 cycles  bevacizumab (AVASTIN) 5 mg/kg = 500 mg in sodium chloride 0.9% 100 mL chemo infusion, 5 mg/kg = 500 mg,  Intravenous, Clinic/HOD 1 time, 0 of 12 cycles  irinotecan (CAMPTOSAR) 180 mg/m2 = 376 mg in sodium chloride 0.9% 500 mL chemo infusion, 180 mg/m2 = 376 mg, Intravenous, Clinic/HOD 1 time, 0 of 12 cycles  leucovorin calcium 400 mg/m2 = 835 mg in dextrose 5 % 250 mL infusion, 400 mg/m2 = 835 mg, Intravenous, Clinic/HOD 1 time, 0 of 12 cycles      4/8/2019 - 12/23/2019 Chemotherapy     Treatment Summary   Plan Name: OP COLORECTAL FOLFIRI + BEVACIZUMAB Q2W  Treatment Goal: Control  Status: Inactive  Start Date: 4/8/2019  End Date: 12/3/2019  Provider: Karina Pettit MD  Chemotherapy: fluorouracil injection 835 mg, 400 mg/m2 = 835 mg, Intravenous, Clinic/HOD 1 time, 12 of 12 cycles  Administration: 835 mg (8/6/2019), 835 mg (8/27/2019), 835 mg (8/27/2019), 835 mg (9/10/2019), 835 mg (9/24/2019)  fluorouracil (ADRUCIL) 2,400 mg/m2 = 5,015 mg in sodium chloride 0.9% 200.3 mL chemo infusion, 2,400 mg/m2 = 5,015 mg, Intravenous, Over 46 hours, 12 of 12 cycles  Administration: 5,015 mg (8/6/2019), 5,015 mg (8/27/2019), 5,015 mg (9/10/2019), 5,015 mg (9/24/2019)  bevacizumab (AVASTIN) 5 mg/kg = 500 mg in sodium chloride 0.9% 100 mL chemo infusion, 5 mg/kg = 500 mg, Intravenous, Clinic/HOD 1 time, 14 of 14 cycles  Dose modification: 7.5 mg/kg (original dose 5 mg/kg, Cycle 14)  Administration: 500 mg (8/6/2019), 500 mg (8/27/2019), 500 mg (9/10/2019), 500 mg (9/24/2019), 500 mg (11/12/2019), 750 mg (12/3/2019)  irinotecan (CAMPTOSAR) 180 mg/m2 = 376 mg in sodium chloride 0.9% 500 mL chemo infusion, 180 mg/m2 = 376 mg, Intravenous, Clinic/HOD 1 time, 14 of 14 cycles  Dose modification: 200 mg/m2 (original dose 180 mg/m2, Cycle 14)  Administration: 376 mg (8/6/2019), 376 mg (8/27/2019), 376 mg (9/10/2019), 376 mg (9/24/2019), 376 mg (11/12/2019), 418 mg (12/3/2019)  leucovorin calcium 400 mg/m2 = 835 mg in dextrose 5 % 250 mL infusion, 400 mg/m2 = 835 mg, Intravenous, Clinic/HOD 1 time, 12 of 12 cycles  Administration: 835 mg  (8/6/2019), 835 mg (8/27/2019), 835 mg (9/10/2019), 835 mg (9/24/2019)      12/22/2019 -  Chemotherapy     Treatment Summary   Plan Name: OP PANC NAB-PACLITAXEL + GEMCITABINE Day 1, 8 , 15 every 28 days  Treatment Goal: Maintenance  Status: Active  Start Date: 12/31/2019  End Date: 4/14/2020 (Planned)  Provider: Karina Pettit MD  Chemotherapy: PACLitaxel-protein bound (ABRAXANE) 100 mg/m2 = 200 mg in 40 mL infusion, 100 mg/m2 = 200 mg (100 % of original dose 100 mg/m2), Intravenous, Clinic/HOD 1 time, 1 of 4 cycles  Dose modification: 100 mg/m2 (original dose 100 mg/m2, Cycle 1), 125 mg/m2 (original dose 100 mg/m2, Cycle 1)  Administration: 200 mg (12/31/2019), 200 mg (1/7/2020), 250 mg (1/14/2020)  gemcitabine 790 mg in sodium chloride 0.9% 250 mL chemo infusion, 400 mg/m2 = 790 mg (100 % of original dose 400 mg/m2), Intravenous, Clinic/HOD 1 time, 1 of 4 cycles  Dose modification: 400 mg/m2 (original dose 400 mg/m2, Cycle 1), 800 mg/m2 (original dose 400 mg/m2, Cycle 1), 400 mg/m2 (original dose 400 mg/m2, Cycle 2)  Administration: 790 mg (12/31/2019), 790 mg (1/7/2020), 1,575 mg (1/14/2020)           Indigo Stuart is a 64 y.o.  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  Kras was MUTATED No MSI : EGFR no overexpression     Tolerating lopressor for htn and lipitor for dyslipidemia  Tolerating zofran for nausea prn chemo     Cycle one folfox avastin January 15 2019   Failed regimen and admitted with SBO and found to have duodenal cancer   Hepatic mets progressed hence changed to irinotecan dropped  oxaliplatin   Cycle one folfiri avastin April 2019     Pt was seen for left arm swelling and ultrasound revealed a DVT in her arm , post lovenox , now on eliquis   Scan reviewed again  Here to resume Xeliri which was started Nov 8 2019 December 13 2019   DVT left internal jugular vein noted October 8 19 patient started on  Eliquis  Here for evaluation of her imaging studies and scans  PET-CT with increasing SUV and increasing activity the cancer no evidence of new disease  Ultrasound revealed resolution of the blood clots in her left upper extremity    December 23 2019   PET CT mixed response explained again   cea now has normalized     January 7 2020  For chemo clearance   New rash to trunk and axilla     January 13 2020  For cycle 1 day 15  Rash better but remains   CEA has increased again       February 3 2020  For cycle 2 day 1 clearance after being delayed for one week with cytopenias   + leg pain new  Is on 1/2 dose eliquis after having nosebleeds and thrombocytopenia  Both of these resolved     No change in PMH, PFH, PSH since last OV     Current Outpatient Medications:     amLODIPine (NORVASC) 5 MG tablet, Take 1 tablet (5 mg total) by mouth once daily., Disp: 90 tablet, Rfl: 3    atorvastatin (LIPITOR) 20 MG tablet, Take 1 tablet (20 mg total) by mouth every evening., Disp: 90 tablet, Rfl: 3    ELIQUIS 5 mg Tab, TAKE 1 TABLET BY MOUTH TWICE A DAY (Patient taking differently: No sig reported), Disp: 60 tablet, Rfl: 2    ferrous sulfate (FEOSOL) 325 mg (65 mg iron) Tab tablet, Take 1 tablet by mouth 2 (two) times daily., Disp: , Rfl: 3    metoprolol succinate (TOPROL-XL) 25 MG 24 hr tablet, Take 1 tablet (25 mg total) by mouth every evening., Disp: 90 tablet, Rfl: 3    potassium chloride SA (K-DUR,KLOR-CON) 20 MEQ tablet, Take 1 tablet (20 mEq total) by mouth once daily., Disp: 30 tablet, Rfl: 11    promethazine (PHENERGAN) 25 MG tablet, Take 1 tablet (25 mg total) by mouth every 6 (six) hours as needed for Nausea. (Patient not taking: Reported on 2/3/2020), Disp: 30 tablet, Rfl: 1      Review of Systems:  General: Weight gain: No, Weight Loss: No,  Fatigue yes   Chills: No, Night Sweats: No, Insomnia: No, Excessive sleeping: No   Respiratory:  Cough: No, Shortness of Breath:  no   Wheezing: No, Excessive Snoring: No,  "Coughing up blood: No  Endocrine: Heat Intolerance: No, Cold Intolerance: No,   Excessive Thirst: No, Excessive Hunger: No  Eyes:  Blurred Vision: No, Double Vision: No   Light Sensitivity: No, Eye pain: No  Musculoskeletal: Muscle Aches/Pain: Yes    Joint Pain/Swelling: yes     Muscle Weakness:no , Neck Pain: No, Back Pain:   Neurological: Difficulty Walking/Falls:  No  Further   Headache Migraine: No, Dizziness/Vertigo: No, Fainting: No, Weakness: Better   Cardiovascular: Chest Pain: No, Shortness of Breath: no   Gastrointestinal: Nausea/Vomiting: No, Constipation: No, Diarrhea: no   Psych/Cog:  Depression: No, Anxiety: No, Hallucinations: No   : Frequent Urination: No, Incontinence: No, Blood of Urine: No, Urinary Infections: No  ENT:Hearing Loss: No, Earache: No, Ringing in Ears: No  Facial Pain: No, Chronic Congestion:No   Immune: Seasonal Allergies: No, Hives and/or Rashes: No  The remainder of the review of twelve body systems was reviewed and normal  Skin + rash       /64 (BP Location: Left arm, Patient Position: Sitting)   Pulse 83   Temp 98.7 °F (37.1 °C) (Oral)   Resp 18   Ht 5' 2" (1.575 m)   Wt 84.8 kg (186 lb 15.2 oz)   SpO2 97%   BMI 34.19 kg/m²   Constitutional: oriented to person, place, and time.  Conj pink   HENT: anicteric sclera   Head: Normocephalic and atraumatic. Ears canal normal no discharge    Nose: Nose normal. ++boggy turbinates  No lymphatics noted   Eyes: Conjunctivae PALE  EOM are normal.  Neck: . No thyromegaly present.     Cardiovascular: Normal rate, regular rhythm, normal heart sounds  No murmur heard.  Pulmonary/Chest:  Clear bilaterally no fremitus  Abdominal: Soft. Bowel sounds are normal.  no mass.   Musculoskeletal: Normal range of motion. decreased strength   Left arm remains swollen yet better   No further boggy turbinates  Lymphadenopathy:  no cervical adenopathy.   Neurological: alert and oriented to person, place  Slight weakness today   Skin: Skin is warm " and dry. Rash to stomach continues yet better  Psychiatric: normal mood and affect.   Vitals reviewed.  Chest port removed     Lab Results   Component Value Date    WBC 52.53 (HH) 01/31/2020    RBC 3.65 (L) 01/31/2020    HGB 11.2 (L) 01/31/2020    HCT 35.5 (L) 01/31/2020    MCV 97 01/31/2020    MCH 30.7 01/31/2020    MCHC 31.5 (L) 01/31/2020    RDW 17.1 (H) 01/31/2020     (H) 01/31/2020    MPV 8.1 (L) 01/31/2020    GRAN 81.0 (H) 01/31/2020    LYMPH 10.0 (L) 01/31/2020    MONO 0.0 (L) 01/31/2020    EOS 0.1 01/21/2020    BASO 0.03 01/21/2020    EOSINOPHIL 1.0 01/31/2020    BASOPHIL 1.0 01/31/2020       CMP  Sodium   Date Value Ref Range Status   01/31/2020 141 136 - 145 mmol/L Final     Potassium   Date Value Ref Range Status   01/31/2020 4.2 3.5 - 5.1 mmol/L Final     Chloride   Date Value Ref Range Status   01/31/2020 103 95 - 110 mmol/L Final     CO2   Date Value Ref Range Status   01/31/2020 25 23 - 29 mmol/L Final     Glucose   Date Value Ref Range Status   01/31/2020 101 70 - 110 mg/dL Final     BUN, Bld   Date Value Ref Range Status   01/31/2020 6 (L) 8 - 23 mg/dL Final     Creatinine   Date Value Ref Range Status   01/31/2020 0.7 0.5 - 1.4 mg/dL Final     Calcium   Date Value Ref Range Status   01/31/2020 10.0 8.7 - 10.5 mg/dL Final     Total Protein   Date Value Ref Range Status   01/31/2020 6.8 6.0 - 8.4 g/dL Final     Albumin   Date Value Ref Range Status   01/31/2020 3.1 (L) 3.5 - 5.2 g/dL Final     Total Bilirubin   Date Value Ref Range Status   01/31/2020 0.3 0.1 - 1.0 mg/dL Final     Comment:     For infants and newborns, interpretation of results should be based  on gestational age, weight and in agreement with clinical  observations.  Premature Infant recommended reference ranges:  Up to 24 hours.............<8.0 mg/dL  Up to 48 hours............<12.0 mg/dL  3-5 days..................<15.0 mg/dL  6-29 days.................<15.0 mg/dL       Alkaline Phosphatase   Date Value Ref Range Status    01/31/2020 223 (H) 55 - 135 U/L Final     AST   Date Value Ref Range Status   01/31/2020 29 10 - 40 U/L Final     ALT   Date Value Ref Range Status   01/31/2020 28 10 - 44 U/L Final     Anion Gap   Date Value Ref Range Status   01/31/2020 13 8 - 16 mmol/L Final     eGFR if    Date Value Ref Range Status   01/31/2020 >60 >60 mL/min/1.73 m^2 Final     eGFR if non    Date Value Ref Range Status   01/31/2020 >60 >60 mL/min/1.73 m^2 Final     Comment:     Calculation used to obtain the estimated glomerular filtration  rate (eGFR) is the CKD-EPI equation.      Impression:           - Normal esophagus.                        - Normal stomach.                        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 Prosthesis placed. The                         proximal end of the stent is distal to the ampulla                         (refer to images).  No msi   There are no osseous abnormalities.      Impression PET CT December 2019       Continued improvement of the hepatic metastasis with decrease in size of the multifocal hepatic lesions    Stable subcentimeter mesenteric lymph nodes    No evidence of new metastatic disease      Electronically signed by: Charissa Marroquin MD  Date: 12/11/2019  Time: 11:03            Hepatic metastases    Malignant neoplasm metastatic to lung, unspecified laterality  -     CBC auto differential; Standing  -     CMP; Future; Expected date: 02/03/2020    Duodenal cancer  -     CBC auto differential; Standing  -     CMP; Future; Expected date: 02/03/2020    Encounter for chemotherapy management    Elevated CEA    Chemotherapy follow-up examination    Other orders  -     dexamethasone IVPB 10 mg  -     diphenhydrAMINE (BENADRYL) 12.5 mg in sodium chloride 0.9% 50 mL IVPB  -     acetaminophen tablet 650 mg  -     palonosetron injection 0.25 mg  -     PACLitaxel-protein bound (ABRAXANE) 125 mg/m2 = 250 mg in 50 mL  infusion  -     gemcitabine (GEMZAR) 790 mg in sodium chloride 0.9% 250 mL chemo infusion  -     sodium chloride 0.9% 250 mL flush bag  -     sodium chloride 0.9% flush 10 mL  -     heparin, porcine (PF) 100 unit/mL injection flush 500 Units  -     alteplase injection 2 mg  -     dexamethasone IVPB 10 mg  -     diphenhydrAMINE (BENADRYL) 12.5 mg in sodium chloride 0.9% 50 mL IVPB  -     acetaminophen tablet 650 mg  -     palonosetron injection 0.25 mg  -     PACLitaxel-protein bound (ABRAXANE) 125 mg/m2 = 250 mg in 50 mL infusion  -     gemcitabine (GEMZAR) 790 mg in sodium chloride 0.9% 250 mL chemo infusion  -     sodium chloride 0.9% 250 mL flush bag  -     sodium chloride 0.9% flush 10 mL  -     heparin, porcine (PF) 100 unit/mL injection flush 500 Units  -     alteplase injection 2 mg  -     dexamethasone IVPB 10 mg  -     diphenhydrAMINE (BENADRYL) 12.5 mg in sodium chloride 0.9% 50 mL IVPB  -     acetaminophen tablet 650 mg  -     palonosetron injection 0.25 mg  -     PACLitaxel-protein bound (ABRAXANE) 125 mg/m2 = 250 mg in 50 mL infusion  -     gemcitabine (GEMZAR) 790 mg in sodium chloride 0.9% 250 mL chemo infusion  -     sodium chloride 0.9% 250 mL flush bag  -     sodium chloride 0.9% flush 10 mL  -     heparin, porcine (PF) 100 unit/mL injection flush 500 Units  -     alteplase injection 2 mg      1. PET CT in past reviewed showed progression with increasing activity of malignancy and CEA rising most likely due to duodenal cancer    2.  Thrombosis resolved on eliquis  left upper extremity  Tolerating anticoagulation increase back to full strength as she is no longer thrombocytopenic   3.  Long-term plan if she responds well enough to chemotherapy would be to send her to Interventional Radiology for evaluation with Dr Del Rosario   4.  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 Duodenal cancer  : due for cycle 2 : cleared for Feb 4  6. Granulocytosis due to growth factor support which was delayed then resumed   7. Reactive  thrombocytosis  8. 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  This is verifying that most patients can tolerate 100 mg per meter squared of Nab paclitaxel weekly every 28 days.  If patient tolerates I may increase to 125 per the original article.  Gemzar dose will started 400 makes her meter sq which most patient has handled okay and she may be weaned up to 500 mg per meter squared or 800 mg/m2 every other week   She cannot tolerate higher dosages   Cont 125 mg/m2 of abraxane and 400 mg/m2 of gemzar     Antinausea meds may be continued as needed  Cont norvasc for HTN monitored by Dr Arevalo   Tolerating lipitor for dyslipidemia  On statin monitoring counts closely  Cleared  chemo   acp documented

## 2020-02-04 ENCOUNTER — INFUSION (OUTPATIENT)
Dept: INFUSION THERAPY | Facility: HOSPITAL | Age: 65
End: 2020-02-04
Attending: INTERNAL MEDICINE
Payer: MEDICARE

## 2020-02-04 VITALS
SYSTOLIC BLOOD PRESSURE: 117 MMHG | HEIGHT: 61 IN | TEMPERATURE: 98 F | BODY MASS INDEX: 35.19 KG/M2 | DIASTOLIC BLOOD PRESSURE: 79 MMHG | WEIGHT: 186.38 LBS | HEART RATE: 79 BPM | RESPIRATION RATE: 20 BRPM

## 2020-02-04 DIAGNOSIS — D70.1 CHEMOTHERAPY INDUCED NEUTROPENIA: ICD-10-CM

## 2020-02-04 DIAGNOSIS — K31.5 DUODENAL STENOSIS: ICD-10-CM

## 2020-02-04 DIAGNOSIS — Z51.11 ENCOUNTER FOR ANTINEOPLASTIC CHEMOTHERAPY: Primary | ICD-10-CM

## 2020-02-04 DIAGNOSIS — C17.0 DUODENAL CANCER: ICD-10-CM

## 2020-02-04 DIAGNOSIS — C78.7 HEPATIC METASTASES: ICD-10-CM

## 2020-02-04 DIAGNOSIS — T45.1X5A CHEMOTHERAPY INDUCED NEUTROPENIA: ICD-10-CM

## 2020-02-04 DIAGNOSIS — C78.00 MALIGNANT NEOPLASM METASTATIC TO LUNG, UNSPECIFIED LATERALITY: ICD-10-CM

## 2020-02-04 PROCEDURE — 96417 CHEMO IV INFUS EACH ADDL SEQ: CPT

## 2020-02-04 PROCEDURE — 25000003 PHARM REV CODE 250: Performed by: INTERNAL MEDICINE

## 2020-02-04 PROCEDURE — 63600175 PHARM REV CODE 636 W HCPCS: Performed by: INTERNAL MEDICINE

## 2020-02-04 PROCEDURE — A4216 STERILE WATER/SALINE, 10 ML: HCPCS | Performed by: INTERNAL MEDICINE

## 2020-02-04 PROCEDURE — 96413 CHEMO IV INFUSION 1 HR: CPT

## 2020-02-04 PROCEDURE — 96375 TX/PRO/DX INJ NEW DRUG ADDON: CPT

## 2020-02-04 PROCEDURE — 96367 TX/PROPH/DG ADDL SEQ IV INF: CPT

## 2020-02-04 RX ORDER — PALONOSETRON 0.05 MG/ML
0.25 INJECTION, SOLUTION INTRAVENOUS
Status: COMPLETED | OUTPATIENT
Start: 2020-02-04 | End: 2020-02-04

## 2020-02-04 RX ORDER — HEPARIN 100 UNIT/ML
500 SYRINGE INTRAVENOUS
Status: DISCONTINUED | OUTPATIENT
Start: 2020-02-04 | End: 2020-02-04 | Stop reason: HOSPADM

## 2020-02-04 RX ORDER — SODIUM CHLORIDE 0.9 % (FLUSH) 0.9 %
10 SYRINGE (ML) INJECTION
Status: DISCONTINUED | OUTPATIENT
Start: 2020-02-04 | End: 2020-02-04 | Stop reason: HOSPADM

## 2020-02-04 RX ORDER — ACETAMINOPHEN 325 MG/1
650 TABLET ORAL
Status: COMPLETED | OUTPATIENT
Start: 2020-02-04 | End: 2020-02-04

## 2020-02-04 RX ADMIN — GEMCITABINE 790 MG: 38 INJECTION, SOLUTION INTRAVENOUS at 10:02

## 2020-02-04 RX ADMIN — DEXAMETHASONE SODIUM PHOSPHATE 10 MG: 4 INJECTION, SOLUTION INTRA-ARTICULAR; INTRALESIONAL; INTRAMUSCULAR; INTRAVENOUS; SOFT TISSUE at 09:02

## 2020-02-04 RX ADMIN — PALONOSETRON HYDROCHLORIDE 0.25 MG: 0.25 INJECTION INTRAVENOUS at 09:02

## 2020-02-04 RX ADMIN — ACETAMINOPHEN 650 MG: 325 TABLET ORAL at 08:02

## 2020-02-04 RX ADMIN — SODIUM CHLORIDE, PRESERVATIVE FREE 10 ML: 5 INJECTION INTRAVENOUS at 10:02

## 2020-02-04 RX ADMIN — DIPHENHYDRAMINE HYDROCHLORIDE 12.5 MG: 50 INJECTION INTRAMUSCULAR; INTRAVENOUS at 08:02

## 2020-02-04 RX ADMIN — SODIUM CHLORIDE: 0.9 INJECTION, SOLUTION INTRAVENOUS at 08:02

## 2020-02-04 RX ADMIN — PACLITAXEL 250 MG: 100 INJECTION, POWDER, LYOPHILIZED, FOR SUSPENSION INTRAVENOUS at 09:02

## 2020-02-04 RX ADMIN — HEPARIN 500 UNITS: 100 SYRINGE at 10:02

## 2020-02-07 ENCOUNTER — LAB VISIT (OUTPATIENT)
Dept: LAB | Facility: HOSPITAL | Age: 65
End: 2020-02-07
Attending: INTERNAL MEDICINE
Payer: MEDICARE

## 2020-02-07 DIAGNOSIS — C78.7 HEPATIC METASTASES: ICD-10-CM

## 2020-02-07 DIAGNOSIS — Z51.11 ENCOUNTER FOR ANTINEOPLASTIC CHEMOTHERAPY: ICD-10-CM

## 2020-02-07 DIAGNOSIS — C78.00 MALIGNANT NEOPLASM METASTATIC TO LUNG, UNSPECIFIED LATERALITY: ICD-10-CM

## 2020-02-07 DIAGNOSIS — C17.0 DUODENAL CANCER: ICD-10-CM

## 2020-02-07 LAB
ALBUMIN SERPL BCP-MCNC: 3.3 G/DL (ref 3.5–5.2)
ALP SERPL-CCNC: 198 U/L (ref 55–135)
ALT SERPL W/O P-5'-P-CCNC: 37 U/L (ref 10–44)
ANION GAP SERPL CALC-SCNC: 7 MMOL/L (ref 8–16)
AST SERPL-CCNC: 47 U/L (ref 10–40)
BASOPHILS # BLD AUTO: 0.03 K/UL (ref 0–0.2)
BASOPHILS NFR BLD: 0.5 % (ref 0–1.9)
BILIRUB SERPL-MCNC: 0.4 MG/DL (ref 0.1–1)
BUN SERPL-MCNC: 12 MG/DL (ref 8–23)
CALCIUM SERPL-MCNC: 9.4 MG/DL (ref 8.7–10.5)
CEA SERPL-MCNC: 101.1 NG/ML (ref 0–5)
CHLORIDE SERPL-SCNC: 104 MMOL/L (ref 95–110)
CO2 SERPL-SCNC: 28 MMOL/L (ref 23–29)
CREAT SERPL-MCNC: 0.6 MG/DL (ref 0.5–1.4)
DIFFERENTIAL METHOD: ABNORMAL
EOSINOPHIL # BLD AUTO: 0.1 K/UL (ref 0–0.5)
EOSINOPHIL NFR BLD: 0.9 % (ref 0–8)
ERYTHROCYTE [DISTWIDTH] IN BLOOD BY AUTOMATED COUNT: 16.8 % (ref 11.5–14.5)
EST. GFR  (AFRICAN AMERICAN): >60 ML/MIN/1.73 M^2
EST. GFR  (NON AFRICAN AMERICAN): >60 ML/MIN/1.73 M^2
GLUCOSE SERPL-MCNC: 86 MG/DL (ref 70–110)
HCT VFR BLD AUTO: 34.9 % (ref 37–48.5)
HGB BLD-MCNC: 10.7 G/DL (ref 12–16)
IMM GRANULOCYTES # BLD AUTO: 0.02 K/UL (ref 0–0.04)
LYMPHOCYTES # BLD AUTO: 0.8 K/UL (ref 1–4.8)
LYMPHOCYTES NFR BLD: 13.9 % (ref 18–48)
MCH RBC QN AUTO: 29.8 PG (ref 27–31)
MCHC RBC AUTO-ENTMCNC: 30.7 G/DL (ref 32–36)
MCV RBC AUTO: 97 FL (ref 82–98)
MONOCYTES # BLD AUTO: 0.1 K/UL (ref 0.3–1)
MONOCYTES NFR BLD: 1.3 % (ref 4–15)
NEUTROPHILS # BLD AUTO: 4.5 K/UL (ref 1.8–7.7)
NEUTROPHILS NFR BLD: 83 % (ref 38–73)
NRBC BLD-RTO: 0 /100 WBC
PLATELET # BLD AUTO: 290 K/UL (ref 150–350)
PMV BLD AUTO: 9.6 FL (ref 9.2–12.9)
POTASSIUM SERPL-SCNC: 4.2 MMOL/L (ref 3.5–5.1)
PROT SERPL-MCNC: 6.8 G/DL (ref 6–8.4)
RBC # BLD AUTO: 3.59 M/UL (ref 4–5.4)
SODIUM SERPL-SCNC: 139 MMOL/L (ref 136–145)
WBC # BLD AUTO: 5.46 K/UL (ref 3.9–12.7)

## 2020-02-07 PROCEDURE — 82378 CARCINOEMBRYONIC ANTIGEN: CPT

## 2020-02-07 PROCEDURE — 36415 COLL VENOUS BLD VENIPUNCTURE: CPT

## 2020-02-07 PROCEDURE — 80053 COMPREHEN METABOLIC PANEL: CPT

## 2020-02-07 PROCEDURE — 85025 COMPLETE CBC W/AUTO DIFF WBC: CPT

## 2020-02-10 ENCOUNTER — OFFICE VISIT (OUTPATIENT)
Dept: HEMATOLOGY/ONCOLOGY | Facility: CLINIC | Age: 65
End: 2020-02-10
Payer: MEDICARE

## 2020-02-10 VITALS
DIASTOLIC BLOOD PRESSURE: 67 MMHG | TEMPERATURE: 99 F | BODY MASS INDEX: 34.72 KG/M2 | RESPIRATION RATE: 18 BRPM | SYSTOLIC BLOOD PRESSURE: 137 MMHG | OXYGEN SATURATION: 99 % | HEART RATE: 83 BPM | WEIGHT: 183.88 LBS | HEIGHT: 61 IN

## 2020-02-10 DIAGNOSIS — C17.0 DUODENAL CANCER: Primary | ICD-10-CM

## 2020-02-10 DIAGNOSIS — C18.8 OVERLAPPING MALIGNANT NEOPLASM OF COLON: ICD-10-CM

## 2020-02-10 DIAGNOSIS — C78.00 MALIGNANT NEOPLASM METASTATIC TO LUNG, UNSPECIFIED LATERALITY: ICD-10-CM

## 2020-02-10 DIAGNOSIS — C78.7 HEPATIC METASTASES: ICD-10-CM

## 2020-02-10 PROCEDURE — 3078F PR MOST RECENT DIASTOLIC BLOOD PRESSURE < 80 MM HG: ICD-10-PCS | Mod: S$GLB,,, | Performed by: INTERNAL MEDICINE

## 2020-02-10 PROCEDURE — 3075F SYST BP GE 130 - 139MM HG: CPT | Mod: S$GLB,,, | Performed by: INTERNAL MEDICINE

## 2020-02-10 PROCEDURE — 1126F PR PAIN SEVERITY QUANTIFIED, NO PAIN PRESENT: ICD-10-PCS | Mod: S$GLB,,, | Performed by: INTERNAL MEDICINE

## 2020-02-10 PROCEDURE — 99215 OFFICE O/P EST HI 40 MIN: CPT | Mod: S$GLB,,, | Performed by: INTERNAL MEDICINE

## 2020-02-10 PROCEDURE — 1126F AMNT PAIN NOTED NONE PRSNT: CPT | Mod: S$GLB,,, | Performed by: INTERNAL MEDICINE

## 2020-02-10 PROCEDURE — 1101F PT FALLS ASSESS-DOCD LE1/YR: CPT | Mod: S$GLB,,, | Performed by: INTERNAL MEDICINE

## 2020-02-10 PROCEDURE — 99215 PR OFFICE/OUTPT VISIT, EST, LEVL V, 40-54 MIN: ICD-10-PCS | Mod: S$GLB,,, | Performed by: INTERNAL MEDICINE

## 2020-02-10 PROCEDURE — 99999 PR PBB SHADOW E&M-EST. PATIENT-LVL IV: ICD-10-PCS | Mod: PBBFAC,,, | Performed by: INTERNAL MEDICINE

## 2020-02-10 PROCEDURE — 3078F DIAST BP <80 MM HG: CPT | Mod: S$GLB,,, | Performed by: INTERNAL MEDICINE

## 2020-02-10 PROCEDURE — 3075F PR MOST RECENT SYSTOLIC BLOOD PRESS GE 130-139MM HG: ICD-10-PCS | Mod: S$GLB,,, | Performed by: INTERNAL MEDICINE

## 2020-02-10 PROCEDURE — 1101F PR PT FALLS ASSESS DOC 0-1 FALLS W/OUT INJ PAST YR: ICD-10-PCS | Mod: S$GLB,,, | Performed by: INTERNAL MEDICINE

## 2020-02-10 PROCEDURE — 1159F MED LIST DOCD IN RCRD: CPT | Mod: S$GLB,,, | Performed by: INTERNAL MEDICINE

## 2020-02-10 PROCEDURE — 99999 PR PBB SHADOW E&M-EST. PATIENT-LVL IV: CPT | Mod: PBBFAC,,, | Performed by: INTERNAL MEDICINE

## 2020-02-10 PROCEDURE — 1159F PR MEDICATION LIST DOCUMENTED IN MEDICAL RECORD: ICD-10-PCS | Mod: S$GLB,,, | Performed by: INTERNAL MEDICINE

## 2020-02-10 NOTE — PROGRESS NOTES
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  - Clinical: Stage IV (cTX, cNX, cM1) - Signed by Karina Pettit MD on 3/11/2019       Chemotherapy     Treatment Summary   Plan Name: OP COLORECTAL FOLFOX + BEVACIZUMAB Q2W  Treatment Goal: Maintenance  Status: Active  Start Date: 1/21/2019  End Date: 7/3/2019 (Planned)  Provider: Karina Pettit MD  Chemotherapy: fluorouracil injection 835 mg, 400 mg/m2 = 835 mg, Intravenous, Clinic/[facility] 1 time, 4 of 12 cycles    fluorouracil (ADRUCIL) 2,400 mg/m2 = 5,015 mg in sodium chloride 0.9% 200.3 mL chemo infusion, 2,400 mg/m2 = 5,015 mg, Intravenous, Over 46 hours, 4 of 12 cycles    bevacizumab (AVASTIN) 5 mg/kg = 500 mg in sodium chloride 0.9% 100 mL chemo infusion, 5 mg/kg = 500 mg, Intravenous, Clinic/[facility] 1 time, 4 of 12 cycles    leucovorin calcium 400 mg/m2 = 835 mg in dextrose 5 % 250 mL infusion, 400 mg/m2 = 835 mg, Intravenous, Clinic/[facility] 1 time, 4 of 12 cycles    oxaliplatin (ELOXATIN) 85 mg/m2 = 178 mg in dextrose 5 % 500 mL chemo infusion, 85 mg/m2 = 178 mg, Intravenous, Clinic/[facility] 1 time, 4 of 12 cycles          4/8/2019 - 4/8/2019 Chemotherapy     Treatment Summary   Plan Name: OP COLORECTAL FOLFIRI + BEVACIZUMAB Q2W  Treatment Goal: Control  Status: Inactive  Start Date: [No treatment day found]  End Date: [No treatment day found]  Provider: Karina Pettit MD  Chemotherapy: fluorouracil injection 835 mg, 400 mg/m2 = 835 mg, Intravenous, Clinic/[facility] 1 time, 0 of 12 cycles  fluorouracil (ADRUCIL) 2,400 mg/m2 = 5,015 mg in sodium chloride 0.9% 200.3 mL chemo infusion, 2,400 mg/m2 = 5,015 mg, Intravenous, Over 46 hours, 0 of 12 cycles  bevacizumab (AVASTIN) 5 mg/kg = 500 mg in sodium chloride 0.9% 100 mL chemo infusion, 5 mg/kg = 500 mg, Intravenous, Clinic/[facility] 1 time, 0 of 12  cycles  irinotecan (CAMPTOSAR) 180 mg/m2 = 376 mg in sodium chloride 0.9% 500 mL chemo infusion, 180 mg/m2 = 376 mg, Intravenous, Clinic/[facility] 1 time, 0 of 12 cycles  leucovorin calcium 400 mg/m2 = 835 mg in dextrose 5 % 250 mL infusion, 400 mg/m2 = 835 mg, Intravenous, Clinic/[facility] 1 time, 0 of 12 cycles      12/22/2019 -  Chemotherapy     Treatment Summary   Plan Name: OP PANC NAB-PACLITAXEL + GEMCITABINE Day 1, 8 , 15 every 28 days  Treatment Goal: Maintenance  Status: Active  Start Date: 12/31/2019  End Date: 4/14/2020 (Planned)  Provider: Karina Pettit MD  Chemotherapy: PACLitaxel-protein bound (ABRAXANE) 100 mg/m2 = 200 mg in 40 mL infusion, 100 mg/m2 = 200 mg (100 % of original dose 100 mg/m2), Intravenous, Clinic/[facility] 1 time, 2 of 4 cycles  Dose modification: 100 mg/m2 (original dose 100 mg/m2, Cycle 1), 125 mg/m2 (original dose 100 mg/m2, Cycle 1)  Administration: 200 mg (12/31/2019), 200 mg (1/7/2020), 250 mg (1/14/2020), 250 mg (2/4/2020)  gemcitabine 790 mg in sodium chloride 0.9% 250 mL chemo infusion, 400 mg/m2 = 790 mg (100 % of original dose 400 mg/m2), Intravenous, Clinic/HOD 1 time, 2 of 4 cycles  Dose modification: 400 mg/m2 (original dose 400 mg/m2, Cycle 1), 800 mg/m2 (original dose 400 mg/m2, Cycle 1), 400 mg/m2 (original dose 400 mg/m2, Cycle 2)  Administration: 790 mg (12/31/2019), 790 mg (1/7/2020), 1,575 mg (1/14/2020), 790 mg (2/4/2020)        Lung metastases    1/15/2019 Initial Diagnosis     Lung metastases      4/8/2019 - 4/8/2019 Chemotherapy     Treatment Summary   Plan Name: OP COLORECTAL FOLFIRI + BEVACIZUMAB Q2W  Treatment Goal: Control  Status: Inactive  Start Date: [No treatment day found]  End Date: [No treatment day found]  Provider: Karina Pettit MD  Chemotherapy: fluorouracil injection 835 mg, 400 mg/m2 = 835 mg, Intravenous, [facility] 1 time, 0 of 12 cycles  fluorouracil (ADRUCIL) 2,400 mg/m2 = 5,015 mg in sodium chloride 0.9% 200.3 mL chemo infusion, 2,400 mg/m2 = 5,015 mg,  Intravenous, Over 46 hours, 0 of 12 cycles  bevacizumab (AVASTIN) 5 mg/kg = 500 mg in sodium chloride 0.9% 100 mL chemo infusion, 5 mg/kg = 500 mg, Intravenous, Clinic/HOD 1 time, 0 of 12 cycles  irinotecan (CAMPTOSAR) 180 mg/m2 = 376 mg in sodium chloride 0.9% 500 mL chemo infusion, 180 mg/m2 = 376 mg, Intravenous, Clinic/HOD 1 time, 0 of 12 cycles  leucovorin calcium 400 mg/m2 = 835 mg in dextrose 5 % 250 mL infusion, 400 mg/m2 = 835 mg, Intravenous, Clinic/HOD 1 time, 0 of 12 cycles      4/8/2019 - 12/23/2019 Chemotherapy     Treatment Summary   Plan Name: OP COLORECTAL FOLFIRI + BEVACIZUMAB Q2W  Treatment Goal: Control  Status: Inactive  Start Date: 4/8/2019  End Date: 12/3/2019  Provider: Karina Pettit MD  Chemotherapy: fluorouracil injection 835 mg, 400 mg/m2 = 835 mg, Intravenous, Clinic/HOD 1 time, 12 of 12 cycles  Administration: 835 mg (8/6/2019), 835 mg (8/27/2019), 835 mg (8/27/2019), 835 mg (9/10/2019), 835 mg (9/24/2019)  fluorouracil (ADRUCIL) 2,400 mg/m2 = 5,015 mg in sodium chloride 0.9% 200.3 mL chemo infusion, 2,400 mg/m2 = 5,015 mg, Intravenous, Over 46 hours, 12 of 12 cycles  Administration: 5,015 mg (8/6/2019), 5,015 mg (8/27/2019), 5,015 mg (9/10/2019), 5,015 mg (9/24/2019)  bevacizumab (AVASTIN) 5 mg/kg = 500 mg in sodium chloride 0.9% 100 mL chemo infusion, 5 mg/kg = 500 mg, Intravenous, Clinic/HOD 1 time, 14 of 14 cycles  Dose modification: 7.5 mg/kg (original dose 5 mg/kg, Cycle 14)  Administration: 500 mg (8/6/2019), 500 mg (8/27/2019), 500 mg (9/10/2019), 500 mg (9/24/2019), 500 mg (11/12/2019), 750 mg (12/3/2019)  irinotecan (CAMPTOSAR) 180 mg/m2 = 376 mg in sodium chloride 0.9% 500 mL chemo infusion, 180 mg/m2 = 376 mg, Intravenous, [facility] 1 time, 14 of 14 cycles  Dose modification: 200 mg/m2 (original dose 180 mg/m2, Cycle 14)  Administration: 376 mg (8/6/2019), 376 mg (8/27/2019), 376 mg (9/10/2019), 376 mg (9/24/2019), 376 mg (11/12/2019), 418 mg (12/3/2019)  leucovorin  calcium 400 mg/m2 = 835 mg in dextrose 5 % 250 mL infusion, 400 mg/m2 = 835 mg, Intravenous, Clinic/HOD 1 time, 12 of 12 cycles  Administration: 835 mg (8/6/2019), 835 mg (8/27/2019), 835 mg (9/10/2019), 835 mg (9/24/2019)      12/22/2019 -  Chemotherapy     Treatment Summary   Plan Name: OP PANC NAB-PACLITAXEL + GEMCITABINE Day 1, 8 , 15 every 28 days  Treatment Goal: Maintenance  Status: Active  Start Date: 12/31/2019  End Date: 4/14/2020 (Planned)  Provider: Karina Pettit MD  Chemotherapy: PACLitaxel-protein bound (ABRAXANE) 100 mg/m2 = 200 mg in 40 mL infusion, 100 mg/m2 = 200 mg (100 % of original dose 100 mg/m2), Intravenous, Clinic/HOD 1 time, 2 of 4 cycles  Dose modification: 100 mg/m2 (original dose 100 mg/m2, Cycle 1), 125 mg/m2 (original dose 100 mg/m2, Cycle 1)  Administration: 200 mg (12/31/2019), 200 mg (1/7/2020), 250 mg (1/14/2020), 250 mg (2/4/2020)  gemcitabine 790 mg in sodium chloride 0.9% 250 mL chemo infusion, 400 mg/m2 = 790 mg (100 % of original dose 400 mg/m2), Intravenous, Clinic/HOD 1 time, 2 of 4 cycles  Dose modification: 400 mg/m2 (original dose 400 mg/m2, Cycle 1), 800 mg/m2 (original dose 400 mg/m2, Cycle 1), 400 mg/m2 (original dose 400 mg/m2, Cycle 2)  Administration: 790 mg (12/31/2019), 790 mg (1/7/2020), 1,575 mg (1/14/2020), 790 mg (2/4/2020)        Hepatic metastases    1/15/2019 Initial Diagnosis     Hepatic metastases      4/8/2019 - 4/8/2019 Chemotherapy     Treatment Summary   Plan Name: OP COLORECTAL FOLFIRI + BEVACIZUMAB Q2W  Treatment Goal: Control  Status: Inactive  Start Date: [No treatment day found]  End Date: [No treatment day found]  Provider: Karina Pettit MD  Chemotherapy: fluorouracil injection 835 mg, 400 mg/m2 = 835 mg, Intravenous, [facility] 1 time, 0 of 12 cycles  fluorouracil (ADRUCIL) 2,400 mg/m2 = 5,015 mg in sodium chloride 0.9% 200.3 mL chemo infusion, 2,400 mg/m2 = 5,015 mg, Intravenous, Over 46 hours, 0 of 12 cycles  bevacizumab (AVASTIN) 5 mg/kg  = 500 mg in sodium chloride 0.9% 100 mL chemo infusion, 5 mg/kg = 500 mg, Intravenous, Clinic/HOD 1 time, 0 of 12 cycles  irinotecan (CAMPTOSAR) 180 mg/m2 = 376 mg in sodium chloride 0.9% 500 mL chemo infusion, 180 mg/m2 = 376 mg, Intravenous, Clinic/HOD 1 time, 0 of 12 cycles  leucovorin calcium 400 mg/m2 = 835 mg in dextrose 5 % 250 mL infusion, 400 mg/m2 = 835 mg, Intravenous, Clinic/HOD 1 time, 0 of 12 cycles      4/8/2019 - 12/23/2019 Chemotherapy     Treatment Summary   Plan Name: OP COLORECTAL FOLFIRI + BEVACIZUMAB Q2W  Treatment Goal: Control  Status: Inactive  Start Date: 4/8/2019  End Date: 12/3/2019  Provider: Karina Pettit MD  Chemotherapy: fluorouracil injection 835 mg, 400 mg/m2 = 835 mg, Intravenous, Clinic/HOD 1 time, 12 of 12 cycles  Administration: 835 mg (8/6/2019), 835 mg (8/27/2019), 835 mg (8/27/2019), 835 mg (9/10/2019), 835 mg (9/24/2019)  fluorouracil (ADRUCIL) 2,400 mg/m2 = 5,015 mg in sodium chloride 0.9% 200.3 mL chemo infusion, 2,400 mg/m2 = 5,015 mg, Intravenous, Over 46 hours, 12 of 12 cycles  Administration: 5,015 mg (8/6/2019), 5,015 mg (8/27/2019), 5,015 mg (9/10/2019), 5,015 mg (9/24/2019)  bevacizumab (AVASTIN) 5 mg/kg = 500 mg in sodium chloride 0.9% 100 mL chemo infusion, 5 mg/kg = 500 mg, Intravenous, Clinic/HOD 1 time, 14 of 14 cycles  Dose modification: 7.5 mg/kg (original dose 5 mg/kg, Cycle 14)  Administration: 500 mg (8/6/2019), 500 mg (8/27/2019), 500 mg (9/10/2019), 500 mg (9/24/2019), 500 mg (11/12/2019), 750 mg (12/3/2019)  irinotecan (CAMPTOSAR) 180 mg/m2 = 376 mg in sodium chloride 0.9% 500 mL chemo infusion, 180 mg/m2 = 376 mg, Intravenous, Clinic/[facility] 1 time, 14 of 14 cycles  Dose modification: 200 mg/m2 (original dose 180 mg/m2, Cycle 14)  Administration: 376 mg (8/6/2019), 376 mg (8/27/2019), 376 mg (9/10/2019), 376 mg (9/24/2019), 376 mg (11/12/2019), 418 mg (12/3/2019)  leucovorin calcium 400 mg/m2 = 835 mg in dextrose 5 % 250 mL infusion, 400 mg/m2 = 835 mg,  Intravenous, Clinic/HOD 1 time, 12 of 12 cycles  Administration: 835 mg (8/6/2019), 835 mg (8/27/2019), 835 mg (9/10/2019), 835 mg (9/24/2019)      12/22/2019 -  Chemotherapy     Treatment Summary   Plan Name: OP PANC NAB-PACLITAXEL + GEMCITABINE Day 1, 8 , 15 every 28 days  Treatment Goal: Maintenance  Status: Active  Start Date: 12/31/2019  End Date: 4/14/2020 (Planned)  Provider: Karina Pettit MD  Chemotherapy: PACLitaxel-protein bound (ABRAXANE) 100 mg/m2 = 200 mg in 40 mL infusion, 100 mg/m2 = 200 mg (100 % of original dose 100 mg/m2), Intravenous, Clinic/HOD 1 time, 2 of 4 cycles  Dose modification: 100 mg/m2 (original dose 100 mg/m2, Cycle 1), 125 mg/m2 (original dose 100 mg/m2, Cycle 1)  Administration: 200 mg (12/31/2019), 200 mg (1/7/2020), 250 mg (1/14/2020), 250 mg (2/4/2020)  gemcitabine 790 mg in sodium chloride 0.9% 250 mL chemo infusion, 400 mg/m2 = 790 mg (100 % of original dose 400 mg/m2), Intravenous, Clinic/HOD 1 time, 2 of 4 cycles  Dose modification: 400 mg/m2 (original dose 400 mg/m2, Cycle 1), 800 mg/m2 (original dose 400 mg/m2, Cycle 1), 400 mg/m2 (original dose 400 mg/m2, Cycle 2)  Administration: 790 mg (12/31/2019), 790 mg (1/7/2020), 1,575 mg (1/14/2020), 790 mg (2/4/2020)           Indigo Stuart is a 64 y.o.  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  Kras was MUTATED No MSI : EGFR no overexpression     Tolerating lopressor for htn and lipitor for dyslipidemia  Tolerating zofran for nausea prn chemo     Cycle one folfox avastin January 15 2019   Failed regimen and admitted with SBO and found to have duodenal cancer   Hepatic mets progressed hence changed to irinotecan dropped  oxaliplatin   Cycle one folfiri avastin April 2019     Pt was seen for left arm swelling and ultrasound revealed a DVT in her arm , post lovenox , now on eliquis   Scan reviewed again  Here to resume Xeliri which was started  Nov 8 2019 December 13 2019   DVT left internal jugular vein noted October 8 19 patient started on Eliquis  Here for evaluation of her imaging studies and scans  PET-CT with increasing SUV and increasing activity the cancer no evidence of new disease  Ultrasound revealed resolution of the blood clots in her left upper extremity    December 23 2019   PET CT mixed response explained again   cea now has normalized     January 7 2020  For chemo clearance   New rash to trunk and axilla     January 13 2020  For cycle 1 day 15  Rash better but remains   CEA has increased again       February 3 2020  For cycle 2 day 1 clearance after being delayed for one week with cytopenias   + leg pain new  Is on 1/2 dose eliquis after having nosebleeds and thrombocytopenia  Both of these resolved     2-  Here for cycle 2 day 8   Pt with weakness and fatigue, decreased apprtite and chills no fever     No change in PMH, PFH, PSH since last OV     Current Outpatient Medications:     amLODIPine (NORVASC) 5 MG tablet, Take 1 tablet (5 mg total) by mouth once daily., Disp: 90 tablet, Rfl: 3    atorvastatin (LIPITOR) 20 MG tablet, Take 1 tablet (20 mg total) by mouth every evening., Disp: 90 tablet, Rfl: 3    ELIQUIS 5 mg Tab, TAKE 1 TABLET BY MOUTH TWICE A DAY (Patient taking differently: No sig reported), Disp: 60 tablet, Rfl: 2    ferrous sulfate (FEOSOL) 325 mg (65 mg iron) Tab tablet, Take 1 tablet by mouth 2 (two) times daily., Disp: , Rfl: 3    metoprolol succinate (TOPROL-XL) 25 MG 24 hr tablet, Take 1 tablet (25 mg total) by mouth every evening., Disp: 90 tablet, Rfl: 3    potassium chloride SA (K-DUR,KLOR-CON) 20 MEQ tablet, Take 1 tablet (20 mEq total) by mouth once daily., Disp: 30 tablet, Rfl: 11    promethazine (PHENERGAN) 25 MG tablet, Take 1 tablet (25 mg total) by mouth every 6 (six) hours as needed for Nausea. (Patient not taking: Reported on 2/3/2020), Disp: 30 tablet, Rfl: 1      Review of  "Systems:  General: Weight gain: No, Weight Loss: No,  Fatigue yes   Chills: No, Night Sweats: No, Insomnia: No, Excessive sleeping: No   Respiratory:  Cough: No, Shortness of Breath:  no   Wheezing: No, Excessive Snoring: No, Coughing up blood: No  Endocrine: Heat Intolerance: No, Cold Intolerance: No,   Excessive Thirst: No, Excessive Hunger: No  Eyes:  Blurred Vision: No, Double Vision: No   Light Sensitivity: No, Eye pain: No  Musculoskeletal: Muscle Aches/Pain: Yes    Joint Pain/Swelling: yes     Muscle Weakness:no , Neck Pain: No, Back Pain:   Neurological: Difficulty Walking/Falls:  Yes + weakness  Headache Migraine: No, Dizziness/Vertigo:  yes  Cardiovascular: Chest Pain: No, Shortness of Breath: no   Gastrointestinal: Nausea/Vomiting: No, Constipation: No, Diarrhea: no   Psych/Cog:  Depression: No, Anxiety: No, Hallucinations: No   : Frequent Urination: No, Incontinence: No, Blood of Urine: No, Urinary Infections: No  ENT:Hearing Loss: No, Earache: No, Ringing in Ears: No  Facial Pain: No, Chronic Congestion:No   Immune: Seasonal Allergies: No, Hives and/or Rashes: No  The remainder of the review of twelve body systems was reviewed and normal  Skin + rash       /67 (BP Location: Right arm, Patient Position: Sitting)   Pulse 83   Temp 99.4 °F (37.4 °C) (Oral)   Resp 18   Ht 5' 1" (1.549 m)   Wt 83.4 kg (183 lb 13.8 oz)   SpO2 99%   BMI 34.74 kg/m²   Constitutional: oriented to person, place, and time.  Conj pale  HENT: anicteric sclera   Head: Normocephalic and atraumatic. Ears canal normal no discharge    Nose: Nose normal. ++boggy turbinates  No lymphatics noted   Eyes: Conjunctivae PALE  EOM are normal.  Neck: . No thyromegaly present.   Pharyngeal edema no erythema     Cardiovascular: Normal rate, regular rhythm, normal heart sounds  No murmur heard.  Pulmonary/Chest:  Clear bilaterally no fremitus  Abdominal: Soft. Bowel sounds are normal.  no mass.   Musculoskeletal: Normal range of " motion. decreased strength   Left arm remains swollen yet better   No further boggy turbinates  Lymphadenopathy:  no cervical adenopathy.   Neurological: alert and oriented to person, place  Slight weakness today   Skin: Skin is warm and dry. Rash to stomach  Gone   Psychiatric: normal mood and affect.   Vitals reviewed.  Chest port removed     Lab Results   Component Value Date    WBC 5.46 02/07/2020    RBC 3.59 (L) 02/07/2020    HGB 10.7 (L) 02/07/2020    HCT 34.9 (L) 02/07/2020    MCV 97 02/07/2020    MCH 29.8 02/07/2020    MCHC 30.7 (L) 02/07/2020    RDW 16.8 (H) 02/07/2020     02/07/2020    MPV 9.6 02/07/2020    GRAN 4.5 02/07/2020    GRAN 83.0 (H) 02/07/2020    LYMPH 0.8 (L) 02/07/2020    LYMPH 13.9 (L) 02/07/2020    MONO 0.1 (L) 02/07/2020    MONO 1.3 (L) 02/07/2020    EOS 0.1 02/07/2020    BASO 0.03 02/07/2020    EOSINOPHIL 0.9 02/07/2020    BASOPHIL 0.5 02/07/2020       CMP  Sodium   Date Value Ref Range Status   02/07/2020 139 136 - 145 mmol/L Final     Potassium   Date Value Ref Range Status   02/07/2020 4.2 3.5 - 5.1 mmol/L Final     Chloride   Date Value Ref Range Status   02/07/2020 104 95 - 110 mmol/L Final     CO2   Date Value Ref Range Status   02/07/2020 28 23 - 29 mmol/L Final     Glucose   Date Value Ref Range Status   02/07/2020 86 70 - 110 mg/dL Final     BUN, Bld   Date Value Ref Range Status   02/07/2020 12 8 - 23 mg/dL Final     Creatinine   Date Value Ref Range Status   02/07/2020 0.6 0.5 - 1.4 mg/dL Final     Calcium   Date Value Ref Range Status   02/07/2020 9.4 8.7 - 10.5 mg/dL Final     Total Protein   Date Value Ref Range Status   02/07/2020 6.8 6.0 - 8.4 g/dL Final     Albumin   Date Value Ref Range Status   02/07/2020 3.3 (L) 3.5 - 5.2 g/dL Final     Total Bilirubin   Date Value Ref Range Status   02/07/2020 0.4 0.1 - 1.0 mg/dL Final     Comment:     For infants and newborns, interpretation of results should be based  on gestational age, weight and in agreement with  clinical  observations.  Premature Infant recommended reference ranges:  Up to 24 hours.............<8.0 mg/dL  Up to 48 hours............<12.0 mg/dL  3-5 days..................<15.0 mg/dL  6-29 days.................<15.0 mg/dL       Alkaline Phosphatase   Date Value Ref Range Status   02/07/2020 198 (H) 55 - 135 U/L Final     AST   Date Value Ref Range Status   02/07/2020 47 (H) 10 - 40 U/L Final     ALT   Date Value Ref Range Status   02/07/2020 37 10 - 44 U/L Final     Anion Gap   Date Value Ref Range Status   02/07/2020 7 (L) 8 - 16 mmol/L Final     eGFR if    Date Value Ref Range Status   02/07/2020 >60 >60 mL/min/1.73 m^2 Final     eGFR if non    Date Value Ref Range Status   02/07/2020 >60 >60 mL/min/1.73 m^2 Final     Comment:     Calculation used to obtain the estimated glomerular filtration  rate (eGFR) is the CKD-EPI equation.      Impression:           - Normal esophagus.                        - Normal stomach.                        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 Prosthesis placed. The                         proximal end of the stent is distal to the ampulla                         (refer to images).  No msi   There are no osseous abnormalities.      Impression PET CT December 2019       Continued improvement of the hepatic metastasis with decrease in size of the multifocal hepatic lesions    Stable subcentimeter mesenteric lymph nodes    No evidence of new metastatic disease      Electronically signed by: Charissa Marroquin MD  Date: 12/11/2019  Time: 11:03     cea increasing        Duodenal cancer  -     Iron and TIBC; Future; Expected date: 02/10/2020  -     Vitamin B6; Future; Expected date: 02/10/2020  -     Vitamin B1; Future; Expected date: 02/10/2020  -     CBC auto differential; Standing  -     CMP; Future; Expected date: 02/10/2020    Hepatic metastases  -     Iron and TIBC; Future; Expected date:  02/10/2020  -     Vitamin B6; Future; Expected date: 02/10/2020  -     Vitamin B1; Future; Expected date: 02/10/2020  -     CBC auto differential; Standing  -     CMP; Future; Expected date: 02/10/2020    Malignant neoplasm metastatic to lung, unspecified laterality  -     Iron and TIBC; Future; Expected date: 02/10/2020  -     Vitamin B6; Future; Expected date: 02/10/2020  -     Vitamin B1; Future; Expected date: 02/10/2020  -     CBC auto differential; Standing  -     CMP; Future; Expected date: 02/10/2020    Overlapping malignant neoplasm of colon      1. PET CT in past reviewed showed progression with increasing activity of malignancy and CEA rising most likely due to duodenal cancer  MUST cont chemo   2.  Thrombosis resolved on eliquis  left upper extremity  Tolerating anticoagulation increase back to full strength as she is no longer thrombocytopenic   3.  Long-term plan if she responds well enough to chemotherapy would be to send her to Interventional Radiology for evaluation with Dr Del Rosario   4.  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 Duodenal cancer  : due for cycle 2  Day 8 now then cycle 2 day 15 next week: adding on body to keep her WBC normal : cleared   Orders reviewed and signed   6.  Weakness from chemo : labs ok transaminitis   7. Rising CEA   8. Anemia progressed: diet explained for over 15 minutes: special diet nec because she has a stent   9. Return next week for cycle 2 day 15 with labs and iron studies     She needs to  protein powder with iron   May need IV iron next visit and retacrit if drops any further in hemoglobin   Had been receiving Xeloda plus Avastin plus irinotecan    Abraxane  And gemzar cleared   After lengthy hours of research overall survival has been shown to be beneficial for CK7 positive duodenal cancers with Gemzar plus Abraxane  article in cancer medicine called durable response for ampullary and duodenal adenocarcinoma with a nap a paclitaxel plus gemcitabine plus or minus cisplatin  combination  Also referencing article inch a [place] [employer] On[employer] title a randomized phase 3 trial of weekly or 3 weekly doses of Nab paclitaxel versus weekly doses of paclitaxel in patients with previously treated advanced gastric cancer.  This is verifying that most patients can tolerate 100 mg per meter squared of Nab paclitaxel weekly every 28 days.  If patient tolerates I may increase to 125 per the original article.  Gemzar dose will started 400 makes her meter sq which most patient has handled okay and she may be weaned up to 500 mg per meter squared or 800 mg/m2 every other week   She cannot tolerate higher dosages   Cont 125 mg/m2 of abraxane and 400 mg/m2 of gemzar     Antinausea meds may be continued as needed  Cont norvasc for HTN monitored by Dr Arevalo   Tolerating lipitor for dyslipidemia  On statin monitoring counts closely    11 Healthy Foods That Are Very High in Iron  Iron is a mineral that serves several important functions, its main being to carry oxygen throughout your body and making red blood cells ([employer]).    Its an essential nutrient, meaning you must get it from food. The recommended daily intake (RDI) is 18 mg.    Interestingly, the amount your body absorbs is partly based on how much you have stored.    A deficiency can occur if your intake is too low to replace the amount you lose every day (2[employer]).    Iron deficiency can cause anemia and lead to symptoms like fatigue. Menstruating women who don't consume iron-rich foods are at a particularly high risk of deficiency.    Luckily, there are plenty of good food choices to help you meet your daily iron needs.    Here are 11 healthy foods that are high in iron.    1. Shellfish  Shellfish is tasty and nutritious. All shellfish is high in iron, but clams, oysters and mussels are particularly good sources.    For instance, a 3.5-ounce (100-gram) serving of clams may contain up to 28 mg of iron, which is 155%  of the RDI (3).    However, the iron content of clams is highly variable, and some types may contain much lower amounts (4).    The iron in shellfish is heme iron, which your body absorbs more easily than the non-heme iron found in plants.    A serving of clams also provides 26 grams of protein, 37% of the RDI for vitamin C and a whopping 1,648% of the RDI for vitamin B12.    In fact, all shellfish is high in nutrients and has been shown to increase the level of heart-healthy HDL cholesterol in your blood ([employer]).    Although there are legitimate concerns about mercury and toxins in certain types of fish and shellfish, the benefits of consuming seafood far outweigh the risks ([employer]).    SUMMARY  A 3.5-ounce (100-gram) serving of clams provides 155% of the RDI for iron. Shellfish is also rich in many other nutrients and may increase good HDL cholesterol levels in your blood.  2. Spinach  Spinach provides many health benefits for very few calories.    3.5 ounces (100 grams) of cooked spinach contain 3.6 mg of iron, or 20% of the RDI (7).    Although this is non-heme iron, which isn't absorbed very well, spinach is also rich in vitamin C.    This is important since vitamin C significantly boosts iron absorption ([facility]).    Spinach is also rich in antioxidants called carotenoids that may reduce your risk of cancer, decrease inflammation and protect your eyes from disease (9[employer], 10[employer], 11[employer], 12[employer]).    Consuming spinach and other leafy greens with fat helps your body absorb the carotenoids, so make sure to eat a healthy fat like olive oil with your spinach (13[employer]).    SUMMARY  Spinach provides 20% of the RDI for iron per serving, along with several vitamins and minerals. It also contains important antioxidants.    3. Liver and Other Organ Meats  Organ meats are extremely nutritious. Popular types include liver, kidneys, brain and  heart -- all of which are high in iron.    For example, a 3.5-ounce (100-gram) serving of beef liver contains 6.5 mg of iron, or 36% of the RDI (14).    Organ meats are also high in protein and rich in B vitamins, copper and selenium. Liver is especially high in vitamin A, providing an impressive 634% of the RDI per serving.    Whats more, organ meats are among the best sources of choline, an important nutrient for brain and liver health that many people don't get enough of ([facility]).    SUMMARY  Organ meats are good sources of iron, and liver contains 36% of the RDI per serving. Organ meats are also rich in many other nutrients, such as selenium, vitamin A and choline.  4. Legumes  Legumes are loaded with nutrients.    Some of the most common types of legumes are beans, lentils, chickpeas, peas and soybeans.    They're a great source of iron, especially for vegetarians. One cup (198 grams) of cooked lentils contains 6.6 mg, which is 37% of the RDI (16).    Legumes are also rich in folate, magnesium and potassium.    What's more, studies have shown that beans and other legumes can reduce inflammation in people with diabetes. Legumes can also decrease heart disease risk for people with metabolic syndrome (17[employer], 18[employer], 19[employer], 20[employer]).    Additionally, legumes may help you lose weight. They're very high in soluble fiber, which can increase feelings of fullness and reduce calorie intake (21[employer]).    In one study, a high-fiber diet containing beans was shown to be as effective as a low-carb diet for weight loss (22[employer]).    In order to maximize iron absorption, consume legumes with foods high in vitamin C, such as tomatoes, greens or citrus fruits.    SUMMARY  One cup (198 grams) of cooked lentils provides 37% of the RDI for iron. Legumes are also high in folate, magnesium, potassium and fiber and may even aid weight loss.  5. Red Meat  Red meat is  satisfying and nutritious. A 3.5-ounce (100-gram) serving of ground beef contains 2.7 mg of iron, which is 15% of the RDI (23).    Meat is also rich in protein, zinc, selenium and several B vitamins (24).    Researchers have suggested that iron deficiency may be less likely in people who eat meat, poultry and fish on a regular basis (25[employer]).    In fact, red meat is probably the single most easily accessible source of heme iron, potentially making it an important food for people who are prone to anemia.    In one study looking at changes in iron stores after aerobic exercise, women who consumed meat retained iron better than those who took iron supplements (26[employer]).    SUMMARY  One serving of ground beef contains 15% of the RDI for iron and is one of the most easily accessible sources of heme iron. Its also rich in B vitamins, zinc, selenium and high-quality protein.    6. Pumpkin Seeds  Pumpkin seeds are a tasty, portable snack.    A 1-ounce (28-gram) serving of pumpkin seeds contains 4.2 mg of iron, which is 23% of the RDI (27).    In addition, pumpkin seeds are a good source of vitamin K, zinc and manganese. They're also among the best sources of magnesium, which many people are deficient in (28[employer]).    A 1-ounce (28-gram) serving contains 37% of the RDI for magnesium, which helps reduce your risk of insulin resistance, diabetes and depression (29[employer], 30[employer], 31[employer]).    SUMMARY  Pumpkin seeds provide 26% of the RDI for iron per serving. Theyre also a good source of several other nutrients, particularly magnesium.  7. Quinoa  Quinoa is a popular grain known as a pseudocereal. One cup (185 grams) of cooked quinoa provides 2.8 mg of iron, which is 15% of the RDI (32).    Furthermore, quinoa contains no gluten, making it a good choice for people with celiac disease or other forms of gluten intolerance.    Quinoa is also higher in protein than many other  grains, as well as rich in folate, magnesium, copper, manganese and many other nutrients.    In addition, quinoa has more antioxidant activity than many other grains. Antioxidants help protect your cells from the damage done by free radicals, which are formed during metabolism and in response to stress (33[employer], 34[employer]).    SUMMARY  Quinoa provides 15% of the RDI for iron per serving. It also contains no gluten and is high in protein, folate, minerals and antioxidants.  8. Turkey  Turkey meat is a healthy and delicious food. It's also a good source of iron -- especially dark turkey meat.    A 3.5-ounce (100-gram) portion of dark turkey meat has 2.3 mg of iron, which is 13% of the RDI (35).    In comparison, the same amount of white turkey meat contains only 1.3 mg (36).    Turkey also packs an impressive 29 grams of protein per serving and several B vitamins and minerals, including 30% of the RDI for zinc and 58% of the RDI for selenium.    Consuming high-protein foods like turkey may aid weight loss since protein makes you feel full and increases your metabolic rate after a meal (37[employer], 38[employer], 39[employer]).    High protein intake can also help prevent the muscle loss that occurs during weight loss and as part of the aging process (40[employer], 41[employer]).    SUMMARY  Turkey provides 13% of the RDI for iron and is a good source of several vitamins and minerals. Its high protein content promotes fullness, increases metabolism and prevents muscle loss.  9. Broccoli  Broccoli is incredibly nutritious. A 1-cup (156-gram) serving of cooked broccoli contains 1 mg of iron, which is 6% of the RDI, making it a fairly good source (42).    What's more, a serving of broccoli also packs 168% of the RDI for vitamin C, which helps your body absorb the iron better (8[employer], 43[employer]).    The same serving size is also high in folate and provides 6 grams of  fiber, as well as some vitamin K.    Broccoli is a member of the cruciferous vegetable family, which also includes cauliflower, Adams sprouts, kale and cabbage.    Cruciferous vegetables contain indole, sulforaphane and glucosinolates, which are plant compounds believed to be protective against cancer (44[employer], 45[employer], 46, 47[employer]).    SUMMARY  One serving of broccoli provides 6% of the RDI for iron and is very high in vitamins C, K and folate. It may also help reduce cancer risk.  10. Tofu  Tofu is a soy-based food that's popular among vegetarians and in some  countries.    A half-cup (126-gram) serving provides 3.6 mg of iron, which is 19% of the RDI (48).    Tofu is also a good source of thiamine and several minerals, including calcium, magnesium and selenium. In addition, it provides 20 grams of protein per serving.    Tofu also contains unique compounds called isoflavones, which have been linked to improved insulin sensitivity, a decreased risk of heart disease and relief from menopausal symptoms (49[employer], 50[employer]).    SUMMARY  Tofu provides 19% of the RDI for iron per serving and is rich in protein and minerals. Its isoflavones may improve heart health and relieve menopausal symptoms.  11. Dark Chocolate  Dark chocolate is incredibly delicious and nutritious.    A 1-ounce (28-gram) serving contains 3.3 mg of iron, which is 19% of the RDI (51).    This small serving also packs 25% and 16% of the RDIs for copper and magnesium respectively.    In addition, it contains prebiotic fiber, which nourishes the friendly bacteria in your gut (52[employer]).    A study found that cocoa powder and dark chocolate had more antioxidant activity than powders and juices made from acai berries and blueberries (53[employer]).    Studies have also shown that chocolate has beneficial effects on cholesterol and may reduce your risk of heart attacks and strokes (54[employer]  [employer], 55[employer], 56[employer]).    However, not all chocolate is created equal. It's believed that compounds called flavanols are responsible for chocolate's benefits, and the flavanol content of dark chocolate is much higher than that of milk chocolate (57).    Therefore, it's best to consume chocolate with a minimum of 70% cocoa to get the maximum benefits.    SUMMARY  A small serving of dark chocolate contains 19% of the RDI for iron along with several minerals and prebiotic fiber that promotes gut health.  The Bottom Line  Iron is an important mineral that must be consumed regularly as your body cannot produce it on its own.    Yet it should be noted that some people need to limit their intake of red meat and other foods high in heme iron.    However, most people are easily able to regulate the amount they absorb from food.    Remember that if you don't eat meat or fish, you can boost absorption by including a source of vitamin C when eating plant sources of iron.      Stent in place : increase intake of eggs and protein    acp documented

## 2020-02-11 ENCOUNTER — INFUSION (OUTPATIENT)
Dept: INFUSION THERAPY | Facility: HOSPITAL | Age: 65
End: 2020-02-11
Attending: INTERNAL MEDICINE
Payer: MEDICARE

## 2020-02-11 VITALS
TEMPERATURE: 98 F | DIASTOLIC BLOOD PRESSURE: 80 MMHG | HEART RATE: 73 BPM | RESPIRATION RATE: 18 BRPM | SYSTOLIC BLOOD PRESSURE: 115 MMHG | WEIGHT: 183.19 LBS | HEIGHT: 61 IN | BODY MASS INDEX: 34.58 KG/M2

## 2020-02-11 DIAGNOSIS — C78.00 MALIGNANT NEOPLASM METASTATIC TO LUNG, UNSPECIFIED LATERALITY: ICD-10-CM

## 2020-02-11 DIAGNOSIS — K31.5 DUODENAL STENOSIS: ICD-10-CM

## 2020-02-11 DIAGNOSIS — D70.1 CHEMOTHERAPY INDUCED NEUTROPENIA: ICD-10-CM

## 2020-02-11 DIAGNOSIS — Z51.11 ENCOUNTER FOR ANTINEOPLASTIC CHEMOTHERAPY: Primary | ICD-10-CM

## 2020-02-11 DIAGNOSIS — C17.0 DUODENAL CANCER: ICD-10-CM

## 2020-02-11 DIAGNOSIS — C78.7 HEPATIC METASTASES: ICD-10-CM

## 2020-02-11 DIAGNOSIS — T45.1X5A CHEMOTHERAPY INDUCED NEUTROPENIA: ICD-10-CM

## 2020-02-11 DIAGNOSIS — C78.7 HEPATIC METASTASES: Primary | ICD-10-CM

## 2020-02-11 PROCEDURE — A4216 STERILE WATER/SALINE, 10 ML: HCPCS | Performed by: INTERNAL MEDICINE

## 2020-02-11 PROCEDURE — 96375 TX/PRO/DX INJ NEW DRUG ADDON: CPT

## 2020-02-11 PROCEDURE — 25000003 PHARM REV CODE 250: Performed by: INTERNAL MEDICINE

## 2020-02-11 PROCEDURE — 96417 CHEMO IV INFUS EACH ADDL SEQ: CPT

## 2020-02-11 PROCEDURE — 96413 CHEMO IV INFUSION 1 HR: CPT

## 2020-02-11 PROCEDURE — 96367 TX/PROPH/DG ADDL SEQ IV INF: CPT

## 2020-02-11 PROCEDURE — 63600175 PHARM REV CODE 636 W HCPCS: Performed by: INTERNAL MEDICINE

## 2020-02-11 RX ORDER — ACETAMINOPHEN 325 MG/1
650 TABLET ORAL
Status: COMPLETED | OUTPATIENT
Start: 2020-02-11 | End: 2020-02-11

## 2020-02-11 RX ORDER — HEPARIN 100 UNIT/ML
500 SYRINGE INTRAVENOUS
Status: DISCONTINUED | OUTPATIENT
Start: 2020-02-11 | End: 2020-02-11 | Stop reason: HOSPADM

## 2020-02-11 RX ORDER — PALONOSETRON 0.05 MG/ML
0.25 INJECTION, SOLUTION INTRAVENOUS
Status: COMPLETED | OUTPATIENT
Start: 2020-02-11 | End: 2020-02-11

## 2020-02-11 RX ORDER — SODIUM CHLORIDE 0.9 % (FLUSH) 0.9 %
10 SYRINGE (ML) INJECTION
Status: DISCONTINUED | OUTPATIENT
Start: 2020-02-11 | End: 2020-02-11 | Stop reason: HOSPADM

## 2020-02-11 RX ADMIN — HEPARIN 500 UNITS: 100 SYRINGE at 10:02

## 2020-02-11 RX ADMIN — DEXAMETHASONE SODIUM PHOSPHATE 10 MG: 4 INJECTION, SOLUTION INTRA-ARTICULAR; INTRALESIONAL; INTRAMUSCULAR; INTRAVENOUS; SOFT TISSUE at 08:02

## 2020-02-11 RX ADMIN — GEMCITABINE 790 MG: 38 INJECTION, SOLUTION INTRAVENOUS at 10:02

## 2020-02-11 RX ADMIN — DIPHENHYDRAMINE HYDROCHLORIDE 12.5 MG: 50 INJECTION INTRAMUSCULAR; INTRAVENOUS at 08:02

## 2020-02-11 RX ADMIN — SODIUM CHLORIDE, PRESERVATIVE FREE 10 ML: 5 INJECTION INTRAVENOUS at 10:02

## 2020-02-11 RX ADMIN — ACETAMINOPHEN 650 MG: 325 TABLET ORAL at 08:02

## 2020-02-11 RX ADMIN — PACLITAXEL 250 MG: 100 INJECTION, POWDER, LYOPHILIZED, FOR SUSPENSION INTRAVENOUS at 09:02

## 2020-02-11 RX ADMIN — PALONOSETRON HYDROCHLORIDE 0.25 MG: 0.25 INJECTION INTRAVENOUS at 08:02

## 2020-02-12 ENCOUNTER — PATIENT MESSAGE (OUTPATIENT)
Dept: HEMATOLOGY/ONCOLOGY | Facility: CLINIC | Age: 65
End: 2020-02-12

## 2020-02-14 ENCOUNTER — LAB VISIT (OUTPATIENT)
Dept: LAB | Facility: HOSPITAL | Age: 65
End: 2020-02-14
Attending: INTERNAL MEDICINE
Payer: MEDICARE

## 2020-02-14 DIAGNOSIS — C78.00 MALIGNANT NEOPLASM METASTATIC TO LUNG, UNSPECIFIED LATERALITY: ICD-10-CM

## 2020-02-14 DIAGNOSIS — C17.0 DUODENAL CANCER: ICD-10-CM

## 2020-02-14 DIAGNOSIS — C78.7 HEPATIC METASTASES: ICD-10-CM

## 2020-02-14 LAB
ALBUMIN SERPL BCP-MCNC: 3 G/DL (ref 3.5–5.2)
ALP SERPL-CCNC: 233 U/L (ref 55–135)
ALT SERPL W/O P-5'-P-CCNC: 60 U/L (ref 10–44)
ANION GAP SERPL CALC-SCNC: 11 MMOL/L (ref 8–16)
AST SERPL-CCNC: 56 U/L (ref 10–40)
BASOPHILS # BLD AUTO: 0.02 K/UL (ref 0–0.2)
BASOPHILS NFR BLD: 0.5 % (ref 0–1.9)
BILIRUB SERPL-MCNC: 0.7 MG/DL (ref 0.1–1)
BUN SERPL-MCNC: 12 MG/DL (ref 8–23)
CALCIUM SERPL-MCNC: 8.8 MG/DL (ref 8.7–10.5)
CHLORIDE SERPL-SCNC: 103 MMOL/L (ref 95–110)
CO2 SERPL-SCNC: 25 MMOL/L (ref 23–29)
CREAT SERPL-MCNC: 0.6 MG/DL (ref 0.5–1.4)
DIFFERENTIAL METHOD: ABNORMAL
EOSINOPHIL # BLD AUTO: 0.1 K/UL (ref 0–0.5)
EOSINOPHIL NFR BLD: 2.4 % (ref 0–8)
ERYTHROCYTE [DISTWIDTH] IN BLOOD BY AUTOMATED COUNT: 17.5 % (ref 11.5–14.5)
EST. GFR  (AFRICAN AMERICAN): >60 ML/MIN/1.73 M^2
EST. GFR  (NON AFRICAN AMERICAN): >60 ML/MIN/1.73 M^2
GLUCOSE SERPL-MCNC: 128 MG/DL (ref 70–110)
HCT VFR BLD AUTO: 31 % (ref 37–48.5)
HGB BLD-MCNC: 9.3 G/DL (ref 12–16)
IMM GRANULOCYTES # BLD AUTO: 0 K/UL (ref 0–0.04)
IRON SERPL-MCNC: 139 UG/DL (ref 30–160)
LYMPHOCYTES # BLD AUTO: 0.7 K/UL (ref 1–4.8)
LYMPHOCYTES NFR BLD: 19.2 % (ref 18–48)
MCH RBC QN AUTO: 29.7 PG (ref 27–31)
MCHC RBC AUTO-ENTMCNC: 30 G/DL (ref 32–36)
MCV RBC AUTO: 99 FL (ref 82–98)
MONOCYTES # BLD AUTO: 0 K/UL (ref 0.3–1)
MONOCYTES NFR BLD: 0.3 % (ref 4–15)
NEUTROPHILS # BLD AUTO: 2.9 K/UL (ref 1.8–7.7)
NEUTROPHILS NFR BLD: 77.6 % (ref 38–73)
NRBC BLD-RTO: 0 /100 WBC
PLATELET # BLD AUTO: 196 K/UL (ref 150–350)
PMV BLD AUTO: 9.9 FL (ref 9.2–12.9)
POTASSIUM SERPL-SCNC: 3.9 MMOL/L (ref 3.5–5.1)
PROT SERPL-MCNC: 6.2 G/DL (ref 6–8.4)
RBC # BLD AUTO: 3.13 M/UL (ref 4–5.4)
SATURATED IRON: 51 % (ref 20–50)
SODIUM SERPL-SCNC: 139 MMOL/L (ref 136–145)
TOTAL IRON BINDING CAPACITY: 272 UG/DL (ref 250–450)
TRANSFERRIN SERPL-MCNC: 184 MG/DL (ref 200–375)
WBC # BLD AUTO: 3.7 K/UL (ref 3.9–12.7)

## 2020-02-14 PROCEDURE — 83540 ASSAY OF IRON: CPT

## 2020-02-14 PROCEDURE — 80053 COMPREHEN METABOLIC PANEL: CPT

## 2020-02-14 PROCEDURE — 85025 COMPLETE CBC W/AUTO DIFF WBC: CPT

## 2020-02-14 PROCEDURE — 36415 COLL VENOUS BLD VENIPUNCTURE: CPT

## 2020-02-14 PROCEDURE — 84425 ASSAY OF VITAMIN B-1: CPT

## 2020-02-14 PROCEDURE — 84207 ASSAY OF VITAMIN B-6: CPT

## 2020-02-17 ENCOUNTER — PATIENT MESSAGE (OUTPATIENT)
Dept: HEMATOLOGY/ONCOLOGY | Facility: CLINIC | Age: 65
End: 2020-02-17

## 2020-02-17 ENCOUNTER — LAB VISIT (OUTPATIENT)
Dept: LAB | Facility: HOSPITAL | Age: 65
End: 2020-02-17
Attending: INTERNAL MEDICINE
Payer: MEDICARE

## 2020-02-17 ENCOUNTER — OFFICE VISIT (OUTPATIENT)
Dept: HEMATOLOGY/ONCOLOGY | Facility: CLINIC | Age: 65
End: 2020-02-17
Payer: MEDICARE

## 2020-02-17 VITALS
HEART RATE: 97 BPM | TEMPERATURE: 99 F | RESPIRATION RATE: 22 BRPM | OXYGEN SATURATION: 98 % | BODY MASS INDEX: 34.47 KG/M2 | HEIGHT: 61 IN | WEIGHT: 182.56 LBS | DIASTOLIC BLOOD PRESSURE: 56 MMHG | SYSTOLIC BLOOD PRESSURE: 120 MMHG

## 2020-02-17 DIAGNOSIS — Z79.01 ANTICOAGULATED: ICD-10-CM

## 2020-02-17 DIAGNOSIS — C17.0 MALIGNANT NEOPLASM OF DUODENUM: ICD-10-CM

## 2020-02-17 DIAGNOSIS — Z51.11 ENCOUNTER FOR ANTINEOPLASTIC CHEMOTHERAPY: ICD-10-CM

## 2020-02-17 DIAGNOSIS — R74.01 TRANSAMINASEMIA: ICD-10-CM

## 2020-02-17 DIAGNOSIS — R74.8 ACID PHOSPHATASE ELEVATED: ICD-10-CM

## 2020-02-17 DIAGNOSIS — R04.0 EPISTAXIS: ICD-10-CM

## 2020-02-17 DIAGNOSIS — C78.00 MALIGNANT NEOPLASM METASTATIC TO LUNG, UNSPECIFIED LATERALITY: ICD-10-CM

## 2020-02-17 DIAGNOSIS — Z51.11 ENCOUNTER FOR CHEMOTHERAPY MANAGEMENT: ICD-10-CM

## 2020-02-17 DIAGNOSIS — C17.0 DUODENAL CANCER: ICD-10-CM

## 2020-02-17 DIAGNOSIS — C78.7 HEPATIC METASTASES: Primary | ICD-10-CM

## 2020-02-17 DIAGNOSIS — C18.8 OVERLAPPING MALIGNANT NEOPLASM OF COLON: ICD-10-CM

## 2020-02-17 DIAGNOSIS — C18.8 MALIGNANT NEOPLASM OF OVERLAPPING SITES OF COLON: ICD-10-CM

## 2020-02-17 DIAGNOSIS — C78.7 SECONDARY MALIGNANT NEOPLASM OF LIVER: Primary | ICD-10-CM

## 2020-02-17 DIAGNOSIS — R74.01 NONSPECIFIC ELEVATION OF LEVELS OF TRANSAMINASE OR LACTIC ACID DEHYDROGENASE (LDH): ICD-10-CM

## 2020-02-17 DIAGNOSIS — R74.8 ELEVATED ALKALINE PHOSPHATASE LEVEL: ICD-10-CM

## 2020-02-17 DIAGNOSIS — R74.02 NONSPECIFIC ELEVATION OF LEVELS OF TRANSAMINASE OR LACTIC ACID DEHYDROGENASE (LDH): ICD-10-CM

## 2020-02-17 DIAGNOSIS — R97.0 ELEVATED CEA: ICD-10-CM

## 2020-02-17 LAB — CEA SERPL-MCNC: 132.3 NG/ML (ref 0–5)

## 2020-02-17 PROCEDURE — 1101F PT FALLS ASSESS-DOCD LE1/YR: CPT | Mod: S$GLB,,, | Performed by: INTERNAL MEDICINE

## 2020-02-17 PROCEDURE — 99215 OFFICE O/P EST HI 40 MIN: CPT | Mod: S$GLB,,, | Performed by: INTERNAL MEDICINE

## 2020-02-17 PROCEDURE — 36415 COLL VENOUS BLD VENIPUNCTURE: CPT

## 2020-02-17 PROCEDURE — 1159F PR MEDICATION LIST DOCUMENTED IN MEDICAL RECORD: ICD-10-PCS | Mod: S$GLB,,, | Performed by: INTERNAL MEDICINE

## 2020-02-17 PROCEDURE — 82378 CARCINOEMBRYONIC ANTIGEN: CPT

## 2020-02-17 PROCEDURE — 1101F PR PT FALLS ASSESS DOC 0-1 FALLS W/OUT INJ PAST YR: ICD-10-PCS | Mod: S$GLB,,, | Performed by: INTERNAL MEDICINE

## 2020-02-17 PROCEDURE — 1126F PR PAIN SEVERITY QUANTIFIED, NO PAIN PRESENT: ICD-10-PCS | Mod: S$GLB,,, | Performed by: INTERNAL MEDICINE

## 2020-02-17 PROCEDURE — 3078F PR MOST RECENT DIASTOLIC BLOOD PRESSURE < 80 MM HG: ICD-10-PCS | Mod: S$GLB,,, | Performed by: INTERNAL MEDICINE

## 2020-02-17 PROCEDURE — 99999 PR PBB SHADOW E&M-EST. PATIENT-LVL IV: CPT | Mod: PBBFAC,,, | Performed by: INTERNAL MEDICINE

## 2020-02-17 PROCEDURE — 3074F PR MOST RECENT SYSTOLIC BLOOD PRESSURE < 130 MM HG: ICD-10-PCS | Mod: S$GLB,,, | Performed by: INTERNAL MEDICINE

## 2020-02-17 PROCEDURE — 1159F MED LIST DOCD IN RCRD: CPT | Mod: S$GLB,,, | Performed by: INTERNAL MEDICINE

## 2020-02-17 PROCEDURE — 99999 PR PBB SHADOW E&M-EST. PATIENT-LVL IV: ICD-10-PCS | Mod: PBBFAC,,, | Performed by: INTERNAL MEDICINE

## 2020-02-17 PROCEDURE — 1126F AMNT PAIN NOTED NONE PRSNT: CPT | Mod: S$GLB,,, | Performed by: INTERNAL MEDICINE

## 2020-02-17 PROCEDURE — 3078F DIAST BP <80 MM HG: CPT | Mod: S$GLB,,, | Performed by: INTERNAL MEDICINE

## 2020-02-17 PROCEDURE — 3074F SYST BP LT 130 MM HG: CPT | Mod: S$GLB,,, | Performed by: INTERNAL MEDICINE

## 2020-02-17 PROCEDURE — 99215 PR OFFICE/OUTPT VISIT, EST, LEVL V, 40-54 MIN: ICD-10-PCS | Mod: S$GLB,,, | Performed by: INTERNAL MEDICINE

## 2020-02-17 NOTE — PROGRESS NOTES
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  - Clinical: Stage IV (cTX, cNX, cM1) - Signed by Karina Pettit MD on 3/11/2019       Chemotherapy     Treatment Summary   Plan Name: OP COLORECTAL FOLFOX + BEVACIZUMAB Q2W  Treatment Goal: Maintenance  Status: Active  Start Date: 1/21/2019  End Date: 7/3/2019 (Planned)  Provider: Karina Pettit MD  Chemotherapy: fluorouracil injection 835 mg, 400 mg/m2 = 835 mg, Intravenous, Clinic/[facility] 1 time, 4 of 12 cycles    fluorouracil (ADRUCIL) 2,400 mg/m2 = 5,015 mg in sodium chloride 0.9% 200.3 mL chemo infusion, 2,400 mg/m2 = 5,015 mg, Intravenous, Over 46 hours, 4 of 12 cycles    bevacizumab (AVASTIN) 5 mg/kg = 500 mg in sodium chloride 0.9% 100 mL chemo infusion, 5 mg/kg = 500 mg, Intravenous, Clinic/[facility] 1 time, 4 of 12 cycles    leucovorin calcium 400 mg/m2 = 835 mg in dextrose 5 % 250 mL infusion, 400 mg/m2 = 835 mg, Intravenous, Clinic/[facility] 1 time, 4 of 12 cycles    oxaliplatin (ELOXATIN) 85 mg/m2 = 178 mg in dextrose 5 % 500 mL chemo infusion, 85 mg/m2 = 178 mg, Intravenous, Clinic/[facility] 1 time, 4 of 12 cycles          4/8/2019 - 4/8/2019 Chemotherapy     Treatment Summary   Plan Name: OP COLORECTAL FOLFIRI + BEVACIZUMAB Q2W  Treatment Goal: Control  Status: Inactive  Start Date: [No treatment day found]  End Date: [No treatment day found]  Provider: Karina Pettit MD  Chemotherapy: fluorouracil injection 835 mg, 400 mg/m2 = 835 mg, Intravenous, Clinic/[facility] 1 time, 0 of 12 cycles  fluorouracil (ADRUCIL) 2,400 mg/m2 = 5,015 mg in sodium chloride 0.9% 200.3 mL chemo infusion, 2,400 mg/m2 = 5,015 mg, Intravenous, Over 46 hours, 0 of 12 cycles  bevacizumab (AVASTIN) 5 mg/kg = 500 mg in sodium chloride 0.9% 100 mL chemo infusion, 5 mg/kg = 500 mg, Intravenous, Clinic/[facility] 1 time, 0 of 12  cycles  irinotecan (CAMPTOSAR) 180 mg/m2 = 376 mg in sodium chloride 0.9% 500 mL chemo infusion, 180 mg/m2 = 376 mg, Intravenous, Clinic/[facility] 1 time, 0 of 12 cycles  leucovorin calcium 400 mg/m2 = 835 mg in dextrose 5 % 250 mL infusion, 400 mg/m2 = 835 mg, Intravenous, Clinic/[facility] 1 time, 0 of 12 cycles      12/22/2019 -  Chemotherapy     Treatment Summary   Plan Name: OP PANC NAB-PACLITAXEL + GEMCITABINE Day 1, 8 , 15 every 28 days  Treatment Goal: Maintenance  Status: Active  Start Date: 12/31/2019  End Date: 4/14/2020 (Planned)  Provider: Karina Pettit MD  Chemotherapy: PACLitaxel-protein bound (ABRAXANE) 100 mg/m2 = 200 mg in 40 mL infusion, 100 mg/m2 = 200 mg (100 % of original dose 100 mg/m2), Intravenous, Clinic/[facility] 1 time, 2 of 4 cycles  Dose modification: 100 mg/m2 (original dose 100 mg/m2, Cycle 1), 125 mg/m2 (original dose 100 mg/m2, Cycle 1)  Administration: 200 mg (12/31/2019), 200 mg (1/7/2020), 250 mg (1/14/2020), 250 mg (2/4/2020), 250 mg (2/11/2020)  gemcitabine 790 mg in sodium chloride 0.9% 250 mL chemo infusion, 400 mg/m2 = 790 mg (100 % of original dose 400 mg/m2), Intravenous, Clinic/[facility] 1 time, 2 of 4 cycles  Dose modification: 400 mg/m2 (original dose 400 mg/m2, Cycle 1), 800 mg/m2 (original dose 400 mg/m2, Cycle 1), 400 mg/m2 (original dose 400 mg/m2, Cycle 2)  Administration: 790 mg (12/31/2019), 790 mg (1/7/2020), 1,575 mg (1/14/2020), 790 mg (2/4/2020), 790 mg (2/11/2020)        Lung metastases    1/15/2019 Initial Diagnosis     Lung metastases      4/8/2019 - 4/8/2019 Chemotherapy     Treatment Summary   Plan Name: OP COLORECTAL FOLFIRI + BEVACIZUMAB Q2W  Treatment Goal: Control  Status: Inactive  Start Date: [No treatment day found]  End Date: [No treatment day found]  Provider: Karina Pettit MD  Chemotherapy: fluorouracil injection 835 mg, 400 mg/m2 = 835 mg, Intravenous, Clinic/[facility] 1 time, 0 of 12 cycles  fluorouracil (ADRUCIL) 2,400 mg/m2 = 5,015 mg in sodium chloride 0.9% 200.3 mL  chemo infusion, 2,400 mg/m2 = 5,015 mg, Intravenous, Over 46 hours, 0 of 12 cycles  bevacizumab (AVASTIN) 5 mg/kg = 500 mg in sodium chloride 0.9% 100 mL chemo infusion, 5 mg/kg = 500 mg, Intravenous, Clinic/[facility] 1 time, 0 of 12 cycles  irinotecan (CAMPTOSAR) 180 mg/m2 = 376 mg in sodium chloride 0.9% 500 mL chemo infusion, 180 mg/m2 = 376 mg, Intravenous, Clinic/[facility] 1 time, 0 of 12 cycles  leucovorin calcium 400 mg/m2 = 835 mg in dextrose 5 % 250 mL infusion, 400 mg/m2 = 835 mg, Intravenous, Clinic/[facility] 1 time, 0 of 12 cycles      4/8/2019 - 12/23/2019 Chemotherapy     Treatment Summary   Plan Name: OP COLORECTAL FOLFIRI + BEVACIZUMAB Q2W  Treatment Goal: Control  Status: Inactive  Start Date: 4/8/2019  End Date: 12/3/2019  Provider: Karina Pettit MD  Chemotherapy: fluorouracil injection 835 mg, 400 mg/m2 = 835 mg, Intravenous, Clinic/[facility] 1 time, 12 of 12 cycles  Administration: 835 mg (8/6/2019), 835 mg (8/27/2019), 835 mg (8/27/2019), 835 mg (9/10/2019), 835 mg (9/24/2019)  fluorouracil (ADRUCIL) 2,400 mg/m2 = 5,015 mg in sodium chloride 0.9% 200.3 mL chemo infusion, 2,400 mg/m2 = 5,015 mg, Intravenous, Over 46 hours, 12 of 12 cycles  Administration: 5,015 mg (8/6/2019), 5,015 mg (8/27/2019), 5,015 mg (9/10/2019), 5,015 mg (9/24/2019)  bevacizumab (AVASTIN) 5 mg/kg = 500 mg in sodium chloride 0.9% 100 mL chemo infusion, 5 mg/kg = 500 mg, Intravenous, Clinic/[facility]OD 1 time, 14 of 14 cycles  Dose modification: 7.5 mg/kg (original dose 5 mg/kg, Cycle 14)  Administration: 500 mg (8/6/2019), 500 mg (8/27/2019), 500 mg (9/10/2019), 500 mg (9/24/2019), 500 mg (11/12/2019), 750 mg (12/3/2019)  irinotecan (CAMPTOSAR) 180 mg/m2 = 376 mg in sodium chloride 0.9% 500 mL chemo infusion, 180 mg/m2 = 376 mg, Intravenous, Clinic[facility] 1 time, 14 of 14 cycles  Dose modification: 200 mg/m2 (original dose 180 mg/m2, Cycle 14)  Administration: 376 mg (8/6/2019), 376 mg (8/27/2019), 376 mg (9/10/2019), 376 mg (9/24/2019), 376 mg  (11/12/2019), 418 mg (12/3/2019)  leucovorin calcium 400 mg/m2 = 835 mg in dextrose 5 % 250 mL infusion, 400 mg/m2 = 835 mg, Intravenous, Clinic/HOD 1 time, 12 of 12 cycles  Administration: 835 mg (8/6/2019), 835 mg (8/27/2019), 835 mg (9/10/2019), 835 mg (9/24/2019)      12/22/2019 -  Chemotherapy     Treatment Summary   Plan Name: OP PANC NAB-PACLITAXEL + GEMCITABINE Day 1, 8 , 15 every 28 days  Treatment Goal: Maintenance  Status: Active  Start Date: 12/31/2019  End Date: 4/14/2020 (Planned)  Provider: Karina Pettit MD  Chemotherapy: PACLitaxel-protein bound (ABRAXANE) 100 mg/m2 = 200 mg in 40 mL infusion, 100 mg/m2 = 200 mg (100 % of original dose 100 mg/m2), Intravenous, Clinic/[facility] 1 time, 2 of 4 cycles  Dose modification: 100 mg/m2 (original dose 100 mg/m2, Cycle 1), 125 mg/m2 (original dose 100 mg/m2, Cycle 1)  Administration: 200 mg (12/31/2019), 200 mg (1/7/2020), 250 mg (1/14/2020), 250 mg (2/4/2020), 250 mg (2/11/2020)  gemcitabine 790 mg in sodium chloride 0.9% 250 mL chemo infusion, 400 mg/m2 = 790 mg (100 % of original dose 400 mg/m2), Intravenous, Clinic/HOD 1 time, 2 of 4 cycles  Dose modification: 400 mg/m2 (original dose 400 mg/m2, Cycle 1), 800 mg/m2 (original dose 400 mg/m2, Cycle 1), 400 mg/m2 (original dose 400 mg/m2, Cycle 2)  Administration: 790 mg (12/31/2019), 790 mg (1/7/2020), 1,575 mg (1/14/2020), 790 mg (2/4/2020), 790 mg (2/11/2020)        Hepatic metastases    1/15/2019 Initial Diagnosis     Hepatic metastases      4/8/2019 - 4/8/2019 Chemotherapy     Treatment Summary   Plan Name: OP COLORECTAL FOLFIRI + BEVACIZUMAB Q2W  Treatment Goal: Control  Status: Inactive  Start Date: [No treatment day found]  End Date: [No treatment day found]  Provider: Karina Pettit MD  Chemotherapy: fluorouracil injection 835 mg, 400 mg/m2 = 835 mg, Intravenous, Clinic/[facility] 1 time, 0 of 12 cycles  fluorouracil (ADRUCIL) 2,400 mg/m2 = 5,015 mg in sodium chloride 0.9% 200.3 mL chemo infusion, 2,400 mg/m2  = 5,015 mg, Intravenous, Over 46 hours, 0 of 12 cycles  bevacizumab (AVASTIN) 5 mg/kg = 500 mg in sodium chloride 0.9% 100 mL chemo infusion, 5 mg/kg = 500 mg, Intravenous, Clinic/HOD 1 time, 0 of 12 cycles  irinotecan (CAMPTOSAR) 180 mg/m2 = 376 mg in sodium chloride 0.9% 500 mL chemo infusion, 180 mg/m2 = 376 mg, Intravenous, Clinic/HOD 1 time, 0 of 12 cycles  leucovorin calcium 400 mg/m2 = 835 mg in dextrose 5 % 250 mL infusion, 400 mg/m2 = 835 mg, Intravenous, Clinic/[facility] 1 time, 0 of 12 cycles      4/8/2019 - 12/23/2019 Chemotherapy     Treatment Summary   Plan Name: OP COLORECTAL FOLFIRI + BEVACIZUMAB Q2W  Treatment Goal: Control  Status: Inactive  Start Date: 4/8/2019  End Date: 12/3/2019  Provider: aKrina Pettit MD  Chemotherapy: fluorouracil injection 835 mg, 400 mg/m2 = 835 mg, Intravenous, Clinic/[facility] 1 time, 12 of 12 cycles  Administration: 835 mg (8/6/2019), 835 mg (8/27/2019), 835 mg (8/27/2019), 835 mg (9/10/2019), 835 mg (9/24/2019)  fluorouracil (ADRUCIL) 2,400 mg/m2 = 5,015 mg in sodium chloride 0.9% 200.3 mL chemo infusion, 2,400 mg/m2 = 5,015 mg, Intravenous, Over 46 hours, 12 of 12 cycles  Administration: 5,015 mg (8/6/2019), 5,015 mg (8/27/2019), 5,015 mg (9/10/2019), 5,015 mg (9/24/2019)  bevacizumab (AVASTIN) 5 mg/kg = 500 mg in sodium chloride 0.9% 100 mL chemo infusion, 5 mg/kg = 500 mg, Intravenous, Clinic/HOD 1 time, 14 of 14 cycles  Dose modification: 7.5 mg/kg (original dose 5 mg/kg, Cycle 14)  Administration: 500 mg (8/6/2019), 500 mg (8/27/2019), 500 mg (9/10/2019), 500 mg (9/24/2019), 500 mg (11/12/2019), 750 mg (12/3/2019)  irinotecan (CAMPTOSAR) 180 mg/m2 = 376 mg in sodium chloride 0.9% 500 mL chemo infusion, 180 mg/m2 = 376 mg, Intravenous, [facility] 1 time, 14 of 14 cycles  Dose modification: 200 mg/m2 (original dose 180 mg/m2, Cycle 14)  Administration: 376 mg (8/6/2019), 376 mg (8/27/2019), 376 mg (9/10/2019), 376 mg (9/24/2019), 376 mg (11/12/2019), 418 mg  (12/3/2019)  leucovorin calcium 400 mg/m2 = 835 mg in dextrose 5 % 250 mL infusion, 400 mg/m2 = 835 mg, Intravenous, Clinic/HOD 1 time, 12 of 12 cycles  Administration: 835 mg (8/6/2019), 835 mg (8/27/2019), 835 mg (9/10/2019), 835 mg (9/24/2019)      12/22/2019 -  Chemotherapy     Treatment Summary   Plan Name: OP PANC NAB-PACLITAXEL + GEMCITABINE Day 1, 8 , 15 every 28 days  Treatment Goal: Maintenance  Status: Active  Start Date: 12/31/2019  End Date: 4/14/2020 (Planned)  Provider: Karina Pettit MD  Chemotherapy: PACLitaxel-protein bound (ABRAXANE) 100 mg/m2 = 200 mg in 40 mL infusion, 100 mg/m2 = 200 mg (100 % of original dose 100 mg/m2), Intravenous, Clinic/H[facility] 1 time, 2 of 4 cycles  Dose modification: 100 mg/m2 (original dose 100 mg/m2, Cycle 1), 125 mg/m2 (original dose 100 mg/m2, Cycle 1)  Administration: 200 mg (12/31/2019), 200 mg (1/7/2020), 250 mg (1/14/2020), 250 mg (2/4/2020), 250 mg (2/11/2020)  gemcitabine 790 mg in sodium chloride 0.9% 250 mL chemo infusion, 400 mg/m2 = 790 mg (100 % of original dose 400 mg/m2), Intravenous, Clinic/HOD 1 time, 2 of 4 cycles  Dose modification: 400 mg/m2 (original dose 400 mg/m2, Cycle 1), 800 mg/m2 (original dose 400 mg/m2, Cycle 1), 400 mg/m2 (original dose 400 mg/m2, Cycle 2)  Administration: 790 mg (12/31/2019), 790 mg (1/7/2020), 1,575 mg (1/14/2020), 790 mg (2/4/2020), 790 mg (2/11/2020)           Indigo Stuart is a 64 y.o.  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  Kras was MUTATED No MSI : EGFR no overexpression     Tolerating lopressor for htn and lipitor for dyslipidemia  Tolerating zofran for nausea prn chemo     Cycle one folfox avastin January 15 2019   Failed regimen and admitted with SBO and found to have duodenal cancer   Hepatic mets progressed hence changed to irinotecan dropped  oxaliplatin   Cycle one folfiri avastin April 2019     Pt was seen for left arm  swelling and ultrasound revealed a DVT in her arm , post lovenox , now on eliquis   Scan reviewed again  Here to resume Xeliri which was started Nov 8 2019 December 13 2019   DVT left internal jugular vein noted October 8 19 patient started on Eliquis  Here for evaluation of her imaging studies and scans  PET-CT with increasing SUV and increasing activity the cancer no evidence of new disease  Ultrasound revealed resolution of the blood clots in her left upper extremity    December 23 2019   PET CT mixed response explained again   cea now has normalized     January 7 2020  For chemo clearance   New rash to trunk and axilla     January 13 2020  For cycle 1 day 15  Rash better but remains   CEA has increased again       February 3 2020  For cycle 2 day 1 clearance after being delayed for one week with cytopenias   + leg pain new  Is on 1/2 dose eliquis after having nosebleeds and thrombocytopenia  Both of these resolved     2-  Here for cycle 2 day 8   Pt with weakness and fatigue, decreased apprtite and chills no fever     2-  Here for cycle 2 day 15   Leg pain and weakness to where she is in a wheelchair on this new chemo   cea rising , on eliquis but platelets ok     No change in PMH, PFH, PSH since last OV     Current Outpatient Medications:     amLODIPine (NORVASC) 5 MG tablet, Take 1 tablet (5 mg total) by mouth once daily., Disp: 90 tablet, Rfl: 3    atorvastatin (LIPITOR) 20 MG tablet, Take 1 tablet (20 mg total) by mouth every evening., Disp: 90 tablet, Rfl: 3    ELIQUIS 5 mg Tab, TAKE 1 TABLET BY MOUTH TWICE A DAY (Patient taking differently: No sig reported), Disp: 60 tablet, Rfl: 2    ferrous sulfate (FEOSOL) 325 mg (65 mg iron) Tab tablet, Take 1 tablet by mouth 2 (two) times daily., Disp: , Rfl: 3    metoprolol succinate (TOPROL-XL) 25 MG 24 hr tablet, Take 1 tablet (25 mg total) by mouth every evening., Disp: 90 tablet, Rfl: 3    potassium chloride SA (K-DUR,KLOR-CON) 20 MEQ  "tablet, Take 1 tablet (20 mEq total) by mouth once daily., Disp: 30 tablet, Rfl: 11    promethazine (PHENERGAN) 25 MG tablet, Take 1 tablet (25 mg total) by mouth every 6 (six) hours as needed for Nausea., Disp: 30 tablet, Rfl: 1      Review of Systems:  General: Weight gain: No, Weight Loss: No,  Fatigue yes   Chills: No, Night Sweats: No, Insomnia: No, Excessive sleeping: yes  Respiratory:  Cough: No, Shortness of Breath:  no   Wheezing: No, Excessive Snoring: No, Coughing up blood: No  Endocrine: Heat Intolerance: No, Cold Intolerance: No,   Excessive Thirst: No, Excessive Hunger: No  Eyes:  Blurred Vision: No, Double Vision: No   Light Sensitivity: No, Eye pain: No  Musculoskeletal: Muscle Aches/Pain: Yes    Joint Pain/Swelling: yes     Muscle Weakness:no , Neck Pain: No, Back Pain:   Neurological: Difficulty Walking/Falls:  Yes + weakness  Headache Migraine: No, Dizziness/Vertigo:  yes  Cardiovascular: Chest Pain: No, Shortness of Breath: no   Gastrointestinal: Nausea/Vomiting: No, Constipation: No, Diarrhea: no   Psych/Cog:  Depression: No, Anxiety: No, Hallucinations: No   : Frequent Urination: No, Incontinence: No, Blood of Urine: No, Urinary Infections: No  ENT:Hearing Loss: No, Earache: No, Ringing in Ears: No  Facial Pain: No, Chronic Congestion:No   Immune: Seasonal Allergies: No, Hives and/or Rashes: No  The remainder of the review of twelve body systems was reviewed and normal  + epistaxis and weakness      BP (!) 120/56 (BP Location: Right arm, Patient Position: Sitting)   Pulse 97   Temp 99 °F (37.2 °C) (Oral)   Resp (!) 22   Ht 5' 1" (1.549 m)   Wt 82.8 kg (182 lb 8.7 oz)   SpO2 98%   BMI 34.49 kg/m²   Constitutional: oriented to person, place, and time.  Conj pale  HENT: anicteric sclera   Head: Normocephalic and atraumatic. Ears canal normal no discharge    Nose: Nose normal. ++boggy turbinates  No lymphatics noted   Eyes: Conjunctivae PALE  EOM are normal.  Neck: . No thyromegaly " present.   Pharyngeal edema no erythema     Cardiovascular: Normal rate, regular rhythm, normal heart sounds  No murmur heard.  Pulmonary/Chest:  Clear bilaterally no fremitus  Abdominal: Soft. Bowel sounds are normal.  no mass.   Musculoskeletal: Normal range of motion. decreased strength   Left arm remains swollen yet better   No further boggy turbinates  Lymphadenopathy:  no cervical adenopathy.   Neurological: alert and oriented to person, place  Slight weakness today   Skin: Skin is warm and dry   Psychiatric: normal mood and affect.   Vitals reviewed.  Chest port removed     Lab Results   Component Value Date    WBC 3.70 (L) 02/14/2020    RBC 3.13 (L) 02/14/2020    HGB 9.3 (L) 02/14/2020    HCT 31.0 (L) 02/14/2020    MCV 99 (H) 02/14/2020    MCH 29.7 02/14/2020    MCHC 30.0 (L) 02/14/2020    RDW 17.5 (H) 02/14/2020     02/14/2020    MPV 9.9 02/14/2020    GRAN 2.9 02/14/2020    GRAN 77.6 (H) 02/14/2020    LYMPH 0.7 (L) 02/14/2020    LYMPH 19.2 02/14/2020    MONO 0.0 (L) 02/14/2020    MONO 0.3 (L) 02/14/2020    EOS 0.1 02/14/2020    BASO 0.02 02/14/2020    EOSINOPHIL 2.4 02/14/2020    BASOPHIL 0.5 02/14/2020       CMP  Sodium   Date Value Ref Range Status   02/14/2020 139 136 - 145 mmol/L Final     Potassium   Date Value Ref Range Status   02/14/2020 3.9 3.5 - 5.1 mmol/L Final     Chloride   Date Value Ref Range Status   02/14/2020 103 95 - 110 mmol/L Final     CO2   Date Value Ref Range Status   02/14/2020 25 23 - 29 mmol/L Final     Glucose   Date Value Ref Range Status   02/14/2020 128 (H) 70 - 110 mg/dL Final     BUN, Bld   Date Value Ref Range Status   02/14/2020 12 8 - 23 mg/dL Final     Creatinine   Date Value Ref Range Status   02/14/2020 0.6 0.5 - 1.4 mg/dL Final     Calcium   Date Value Ref Range Status   02/14/2020 8.8 8.7 - 10.5 mg/dL Final     Total Protein   Date Value Ref Range Status   02/14/2020 6.2 6.0 - 8.4 g/dL Final     Albumin   Date Value Ref Range Status   02/14/2020 3.0 (L) 3.5 -  5.2 g/dL Final     Total Bilirubin   Date Value Ref Range Status   02/14/2020 0.7 0.1 - 1.0 mg/dL Final     Comment:     For infants and newborns, interpretation of results should be based  on gestational age, weight and in agreement with clinical  observations.  Premature Infant recommended reference ranges:  Up to 24 hours.............<8.0 mg/dL  Up to 48 hours............<12.0 mg/dL  3-5 days..................<15.0 mg/dL  6-29 days.................<15.0 mg/dL       Alkaline Phosphatase   Date Value Ref Range Status   02/14/2020 233 (H) 55 - 135 U/L Final     AST   Date Value Ref Range Status   02/14/2020 56 (H) 10 - 40 U/L Final     ALT   Date Value Ref Range Status   02/14/2020 60 (H) 10 - 44 U/L Final     Anion Gap   Date Value Ref Range Status   02/14/2020 11 8 - 16 mmol/L Final     eGFR if    Date Value Ref Range Status   02/14/2020 >60 >60 mL/min/1.73 m^2 Final     eGFR if non    Date Value Ref Range Status   02/14/2020 >60 >60 mL/min/1.73 m^2 Final     Comment:     Calculation used to obtain the estimated glomerular filtration  rate (eGFR) is the CKD-EPI equation.      Impression:           - Normal esophagus.                        - Normal stomach.                        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 Prosthesis placed. The                         proximal end of the stent is distal to the ampulla                         (refer to images).  No msi   There are no osseous abnormalities.      Impression PET CT December 2019       Continued improvement of the hepatic metastasis with decrease in size of the multifocal hepatic lesions    Stable subcentimeter mesenteric lymph nodes    No evidence of new metastatic disease      Electronically signed by: Charissa Marroquin MD  Date: 12/11/2019  Time: 11:03     cea increasing        Hepatic metastases  -     CT Abdomen Pelvis With Contrast; Future; Expected date:  02/17/2020  -     CEA; Future; Expected date: 02/17/2020  -     Ambulatory referral/consult to ENT; Future; Expected date: 02/24/2020    Transaminasemia  -     CT Abdomen Pelvis With Contrast; Future; Expected date: 02/17/2020  -     CEA; Future; Expected date: 02/17/2020  -     Ambulatory referral/consult to ENT; Future; Expected date: 02/24/2020    Elevated alkaline phosphatase level  -     CT Abdomen Pelvis With Contrast; Future; Expected date: 02/17/2020  -     CEA; Future; Expected date: 02/17/2020  -     Ambulatory referral/consult to ENT; Future; Expected date: 02/24/2020    Elevated CEA    Encounter for chemotherapy management  -     CT Abdomen Pelvis With Contrast; Future; Expected date: 02/17/2020  -     CEA; Future; Expected date: 02/17/2020  -     Ambulatory referral/consult to ENT; Future; Expected date: 02/24/2020    Malignant neoplasm metastatic to lung, unspecified laterality    Epistaxis  -     CT Abdomen Pelvis With Contrast; Future; Expected date: 02/17/2020  -     CEA; Future; Expected date: 02/17/2020  -     Ambulatory referral/consult to ENT; Future; Expected date: 02/24/2020    Anticoagulated    Duodenal cancer  -     CT Abdomen Pelvis With Contrast; Future; Expected date: 02/17/2020  -     CEA; Future; Expected date: 02/17/2020  -     Ambulatory referral/consult to ENT; Future; Expected date: 02/24/2020    Overlapping malignant neoplasm of colon  -     CT Abdomen Pelvis With Contrast; Future; Expected date: 02/17/2020  -     CEA; Future; Expected date: 02/17/2020  -     Ambulatory referral/consult to ENT; Future; Expected date: 02/24/2020      1. PET CT in past reviewed showed progression with increasing activity of malignancy and CEA rising most likely due to duodenal cancer  MUST cont chemo   Assess increase in LFTs with a CT scan now   2.  Thrombosis resolved on eliquis  left upper extremity  New epistaxis even when she held eliquis : referred to ENT    3.  Weakness on chemo : cannot  tolerate abraxane and gemzar : must change to new regimen   4.  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 Duodenal cancer  : due for cycle 2  Day15  Not cleared   6.  Bone pain from chemo   7. Rising CEA   8. Anemia progressed: iron levels stable   9. Return next week after scan        Had been receiving Xeloda plus Avastin plus irinotecan    Abraxane  And gemzar failed        Antinausea meds may be continued as needed  Cont norvasc for HTN monitored by Dr Arevalo   Tolerating lipitor for dyslipidemia  On statin monitoring counts closely    acp documented

## 2020-02-18 ENCOUNTER — PATIENT MESSAGE (OUTPATIENT)
Dept: HEMATOLOGY/ONCOLOGY | Facility: CLINIC | Age: 65
End: 2020-02-18

## 2020-02-18 LAB
PYRIDOXAL SERPL-MCNC: 4 UG/L (ref 5–50)
VIT B1 BLD-MCNC: 29 UG/L (ref 38–122)

## 2020-02-20 ENCOUNTER — PATIENT MESSAGE (OUTPATIENT)
Dept: HEMATOLOGY/ONCOLOGY | Facility: CLINIC | Age: 65
End: 2020-02-20

## 2020-02-21 ENCOUNTER — HOSPITAL ENCOUNTER (OUTPATIENT)
Dept: RADIOLOGY | Facility: HOSPITAL | Age: 65
Discharge: HOME | End: 2020-02-21
Attending: INTERNAL MEDICINE
Payer: MEDICARE

## 2020-02-21 DIAGNOSIS — R04.0 EPISTAXIS: ICD-10-CM

## 2020-02-21 DIAGNOSIS — C17.0 DUODENAL CANCER: ICD-10-CM

## 2020-02-21 DIAGNOSIS — R74.01 TRANSAMINASEMIA: ICD-10-CM

## 2020-02-21 DIAGNOSIS — C18.8 OVERLAPPING MALIGNANT NEOPLASM OF COLON: ICD-10-CM

## 2020-02-21 DIAGNOSIS — Z51.11 ENCOUNTER FOR CHEMOTHERAPY MANAGEMENT: ICD-10-CM

## 2020-02-21 DIAGNOSIS — C78.7 HEPATIC METASTASES: ICD-10-CM

## 2020-02-21 DIAGNOSIS — R74.8 ELEVATED ALKALINE PHOSPHATASE LEVEL: ICD-10-CM

## 2020-02-21 PROCEDURE — 74177 CT ABD & PELVIS W/CONTRAST: CPT | Mod: 26,,, | Performed by: RADIOLOGY

## 2020-02-21 PROCEDURE — 25500020 PHARM REV CODE 255: Performed by: INTERNAL MEDICINE

## 2020-02-21 PROCEDURE — 74177 CT ABD & PELVIS W/CONTRAST: CPT | Mod: TC

## 2020-02-21 PROCEDURE — 74177 CT ABDOMEN PELVIS WITH CONTRAST: ICD-10-PCS | Mod: 26,,, | Performed by: RADIOLOGY

## 2020-02-21 RX ADMIN — IOHEXOL 1000 ML: 9 SOLUTION ORAL at 08:02

## 2020-02-21 RX ADMIN — IOHEXOL 75 ML: 350 INJECTION, SOLUTION INTRAVENOUS at 08:02

## 2020-02-24 ENCOUNTER — OFFICE VISIT (OUTPATIENT)
Dept: HEMATOLOGY/ONCOLOGY | Facility: CLINIC | Age: 65
End: 2020-02-24
Payer: MEDICARE

## 2020-02-24 ENCOUNTER — HOSPITAL ENCOUNTER (OUTPATIENT)
Facility: HOSPITAL | Age: 65
Discharge: HOME | End: 2020-02-26
Attending: EMERGENCY MEDICINE | Admitting: INTERNAL MEDICINE
Payer: MEDICARE

## 2020-02-24 VITALS
DIASTOLIC BLOOD PRESSURE: 72 MMHG | HEART RATE: 110 BPM | BODY MASS INDEX: 34.42 KG/M2 | TEMPERATURE: 103 F | SYSTOLIC BLOOD PRESSURE: 118 MMHG | HEIGHT: 61 IN | WEIGHT: 182.31 LBS

## 2020-02-24 DIAGNOSIS — D64.9 SYMPTOMATIC ANEMIA: ICD-10-CM

## 2020-02-24 DIAGNOSIS — K31.5 DUODENAL STENOSIS: ICD-10-CM

## 2020-02-24 DIAGNOSIS — N39.0 UTI (URINARY TRACT INFECTION): ICD-10-CM

## 2020-02-24 DIAGNOSIS — C78.7 HEPATIC METASTASES: Primary | ICD-10-CM

## 2020-02-24 DIAGNOSIS — C78.00 MALIGNANT NEOPLASM METASTATIC TO LUNG, UNSPECIFIED LATERALITY: ICD-10-CM

## 2020-02-24 DIAGNOSIS — D70.9 NEUTROPENIC FEVER: Primary | ICD-10-CM

## 2020-02-24 DIAGNOSIS — C18.8 OVERLAPPING MALIGNANT NEOPLASM OF COLON: ICD-10-CM

## 2020-02-24 DIAGNOSIS — D72.9 NEUTROPHILIA: ICD-10-CM

## 2020-02-24 DIAGNOSIS — R50.81 NEUTROPENIC FEVER: Primary | ICD-10-CM

## 2020-02-24 DIAGNOSIS — R50.9 FEVER, UNSPECIFIED FEVER CAUSE: ICD-10-CM

## 2020-02-24 LAB
ALBUMIN SERPL BCP-MCNC: 2.8 G/DL (ref 3.5–5.2)
ALP SERPL-CCNC: 320 U/L (ref 55–135)
ALT SERPL W/O P-5'-P-CCNC: 32 U/L (ref 10–44)
ANION GAP SERPL CALC-SCNC: 14 MMOL/L (ref 8–16)
ANISOCYTOSIS BLD QL SMEAR: SLIGHT
AST SERPL-CCNC: 50 U/L (ref 10–40)
BACTERIA #/AREA URNS HPF: ABNORMAL /HPF
BASOPHILS NFR BLD: 0 % (ref 0–1.9)
BILIRUB SERPL-MCNC: 1 MG/DL (ref 0.1–1)
BILIRUB UR QL STRIP: ABNORMAL
BUN SERPL-MCNC: 9 MG/DL (ref 8–23)
CALCIUM SERPL-MCNC: 9.7 MG/DL (ref 8.7–10.5)
CHLORIDE SERPL-SCNC: 98 MMOL/L (ref 95–110)
CLARITY UR: CLEAR
CO2 SERPL-SCNC: 23 MMOL/L (ref 23–29)
COLOR UR: YELLOW
CREAT SERPL-MCNC: 0.7 MG/DL (ref 0.5–1.4)
DIFFERENTIAL METHOD: ABNORMAL
EOSINOPHIL NFR BLD: 0 % (ref 0–8)
ERYTHROCYTE [DISTWIDTH] IN BLOOD BY AUTOMATED COUNT: 17.5 % (ref 11.5–14.5)
ERYTHROCYTE [SEDIMENTATION RATE] IN BLOOD BY WESTERGREN METHOD: 131 MM/HR (ref 0–20)
EST. GFR  (AFRICAN AMERICAN): >60 ML/MIN/1.73 M^2
EST. GFR  (NON AFRICAN AMERICAN): >60 ML/MIN/1.73 M^2
GLUCOSE SERPL-MCNC: 118 MG/DL (ref 70–110)
GLUCOSE UR QL STRIP: NEGATIVE
HCT VFR BLD AUTO: 32 % (ref 37–48.5)
HGB BLD-MCNC: 9.6 G/DL (ref 12–16)
HGB UR QL STRIP: NEGATIVE
IMM GRANULOCYTES # BLD AUTO: ABNORMAL K/UL
INFLUENZA A, MOLECULAR: NEGATIVE
INFLUENZA B, MOLECULAR: NEGATIVE
KETONES UR QL STRIP: ABNORMAL
LACTATE SERPL-SCNC: 1.3 MMOL/L (ref 0.5–2.2)
LACTATE SERPL-SCNC: 1.4 MMOL/L (ref 0.5–2.2)
LEUKOCYTE ESTERASE UR QL STRIP: NEGATIVE
LYMPHOCYTES NFR BLD: 11 % (ref 18–48)
MCH RBC QN AUTO: 29.3 PG (ref 27–31)
MCHC RBC AUTO-ENTMCNC: 30 G/DL (ref 32–36)
MCV RBC AUTO: 98 FL (ref 82–98)
MICROSCOPIC COMMENT: ABNORMAL
MONOCYTES NFR BLD: 21 % (ref 4–15)
NEUTROPHILS NFR BLD: 63 % (ref 38–73)
NEUTS BAND NFR BLD MANUAL: 5 %
NITRITE UR QL STRIP: POSITIVE
NRBC BLD-RTO: 0 /100 WBC
OVALOCYTES BLD QL SMEAR: ABNORMAL
PH UR STRIP: 6 [PH] (ref 5–8)
PLATELET # BLD AUTO: 468 K/UL (ref 150–350)
PLATELET BLD QL SMEAR: ABNORMAL
PMV BLD AUTO: 8.4 FL (ref 9.2–12.9)
POIKILOCYTOSIS BLD QL SMEAR: SLIGHT
POLYCHROMASIA BLD QL SMEAR: ABNORMAL
POTASSIUM SERPL-SCNC: 4.5 MMOL/L (ref 3.5–5.1)
PROT SERPL-MCNC: 7 G/DL (ref 6–8.4)
PROT UR QL STRIP: ABNORMAL
RBC # BLD AUTO: 3.28 M/UL (ref 4–5.4)
SODIUM SERPL-SCNC: 135 MMOL/L (ref 136–145)
SP GR UR STRIP: 1.02 (ref 1–1.03)
SPECIMEN SOURCE: NORMAL
SQUAMOUS #/AREA URNS HPF: 45 /HPF
URN SPEC COLLECT METH UR: ABNORMAL
UROBILINOGEN UR STRIP-ACNC: 1 EU/DL
WBC # BLD AUTO: 10.27 K/UL (ref 3.9–12.7)
WBC #/AREA URNS HPF: 2 /HPF (ref 0–5)

## 2020-02-24 PROCEDURE — 3078F DIAST BP <80 MM HG: CPT | Mod: S$GLB,,, | Performed by: INTERNAL MEDICINE

## 2020-02-24 PROCEDURE — 1159F MED LIST DOCD IN RCRD: CPT | Mod: S$GLB,,, | Performed by: INTERNAL MEDICINE

## 2020-02-24 PROCEDURE — 25000003 PHARM REV CODE 250: Performed by: INTERNAL MEDICINE

## 2020-02-24 PROCEDURE — G0378 HOSPITAL OBSERVATION PER HR: HCPCS

## 2020-02-24 PROCEDURE — 96374 THER/PROPH/DIAG INJ IV PUSH: CPT

## 2020-02-24 PROCEDURE — 87040 BLOOD CULTURE FOR BACTERIA: CPT

## 2020-02-24 PROCEDURE — 3074F SYST BP LT 130 MM HG: CPT | Mod: S$GLB,,, | Performed by: INTERNAL MEDICINE

## 2020-02-24 PROCEDURE — 81000 URINALYSIS NONAUTO W/SCOPE: CPT

## 2020-02-24 PROCEDURE — 85007 BL SMEAR W/DIFF WBC COUNT: CPT | Mod: NCS

## 2020-02-24 PROCEDURE — 87502 INFLUENZA DNA AMP PROBE: CPT

## 2020-02-24 PROCEDURE — 1101F PT FALLS ASSESS-DOCD LE1/YR: CPT | Mod: S$GLB,,, | Performed by: INTERNAL MEDICINE

## 2020-02-24 PROCEDURE — 63600175 PHARM REV CODE 636 W HCPCS: Performed by: EMERGENCY MEDICINE

## 2020-02-24 PROCEDURE — 80053 COMPREHEN METABOLIC PANEL: CPT

## 2020-02-24 PROCEDURE — 1126F PR PAIN SEVERITY QUANTIFIED, NO PAIN PRESENT: ICD-10-PCS | Mod: S$GLB,,, | Performed by: INTERNAL MEDICINE

## 2020-02-24 PROCEDURE — 25000003 PHARM REV CODE 250: Performed by: EMERGENCY MEDICINE

## 2020-02-24 PROCEDURE — 85651 RBC SED RATE NONAUTOMATED: CPT

## 2020-02-24 PROCEDURE — 99285 EMERGENCY DEPT VISIT HI MDM: CPT | Mod: 25

## 2020-02-24 PROCEDURE — 99999 PR PBB SHADOW E&M-EST. PATIENT-LVL III: CPT | Mod: PBBFAC,,, | Performed by: INTERNAL MEDICINE

## 2020-02-24 PROCEDURE — 1101F PR PT FALLS ASSESS DOC 0-1 FALLS W/OUT INJ PAST YR: ICD-10-PCS | Mod: S$GLB,,, | Performed by: INTERNAL MEDICINE

## 2020-02-24 PROCEDURE — 3078F PR MOST RECENT DIASTOLIC BLOOD PRESSURE < 80 MM HG: ICD-10-PCS | Mod: S$GLB,,, | Performed by: INTERNAL MEDICINE

## 2020-02-24 PROCEDURE — 36415 COLL VENOUS BLD VENIPUNCTURE: CPT

## 2020-02-24 PROCEDURE — 83605 ASSAY OF LACTIC ACID: CPT

## 2020-02-24 PROCEDURE — 99999 PR PBB SHADOW E&M-EST. PATIENT-LVL III: ICD-10-PCS | Mod: PBBFAC,,, | Performed by: INTERNAL MEDICINE

## 2020-02-24 PROCEDURE — 99215 OFFICE O/P EST HI 40 MIN: CPT | Mod: S$GLB,,, | Performed by: INTERNAL MEDICINE

## 2020-02-24 PROCEDURE — 85027 COMPLETE CBC AUTOMATED: CPT

## 2020-02-24 PROCEDURE — 99215 PR OFFICE/OUTPT VISIT, EST, LEVL V, 40-54 MIN: ICD-10-PCS | Mod: S$GLB,,, | Performed by: INTERNAL MEDICINE

## 2020-02-24 PROCEDURE — 3074F PR MOST RECENT SYSTOLIC BLOOD PRESSURE < 130 MM HG: ICD-10-PCS | Mod: S$GLB,,, | Performed by: INTERNAL MEDICINE

## 2020-02-24 PROCEDURE — 1126F AMNT PAIN NOTED NONE PRSNT: CPT | Mod: S$GLB,,, | Performed by: INTERNAL MEDICINE

## 2020-02-24 PROCEDURE — 1159F PR MEDICATION LIST DOCUMENTED IN MEDICAL RECORD: ICD-10-PCS | Mod: S$GLB,,, | Performed by: INTERNAL MEDICINE

## 2020-02-24 RX ORDER — ATORVASTATIN CALCIUM 20 MG/1
20 TABLET, FILM COATED ORAL NIGHTLY
Status: DISCONTINUED | OUTPATIENT
Start: 2020-02-24 | End: 2020-02-26 | Stop reason: HOSPADM

## 2020-02-24 RX ORDER — AMOXICILLIN 250 MG
2 CAPSULE ORAL NIGHTLY PRN
COMMUNITY

## 2020-02-24 RX ORDER — CEFEPIME HYDROCHLORIDE 1 G/50ML
2 INJECTION, SOLUTION INTRAVENOUS
Status: COMPLETED | OUTPATIENT
Start: 2020-02-24 | End: 2020-02-24

## 2020-02-24 RX ORDER — LANOLIN ALCOHOL/MO/W.PET/CERES
50 CREAM (GRAM) TOPICAL DAILY
Status: DISCONTINUED | OUTPATIENT
Start: 2020-02-25 | End: 2020-02-26 | Stop reason: HOSPADM

## 2020-02-24 RX ORDER — LANOLIN ALCOHOL/MO/W.PET/CERES
100 CREAM (GRAM) TOPICAL DAILY
COMMUNITY

## 2020-02-24 RX ORDER — CEFTRIAXONE 1 G/1
1 INJECTION, POWDER, FOR SOLUTION INTRAMUSCULAR; INTRAVENOUS
Status: DISCONTINUED | OUTPATIENT
Start: 2020-02-25 | End: 2020-02-24

## 2020-02-24 RX ORDER — THIAMINE HCL 250 MG
250 TABLET ORAL DAILY
COMMUNITY

## 2020-02-24 RX ORDER — ACETAMINOPHEN 500 MG
1000 TABLET ORAL
Status: COMPLETED | OUTPATIENT
Start: 2020-02-24 | End: 2020-02-24

## 2020-02-24 RX ORDER — ONDANSETRON 2 MG/ML
4 INJECTION INTRAMUSCULAR; INTRAVENOUS EVERY 8 HOURS PRN
Status: DISCONTINUED | OUTPATIENT
Start: 2020-02-24 | End: 2020-02-26 | Stop reason: HOSPADM

## 2020-02-24 RX ORDER — METOPROLOL SUCCINATE 25 MG/1
25 TABLET, EXTENDED RELEASE ORAL NIGHTLY
Status: DISCONTINUED | OUTPATIENT
Start: 2020-02-24 | End: 2020-02-26 | Stop reason: HOSPADM

## 2020-02-24 RX ORDER — POTASSIUM CHLORIDE 20 MEQ/1
20 TABLET, EXTENDED RELEASE ORAL DAILY
Status: DISCONTINUED | OUTPATIENT
Start: 2020-02-25 | End: 2020-02-26 | Stop reason: HOSPADM

## 2020-02-24 RX ORDER — SODIUM CHLORIDE 0.9 % (FLUSH) 0.9 %
10 SYRINGE (ML) INJECTION
Status: DISCONTINUED | OUTPATIENT
Start: 2020-02-24 | End: 2020-02-26 | Stop reason: HOSPADM

## 2020-02-24 RX ORDER — FERROUS SULFATE 325(65) MG
325 TABLET, DELAYED RELEASE (ENTERIC COATED) ORAL DAILY
Status: DISCONTINUED | OUTPATIENT
Start: 2020-02-25 | End: 2020-02-26 | Stop reason: HOSPADM

## 2020-02-24 RX ORDER — AMLODIPINE BESYLATE 5 MG/1
5 TABLET ORAL DAILY
Status: DISCONTINUED | OUTPATIENT
Start: 2020-02-25 | End: 2020-02-26 | Stop reason: HOSPADM

## 2020-02-24 RX ORDER — LIDOCAINE HYDROCHLORIDE 10 MG/ML
1 INJECTION INFILTRATION; PERINEURAL ONCE
Status: DISCONTINUED | OUTPATIENT
Start: 2020-02-24 | End: 2020-02-24

## 2020-02-24 RX ADMIN — SODIUM CHLORIDE 1000 ML: 0.9 INJECTION, SOLUTION INTRAVENOUS at 04:02

## 2020-02-24 RX ADMIN — APIXABAN 5 MG: 2.5 TABLET, FILM COATED ORAL at 09:02

## 2020-02-24 RX ADMIN — ACETAMINOPHEN 1000 MG: 500 TABLET ORAL at 04:02

## 2020-02-24 RX ADMIN — METOPROLOL SUCCINATE 25 MG: 25 TABLET, EXTENDED RELEASE ORAL at 09:02

## 2020-02-24 RX ADMIN — ATORVASTATIN CALCIUM 20 MG: 20 TABLET, FILM COATED ORAL at 09:02

## 2020-02-24 RX ADMIN — CEFEPIME HYDROCHLORIDE 2 G: 2 INJECTION, SOLUTION INTRAVENOUS at 04:02

## 2020-02-24 NOTE — PROGRESS NOTES
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  - Clinical: Stage IV (cTX, cNX, cM1) - Signed by Karina Pettit MD on 3/11/2019       Chemotherapy     Treatment Summary   Plan Name: OP COLORECTAL FOLFOX + BEVACIZUMAB Q2W  Treatment Goal: Maintenance  Status: Active  Start Date: 1/21/2019  End Date: 7/3/2019 (Planned)  Provider: Karina Pettit MD  Chemotherapy: fluorouracil injection 835 mg, 400 mg/m2 = 835 mg, Intravenous, Clinic/HOD 1 time, 4 of 12 cycles    fluorouracil (ADRUCIL) 2,400 mg/m2 = 5,015 mg in sodium chloride 0.9% 200.3 mL chemo infusion, 2,400 mg/m2 = 5,015 mg, Intravenous, Over 46 hours, 4 of 12 cycles    bevacizumab (AVASTIN) 5 mg/kg = 500 mg in sodium chloride 0.9% 100 mL chemo infusion, 5 mg/kg = 500 mg, Intravenous, Clinic/HOD 1 time, 4 of 12 cycles    leucovorin calcium 400 mg/m2 = 835 mg in dextrose 5 % 250 mL infusion, 400 mg/m2 = 835 mg, Intravenous, Clinic/HOD 1 time, 4 of 12 cycles    oxaliplatin (ELOXATIN) 85 mg/m2 = 178 mg in dextrose 5 % 500 mL chemo infusion, 85 mg/m2 = 178 mg, Intravenous, Clinic/HOD 1 time, 4 of 12 cycles          4/8/2019 - 4/8/2019 Chemotherapy     Treatment Summary   Plan Name: OP COLORECTAL FOLFIRI + BEVACIZUMAB Q2W  Treatment Goal: Control  Status: Inactive  Start Date: [No treatment day found]  End Date: [No treatment day found]  Provider: Karina Pettit MD  Chemotherapy: fluorouracil injection 835 mg, 400 mg/m2 = 835 mg, Intravenous, Clinic/HOD 1 time, 0 of 12 cycles  fluorouracil (ADRUCIL) 2,400 mg/m2 = 5,015 mg in sodium chloride 0.9% 200.3 mL chemo infusion, 2,400 mg/m2 = 5,015 mg, Intravenous, Over 46 hours, 0 of 12 cycles  bevacizumab (AVASTIN) 5 mg/kg = 500 mg in sodium chloride 0.9% 100 mL chemo infusion, 5 mg/kg = 500 mg, Intravenous, Clinic/HOD 1 time, 0 of 12 cycles  irinotecan  (CAMPTOSAR) 180 mg/m2 = 376 mg in sodium chloride 0.9% 500 mL chemo infusion, 180 mg/m2 = 376 mg, Intravenous, Clinic/HOD 1 time, 0 of 12 cycles  leucovorin calcium 400 mg/m2 = 835 mg in dextrose 5 % 250 mL infusion, 400 mg/m2 = 835 mg, Intravenous, Clinic/HOD 1 time, 0 of 12 cycles      12/22/2019 -  Chemotherapy     Treatment Summary   Plan Name: OP PANC NAB-PACLITAXEL + GEMCITABINE Day 1, 8 , 15 every 28 days  Treatment Goal: Maintenance  Status: Active  Start Date: 12/31/2019  End Date: 4/14/2020 (Planned)  Provider: Karina Pettit MD  Chemotherapy: PACLitaxel-protein bound (ABRAXANE) 100 mg/m2 = 200 mg in 40 mL infusion, 100 mg/m2 = 200 mg (100 % of original dose 100 mg/m2), Intravenous, Clinic/HOD 1 time, 2 of 4 cycles  Dose modification: 100 mg/m2 (original dose 100 mg/m2, Cycle 1), 125 mg/m2 (original dose 100 mg/m2, Cycle 1)  Administration: 200 mg (12/31/2019), 200 mg (1/7/2020), 250 mg (1/14/2020), 250 mg (2/4/2020), 250 mg (2/11/2020)  gemcitabine 790 mg in sodium chloride 0.9% 250 mL chemo infusion, 400 mg/m2 = 790 mg (100 % of original dose 400 mg/m2), Intravenous, Clinic/HOD 1 time, 2 of 4 cycles  Dose modification: 400 mg/m2 (original dose 400 mg/m2, Cycle 1), 800 mg/m2 (original dose 400 mg/m2, Cycle 1), 400 mg/m2 (original dose 400 mg/m2, Cycle 2)  Administration: 790 mg (12/31/2019), 790 mg (1/7/2020), 1,575 mg (1/14/2020), 790 mg (2/4/2020), 790 mg (2/11/2020)        Lung metastases    1/15/2019 Initial Diagnosis     Lung metastases      4/8/2019 - 4/8/2019 Chemotherapy     Treatment Summary   Plan Name: OP COLORECTAL FOLFIRI + BEVACIZUMAB Q2W  Treatment Goal: Control  Status: Inactive  Start Date: [No treatment day found]  End Date: [No treatment day found]  Provider: Karina Pettit MD  Chemotherapy: fluorouracil injection 835 mg, 400 mg/m2 = 835 mg, Intravenous, Clinic/HOD 1 time, 0 of 12 cycles  fluorouracil (ADRUCIL) 2,400 mg/m2 = 5,015 mg in sodium chloride 0.9% 200.3 mL chemo infusion,  2,400 mg/m2 = 5,015 mg, Intravenous, Over 46 hours, 0 of 12 cycles  bevacizumab (AVASTIN) 5 mg/kg = 500 mg in sodium chloride 0.9% 100 mL chemo infusion, 5 mg/kg = 500 mg, Intravenous, Clinic/HOD 1 time, 0 of 12 cycles  irinotecan (CAMPTOSAR) 180 mg/m2 = 376 mg in sodium chloride 0.9% 500 mL chemo infusion, 180 mg/m2 = 376 mg, Intravenous, Clinic/HOD 1 time, 0 of 12 cycles  leucovorin calcium 400 mg/m2 = 835 mg in dextrose 5 % 250 mL infusion, 400 mg/m2 = 835 mg, Intravenous, Clinic/HOD 1 time, 0 of 12 cycles      4/8/2019 - 12/23/2019 Chemotherapy     Treatment Summary   Plan Name: OP COLORECTAL FOLFIRI + BEVACIZUMAB Q2W  Treatment Goal: Control  Status: Inactive  Start Date: 4/8/2019  End Date: 12/3/2019  Provider: Karina Pettit MD  Chemotherapy: fluorouracil injection 835 mg, 400 mg/m2 = 835 mg, Intravenous, Clinic/HOD 1 time, 12 of 12 cycles  Administration: 835 mg (8/6/2019), 835 mg (8/27/2019), 835 mg (8/27/2019), 835 mg (9/10/2019), 835 mg (9/24/2019)  fluorouracil (ADRUCIL) 2,400 mg/m2 = 5,015 mg in sodium chloride 0.9% 200.3 mL chemo infusion, 2,400 mg/m2 = 5,015 mg, Intravenous, Over 46 hours, 12 of 12 cycles  Administration: 5,015 mg (8/6/2019), 5,015 mg (8/27/2019), 5,015 mg (9/10/2019), 5,015 mg (9/24/2019)  bevacizumab (AVASTIN) 5 mg/kg = 500 mg in sodium chloride 0.9% 100 mL chemo infusion, 5 mg/kg = 500 mg, Intravenous, Clinic/HOD 1 time, 14 of 14 cycles  Dose modification: 7.5 mg/kg (original dose 5 mg/kg, Cycle 14)  Administration: 500 mg (8/6/2019), 500 mg (8/27/2019), 500 mg (9/10/2019), 500 mg (9/24/2019), 500 mg (11/12/2019), 750 mg (12/3/2019)  irinotecan (CAMPTOSAR) 180 mg/m2 = 376 mg in sodium chloride 0.9% 500 mL chemo infusion, 180 mg/m2 = 376 mg, Intravenous, [facility] 1 time, 14 of 14 cycles  Dose modification: 200 mg/m2 (original dose 180 mg/m2, Cycle 14)  Administration: 376 mg (8/6/2019), 376 mg (8/27/2019), 376 mg (9/10/2019), 376 mg (9/24/2019), 376 mg (11/12/2019), 418 mg  (12/3/2019)  leucovorin calcium 400 mg/m2 = 835 mg in dextrose 5 % 250 mL infusion, 400 mg/m2 = 835 mg, Intravenous, Clinic/HOD 1 time, 12 of 12 cycles  Administration: 835 mg (8/6/2019), 835 mg (8/27/2019), 835 mg (9/10/2019), 835 mg (9/24/2019)      12/22/2019 -  Chemotherapy     Treatment Summary   Plan Name: OP PANC NAB-PACLITAXEL + GEMCITABINE Day 1, 8 , 15 every 28 days  Treatment Goal: Maintenance  Status: Active  Start Date: 12/31/2019  End Date: 4/14/2020 (Planned)  Provider: Karina Pettit MD  Chemotherapy: PACLitaxel-protein bound (ABRAXANE) 100 mg/m2 = 200 mg in 40 mL infusion, 100 mg/m2 = 200 mg (100 % of original dose 100 mg/m2), Intravenous, Clinic/HOD 1 time, 2 of 4 cycles  Dose modification: 100 mg/m2 (original dose 100 mg/m2, Cycle 1), 125 mg/m2 (original dose 100 mg/m2, Cycle 1)  Administration: 200 mg (12/31/2019), 200 mg (1/7/2020), 250 mg (1/14/2020), 250 mg (2/4/2020), 250 mg (2/11/2020)  gemcitabine 790 mg in sodium chloride 0.9% 250 mL chemo infusion, 400 mg/m2 = 790 mg (100 % of original dose 400 mg/m2), Intravenous, Clinic/HOD 1 time, 2 of 4 cycles  Dose modification: 400 mg/m2 (original dose 400 mg/m2, Cycle 1), 800 mg/m2 (original dose 400 mg/m2, Cycle 1), 400 mg/m2 (original dose 400 mg/m2, Cycle 2)  Administration: 790 mg (12/31/2019), 790 mg (1/7/2020), 1,575 mg (1/14/2020), 790 mg (2/4/2020), 790 mg (2/11/2020)        Hepatic metastases    1/15/2019 Initial Diagnosis     Hepatic metastases      4/8/2019 - 4/8/2019 Chemotherapy     Treatment Summary   Plan Name: OP COLORECTAL FOLFIRI + BEVACIZUMAB Q2W  Treatment Goal: Control  Status: Inactive  Start Date: [No treatment day found]  End Date: [No treatment day found]  Provider: Karina Pettit MD  Chemotherapy: fluorouracil injection 835 mg, 400 mg/m2 = 835 mg, Intravenous, Clinic/HOD 1 time, 0 of 12 cycles  fluorouracil (ADRUCIL) 2,400 mg/m2 = 5,015 mg in sodium chloride 0.9% 200.3 mL chemo infusion, 2,400 mg/m2 = 5,015 mg,  Intravenous, Over 46 hours, 0 of 12 cycles  bevacizumab (AVASTIN) 5 mg/kg = 500 mg in sodium chloride 0.9% 100 mL chemo infusion, 5 mg/kg = 500 mg, Intravenous, Clinic/HOD 1 time, 0 of 12 cycles  irinotecan (CAMPTOSAR) 180 mg/m2 = 376 mg in sodium chloride 0.9% 500 mL chemo infusion, 180 mg/m2 = 376 mg, Intravenous, Clinic/HOD 1 time, 0 of 12 cycles  leucovorin calcium 400 mg/m2 = 835 mg in dextrose 5 % 250 mL infusion, 400 mg/m2 = 835 mg, Intravenous, Clinic/HOD 1 time, 0 of 12 cycles      4/8/2019 - 12/23/2019 Chemotherapy     Treatment Summary   Plan Name: OP COLORECTAL FOLFIRI + BEVACIZUMAB Q2W  Treatment Goal: Control  Status: Inactive  Start Date: 4/8/2019  End Date: 12/3/2019  Provider: Karina Pettit MD  Chemotherapy: fluorouracil injection 835 mg, 400 mg/m2 = 835 mg, Intravenous, Clinic/HOD 1 time, 12 of 12 cycles  Administration: 835 mg (8/6/2019), 835 mg (8/27/2019), 835 mg (8/27/2019), 835 mg (9/10/2019), 835 mg (9/24/2019)  fluorouracil (ADRUCIL) 2,400 mg/m2 = 5,015 mg in sodium chloride 0.9% 200.3 mL chemo infusion, 2,400 mg/m2 = 5,015 mg, Intravenous, Over 46 hours, 12 of 12 cycles  Administration: 5,015 mg (8/6/2019), 5,015 mg (8/27/2019), 5,015 mg (9/10/2019), 5,015 mg (9/24/2019)  bevacizumab (AVASTIN) 5 mg/kg = 500 mg in sodium chloride 0.9% 100 mL chemo infusion, 5 mg/kg = 500 mg, Intravenous, Clinic/HOD 1 time, 14 of 14 cycles  Dose modification: 7.5 mg/kg (original dose 5 mg/kg, Cycle 14)  Administration: 500 mg (8/6/2019), 500 mg (8/27/2019), 500 mg (9/10/2019), 500 mg (9/24/2019), 500 mg (11/12/2019), 750 mg (12/3/2019)  irinotecan (CAMPTOSAR) 180 mg/m2 = 376 mg in sodium chloride 0.9% 500 mL chemo infusion, 180 mg/m2 = 376 mg, Intravenous, [facility] 1 time, 14 of 14 cycles  Dose modification: 200 mg/m2 (original dose 180 mg/m2, Cycle 14)  Administration: 376 mg (8/6/2019), 376 mg (8/27/2019), 376 mg (9/10/2019), 376 mg (9/24/2019), 376 mg (11/12/2019), 418 mg (12/3/2019)  leucovorin  calcium 400 mg/m2 = 835 mg in dextrose 5 % 250 mL infusion, 400 mg/m2 = 835 mg, Intravenous, Clinic/HOD 1 time, 12 of 12 cycles  Administration: 835 mg (8/6/2019), 835 mg (8/27/2019), 835 mg (9/10/2019), 835 mg (9/24/2019)      12/22/2019 -  Chemotherapy     Treatment Summary   Plan Name: OP PANC NAB-PACLITAXEL + GEMCITABINE Day 1, 8 , 15 every 28 days  Treatment Goal: Maintenance  Status: Active  Start Date: 12/31/2019  End Date: 4/14/2020 (Planned)  Provider: Karina Pettit MD  Chemotherapy: PACLitaxel-protein bound (ABRAXANE) 100 mg/m2 = 200 mg in 40 mL infusion, 100 mg/m2 = 200 mg (100 % of original dose 100 mg/m2), Intravenous, Clinic/HOD 1 time, 2 of 4 cycles  Dose modification: 100 mg/m2 (original dose 100 mg/m2, Cycle 1), 125 mg/m2 (original dose 100 mg/m2, Cycle 1)  Administration: 200 mg (12/31/2019), 200 mg (1/7/2020), 250 mg (1/14/2020), 250 mg (2/4/2020), 250 mg (2/11/2020)  gemcitabine 790 mg in sodium chloride 0.9% 250 mL chemo infusion, 400 mg/m2 = 790 mg (100 % of original dose 400 mg/m2), Intravenous, Clinic/HOD 1 time, 2 of 4 cycles  Dose modification: 400 mg/m2 (original dose 400 mg/m2, Cycle 1), 800 mg/m2 (original dose 400 mg/m2, Cycle 1), 400 mg/m2 (original dose 400 mg/m2, Cycle 2)  Administration: 790 mg (12/31/2019), 790 mg (1/7/2020), 1,575 mg (1/14/2020), 790 mg (2/4/2020), 790 mg (2/11/2020)           Indigo Stuart is a 64 y.o.  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  Kras was MUTATED No MSI : EGFR no overexpression     Tolerating lopressor for htn and lipitor for dyslipidemia  Tolerating zofran for nausea prn chemo     Cycle one folfox avastin January 15 2019   Failed regimen and admitted with SBO and found to have duodenal cancer   Hepatic mets progressed hence changed to irinotecan dropped  oxaliplatin   Cycle one folfiri avastin April 2019     Pt was seen for left arm swelling and ultrasound  revealed a DVT in her arm , post lovenox , now on eliquis   Scan reviewed again  Here to resume Xeliri which was started Nov 8 2019 December 13 2019   DVT left internal jugular vein noted October 8 19 patient started on Eliquis  Here for evaluation of her imaging studies and scans  PET-CT with increasing SUV and increasing activity the cancer no evidence of new disease  Ultrasound revealed resolution of the blood clots in her left upper extremity    December 23 2019   PET CT mixed response explained again   cea now has normalized     January 7 2020  For chemo clearance   New rash to trunk and axilla     January 13 2020  For cycle 1 day 15  Rash better but remains   CEA has increased again       February 3 2020  For cycle 2 day 1 clearance after being delayed for one week with cytopenias   + leg pain new  Is on 1/2 dose eliquis after having nosebleeds and thrombocytopenia  Both of these resolved     2-  Here for cycle 2 day 8   Pt with weakness and fatigue, decreased apprtite and chills no fever     2-  Here for cycle 2 day 15   Leg pain and weakness to where she is in a wheelchair on this new chemo   cea rising , on eliquis but platelets ok     February 24, 2020 here for clearance cycle 2 day 15 Gemzar Abraxane  Patient has a fever in office today of 102.5, she is tachycardic with a heart rate of 110.  She states she has not had any hot beverages and she did lot just eat lunch.  She is not feeling well somewhat diaphoretic    No change in PMH, PFH, PSH since last OV     Current Outpatient Medications:     amLODIPine (NORVASC) 5 MG tablet, Take 1 tablet (5 mg total) by mouth once daily., Disp: 90 tablet, Rfl: 3    atorvastatin (LIPITOR) 20 MG tablet, Take 1 tablet (20 mg total) by mouth every evening., Disp: 90 tablet, Rfl: 3    ELIQUIS 5 mg Tab, TAKE 1 TABLET BY MOUTH TWICE A DAY (Patient taking differently: No sig reported), Disp: 60 tablet, Rfl: 2    ferrous sulfate (FEOSOL) 325 mg (65 mg  "iron) Tab tablet, Take 1 tablet by mouth 2 (two) times daily., Disp: , Rfl: 3    metoprolol succinate (TOPROL-XL) 25 MG 24 hr tablet, Take 1 tablet (25 mg total) by mouth every evening., Disp: 90 tablet, Rfl: 3    potassium chloride SA (K-DUR,KLOR-CON) 20 MEQ tablet, Take 1 tablet (20 mEq total) by mouth once daily., Disp: 30 tablet, Rfl: 11    promethazine (PHENERGAN) 25 MG tablet, Take 1 tablet (25 mg total) by mouth every 6 (six) hours as needed for Nausea., Disp: 30 tablet, Rfl: 1      Review of Systems:  General: Weight gain: No, Weight Loss: No,  Fatigue yes   Chills: No, Night Sweats: No, Insomnia: No, Excessive sleeping: yes  Respiratory:  Cough: No, Shortness of Breath:  intermittent  Wheezing: No, Excessive Snoring: No, Coughing up blood: No  Endocrine: Heat Intolerance: No, Cold Intolerance: No,   Excessive Thirst: No, Excessive Hunger: No  Eyes:  Blurred Vision: No, Double Vision: No   Light Sensitivity: No, Eye pain: No  Musculoskeletal: Muscle Aches/Pain: Yes    Joint Pain/Swelling: yes     Muscle Weakness:no , Neck Pain: No, Back Pain:   Neurological:  definite weakness feels unsteady on her feet  Headache Migraine: No, Dizziness/Vertigo:  yes  Cardiovascular: Chest Pain: No   Gastrointestinal: Nausea/Vomiting: No, Constipation: No, Diarrhea: no   Psych/Cog:  Depression: No, Anxiety: No, Hallucinations: No   : Frequent Urination: No, Incontinence: No, Blood of Urine: No, Urinary Infections: No  ENT:Hearing Loss: No, Earache: No, Ringing in Ears: No  Facial Pain: No, Chronic Congestion:No   Immune: Seasonal Allergies: No, Hives and/or Rashes: No  The remainder of the review of twelve body systems was reviewed and normal  + epistaxis and weakness      /72   Pulse 110   Temp (!) 102.5 °F (39.2 °C) (Oral)   Ht 5' 1" (1.549 m)   Wt 82.7 kg (182 lb 5.1 oz)   BMI 34.45 kg/m²   Constitutional: oriented to person, place, and time.  Conj pale he should appears Gy in appears not to feel well " today  HENT: anicteric sclera   Head: Normocephalic and atraumatic. Ears canal normal no discharge    Nose: Nose normal. ++boggy turbinates  No lymphatics noted   Eyes: Conjunctivae PALE  EOM are normal.  Neck: . No thyromegaly present.   Pharyngeal edema no erythema     Cardiovascular:  Tachycardic regular rhythm, normal heart sounds  No murmur heard.  Pulmonary/Chest:  Clear bilaterally no fremitus  Abdominal: Soft. Bowel sounds are normal.  no mass.   Musculoskeletal: Normal range of motion. decreased strength   Left arm remains swollen yet better   No further boggy turbinates  Lymphadenopathy:  no cervical adenopathy.   Neurological: alert and oriented to person, place  Slight weakness today   Skin: Skin is warm and dry   Psychiatric: normal mood and affect.   Vitals reviewed.  Chest port removed     Lab Results   Component Value Date    WBC 3.70 (L) 02/14/2020    RBC 3.13 (L) 02/14/2020    HGB 9.3 (L) 02/14/2020    HCT 31.0 (L) 02/14/2020    MCV 99 (H) 02/14/2020    MCH 29.7 02/14/2020    MCHC 30.0 (L) 02/14/2020    RDW 17.5 (H) 02/14/2020     02/14/2020    MPV 9.9 02/14/2020    GRAN 2.9 02/14/2020    GRAN 77.6 (H) 02/14/2020    LYMPH 0.7 (L) 02/14/2020    LYMPH 19.2 02/14/2020    MONO 0.0 (L) 02/14/2020    MONO 0.3 (L) 02/14/2020    EOS 0.1 02/14/2020    BASO 0.02 02/14/2020    EOSINOPHIL 2.4 02/14/2020    BASOPHIL 0.5 02/14/2020     Lab Results   Component Value Date    WBC 3.70 (L) 02/14/2020    RBC 3.13 (L) 02/14/2020    HGB 9.3 (L) 02/14/2020    HCT 31.0 (L) 02/14/2020    MCV 99 (H) 02/14/2020    MCH 29.7 02/14/2020    MCHC 30.0 (L) 02/14/2020    RDW 17.5 (H) 02/14/2020     02/14/2020    MPV 9.9 02/14/2020    GRAN 2.9 02/14/2020    GRAN 77.6 (H) 02/14/2020    LYMPH 0.7 (L) 02/14/2020    LYMPH 19.2 02/14/2020    MONO 0.0 (L) 02/14/2020    MONO 0.3 (L) 02/14/2020    EOS 0.1 02/14/2020    BASO 0.02 02/14/2020    EOSINOPHIL 2.4 02/14/2020    BASOPHIL 0.5 02/14/2020       CMP  Sodium   Date Value  Ref Range Status   02/14/2020 139 136 - 145 mmol/L Final     Potassium   Date Value Ref Range Status   02/14/2020 3.9 3.5 - 5.1 mmol/L Final     Chloride   Date Value Ref Range Status   02/14/2020 103 95 - 110 mmol/L Final     CO2   Date Value Ref Range Status   02/14/2020 25 23 - 29 mmol/L Final     Glucose   Date Value Ref Range Status   02/14/2020 128 (H) 70 - 110 mg/dL Final     BUN, Bld   Date Value Ref Range Status   02/14/2020 12 8 - 23 mg/dL Final     Creatinine   Date Value Ref Range Status   02/14/2020 0.6 0.5 - 1.4 mg/dL Final     Calcium   Date Value Ref Range Status   02/14/2020 8.8 8.7 - 10.5 mg/dL Final     Total Protein   Date Value Ref Range Status   02/14/2020 6.2 6.0 - 8.4 g/dL Final     Albumin   Date Value Ref Range Status   02/14/2020 3.0 (L) 3.5 - 5.2 g/dL Final     Total Bilirubin   Date Value Ref Range Status   02/14/2020 0.7 0.1 - 1.0 mg/dL Final     Comment:     For infants and newborns, interpretation of results should be based  on gestational age, weight and in agreement with clinical  observations.  Premature Infant recommended reference ranges:  Up to 24 hours.............<8.0 mg/dL  Up to 48 hours............<12.0 mg/dL  3-5 days..................<15.0 mg/dL  6-29 days.................<15.0 mg/dL       Alkaline Phosphatase   Date Value Ref Range Status   02/14/2020 233 (H) 55 - 135 U/L Final     AST   Date Value Ref Range Status   02/14/2020 56 (H) 10 - 40 U/L Final     ALT   Date Value Ref Range Status   02/14/2020 60 (H) 10 - 44 U/L Final     Anion Gap   Date Value Ref Range Status   02/14/2020 11 8 - 16 mmol/L Final     eGFR if    Date Value Ref Range Status   02/14/2020 >60 >60 mL/min/1.73 m^2 Final     eGFR if non    Date Value Ref Range Status   02/14/2020 >60 >60 mL/min/1.73 m^2 Final     Comment:     Calculation used to obtain the estimated glomerular filtration  rate (eGFR) is the CKD-EPI equation.      Impression:           - Normal  esophagus.                        - Normal stomach.                        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 Prosthesis placed. The                         proximal end of the stent is distal to the ampulla                         (refer to images).  No msi   There are no osseous abnormalities.      Impression PET CT December 2019       Continued improvement of the hepatic metastasis with decrease in size of the multifocal hepatic lesions    Stable subcentimeter mesenteric lymph nodes    No evidence of new metastatic disease      Electronically signed by: Charissa Marroquin MD  Date: 12/11/2019  Time: 11:03     cea increasing        Hepatic metastases    Malignant neoplasm metastatic to lung, unspecified laterality    Overlapping malignant neoplasm of colon    Symptomatic anemia    Duodenal stenosis    Fever, unspecified fever cause    Neutrophilia      1.  Neutrophilia post chemotherapy for duodenal cancer after completing therapy for colon cancer with hepatic Mets.  Recent imaging reveals mixed response to chemotherapy with approximately 2 lesions in the liver increasing in size  2.  Fever post chemotherapy on February 11th with transaminitis:  Explain fever could be coming from tumor burden in the liver or possible sepsis since patient reluctantly agreed to go to the emergency room and I appreciate Dr. Watson who will await her arrival to help workup for fever of unknown etiology at this time with tachycardia possibly need CT of chest to rule out pulmonary embolism although she is on anticoagulation she has had thrombotic events in the past  3.  Duodenal cancer with chemo on hold for now due for cycle 2 day 15 Abraxane Gemzar  4.  Thrombosis resolved on eliquis  left upper extremity     3.  Weakness on chemo : cannot tolerate abraxane and gemzar    4.  CRC on chemo     5.  Transaminitis with stent in place  7. Rising CEA      Had been receiving Xeloda plus  Avastin plus irinotecan    Abraxane  And gemzar failed   I will follow up with patient after she presents to emergency room he will follow up on her testing and further recs to be made accordingly.  I explained I cannot help guide whether she will be admitted or not.  I do recommend blood cultures and urinalysis to assess further with possible chest x-ray I   Antinausea meds may be continued as needed  Cont norvasc for HTN monitored by Dr Arevalo   Tolerating lipitor for dyslipidemia  On statin monitoring counts closely    In the meantime I will reach out to Interventional Radiology as well and discussed possible therapy for localized progression involving her liver metastases  acp documented

## 2020-02-24 NOTE — ED NOTES
Pt awake alert oriented reports fever today reports last chemo tx approximately 2 weeks ago, denies chest pain or sob, denies abd pain family at bedside

## 2020-02-24 NOTE — ED PROVIDER NOTES
Encounter Date: 2/24/2020    SCRIBE #1 NOTE: I, Jessi Acosta, am scribing for, and in the presence of, Bebeto Quintana MD.       History     Chief Complaint   Patient presents with    Fever       Time seen by provider: 3:56 PM on 02/24/2020    Indigo Stuart is a 64 y.o. female with PMHx of colon, lung and liver CA who presents to the ED with an onset of subjective fever that began this morning. Pt c/o generalized body aches, weakness, and fatigue. She is currently undergoing chemotherapy, last treatment being a few weeks ago. The patient denies HA, abdominal pain, sore throat, dysuria, CP, N/V/D or any other symptoms at this time. No pertinent PSHx.    The history is provided by the patient.     Review of patient's allergies indicates:   Allergen Reactions    Codeine Nausea And Vomiting    Erythromycin base Other (See Comments)    Lisinopril Palpitations     Cough      Past Medical History:   Diagnosis Date    Cancer     colon    Encounter for blood transfusion     Hypertension      Past Surgical History:   Procedure Laterality Date    CHOLECYSTECTOMY      COLON SURGERY      COLONOSCOPY N/A 3/30/2018    Procedure: COLONOSCOPY;  Surgeon: Daniel Magana MD;  Location: [facility];  Service: Endoscopy;  Laterality: N/A;    ESOPHAGOGASTRODUODENOSCOPY N/A 1/3/2019    Procedure: EGD (ESOPHAGOGASTRODUODENOSCOPY);  Surgeon: Adis Arguello MD;  Location: [facility];  Service: Endoscopy;  Laterality: N/A;    ESOPHAGOGASTRODUODENOSCOPY N/A 1/14/2019    Procedure: EGD (ESOPHAGOGASTRODUODENOSCOPY);  Surgeon: Monserrat Lopez MD;  Location: [facility] ([address] [place] [address]);  Service: Endoscopy;  Laterality: N/A;  with duodenal stent placement per La/svn    HYSTERECTOMY      INSERTION OF TUNNELED CENTRAL VENOUS CATHETER (CVC) WITH SUBCUTANEOUS PORT N/A 1/4/2019    Procedure: [id number];  Surgeon: Emilio Romero MD;  Location: [facility];  Service: General;  Laterality: N/A;     Family History    Problem Relation Age of Onset    Cancer Mother         colon ca, liver ca    Cancer Father      Social History     Tobacco Use    Smoking status: Never Smoker    Smokeless tobacco: Never Used   Substance Use Topics    Alcohol use: No     Frequency: Never     Drinks per session: Patient refused     Binge frequency: Never    Drug use: No     Review of Systems   Constitutional: Positive for fatigue and fever (subjective).   HENT: Negative for sore throat.    Respiratory: Negative for shortness of breath.    Cardiovascular: Negative for chest pain.   Gastrointestinal: Negative for abdominal pain and nausea.   Genitourinary: Negative for dysuria.   Musculoskeletal: Positive for myalgias (generalized). Negative for back pain.   Skin: Negative for rash.   Allergic/Immunologic: Positive for immunocompromised state.   Neurological: Positive for weakness. Negative for headaches.   Hematological: Does not bruise/bleed easily.       Physical Exam     Initial Vitals [02/24/20 1518]   BP Pulse Resp Temp SpO2   (!) 123/58 (!) 114 20 (!) 102.8 °F (39.3 °C) 97 %      MAP       --         Physical Exam    Nursing note and vitals reviewed.  Constitutional:   Nontoxic, well appearing female.    HENT:   Head: Normocephalic and atraumatic. Hair is abnormal.   Baldness noted to head.   Eyes: EOM are normal. Pupils are equal, round, and reactive to light.   Neck: Neck supple.   No meningismus.   Cardiovascular: Regular rhythm and intact distal pulses. Tachycardia present.  Exam reveals no gallop and no friction rub.    Murmur heard.  Pulmonary/Chest: Breath sounds normal. No respiratory distress. She has no decreased breath sounds. She has no wheezes. She has no rhonchi. She has no rales.   Abdominal: Soft. Bowel sounds are normal. She exhibits no distension. There is no tenderness.   No significant abdominal tenderness.   Musculoskeletal: Normal range of motion.   Neurological: She is alert and oriented to person, place, and time.  She has normal strength. GCS eye subscore is 4. GCS verbal subscore is 5. GCS motor subscore is 6.   Skin: Skin is warm and dry.   Psychiatric: She has a normal mood and affect.         ED Course   Procedures  Labs Reviewed   CBC W/ AUTO DIFFERENTIAL - Abnormal; Notable for the following components:       Result Value    RBC 3.28 (*)     Hemoglobin 9.6 (*)     Hematocrit 32.0 (*)     Mean Corpuscular Hemoglobin Conc 30.0 (*)     RDW 17.5 (*)     Platelets 468 (*)     MPV 8.4 (*)     Lymph% 11.0 (*)     Mono% 21.0 (*)     Platelet Estimate Increased (*)     All other components within normal limits   COMPREHENSIVE METABOLIC PANEL - Abnormal; Notable for the following components:    Sodium 135 (*)     Glucose 118 (*)     Albumin 2.8 (*)     Alkaline Phosphatase 320 (*)     AST 50 (*)     All other components within normal limits   URINALYSIS, REFLEX TO URINE CULTURE - Abnormal; Notable for the following components:    Protein, UA Trace (*)     Ketones, UA Trace (*)     Bilirubin (UA) 1+ (*)     Nitrite, UA Positive (*)     All other components within normal limits    Narrative:     Preferred Collection Type->Urine, Clean Catch   SEDIMENTATION RATE - Abnormal; Notable for the following components:    Sed Rate 131 (*)     All other components within normal limits   URINALYSIS MICROSCOPIC - Abnormal; Notable for the following components:    Bacteria Many (*)     All other components within normal limits    Narrative:     Preferred Collection Type->Urine, Clean Catch   INFLUENZA A & B BY MOLECULAR   CULTURE, BLOOD   CULTURE, BLOOD   LACTIC ACID, PLASMA   LACTIC ACID, PLASMA          Imaging Results          X-Ray Chest AP Portable (Final result)  Result time 02/24/20 16:51:56    Final result by Russell Dasilva MD (02/24/20 16:51:56)                 Impression:      No airspace disease to suggest pneumonia.      Electronically signed by: Russell Dasilva  Date:    02/24/2020  Time:    16:51             Narrative:     EXAMINATION:  XR CHEST AP PORTABLE    CLINICAL HISTORY:  Sepsis;    TECHNIQUE:  Single frontal view of the chest was performed.    COMPARISON:  10/28/2019    FINDINGS:  Lungs are clear.  Normal size heart.  Port-A-Cath left chest wall with tip in the SVC.  There is slight tortuosity along the catheter tubing.  No pleural effusion or pneumothorax.  Moderate right AC joint degenerative changes.                                 Medical Decision Making:   History:   Old Medical Records: I decided to obtain old medical records.  Old Records Summarized: records from another hospital.       <> Summary of Records: Lab results 01/21/20 revealed patient was neutropenic.  Clinical Tests:   Lab Tests: Ordered and Reviewed  Radiological Study: Ordered and Reviewed  Patient will be admitted because she has a urinary tract infection, fever, slight hypotension and is currently on chemotherapy but is not neutropenic.  She does not have significant white blood cells in her urine however she may not be able to mount a significant response given that she is on chemotherapy.  Flu is negative. Chest x-ray is within normal limits.  Patient will be admitted.            Scribe Attestation:   Scribe #1: I performed the above scribed service and the documentation accurately describes the services I performed. I attest to the accuracy of the note.    I, Dr. Bebeto Quintana personally performed the services described in this documentation. All medical record entries made by the scribe were at my direction and in my presence.  I have reviewed the chart and agree that the record reflects my personal performance and is accurate and complete. Bebeto Quintana MD.  8:26 AM 02/25/2020    DISCLAIMER: This note was prepared with Dragon NaturallySpeaking voice recognition transcription software. Garbled syntax, mangled pronouns, and other bizarre constructions may be attributed to that software system         ED Course as of Feb 24 1806 Mon Feb 24,  2020 1700 Chemistry stable except for increased alk-phos.  AST and ALT are for the most part within normal limits. T bili normal. Awaiting rest of labs.  Flu negative    [JS]   1711 Patient is not neutropenic.  However she has nitrate positive urine with bacteria.  I suspect this is a urinary tract infection.  She got a dose of cefepime here.    [JS]   1711 Chest x-ray shows no acute cardiopulmonary process.  Flu negative.    [JS]      ED Course User Index  [JS] Bebeto Quintana MD                Clinical Impression:       ICD-10-CM ICD-9-CM   1. UTI (urinary tract infection) N39.0 599.0         Disposition:   Disposition: Placed in Observation                     Bebeto Quintana MD  02/25/20 7869

## 2020-02-25 LAB
ANION GAP SERPL CALC-SCNC: 10 MMOL/L (ref 8–16)
BASOPHILS # BLD AUTO: 0.04 K/UL (ref 0–0.2)
BASOPHILS NFR BLD: 0.4 % (ref 0–1.9)
BUN SERPL-MCNC: 9 MG/DL (ref 8–23)
CALCIUM SERPL-MCNC: 9.1 MG/DL (ref 8.7–10.5)
CHLORIDE SERPL-SCNC: 103 MMOL/L (ref 95–110)
CO2 SERPL-SCNC: 24 MMOL/L (ref 23–29)
CREAT SERPL-MCNC: 0.6 MG/DL (ref 0.5–1.4)
DIFFERENTIAL METHOD: ABNORMAL
EOSINOPHIL # BLD AUTO: 0 K/UL (ref 0–0.5)
EOSINOPHIL NFR BLD: 0.1 % (ref 0–8)
ERYTHROCYTE [DISTWIDTH] IN BLOOD BY AUTOMATED COUNT: 17.2 % (ref 11.5–14.5)
EST. GFR  (AFRICAN AMERICAN): >60 ML/MIN/1.73 M^2
EST. GFR  (NON AFRICAN AMERICAN): >60 ML/MIN/1.73 M^2
GLUCOSE SERPL-MCNC: 104 MG/DL (ref 70–110)
HCT VFR BLD AUTO: 26 % (ref 37–48.5)
HGB BLD-MCNC: 8.1 G/DL (ref 12–16)
IMM GRANULOCYTES # BLD AUTO: 0.12 K/UL (ref 0–0.04)
LYMPHOCYTES # BLD AUTO: 1.2 K/UL (ref 1–4.8)
LYMPHOCYTES NFR BLD: 12.9 % (ref 18–48)
MCH RBC QN AUTO: 29.8 PG (ref 27–31)
MCHC RBC AUTO-ENTMCNC: 31.2 G/DL (ref 32–36)
MCV RBC AUTO: 96 FL (ref 82–98)
MONOCYTES # BLD AUTO: 2.6 K/UL (ref 0.3–1)
MONOCYTES NFR BLD: 27.5 % (ref 4–15)
NEUTROPHILS # BLD AUTO: 5.5 K/UL (ref 1.8–7.7)
NEUTROPHILS NFR BLD: 57.8 % (ref 38–73)
NRBC BLD-RTO: 0 /100 WBC
PLATELET # BLD AUTO: 403 K/UL (ref 150–350)
PMV BLD AUTO: 8.8 FL (ref 9.2–12.9)
POTASSIUM SERPL-SCNC: 3.8 MMOL/L (ref 3.5–5.1)
RBC # BLD AUTO: 2.72 M/UL (ref 4–5.4)
SODIUM SERPL-SCNC: 137 MMOL/L (ref 136–145)
WBC # BLD AUTO: 9.58 K/UL (ref 3.9–12.7)

## 2020-02-25 PROCEDURE — 94761 N-INVAS EAR/PLS OXIMETRY MLT: CPT

## 2020-02-25 PROCEDURE — 25000003 PHARM REV CODE 250: Performed by: INTERNAL MEDICINE

## 2020-02-25 PROCEDURE — 87086 URINE CULTURE/COLONY COUNT: CPT

## 2020-02-25 PROCEDURE — 63600175 PHARM REV CODE 636 W HCPCS: Performed by: INTERNAL MEDICINE

## 2020-02-25 PROCEDURE — 80048 BASIC METABOLIC PNL TOTAL CA: CPT

## 2020-02-25 PROCEDURE — 36415 COLL VENOUS BLD VENIPUNCTURE: CPT

## 2020-02-25 PROCEDURE — 96376 TX/PRO/DX INJ SAME DRUG ADON: CPT

## 2020-02-25 PROCEDURE — 96375 TX/PRO/DX INJ NEW DRUG ADDON: CPT

## 2020-02-25 PROCEDURE — 63600175 PHARM REV CODE 636 W HCPCS: Performed by: NURSE PRACTITIONER

## 2020-02-25 PROCEDURE — 25000003 PHARM REV CODE 250: Performed by: NURSE PRACTITIONER

## 2020-02-25 PROCEDURE — 85025 COMPLETE CBC W/AUTO DIFF WBC: CPT

## 2020-02-25 PROCEDURE — 96361 HYDRATE IV INFUSION ADD-ON: CPT

## 2020-02-25 PROCEDURE — G0378 HOSPITAL OBSERVATION PER HR: HCPCS

## 2020-02-25 RX ORDER — SODIUM CHLORIDE 9 MG/ML
INJECTION, SOLUTION INTRAVENOUS CONTINUOUS
Status: DISCONTINUED | OUTPATIENT
Start: 2020-02-25 | End: 2020-02-25

## 2020-02-25 RX ORDER — ACETAMINOPHEN 325 MG/1
650 TABLET ORAL EVERY 6 HOURS PRN
Status: DISCONTINUED | OUTPATIENT
Start: 2020-02-25 | End: 2020-02-26 | Stop reason: HOSPADM

## 2020-02-25 RX ORDER — LOPERAMIDE HYDROCHLORIDE 2 MG/1
2 CAPSULE ORAL 4 TIMES DAILY PRN
Status: DISCONTINUED | OUTPATIENT
Start: 2020-02-25 | End: 2020-02-26 | Stop reason: HOSPADM

## 2020-02-25 RX ADMIN — ACETAMINOPHEN 650 MG: 325 TABLET ORAL at 02:02

## 2020-02-25 RX ADMIN — POTASSIUM CHLORIDE 20 MEQ: 1500 TABLET, EXTENDED RELEASE ORAL at 08:02

## 2020-02-25 RX ADMIN — METOPROLOL SUCCINATE 25 MG: 25 TABLET, EXTENDED RELEASE ORAL at 09:02

## 2020-02-25 RX ADMIN — AMLODIPINE BESYLATE 5 MG: 5 TABLET ORAL at 08:02

## 2020-02-25 RX ADMIN — CEFTRIAXONE 1 G: 1 INJECTION, SOLUTION INTRAVENOUS at 08:02

## 2020-02-25 RX ADMIN — APIXABAN 5 MG: 2.5 TABLET, FILM COATED ORAL at 08:02

## 2020-02-25 RX ADMIN — ONDANSETRON 4 MG: 2 INJECTION INTRAMUSCULAR; INTRAVENOUS at 10:02

## 2020-02-25 RX ADMIN — SODIUM CHLORIDE: 0.9 INJECTION, SOLUTION INTRAVENOUS at 03:02

## 2020-02-25 RX ADMIN — PYRIDOXINE HCL TAB 50 MG 50 MG: 50 TAB at 08:02

## 2020-02-25 RX ADMIN — Medication 250 MG: at 08:02

## 2020-02-25 RX ADMIN — SODIUM CHLORIDE: 0.9 INJECTION, SOLUTION INTRAVENOUS at 09:02

## 2020-02-25 RX ADMIN — ATORVASTATIN CALCIUM 20 MG: 20 TABLET, FILM COATED ORAL at 09:02

## 2020-02-25 RX ADMIN — FERROUS SULFATE TAB EC 325 MG (65 MG FE EQUIVALENT) 325 MG: 325 (65 FE) TABLET DELAYED RESPONSE at 08:02

## 2020-02-25 RX ADMIN — APIXABAN 5 MG: 2.5 TABLET, FILM COATED ORAL at 09:02

## 2020-02-25 NOTE — PLAN OF CARE
Patient AAO, VSS. Pt verbalized understanding of POC. Urine culture collected and pending. Hospitalitis on call contacted concerning temperature of 101.8. Tylenol 650mg ordered. IVF infusing as ordered. Tele monitored; nsr.  Purposeful hourly/q2hr rounding done during shift to promote patient safety. Patient free from falls and injury during shift. Will continue to monitor.

## 2020-02-25 NOTE — SUBJECTIVE & OBJECTIVE
Past Medical History:   Diagnosis Date    Cancer     colon    Encounter for blood transfusion     Hypertension        Past Surgical History:   Procedure Laterality Date    CHOLECYSTECTOMY      COLON SURGERY      COLONOSCOPY N/A 3/30/2018    Procedure: COLONOSCOPY;  Surgeon: Daniel Magana MD;  Location: [facility] ENDO;  Service: Endoscopy;  Laterality: N/A;    ESOPHAGOGASTRODUODENOSCOPY N/A 1/3/2019    Procedure: EGD (ESOPHAGOGASTRODUODENOSCOPY);  Surgeon: Adis Arguello MD;  Location: [facility] ENDO;  Service: Endoscopy;  Laterality: N/A;    ESOPHAGOGASTRODUODENOSCOPY N/A 1/14/2019    Procedure: EGD (ESOPHAGOGASTRODUODENOSCOPY);  Surgeon: Monserrat Lopez MD;  Location: [facility] ([facility]);  Service: Endoscopy;  Laterality: N/A;  with duodenal stent placement per La/svn    HYSTERECTOMY      INSERTION OF TUNNELED CENTRAL VENOUS CATHETER (CVC) WITH SUBCUTANEOUS PORT N/A 1/4/2019    Procedure: [id number];  Surgeon: Emilio Romero MD;  Location: [facility];  Service: General;  Laterality: N/A;       Review of patient's allergies indicates:   Allergen Reactions    Codeine Nausea And Vomiting    Erythromycin base Other (See Comments)    Lisinopril Palpitations     Cough        No current facility-administered medications on file prior to encounter.      Current Outpatient Medications on File Prior to Encounter   Medication Sig    amLODIPine (NORVASC) 5 MG tablet Take 1 tablet (5 mg total) by mouth once daily.    atorvastatin (LIPITOR) 20 MG tablet Take 1 tablet (20 mg total) by mouth every evening.    ELIQUIS 5 mg Tab TAKE 1 TABLET BY MOUTH TWICE A DAY (Patient taking differently: Take by mouth 2 (two) times daily. )    ferrous sulfate (FEOSOL) 325 mg (65 mg iron) Tab tablet Take 1 tablet by mouth 2 (two) times daily.    metoprolol succinate (TOPROL-XL) 25 MG 24 hr tablet Take 1 tablet (25 mg total) by mouth every evening.    potassium chloride SA (K-DUR,KLOR-CON) 20 MEQ tablet Take 1  tablet (20 mEq total) by mouth once daily.    pyridoxine, vitamin B6, (VITAMIN B-6) 50 MG Tab Take 100 mg by mouth once daily.     senna-docusate 8.6-50 mg (SENOKOT-S) 8.6-50 mg per tablet Take 2 tablets by mouth nightly as needed for Constipation.    thiamine (VITAMIN B-1) 250 MG tablet Take 250 mg by mouth once daily.    [DISCONTINUED] promethazine (PHENERGAN) 25 MG tablet Take 1 tablet (25 mg total) by mouth every 6 (six) hours as needed for Nausea.     Family History     Problem Relation (Age of Onset)    Cancer Mother, Father        Tobacco Use    Smoking status: Never Smoker    Smokeless tobacco: Never Used   Substance and Sexual Activity    Alcohol use: No     Frequency: Never     Drinks per session: Patient refused     Binge frequency: Never    Drug use: No    Sexual activity: Not Currently     Review of Systems   Constitutional: Positive for appetite change, fatigue and fever. Negative for chills.   HENT: Negative for congestion, hearing loss, rhinorrhea, sore throat, trouble swallowing and voice change.    Respiratory: Negative for cough, chest tightness, shortness of breath and wheezing.    Cardiovascular: Negative for chest pain, palpitations and leg swelling.   Gastrointestinal: Negative for abdominal pain, blood in stool, diarrhea, nausea and vomiting.   Endocrine: Negative for cold intolerance and heat intolerance.   Genitourinary: Negative for difficulty urinating, frequency, hematuria and urgency.   Musculoskeletal: Negative for back pain, joint swelling and neck stiffness.   Skin: Negative for pallor and rash.   Neurological: Positive for weakness. Negative for tremors, seizures, syncope, speech difficulty, numbness and headaches.   Hematological: Negative for adenopathy.   Psychiatric/Behavioral: Negative for agitation, behavioral problems, confusion and sleep disturbance.     Objective:     Vital Signs (Most Recent):  Temp: 99 °F (37.2 °C) (02/24/20 1847)  Pulse: 90 (02/24/20  1847)  Resp: 16 (02/24/20 1847)  BP: (!) 121/58 (02/24/20 1847)  SpO2: 97 % (02/24/20 1847) Vital Signs (24h Range):  Temp:  [99 °F (37.2 °C)-102.8 °F (39.3 °C)] 99 °F (37.2 °C)  Pulse:  [] 90  Resp:  [16-20] 16  SpO2:  [96 %-97 %] 97 %  BP: ()/(50-72) 121/58     Weight: 82.6 kg (182 lb)  Body mass index is 34.39 kg/m².    Physical Exam   Constitutional: She is oriented to person, place, and time. She appears well-developed and well-nourished. No distress.   HENT:   Head: Normocephalic and atraumatic.   Right Ear: External ear normal.   Left Ear: External ear normal.   Nose: Nose normal.   Mouth/Throat: Oropharynx is clear and moist.   Eyes: Pupils are equal, round, and reactive to light. Conjunctivae and EOM are normal. Right eye exhibits no discharge. Left eye exhibits no discharge. No scleral icterus.   Neck: Normal range of motion. Neck supple. No thyromegaly present.   Cardiovascular: Normal rate, regular rhythm, normal heart sounds and intact distal pulses.   No murmur heard.  Pulmonary/Chest: Effort normal and breath sounds normal. No stridor. No respiratory distress. She has no wheezes. She has no rales.   Abdominal: Soft. Bowel sounds are normal. She exhibits no distension. There is no tenderness.   Musculoskeletal: She exhibits no edema or tenderness.   Lymphadenopathy:     She has no cervical adenopathy.   Neurological: She is alert and oriented to person, place, and time. No cranial nerve deficit or sensory deficit. She exhibits normal muscle tone. Coordination normal.   Skin: Skin is warm and dry. Capillary refill takes less than 2 seconds. No rash noted. She is not diaphoretic. No erythema.   Psychiatric: She has a normal mood and affect. Her behavior is normal. Judgment and thought content normal.   Nursing note and vitals reviewed.        CRANIAL NERVES     CN III, IV, VI   Pupils are equal, round, and reactive to light.  Extraocular motions are normal.        Significant Labs:   BMP:    Recent Labs   Lab 02/24/20  1622   *   *   K 4.5   CL 98   CO2 23   BUN 9   CREATININE 0.7   CALCIUM 9.7     CBC:   Recent Labs   Lab 02/24/20  1622   WBC 10.27   HGB 9.6*   HCT 32.0*   *       Significant Imaging: I have reviewed all pertinent imaging results/findings within the past 24 hours.

## 2020-02-25 NOTE — HOSPITAL COURSE
Patient admitted for evaluation of fever unclear etiology.  Chest x-ray did not show any new infiltrate.  UA shows positive nitrate but WBC was 2.  Empirically started ceftriaxone.  Patient had another spike in temperature early in the morning and hence was monitored over the course of the day with urine culture and blood culture which both were negative. WBC was normal on day of discharge.   Alert, Nad. 0x3.

## 2020-02-25 NOTE — ASSESSMENT & PLAN NOTE
Patient's anemia is currently controlled.   Current CBC reviewed-   Lab Results   Component Value Date    HGB 8.1 (L) 02/25/2020    HCT 26.0 (L) 02/25/2020     Monitor serial CBC and transfuse if patient becomes hemodynamically unstable, symptomatic or H/H drops below 7/21.

## 2020-02-25 NOTE — PLAN OF CARE
Pt signed the العلي Letter.     02/25/20 0954   العلي Message   Medicare Outpatient and Observation Notification regarding financial responsibility Given to patient/caregiver;Explained to patient/caregiver;Signed/date by patient/caregiver   Date العلي was signed 02/25/20   Time العلي was signed 0954

## 2020-02-25 NOTE — ASSESSMENT & PLAN NOTE
Patient's anemia is currently controlled.   Current CBC reviewed-   Lab Results   Component Value Date    HGB 9.6 (L) 02/24/2020    HCT 32.0 (L) 02/24/2020     Monitor serial CBC and transfuse if patient becomes hemodynamically unstable, symptomatic or H/H drops below 7/21.

## 2020-02-25 NOTE — RESPIRATORY THERAPY
02/25/20 0727   PRE-TX-O2   O2 Device (Oxygen Therapy) room air   SpO2 97 %   Pulse Oximetry Type Intermittent   $ Pulse Oximetry - Multiple Charge Pulse Oximetry - Multiple

## 2020-02-25 NOTE — ASSESSMENT & PLAN NOTE
Body mass index is 34.39 kg/m². Morbid obesity complicates all aspects of disease management from diagnostic modalities to treatment. Weight loss encouraged and health benefits explained to patient.

## 2020-02-25 NOTE — PLAN OF CARE
Cm completed the assessment at pt's bedside.  Pt lives with spouse and in independent in care.  PCP is Dr. Stephan Arevalo.  Insurance verified as [employer].  Pt is on Eliquis.  Disposition:  Pt will discharge to home.  NO needs verbalized at this time.  Pt uses [facility].       02/25/20 1005   Discharge Assessment   Assessment Type Discharge Planning Assessment   Confirmed/corrected address and phone number on facesheet? Yes   Assessment information obtained from? Patient   Communicated expected length of stay with patient/caregiver no   Prior to hospitilization cognitive status: Alert/Oriented   Prior to hospitalization functional status: Independent;Assistive Equipment   Current cognitive status: Alert/Oriented   Current Functional Status: Independent;Assistive Equipment   Facility Arrived From: home   Lives With spouse   Able to Return to Prior Arrangements yes   Is patient able to care for self after discharge? Yes   Patient's perception of discharge disposition home or selfcare   Readmission Within the Last 30 Days no previous admission in last 30 days   Patient currently being followed by outpatient case management? Yes   If yes, name of outpatient case management following: insurance company assigned oupatient case management   Patient currently receives any other outside agency services? No   Equipment Currently Used at Home rollator   Do you have any problems affording any of your prescribed medications? No   Is the patient taking medications as prescribed? yes   Does the patient have transportation home? Yes   Transportation Anticipated family or friend will provide   Dialysis Name and Scheduled days na   Does the patient receive services at the Coumadin Clinic? No  (Pt is on Eliquis)   Discharge Plan A Home with family   DME Needed Upon Discharge  none   Patient/Family in Agreement with Plan yes

## 2020-02-25 NOTE — PLAN OF CARE
Intervention:  Modified texture diet      Recommendations  Recommendation/Intervention:   1.) Continue with Adult regular diet   2.) Consider consulting SLP for Mechanical Soft diet restriction (IDDSI level 5)   3.) Recommend boost plus strawberry TID mixed with yogurt    Goals: 1.) patient will meet >75% of EEN while admitted  Nutrition Goal Status: new  Communication of RD Recs: other (comment)(second sign to MD)

## 2020-02-25 NOTE — PLAN OF CARE
Plan of care reviewed, patient verbalized understanding. AAOx4. Up ab patel with assistance to BSC. PIV infusing per order. IV abx infusing per order. VSS. 100.1 temp highest this shift. No complaints of pain throughout shift. Tele monitored. Bed in lowest position, SRx2, brakes locked, call light within reach. Patient remains free from fall and injury. Will continue to monitor.

## 2020-02-25 NOTE — HPI
Indigo Stuart is a 64 y.o. female with PMHx of colon, lung and liver CA who presents to the ED with an onset of subjective fever that began this morning. Pt c/o generalized body aches, weakness, and fatigue. She is currently undergoing chemotherapy, last treatment being a few weeks ago. The patient denies HA, abdominal pain, sore throat, dysuria, CP, N/V/D or any other symptoms at this time. No pertinent PSHx.     The history is provided by the patient.

## 2020-02-25 NOTE — SUBJECTIVE & OBJECTIVE
Interval History:  Patient had temperature spike last night.  Patient otherwise denies any symptoms.     Review of Systems   Constitutional: Positive for fever. Negative for appetite change, chills and fatigue.   HENT: Negative for congestion, hearing loss, rhinorrhea, sore throat, trouble swallowing and voice change.    Respiratory: Negative for cough, chest tightness, shortness of breath and wheezing.    Cardiovascular: Negative for chest pain, palpitations and leg swelling.   Gastrointestinal: Negative for abdominal pain, blood in stool, diarrhea, nausea and vomiting.   Endocrine: Negative for cold intolerance and heat intolerance.   Genitourinary: Negative for difficulty urinating, frequency, hematuria and urgency.   Musculoskeletal: Negative for back pain, joint swelling and neck stiffness.   Skin: Negative for pallor and rash.   Neurological: Negative for tremors, seizures, syncope, speech difficulty, weakness, numbness and headaches.   Hematological: Negative for adenopathy.   Psychiatric/Behavioral: Negative for agitation, behavioral problems, confusion and sleep disturbance.     Objective:     Vital Signs (Most Recent):  Temp: 98.2 °F (36.8 °C) (02/25/20 1458)  Pulse: 91 (02/25/20 1458)  Resp: 17 (02/25/20 1458)  BP: (!) 117/58 (02/25/20 1458)  SpO2: (!) 93 % (02/25/20 1458) Vital Signs (24h Range):  Temp:  [98.2 °F (36.8 °C)-102.8 °F (39.3 °C)] 98.2 °F (36.8 °C)  Pulse:  [] 91  Resp:  [14-20] 17  SpO2:  [93 %-100 %] 93 %  BP: ()/(50-68) 117/58     Weight: 83 kg (182 lb 15.7 oz)  Body mass index is 34.57 kg/m².    Intake/Output Summary (Last 24 hours) at 2/25/2020 1503  Last data filed at 2/25/2020 1100  Gross per 24 hour   Intake 187.5 ml   Output 900 ml   Net -712.5 ml      Physical Exam   Constitutional: She is oriented to person, place, and time. She appears well-developed and well-nourished. No distress.   HENT:   Head: Normocephalic and atraumatic.   Right Ear: External ear normal.   Left  Ear: External ear normal.   Nose: Nose normal.   Mouth/Throat: Oropharynx is clear and moist.   Eyes: Pupils are equal, round, and reactive to light. Conjunctivae and EOM are normal. Right eye exhibits no discharge. Left eye exhibits no discharge. No scleral icterus.   Neck: Normal range of motion. Neck supple. No thyromegaly present.   Cardiovascular: Normal rate, regular rhythm, normal heart sounds and intact distal pulses.   No murmur heard.  Pulmonary/Chest: Effort normal and breath sounds normal. No stridor. No respiratory distress. She has no wheezes. She has no rales.   Abdominal: Soft. Bowel sounds are normal. She exhibits no distension. There is no tenderness.   Musculoskeletal: She exhibits no edema or tenderness.   Lymphadenopathy:     She has no cervical adenopathy.   Neurological: She is alert and oriented to person, place, and time. No cranial nerve deficit or sensory deficit. She exhibits normal muscle tone. Coordination normal.   Skin: Skin is warm and dry. Capillary refill takes less than 2 seconds. No rash noted. She is not diaphoretic. No erythema.   Psychiatric: She has a normal mood and affect. Her behavior is normal. Judgment and thought content normal.   Nursing note and vitals reviewed.      Significant Labs:   BMP:   Recent Labs   Lab 02/25/20  0445         K 3.8      CO2 24   BUN 9   CREATININE 0.6   CALCIUM 9.1     CBC:   Recent Labs   Lab 02/24/20  1622 02/25/20  0445   WBC 10.27 9.58   HGB 9.6* 8.1*   HCT 32.0* 26.0*   * 403*       Significant Imaging: I have reviewed all pertinent imaging results/findings within the past 24 hours.

## 2020-02-25 NOTE — H&P
[facility]  History & Physical    Patient Name: Indigo Stuart  MRN: [id number]  Admission Date: 2/24/2020  Attending Physician: Gregg Harvey MD   Primary Care Provider: John Conner MD         Patient information was obtained from patient and ER records.     Subjective:     Principal Problem: Fever  Chief Complaint:   Chief Complaint   Patient presents with    Fever        HPI: Indigo Stuart is a 64 y.o. female with PMHx of colon, lung and liver CA who presents to the ED with an onset of subjective fever that began this morning. Pt c/o generalized body aches, weakness, and fatigue. She is currently undergoing chemotherapy, last treatment being a few weeks ago. The patient denies HA, abdominal pain, sore throat, dysuria, CP, N/V/D or any other symptoms at this time. No pertinent PSHx.     The history is provided by the patient.      Past Medical History:   Diagnosis Date    Cancer     colon    Encounter for blood transfusion     Hypertension        Past Surgical History:   Procedure Laterality Date    CHOLECYSTECTOMY      COLON SURGERY      COLONOSCOPY N/A 3/30/2018    Procedure: COLONOSCOPY;  Surgeon: Daniel Magana MD;  Location: [facility];  Service: Endoscopy;  Laterality: N/A;    ESOPHAGOGASTRODUODENOSCOPY N/A 1/3/2019    Procedure: EGD (ESOPHAGOGASTRODUODENOSCOPY);  Surgeon: Adis Arguello MD;  Location: [facility];  Service: Endoscopy;  Laterality: N/A;    ESOPHAGOGASTRODUODENOSCOPY N/A 1/14/2019    Procedure: EGD (ESOPHAGOGASTRODUODENOSCOPY);  Surgeon: Monserrat Lopez MD;  Location: [facility] ([address] [place] [address]);  Service: Endoscopy;  Laterality: N/A;  with duodenal stent placement per La/svn    HYSTERECTOMY      INSERTION OF TUNNELED CENTRAL VENOUS CATHETER (CVC) WITH SUBCUTANEOUS PORT N/A 1/4/2019    Procedure: [id number];  Surgeon: Emilio Romero MD;  [facility];  Service: General;  Laterality: N/A;        Review of patient's allergies indicates:   Allergen Reactions    Codeine Nausea And Vomiting    Erythromycin base Other (See Comments)    Lisinopril Palpitations     Cough        No current facility-administered medications on file prior to encounter.      Current Outpatient Medications on File Prior to Encounter   Medication Sig    amLODIPine (NORVASC) 5 MG tablet Take 1 tablet (5 mg total) by mouth once daily.    atorvastatin (LIPITOR) 20 MG tablet Take 1 tablet (20 mg total) by mouth every evening.    ELIQUIS 5 mg Tab TAKE 1 TABLET BY MOUTH TWICE A DAY (Patient taking differently: Take by mouth 2 (two) times daily. )    ferrous sulfate (FEOSOL) 325 mg (65 mg iron) Tab tablet Take 1 tablet by mouth 2 (two) times daily.    metoprolol succinate (TOPROL-XL) 25 MG 24 hr tablet Take 1 tablet (25 mg total) by mouth every evening.    potassium chloride SA (K-DUR,KLOR-CON) 20 MEQ tablet Take 1 tablet (20 mEq total) by mouth once daily.    pyridoxine, vitamin B6, (VITAMIN B-6) 50 MG Tab Take 100 mg by mouth once daily.     senna-docusate 8.6-50 mg (SENOKOT-S) 8.6-50 mg per tablet Take 2 tablets by mouth nightly as needed for Constipation.    thiamine (VITAMIN B-1) 250 MG tablet Take 250 mg by mouth once daily.    [DISCONTINUED] promethazine (PHENERGAN) 25 MG tablet Take 1 tablet (25 mg total) by mouth every 6 (six) hours as needed for Nausea.     Family History     Problem Relation (Age of Onset)    Cancer Mother, Father        Tobacco Use    Smoking status: Never Smoker    Smokeless tobacco: Never Used   Substance and Sexual Activity    Alcohol use: No     Frequency: Never     Drinks per session: Patient refused     Binge frequency: Never    Drug use: No    Sexual activity: Not Currently     Review of Systems   Constitutional: Positive for appetite change, fatigue and fever. Negative for chills.   HENT: Negative for congestion, hearing loss, rhinorrhea, sore throat, trouble swallowing and voice  change.    Respiratory: Negative for cough, chest tightness, shortness of breath and wheezing.    Cardiovascular: Negative for chest pain, palpitations and leg swelling.   Gastrointestinal: Negative for abdominal pain, blood in stool, diarrhea, nausea and vomiting.   Endocrine: Negative for cold intolerance and heat intolerance.   Genitourinary: Negative for difficulty urinating, frequency, hematuria and urgency.   Musculoskeletal: Negative for back pain, joint swelling and neck stiffness.   Skin: Negative for pallor and rash.   Neurological: Positive for weakness. Negative for tremors, seizures, syncope, speech difficulty, numbness and headaches.   Hematological: Negative for adenopathy.   Psychiatric/Behavioral: Negative for agitation, behavioral problems, confusion and sleep disturbance.     Objective:     Vital Signs (Most Recent):  Temp: 99 °F (37.2 °C) (02/24/20 1847)  Pulse: 90 (02/24/20 1847)  Resp: 16 (02/24/20 1847)  BP: (!) 121/58 (02/24/20 1847)  SpO2: 97 % (02/24/20 1847) Vital Signs (24h Range):  Temp:  [99 °F (37.2 °C)-102.8 °F (39.3 °C)] 99 °F (37.2 °C)  Pulse:  [] 90  Resp:  [16-20] 16  SpO2:  [96 %-97 %] 97 %  BP: ()/(50-72) 121/58     Weight: 82.6 kg (182 lb)  Body mass index is 34.39 kg/m².    Physical Exam   Constitutional: She is oriented to person, place, and time. She appears well-developed and well-nourished. No distress.   HENT:   Head: Normocephalic and atraumatic.   Right Ear: External ear normal.   Left Ear: External ear normal.   Nose: Nose normal.   Mouth/Throat: Oropharynx is clear and moist.   Eyes: Pupils are equal, round, and reactive to light. Conjunctivae and EOM are normal. Right eye exhibits no discharge. Left eye exhibits no discharge. No scleral icterus.   Neck: Normal range of motion. Neck supple. No thyromegaly present.   Cardiovascular: Normal rate, regular rhythm, normal heart sounds and intact distal pulses.   No murmur heard.  Pulmonary/Chest: Effort normal  and breath sounds normal. No stridor. No respiratory distress. She has no wheezes. She has no rales.   Abdominal: Soft. Bowel sounds are normal. She exhibits no distension. There is no tenderness.   Musculoskeletal: She exhibits no edema or tenderness.   Lymphadenopathy:     She has no cervical adenopathy.   Neurological: She is alert and oriented to person, place, and time. No cranial nerve deficit or sensory deficit. She exhibits normal muscle tone. Coordination normal.   Skin: Skin is warm and dry. Capillary refill takes less than 2 seconds. No rash noted. She is not diaphoretic. No erythema.   Psychiatric: She has a normal mood and affect. Her behavior is normal. Judgment and thought content normal.   Nursing note and vitals reviewed.        CRANIAL NERVES     CN III, IV, VI   Pupils are equal, round, and reactive to light.  Extraocular motions are normal.        Significant Labs:   BMP:   Recent Labs   Lab 02/24/20  1622   *   *   K 4.5   CL 98   CO2 23   BUN 9   CREATININE 0.7   CALCIUM 9.7     CBC:   Recent Labs   Lab 02/24/20  1622   WBC 10.27   HGB 9.6*   HCT 32.0*   *       Significant Imaging: I have reviewed all pertinent imaging results/findings within the past 24 hours.    Assessment/Plan:     Fever  This patient does not have evidence of infective focus  ML has viral infection, unclear source  Antibiotics given-   Antibiotics (From admission, onward)    Start     Stop Route Frequency Ordered    02/25/20 0900  cefTRIAXone (ROCEPHIN) 1 g in dextrose 5 % 50 mL IVPB  (CEFTRIAXONE (ROCEPHIN) IM WITH LIDOCAINE)      -- IV Every 24 hours (non-standard times) 02/24/20 1819          Latest lactate reviewed, they are-  Recent Labs   Lab 02/24/20  1622   LACTATE 1.3       Organ dysfunction indicated by Acute liver injury      UTI (urinary tract infection)  Will continue Ceftriaxone 1 g daily  Will follow Fu with urine culture      Overlapping malignant neoplasm of colon  Hx  noted      Hyperlipidemia  Will continue statin home dose      Severe obesity (BMI 35.0-39.9) with comorbidity  Body mass index is 34.39 kg/m². Morbid obesity complicates all aspects of disease management from diagnostic modalities to treatment. Weight loss encouraged and health benefits explained to patient.        Hepatic metastases  Hx noted      Lung metastases  Hx noted      Duodenal cancer  Hx noted      Fatigue  ML 2/2 infection ML Prodromal symptoms of viral infection        Thrombocytosis  Hx noted      Anemia  Patient's anemia is currently controlled.   Current CBC reviewed-   Lab Results   Component Value Date    HGB 9.6 (L) 02/24/2020    HCT 32.0 (L) 02/24/2020     Monitor serial CBC and transfuse if patient becomes hemodynamically unstable, symptomatic or H/H drops below 7/21.       Essential hypertension  Will continue home antiHTN meds  Will continue amlodipine,metoprolol home dose      VTE Risk Mitigation (From admission, onward)         Ordered     apixaban tablet 5 mg  2 times daily      02/24/20 1849     IP VTE HIGH RISK PATIENT  Once      02/24/20 1849     Place sequential compression device  Until discontinued      02/24/20 1849                   Gregg Harvey MD  Department of Hospital Medicine   [facility]

## 2020-02-25 NOTE — PROGRESS NOTES
[facility]  Progress Note    Patient Name: Indigo Stuart  MRN: [id number]  Patient Class: OP- Observation   Admission Date: 2/24/2020  Length of Stay: 0 days  Attending Physician: Gregg Harvey MD  Primary Care Provider: John Conner MD        Subjective:     Principal Problem:<principal problem not specified>        HPI:  Indigo Stuart is a 64 y.o. female with PMHx of colon, lung and liver CA who presents to the ED with an onset of subjective fever that began this morning. Pt c/o generalized body aches, weakness, and fatigue. She is currently undergoing chemotherapy, last treatment being a few weeks ago. The patient denies HA, abdominal pain, sore throat, dysuria, CP, N/V/D or any other symptoms at this time. No pertinent PSHx.     The history is provided by the patient.      Overview/Hospital Course:  Patient admitted for evaluation of fever unclear etiology.  Chest x-ray did not show any new infiltrate.  UA shows positive nitrate but WBC was 2.  Empirically started ceftriaxone.  Patient had another spike in temperature early in the morning and hence was monitored over the course of the day with urine culture and blood culture pending.    Interval History:  Patient had temperature spike last night.  Patient otherwise denies any symptoms.     Review of Systems   Constitutional: Positive for fever. Negative for appetite change, chills and fatigue.   HENT: Negative for congestion, hearing loss, rhinorrhea, sore throat, trouble swallowing and voice change.    Respiratory: Negative for cough, chest tightness, shortness of breath and wheezing.    Cardiovascular: Negative for chest pain, palpitations and leg swelling.   Gastrointestinal: Negative for abdominal pain, blood in stool, diarrhea, nausea and vomiting.   Endocrine: Negative for cold intolerance and heat intolerance.   Genitourinary: Negative for difficulty urinating, frequency, hematuria and urgency.    Musculoskeletal: Negative for back pain, joint swelling and neck stiffness.   Skin: Negative for pallor and rash.   Neurological: Negative for tremors, seizures, syncope, speech difficulty, weakness, numbness and headaches.   Hematological: Negative for adenopathy.   Psychiatric/Behavioral: Negative for agitation, behavioral problems, confusion and sleep disturbance.     Objective:     Vital Signs (Most Recent):  Temp: 98.2 °F (36.8 °C) (02/25/20 1458)  Pulse: 91 (02/25/20 1458)  Resp: 17 (02/25/20 1458)  BP: (!) 117/58 (02/25/20 1458)  SpO2: (!) 93 % (02/25/20 1458) Vital Signs (24h Range):  Temp:  [98.2 °F (36.8 °C)-102.8 °F (39.3 °C)] 98.2 °F (36.8 °C)  Pulse:  [] 91  Resp:  [14-20] 17  SpO2:  [93 %-100 %] 93 %  BP: ()/(50-68) 117/58     Weight: 83 kg (182 lb 15.7 oz)  Body mass index is 34.57 kg/m².    Intake/Output Summary (Last 24 hours) at 2/25/2020 1503  Last data filed at 2/25/2020 1100  Gross per 24 hour   Intake 187.5 ml   Output 900 ml   Net -712.5 ml      Physical Exam   Constitutional: She is oriented to person, place, and time. She appears well-developed and well-nourished. No distress.   HENT:   Head: Normocephalic and atraumatic.   Right Ear: External ear normal.   Left Ear: External ear normal.   Nose: Nose normal.   Mouth/Throat: Oropharynx is clear and moist.   Eyes: Pupils are equal, round, and reactive to light. Conjunctivae and EOM are normal. Right eye exhibits no discharge. Left eye exhibits no discharge. No scleral icterus.   Neck: Normal range of motion. Neck supple. No thyromegaly present.   Cardiovascular: Normal rate, regular rhythm, normal heart sounds and intact distal pulses.   No murmur heard.  Pulmonary/Chest: Effort normal and breath sounds normal. No stridor. No respiratory distress. She has no wheezes. She has no rales.   Abdominal: Soft. Bowel sounds are normal. She exhibits no distension. There is no tenderness.   Musculoskeletal: She exhibits no edema or  tenderness.   Lymphadenopathy:     She has no cervical adenopathy.   Neurological: She is alert and oriented to person, place, and time. No cranial nerve deficit or sensory deficit. She exhibits normal muscle tone. Coordination normal.   Skin: Skin is warm and dry. Capillary refill takes less than 2 seconds. No rash noted. She is not diaphoretic. No erythema.   Psychiatric: She has a normal mood and affect. Her behavior is normal. Judgment and thought content normal.   Nursing note and vitals reviewed.      Significant Labs:   BMP:   Recent Labs   Lab 02/25/20  0445         K 3.8      CO2 24   BUN 9   CREATININE 0.6   CALCIUM 9.1     CBC:   Recent Labs   Lab 02/24/20  1622 02/25/20  0445   WBC 10.27 9.58   HGB 9.6* 8.1*   HCT 32.0* 26.0*   * 403*       Significant Imaging: I have reviewed all pertinent imaging results/findings within the past 24 hours.      Assessment/Plan:      Fever  This patient does not have evidence of infective focus  ML has viral infection, unclear source  Micro currently pending initial blood culture negative day 1  Antibiotics given-   Antibiotics (From admission, onward)    Start     Stop Route Frequency Ordered    02/25/20 0900  cefTRIAXone (ROCEPHIN) 1 g in dextrose 5 % 50 mL IVPB  (CEFTRIAXONE (ROCEPHIN) IM WITH LIDOCAINE)      -- IV Every 24 hours (non-standard times) 02/24/20 1819          Latest lactate reviewed, they are-  Recent Labs   Lab 02/24/20  1622 02/24/20  2110   LACTATE 1.3 1.4       Organ dysfunction indicated by Acute liver injury.  Liver function improved      UTI (urinary tract infection)  Will continue Ceftriaxone 1 g daily  Will follow Fu with urine culture      Overlapping malignant neoplasm of colon  Hx noted      Hyperlipidemia  Will continue statin home dose      Severe obesity (BMI 35.0-39.9) with comorbidity  Body mass index is 34.57 kg/m². Morbid obesity complicates all aspects of disease management from diagnostic modalities to  treatment. Weight loss encouraged and health benefits explained to patient.        Hepatic metastases  Hx noted      Lung metastases  Hx noted      Duodenal cancer  Hx noted      Fatigue  ML 2/2 infection ML Prodromal symptoms of viral infection        Thrombocytosis  Hx noted      Severe malnutrition  History noted      Anemia  Patient's anemia is currently controlled.   Current CBC reviewed-   Lab Results   Component Value Date    HGB 8.1 (L) 02/25/2020    HCT 26.0 (L) 02/25/2020     Monitor serial CBC and transfuse if patient becomes hemodynamically unstable, symptomatic or H/H drops below 7/21.       Essential hypertension  Will continue home antiHTN meds  Will continue amlodipine,metoprolol home dose      VTE Risk Mitigation (From admission, onward)         Ordered     apixaban tablet 5 mg  2 times daily      02/24/20 1849     IP VTE HIGH RISK PATIENT  Once      02/24/20 1849     Place sequential compression device  Until discontinued      02/24/20 1849                      Gregg Harvey MD  Department of Hospital Medicine   [facility]

## 2020-02-25 NOTE — ASSESSMENT & PLAN NOTE
Contributing Nutrition Diagnosis  inadequate energy intake    Related to (etiology):   Decreased appetite following chemo treatment     Signs and Symptoms (as evidenced by):   26% weight loss in 1 year  <75% energy intake >3 months  Mild muscle and fat depletion     Interventions/Recommendations (treatment strategy):  Continue with adult regular diet  Consult SLP for mechanical soft restriction following stent  Send strawberry boost + yogurt with meals     Nutrition Diagnosis Status:   New

## 2020-02-25 NOTE — CONSULTS
"  [facility]  Adult Nutrition  Consult Note    SUMMARY   Intervention:  Modified texture diet     Recommendations  Recommendation/Intervention:   1.) Continue with Adult regular diet   2.) Consider consulting SLP for Mechanical Soft diet restriction (IDDSI level 5)   3.) Recommend boost plus strawberry TID mixed with yogurt    Goals: 1.) patient will meet >75% of EEN while admitted  Nutrition Goal Status: new  Communication of RD Recs: other (comment)(second sign to MD)     1. UTI (urinary tract infection)      Past Medical History:   Diagnosis Date    Cancer     colon    Encounter for blood transfusion     Hypertension          Reason for Assessment  Reason For Assessment: consult  General Information Comments: Admits with fever. PMH of colon cancer with mets to lung and liver. Undergoing chemotherapy, last session was a few weeks ago. Patient awake and alert with family at bedside. Family reports patient has been losing weight every week since starting chemo. States a UBW of ~246 lbs about 1 year ago. 26% weight loss noted in 1 year. Patient states she has been following a mechanical soft diet at home after having her stent placed. Reports decreased appetite, and some nausea following her chemo treatments. States she typically eats ~2 small meals per day. Denies any emesis after meals. States she enjoys smoothies (strawberry banana) and tolerates milk products well.     Nutrition Risk Screen  Nutrition Risk Screen: no indicators present    Nutrition/Diet History  Spiritual, Cultural Beliefs, Confucianist Practices, Values that Affect Care: no  Food Allergies: NKFA    Anthropometrics  Temp: 100.1 °F (37.8 °C)  Height Method: Stated  Height: 5' 1"  Height (inches): 61 in  Weight Method: Bed Scale  Weight: 83 kg (182 lb 15.7 oz)  Weight (lb): 182.98 lb  Ideal Body Weight (IBW), Female: 105 lb  % Ideal Body Weight, Female (lb): 174.27 %  BMI (Calculated): 34.6  BMI Grade: 30 - 34.9- obesity - grade " I  Weight Loss: unintentional  Usual Body Weight (UBW), k kg (2019) per chart review.   % Usual Body Weight: 87.55  % Weight Change From Usual Weight: -12.63 %    Lab/Procedures/Meds  Pertinent Labs Reviewed: reviewed  BMP  Lab Results   Component Value Date     2020    K 3.8 2020     2020    CO2 24 2020    BUN 9 2020    CREATININE 0.6 2020    CALCIUM 9.1 2020    ANIONGAP 10 2020    ESTGFRAFRICA >60 2020    EGFRNONAA >60 2020     Lab Results   Component Value Date    ALBUMIN 2.8 (L) 2020     Lab Results   Component Value Date    CALCIUM 9.1 2020     No results for input(s): POCTGLUCOSE in the last 24 hours.  Lab Results   Component Value Date    HGBA1C 5.6 2019       Pertinent Medications Reviewed: reviewed  Scheduled Meds:   amLODIPine  5 mg Oral Daily    apixaban  5 mg Oral BID    atorvastatin  20 mg Oral QHS    cefTRIAXone 1 g in dextrose 5 % 50 mL  1 g Intravenous Q24H    ferrous sulfate  325 mg Oral Daily    metoprolol succinate  25 mg Oral QHS    potassium chloride SA  20 mEq Oral Daily    pyridoxine (vitamin B6)  50 mg Oral Daily    thiamine  250 mg Oral Daily     Continuous Infusions:   sodium chloride 0.9% 75 mL/hr at 20 0330             Estimated/Assessed Needs  Weight Used For Calorie Calculations: 83 kg (182 lb 15.7 oz)  Energy Calorie Requirements (kcal): 0816-9600  Energy Need Method: Villalba-St Rajesh(x1.5)  Protein Requirements:  g protein/day  Weight Used For Protein Calculations: 83 kg (182 lb 15.7 oz)  Estimated Fluid Requirement Method: RDA Method  RDA Method (mL): 1900      Nutrition Prescription Ordered  Current Diet Order: Adult regular diet     Evaluation of Received Nutrient/Fluid Intake  IV Fluid (mL): 1800  % Intake of Estimated Energy Needs: 0 - 25 %  % Meal Intake: 0 - 25 %    Nutrition Risk  Level of Risk/Frequency of Follow-up: (x2 weekly)     Assessment and  Plan    Severe malnutrition  Contributing Nutrition Diagnosis  inadequate energy intake    Related to (etiology):   Decreased appetite following chemo treatment     Signs and Symptoms (as evidenced by):   26% weight loss in 1 year  <75% energy intake >3 months  Mild muscle and fat depletion     Interventions/Recommendations (treatment strategy):  Continue with adult regular diet  Consult SLP for mechanical soft restriction following stent  Send strawberry boost + yogurt with meals     Nutrition Diagnosis Status:   New           Monitor and Evaluation  Food and Nutrient Intake: energy intake, food and beverage intake  Food and Nutrient Adminstration: diet order  Anthropometric Measurements: weight, weight change, body mass index  Biochemical Data, Medical Tests and Procedures: electrolyte and renal panel, glucose/endocrine profile, lipid profile  Nutrition-Focused Physical Findings: overall appearance     Malnutrition Assessment  Malnutrition Type: chronic illness  Energy Intake: moderate energy intake  Weight Loss (Malnutrition): greater than 20% in 1 year  Energy Intake (Malnutrition): less than 75% for greater than or equal to 3 months   Subcutaneous Fat Loss (Final Summary): mild protein-calorie malnutrition  Muscle Loss Evaluation (Final Summary): mild protein-calorie malnutrition    Severe Weight Loss (Malnutrition): greater than 20% in 1 year    Nutrition Follow-Up  RD Follow-up?: Yes

## 2020-02-25 NOTE — ASSESSMENT & PLAN NOTE
Body mass index is 34.57 kg/m². Morbid obesity complicates all aspects of disease management from diagnostic modalities to treatment. Weight loss encouraged and health benefits explained to patient.

## 2020-02-25 NOTE — ASSESSMENT & PLAN NOTE
This patient does not have evidence of infective focus  ML has viral infection, unclear source  Micro currently pending initial blood culture negative day 1  Antibiotics given-   Antibiotics (From admission, onward)    Start     Stop Route Frequency Ordered    02/25/20 0900  cefTRIAXone (ROCEPHIN) 1 g in dextrose 5 % 50 mL IVPB  (CEFTRIAXONE (ROCEPHIN) IM WITH LIDOCAINE)      -- IV Every 24 hours (non-standard times) 02/24/20 1819          Latest lactate reviewed, they are-  Recent Labs   Lab 02/24/20  1622 02/24/20  2110   LACTATE 1.3 1.4       Organ dysfunction indicated by Acute liver injury.  Liver function improved

## 2020-02-25 NOTE — ASSESSMENT & PLAN NOTE
This patient does not have evidence of infective focus  ML has viral infection, unclear source  Antibiotics given-   Antibiotics (From admission, onward)    Start     Stop Route Frequency Ordered    02/25/20 0900  cefTRIAXone (ROCEPHIN) 1 g in dextrose 5 % 50 mL IVPB  (CEFTRIAXONE (ROCEPHIN) IM WITH LIDOCAINE)      -- IV Every 24 hours (non-standard times) 02/24/20 1819          Latest lactate reviewed, they are-  Recent Labs   Lab 02/24/20  1622   LACTATE 1.3       Organ dysfunction indicated by Acute liver injury

## 2020-02-26 ENCOUNTER — PATIENT MESSAGE (OUTPATIENT)
Dept: HEMATOLOGY/ONCOLOGY | Facility: CLINIC | Age: 65
End: 2020-02-26

## 2020-02-26 VITALS
RESPIRATION RATE: 18 BRPM | HEART RATE: 84 BPM | SYSTOLIC BLOOD PRESSURE: 111 MMHG | HEIGHT: 61 IN | OXYGEN SATURATION: 99 % | TEMPERATURE: 99 F | DIASTOLIC BLOOD PRESSURE: 56 MMHG | WEIGHT: 183 LBS | BODY MASS INDEX: 34.55 KG/M2

## 2020-02-26 LAB
ANION GAP SERPL CALC-SCNC: 10 MMOL/L (ref 8–16)
BACTERIA UR CULT: NORMAL
BASOPHILS # BLD AUTO: 0.04 K/UL (ref 0–0.2)
BASOPHILS NFR BLD: 0.4 % (ref 0–1.9)
BUN SERPL-MCNC: 6 MG/DL (ref 8–23)
CALCIUM SERPL-MCNC: 9.3 MG/DL (ref 8.7–10.5)
CHLORIDE SERPL-SCNC: 103 MMOL/L (ref 95–110)
CO2 SERPL-SCNC: 26 MMOL/L (ref 23–29)
CREAT SERPL-MCNC: 0.6 MG/DL (ref 0.5–1.4)
DIFFERENTIAL METHOD: ABNORMAL
EOSINOPHIL # BLD AUTO: 0 K/UL (ref 0–0.5)
EOSINOPHIL NFR BLD: 0.1 % (ref 0–8)
ERYTHROCYTE [DISTWIDTH] IN BLOOD BY AUTOMATED COUNT: 17.3 % (ref 11.5–14.5)
EST. GFR  (AFRICAN AMERICAN): >60 ML/MIN/1.73 M^2
EST. GFR  (NON AFRICAN AMERICAN): >60 ML/MIN/1.73 M^2
GLUCOSE SERPL-MCNC: 96 MG/DL (ref 70–110)
HCT VFR BLD AUTO: 27 % (ref 37–48.5)
HGB BLD-MCNC: 8.2 G/DL (ref 12–16)
IMM GRANULOCYTES # BLD AUTO: 0.15 K/UL (ref 0–0.04)
LYMPHOCYTES # BLD AUTO: 1.3 K/UL (ref 1–4.8)
LYMPHOCYTES NFR BLD: 11.9 % (ref 18–48)
MCH RBC QN AUTO: 29.4 PG (ref 27–31)
MCHC RBC AUTO-ENTMCNC: 30.4 G/DL (ref 32–36)
MCV RBC AUTO: 97 FL (ref 82–98)
MONOCYTES # BLD AUTO: 1.9 K/UL (ref 0.3–1)
MONOCYTES NFR BLD: 17.7 % (ref 4–15)
NEUTROPHILS # BLD AUTO: 7.4 K/UL (ref 1.8–7.7)
NEUTROPHILS NFR BLD: 68.5 % (ref 38–73)
NRBC BLD-RTO: 0 /100 WBC
PLATELET # BLD AUTO: 441 K/UL (ref 150–350)
PMV BLD AUTO: 8.7 FL (ref 9.2–12.9)
POTASSIUM SERPL-SCNC: 4.1 MMOL/L (ref 3.5–5.1)
RBC # BLD AUTO: 2.79 M/UL (ref 4–5.4)
SODIUM SERPL-SCNC: 139 MMOL/L (ref 136–145)
WBC # BLD AUTO: 10.81 K/UL (ref 3.9–12.7)

## 2020-02-26 PROCEDURE — 63600175 PHARM REV CODE 636 W HCPCS: Performed by: INTERNAL MEDICINE

## 2020-02-26 PROCEDURE — 94761 N-INVAS EAR/PLS OXIMETRY MLT: CPT

## 2020-02-26 PROCEDURE — 85025 COMPLETE CBC W/AUTO DIFF WBC: CPT

## 2020-02-26 PROCEDURE — 96376 TX/PRO/DX INJ SAME DRUG ADON: CPT

## 2020-02-26 PROCEDURE — 80048 BASIC METABOLIC PNL TOTAL CA: CPT

## 2020-02-26 PROCEDURE — 36415 COLL VENOUS BLD VENIPUNCTURE: CPT

## 2020-02-26 PROCEDURE — G0378 HOSPITAL OBSERVATION PER HR: HCPCS

## 2020-02-26 PROCEDURE — 25000003 PHARM REV CODE 250: Performed by: INTERNAL MEDICINE

## 2020-02-26 PROCEDURE — 94760 N-INVAS EAR/PLS OXIMETRY 1: CPT

## 2020-02-26 RX ORDER — CEFUROXIME AXETIL 250 MG/1
250 TABLET ORAL 2 TIMES DAILY
Qty: 14 TABLET | Refills: 0 | Status: SHIPPED | OUTPATIENT
Start: 2020-02-26 | End: 2020-03-04

## 2020-02-26 RX ADMIN — AMLODIPINE BESYLATE 5 MG: 5 TABLET ORAL at 08:02

## 2020-02-26 RX ADMIN — FERROUS SULFATE TAB EC 325 MG (65 MG FE EQUIVALENT) 325 MG: 325 (65 FE) TABLET DELAYED RESPONSE at 08:02

## 2020-02-26 RX ADMIN — APIXABAN 5 MG: 2.5 TABLET, FILM COATED ORAL at 08:02

## 2020-02-26 RX ADMIN — POTASSIUM CHLORIDE 20 MEQ: 1500 TABLET, EXTENDED RELEASE ORAL at 08:02

## 2020-02-26 RX ADMIN — PYRIDOXINE HCL TAB 50 MG 50 MG: 50 TAB at 08:02

## 2020-02-26 RX ADMIN — Medication 250 MG: at 08:02

## 2020-02-26 RX ADMIN — CEFTRIAXONE 1 G: 1 INJECTION, SOLUTION INTRAVENOUS at 08:02

## 2020-02-26 NOTE — RESPIRATORY THERAPY
02/26/20 0723   PRE-TX-O2   O2 Device (Oxygen Therapy) room air   SpO2 96 %   Pulse Oximetry Type Intermittent   $ Pulse Oximetry - Multiple Charge Pulse Oximetry - Multiple

## 2020-02-26 NOTE — PLAN OF CARE
Patient AAO, VSS. Pt verbalized understanding of POC. Patient's fever has been ranging between  during shift. PRN medication utilized. Patient reported nausea and vomiting. PRN medication utilized. Nausea and vomiting subsided. Tele monitored; nsr. Patient requested for fluids to be discontinued. On call notified and ordered. Imodium order for diarrhea. Patient has been free of diarrhea throughout shift. Purposeful hourly/q2hr rounding done during shift to promote patient safety. Patient free from falls and injury during shift. Will continue to monitor.

## 2020-02-26 NOTE — NURSING
Pt given all discharge instructions written and verbal. Pt verbalized understanding of education. IV and tele removed. In stable condition and no complaints. Left via pt transport with all belongings.

## 2020-02-26 NOTE — NURSING
Called Dr. Harvey for orders for imodium for patient, who is requesting. Was told to tell night nurse to call night hospilist to put in orders.

## 2020-02-26 NOTE — PLAN OF CARE
02/26/20 1415   Final Note   Assessment Type Final Discharge Note   Anticipated Discharge Disposition Home

## 2020-02-28 ENCOUNTER — LAB VISIT (OUTPATIENT)
Dept: LAB | Facility: HOSPITAL | Age: 65
End: 2020-02-28
Attending: INTERNAL MEDICINE
Payer: MEDICARE

## 2020-02-28 DIAGNOSIS — C17.0 DUODENAL CANCER: ICD-10-CM

## 2020-02-28 DIAGNOSIS — Z51.11 ENCOUNTER FOR CHEMOTHERAPY MANAGEMENT: ICD-10-CM

## 2020-02-28 DIAGNOSIS — R74.8 ELEVATED ALKALINE PHOSPHATASE LEVEL: ICD-10-CM

## 2020-02-28 DIAGNOSIS — C78.7 HEPATIC METASTASES: ICD-10-CM

## 2020-02-28 DIAGNOSIS — R74.01 TRANSAMINASEMIA: ICD-10-CM

## 2020-02-28 DIAGNOSIS — R04.0 EPISTAXIS: ICD-10-CM

## 2020-02-28 DIAGNOSIS — C18.8 OVERLAPPING MALIGNANT NEOPLASM OF COLON: ICD-10-CM

## 2020-02-28 LAB
ALBUMIN SERPL BCP-MCNC: 2.5 G/DL (ref 3.5–5.2)
ALP SERPL-CCNC: 295 U/L (ref 55–135)
ALT SERPL W/O P-5'-P-CCNC: 43 U/L (ref 10–44)
ANION GAP SERPL CALC-SCNC: 13 MMOL/L (ref 8–16)
AST SERPL-CCNC: 58 U/L (ref 10–40)
BASOPHILS # BLD AUTO: 0.08 K/UL (ref 0–0.2)
BASOPHILS NFR BLD: 0.7 % (ref 0–1.9)
BILIRUB SERPL-MCNC: 0.4 MG/DL (ref 0.1–1)
BUN SERPL-MCNC: 6 MG/DL (ref 8–23)
CALCIUM SERPL-MCNC: 9.7 MG/DL (ref 8.7–10.5)
CEA SERPL-MCNC: 164.8 NG/ML (ref 0–5)
CHLORIDE SERPL-SCNC: 102 MMOL/L (ref 95–110)
CO2 SERPL-SCNC: 27 MMOL/L (ref 23–29)
CREAT SERPL-MCNC: 0.6 MG/DL (ref 0.5–1.4)
DIFFERENTIAL METHOD: ABNORMAL
EOSINOPHIL # BLD AUTO: 0.1 K/UL (ref 0–0.5)
EOSINOPHIL NFR BLD: 0.4 % (ref 0–8)
ERYTHROCYTE [DISTWIDTH] IN BLOOD BY AUTOMATED COUNT: 17.7 % (ref 11.5–14.5)
EST. GFR  (AFRICAN AMERICAN): >60 ML/MIN/1.73 M^2
EST. GFR  (NON AFRICAN AMERICAN): >60 ML/MIN/1.73 M^2
GLUCOSE SERPL-MCNC: 98 MG/DL (ref 70–110)
HCT VFR BLD AUTO: 30.8 % (ref 37–48.5)
HGB BLD-MCNC: 9.3 G/DL (ref 12–16)
IMM GRANULOCYTES # BLD AUTO: 0.37 K/UL (ref 0–0.04)
LYMPHOCYTES # BLD AUTO: 1.4 K/UL (ref 1–4.8)
LYMPHOCYTES NFR BLD: 12.3 % (ref 18–48)
MCH RBC QN AUTO: 29.9 PG (ref 27–31)
MCHC RBC AUTO-ENTMCNC: 30.2 G/DL (ref 32–36)
MCV RBC AUTO: 99 FL (ref 82–98)
MONOCYTES # BLD AUTO: 1.1 K/UL (ref 0.3–1)
MONOCYTES NFR BLD: 9.8 % (ref 4–15)
NEUTROPHILS # BLD AUTO: 8.4 K/UL (ref 1.8–7.7)
NEUTROPHILS NFR BLD: 73.6 % (ref 38–73)
NRBC BLD-RTO: 0 /100 WBC
PLATELET # BLD AUTO: 535 K/UL (ref 150–350)
PMV BLD AUTO: 8.3 FL (ref 9.2–12.9)
POTASSIUM SERPL-SCNC: 4.1 MMOL/L (ref 3.5–5.1)
PROT SERPL-MCNC: 6.8 G/DL (ref 6–8.4)
RBC # BLD AUTO: 3.11 M/UL (ref 4–5.4)
SODIUM SERPL-SCNC: 142 MMOL/L (ref 136–145)
WBC # BLD AUTO: 11.44 K/UL (ref 3.9–12.7)

## 2020-02-28 PROCEDURE — 85025 COMPLETE CBC W/AUTO DIFF WBC: CPT

## 2020-02-28 PROCEDURE — 80053 COMPREHEN METABOLIC PANEL: CPT

## 2020-02-28 PROCEDURE — 82378 CARCINOEMBRYONIC ANTIGEN: CPT

## 2020-02-28 PROCEDURE — 36415 COLL VENOUS BLD VENIPUNCTURE: CPT

## 2020-03-01 LAB
BACTERIA BLD CULT: NORMAL
BACTERIA BLD CULT: NORMAL

## 2020-03-02 ENCOUNTER — OFFICE VISIT (OUTPATIENT)
Dept: HEMATOLOGY/ONCOLOGY | Facility: CLINIC | Age: 65
End: 2020-03-02
Payer: MEDICARE

## 2020-03-02 VITALS
HEIGHT: 61 IN | SYSTOLIC BLOOD PRESSURE: 122 MMHG | OXYGEN SATURATION: 99 % | RESPIRATION RATE: 20 BRPM | TEMPERATURE: 99 F | BODY MASS INDEX: 34.5 KG/M2 | HEART RATE: 96 BPM | WEIGHT: 182.75 LBS | DIASTOLIC BLOOD PRESSURE: 87 MMHG

## 2020-03-02 DIAGNOSIS — D64.9 SYMPTOMATIC ANEMIA: ICD-10-CM

## 2020-03-02 DIAGNOSIS — D75.839 THROMBOCYTOSIS: ICD-10-CM

## 2020-03-02 DIAGNOSIS — C78.00 MALIGNANT NEOPLASM METASTATIC TO LUNG, UNSPECIFIED LATERALITY: ICD-10-CM

## 2020-03-02 DIAGNOSIS — C78.7 HEPATIC METASTASES: Primary | ICD-10-CM

## 2020-03-02 DIAGNOSIS — C17.0 DUODENAL CANCER: Primary | ICD-10-CM

## 2020-03-02 DIAGNOSIS — C78.7 HEPATIC METASTASES: ICD-10-CM

## 2020-03-02 PROCEDURE — 1101F PR PT FALLS ASSESS DOC 0-1 FALLS W/OUT INJ PAST YR: ICD-10-PCS | Mod: S$GLB,,, | Performed by: INTERNAL MEDICINE

## 2020-03-02 PROCEDURE — 3079F PR MOST RECENT DIASTOLIC BLOOD PRESSURE 80-89 MM HG: ICD-10-PCS | Mod: S$GLB,,, | Performed by: INTERNAL MEDICINE

## 2020-03-02 PROCEDURE — 3079F DIAST BP 80-89 MM HG: CPT | Mod: S$GLB,,, | Performed by: INTERNAL MEDICINE

## 2020-03-02 PROCEDURE — 1126F AMNT PAIN NOTED NONE PRSNT: CPT | Mod: S$GLB,,, | Performed by: INTERNAL MEDICINE

## 2020-03-02 PROCEDURE — 1101F PT FALLS ASSESS-DOCD LE1/YR: CPT | Mod: S$GLB,,, | Performed by: INTERNAL MEDICINE

## 2020-03-02 PROCEDURE — 1126F PR PAIN SEVERITY QUANTIFIED, NO PAIN PRESENT: ICD-10-PCS | Mod: S$GLB,,, | Performed by: INTERNAL MEDICINE

## 2020-03-02 PROCEDURE — 99999 PR PBB SHADOW E&M-EST. PATIENT-LVL IV: ICD-10-PCS | Mod: PBBFAC,,, | Performed by: INTERNAL MEDICINE

## 2020-03-02 PROCEDURE — 99999 PR PBB SHADOW E&M-EST. PATIENT-LVL IV: CPT | Mod: PBBFAC,,, | Performed by: INTERNAL MEDICINE

## 2020-03-02 PROCEDURE — 99215 PR OFFICE/OUTPT VISIT, EST, LEVL V, 40-54 MIN: ICD-10-PCS | Mod: S$GLB,,, | Performed by: INTERNAL MEDICINE

## 2020-03-02 PROCEDURE — 99215 OFFICE O/P EST HI 40 MIN: CPT | Mod: S$GLB,,, | Performed by: INTERNAL MEDICINE

## 2020-03-02 PROCEDURE — 1159F PR MEDICATION LIST DOCUMENTED IN MEDICAL RECORD: ICD-10-PCS | Mod: S$GLB,,, | Performed by: INTERNAL MEDICINE

## 2020-03-02 PROCEDURE — 3074F SYST BP LT 130 MM HG: CPT | Mod: S$GLB,,, | Performed by: INTERNAL MEDICINE

## 2020-03-02 PROCEDURE — 1159F MED LIST DOCD IN RCRD: CPT | Mod: S$GLB,,, | Performed by: INTERNAL MEDICINE

## 2020-03-02 PROCEDURE — 3074F PR MOST RECENT SYSTOLIC BLOOD PRESSURE < 130 MM HG: ICD-10-PCS | Mod: S$GLB,,, | Performed by: INTERNAL MEDICINE

## 2020-03-02 NOTE — PROGRESS NOTES
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  - Clinical: Stage IV (cTX, cNX, cM1) - Signed by Karina Pettit MD on 3/11/2019       Chemotherapy     Treatment Summary   Plan Name: OP COLORECTAL FOLFOX + BEVACIZUMAB Q2W  Treatment Goal: Maintenance  Status: Active  Start Date: 1/21/2019  End Date: 7/3/2019 (Planned)  Provider: Karina Pettit MD  Chemotherapy: fluorouracil injection 835 mg, 400 mg/m2 = 835 mg, Intravenous, Clinic/HOD 1 time, 4 of 12 cycles    fluorouracil (ADRUCIL) 2,400 mg/m2 = 5,015 mg in sodium chloride 0.9% 200.3 mL chemo infusion, 2,400 mg/m2 = 5,015 mg, Intravenous, Over 46 hours, 4 of 12 cycles    bevacizumab (AVASTIN) 5 mg/kg = 500 mg in sodium chloride 0.9% 100 mL chemo infusion, 5 mg/kg = 500 mg, Intravenous, Clinic/HOD 1 time, 4 of 12 cycles    leucovorin calcium 400 mg/m2 = 835 mg in dextrose 5 % 250 mL infusion, 400 mg/m2 = 835 mg, Intravenous, Clinic/HOD 1 time, 4 of 12 cycles    oxaliplatin (ELOXATIN) 85 mg/m2 = 178 mg in dextrose 5 % 500 mL chemo infusion, 85 mg/m2 = 178 mg, Intravenous, Clinic/HOD 1 time, 4 of 12 cycles          4/8/2019 - 4/8/2019 Chemotherapy     Treatment Summary   Plan Name: OP COLORECTAL FOLFIRI + BEVACIZUMAB Q2W  Treatment Goal: Control  Status: Inactive  Start Date: [No treatment day found]  End Date: [No treatment day found]  Provider: Karina Pettit MD  Chemotherapy: fluorouracil injection 835 mg, 400 mg/m2 = 835 mg, Intravenous, Clinic/HOD 1 time, 0 of 12 cycles  fluorouracil (ADRUCIL) 2,400 mg/m2 = 5,015 mg in sodium chloride 0.9% 200.3 mL chemo infusion, 2,400 mg/m2 = 5,015 mg, Intravenous, Over 46 hours, 0 of 12  cycles  bevacizumab (AVASTIN) 5 mg/kg = 500 mg in sodium chloride 0.9% 100 mL chemo infusion, 5 mg/kg = 500 mg, Intravenous, Clinic/HOD 1 time, 0 of 12 cycles  irinotecan (CAMPTOSAR) 180 mg/m2 = 376 mg in sodium chloride 0.9% 500 mL chemo infusion, 180 mg/m2 = 376 mg, Intravenous, Clinic/HOD 1 time, 0 of 12 cycles  leucovorin calcium 400 mg/m2 = 835 mg in dextrose 5 % 250 mL infusion, 400 mg/m2 = 835 mg, Intravenous, Clinic/HOD 1 time, 0 of 12 cycles      12/22/2019 -  Chemotherapy     Treatment Summary   Plan Name: OP PANC NAB-PACLITAXEL + GEMCITABINE Day 1, 8 , 15 every 28 days  Treatment Goal: Maintenance  Status: Active  Start Date: 12/31/2019  End Date: 4/14/2020 (Planned)  Provider: Karina Pettit MD  Chemotherapy: PACLitaxel-protein bound (ABRAXANE) 100 mg/m2 = 200 mg in 40 mL infusion, 100 mg/m2 = 200 mg (100 % of original dose 100 mg/m2), Intravenous, Clinic/HOD 1 time, 2 of 4 cycles  Dose modification: 100 mg/m2 (original dose 100 mg/m2, Cycle 1), 125 mg/m2 (original dose 100 mg/m2, Cycle 1)  Administration: 200 mg (12/31/2019), 200 mg (1/7/2020), 250 mg (1/14/2020), 250 mg (2/4/2020), 250 mg (2/11/2020)  gemcitabine 790 mg in sodium chloride 0.9% 250 mL chemo infusion, 400 mg/m2 = 790 mg (100 % of original dose 400 mg/m2), Intravenous, Clinic/HOD 1 time, 2 of 4 cycles  Dose modification: 400 mg/m2 (original dose 400 mg/m2, Cycle 1), 800 mg/m2 (original dose 400 mg/m2, Cycle 1), 400 mg/m2 (original dose 400 mg/m2, Cycle 2)  Administration: 790 mg (12/31/2019), 790 mg (1/7/2020), 1,575 mg (1/14/2020), 790 mg (2/4/2020), 790 mg (2/11/2020)        Lung metastases    1/15/2019 Initial Diagnosis     Lung metastases      4/8/2019 - 4/8/2019 Chemotherapy     Treatment Summary   Plan Name: OP COLORECTAL FOLFIRI + BEVACIZUMAB Q2W  Treatment Goal: Control  Status: Inactive  Start Date: [No treatment day found]  End Date: [No treatment day found]  Provider: Karina Pettit MD  Chemotherapy: fluorouracil injection  835 mg, 400 mg/m2 = 835 mg, Intravenous, Clinic/HOD 1 time, 0 of 12 cycles  fluorouracil (ADRUCIL) 2,400 mg/m2 = 5,015 mg in sodium chloride 0.9% 200.3 mL chemo infusion, 2,400 mg/m2 = 5,015 mg, Intravenous, Over 46 hours, 0 of 12 cycles  bevacizumab (AVASTIN) 5 mg/kg = 500 mg in sodium chloride 0.9% 100 mL chemo infusion, 5 mg/kg = 500 mg, Intravenous, Clinic/HOD 1 time, 0 of 12 cycles  irinotecan (CAMPTOSAR) 180 mg/m2 = 376 mg in sodium chloride 0.9% 500 mL chemo infusion, 180 mg/m2 = 376 mg, Intravenous, Clinic/HOD 1 time, 0 of 12 cycles  leucovorin calcium 400 mg/m2 = 835 mg in dextrose 5 % 250 mL infusion, 400 mg/m2 = 835 mg, Intravenous, Clinic/HOD 1 time, 0 of 12 cycles      4/8/2019 - 12/23/2019 Chemotherapy     Treatment Summary   Plan Name: OP COLORECTAL FOLFIRI + BEVACIZUMAB Q2W  Treatment Goal: Control  Status: Inactive  Start Date: 4/8/2019  End Date: 12/3/2019  Provider: Karina Pettit MD  Chemotherapy: fluorouracil injection 835 mg, 400 mg/m2 = 835 mg, Intravenous, Clinic/HOD 1 time, 12 of 12 cycles  Administration: 835 mg (8/6/2019), 835 mg (8/27/2019), 835 mg (8/27/2019), 835 mg (9/10/2019), 835 mg (9/24/2019)  fluorouracil (ADRUCIL) 2,400 mg/m2 = 5,015 mg in sodium chloride 0.9% 200.3 mL chemo infusion, 2,400 mg/m2 = 5,015 mg, Intravenous, Over 46 hours, 12 of 12 cycles  Administration: 5,015 mg (8/6/2019), 5,015 mg (8/27/2019), 5,015 mg (9/10/2019), 5,015 mg (9/24/2019)  bevacizumab (AVASTIN) 5 mg/kg = 500 mg in sodium chloride 0.9% 100 mL chemo infusion, 5 mg/kg = 500 mg, Intravenous, Clinic/HOD 1 time, 14 of 14 cycles  Dose modification: 7.5 mg/kg (original dose 5 mg/kg, Cycle 14)  Administration: 500 mg (8/6/2019), 500 mg (8/27/2019), 500 mg (9/10/2019), 500 mg (9/24/2019), 500 mg (11/12/2019), 750 mg (12/3/2019)  irinotecan (CAMPTOSAR) 180 mg/m2 = 376 mg in sodium chloride 0.9% 500 mL chemo infusion, 180 mg/m2 = 376 mg, Intravenous, Clinic/HOD 1 time, 14 of 14 cycles  Dose modification: 200  mg/m2 (original dose 180 mg/m2, Cycle 14)  Administration: 376 mg (8/6/2019), 376 mg (8/27/2019), 376 mg (9/10/2019), 376 mg (9/24/2019), 376 mg (11/12/2019), 418 mg (12/3/2019)  leucovorin calcium 400 mg/m2 = 835 mg in dextrose 5 % 250 mL infusion, 400 mg/m2 = 835 mg, Intravenous, Clinic/HOD 1 time, 12 of 12 cycles  Administration: 835 mg (8/6/2019), 835 mg (8/27/2019), 835 mg (9/10/2019), 835 mg (9/24/2019)      12/22/2019 -  Chemotherapy     Treatment Summary   Plan Name: OP PANC NAB-PACLITAXEL + GEMCITABINE Day 1, 8 , 15 every 28 days  Treatment Goal: Maintenance  Status: Active  Start Date: 12/31/2019  End Date: 4/14/2020 (Planned)  Provider: Karina Pettit MD  Chemotherapy: PACLitaxel-protein bound (ABRAXANE) 100 mg/m2 = 200 mg in 40 mL infusion, 100 mg/m2 = 200 mg (100 % of original dose 100 mg/m2), Intravenous, Clinic/HOD 1 time, 2 of 4 cycles  Dose modification: 100 mg/m2 (original dose 100 mg/m2, Cycle 1), 125 mg/m2 (original dose 100 mg/m2, Cycle 1)  Administration: 200 mg (12/31/2019), 200 mg (1/7/2020), 250 mg (1/14/2020), 250 mg (2/4/2020), 250 mg (2/11/2020)  gemcitabine 790 mg in sodium chloride 0.9% 250 mL chemo infusion, 400 mg/m2 = 790 mg (100 % of original dose 400 mg/m2), Intravenous, Clinic/HOD 1 time, 2 of 4 cycles  Dose modification: 400 mg/m2 (original dose 400 mg/m2, Cycle 1), 800 mg/m2 (original dose 400 mg/m2, Cycle 1), 400 mg/m2 (original dose 400 mg/m2, Cycle 2)  Administration: 790 mg (12/31/2019), 790 mg (1/7/2020), 1,575 mg (1/14/2020), 790 mg (2/4/2020), 790 mg (2/11/2020)        Hepatic metastases    1/15/2019 Initial Diagnosis     Hepatic metastases      4/8/2019 - 4/8/2019 Chemotherapy     Treatment Summary   Plan Name: OP COLORECTAL FOLFIRI + BEVACIZUMAB Q2W  Treatment Goal: Control  Status: Inactive  Start Date: [No treatment day found]  End Date: [No treatment day found]  Provider: Karina Pettit MD  Chemotherapy: fluorouracil injection 835 mg, 400 mg/m2 = 835 mg,  Intravenous, Clinic/HOD 1 time, 0 of 12 cycles  fluorouracil (ADRUCIL) 2,400 mg/m2 = 5,015 mg in sodium chloride 0.9% 200.3 mL chemo infusion, 2,400 mg/m2 = 5,015 mg, Intravenous, Over 46 hours, 0 of 12 cycles  bevacizumab (AVASTIN) 5 mg/kg = 500 mg in sodium chloride 0.9% 100 mL chemo infusion, 5 mg/kg = 500 mg, Intravenous, Clinic/HOD 1 time, 0 of 12 cycles  irinotecan (CAMPTOSAR) 180 mg/m2 = 376 mg in sodium chloride 0.9% 500 mL chemo infusion, 180 mg/m2 = 376 mg, Intravenous, Clinic/HOD 1 time, 0 of 12 cycles  leucovorin calcium 400 mg/m2 = 835 mg in dextrose 5 % 250 mL infusion, 400 mg/m2 = 835 mg, Intravenous, Clinic/HOD 1 time, 0 of 12 cycles      4/8/2019 - 12/23/2019 Chemotherapy     Treatment Summary   Plan Name: OP COLORECTAL FOLFIRI + BEVACIZUMAB Q2W  Treatment Goal: Control  Status: Inactive  Start Date: 4/8/2019  End Date: 12/3/2019  Provider: Karina Pettit MD  Chemotherapy: fluorouracil injection 835 mg, 400 mg/m2 = 835 mg, Intravenous, Clinic/HOD 1 time, 12 of 12 cycles  Administration: 835 mg (8/6/2019), 835 mg (8/27/2019), 835 mg (8/27/2019), 835 mg (9/10/2019), 835 mg (9/24/2019)  fluorouracil (ADRUCIL) 2,400 mg/m2 = 5,015 mg in sodium chloride 0.9% 200.3 mL chemo infusion, 2,400 mg/m2 = 5,015 mg, Intravenous, Over 46 hours, 12 of 12 cycles  Administration: 5,015 mg (8/6/2019), 5,015 mg (8/27/2019), 5,015 mg (9/10/2019), 5,015 mg (9/24/2019)  bevacizumab (AVASTIN) 5 mg/kg = 500 mg in sodium chloride 0.9% 100 mL chemo infusion, 5 mg/kg = 500 mg, Intravenous, Clinic/HOD 1 time, 14 of 14 cycles  Dose modification: 7.5 mg/kg (original dose 5 mg/kg, Cycle 14)  Administration: 500 mg (8/6/2019), 500 mg (8/27/2019), 500 mg (9/10/2019), 500 mg (9/24/2019), 500 mg (11/12/2019), 750 mg (12/3/2019)  irinotecan (CAMPTOSAR) 180 mg/m2 = 376 mg in sodium chloride 0.9% 500 mL chemo infusion, 180 mg/m2 = 376 mg, Intravenous, [facility] 1 time, 14 of 14 cycles  Dose modification: 200 mg/m2 (original dose 180  mg/m2, Cycle 14)  Administration: 376 mg (8/6/2019), 376 mg (8/27/2019), 376 mg (9/10/2019), 376 mg (9/24/2019), 376 mg (11/12/2019), 418 mg (12/3/2019)  leucovorin calcium 400 mg/m2 = 835 mg in dextrose 5 % 250 mL infusion, 400 mg/m2 = 835 mg, Intravenous, Clinic/HOD 1 time, 12 of 12 cycles  Administration: 835 mg (8/6/2019), 835 mg (8/27/2019), 835 mg (9/10/2019), 835 mg (9/24/2019)      12/22/2019 -  Chemotherapy     Treatment Summary   Plan Name: OP PANC NAB-PACLITAXEL + GEMCITABINE Day 1, 8 , 15 every 28 days  Treatment Goal: Maintenance  Status: Active  Start Date: 12/31/2019  End Date: 4/14/2020 (Planned)  Provider: Karina Pettit MD  Chemotherapy: PACLitaxel-protein bound (ABRAXANE) 100 mg/m2 = 200 mg in 40 mL infusion, 100 mg/m2 = 200 mg (100 % of original dose 100 mg/m2), Intravenous, Clinic/HOD 1 time, 2 of 4 cycles  Dose modification: 100 mg/m2 (original dose 100 mg/m2, Cycle 1), 125 mg/m2 (original dose 100 mg/m2, Cycle 1)  Administration: 200 mg (12/31/2019), 200 mg (1/7/2020), 250 mg (1/14/2020), 250 mg (2/4/2020), 250 mg (2/11/2020)  gemcitabine 790 mg in sodium chloride 0.9% 250 mL chemo infusion, 400 mg/m2 = 790 mg (100 % of original dose 400 mg/m2), Intravenous, Clinic/HOD 1 time, 2 of 4 cycles  Dose modification: 400 mg/m2 (original dose 400 mg/m2, Cycle 1), 800 mg/m2 (original dose 400 mg/m2, Cycle 1), 400 mg/m2 (original dose 400 mg/m2, Cycle 2)  Administration: 790 mg (12/31/2019), 790 mg (1/7/2020), 1,575 mg (1/14/2020), 790 mg (2/4/2020), 790 mg (2/11/2020)           Indigo Stuart is a 64 y.o.  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  Kras was MUTATED No MSI : EGFR no overexpression     Tolerating lopressor for htn and lipitor for dyslipidemia  Tolerating zofran for nausea prn chemo     Cycle one folfox avastin January 15 2019   Failed regimen and admitted with SBO and found to have duodenal cancer    Hepatic mets progressed hence changed to irinotecan dropped  oxaliplatin   Cycle one folfiri avastin April 2019     Pt was seen for left arm swelling and ultrasound revealed a DVT in her arm , post lovenox , now on eliquis   Scan reviewed again  Here to resume Xeliri which was started Nov 8 2019 December 13 2019   DVT left internal jugular vein noted October 8 19 patient started on Eliquis  Here for evaluation of her imaging studies and scans  PET-CT with increasing SUV and increasing activity the cancer no evidence of new disease  Ultrasound revealed resolution of the blood clots in her left upper extremity    December 23 2019   PET CT mixed response explained again   cea now has normalized     January 7 2020  For chemo clearance   New rash to trunk and axilla     January 13 2020  For cycle 1 day 15  Rash better but remains   CEA has increased again       February 3 2020  For cycle 2 day 1 clearance after being delayed for one week with cytopenias   + leg pain new  Is on 1/2 dose eliquis after having nosebleeds and thrombocytopenia  Both of these resolved     2-  Here for cycle 2 day 8   Pt with weakness and fatigue, decreased apprtite and chills no fever     2-  Here for cycle 2 day 15   Leg pain and weakness to where she is in a wheelchair on this new chemo   cea rising , on eliquis but platelets ok     February 24, 2020 here for clearance cycle 2 day 15 Gemzar Abraxane  Patient has a fever in office today of 102.5, she is tachycardic with a heart rate of 110.  She states she has not had any hot beverages and she did lot just eat lunch.  She is not feeling well somewhat diaphoretic    March 2 2020 post hospitalization for UTI and febrile neutropenia   Today patient is weak and pale she is to walker to walk in clinic and she states she cannot leave clinic had a walker she needs a wheelchair  No change in PMH, PFH, PSH since last OV ,    Current Outpatient Medications:     amLODIPine (NORVASC)  5 MG tablet, Take 1 tablet (5 mg total) by mouth once daily., Disp: 90 tablet, Rfl: 3    atorvastatin (LIPITOR) 20 MG tablet, Take 1 tablet (20 mg total) by mouth every evening., Disp: 90 tablet, Rfl: 3    cefUROXime (CEFTIN) 250 MG tablet, Take 1 tablet (250 mg total) by mouth 2 (two) times daily. for 7 days, Disp: 14 tablet, Rfl: 0    ELIQUIS 5 mg Tab, TAKE 1 TABLET BY MOUTH TWICE A DAY (Patient taking differently: Take by mouth 2 (two) times daily. ), Disp: 60 tablet, Rfl: 2    ferrous sulfate (FEOSOL) 325 mg (65 mg iron) Tab tablet, Take 1 tablet by mouth 2 (two) times daily., Disp: , Rfl: 3    metoprolol succinate (TOPROL-XL) 25 MG 24 hr tablet, Take 1 tablet (25 mg total) by mouth every evening., Disp: 90 tablet, Rfl: 3    potassium chloride SA (K-DUR,KLOR-CON) 20 MEQ tablet, Take 1 tablet (20 mEq total) by mouth once daily., Disp: 30 tablet, Rfl: 11    pyridoxine, vitamin B6, (VITAMIN B-6) 50 MG Tab, Take 100 mg by mouth once daily. , Disp: , Rfl:     senna-docusate 8.6-50 mg (SENOKOT-S) 8.6-50 mg per tablet, Take 2 tablets by mouth nightly as needed for Constipation., Disp: , Rfl:     thiamine (VITAMIN B-1) 250 MG tablet, Take 250 mg by mouth once daily., Disp: , Rfl:       Review of Systems:  General: Weight gain: No, Weight Loss: No,  Fatigue yes   Chills: No, Night Sweats: No, Insomnia: No, Excessive sleeping: yes  Respiratory:  Cough: No, Shortness of Breath:  yes  Wheezing: No, Excessive Snoring: No, Coughing up blood: No  Endocrine: Heat Intolerance: No, Cold Intolerance: No,   Excessive Thirst: No, Excessive Hunger: No  Eyes:  Blurred Vision: No, Double Vision: No   Light Sensitivity: No, Eye pain: No  Musculoskeletal: Muscle Aches/Pain: Yes    Joint Pain/Swelling: yes     Muscle Weakness:  Yes, Neck Pain: No, Back Pain:   Neurological:  increased shortness of breath with exertion nausea short of breath at rest  Headache Migraine: No, Dizziness/Vertigo:  yes  Cardiovascular: Chest Pain: No  "  Gastrointestinal: Nausea/Vomiting: No, Constipation: No, Diarrhea: no   Psych/Cog:  Depression: No, Anxiety: No, Hallucinations: No   : Frequent Urination: No, Incontinence: No, Blood of Urine: No, Urinary Infections: No  ENT:Hearing Loss: No, Earache: No, Ringing in Ears: No  Facial Pain: No, Chronic Congestion:No   Immune: Seasonal Allergies: No, Hives and/or Rashes: No  The remainder of the review of twelve body systems was reviewed and normal  + epistaxis and weakness      /87 (BP Location: Left arm, Patient Position: Sitting)   Pulse 96   Temp 98.5 °F (36.9 °C) (Oral)   Resp 20   Ht 5' 1" (1.549 m)   Wt 82.9 kg (182 lb 12.2 oz)   SpO2 99%   BMI 34.53 kg/m²   Constitutional: oriented to person, place, and time.     HENT:  Conjunctiva pale patient appears ill  Head: Normocephalic and atraumatic. Ears canal normal no discharge    Nose: Nose normal. ++boggy turbinates  No lymphatics noted   Eyes: Conjunctivae PALE  EOM are normal.  Neck: . No thyromegaly present.   Pharyngeal edema no erythema     Cardiovascular:  Tachycardic regular rhythm, normal heart sounds  No murmur heard.  Pulmonary/Chest:  Decreased breath sounds bilaterally no wheezes no fremitus no rhonchi  Abdominal: Soft. Bowel sounds are normal.  no mass.   Musculoskeletal: Normal range of motion. decreased strength   Left arm remains swollen yet better   No further boggy turbinates  Lymphadenopathy:  no cervical adenopathy.   Neurological: alert and oriented to person, place  Slight weakness today   Skin: Skin is warm and dry   Psychiatric: normal mood and affect.   Vitals reviewed.  Chest port removed     Lab Results   Component Value Date    WBC 11.44 02/28/2020    RBC 3.11 (L) 02/28/2020    HGB 9.3 (L) 02/28/2020    HCT 30.8 (L) 02/28/2020    MCV 99 (H) 02/28/2020    MCH 29.9 02/28/2020    MCHC 30.2 (L) 02/28/2020    RDW 17.7 (H) 02/28/2020     (H) 02/28/2020    MPV 8.3 (L) 02/28/2020    GRAN 8.4 (H) 02/28/2020    GRAN 73.6 " (H) 02/28/2020    LYMPH 1.4 02/28/2020    LYMPH 12.3 (L) 02/28/2020    MONO 1.1 (H) 02/28/2020    MONO 9.8 02/28/2020    EOS 0.1 02/28/2020    BASO 0.08 02/28/2020    EOSINOPHIL 0.4 02/28/2020    BASOPHIL 0.7 02/28/2020         CMP  Sodium   Date Value Ref Range Status   02/28/2020 142 136 - 145 mmol/L Final     Potassium   Date Value Ref Range Status   02/28/2020 4.1 3.5 - 5.1 mmol/L Final     Chloride   Date Value Ref Range Status   02/28/2020 102 95 - 110 mmol/L Final     CO2   Date Value Ref Range Status   02/28/2020 27 23 - 29 mmol/L Final     Glucose   Date Value Ref Range Status   02/28/2020 98 70 - 110 mg/dL Final     BUN, Bld   Date Value Ref Range Status   02/28/2020 6 (L) 8 - 23 mg/dL Final     Creatinine   Date Value Ref Range Status   02/28/2020 0.6 0.5 - 1.4 mg/dL Final     Calcium   Date Value Ref Range Status   02/28/2020 9.7 8.7 - 10.5 mg/dL Final     Total Protein   Date Value Ref Range Status   02/28/2020 6.8 6.0 - 8.4 g/dL Final     Albumin   Date Value Ref Range Status   02/28/2020 2.5 (L) 3.5 - 5.2 g/dL Final     Total Bilirubin   Date Value Ref Range Status   02/28/2020 0.4 0.1 - 1.0 mg/dL Final     Comment:     For infants and newborns, interpretation of results should be based  on gestational age, weight and in agreement with clinical  observations.  Premature Infant recommended reference ranges:  Up to 24 hours.............<8.0 mg/dL  Up to 48 hours............<12.0 mg/dL  3-5 days..................<15.0 mg/dL  6-29 days.................<15.0 mg/dL       Alkaline Phosphatase   Date Value Ref Range Status   02/28/2020 295 (H) 55 - 135 U/L Final     AST   Date Value Ref Range Status   02/28/2020 58 (H) 10 - 40 U/L Final     ALT   Date Value Ref Range Status   02/28/2020 43 10 - 44 U/L Final     Anion Gap   Date Value Ref Range Status   02/28/2020 13 8 - 16 mmol/L Final     eGFR if    Date Value Ref Range Status   02/28/2020 >60 >60 mL/min/1.73 m^2 Final     eGFR if non African  American   Date Value Ref Range Status   02/28/2020 >60 >60 mL/min/1.73 m^2 Final     Comment:     Calculation used to obtain the estimated glomerular filtration  rate (eGFR) is the CKD-EPI equation.      Impression:           - Normal esophagus.                        - Normal stomach.                        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 Prosthesis placed. The                         proximal end of the stent is distal to the ampulla                         (refer to images).  No msi   There are no osseous abnormalities.      Impression PET CT December 2019       Continued improvement of the hepatic metastasis with decrease in size of the multifocal hepatic lesions    Stable subcentimeter mesenteric lymph nodes    No evidence of new metastatic disease      Electronically signed by: Charissa Marroquin MD  Date: 12/11/2019  Time: 11:03     cea increasing        Duodenal cancer    Hepatic metastases    Malignant neoplasm metastatic to lung, unspecified laterality    Thrombocytosis    Symptomatic anemia      1.  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  Recent imaging reveals mixed response to chemotherapy with approximately 2 lesions in the liver increasing in size  2.  Fever post chemotherapy on February 11th with transaminitis due to UTI she is to continue her antibiotic prescription post hospitalization:    3.  Duodenal cancer with chemo on hold for now due for cycle 2 day 15 Abraxane Gemzar  4.  Thrombosis resolved on eliquis  left upper extremity     3.  Weakness on chemo : cannot tolerate abraxane and gemzar    4.  Hepatic metastases.  Patient will be referred to interventional Radiology and I appreciate their input it looks like she may be a good candidate for y 90   5.  Transaminitis with stent in place  7.  Rising CEA    Patient is post FOLFOX with Avastin, she has failed FOLFIRI with  Avastin  Had been receiving Xeloda plus Avastin plus irinotecan    Abraxane  And gemzar failed    Anemia overall stable at the moment not requiring any intervention at this time  Antinausea meds may be continued as needed  Cont norvasc for HTN monitored by Dr Arevalo   Tolerating lipitor for dyslipidemia  On statin monitoring counts closely  Will see her back in 3-4 weeks with further labs to make sure that she has not needed any further IV iron  acp documented

## 2020-03-03 ENCOUNTER — TELEPHONE (OUTPATIENT)
Dept: HEMATOLOGY/ONCOLOGY | Facility: CLINIC | Age: 65
End: 2020-03-03

## 2020-03-03 NOTE — TELEPHONE ENCOUNTER
Notification from [employer] states pt will be dropped from their support system without a paid claim by 3/25/2020. Informed Juan that pt was seen yesterday and chemo is on hold for now. F/up appt scheduled for 3/23, and it's possible pt will restart tx after that appt. Juan filed for extension and pt was approved for extension until 4/4/2020.

## 2020-03-04 ENCOUNTER — PATIENT MESSAGE (OUTPATIENT)
Dept: HEMATOLOGY/ONCOLOGY | Facility: CLINIC | Age: 65
End: 2020-03-04

## 2020-03-04 NOTE — DISCHARGE SUMMARY
[facility] Hospital Medicine  Discharge Summary      Patient Name: Indigo Stuart  MRN: [id number]  Admission Date: 2/24/2020  Hospital Length of Stay: 0 days  Discharge Date and Time: 2/26/2020  2:57 PM  Attending Physician: No att. providers found   Discharging Provider: Viviana Sifuentes MD  Primary Care Provider: John Conner MD      HPI:   Indigo Stuart is a 64 y.o. female with PMHx of colon, lung and liver CA who presents to the ED with an onset of subjective fever that began this morning. Pt c/o generalized body aches, weakness, and fatigue. She is currently undergoing chemotherapy, last treatment being a few weeks ago. The patient denies HA, abdominal pain, sore throat, dysuria, CP, N/V/D or any other symptoms at this time. No pertinent PSHx.     The history is provided by the patient.      * No surgery found *      Hospital Course:   Patient admitted for evaluation of fever unclear etiology.  Chest x-ray did not show any new infiltrate.  UA shows positive nitrate but WBC was 2.  Empirically started ceftriaxone.  Patient had another spike in temperature early in the morning and hence was monitored over the course of the day with urine culture and blood culture which both were negative. WBC was normal on day of discharge.   Alert, Nad. 0x3.    She is dc home on po ceftin x 7 days.   Consults:   Consults (From admission, onward)        Status Ordering Provider     Inpatient consult to Registered Dietitian/Nutritionist  Once     Provider:  (Not yet assigned)    GABRIELLA Cardona          No new Assessment & Plan notes have been filed under this hospital service since the last note was generated.  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 Range 2/26/2020 04:42   WBC Latest Ref Range: 3.90 - 12.70 K/uL 10.81   RBC Latest Ref Range: 4.00 - 5.40 M/uL 2.79 (L)   Hemoglobin Latest Ref Range: 12.0 - 16.0 g/dL 8.2 (L)   Hematocrit Latest Ref Range: 37.0 - 48.5 % 27.0 (L)   MCV Latest Ref Range: 82 - 98 fL 97   MCH Latest Ref Range: 27.0 - 31.0 pg 29.4   MCHC Latest Ref Range: 32.0 - 36.0 g/dL 30.4 (L)   RDW Latest Ref Range: 11.5 - 14.5 % 17.3 (H)   Platelets Latest Ref Range: 150 - 350 K/uL 441 (H)     Results for ABDIAZIZ SHARMA (MRN [id number]) as of 3/4/2020 13:12   Ref. Range 2/26/2020 04:42   Sodium Latest Ref Range: 136 - 145 mmol/L 139   Potassium Latest Ref Range: 3.5 - 5.1 mmol/L 4.1   Chloride Latest Ref Range: 95 - 110 mmol/L 103   CO2 Latest Ref Range: 23 - 29 mmol/L 26   Anion Gap Latest Ref Range: 8 - 16 mmol/L 10   BUN, Bld Latest Ref Range: 8 - 23 mg/dL 6 (L)   Creatinine Latest Ref Range: 0.5 - 1.4 mg/dL 0.6   eGFR if non African American Latest Ref Range: >60 mL/min/1.73 m^2 >60   eGFR if  Latest Ref Range: >60 mL/min/1.73 m^2 >60   Glucose Latest Ref Range: 70 - 110 mg/dL 96   Calcium Latest Ref Range: 8.7 - 10.5 mg/dL 9.3         Component 8d ago   Urine Culture, Routine No significant  growth    Resulting Agency [facility]      Narrative   Performed by: [facility]   Indicated criteria for high risk culture:->Other  Other (specify):->hx of Neutropenic pt           Component 9d ago   Blood Culture, Routine No growth after 5 days.    Resulting Agency [facility]      Narrative   Performed by: [facility]   Aerobic and anaerobic      Specimen Collected: 02/24/20 16:22        Impression       No airspace disease to suggest pneumonia.      Electronically signed by: Russell Dasilva  Date: 02/24/2020  Time: 16:51       Pending Diagnostic Studies:     None         Medications:  Reconciled Home Medications:      Medication List      START taking these medications    cefUROXime 250 MG tablet  Commonly known as:  CEFTIN  Take 1 tablet (250 mg total) by mouth 2 (two) times daily. for 7 days        CHANGE how you take these medications    Eliquis 5 mg Tab  Generic drug:  apixaban  TAKE 1 TABLET BY MOUTH TWICE A DAY  What changed:  how much to take        CONTINUE taking these medications    amLODIPine 5 MG tablet  Commonly known as:  NORVASC  Take 1 tablet (5 mg total) by mouth once daily.     atorvastatin 20 MG tablet  Commonly known as:  LIPITOR  Take 1 tablet (20 mg total) by mouth every evening.     ferrous sulfate 325 mg (65 mg iron) Tab tablet  Commonly known as:  FEOSOL  Take 1 tablet by mouth 2 (two) times daily.     metoprolol succinate 25 MG 24 hr tablet  Commonly known as:  TOPROL-XL  Take 1 tablet (25 mg total) by mouth every evening.     potassium chloride SA 20 MEQ tablet  Commonly known as:  K-DUR,KLOR-CON  Take 1 tablet (20 mEq total) by mouth once daily.     Senokot-S 8.6-50 mg per tablet  Generic drug:  senna-docusate 8.6-50 mg  Take 2 tablets by mouth nightly as needed for Constipation.     Vitamin B-1 250 MG tablet  Generic drug:  thiamine  Take 250 mg by mouth once daily.     Vitamin B-6 50 MG Tab  Generic drug:  pyridoxine (vitamin B6)  Take 100 mg by mouth once daily.            Indwelling Lines/Drains at time of  discharge:   Lines/Drains/Airways     Central Venous Catheter Line                 PowerPort A Cath Single Lumen left atrial;left subclavian -- days                Time spent on the discharge of patient: 33 minutes  Patient was seen and examined on the date of discharge and determined to be suitable for discharge.         Viviana Sifuentes MD  Department of Hospital Medicine  [facility]

## 2020-03-10 ENCOUNTER — PATIENT MESSAGE (OUTPATIENT)
Dept: HEMATOLOGY/ONCOLOGY | Facility: CLINIC | Age: 65
End: 2020-03-10

## 2020-03-16 DIAGNOSIS — C78.00 MALIGNANT NEOPLASM METASTATIC TO LUNG, UNSPECIFIED LATERALITY: ICD-10-CM

## 2020-03-16 DIAGNOSIS — C78.7 HEPATIC METASTASES: Primary | ICD-10-CM

## 2020-03-16 DIAGNOSIS — C17.0 DUODENAL CANCER: ICD-10-CM

## 2020-03-17 DIAGNOSIS — Z51.11 ENCOUNTER FOR ANTINEOPLASTIC CHEMOTHERAPY: Primary | ICD-10-CM

## 2020-03-18 RX ORDER — ONDANSETRON 8 MG/1
8 TABLET, ORALLY DISINTEGRATING ORAL EVERY 12 HOURS PRN
Qty: 30 TABLET | Refills: 1 | Status: SHIPPED | OUTPATIENT
Start: 2020-03-18 | End: 2021-03-18

## 2020-03-19 ENCOUNTER — OFFICE VISIT (OUTPATIENT)
Dept: INTERVENTIONAL RADIOLOGY/VASCULAR | Facility: CLINIC | Age: 65
End: 2020-03-19
Payer: MEDICARE

## 2020-03-19 DIAGNOSIS — C78.7 HEPATIC METASTASES: Primary | ICD-10-CM

## 2020-03-19 DIAGNOSIS — C17.0 DUODENAL CANCER: ICD-10-CM

## 2020-03-19 PROCEDURE — 99203 OFFICE O/P NEW LOW 30 MIN: CPT | Mod: 95,,, | Performed by: FAMILY MEDICINE

## 2020-03-19 PROCEDURE — 99203 PR OFFICE/OUTPT VISIT, NEW, LEVL III, 30-44 MIN: ICD-10-PCS | Mod: 95,,, | Performed by: FAMILY MEDICINE

## 2020-03-19 PROCEDURE — 1101F PR PT FALLS ASSESS DOC 0-1 FALLS W/OUT INJ PAST YR: ICD-10-PCS | Mod: ,,, | Performed by: FAMILY MEDICINE

## 2020-03-19 PROCEDURE — 1159F PR MEDICATION LIST DOCUMENTED IN MEDICAL RECORD: ICD-10-PCS | Mod: ,,, | Performed by: FAMILY MEDICINE

## 2020-03-19 PROCEDURE — 1101F PT FALLS ASSESS-DOCD LE1/YR: CPT | Mod: ,,, | Performed by: FAMILY MEDICINE

## 2020-03-19 PROCEDURE — 1159F MED LIST DOCD IN RCRD: CPT | Mod: ,,, | Performed by: FAMILY MEDICINE

## 2020-03-19 NOTE — Clinical Note
"Thank you for referring Ms. Stuart to Interventional Radiology at the [facility]. Please don't hesitate to contact us if there are any questions regarding this evaluation at [phone]. If you have any other patients for whom you would like a consultation, please place an order for "Amb consult to [facility] Interventional Rad", and we will be happy to review their case.Sincerely,NADIA Oscar, FNPInterventional Radiology"

## 2020-03-19 NOTE — PROGRESS NOTES
"Subjective:       Patient ID: Indigo Stuart is a 64 y.o. female.    Chief Complaint: Cancer    Patient referred to Interventional Radiology by Dr. Pettit for evaluation of liver lesions. 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 She is in care with oncology and her last chemo treatment was in February 2020. A CT scan on 2/21/2020 noted: "Hepatic metastatic disease, with mixed response manifest as some masses increasing in size and others decreasing in size." She reports feeling well. She has some complaints of numbness, nausea, and weakness after he chemo, but seem to be improving with time. Her son participates in her video visit today.     Review of Systems   Constitutional: Positive for activity change and appetite change. Negative for chills, fatigue and fever.   Gastrointestinal: Positive for nausea (alleviated with medication) and vomiting. Negative for abdominal distention and abdominal pain.   Musculoskeletal: Positive for gait problem (use walker).   Neurological: Positive for weakness and numbness (believes due to chemo).       Objective:      Physical Exam   Constitutional: She is oriented to person, place, and time. She appears well-developed and well-nourished. No distress.   HENT:   Head: Normocephalic.   Pulmonary/Chest: Effort normal. No respiratory distress.   Neurological: She is alert and oriented to person, place, and time.   Skin: She is not diaphoretic.   Psychiatric: She has a normal mood and affect. Her behavior is normal. Judgment and thought content normal.       Imaging:             Assessment:       1. Hepatic metastases    2. Duodenal cancer        Plan:         The patient location is: [place], [place]  The chief complaint leading to consultation is: duodenal cancer mets to liver  Visit type: Virtual visit with synchronous audio and video  Total time spent with patient: 35 min  Each patient to whom he or she provides medical services by telemedicine is:  (1) " informed of the relationship between the physician and patient and the respective role of any other health care provider with respect to management of the patient; and (2) notified that he or she may decline to receive medical services by telemedicine and may withdraw from such care at any time.    Notes: Discussed case with Dr. Del Rosario. Explained to patient recommendation is to treat with radioembolization. Yttrium 90 Radioembolization discussed in detail with the patient including risks (including, but not limited to, pain, bleeding, infection, damage to nearby structures, and the need for additional procedures), benefits, potential complications, usual pre and post procedure course.  Discussed the need for initial Angiogram mapping study prior to scheduling the actual Radioembolization procedure. Utilized illustrations to help explain procedure. The patient voices understanding and all questions have been answered.  The patient agrees to proceed as planned. Patient scheduled for 3/31/2020. Advised to stop Eliquis for 2 days prior to procedure. Clinic phone number provided.

## 2020-03-20 ENCOUNTER — TELEPHONE (OUTPATIENT)
Dept: HEMATOLOGY/ONCOLOGY | Facility: CLINIC | Age: 65
End: 2020-03-20

## 2020-03-20 ENCOUNTER — LAB VISIT (OUTPATIENT)
Dept: LAB | Facility: HOSPITAL | Age: 65
End: 2020-03-20
Attending: INTERNAL MEDICINE
Payer: MEDICARE

## 2020-03-20 DIAGNOSIS — C78.00 MALIGNANT NEOPLASM METASTATIC TO LUNG, UNSPECIFIED LATERALITY: ICD-10-CM

## 2020-03-20 DIAGNOSIS — C17.0 DUODENAL CANCER: ICD-10-CM

## 2020-03-20 LAB
ALBUMIN SERPL BCP-MCNC: 2.5 G/DL (ref 3.5–5.2)
ALP SERPL-CCNC: 370 U/L (ref 55–135)
ALT SERPL W/O P-5'-P-CCNC: 37 U/L (ref 10–44)
ANION GAP SERPL CALC-SCNC: 12 MMOL/L (ref 8–16)
AST SERPL-CCNC: 73 U/L (ref 10–40)
BASOPHILS # BLD AUTO: 0.05 K/UL (ref 0–0.2)
BASOPHILS NFR BLD: 0.4 % (ref 0–1.9)
BILIRUB SERPL-MCNC: 0.5 MG/DL (ref 0.1–1)
BUN SERPL-MCNC: 8 MG/DL (ref 8–23)
CALCIUM SERPL-MCNC: 9.9 MG/DL (ref 8.7–10.5)
CHLORIDE SERPL-SCNC: 103 MMOL/L (ref 95–110)
CO2 SERPL-SCNC: 25 MMOL/L (ref 23–29)
CREAT SERPL-MCNC: 0.7 MG/DL (ref 0.5–1.4)
DIFFERENTIAL METHOD: ABNORMAL
EOSINOPHIL # BLD AUTO: 0.2 K/UL (ref 0–0.5)
EOSINOPHIL NFR BLD: 1.4 % (ref 0–8)
ERYTHROCYTE [DISTWIDTH] IN BLOOD BY AUTOMATED COUNT: 16.6 % (ref 11.5–14.5)
EST. GFR  (AFRICAN AMERICAN): >60 ML/MIN/1.73 M^2
EST. GFR  (NON AFRICAN AMERICAN): >60 ML/MIN/1.73 M^2
GLUCOSE SERPL-MCNC: 93 MG/DL (ref 70–110)
HCT VFR BLD AUTO: 31.7 % (ref 37–48.5)
HGB BLD-MCNC: 9.2 G/DL (ref 12–16)
IMM GRANULOCYTES # BLD AUTO: 0.08 K/UL (ref 0–0.04)
IMM GRANULOCYTES NFR BLD AUTO: 0.7 % (ref 0–0.5)
LYMPHOCYTES # BLD AUTO: 1.4 K/UL (ref 1–4.8)
LYMPHOCYTES NFR BLD: 11.5 % (ref 18–48)
MCH RBC QN AUTO: 28.4 PG (ref 27–31)
MCHC RBC AUTO-ENTMCNC: 29 G/DL (ref 32–36)
MCV RBC AUTO: 98 FL (ref 82–98)
MONOCYTES # BLD AUTO: 0.9 K/UL (ref 0.3–1)
MONOCYTES NFR BLD: 7.8 % (ref 4–15)
NEUTROPHILS # BLD AUTO: 9.3 K/UL (ref 1.8–7.7)
NEUTROPHILS NFR BLD: 78.2 % (ref 38–73)
NRBC BLD-RTO: 0 /100 WBC
PLATELET # BLD AUTO: 516 K/UL (ref 150–350)
PMV BLD AUTO: 8.8 FL (ref 9.2–12.9)
POTASSIUM SERPL-SCNC: 4.4 MMOL/L (ref 3.5–5.1)
PROT SERPL-MCNC: 7.3 G/DL (ref 6–8.4)
RBC # BLD AUTO: 3.24 M/UL (ref 4–5.4)
SODIUM SERPL-SCNC: 140 MMOL/L (ref 136–145)
WBC # BLD AUTO: 11.93 K/UL (ref 3.9–12.7)

## 2020-03-20 PROCEDURE — 80053 COMPREHEN METABOLIC PANEL: CPT

## 2020-03-20 PROCEDURE — 85025 COMPLETE CBC W/AUTO DIFF WBC: CPT

## 2020-03-20 PROCEDURE — 36415 COLL VENOUS BLD VENIPUNCTURE: CPT

## 2020-03-20 NOTE — TELEPHONE ENCOUNTER
Spoke to patient and informed her that we are currently limiting the number of people coming into the [facility] for everyone's safety.  Set up virtual visit through my chart for Monday at 11..  Patient verbalized understanding.  No further questions at this time.

## 2020-03-23 ENCOUNTER — PATIENT MESSAGE (OUTPATIENT)
Dept: HEMATOLOGY/ONCOLOGY | Facility: CLINIC | Age: 65
End: 2020-03-23

## 2020-03-23 ENCOUNTER — TELEPHONE (OUTPATIENT)
Dept: PHARMACY | Facility: CLINIC | Age: 65
End: 2020-03-23

## 2020-03-23 ENCOUNTER — OFFICE VISIT (OUTPATIENT)
Dept: HEMATOLOGY/ONCOLOGY | Facility: CLINIC | Age: 65
End: 2020-03-23
Payer: MEDICARE

## 2020-03-23 DIAGNOSIS — C17.0 DUODENAL CANCER: ICD-10-CM

## 2020-03-23 DIAGNOSIS — C78.00 MALIGNANT NEOPLASM METASTATIC TO LUNG, UNSPECIFIED LATERALITY: ICD-10-CM

## 2020-03-23 DIAGNOSIS — I82.629 ACUTE VENOUS EMBOLISM AND THROMBOSIS OF DEEP VEINS OF UPPER EXTREMITY, UNSPECIFIED LATERALITY: ICD-10-CM

## 2020-03-23 DIAGNOSIS — R64 MALIGNANT CACHEXIA: Primary | ICD-10-CM

## 2020-03-23 DIAGNOSIS — C18.8 OVERLAPPING MALIGNANT NEOPLASM OF COLON: ICD-10-CM

## 2020-03-23 PROCEDURE — 99215 OFFICE O/P EST HI 40 MIN: CPT | Mod: 95,,, | Performed by: INTERNAL MEDICINE

## 2020-03-23 PROCEDURE — 1159F PR MEDICATION LIST DOCUMENTED IN MEDICAL RECORD: ICD-10-PCS | Mod: ,,, | Performed by: INTERNAL MEDICINE

## 2020-03-23 PROCEDURE — 1159F MED LIST DOCD IN RCRD: CPT | Mod: ,,, | Performed by: INTERNAL MEDICINE

## 2020-03-23 PROCEDURE — 3288F PR FALLS RISK ASSESSMENT DOCUMENTED: ICD-10-PCS | Mod: ,,, | Performed by: INTERNAL MEDICINE

## 2020-03-23 PROCEDURE — 1101F PT FALLS ASSESS-DOCD LE1/YR: CPT | Mod: ,,, | Performed by: INTERNAL MEDICINE

## 2020-03-23 PROCEDURE — 99215 PR OFFICE/OUTPT VISIT, EST, LEVL V, 40-54 MIN: ICD-10-PCS | Mod: 95,,, | Performed by: INTERNAL MEDICINE

## 2020-03-23 PROCEDURE — 3288F FALL RISK ASSESSMENT DOCD: CPT | Mod: ,,, | Performed by: INTERNAL MEDICINE

## 2020-03-23 PROCEDURE — 1101F PR PT FALLS ASSESS DOC 0-1 FALLS W/OUT INJ PAST YR: ICD-10-PCS | Mod: ,,, | Performed by: INTERNAL MEDICINE

## 2020-03-23 RX ORDER — DRONABINOL 5 MG/1
5 CAPSULE ORAL
Qty: 60 CAPSULE | Refills: 2 | Status: SHIPPED | OUTPATIENT
Start: 2020-03-23

## 2020-03-23 NOTE — TELEPHONE ENCOUNTER
Informed Patient  that [facility] received prescription for Stivarga and prior authorization is required.  [facility] will be back in touch once insurance determination is received.

## 2020-03-23 NOTE — PROGRESS NOTES
The patient location is:  home  The chief complaint leading to consultation is:  I am weak  Visit type: Virtual visit with synchronous audio and video  Total time spent with patient:  40 min  Each patient to whom he or she provides medical services by telemedicine is:  (1) informed of the relationship between the physician and patient and the respective role of any other health care provider with respect to management of the patient; and (2) notified that he or she may decline to receive medical services by telemedicine and may withdraw from such care at any time.    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  - Clinical: Stage IV (cTX, cNX, cM1) - Signed by Karina Pettit MD on 3/11/2019       Chemotherapy     Treatment Summary   Plan Name: OP COLORECTAL FOLFOX + BEVACIZUMAB Q2W  Treatment Goal: Maintenance  Status: Active  Start Date: 1/21/2019  End Date: 7/3/2019 (Planned)  Provider: Karina Pettit MD  Chemotherapy: fluorouracil injection 835 mg, 400 mg/m2 = 835 mg, Intravenous, Clinic/[facility] 1 time, 4 of 12 cycles    fluorouracil (ADRUCIL) 2,400 mg/m2 = 5,015 mg in sodium chloride 0.9% 200.3 mL chemo infusion, 2,400 mg/m2 = 5,015 mg, Intravenous, Over 46 hours, 4 of 12 cycles    bevacizumab (AVASTIN) 5 mg/kg = 500 mg in sodium chloride 0.9% 100 mL chemo infusion, 5 mg/kg = 500 mg, Intravenous, Clinic/[facility] 1 time, 4 of 12 cycles    leucovorin calcium 400 mg/m2 = 835 mg in dextrose 5 % 250 mL infusion, 400 mg/m2 = 835 mg, Intravenous, Clinic/[facility] 1 time, 4 of 12 cycles    oxaliplatin (ELOXATIN) 85 mg/m2 = 178 mg in dextrose 5 % 500 mL chemo infusion, 85 mg/m2 = 178 mg, Intravenous, Clinic/[facility] 1 time, 4 of 12 cycles          4/8/2019 - 4/8/2019 Chemotherapy     Treatment Summary   Plan  Name: OP COLORECTAL FOLFIRI + BEVACIZUMAB Q2W  Treatment Goal: Control  Status: Inactive  Start Date: [No treatment day found]  End Date: [No treatment day found]  Provider: Karina Pettit MD  Chemotherapy: fluorouracil injection 835 mg, 400 mg/m2 = 835 mg, Intravenous, Clinic/[facility] 1 time, 0 of 12 cycles  fluorouracil (ADRUCIL) 2,400 mg/m2 = 5,015 mg in sodium chloride 0.9% 200.3 mL chemo infusion, 2,400 mg/m2 = 5,015 mg, Intravenous, Over 46 hours, 0 of 12 cycles  bevacizumab (AVASTIN) 5 mg/kg = 500 mg in sodium chloride 0.9% 100 mL chemo infusion, 5 mg/kg = 500 mg, Intravenous, Clinic/[facility] 1 time, 0 of 12 cycles  irinotecan (CAMPTOSAR) 180 mg/m2 = 376 mg in sodium chloride 0.9% 500 mL chemo infusion, 180 mg/m2 = 376 mg, Intravenous, Clinic/[facility] 1 time, 0 of 12 cycles  leucovorin calcium 400 mg/m2 = 835 mg in dextrose 5 % 250 mL infusion, 400 mg/m2 = 835 mg, Intravenous, Clinic/[facility] 1 time, 0 of 12 cycles      12/22/2019 -  Chemotherapy     Treatment Summary   Plan Name: OP PANC NAB-PACLITAXEL + GEMCITABINE Day 1, 8 , 15 every 28 days  Treatment Goal: Maintenance  Status: Active  Start Date: 12/31/2019  End Date: 4/14/2020 (Planned)  Provider: Karina Pettit MD  Chemotherapy: PACLitaxel-protein bound (ABRAXANE) 100 mg/m2 = 200 mg in 40 mL infusion, 100 mg/m2 = 200 mg (100 % of original dose 100 mg/m2), Intravenous, Clinic/HOD 1 time, 2 of 4 cycles  Dose modification: 100 mg/m2 (original dose 100 mg/m2, Cycle 1), 125 mg/m2 (original dose 100 mg/m2, Cycle 1)  Administration: 200 mg (12/31/2019), 200 mg (1/7/2020), 250 mg (1/14/2020), 250 mg (2/4/2020), 250 mg (2/11/2020)  gemcitabine 790 mg in sodium chloride 0.9% 250 mL chemo infusion, 400 mg/m2 = 790 mg (100 % of original dose 400 mg/m2), Intravenous, Clinic/[facility] 1 time, 2 of 4 cycles  Dose modification: 400 mg/m2 (original dose 400 mg/m2, Cycle 1), 800 mg/m2 (original dose 400 mg/m2, Cycle 1), 400 mg/m2 (original dose 400 mg/m2, Cycle 2)  Administration: 790 mg  (12/31/2019), 790 mg (1/7/2020), 1,575 mg (1/14/2020), 790 mg (2/4/2020), 790 mg (2/11/2020)        Lung metastases    1/15/2019 Initial Diagnosis     Lung metastases      4/8/2019 - 4/8/2019 Chemotherapy     Treatment Summary   Plan Name: OP COLORECTAL FOLFIRI + BEVACIZUMAB Q2W  Treatment Goal: Control  Status: Inactive  Start Date: [No treatment day found]  End Date: [No treatment day found]  Provider: Karina Pettit MD  Chemotherapy: fluorouracil injection 835 mg, 400 mg/m2 = 835 mg, Intravenous, Clinic/[facility] 1 time, 0 of 12 cycles  fluorouracil (ADRUCIL) 2,400 mg/m2 = 5,015 mg in sodium chloride 0.9% 200.3 mL chemo infusion, 2,400 mg/m2 = 5,015 mg, Intravenous, Over 46 hours, 0 of 12 cycles  bevacizumab (AVASTIN) 5 mg/kg = 500 mg in sodium chloride 0.9% 100 mL chemo infusion, 5 mg/kg = 500 mg, Intravenous, Clinic/[facility] 1 time, 0 of 12 cycles  irinotecan (CAMPTOSAR) 180 mg/m2 = 376 mg in sodium chloride 0.9% 500 mL chemo infusion, 180 mg/m2 = 376 mg, Intravenous, Clinic/[facility] 1 time, 0 of 12 cycles  leucovorin calcium 400 mg/m2 = 835 mg in dextrose 5 % 250 mL infusion, 400 mg/m2 = 835 mg, Intravenous, Clinic/[facility] 1 time, 0 of 12 cycles      4/8/2019 - 12/23/2019 Chemotherapy     Treatment Summary   Plan Name: OP COLORECTAL FOLFIRI + BEVACIZUMAB Q2W  Treatment Goal: Control  Status: Inactive  Start Date: 4/8/2019  End Date: 12/3/2019  Provider: Karina Pettit MD  Chemotherapy: fluorouracil injection 835 mg, 400 mg/m2 = 835 mg, Intravenous, Clinic/[facility] 1 time, 12 of 12 cycles  Administration: 835 mg (8/6/2019), 835 mg (8/27/2019), 835 mg (8/27/2019), 835 mg (9/10/2019), 835 mg (9/24/2019)  fluorouracil (ADRUCIL) 2,400 mg/m2 = 5,015 mg in sodium chloride 0.9% 200.3 mL chemo infusion, 2,400 mg/m2 = 5,015 mg, Intravenous, Over 46 hours, 12 of 12 cycles  Administration: 5,015 mg (8/6/2019), 5,015 mg (8/27/2019), 5,015 mg (9/10/2019), 5,015 mg (9/24/2019)  bevacizumab (AVASTIN) 5 mg/kg = 500 mg in sodium chloride 0.9% 100 mL  chemo infusion, 5 mg/kg = 500 mg, Intravenous, Clinic/H[facility] 1 time, 14 of 14 cycles  Dose modification: 7.5 mg/kg (original dose 5 mg/kg, Cycle 14)  Administration: 500 mg (8/6/2019), 500 mg (8/27/2019), 500 mg (9/10/2019), 500 mg (9/24/2019), 500 mg (11/12/2019), 750 mg (12/3/2019)  irinotecan (CAMPTOSAR) 180 mg/m2 = 376 mg in sodium chloride 0.9% 500 mL chemo infusion, 180 mg/m2 = 376 mg, Intravenous, Clinic/[facility] 1 time, 14 of 14 cycles  Dose modification: 200 mg/m2 (original dose 180 mg/m2, Cycle 14)  Administration: 376 mg (8/6/2019), 376 mg (8/27/2019), 376 mg (9/10/2019), 376 mg (9/24/2019), 376 mg (11/12/2019), 418 mg (12/3/2019)  leucovorin calcium 400 mg/m2 = 835 mg in dextrose 5 % 250 mL infusion, 400 mg/m2 = 835 mg, Intravenous, Clinic/HOD 1 time, 12 of 12 cycles  Administration: 835 mg (8/6/2019), 835 mg (8/27/2019), 835 mg (9/10/2019), 835 mg (9/24/2019)      12/22/2019 -  Chemotherapy     Treatment Summary   Plan Name: OP PANC NAB-PACLITAXEL + GEMCITABINE Day 1, 8 , 15 every 28 days  Treatment Goal: Maintenance  Status: Active  Start Date: 12/31/2019  End Date: 4/14/2020 (Planned)  Provider: Karina Pettit MD  Chemotherapy: PACLitaxel-protein bound (ABRAXANE) 100 mg/m2 = 200 mg in 40 mL infusion, 100 mg/m2 = 200 mg (100 % of original dose 100 mg/m2), Intravenous, Clinic/HOD 1 time, 2 of 4 cycles  Dose modification: 100 mg/m2 (original dose 100 mg/m2, Cycle 1), 125 mg/m2 (original dose 100 mg/m2, Cycle 1)  Administration: 200 mg (12/31/2019), 200 mg (1/7/2020), 250 mg (1/14/2020), 250 mg (2/4/2020), 250 mg (2/11/2020)  gemcitabine 790 mg in sodium chloride 0.9% 250 mL chemo infusion, 400 mg/m2 = 790 mg (100 % of original dose 400 mg/m2), Intravenous, Clinic/[facility] 1 time, 2 of 4 cycles  Dose modification: 400 mg/m2 (original dose 400 mg/m2, Cycle 1), 800 mg/m2 (original dose 400 mg/m2, Cycle 1), 400 mg/m2 (original dose 400 mg/m2, Cycle 2)  Administration: 790 mg (12/31/2019), 790 mg (1/7/2020), 1,575 mg  (1/14/2020), 790 mg (2/4/2020), 790 mg (2/11/2020)        Hepatic metastases    1/15/2019 Initial Diagnosis     Hepatic metastases      4/8/2019 - 4/8/2019 Chemotherapy     Treatment Summary   Plan Name: OP COLORECTAL FOLFIRI + BEVACIZUMAB Q2W  Treatment Goal: Control  Status: Inactive  Start Date: [No treatment day found]  End Date: [No treatment day found]  Provider: Karina Pettit MD  Chemotherapy: fluorouracil injection 835 mg, 400 mg/m2 = 835 mg, Intravenous, Clinic/HOD 1 time, 0 of 12 cycles  fluorouracil (ADRUCIL) 2,400 mg/m2 = 5,015 mg in sodium chloride 0.9% 200.3 mL chemo infusion, 2,400 mg/m2 = 5,015 mg, Intravenous, Over 46 hours, 0 of 12 cycles  bevacizumab (AVASTIN) 5 mg/kg = 500 mg in sodium chloride 0.9% 100 mL chemo infusion, 5 mg/kg = 500 mg, Intravenous, Clinic/HOD 1 time, 0 of 12 cycles  irinotecan (CAMPTOSAR) 180 mg/m2 = 376 mg in sodium chloride 0.9% 500 mL chemo infusion, 180 mg/m2 = 376 mg, Intravenous, Clinic/HOD 1 time, 0 of 12 cycles  leucovorin calcium 400 mg/m2 = 835 mg in dextrose 5 % 250 mL infusion, 400 mg/m2 = 835 mg, Intravenous, Clinic/HOD 1 time, 0 of 12 cycles      4/8/2019 - 12/23/2019 Chemotherapy     Treatment Summary   Plan Name: OP COLORECTAL FOLFIRI + BEVACIZUMAB Q2W  Treatment Goal: Control  Status: Inactive  Start Date: 4/8/2019  End Date: 12/3/2019  Provider: Karina Pettit MD  Chemotherapy: fluorouracil injection 835 mg, 400 mg/m2 = 835 mg, Intravenous, Clinic/HOD 1 time, 12 of 12 cycles  Administration: 835 mg (8/6/2019), 835 mg (8/27/2019), 835 mg (8/27/2019), 835 mg (9/10/2019), 835 mg (9/24/2019)  fluorouracil (ADRUCIL) 2,400 mg/m2 = 5,015 mg in sodium chloride 0.9% 200.3 mL chemo infusion, 2,400 mg/m2 = 5,015 mg, Intravenous, Over 46 hours, 12 of 12 cycles  Administration: 5,015 mg (8/6/2019), 5,015 mg (8/27/2019), 5,015 mg (9/10/2019), 5,015 mg (9/24/2019)  bevacizumab (AVASTIN) 5 mg/kg = 500 mg in sodium chloride 0.9% 100 mL chemo infusion, 5 mg/kg = 500 mg,  Intravenous, Clinic/HOD 1 time, 14 of 14 cycles  Dose modification: 7.5 mg/kg (original dose 5 mg/kg, Cycle 14)  Administration: 500 mg (8/6/2019), 500 mg (8/27/2019), 500 mg (9/10/2019), 500 mg (9/24/2019), 500 mg (11/12/2019), 750 mg (12/3/2019)  irinotecan (CAMPTOSAR) 180 mg/m2 = 376 mg in sodium chloride 0.9% 500 mL chemo infusion, 180 mg/m2 = 376 mg, Intravenous, Clinic/HOD 1 time, 14 of 14 cycles  Dose modification: 200 mg/m2 (original dose 180 mg/m2, Cycle 14)  Administration: 376 mg (8/6/2019), 376 mg (8/27/2019), 376 mg (9/10/2019), 376 mg (9/24/2019), 376 mg (11/12/2019), 418 mg (12/3/2019)  leucovorin calcium 400 mg/m2 = 835 mg in dextrose 5 % 250 mL infusion, 400 mg/m2 = 835 mg, Intravenous, Clinic/HOD 1 time, 12 of 12 cycles  Administration: 835 mg (8/6/2019), 835 mg (8/27/2019), 835 mg (9/10/2019), 835 mg (9/24/2019)      12/22/2019 -  Chemotherapy     Treatment Summary   Plan Name: OP PANC NAB-PACLITAXEL + GEMCITABINE Day 1, 8 , 15 every 28 days  Treatment Goal: Maintenance  Status: Active  Start Date: 12/31/2019  End Date: 4/14/2020 (Planned)  Provider: Karina Pettit MD  Chemotherapy: PACLitaxel-protein bound (ABRAXANE) 100 mg/m2 = 200 mg in 40 mL infusion, 100 mg/m2 = 200 mg (100 % of original dose 100 mg/m2), Intravenous, Clinic/HOD 1 time, 2 of 4 cycles  Dose modification: 100 mg/m2 (original dose 100 mg/m2, Cycle 1), 125 mg/m2 (original dose 100 mg/m2, Cycle 1)  Administration: 200 mg (12/31/2019), 200 mg (1/7/2020), 250 mg (1/14/2020), 250 mg (2/4/2020), 250 mg (2/11/2020)  gemcitabine 790 mg in sodium chloride 0.9% 250 mL chemo infusion, 400 mg/m2 = 790 mg (100 % of original dose 400 mg/m2), Intravenous, Clinic/[facility] 1 time, 2 of 4 cycles  Dose modification: 400 mg/m2 (original dose 400 mg/m2, Cycle 1), 800 mg/m2 (original dose 400 mg/m2, Cycle 1), 400 mg/m2 (original dose 400 mg/m2, Cycle 2)  Administration: 790 mg (12/31/2019), 790 mg (1/7/2020), 1,575 mg (1/14/2020), 790 mg (2/4/2020), 790  mg (2/11/2020)           Indigo Stuart is a 64 y.o.  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  Kras was MUTATED No MSI : EGFR no overexpression    Tolerating lopressor for htn and lipitor for dyslipidemia  Tolerating zofran for nausea prn chemo     Cycle one folfox avastin January 15 2019   Failed regimen and admitted with SBO and found to have duodenal cancer   Hepatic mets progressed hence changed to irinotecan dropped  oxaliplatin   Cycle one folfiri avastin April 2019     Pt was seen for left arm swelling and ultrasound revealed a DVT in her arm , post lovenox , now on eliquis   Scan reviewed again  Here to resume Xeliri which was started Nov 8 2019 December 13 2019   DVT left internal jugular vein noted October 8 19 patient started on Eliquis  Here for evaluation of her imaging studies and scans  PET-CT with increasing SUV and increasing activity the cancer no evidence of new disease  Ultrasound revealed resolution of the blood clots in her left upper extremity    December 23 2019   PET CT mixed response explained again   cea now has normalized     January 7 2020  For chemo clearance   New rash to trunk and axilla     January 13 2020  For cycle 1 day 15  Rash better but remains   CEA has increased again       February 3 2020  For cycle 2 day 1 clearance after being delayed for one week with cytopenias   + leg pain new  Is on 1/2 dose eliquis after having nosebleeds and thrombocytopenia  Both of these resolved     2-  Here for cycle 2 day 8   Pt with weakness and fatigue, decreased apprtite and chills no fever     2-  Here for cycle 2 day 15   Leg pain and weakness to where she is in a wheelchair on this new chemo   cea rising , on eliquis but platelets ok     February 24, 2020 here for clearance cycle 2 day 15 Gemzar Abraxane  Patient has a fever in office today of 102.5, she is tachycardic with a heart  rate of 110.  She states she has not had any hot beverages and she did lot just eat lunch.  She is not feeling well somewhat diaphoretic    March 2 2020 post hospitalization for UTI and febrile neutropenia   Today patient is weak and pale she is to walker to walk in clinic and she states she cannot leave clinic had a walker she needs a wheelchair  No change in PMH, PFH, PSH since last OV     March 23 2020  No further epistaxis, she remains on Eliquis   Has seen IR for hepatic embolization      Current Outpatient Medications:     amLODIPine (NORVASC) 5 MG tablet, Take 1 tablet (5 mg total) by mouth once daily., Disp: 90 tablet, Rfl: 3    atorvastatin (LIPITOR) 20 MG tablet, Take 1 tablet (20 mg total) by mouth every evening., Disp: 90 tablet, Rfl: 3    ELIQUIS 5 mg Tab, TAKE 1 TABLET BY MOUTH TWICE A DAY (Patient taking differently: Take by mouth 2 (two) times daily. ), Disp: 60 tablet, Rfl: 2    ferrous sulfate (FEOSOL) 325 mg (65 mg iron) Tab tablet, Take 1 tablet by mouth 2 (two) times daily., Disp: , Rfl: 3    metoprolol succinate (TOPROL-XL) 25 MG 24 hr tablet, Take 1 tablet (25 mg total) by mouth every evening., Disp: 90 tablet, Rfl: 3    ondansetron (ZOFRAN-ODT) 8 MG TbDL, Take 1 tablet (8 mg total) by mouth every 12 (twelve) hours as needed., Disp: 30 tablet, Rfl: 1    potassium chloride SA (K-DUR,KLOR-CON) 20 MEQ tablet, Take 1 tablet (20 mEq total) by mouth once daily., Disp: 30 tablet, Rfl: 11    pyridoxine, vitamin B6, (VITAMIN B-6) 50 MG Tab, Take 100 mg by mouth once daily. , Disp: , Rfl:     senna-docusate 8.6-50 mg (SENOKOT-S) 8.6-50 mg per tablet, Take 2 tablets by mouth nightly as needed for Constipation., Disp: , Rfl:     thiamine (VITAMIN B-1) 250 MG tablet, Take 250 mg by mouth once daily., Disp: , Rfl:     Patient is tolerating Eliquis after extensive venous thrombosis.  She is tolerating Toprol-XL to help with tachycardia and hypertension.  She remains heating Zofran to help with  nausea and gastritis like symptoms  Review of Systems:  General: Weight gain: No, Weight Loss: yes   Fatigue yes   Chills: No, Night Sweats: No, Insomnia: No, Excessive sleeping: yes  Respiratory:  Cough: No, Shortness of Breath:  yes  Wheezing: No, Excessive Snoring: No, Coughing up blood: No  Endocrine: Heat Intolerance: No, Cold Intolerance: No,   Excessive Thirst: No, Excessive Hunger: No  Eyes:  Blurred Vision: No, Double Vision: No   Light Sensitivity: No, Eye pain: No  Musculoskeletal: Muscle Aches/Pain: Yes   Joint Pain/Swelling: yes     Muscle Weakness:  Yes, Neck Pain: No, Back Pain:   Neurological:  increased shortness of breath with exertion nausea short of breath at rest  Headache Migraine: No, Dizziness/Vertigo:  yes  Cardiovascular: Chest Pain: No   Gastrointestinal: Nausea/Vomiting:  Yes, Constipation: No, Diarrhea: no   Psych/Cog:  Depression: No, Anxiety: No, Hallucinations: No   : Frequent Urination: No, Incontinence: No, Blood of Urine: No, Urinary Infections: No  ENT:Hearing Loss: No, Earache: No, Ringing in Ears: No  Immune: Seasonal Allergies: No, Hives and/or Rashes: No  The remainder of the review of twelve body systems was reviewed and normal  + epistaxis and weakness        General patient is in no distress w appears ill and gray in appearance today  Psych flat affect no evidence of depression no evidence of anxiety  Head normocephalic atraumatic, no hoarseness,  well-spoken speech intact  Eyes noninjected sclera conjunctiva appear pink  Face is symmetric  Skin intact no lesions noted no ecchymosis no rash  Neck with no limited range of motion no JVD evident  Chest with no use of accessory muscles no labored breathing no evidence of tachypnea  No respiratory distress, effort normal   Abdomen appears to be nondistended  Extremities with no cyanosis no evidence of edema  Neuro muscular cranial nerves appear grossly intact  Gait appears steady  No joint effusions  Behavior normal judgment  normal      Lab Results   Component Value Date    WBC 11.93 03/20/2020    RBC 3.24 (L) 03/20/2020    HGB 9.2 (L) 03/20/2020    HCT 31.7 (L) 03/20/2020    MCV 98 03/20/2020    MCH 28.4 03/20/2020    MCHC 29.0 (L) 03/20/2020    RDW 16.6 (H) 03/20/2020     (H) 03/20/2020    MPV 8.8 (L) 03/20/2020    GRAN 9.3 (H) 03/20/2020    GRAN 78.2 (H) 03/20/2020    LYMPH 1.4 03/20/2020    LYMPH 11.5 (L) 03/20/2020    MONO 0.9 03/20/2020    MONO 7.8 03/20/2020    EOS 0.2 03/20/2020    BASO 0.05 03/20/2020    EOSINOPHIL 1.4 03/20/2020    BASOPHIL 0.4 03/20/2020         CMP  Sodium   Date Value Ref Range Status   03/20/2020 140 136 - 145 mmol/L Final     Potassium   Date Value Ref Range Status   03/20/2020 4.4 3.5 - 5.1 mmol/L Final     Chloride   Date Value Ref Range Status   03/20/2020 103 95 - 110 mmol/L Final     CO2   Date Value Ref Range Status   03/20/2020 25 23 - 29 mmol/L Final     Glucose   Date Value Ref Range Status   03/20/2020 93 70 - 110 mg/dL Final     BUN, Bld   Date Value Ref Range Status   03/20/2020 8 8 - 23 mg/dL Final     Creatinine   Date Value Ref Range Status   03/20/2020 0.7 0.5 - 1.4 mg/dL Final     Calcium   Date Value Ref Range Status   03/20/2020 9.9 8.7 - 10.5 mg/dL Final     Total Protein   Date Value Ref Range Status   03/20/2020 7.3 6.0 - 8.4 g/dL Final     Albumin   Date Value Ref Range Status   03/20/2020 2.5 (L) 3.5 - 5.2 g/dL Final     Total Bilirubin   Date Value Ref Range Status   03/20/2020 0.5 0.1 - 1.0 mg/dL Final     Comment:     For infants and newborns, interpretation of results should be based  on gestational age, weight and in agreement with clinical  observations.  Premature Infant recommended reference ranges:  Up to 24 hours.............<8.0 mg/dL  Up to 48 hours............<12.0 mg/dL  3-5 days..................<15.0 mg/dL  6-29 days.................<15.0 mg/dL       Alkaline Phosphatase   Date Value Ref Range Status   03/20/2020 370 (H) 55 - 135 U/L Final     AST   Date  Value Ref Range Status   03/20/2020 73 (H) 10 - 40 U/L Final     ALT   Date Value Ref Range Status   03/20/2020 37 10 - 44 U/L Final     Anion Gap   Date Value Ref Range Status   03/20/2020 12 8 - 16 mmol/L Final     eGFR if    Date Value Ref Range Status   03/20/2020 >60 >60 mL/min/1.73 m^2 Final     eGFR if non    Date Value Ref Range Status   03/20/2020 >60 >60 mL/min/1.73 m^2 Final     Comment:     Calculation used to obtain the estimated glomerular filtration  rate (eGFR) is the CKD-EPI equation.      Impression:           - Normal esophagus.                        - Normal stomach.                        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 Prosthesis placed. The                         proximal end of the stent is distal to the ampulla                         (refer to images).  No msi   There are no osseous abnormalities.      Impression PET CT December 2019       Continued improvement of the hepatic metastasis with decrease in size of the multifocal hepatic lesions    Stable subcentimeter mesenteric lymph nodes    No evidence of new metastatic disease      Electronically signed by: Charissa Marroquin MD  Date: 12/11/2019  Time: 11:03     cea increasing        Malignant cachexia  -     dronabinoL (MARINOL) 5 MG capsule; Take 1 capsule (5 mg total) by mouth 2 (two) times daily before meals. To increase appetite  Dispense: 60 capsule; Refill: 2    Acute venous embolism and thrombosis of deep veins of upper extremity, unspecified laterality    Duodenal cancer  -     regorafenib (STIVARGA) 40 mg Tab; Take 160 mg by mouth once daily For 21 days then hold for 7 days then resume.  Dispense: 84 tablet; Refill: 2    Overlapping malignant neoplasm of colon  -     regorafenib (STIVARGA) 40 mg Tab; Take 160 mg by mouth once daily For 21 days then hold for 7 days then resume.  Dispense: 84 tablet; Refill: 2    Malignant neoplasm  metastatic to lung, unspecified laterality  -     regorafenib (STIVARGA) 40 mg Tab; Take 160 mg by mouth once daily For 21 days then hold for 7 days then resume.  Dispense: 84 tablet; Refill: 2      1.  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  Recent imaging reveals mixed response to chemotherapy with approximately 2 lesions in the liver increasing in size planning in place for embolization  2.  Transaminitis stable monitoring closely   3.  Duodenal cancer with chemo post cycle 2 day 8 Abraxane Gemzar and colon cancer will need to resume some type of chemotherapy will ask her insurance company to approve Stivarga  4.  Thrombosis resolved on eliquis  left upper extremity   continue anticoagulation for such  3.  Weakness on chemo : cannot tolerate abraxane and gemzar progression of anemia and reactive thrombocytosis   4.  Hepatic metastases.  Patient will be referred to interventional Radiology and I appreciate their input it looks like she may be a good candidate for y 90   5.  Transaminitis with stent in place  7.  Rising CEA    Patient is post FOLFOX with Avastin, she has failed FOLFIRI with Avastin  Had been receiving Xeloda plus Avastin plus irinotecan    Abraxane  And gemzar failed    Anemia will recheck iron levels patient me being need of intravenous iron replacement reactive thrombocytosis likely due to iron deficiency  Refilling Zofran  Cont norvasc for HTN monitored by Dr Arevalo   Tolerating lipitor for dyslipidemia  On statin monitoring counts closely  Return to clinic in 1-2 weeks by virtual visit reassess iron studies in the need for intravenous iron start Stivarga well undergoing localized hepatic therapy for hepatic Mets  acp documented

## 2020-03-25 ENCOUNTER — TELEPHONE (OUTPATIENT)
Dept: HEMATOLOGY/ONCOLOGY | Facility: CLINIC | Age: 65
End: 2020-03-25

## 2020-03-25 DIAGNOSIS — C78.7 HEPATIC METASTASES: ICD-10-CM

## 2020-03-25 DIAGNOSIS — C17.0 DUODENAL CANCER: Primary | ICD-10-CM

## 2020-03-25 NOTE — TELEPHONE ENCOUNTER
----- Message from Ana Luisa ReyD sent at 3/24/2020  2:34 PM CDT -----  Regarding: Stivarga Therapy  Juan Pablo reza, Dr. Pettit and Staff:    [facility] received prescription for Stivarga 160mg daily.     Results from the ReDOS trial show weekly dosing escalation of Stivarga from 80mg to 160mg/day was found to be more favorable than initiating treatment with a maximum dose of Stivarga 160mg/day.    Patients who initiated treatment with Stivarga 80mg/day and increased weekly by 40mg up to a target dose of 160mg/day showed more favorable rates of grade 3/4 toxicity when compared to those who began with the usual 160mg/day dosing.         Journal of Clinical Oncology 36, no. 4_suppl (February 01, 2018) 611-611    If appropriate, may we addend the current prescription for Stivarga to reflect the following dose escalation as presented in the above study:    Stivarga 40mg tablets - take 2 tablets (80 mg) by mouth daily for one week, then increase to 3 tablets (120 mg) by mouth daily for one week, then increase by 4 tablets (160 mg) by mouth daily for one week, followed by 7 days off.      Also, I see that Ms. Stuart's most recent QT/QTc from 2/24/2020 was 452/522 msec, whereas her values from approximately one year prior in March of 2019, were much lower QT/QTc 330/432 msec.      She is on metoprolol and the combination of Stivarga and metoprolol may lead to increased risk of bradycardia/further prolongation of QT/QTc.  Will she have an updated EKG prior to starting Stivarga to see if her QT/QTc have lowered since February?    Please advise.  Thanks.    Anastacia Mcqueen, Pharm D.   [facility]  [phone]

## 2020-03-25 NOTE — TELEPHONE ENCOUNTER
DOCUMENTATION ONLY:  Prior authorization for Stivarga 40 mg Tablet #84/28 approved from 02/23/2020 to 03/24/2023  Case ID: [id number]    Co-pay: $2,672.54    Patient Assistance IS required. Sending to the financial assistance team to investigate assistance options. - JESSICA

## 2020-03-26 NOTE — TELEPHONE ENCOUNTER
Stivarga is approved by the patient's insurance with a high copay. We will be assisting the patient in applying to the  patient assistance program. Sending a staff message to Dr. Pettit regarding assistance application and faxing application prescription for his review and signature.

## 2020-03-27 ENCOUNTER — TELEPHONE (OUTPATIENT)
Dept: INTERVENTIONAL RADIOLOGY/VASCULAR | Facility: HOSPITAL | Age: 65
End: 2020-03-27

## 2020-03-30 RX ORDER — HEPARIN SODIUM 200 [USP'U]/100ML
500 INJECTION, SOLUTION INTRAVENOUS CONTINUOUS
Status: CANCELLED | OUTPATIENT
Start: 2020-03-30

## 2020-03-30 RX ORDER — MIDAZOLAM HYDROCHLORIDE 1 MG/ML
1 INJECTION INTRAMUSCULAR; INTRAVENOUS
Status: CANCELLED | OUTPATIENT
Start: 2020-03-30

## 2020-03-30 RX ORDER — FENTANYL CITRATE 50 UG/ML
50 INJECTION, SOLUTION INTRAMUSCULAR; INTRAVENOUS
Status: CANCELLED | OUTPATIENT
Start: 2020-03-30

## 2020-03-31 ENCOUNTER — HOSPITAL ENCOUNTER (OUTPATIENT)
Facility: HOSPITAL | Age: 65
Discharge: HOME | End: 2020-03-31
Attending: RADIOLOGY | Admitting: RADIOLOGY
Payer: MEDICARE

## 2020-03-31 ENCOUNTER — HOSPITAL ENCOUNTER (OUTPATIENT)
Dept: RADIOLOGY | Facility: HOSPITAL | Age: 65
Discharge: HOME | End: 2020-03-31
Attending: RADIOLOGY | Admitting: RADIOLOGY
Payer: MEDICARE

## 2020-03-31 ENCOUNTER — HOSPITAL ENCOUNTER (OUTPATIENT)
Dept: RADIOLOGY | Facility: HOSPITAL | Age: 65
Discharge: HOME | End: 2020-03-31
Attending: FAMILY MEDICINE | Admitting: RADIOLOGY
Payer: MEDICARE

## 2020-03-31 VITALS
HEART RATE: 79 BPM | BODY MASS INDEX: 33.99 KG/M2 | RESPIRATION RATE: 16 BRPM | TEMPERATURE: 98 F | HEIGHT: 61 IN | OXYGEN SATURATION: 97 % | SYSTOLIC BLOOD PRESSURE: 105 MMHG | WEIGHT: 180 LBS | DIASTOLIC BLOOD PRESSURE: 55 MMHG

## 2020-03-31 DIAGNOSIS — C17.0 DUODENAL CANCER: ICD-10-CM

## 2020-03-31 DIAGNOSIS — C78.7 HEPATIC METASTASES: ICD-10-CM

## 2020-03-31 LAB
ALBUMIN SERPL BCP-MCNC: 2.3 G/DL (ref 3.5–5.2)
ALP SERPL-CCNC: 372 U/L (ref 55–135)
ALT SERPL W/O P-5'-P-CCNC: 33 U/L (ref 10–44)
ANION GAP SERPL CALC-SCNC: 14 MMOL/L (ref 8–16)
AST SERPL-CCNC: 96 U/L (ref 10–40)
BASOPHILS # BLD AUTO: 0.05 K/UL (ref 0–0.2)
BASOPHILS NFR BLD: 0.4 % (ref 0–1.9)
BILIRUB DIRECT SERPL-MCNC: 0.4 MG/DL (ref 0.1–0.3)
BILIRUB SERPL-MCNC: 0.6 MG/DL (ref 0.1–1)
BUN SERPL-MCNC: 7 MG/DL (ref 8–23)
CALCIUM SERPL-MCNC: 9.8 MG/DL (ref 8.7–10.5)
CHLORIDE SERPL-SCNC: 99 MMOL/L (ref 95–110)
CO2 SERPL-SCNC: 25 MMOL/L (ref 23–29)
CREAT SERPL-MCNC: 0.6 MG/DL (ref 0.5–1.4)
DIFFERENTIAL METHOD: ABNORMAL
EOSINOPHIL # BLD AUTO: 0.1 K/UL (ref 0–0.5)
EOSINOPHIL NFR BLD: 0.7 % (ref 0–8)
ERYTHROCYTE [DISTWIDTH] IN BLOOD BY AUTOMATED COUNT: 17.3 % (ref 11.5–14.5)
EST. GFR  (AFRICAN AMERICAN): >60 ML/MIN/1.73 M^2
EST. GFR  (NON AFRICAN AMERICAN): >60 ML/MIN/1.73 M^2
GLUCOSE SERPL-MCNC: 91 MG/DL (ref 70–110)
HCT VFR BLD AUTO: 34.7 % (ref 37–48.5)
HGB BLD-MCNC: 9.9 G/DL (ref 12–16)
IMM GRANULOCYTES # BLD AUTO: 0.15 K/UL (ref 0–0.04)
IMM GRANULOCYTES NFR BLD AUTO: 1.1 % (ref 0–0.5)
INR PPP: 1.2 (ref 0.8–1.2)
LYMPHOCYTES # BLD AUTO: 1.7 K/UL (ref 1–4.8)
LYMPHOCYTES NFR BLD: 12.7 % (ref 18–48)
MCH RBC QN AUTO: 27.5 PG (ref 27–31)
MCHC RBC AUTO-ENTMCNC: 28.5 G/DL (ref 32–36)
MCV RBC AUTO: 96 FL (ref 82–98)
MONOCYTES # BLD AUTO: 1 K/UL (ref 0.3–1)
MONOCYTES NFR BLD: 7.7 % (ref 4–15)
NEUTROPHILS # BLD AUTO: 10.2 K/UL (ref 1.8–7.7)
NEUTROPHILS NFR BLD: 77.4 % (ref 38–73)
NRBC BLD-RTO: 0 /100 WBC
PLATELET # BLD AUTO: 477 K/UL (ref 150–350)
PMV BLD AUTO: 9.3 FL (ref 9.2–12.9)
POTASSIUM SERPL-SCNC: 3.7 MMOL/L (ref 3.5–5.1)
PROT SERPL-MCNC: 7.8 G/DL (ref 6–8.4)
PROTHROMBIN TIME: 12.5 SEC (ref 9–12.5)
RBC # BLD AUTO: 3.6 M/UL (ref 4–5.4)
SODIUM SERPL-SCNC: 138 MMOL/L (ref 136–145)
WBC # BLD AUTO: 13.11 K/UL (ref 3.9–12.7)

## 2020-03-31 PROCEDURE — 78201 LIVER IMAGING STATIC ONLY: CPT | Mod: TC

## 2020-03-31 PROCEDURE — 78201 LIVER IMAGING STATIC ONLY: CPT | Mod: 26,,, | Performed by: RADIOLOGY

## 2020-03-31 PROCEDURE — 80053 COMPREHEN METABOLIC PANEL: CPT

## 2020-03-31 PROCEDURE — 78201 NM LIVER IMAGING STATIC PRE Y-90 EMBOLIZATION: ICD-10-PCS | Mod: 26,,, | Performed by: RADIOLOGY

## 2020-03-31 PROCEDURE — 78999 NM FUSION SPECT CT: ICD-10-PCS | Mod: 26,,, | Performed by: RADIOLOGY

## 2020-03-31 PROCEDURE — 78999 UNLISTED MISC PX DX NUC MED: CPT | Mod: 26,,, | Performed by: RADIOLOGY

## 2020-03-31 PROCEDURE — 25500020 PHARM REV CODE 255: Performed by: RADIOLOGY

## 2020-03-31 PROCEDURE — 63600175 PHARM REV CODE 636 W HCPCS: Performed by: PHYSICIAN ASSISTANT

## 2020-03-31 PROCEDURE — 82248 BILIRUBIN DIRECT: CPT

## 2020-03-31 PROCEDURE — 78999 UNLISTED MISC PX DX NUC MED: CPT | Mod: TC

## 2020-03-31 PROCEDURE — 85610 PROTHROMBIN TIME: CPT

## 2020-03-31 PROCEDURE — 25000003 PHARM REV CODE 250: Performed by: RADIOLOGY

## 2020-03-31 PROCEDURE — 63600175 PHARM REV CODE 636 W HCPCS: Performed by: RADIOLOGY

## 2020-03-31 PROCEDURE — 85025 COMPLETE CBC W/AUTO DIFF WBC: CPT

## 2020-03-31 RX ORDER — LIDOCAINE HYDROCHLORIDE 10 MG/ML
1 INJECTION, SOLUTION EPIDURAL; INFILTRATION; INTRACAUDAL; PERINEURAL ONCE AS NEEDED
Status: DISCONTINUED | OUTPATIENT
Start: 2020-03-31 | End: 2020-03-31 | Stop reason: HOSPADM

## 2020-03-31 RX ORDER — SODIUM CHLORIDE 9 MG/ML
INJECTION, SOLUTION INTRAVENOUS CONTINUOUS
Status: DISCONTINUED | OUTPATIENT
Start: 2020-03-31 | End: 2020-03-31 | Stop reason: HOSPADM

## 2020-03-31 RX ORDER — FENTANYL CITRATE 50 UG/ML
INJECTION, SOLUTION INTRAMUSCULAR; INTRAVENOUS CODE/TRAUMA/SEDATION MEDICATION
Status: COMPLETED | OUTPATIENT
Start: 2020-03-31 | End: 2020-03-31

## 2020-03-31 RX ORDER — MIDAZOLAM HYDROCHLORIDE 1 MG/ML
INJECTION INTRAMUSCULAR; INTRAVENOUS CODE/TRAUMA/SEDATION MEDICATION
Status: COMPLETED | OUTPATIENT
Start: 2020-03-31 | End: 2020-03-31

## 2020-03-31 RX ORDER — LIDOCAINE HYDROCHLORIDE 10 MG/ML
INJECTION INFILTRATION; PERINEURAL CODE/TRAUMA/SEDATION MEDICATION
Status: COMPLETED | OUTPATIENT
Start: 2020-03-31 | End: 2020-03-31

## 2020-03-31 RX ORDER — HEPARIN SODIUM 200 [USP'U]/100ML
INJECTION, SOLUTION INTRAVENOUS
Status: COMPLETED | OUTPATIENT
Start: 2020-03-31 | End: 2020-03-31

## 2020-03-31 RX ADMIN — MIDAZOLAM HYDROCHLORIDE 0.5 MG: 1 INJECTION, SOLUTION INTRAMUSCULAR; INTRAVENOUS at 08:03

## 2020-03-31 RX ADMIN — FENTANYL CITRATE 25 MCG: 50 INJECTION, SOLUTION INTRAMUSCULAR; INTRAVENOUS at 08:03

## 2020-03-31 RX ADMIN — HEPARIN SODIUM IN SODIUM CHLORIDE 250 ML: 200 INJECTION INTRAVENOUS at 08:03

## 2020-03-31 RX ADMIN — AMPICILLIN SODIUM AND SULBACTAM SODIUM 3 G: 2; 1 INJECTION, POWDER, FOR SOLUTION INTRAMUSCULAR; INTRAVENOUS at 07:03

## 2020-03-31 RX ADMIN — SODIUM CHLORIDE: 0.9 INJECTION, SOLUTION INTRAVENOUS at 07:03

## 2020-03-31 RX ADMIN — IOHEXOL 50 ML: 300 INJECTION, SOLUTION INTRAVENOUS at 09:03

## 2020-03-31 RX ADMIN — LIDOCAINE HYDROCHLORIDE 10 ML: 10 INJECTION, SOLUTION INFILTRATION; PERINEURAL at 08:03

## 2020-03-31 NOTE — TELEPHONE ENCOUNTER
FOR DOCUMENTATION ONLY:  Financial Assistance for Stivarga is approved from 3/23/20 to 12/31/20 with $0.00 copay  Source: [employer] [place] Patient Assistance Program  Phone: [phone]  Fax: [phone]  Dispensing Pharmacy: [facility]

## 2020-03-31 NOTE — PROCEDURES
Radiology Post-Procedure Note    Pre Op Diagnosis: mCRC  Post Op Diagnosis: Same    Procedure: Y90 mapping    Procedure performed by: Ramesh Verde MD    Written Informed Consent Obtained: Yes  Specimen Removed: NO  Estimated Blood Loss: Minimal    Findings:   Via rt cfa celiac and hepatic arteries were selected and angiograms were obtained. maa administered in latia. exoseal to rt cfa. No complications.    Patient tolerated procedure well.    Ramesh Verde M.D.  Diagnostic and Interventional Radiologist  Department of Radiology  Pager: [phone]

## 2020-03-31 NOTE — NURSING
Report given to Elizabeth ARVIZU in [facility], right groin dressing CDI, spoke to son and gave ETA of dc

## 2020-03-31 NOTE — PROGRESS NOTES
Assume care of pt. Pt s/p Y-90 Mapping. Dressing to the right groin is clean dry and intact. Vss. NAD.  Will cont to monitor.

## 2020-03-31 NOTE — NURSING
exoseal deployed at 0840 to right groin, dressing CDI, no bleeding, no hematoma noted, pt is to remain flat until 1040, will transport pt to [facility] for scan

## 2020-03-31 NOTE — H&P
Radiology History & Physical      SUBJECTIVE:     Chief Complaint: liver mets     History of Present Illness:  Indigo Stuart is a 64 y.o. female who presents for pre Y-90 mapping.     Past Medical History:   Diagnosis Date    Cancer     colon    Encounter for blood transfusion     Hypertension      Past Surgical History:   Procedure Laterality Date    CHOLECYSTECTOMY      COLON SURGERY      COLONOSCOPY N/A 3/30/2018    Procedure: COLONOSCOPY;  Surgeon: Daniel Magana MD;  Location: [facility] ENDO;  Service: Endoscopy;  Laterality: N/A;    ESOPHAGOGASTRODUODENOSCOPY N/A 1/3/2019    Procedure: EGD (ESOPHAGOGASTRODUODENOSCOPY);  Surgeon: Adis Arguello MD;  Location: [facility] ENDO;  Service: Endoscopy;  Laterality: N/A;    ESOPHAGOGASTRODUODENOSCOPY N/A 1/14/2019    Procedure: EGD (ESOPHAGOGASTRODUODENOSCOPY);  Surgeon: Monserrat Lopez MD;  Location: [facility] (2ND FLR);  Service: Endoscopy;  Laterality: N/A;  with duodenal stent placement per La/svn    HYSTERECTOMY      INSERTION OF TUNNELED CENTRAL VENOUS CATHETER (CVC) WITH SUBCUTANEOUS PORT N/A 1/4/2019    Procedure: [id number]-Z-CATH;  Surgeon: Emilio Romero MD;  Location: [facility] OR;  Service: General;  Laterality: N/A;       Home Meds:   Prior to Admission medications    Medication Sig Start Date End Date Taking? Authorizing Provider   amLODIPine (NORVASC) 5 MG tablet Take 1 tablet (5 mg total) by mouth once daily. 10/28/19  Yes Stephan Arevalo, DO   atorvastatin (LIPITOR) 20 MG tablet Take 1 tablet (20 mg total) by mouth every evening. 10/28/19  Yes Stephan Arevalo, DO   ferrous sulfate (FEOSOL) 325 mg (65 mg iron) Tab tablet Take 1 tablet by mouth 2 (two) times daily. 3/7/19  Yes Historical Provider, MD   metoprolol succinate (TOPROL-XL) 25 MG 24 hr tablet Take 1 tablet (25 mg total) by mouth every evening. 10/28/19  Yes Stephan Arevalo, DO   ondansetron (ZOFRAN-ODT) 8 MG TbDL Take 1 tablet (8 mg total) by mouth every 12 (twelve)  hours as needed. 3/18/20 3/18/21 Yes Karina Pettit MD   potassium chloride SA (K-DUR,KLOR-CON) 20 MEQ tablet Take 1 tablet (20 mEq total) by mouth once daily. 7/8/19 7/7/20 Yes Madonna Shrestha MD   regorafenib (STIVARGA) 40 mg Tab Take 4 tablets (160 mg) by mouth once daily For 21 days then hold for 7 days then resume. 3/23/20  Yes Karina Pettit MD   senna-docusate 8.6-50 mg (SENOKOT-S) 8.6-50 mg per tablet Take 2 tablets by mouth nightly as needed for Constipation.   Yes Historical Provider, MD   thiamine (VITAMIN B-1) 250 MG tablet Take 250 mg by mouth once daily.   Yes Historical Provider, MD   dronabinoL (MARINOL) 5 MG capsule Take 1 capsule (5 mg total) by mouth 2 (two) times daily before meals. To increase appetite 3/23/20   Karina Pettit MD   ELIQUIS 5 mg Tab TAKE 1 TABLET BY MOUTH TWICE A DAY  Patient taking differently: Take by mouth 2 (two) times daily.  1/10/20   Karina Pettit MD   pyridoxine, vitamin B6, (VITAMIN B-6) 50 MG Tab Take 100 mg by mouth once daily.     Historical Provider, MD     Anticoagulants/Antiplatelets: Eliquis    Allergies:   Review of patient's allergies indicates:   Allergen Reactions    Codeine Nausea And Vomiting    Erythromycin base Other (See Comments)    Lisinopril Palpitations     Cough      Sedation History:  no adverse reactions    Review of Systems:   Hematological: no known coagulopathies  Respiratory: no shortness of breath  Cardiovascular: no chest pain  Gastrointestinal: no abdominal pain  Genito-Urinary: no dysuria  Musculoskeletal: negative  Neurological: negative         OBJECTIVE:     Vital Signs (Most Recent)  Temp: 98.5 °F (36.9 °C) (03/31/20 0701)  Pulse: 93 (03/31/20 0701)  Resp: 18 (03/31/20 0701)  BP: (!) 112/57 (03/31/20 0701)  SpO2: 97 % (03/31/20 0701)    Physical Exam:  ASA: 3  Mallampati: 3    General: no acute distress  Mental Status: alert and oriented to person, place and time  HEENT: normocephalic, atraumatic  Chest: unlabored  breathing  Heart: regular heart rate  Abdomen: nondistended  Extremity: moves all extremities    Laboratory  Lab Results   Component Value Date    INR 1.2 03/31/2020       Lab Results   Component Value Date    WBC 13.11 (H) 03/31/2020    HGB 9.9 (L) 03/31/2020    HCT 34.7 (L) 03/31/2020    MCV 96 03/31/2020     (H) 03/31/2020      Lab Results   Component Value Date    GLU 93 03/20/2020     03/20/2020    K 4.4 03/20/2020     03/20/2020    CO2 25 03/20/2020    BUN 8 03/20/2020    CREATININE 0.7 03/20/2020    CALCIUM 9.9 03/20/2020    MG 1.6 07/06/2019    ALT 37 03/20/2020    AST 73 (H) 03/20/2020    ALBUMIN 2.5 (L) 03/20/2020    BILITOT 0.5 03/20/2020       ASSESSMENT/PLAN:     Sedation Plan: moderate sedation.     Patient will undergo pre Y-90 mapping.    Shandra Mondragon MD   PGY-3 Radiology

## 2020-03-31 NOTE — DISCHARGE SUMMARY
Radiology Discharge Summary      Hospital Course: No complications    Admit Date: 3/31/2020  Discharge Date: 03/31/2020     Instructions Given to Patient: Yes  Diet: Resume prior diet  Activity: activity as tolerated and no driving for today    Description of Condition on Discharge: Stable  Vital Signs (Most Recent): Temp: 98.5 °F (36.9 °C) (03/31/20 0701)  Pulse: 81 (03/31/20 0845)  Resp: 16 (03/31/20 0845)  BP: (!) 103/51 (03/31/20 0845)  SpO2: 100 % (03/31/20 0845)    Discharge Disposition: Home    Discharge Diagnosis: mCRC s/p Y90 mapping. Follow up as scheduled.    Ramesh Verde M.D.  Diagnostic and Interventional Radiologist  Department of Radiology  Pager: [phone]

## 2020-03-31 NOTE — NURSING
Pt arrived to IR Room 188 for pre Y-90 mapping. Pt oriented to unit and staff. Plan of care reviewed with patient, patient verbalizes understanding. Comfort measures utilized. Pt safely transferred from stretcher to procedural table. Fall risk reviewed with patient, fall risk interventions maintained. Safety strap applied, positioner pillows utilized to minimize pressure points. Blankets applied. Pt prepped and draped utilizing standard sterile technique. Patient placed on continuous monitoring, as required by sedation policy. Timeouts completed utilizing standard universal time-out, per department and facility policy. RN to remain at bedside, continuous monitoring maintained. Pt resting comfortably. Denies pain/discomfort. Will continue to monitor. See flow sheets for monitoring, medication administration, and updates.

## 2020-03-31 NOTE — PROGRESS NOTES
Pt given discharge instructions and handout, all questions answered.  Dsg to right groin CDI.  IV d/c'd with cath tip intact.  NAD noted.  Pt to front entrance via w/c accompanied by transport to meet son.

## 2020-03-31 NOTE — PROGRESS NOTES
Pt arrived to [facility] bed 3 for 1.5 hour post Y-90 mapping recovery. Report received from Shila ARVIZU. Pt oriented to unit and staff.  Stretcher locked and placed in lowest position. Calming environment promoted. Comfort measures provided. Pt resting comfortably.

## 2020-04-03 ENCOUNTER — PATIENT MESSAGE (OUTPATIENT)
Dept: HEMATOLOGY/ONCOLOGY | Facility: CLINIC | Age: 65
End: 2020-04-03

## 2020-04-04 DIAGNOSIS — I82.629 ACUTE VENOUS EMBOLISM AND THROMBOSIS OF DEEP VEINS OF UPPER EXTREMITY, UNSPECIFIED LATERALITY: ICD-10-CM

## 2020-04-05 RX ORDER — APIXABAN 5 MG/1
TABLET, FILM COATED ORAL
Qty: 60 TABLET | Refills: 2 | OUTPATIENT
Start: 2020-04-05

## 2020-04-06 ENCOUNTER — HOSPITAL ENCOUNTER (EMERGENCY)
Facility: HOSPITAL | Age: 65
Discharge: HOME | End: 2020-04-06
Attending: EMERGENCY MEDICINE
Payer: MEDICARE

## 2020-04-06 ENCOUNTER — PATIENT MESSAGE (OUTPATIENT)
Dept: HEMATOLOGY/ONCOLOGY | Facility: CLINIC | Age: 65
End: 2020-04-06

## 2020-04-06 VITALS
RESPIRATION RATE: 18 BRPM | WEIGHT: 180 LBS | DIASTOLIC BLOOD PRESSURE: 58 MMHG | TEMPERATURE: 97 F | HEART RATE: 77 BPM | OXYGEN SATURATION: 98 % | SYSTOLIC BLOOD PRESSURE: 128 MMHG | HEIGHT: 61 IN | BODY MASS INDEX: 33.99 KG/M2

## 2020-04-06 DIAGNOSIS — Z20.822 SUSPECTED COVID-19 VIRUS INFECTION: ICD-10-CM

## 2020-04-06 DIAGNOSIS — Z51.11 ENCOUNTER FOR ANTINEOPLASTIC CHEMOTHERAPY: ICD-10-CM

## 2020-04-06 DIAGNOSIS — E87.6 HYPOKALEMIA: ICD-10-CM

## 2020-04-06 DIAGNOSIS — R11.14 BILIOUS VOMITING WITH NAUSEA: ICD-10-CM

## 2020-04-06 DIAGNOSIS — E86.0 DEHYDRATION: Primary | ICD-10-CM

## 2020-04-06 LAB
ALBUMIN SERPL BCP-MCNC: 2.6 G/DL (ref 3.5–5.2)
ALP SERPL-CCNC: 271 U/L (ref 55–135)
ALT SERPL W/O P-5'-P-CCNC: 23 U/L (ref 10–44)
ANION GAP SERPL CALC-SCNC: 18 MMOL/L (ref 8–16)
AST SERPL-CCNC: 96 U/L (ref 10–40)
BASOPHILS # BLD AUTO: 0.02 K/UL (ref 0–0.2)
BASOPHILS NFR BLD: 0.1 % (ref 0–1.9)
BILIRUB SERPL-MCNC: 0.7 MG/DL (ref 0.1–1)
BUN SERPL-MCNC: 17 MG/DL (ref 8–23)
CALCIUM SERPL-MCNC: 9.9 MG/DL (ref 8.7–10.5)
CHLORIDE SERPL-SCNC: 81 MMOL/L (ref 95–110)
CO2 SERPL-SCNC: 40 MMOL/L (ref 23–29)
CREAT SERPL-MCNC: 0.8 MG/DL (ref 0.5–1.4)
CRP SERPL-MCNC: 298 MG/L (ref 0–8.2)
DIFFERENTIAL METHOD: ABNORMAL
EOSINOPHIL # BLD AUTO: 0 K/UL (ref 0–0.5)
EOSINOPHIL NFR BLD: 0.2 % (ref 0–8)
ERYTHROCYTE [DISTWIDTH] IN BLOOD BY AUTOMATED COUNT: 17.1 % (ref 11.5–14.5)
EST. GFR  (AFRICAN AMERICAN): >60 ML/MIN/1.73 M^2
EST. GFR  (NON AFRICAN AMERICAN): >60 ML/MIN/1.73 M^2
FERRITIN SERPL-MCNC: 2534 NG/ML (ref 20–300)
GLUCOSE SERPL-MCNC: 119 MG/DL (ref 70–110)
HCT VFR BLD AUTO: 37.4 % (ref 37–48.5)
HGB BLD-MCNC: 10.9 G/DL (ref 12–16)
IMM GRANULOCYTES # BLD AUTO: 0.11 K/UL (ref 0–0.04)
IMM GRANULOCYTES NFR BLD AUTO: 0.7 % (ref 0–0.5)
LDH SERPL L TO P-CCNC: 578 U/L (ref 110–260)
LYMPHOCYTES # BLD AUTO: 1.9 K/UL (ref 1–4.8)
LYMPHOCYTES NFR BLD: 12 % (ref 18–48)
MCH RBC QN AUTO: 27.1 PG (ref 27–31)
MCHC RBC AUTO-ENTMCNC: 29.1 G/DL (ref 32–36)
MCV RBC AUTO: 93 FL (ref 82–98)
MONOCYTES # BLD AUTO: 1 K/UL (ref 0.3–1)
MONOCYTES NFR BLD: 5.9 % (ref 4–15)
NEUTROPHILS # BLD AUTO: 13.1 K/UL (ref 1.8–7.7)
NEUTROPHILS NFR BLD: 81.1 % (ref 38–73)
NRBC BLD-RTO: 0 /100 WBC
PLATELET # BLD AUTO: 597 K/UL (ref 150–350)
PMV BLD AUTO: 8.2 FL (ref 9.2–12.9)
POTASSIUM SERPL-SCNC: 2.8 MMOL/L (ref 3.5–5.1)
PROT SERPL-MCNC: 8.2 G/DL (ref 6–8.4)
RBC # BLD AUTO: 4.02 M/UL (ref 4–5.4)
SARS-COV-2 RNA AMPLIFICATION, QUAL: NEGATIVE
SODIUM SERPL-SCNC: 139 MMOL/L (ref 136–145)
WBC # BLD AUTO: 16.13 K/UL (ref 3.9–12.7)

## 2020-04-06 PROCEDURE — 93005 ELECTROCARDIOGRAM TRACING: CPT

## 2020-04-06 PROCEDURE — 85025 COMPLETE CBC W/AUTO DIFF WBC: CPT

## 2020-04-06 PROCEDURE — 63600175 PHARM REV CODE 636 W HCPCS: Performed by: EMERGENCY MEDICINE

## 2020-04-06 PROCEDURE — 82728 ASSAY OF FERRITIN: CPT

## 2020-04-06 PROCEDURE — 83615 LACTATE (LD) (LDH) ENZYME: CPT

## 2020-04-06 PROCEDURE — 96366 THER/PROPH/DIAG IV INF ADDON: CPT

## 2020-04-06 PROCEDURE — 99285 EMERGENCY DEPT VISIT HI MDM: CPT | Mod: 25

## 2020-04-06 PROCEDURE — 80053 COMPREHEN METABOLIC PANEL: CPT

## 2020-04-06 PROCEDURE — 36415 COLL VENOUS BLD VENIPUNCTURE: CPT

## 2020-04-06 PROCEDURE — 96361 HYDRATE IV INFUSION ADD-ON: CPT

## 2020-04-06 PROCEDURE — 86140 C-REACTIVE PROTEIN: CPT

## 2020-04-06 PROCEDURE — 25000003 PHARM REV CODE 250: Performed by: EMERGENCY MEDICINE

## 2020-04-06 PROCEDURE — U0002 COVID-19 LAB TEST NON-CDC: HCPCS

## 2020-04-06 PROCEDURE — 96375 TX/PRO/DX INJ NEW DRUG ADDON: CPT

## 2020-04-06 PROCEDURE — 96365 THER/PROPH/DIAG IV INF INIT: CPT

## 2020-04-06 RX ORDER — ONDANSETRON 2 MG/ML
4 INJECTION INTRAMUSCULAR; INTRAVENOUS
Status: COMPLETED | OUTPATIENT
Start: 2020-04-06 | End: 2020-04-06

## 2020-04-06 RX ORDER — POTASSIUM CHLORIDE 7.45 MG/ML
10 INJECTION INTRAVENOUS
Status: COMPLETED | OUTPATIENT
Start: 2020-04-06 | End: 2020-04-06

## 2020-04-06 RX ADMIN — POTASSIUM CHLORIDE 10 MEQ: 7.46 INJECTION, SOLUTION INTRAVENOUS at 10:04

## 2020-04-06 RX ADMIN — ONDANSETRON 4 MG: 2 INJECTION INTRAMUSCULAR; INTRAVENOUS at 09:04

## 2020-04-06 RX ADMIN — SODIUM CHLORIDE 1000 ML: 0.9 INJECTION, SOLUTION INTRAVENOUS at 09:04

## 2020-04-06 RX ADMIN — SODIUM CHLORIDE 1000 ML: 0.9 INJECTION, SOLUTION INTRAVENOUS at 11:04

## 2020-04-06 NOTE — ED NOTES
Potassium slowed to 50 ml/hr d/t Pt c/o burning to IV site. Reports relief of burning at this rate.

## 2020-04-06 NOTE — ED PROVIDER NOTES
Encounter Date: 4/6/2020    SCRIBE #1 NOTE: Olive FRANCOIS, nilda scribing for, and in the presence of, Dr. Watson.       History     Chief Complaint   Patient presents with    Weakness    Nausea    Vomiting       Time seen by provider: 8:26 AM on 04/06/2020    Indigo Stuart is a 64 y.o. female with HTN and liver cancer, on chemotherapy, who presents to the ED with the onset of generalized weakness and fatigue. She reports associated N/V with green vomit intermittently over the past week. Patient states her vomiting is not related to her chemotherapy as it is a different color. She notes relief after vomiting and has been very thirsty. Patient comes to the ER today due to worsened weakness from not being able to keep any food or liquid down. Patient has been taking Zofran with no relief. No fever, cough, CP, wheezing, back pain or abdominal pain. PSHx includes colon surgery and cholecystectomy. Drug allergy to Codeine.     The history is provided by the patient.     Review of patient's allergies indicates:   Allergen Reactions    Codeine Nausea And Vomiting    Erythromycin base Other (See Comments)    Lisinopril Palpitations     Cough      Past Medical History:   Diagnosis Date    Cancer     colon    Encounter for blood transfusion     Hypertension      Past Surgical History:   Procedure Laterality Date    CHOLECYSTECTOMY      COLON SURGERY      COLONOSCOPY N/A 3/30/2018    Procedure: COLONOSCOPY;  Surgeon: Daniel Magana MD;  Location: [facility];  Service: Endoscopy;  Laterality: N/A;    ESOPHAGOGASTRODUODENOSCOPY N/A 1/3/2019    Procedure: EGD (ESOPHAGOGASTRODUODENOSCOPY);  Surgeon: Adis Arguello MD;  Location: [facility];  Service: Endoscopy;  Laterality: N/A;    ESOPHAGOGASTRODUODENOSCOPY N/A 1/14/2019    Procedure: EGD (ESOPHAGOGASTRODUODENOSCOPY);  Surgeon: Monserrat Lopez MD;  Location: [facility] ([address] [place] [address]);  Service: Endoscopy;  Laterality: N/A;  with duodenal stent placement per  La/svn    HYSTERECTOMY      INSERTION OF TUNNELED CENTRAL VENOUS CATHETER (CVC) WITH SUBCUTANEOUS PORT N/A 1/4/2019    Procedure: [id number]-CATH;  Surgeon: Emilio Romero MD;  Location: [facility] OR;  Service: General;  Laterality: N/A;     Family History   Problem Relation Age of Onset    Cancer Mother         colon ca, liver ca    Cancer Father      Social History     Tobacco Use    Smoking status: Never Smoker    Smokeless tobacco: Never Used   Substance Use Topics    Alcohol use: No     Frequency: Never     Drinks per session: Patient refused     Binge frequency: Never    Drug use: No     Review of Systems   Constitutional: Positive for appetite change (decreased) and fatigue. Negative for fever.   Respiratory: Negative for cough, shortness of breath and wheezing.    Cardiovascular: Negative for chest pain.   Gastrointestinal: Positive for nausea and vomiting. Negative for abdominal pain.   Musculoskeletal: Negative for back pain.   Allergic/Immunologic: Positive for immunocompromised state.   Neurological: Positive for weakness.       Physical Exam     Initial Vitals [04/06/20 0823]   BP Pulse Resp Temp SpO2   109/82 (!) 115 18 97.4 °F (36.3 °C) 97 %      MAP       --         Physical Exam    Nursing note and vitals reviewed.  Constitutional: She appears well-developed and well-nourished.  Non-toxic appearance. No distress.   HENT:   Head: Normocephalic and atraumatic.   Mouth/Throat: Mucous membranes are dry.   Eyes: EOM are normal. Pupils are equal, round, and reactive to light.   Neck: Normal range of motion. Neck supple. No neck rigidity. No JVD present.   Cardiovascular: Regular rhythm, normal heart sounds and intact distal pulses. Tachycardia present.  Exam reveals no gallop and no friction rub.    No murmur heard.  Pulmonary/Chest: Breath sounds normal. She has no wheezes. She has no rhonchi. She has no rales.   Abdominal: Soft. Bowel sounds are normal. She exhibits no distension. There  is no tenderness. There is no rebound and no guarding.   Musculoskeletal: Normal range of motion.   Neurological: She is alert and oriented to person, place, and time. She has normal strength and normal reflexes. No cranial nerve deficit or sensory deficit. She exhibits normal muscle tone. Coordination normal. GCS eye subscore is 4. GCS verbal subscore is 5. GCS motor subscore is 6.   Skin: Skin is warm and dry.   Decreased skin turgor. Alopecia.    Psychiatric: She has a normal mood and affect. Her speech is normal and behavior is normal. She is not actively hallucinating.         ED Course   Procedures  Labs Reviewed   CBC W/ AUTO DIFFERENTIAL - Abnormal; Notable for the following components:       Result Value    WBC 16.13 (*)     Hemoglobin 10.9 (*)     Mean Corpuscular Hemoglobin Conc 29.1 (*)     RDW 17.1 (*)     Platelets 597 (*)     MPV 8.2 (*)     Immature Granulocytes 0.7 (*)     Gran # (ANC) 13.1 (*)     Immature Grans (Abs) 0.11 (*)     Gran% 81.1 (*)     Lymph% 12.0 (*)     All other components within normal limits   COMPREHENSIVE METABOLIC PANEL - Abnormal; Notable for the following components:    Potassium 2.8 (*)     Chloride 81 (*)     CO2 40 (*)     Glucose 119 (*)     Albumin 2.6 (*)     Alkaline Phosphatase 271 (*)     AST 96 (*)     Anion Gap 18 (*)     All other components within normal limits   C-REACTIVE PROTEIN - Abnormal; Notable for the following components:    .0 (*)     All other components within normal limits   FERRITIN - Abnormal; Notable for the following components:    Ferritin 2,534 (*)     All other components within normal limits   LACTATE DEHYDROGENASE - Abnormal; Notable for the following components:     (*)     All other components within normal limits   SARS-COV-2 RNA AMPLIFICATION, QUAL     EKG Readings: (Independently Interpreted)   Initial Reading: No STEMI.   NSR at 91 beats per minute; Non specific ST abnormalities; Prolonged QT at 504 ms     ECG Results           EKG 12-lead (In process)  Result time 04/06/20 11:23:20    In process by Interface, Lab In [facility] (04/06/20 11:23:20)                 Narrative:    Test Reason : R68.89,    Vent. Rate : 091 BPM     Atrial Rate : 091 BPM     P-R Int : 116 ms          QRS Dur : 082 ms      QT Int : 410 ms       P-R-T Axes : -19 067 001 degrees     QTc Int : 504 ms    Normal sinus rhythm  Nonspecific ST and T wave abnormality  Abnormal ECG  When compared with ECG of 24-MAR-2019 17:21,  T wave inversion no longer evident in Anterior leads  QT has lengthened    Referred By: [email or web address]   SELF           Confirmed By:                             Imaging Results          X-Ray Chest AP Portable (Final result)  Result time 04/06/20 09:13:22    Final result by Namrata Kennedy MD (04/06/20 09:13:22)                 Impression:      No acute cardiopulmonary disease      Electronically signed by: Namrata Kennedy MD  Date:    04/06/2020  Time:    09:13             Narrative:    EXAMINATION:  XR CHEST AP PORTABLE    CLINICAL HISTORY:  Suspected Covid-19 Virus Infection;    TECHNIQUE:  Single frontal view of the chest was performed.    COMPARISON:  02/24/2020    FINDINGS:  The heart is not enlarged.  Cardiomediastinal silhouette is stable.  Tex catheter remains in place with the tip at the level of the junction of the left brachiocephalic vein and SVC.  Lungs are expanded and clear of infiltrate and there is no pleural effusion.                                 Medical Decision Making:   History:   Old Medical Records: I decided to obtain old medical records.  Initial Assessment:   Indigo Stuart is a 64 y.o. female with HTN and liver cancer, on chemotherapy, who presents to the ED with the onset of generalized weakness and fatigue. She reports associated N/V with green vomit intermittently over the past week.  She has had the symptoms several times in the past and attributes it to her cancer.  She has no abdominal tenderness on  examination.  She appears mildly dehydrated is given 2 L of IV fluids with significant improvement.  She is found to be hypokalemic and provided with she potassium supplementation.  She is tolerating oral intake without difficulty.  She is able to stand now and feels much improved.  Discuss observation versus discharge home and patient would like to be discharged home.  She states her  will care for her.  I believe this is appropriate especially and light increase chance of being exposed to COVID while in the hospital.  Return precautions discussed for worsening symptoms.  She is discharged improved in no acute distress.  Independently Interpreted Test(s):   I have ordered and independently interpreted EKG Reading(s) - see prior notes  Clinical Tests:   Lab Tests: Ordered and Reviewed  Radiological Study: Ordered and Reviewed  Medical Tests: Ordered and Reviewed            Scribe Attestation:   Scribe #1: I performed the above scribed service and the documentation accurately describes the services I performed. I attest to the accuracy of the note.                   I, Walter Carlson, personally performed the services described in this documentation. All medical record entries made by the scribe were at my direction and in my presence.  I have reviewed the chart and agree that the record reflects my personal performance and is accurate and complete. Blaine Watson MD.        Clinical Impression:       ICD-10-CM ICD-9-CM   1. Dehydration E86.0 276.51   2. Suspected Covid-19 Virus Infection R68.89    3. Bilious vomiting with nausea R11.14 787.04   4. Hypokalemia E87.6 276.8   5. Encounter for antineoplastic chemotherapy Z51.11 V58.11         Disposition:   Disposition: Discharged  Condition: Stable     ED Disposition Condition    Discharge Stable        ED Prescriptions     None        Follow-up Information     Follow up With Specialties Details Why Contact Info    John Conner MD Family Medicine  Schedule an appointment as soon as possible for a visit   [address] [facility] [address]  [phone]      [facility] Emergency Medicine  As needed, If symptoms worsen [address] [place] [id number] [phone]                                     Blaine Watson MD  04/06/20 [id number]

## 2020-04-06 NOTE — ED NOTES
Able to stand and ambulate several steps. States she feels much better and is ready to go home. MD aware.

## 2020-04-07 ENCOUNTER — PES CALL (OUTPATIENT)
Dept: ADMINISTRATIVE | Facility: CLINIC | Age: 65
End: 2020-04-07

## 2020-04-07 RX ORDER — ONDANSETRON 8 MG/1
8 TABLET, ORALLY DISINTEGRATING ORAL EVERY 6 HOURS PRN
Qty: 30 TABLET | Refills: 1 | OUTPATIENT
Start: 2020-04-07 | End: 2021-04-07

## 2020-04-08 ENCOUNTER — LAB VISIT (OUTPATIENT)
Dept: LAB | Facility: HOSPITAL | Age: 65
End: 2020-04-08
Attending: INTERNAL MEDICINE
Payer: MEDICARE

## 2020-04-08 DIAGNOSIS — C17.0 DUODENAL CANCER: ICD-10-CM

## 2020-04-08 LAB
ALBUMIN SERPL BCP-MCNC: 2.2 G/DL (ref 3.5–5.2)
ALP SERPL-CCNC: 255 U/L (ref 55–135)
ALT SERPL W/O P-5'-P-CCNC: 26 U/L (ref 10–44)
ANION GAP SERPL CALC-SCNC: 13 MMOL/L (ref 8–16)
AST SERPL-CCNC: 117 U/L (ref 10–40)
BASOPHILS # BLD AUTO: 0.04 K/UL (ref 0–0.2)
BASOPHILS NFR BLD: 0.3 % (ref 0–1.9)
BILIRUB SERPL-MCNC: 0.6 MG/DL (ref 0.1–1)
BUN SERPL-MCNC: 10 MG/DL (ref 8–23)
CALCIUM SERPL-MCNC: 9.2 MG/DL (ref 8.7–10.5)
CHLORIDE SERPL-SCNC: 93 MMOL/L (ref 95–110)
CO2 SERPL-SCNC: 33 MMOL/L (ref 23–29)
CREAT SERPL-MCNC: 0.6 MG/DL (ref 0.5–1.4)
DIFFERENTIAL METHOD: ABNORMAL
EOSINOPHIL # BLD AUTO: 0.1 K/UL (ref 0–0.5)
EOSINOPHIL NFR BLD: 0.6 % (ref 0–8)
ERYTHROCYTE [DISTWIDTH] IN BLOOD BY AUTOMATED COUNT: 17.1 % (ref 11.5–14.5)
EST. GFR  (AFRICAN AMERICAN): >60 ML/MIN/1.73 M^2
EST. GFR  (NON AFRICAN AMERICAN): >60 ML/MIN/1.73 M^2
GLUCOSE SERPL-MCNC: 105 MG/DL (ref 70–110)
HCT VFR BLD AUTO: 35.8 % (ref 37–48.5)
HGB BLD-MCNC: 10.4 G/DL (ref 12–16)
IMM GRANULOCYTES # BLD AUTO: 0.11 K/UL (ref 0–0.04)
IMM GRANULOCYTES NFR BLD AUTO: 0.7 % (ref 0–0.5)
LYMPHOCYTES # BLD AUTO: 1.8 K/UL (ref 1–4.8)
LYMPHOCYTES NFR BLD: 11.1 % (ref 18–48)
MCH RBC QN AUTO: 26.9 PG (ref 27–31)
MCHC RBC AUTO-ENTMCNC: 29.1 G/DL (ref 32–36)
MCV RBC AUTO: 93 FL (ref 82–98)
MONOCYTES # BLD AUTO: 1 K/UL (ref 0.3–1)
MONOCYTES NFR BLD: 6 % (ref 4–15)
NEUTROPHILS # BLD AUTO: 13 K/UL (ref 1.8–7.7)
NEUTROPHILS NFR BLD: 81.3 % (ref 38–73)
NRBC BLD-RTO: 0 /100 WBC
PLATELET # BLD AUTO: 509 K/UL (ref 150–350)
PMV BLD AUTO: 8.2 FL (ref 9.2–12.9)
POTASSIUM SERPL-SCNC: 2.9 MMOL/L (ref 3.5–5.1)
PROT SERPL-MCNC: 7.2 G/DL (ref 6–8.4)
RBC # BLD AUTO: 3.87 M/UL (ref 4–5.4)
SODIUM SERPL-SCNC: 139 MMOL/L (ref 136–145)
WBC # BLD AUTO: 15.92 K/UL (ref 3.9–12.7)

## 2020-04-08 PROCEDURE — 36415 COLL VENOUS BLD VENIPUNCTURE: CPT

## 2020-04-08 PROCEDURE — 85025 COMPLETE CBC W/AUTO DIFF WBC: CPT

## 2020-04-08 PROCEDURE — 80053 COMPREHEN METABOLIC PANEL: CPT

## 2020-04-09 ENCOUNTER — TELEPHONE (OUTPATIENT)
Dept: HEMATOLOGY/ONCOLOGY | Facility: CLINIC | Age: 65
End: 2020-04-09

## 2020-04-09 ENCOUNTER — OFFICE VISIT (OUTPATIENT)
Dept: HEMATOLOGY/ONCOLOGY | Facility: CLINIC | Age: 65
End: 2020-04-09
Payer: MEDICARE

## 2020-04-09 ENCOUNTER — LAB VISIT (OUTPATIENT)
Dept: LAB | Facility: HOSPITAL | Age: 65
End: 2020-04-09
Attending: INTERNAL MEDICINE
Payer: MEDICARE

## 2020-04-09 DIAGNOSIS — E87.6 HYPOKALEMIA: ICD-10-CM

## 2020-04-09 DIAGNOSIS — R11.10 VOMITING, INTRACTABILITY OF VOMITING NOT SPECIFIED, PRESENCE OF NAUSEA NOT SPECIFIED, UNSPECIFIED VOMITING TYPE: ICD-10-CM

## 2020-04-09 DIAGNOSIS — D72.828 GRANULOCYTOSIS: ICD-10-CM

## 2020-04-09 DIAGNOSIS — R10.13 DYSPEPSIA: Primary | ICD-10-CM

## 2020-04-09 DIAGNOSIS — R64 MALIGNANT CACHEXIA: ICD-10-CM

## 2020-04-09 DIAGNOSIS — C78.00 MALIGNANT NEOPLASM METASTATIC TO LUNG, UNSPECIFIED LATERALITY: ICD-10-CM

## 2020-04-09 PROCEDURE — 87086 URINE CULTURE/COLONY COUNT: CPT

## 2020-04-09 PROCEDURE — 99215 OFFICE O/P EST HI 40 MIN: CPT | Mod: 95,,, | Performed by: INTERNAL MEDICINE

## 2020-04-09 PROCEDURE — 1159F MED LIST DOCD IN RCRD: CPT | Mod: ,,, | Performed by: INTERNAL MEDICINE

## 2020-04-09 PROCEDURE — 1101F PT FALLS ASSESS-DOCD LE1/YR: CPT | Mod: ,,, | Performed by: INTERNAL MEDICINE

## 2020-04-09 PROCEDURE — 99215 PR OFFICE/OUTPT VISIT, EST, LEVL V, 40-54 MIN: ICD-10-PCS | Mod: 95,,, | Performed by: INTERNAL MEDICINE

## 2020-04-09 PROCEDURE — 1101F PR PT FALLS ASSESS DOC 0-1 FALLS W/OUT INJ PAST YR: ICD-10-PCS | Mod: ,,, | Performed by: INTERNAL MEDICINE

## 2020-04-09 PROCEDURE — 1159F PR MEDICATION LIST DOCUMENTED IN MEDICAL RECORD: ICD-10-PCS | Mod: ,,, | Performed by: INTERNAL MEDICINE

## 2020-04-09 RX ORDER — SUCRALFATE 1 G/10ML
1 SUSPENSION ORAL 4 TIMES DAILY
Qty: 420 ML | Refills: 1 | Status: SHIPPED | OUTPATIENT
Start: 2020-04-09 | End: 2020-05-04 | Stop reason: SDUPTHER

## 2020-04-09 RX ORDER — POTASSIUM CHLORIDE 20 MEQ/1
20 TABLET, EXTENDED RELEASE ORAL 2 TIMES DAILY
Qty: 6 TABLET | Refills: 0 | Status: SHIPPED | OUTPATIENT
Start: 2020-04-09 | End: 2020-04-12

## 2020-04-09 RX ORDER — PROCHLORPERAZINE MALEATE 10 MG
10 TABLET ORAL EVERY 6 HOURS PRN
Qty: 30 TABLET | Refills: 1 | Status: SHIPPED | OUTPATIENT
Start: 2020-04-09 | End: 2021-04-09

## 2020-04-09 NOTE — TELEPHONE ENCOUNTER
----- Message from Karina Pettit MD sent at 4/9/2020 10:11 AM CDT -----  Please call and let her know her potassium is low too so I called in extra potassium, also send her urine order to the hospital stat please and schedule OV in 1 week with cea cbc and cmp

## 2020-04-09 NOTE — PROGRESS NOTES
The patient location is:  home  The chief complaint leading to consultation is:  I am weak  Visit type: Virtual visit with synchronous audio and video  Total time spent with patient:  40 min  Each patient to whom he or she provides medical services by telemedicine is:  (1) informed of the relationship between the physician and patient and the respective role of any other health care provider with respect to management of the patient; and (2) notified that he or she may decline to receive medical services by telemedicine and may withdraw from such care at any time.    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  - Clinical: Stage IV (cTX, cNX, cM1) - Signed by Karina Pettit MD on 3/11/2019       Chemotherapy     Treatment Summary   Plan Name: OP COLORECTAL FOLFOX + BEVACIZUMAB Q2W  Treatment Goal: Maintenance  Status: Active  Start Date: 1/21/2019  End Date: 7/3/2019 (Planned)  Provider: Karina Pettit MD  Chemotherapy: fluorouracil injection 835 mg, 400 mg/m2 = 835 mg, Intravenous, Clinic/[facility] 1 time, 4 of 12 cycles    fluorouracil (ADRUCIL) 2,400 mg/m2 = 5,015 mg in sodium chloride 0.9% 200.3 mL chemo infusion, 2,400 mg/m2 = 5,015 mg, Intravenous, Over 46 hours, 4 of 12 cycles    bevacizumab (AVASTIN) 5 mg/kg = 500 mg in sodium chloride 0.9% 100 mL chemo infusion, 5 mg/kg = 500 mg, Intravenous, Clinic/[facility] 1 time, 4 of 12 cycles    leucovorin calcium 400 mg/m2 = 835 mg in dextrose 5 % 250 mL infusion, 400 mg/m2 = 835 mg, Intravenous, Clinic/[facility] 1 time, 4 of 12 cycles    oxaliplatin (ELOXATIN) 85 mg/m2 = 178 mg in dextrose 5 % 500 mL chemo infusion, 85 mg/m2 = 178 mg, Intravenous, Clinic/[facility] 1 time, 4 of 12 cycles          4/8/2019 - 4/8/2019 Chemotherapy     Treatment Summary   Plan  Name: OP COLORECTAL FOLFIRI + BEVACIZUMAB Q2W  Treatment Goal: Control  Status: Inactive  Start Date: [No treatment day found]  End Date: [No treatment day found]  Provider: Karina Pettit MD  Chemotherapy: fluorouracil injection 835 mg, 400 mg/m2 = 835 mg, Intravenous, Clinic/[facility] 1 time, 0 of 12 cycles  fluorouracil (ADRUCIL) 2,400 mg/m2 = 5,015 mg in sodium chloride 0.9% 200.3 mL chemo infusion, 2,400 mg/m2 = 5,015 mg, Intravenous, Over 46 hours, 0 of 12 cycles  bevacizumab (AVASTIN) 5 mg/kg = 500 mg in sodium chloride 0.9% 100 mL chemo infusion, 5 mg/kg = 500 mg, Intravenous, Clinic/[facility] 1 time, 0 of 12 cycles  irinotecan (CAMPTOSAR) 180 mg/m2 = 376 mg in sodium chloride 0.9% 500 mL chemo infusion, 180 mg/m2 = 376 mg, Intravenous, Clinic/[facility] 1 time, 0 of 12 cycles  leucovorin calcium 400 mg/m2 = 835 mg in dextrose 5 % 250 mL infusion, 400 mg/m2 = 835 mg, Intravenous, Clinic/[facility] 1 time, 0 of 12 cycles      12/22/2019 -  Chemotherapy     Treatment Summary   Plan Name: OP PANC NAB-PACLITAXEL + GEMCITABINE Day 1, 8 , 15 every 28 days  Treatment Goal: Maintenance  Status: Active  Start Date: 12/31/2019  End Date: 4/14/2020 (Planned)  Provider: Karina Pettit MD  Chemotherapy: PACLitaxel-protein bound (ABRAXANE) 100 mg/m2 = 200 mg in 40 mL infusion, 100 mg/m2 = 200 mg (100 % of original dose 100 mg/m2), Intravenous, Clinic/HOD 1 time, 2 of 4 cycles  Dose modification: 100 mg/m2 (original dose 100 mg/m2, Cycle 1), 125 mg/m2 (original dose 100 mg/m2, Cycle 1)  Administration: 200 mg (12/31/2019), 200 mg (1/7/2020), 250 mg (1/14/2020), 250 mg (2/4/2020), 250 mg (2/11/2020)  gemcitabine 790 mg in sodium chloride 0.9% 250 mL chemo infusion, 400 mg/m2 = 790 mg (100 % of original dose 400 mg/m2), Intravenous, Clinic/[facility] 1 time, 2 of 4 cycles  Dose modification: 400 mg/m2 (original dose 400 mg/m2, Cycle 1), 800 mg/m2 (original dose 400 mg/m2, Cycle 1), 400 mg/m2 (original dose 400 mg/m2, Cycle 2)  Administration: 790 mg  (12/31/2019), 790 mg (1/7/2020), 1,575 mg (1/14/2020), 790 mg (2/4/2020), 790 mg (2/11/2020)        Lung metastases    1/15/2019 Initial Diagnosis     Lung metastases      4/8/2019 - 4/8/2019 Chemotherapy     Treatment Summary   Plan Name: OP COLORECTAL FOLFIRI + BEVACIZUMAB Q2W  Treatment Goal: Control  Status: Inactive  Start Date: [No treatment day found]  End Date: [No treatment day found]  Provider: Karina Pettit MD  Chemotherapy: fluorouracil injection 835 mg, 400 mg/m2 = 835 mg, Intravenous, Clinic/[facility] 1 time, 0 of 12 cycles  fluorouracil (ADRUCIL) 2,400 mg/m2 = 5,015 mg in sodium chloride 0.9% 200.3 mL chemo infusion, 2,400 mg/m2 = 5,015 mg, Intravenous, Over 46 hours, 0 of 12 cycles  bevacizumab (AVASTIN) 5 mg/kg = 500 mg in sodium chloride 0.9% 100 mL chemo infusion, 5 mg/kg = 500 mg, Intravenous, Clinic/[facility] 1 time, 0 of 12 cycles  irinotecan (CAMPTOSAR) 180 mg/m2 = 376 mg in sodium chloride 0.9% 500 mL chemo infusion, 180 mg/m2 = 376 mg, Intravenous, Clinic/[facility] 1 time, 0 of 12 cycles  leucovorin calcium 400 mg/m2 = 835 mg in dextrose 5 % 250 mL infusion, 400 mg/m2 = 835 mg, Intravenous, Clinic/[facility] 1 time, 0 of 12 cycles      4/8/2019 - 12/23/2019 Chemotherapy     Treatment Summary   Plan Name: OP COLORECTAL FOLFIRI + BEVACIZUMAB Q2W  Treatment Goal: Control  Status: Inactive  Start Date: 4/8/2019  End Date: 12/3/2019  Provider: Karina Pettit MD  Chemotherapy: fluorouracil injection 835 mg, 400 mg/m2 = 835 mg, Intravenous, Clinic/[facility] 1 time, 12 of 12 cycles  Administration: 835 mg (8/6/2019), 835 mg (8/27/2019), 835 mg (8/27/2019), 835 mg (9/10/2019), 835 mg (9/24/2019)  fluorouracil (ADRUCIL) 2,400 mg/m2 = 5,015 mg in sodium chloride 0.9% 200.3 mL chemo infusion, 2,400 mg/m2 = 5,015 mg, Intravenous, Over 46 hours, 12 of 12 cycles  Administration: 5,015 mg (8/6/2019), 5,015 mg (8/27/2019), 5,015 mg (9/10/2019), 5,015 mg (9/24/2019)  bevacizumab (AVASTIN) 5 mg/kg = 500 mg in sodium chloride 0.9% 100 mL  chemo infusion, 5 mg/kg = 500 mg, Intravenous, Clinic/[facility] 1 time, 14 of 14 cycles  Dose modification: 7.5 mg/kg (original dose 5 mg/kg, Cycle 14)  Administration: 500 mg (8/6/2019), 500 mg (8/27/2019), 500 mg (9/10/2019), 500 mg (9/24/2019), 500 mg (11/12/2019), 750 mg (12/3/2019)  irinotecan (CAMPTOSAR) 180 mg/m2 = 376 mg in sodium chloride 0.9% 500 mL chemo infusion, 180 mg/m2 = 376 mg, Intravenous, Clinic/[facility] 1 time, 14 of 14 cycles  Dose modification: 200 mg/m2 (original dose 180 mg/m2, Cycle 14)  Administration: 376 mg (8/6/2019), 376 mg (8/27/2019), 376 mg (9/10/2019), 376 mg (9/24/2019), 376 mg (11/12/2019), 418 mg (12/3/2019)  leucovorin calcium 400 mg/m2 = 835 mg in dextrose 5 % 250 mL infusion, 400 mg/m2 = 835 mg, Intravenous, Clinic/[facility] 1 time, 12 of 12 cycles  Administration: 835 mg (8/6/2019), 835 mg (8/27/2019), 835 mg (9/10/2019), 835 mg (9/24/2019)      12/22/2019 -  Chemotherapy     Treatment Summary   Plan Name: OP PANC NAB-PACLITAXEL + GEMCITABINE Day 1, 8 , 15 every 28 days  Treatment Goal: Maintenance  Status: Active  Start Date: 12/31/2019  End Date: 4/14/2020 (Planned)  Provider: Karina Pettit MD  Chemotherapy: PACLitaxel-protein bound (ABRAXANE) 100 mg/m2 = 200 mg in 40 mL infusion, 100 mg/m2 = 200 mg (100 % of original dose 100 mg/m2), Intravenous, Clinic/HOD 1 time, 2 of 4 cycles  Dose modification: 100 mg/m2 (original dose 100 mg/m2, Cycle 1), 125 mg/m2 (original dose 100 mg/m2, Cycle 1)  Administration: 200 mg (12/31/2019), 200 mg (1/7/2020), 250 mg (1/14/2020), 250 mg (2/4/2020), 250 mg (2/11/2020)  gemcitabine 790 mg in sodium chloride 0.9% 250 mL chemo infusion, 400 mg/m2 = 790 mg (100 % of original dose 400 mg/m2), Intravenous, [facility] time, 2 of 4 cycles  Dose modification: 400 mg/m2 (original dose 400 mg/m2, Cycle 1), 800 mg/m2 (original dose 400 mg/m2, Cycle 1), 400 mg/m2 (original dose 400 mg/m2, Cycle 2)  Administration: 790 mg (12/31/2019), 790 mg (1/7/2020), 1,575 mg  (1/14/2020), 790 mg (2/4/2020), 790 mg (2/11/2020)        Hepatic metastases    1/15/2019 Initial Diagnosis     Hepatic metastases      4/8/2019 - 4/8/2019 Chemotherapy     Treatment Summary   Plan Name: OP COLORECTAL FOLFIRI + BEVACIZUMAB Q2W  Treatment Goal: Control  Status: Inactive  Start Date: [No treatment day found]  End Date: [No treatment day found]  Provider: Karina Pettit MD  Chemotherapy: fluorouracil injection 835 mg, 400 mg/m2 = 835 mg, Intravenous, Clinic/HOD 1 time, 0 of 12 cycles  fluorouracil (ADRUCIL) 2,400 mg/m2 = 5,015 mg in sodium chloride 0.9% 200.3 mL chemo infusion, 2,400 mg/m2 = 5,015 mg, Intravenous, Over 46 hours, 0 of 12 cycles  bevacizumab (AVASTIN) 5 mg/kg = 500 mg in sodium chloride 0.9% 100 mL chemo infusion, 5 mg/kg = 500 mg, Intravenous, Clinic/[facility] 1 time, 0 of 12 cycles  irinotecan (CAMPTOSAR) 180 mg/m2 = 376 mg in sodium chloride 0.9% 500 mL chemo infusion, 180 mg/m2 = 376 mg, Intravenous, Clinic/HOD 1 time, 0 of 12 cycles  leucovorin calcium 400 mg/m2 = 835 mg in dextrose 5 % 250 mL infusion, 400 mg/m2 = 835 mg, Intravenous, Clinic/H[facility] 1 time, 0 of 12 cycles      4/8/2019 - 12/23/2019 Chemotherapy     Treatment Summary   Plan Name: OP COLORECTAL FOLFIRI + BEVACIZUMAB Q2W  Treatment Goal: Control  Status: Inactive  Start Date: 4/8/2019  End Date: 12/3/2019  Provider: Karina Pettit MD  Chemotherapy: fluorouracil injection 835 mg, 400 mg/m2 = 835 mg, Intravenous, Clinic/HOD 1 time, 12 of 12 cycles  Administration: 835 mg (8/6/2019), 835 mg (8/27/2019), 835 mg (8/27/2019), 835 mg (9/10/2019), 835 mg (9/24/2019)  fluorouracil (ADRUCIL) 2,400 mg/m2 = 5,015 mg in sodium chloride 0.9% 200.3 mL chemo infusion, 2,400 mg/m2 = 5,015 mg, Intravenous, Over 46 hours, 12 of 12 cycles  Administration: 5,015 mg (8/6/2019), 5,015 mg (8/27/2019), 5,015 mg (9/10/2019), 5,015 mg (9/24/2019)  bevacizumab (AVASTIN) 5 mg/kg = 500 mg in sodium chloride 0.9% 100 mL chemo infusion, 5 mg/kg = 500 mg,  Intravenous, Clinic/HOD 1 time, 14 of 14 cycles  Dose modification: 7.5 mg/kg (original dose 5 mg/kg, Cycle 14)  Administration: 500 mg (8/6/2019), 500 mg (8/27/2019), 500 mg (9/10/2019), 500 mg (9/24/2019), 500 mg (11/12/2019), 750 mg (12/3/2019)  irinotecan (CAMPTOSAR) 180 mg/m2 = 376 mg in sodium chloride 0.9% 500 mL chemo infusion, 180 mg/m2 = 376 mg, Intravenous, Clinic/HOD 1 time, 14 of 14 cycles  Dose modification: 200 mg/m2 (original dose 180 mg/m2, Cycle 14)  Administration: 376 mg (8/6/2019), 376 mg (8/27/2019), 376 mg (9/10/2019), 376 mg (9/24/2019), 376 mg (11/12/2019), 418 mg (12/3/2019)  leucovorin calcium 400 mg/m2 = 835 mg in dextrose 5 % 250 mL infusion, 400 mg/m2 = 835 mg, Intravenous, Clinic/HOD 1 time, 12 of 12 cycles  Administration: 835 mg (8/6/2019), 835 mg (8/27/2019), 835 mg (9/10/2019), 835 mg (9/24/2019)      12/22/2019 -  Chemotherapy     Treatment Summary   Plan Name: OP PANC NAB-PACLITAXEL + GEMCITABINE Day 1, 8 , 15 every 28 days  Treatment Goal: Maintenance  Status: Active  Start Date: 12/31/2019  End Date: 4/14/2020 (Planned)  Provider: Karina Pettit MD  Chemotherapy: PACLitaxel-protein bound (ABRAXANE) 100 mg/m2 = 200 mg in 40 mL infusion, 100 mg/m2 = 200 mg (100 % of original dose 100 mg/m2), Intravenous, Clinic/HOD 1 time, 2 of 4 cycles  Dose modification: 100 mg/m2 (original dose 100 mg/m2, Cycle 1), 125 mg/m2 (original dose 100 mg/m2, Cycle 1)  Administration: 200 mg (12/31/2019), 200 mg (1/7/2020), 250 mg (1/14/2020), 250 mg (2/4/2020), 250 mg (2/11/2020)  gemcitabine 790 mg in sodium chloride 0.9% 250 mL chemo infusion, 400 mg/m2 = 790 mg (100 % of original dose 400 mg/m2), Intravenous, Clinic/[facility] 1 time, 2 of 4 cycles  Dose modification: 400 mg/m2 (original dose 400 mg/m2, Cycle 1), 800 mg/m2 (original dose 400 mg/m2, Cycle 1), 400 mg/m2 (original dose 400 mg/m2, Cycle 2)  Administration: 790 mg (12/31/2019), 790 mg (1/7/2020), 1,575 mg (1/14/2020), 790 mg (2/4/2020), 790  mg (2/11/2020)           Indigo Stuart is a 64 y.o.  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  Kras was MUTATED No MSI : EGFR no overexpression    Tolerating lopressor for htn and lipitor for dyslipidemia  Tolerating zofran for nausea prn chemo     Cycle one folfox avastin January 15 2019   Failed regimen and admitted with SBO and found to have duodenal cancer   Hepatic mets progressed hence changed to irinotecan dropped  oxaliplatin   Cycle one folfiri avastin April 2019     Pt was seen for left arm swelling and ultrasound revealed a DVT in her arm , post lovenox , now on eliquis   Scan reviewed again  Here to resume Xeliri which was started Nov 8 2019 December 13 2019   DVT left internal jugular vein noted October 8 19 patient started on Eliquis  Here for evaluation of her imaging studies and scans  PET-CT with increasing SUV and increasing activity the cancer no evidence of new disease  Ultrasound revealed resolution of the blood clots in her left upper extremity    December 23 2019   PET CT mixed response explained again   cea now has normalized     January 7 2020  For chemo clearance   New rash to trunk and axilla     January 13 2020  For cycle 1 day 15  Rash better but remains   CEA has increased again       February 3 2020  For cycle 2 day 1 clearance after being delayed for one week with cytopenias   + leg pain new  Is on 1/2 dose eliquis after having nosebleeds and thrombocytopenia  Both of these resolved     2-  Here for cycle 2 day 8   Pt with weakness and fatigue, decreased apprtite and chills no fever     2-  Here for cycle 2 day 15   Leg pain and weakness to where she is in a wheelchair on this new chemo   cea rising , on eliquis but platelets ok     February 24, 2020 here for clearance cycle 2 day 15 Gemzar Abraxane  Patient has a fever in office today of 102.5, she is tachycardic with a heart  rate of 110.  She states she has not had any hot beverages and she did lot just eat lunch.  She is not feeling well somewhat diaphoretic    March 2 2020 post hospitalization for UTI and febrile neutropenia   Today patient is weak and pale she is to walker to walk in clinic and she states she cannot leave clinic had a walker she needs a wheelchair  No change in PMH, PFH, PSH since last OV     March 23 2020  No further epistaxis, she remains on Eliquis   Has seen IR for hepatic embolization    April 9, 2020  Patient feeling better means with emesis and nausea was seen in the emergency room and did not receive any anti emetics.  She was told she was dehydrated received fluids and was sent home.  She remains vomiting bile imaging studies were reviewed scans ordered by Interventional Radiology reviewed no evidence of obstruction.  Patient has had leukocytosis her urine has not been checked recently.  Labs as of 4 6 reveal of an elevated CRP at 298 and an elevated LDH of 578.  Her renal function now was normal alk-phos slightly elevated to 55 AST elevated at 117 total bili normal at 0.6 patient has been having 1 loose bowel movement a day she is tolerating her chemo medicine orally with no complaints taking it 3 weeks on 1 week off      Current Outpatient Medications:     amLODIPine (NORVASC) 5 MG tablet, Take 1 tablet (5 mg total) by mouth once daily., Disp: 90 tablet, Rfl: 3    atorvastatin (LIPITOR) 20 MG tablet, Take 1 tablet (20 mg total) by mouth every evening., Disp: 90 tablet, Rfl: 3    dronabinoL (MARINOL) 5 MG capsule, Take 1 capsule (5 mg total) by mouth 2 (two) times daily before meals. To increase appetite, Disp: 60 capsule, Rfl: 2    ELIQUIS 5 mg Tab, TAKE 1 TABLET BY MOUTH TWICE A DAY (Patient taking differently: Take by mouth 2 (two) times daily. ), Disp: 60 tablet, Rfl: 2    ferrous sulfate (FEOSOL) 325 mg (65 mg iron) Tab tablet, Take 1 tablet by mouth 2 (two) times daily., Disp: , Rfl: 3     metoprolol succinate (TOPROL-XL) 25 MG 24 hr tablet, Take 1 tablet (25 mg total) by mouth every evening., Disp: 90 tablet, Rfl: 3    ondansetron (ZOFRAN-ODT) 8 MG TbDL, Take 1 tablet (8 mg total) by mouth every 12 (twelve) hours as needed., Disp: 30 tablet, Rfl: 1    potassium chloride SA (K-DUR,KLOR-CON) 20 MEQ tablet, Take 1 tablet (20 mEq total) by mouth once daily., Disp: 30 tablet, Rfl: 11    prochlorperazine (COMPAZINE) 10 MG tablet, Take 1 tablet (10 mg total) by mouth every 6 (six) hours as needed., Disp: 30 tablet, Rfl: 1    pyridoxine, vitamin B6, (VITAMIN B-6) 50 MG Tab, Take 100 mg by mouth once daily. , Disp: , Rfl:     regorafenib (STIVARGA) 40 mg Tab, Take 4 tablets (160 mg) by mouth once daily For 21 days then hold for 7 days then resume., Disp: 84 tablet, Rfl: 2    senna-docusate 8.6-50 mg (SENOKOT-S) 8.6-50 mg per tablet, Take 2 tablets by mouth nightly as needed for Constipation., Disp: , Rfl:     sucralfate (CARAFATE) 100 mg/mL suspension, Take 10 mLs (1 g total) by mouth 4 (four) times daily., Disp: 420 mL, Rfl: 1    thiamine (VITAMIN B-1) 250 MG tablet, Take 250 mg by mouth once daily., Disp: , Rfl:     Patient is tolerating Eliquis after extensive venous thrombosis.  She is tolerating Toprol-XL to help with tachycardia and hypertension.  She remains heating Zofran to help with nausea and gastritis like symptoms  Review of Systems:  General: Weight gain: No, Weight Loss: yes   Fatigue yes   Chills: No, Night Sweats: No, Insomnia: No, Excessive sleeping: yes  Respiratory:  Cough: No, Shortness of Breath:  yes  Wheezing: No, Excessive Snoring: No, Coughing up blood: No  Endocrine: Heat Intolerance: No, Cold Intolerance: No,   Excessive Thirst: No, Excessive Hunger: No  Eyes:  Blurred Vision: No, Double Vision: No   Light Sensitivity: No, Eye pain: No  Musculoskeletal: Muscle Aches/Pain: Yes   Joint Pain/Swelling: yes     Muscle Weakness:  Yes, Neck Pain: No, Back Pain:   Neurological:   increased shortness of breath with exertion nausea short of breath at rest  Headache Migraine: No, Dizziness/Vertigo:  yes  Cardiovascular: Chest Pain: No   Gastrointestinal: Nausea/Vomiting:  Yes, Constipation: No, Diarrhea: no   Psych/Cog:  Depression: No, Anxiety: No, Hallucinations: No   : Frequent Urination: No, Incontinence: No, Blood of Urine: No, Urinary Infections: No  ENT:Hearing Loss: No, Earache: No, Ringing in Ears: No  Immune: Seasonal Allergies: No, Hives and/or Rashes: No  The remainder of the review of twelve body systems was reviewed and normal  + epistaxis and weakness      General patient is in no distress well developed well nourished  Psych pleasant affect no evidence of depression no evidence of anxiety  Head normocephalic atraumatic, no hoarseness,  well-spoken speech intact  Eyes noninjected sclera conjunctiva appear pink  Face is symmetric  Skin intact no lesions noted no ecchymosis no rash  Neck with no limited range of motion no JVD evident  Chest with no use of accessory muscles no labored breathing no evidence of tachypnea  No respiratory distress, effort normal   Abdomen appears to be nondistended  Extremities with no cyanosis no evidence of edema  Neuro muscular cranial nerves appear grossly intact  Gait appears steady  No joint effusions  Behavior normal judgment normal        Lab Results   Component Value Date    WBC 15.92 (H) 04/08/2020    RBC 3.87 (L) 04/08/2020    HGB 10.4 (L) 04/08/2020    HCT 35.8 (L) 04/08/2020    MCV 93 04/08/2020    MCH 26.9 (L) 04/08/2020    MCHC 29.1 (L) 04/08/2020    RDW 17.1 (H) 04/08/2020     (H) 04/08/2020    MPV 8.2 (L) 04/08/2020    GRAN 13.0 (H) 04/08/2020    GRAN 81.3 (H) 04/08/2020    LYMPH 1.8 04/08/2020    LYMPH 11.1 (L) 04/08/2020    MONO 1.0 04/08/2020    MONO 6.0 04/08/2020    EOS 0.1 04/08/2020    BASO 0.04 04/08/2020    EOSINOPHIL 0.6 04/08/2020    BASOPHIL 0.3 04/08/2020         CMP  Sodium   Date Value Ref Range Status    04/08/2020 139 136 - 145 mmol/L Final     Potassium   Date Value Ref Range Status   04/08/2020 2.9 (L) 3.5 - 5.1 mmol/L Final     Chloride   Date Value Ref Range Status   04/08/2020 93 (L) 95 - 110 mmol/L Final     CO2   Date Value Ref Range Status   04/08/2020 33 (H) 23 - 29 mmol/L Final     Glucose   Date Value Ref Range Status   04/08/2020 105 70 - 110 mg/dL Final     BUN, Bld   Date Value Ref Range Status   04/08/2020 10 8 - 23 mg/dL Final     Creatinine   Date Value Ref Range Status   04/08/2020 0.6 0.5 - 1.4 mg/dL Final     Calcium   Date Value Ref Range Status   04/08/2020 9.2 8.7 - 10.5 mg/dL Final     Total Protein   Date Value Ref Range Status   04/08/2020 7.2 6.0 - 8.4 g/dL Final     Albumin   Date Value Ref Range Status   04/08/2020 2.2 (L) 3.5 - 5.2 g/dL Final     Total Bilirubin   Date Value Ref Range Status   04/08/2020 0.6 0.1 - 1.0 mg/dL Final     Comment:     For infants and newborns, interpretation of results should be based  on gestational age, weight and in agreement with clinical  observations.  Premature Infant recommended reference ranges:  Up to 24 hours.............<8.0 mg/dL  Up to 48 hours............<12.0 mg/dL  3-5 days..................<15.0 mg/dL  6-29 days.................<15.0 mg/dL       Alkaline Phosphatase   Date Value Ref Range Status   04/08/2020 255 (H) 55 - 135 U/L Final     AST   Date Value Ref Range Status   04/08/2020 117 (H) 10 - 40 U/L Final     ALT   Date Value Ref Range Status   04/08/2020 26 10 - 44 U/L Final     Anion Gap   Date Value Ref Range Status   04/08/2020 13 8 - 16 mmol/L Final     eGFR if    Date Value Ref Range Status   04/08/2020 >60 >60 mL/min/1.73 m^2 Final     eGFR if non    Date Value Ref Range Status   04/08/2020 >60 >60 mL/min/1.73 m^2 Final     Comment:     Calculation used to obtain the estimated glomerular filtration  rate (eGFR) is the CKD-EPI equation.      Impression:           - Normal esophagus.                         - Normal stomach.                        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 Prosthesis placed. The                         proximal end of the stent is distal to the ampulla                         (refer to images).  No msi   There are no osseous abnormalities.      Impression PET CT December 2019       Continued improvement of the hepatic metastasis with decrease in size of the multifocal hepatic lesions    Stable subcentimeter mesenteric lymph nodes    No evidence of new metastatic disease      Electronically signed by: Charissa Marroquin MD  Date: 12/11/2019  Time: 11:03     cea increasing        Dyspepsia  -     Urinalysis  -     sucralfate (CARAFATE) 100 mg/mL suspension; Take 10 mLs (1 g total) by mouth 4 (four) times daily.  Dispense: 420 mL; Refill: 1  -     prochlorperazine (COMPAZINE) 10 MG tablet; Take 1 tablet (10 mg total) by mouth every 6 (six) hours as needed.  Dispense: 30 tablet; Refill: 1    Granulocytosis  -     Urinalysis  -     Urine culture; Future; Expected date: 04/09/2020    Vomiting, intractability of vomiting not specified, presence of nausea not specified, unspecified vomiting type  -     prochlorperazine (COMPAZINE) 10 MG tablet; Take 1 tablet (10 mg total) by mouth every 6 (six) hours as needed.  Dispense: 30 tablet; Refill: 1    Hypokalemia  -     potassium chloride SA (K-DUR,KLOR-CON) 20 MEQ tablet; Take 1 tablet (20 mEq total) by mouth 2 (two) times daily. for 3 days  Dispense: 6 tablet; Refill: 0    crc and duod cancer   Check CEA next week with labs  F/U ordering UA and culture for granulocytosis  Meds ordered for N and V carafate and compazine   Insurance will no longer cover zofran   1.  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  Recent imaging reveals mixed response to chemotherapy with approximately 2 lesions in the liver increasing in size planning in  place for embolization  2.  Transaminitis stable monitoring closely   3.  Duodenal cancer with chemo post cycle 2 day 8 Abraxane Gemzar and colon cancer will need to resume some type of chemotherapy CONTINUE  Stivarga  4.  Thrombosis resolved on eliquis  left upper extremity   continue anticoagulation for such  3.  Weakness on chemo : cannot tolerate abraxane and gemzar progression of anemia and reactive thrombocytosis   4.  Hepatic metastases.  Patient will be referred to interventional Radiology and I appreciate their input it looks like she may be a good candidate for y 90   5.  Transaminitis with stent in place  7.  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

## 2020-04-09 NOTE — Clinical Note
Please call and let her know her potassium is low too so I called in extra potassium, also send her urine order to the hospital stat please and schedule OV in 1 week with cea cbc and cmp

## 2020-04-09 NOTE — TELEPHONE ENCOUNTER
Pt advised to go to lab to get stat urine today 4/9and rtc 1 week with repeat labs per Dr. Pettit. Pt confirmed apt date and times and verbalized all understanding.

## 2020-04-10 LAB
BACTERIA UR CULT: NORMAL
BACTERIA UR CULT: NORMAL

## 2020-04-11 ENCOUNTER — TELEPHONE (OUTPATIENT)
Dept: HEMATOLOGY/ONCOLOGY | Facility: CLINIC | Age: 65
End: 2020-04-11

## 2020-04-11 ENCOUNTER — PATIENT MESSAGE (OUTPATIENT)
Dept: HEMATOLOGY/ONCOLOGY | Facility: CLINIC | Age: 65
End: 2020-04-11

## 2020-04-11 DIAGNOSIS — D72.9 NEUTROPHILIA: Primary | ICD-10-CM

## 2020-04-11 NOTE — PROGRESS NOTES
Pt sent inbox message reiterating needs for u/a c&s per Bakari. Encouraged pt to go to hospital on Monday to obtain. Orders placed in Epic.

## 2020-04-13 ENCOUNTER — PATIENT MESSAGE (OUTPATIENT)
Dept: HEMATOLOGY/ONCOLOGY | Facility: CLINIC | Age: 65
End: 2020-04-13

## 2020-04-14 ENCOUNTER — TELEPHONE (OUTPATIENT)
Dept: HEMATOLOGY/ONCOLOGY | Facility: CLINIC | Age: 65
End: 2020-04-14

## 2020-04-14 ENCOUNTER — PATIENT MESSAGE (OUTPATIENT)
Dept: HEMATOLOGY/ONCOLOGY | Facility: CLINIC | Age: 65
End: 2020-04-14

## 2020-04-14 DIAGNOSIS — R19.7 DIARRHEA, UNSPECIFIED TYPE: Primary | ICD-10-CM

## 2020-04-14 RX ORDER — DIPHENOXYLATE HYDROCHLORIDE AND ATROPINE SULFATE 2.5; .025 MG/1; MG/1
1 TABLET ORAL 4 TIMES DAILY PRN
Qty: 30 TABLET | Refills: 1 | Status: SHIPPED | OUTPATIENT
Start: 2020-04-14 | End: 2020-04-16

## 2020-04-14 NOTE — TELEPHONE ENCOUNTER
Spoke with patient and informed her that per Dr. Pettit, diarrhea could be related to her diagnosis or Stivarga.  Informed her that Dr. Pettit would like to call in Lomotil to take Q 4 hours prn loose stool and to call office if not better by Friday, and she will order CT abd/pelvis with contrast.  Patient reports she has video visit with Dr. Pettit on Thursday and will give her update then. Encouraged patient to stay hydrated with electrolyte rich fluids and avoid high fiber foods.  Patient verbalized understanding.  No further questions at this time.

## 2020-04-15 ENCOUNTER — PATIENT MESSAGE (OUTPATIENT)
Dept: HEMATOLOGY/ONCOLOGY | Facility: CLINIC | Age: 65
End: 2020-04-15

## 2020-04-15 ENCOUNTER — HOSPITAL ENCOUNTER (OUTPATIENT)
Dept: RADIOLOGY | Facility: HOSPITAL | Age: 65
Discharge: HOME | End: 2020-04-15
Attending: INTERNAL MEDICINE
Payer: MEDICARE

## 2020-04-15 ENCOUNTER — TELEPHONE (OUTPATIENT)
Dept: HEMATOLOGY/ONCOLOGY | Facility: CLINIC | Age: 65
End: 2020-04-15

## 2020-04-15 DIAGNOSIS — C78.7 HEPATIC METASTASES: ICD-10-CM

## 2020-04-15 DIAGNOSIS — R63.4 WEIGHT LOSS: ICD-10-CM

## 2020-04-15 DIAGNOSIS — C18.8 OVERLAPPING MALIGNANT NEOPLASM OF COLON: ICD-10-CM

## 2020-04-15 DIAGNOSIS — R11.10 VOMITING, INTRACTABILITY OF VOMITING NOT SPECIFIED, PRESENCE OF NAUSEA NOT SPECIFIED, UNSPECIFIED VOMITING TYPE: ICD-10-CM

## 2020-04-15 DIAGNOSIS — R11.0 NAUSEA: ICD-10-CM

## 2020-04-15 DIAGNOSIS — R19.7 DIARRHEA, UNSPECIFIED TYPE: Primary | ICD-10-CM

## 2020-04-15 DIAGNOSIS — C17.0 DUODENAL CANCER: ICD-10-CM

## 2020-04-15 DIAGNOSIS — R19.7 DIARRHEA, UNSPECIFIED TYPE: ICD-10-CM

## 2020-04-15 PROCEDURE — 74177 CT ABD & PELVIS W/CONTRAST: CPT | Mod: TC

## 2020-04-15 PROCEDURE — 25500020 PHARM REV CODE 255: Performed by: INTERNAL MEDICINE

## 2020-04-15 PROCEDURE — 74177 CT ABD & PELVIS W/CONTRAST: CPT | Mod: 26,,, | Performed by: RADIOLOGY

## 2020-04-15 PROCEDURE — 74177 CT ABDOMEN PELVIS WITH CONTRAST: ICD-10-PCS | Mod: 26,,, | Performed by: RADIOLOGY

## 2020-04-15 RX ADMIN — IOHEXOL 100 ML: 350 INJECTION, SOLUTION INTRAVENOUS at 05:04

## 2020-04-15 NOTE — TELEPHONE ENCOUNTER
Pt made aware of need for STAT CT scan. Pt scheduled for 1830 tonight as she reports she last ate around 1230. Pt confirmed appt. IM sent to [facility] for assistance with approval.     ----- Message from Karina Pettit MD sent at 4/15/2020  2:49 PM CDT -----  Needs stat CT abdomen pelvis

## 2020-04-16 ENCOUNTER — TELEPHONE (OUTPATIENT)
Dept: HEMATOLOGY/ONCOLOGY | Facility: CLINIC | Age: 65
End: 2020-04-16

## 2020-04-16 ENCOUNTER — PATIENT MESSAGE (OUTPATIENT)
Dept: HEMATOLOGY/ONCOLOGY | Facility: CLINIC | Age: 65
End: 2020-04-16

## 2020-04-16 ENCOUNTER — OFFICE VISIT (OUTPATIENT)
Dept: HEMATOLOGY/ONCOLOGY | Facility: CLINIC | Age: 65
End: 2020-04-16
Payer: MEDICARE

## 2020-04-16 DIAGNOSIS — E87.6 HYPOKALEMIA: Primary | ICD-10-CM

## 2020-04-16 PROCEDURE — 1159F MED LIST DOCD IN RCRD: CPT | Mod: ,,, | Performed by: INTERNAL MEDICINE

## 2020-04-16 PROCEDURE — 99215 PR OFFICE/OUTPT VISIT, EST, LEVL V, 40-54 MIN: ICD-10-PCS | Mod: 95,,, | Performed by: INTERNAL MEDICINE

## 2020-04-16 PROCEDURE — 1101F PR PT FALLS ASSESS DOC 0-1 FALLS W/OUT INJ PAST YR: ICD-10-PCS | Mod: ,,, | Performed by: INTERNAL MEDICINE

## 2020-04-16 PROCEDURE — 3288F FALL RISK ASSESSMENT DOCD: CPT | Mod: ,,, | Performed by: INTERNAL MEDICINE

## 2020-04-16 PROCEDURE — 99215 OFFICE O/P EST HI 40 MIN: CPT | Mod: 95,,, | Performed by: INTERNAL MEDICINE

## 2020-04-16 PROCEDURE — 3288F PR FALLS RISK ASSESSMENT DOCUMENTED: ICD-10-PCS | Mod: ,,, | Performed by: INTERNAL MEDICINE

## 2020-04-16 PROCEDURE — 1159F PR MEDICATION LIST DOCUMENTED IN MEDICAL RECORD: ICD-10-PCS | Mod: ,,, | Performed by: INTERNAL MEDICINE

## 2020-04-16 PROCEDURE — 1101F PT FALLS ASSESS-DOCD LE1/YR: CPT | Mod: ,,, | Performed by: INTERNAL MEDICINE

## 2020-04-16 RX ORDER — POTASSIUM CHLORIDE 20 MEQ/1
40 TABLET, EXTENDED RELEASE ORAL DAILY
Qty: 6 TABLET | Refills: 0 | Status: SHIPPED | OUTPATIENT
Start: 2020-04-16 | End: 2020-04-18

## 2020-04-16 RX ORDER — ACETAMINOPHEN 325 MG/1
650 TABLET ORAL
Status: CANCELLED
Start: 2020-04-21

## 2020-04-16 RX ORDER — SODIUM CHLORIDE 0.9 % (FLUSH) 0.9 %
10 SYRINGE (ML) INJECTION
Status: CANCELLED | OUTPATIENT
Start: 2020-04-21

## 2020-04-16 RX ORDER — FAMOTIDINE 10 MG/ML
20 INJECTION INTRAVENOUS
Status: CANCELLED
Start: 2020-04-21

## 2020-04-16 RX ORDER — SODIUM CHLORIDE 0.9 % (FLUSH) 0.9 %
10 SYRINGE (ML) INJECTION
Status: CANCELLED | OUTPATIENT
Start: 2020-04-22

## 2020-04-16 RX ORDER — DIPHENHYDRAMINE HYDROCHLORIDE 50 MG/ML
50 INJECTION INTRAMUSCULAR; INTRAVENOUS ONCE AS NEEDED
Status: CANCELLED | OUTPATIENT
Start: 2020-04-21

## 2020-04-16 RX ORDER — EPINEPHRINE 0.3 MG/.3ML
0.3 INJECTION SUBCUTANEOUS ONCE AS NEEDED
Status: CANCELLED | OUTPATIENT
Start: 2020-04-21

## 2020-04-16 RX ORDER — HEPARIN 100 UNIT/ML
500 SYRINGE INTRAVENOUS
Status: CANCELLED | OUTPATIENT
Start: 2020-04-21

## 2020-04-16 RX ORDER — HEPARIN 100 UNIT/ML
500 SYRINGE INTRAVENOUS
Status: CANCELLED | OUTPATIENT
Start: 2020-04-22

## 2020-04-16 NOTE — PLAN OF CARE
START OFF PATHWAY REGIMEN - Colorectal            Docetaxel (Taxotere)       Oxaliplatin (Eloxatin)       Leucovorin       5-Fluorouracil           Additional Orders: Premedicate with dexamethasone 8 mg PO bid for three   days beginning 1 day prior to chemotherapy.  Ref: Wm et al. Lancet Oncol   2016;17: [id number]    **Always confirm dose/schedule in your pharmacy ordering system**    Patient Characteristics:  Distant Metastases, Third Line, KRAS Mutation Positive/Unknown, BRAF Mutation   Wild-Type/Unknown, KATARZYNA/pMMR  Therapeutic Status: Distant Metastases  BRAF Mutation Status: Quantity Not Sufficient  KRAS/NRAS Mutation Status: Mutation Positive  Line of Therapy: Third Line  Microsatellite/Mismatch Repair Status: KATARZYNA/pMMR  Intent of Therapy:  Non-Curative / Palliative Intent, Discussed with Patient

## 2020-04-16 NOTE — Clinical Note
Cancel chemo pill Stivarga.  Set up urgent chemotherapy for Monday new regimen please get Auth start as soon as possible patient is aware her cancer is getting worse she return for an office visit in 2 weeks please call and let her know her potassium was low hence I called in extra potassium for to take for 2 days.  Set up labs to be CBC and diff CMP in 2 weeks she is cleared for chemotherapy to start Monday please get Marcelo to do chemo school she has not had Taxotere in the past

## 2020-04-16 NOTE — TELEPHONE ENCOUNTER
----- Message from Kellen Gomez sent at 4/16/2020  9:46 AM CDT -----  Contact: Riley Stuart (Son)  Type: Needs Medical Advice  Who Called:  Riley Stuart (Son)  Best Call Back Number:   Additional Information: Patient has been logged in and waiting for her 9am virtual visit but Dr Pettit never connected.

## 2020-04-16 NOTE — PROGRESS NOTES
The patient location is:  At home  The chief complaint leading to consultation is:  How are my blood counts  Patient has consented to this visit via televisit  Visit type: Virtual visit with synchronous audio and video plus interrupted audio   Total time spent with patient:  Over 50 min with more than 90% on medical discussion, counseling and coordination of care.  Each patient to whom he or she provides medical services by telemedicine is:  (1) informed of the relationship between the physician and patient and the respective role of any other health care provider with respect to management of the patient; and (2) notified that he or she may decline to receive medical services by telemedicine and may withdraw from such care at any time.    This visit is to also involve counseling, patient education, informed consent for ordered diagnostic and laboratory tests and motivational interviewing    This document has been created using [employer] dictation software and free typing.  It has been checked for errors but some errors may still exist.        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  - Clinical: Stage IV (cTX, cNX, cM1) - Signed by Karina Pettit MD on 3/11/2019       Chemotherapy     Treatment Summary   Plan Name: OP COLORECTAL FOLFOX + BEVACIZUMAB Q2W  Treatment Goal: Maintenance  Status: Active  Start Date: 1/21/2019  End Date: 7/3/2019 (Planned)  Provider: Karina Pettit MD  Chemotherapy: fluorouracil injection 835 mg, 400 mg/m2 = 835 mg, Intravenous, Clinic/HOD 1 time, 4 of 12 cycles    fluorouracil (ADRUCIL) 2,400 mg/m2 = 5,015 mg in sodium chloride 0.9% 200.3 mL chemo infusion, 2,400 mg/m2 = 5,015 mg, Intravenous, Over 46 hours, 4 of 12 cycles    bevacizumab (AVASTIN) 5 mg/kg = 500 mg in  sodium chloride 0.9% 100 mL chemo infusion, 5 mg/kg = 500 mg, Intravenous, Clinic/HOD 1 time, 4 of 12 cycles    leucovorin calcium 400 mg/m2 = 835 mg in dextrose 5 % 250 mL infusion, 400 mg/m2 = 835 mg, Intravenous, Clinic/HOD 1 time, 4 of 12 cycles    oxaliplatin (ELOXATIN) 85 mg/m2 = 178 mg in dextrose 5 % 500 mL chemo infusion, 85 mg/m2 = 178 mg, Intravenous, Clinic/HOD 1 time, 4 of 12 cycles          4/8/2019 - 4/8/2019 Chemotherapy     Treatment Summary   Plan Name: OP COLORECTAL FOLFIRI + BEVACIZUMAB Q2W  Treatment Goal: Control  Status: Inactive  Start Date: [No treatment day found]  End Date: [No treatment day found]  Provider: Karina Pettit MD  Chemotherapy: fluorouracil injection 835 mg, 400 mg/m2 = 835 mg, Intravenous, Clinic/HOD 1 time, 0 of 12 cycles  fluorouracil (ADRUCIL) 2,400 mg/m2 = 5,015 mg in sodium chloride 0.9% 200.3 mL chemo infusion, 2,400 mg/m2 = 5,015 mg, Intravenous, Over 46 hours, 0 of 12 cycles  bevacizumab (AVASTIN) 5 mg/kg = 500 mg in sodium chloride 0.9% 100 mL chemo infusion, 5 mg/kg = 500 mg, Intravenous, Clinic/HOD 1 time, 0 of 12 cycles  irinotecan (CAMPTOSAR) 180 mg/m2 = 376 mg in sodium chloride 0.9% 500 mL chemo infusion, 180 mg/m2 = 376 mg, Intravenous, Clinic/HOD 1 time, 0 of 12 cycles  leucovorin calcium 400 mg/m2 = 835 mg in dextrose 5 % 250 mL infusion, 400 mg/m2 = 835 mg, Intravenous, Clinic/HOD 1 time, 0 of 12 cycles      12/22/2019 -  Chemotherapy     Treatment Summary   Plan Name: OP PANC NAB-PACLITAXEL + GEMCITABINE Day 1, 8 , 15 every 28 days  Treatment Goal: Maintenance  Status: Active  Start Date: 12/31/2019  End Date: 4/14/2020 (Planned)  Provider: Karina Pettit MD  Chemotherapy: PACLitaxel-protein bound (ABRAXANE) 100 mg/m2 = 200 mg in 40 mL infusion, 100 mg/m2 = 200 mg (100 % of original dose 100 mg/m2), Intravenous, Clinic/HOD 1 time, 2 of 4 cycles  Dose modification: 100 mg/m2 (original dose 100 mg/m2, Cycle 1), 125 mg/m2 (original dose 100 mg/m2, Cycle  1)  Administration: 200 mg (12/31/2019), 200 mg (1/7/2020), 250 mg (1/14/2020), 250 mg (2/4/2020), 250 mg (2/11/2020)  gemcitabine 790 mg in sodium chloride 0.9% 250 mL chemo infusion, 400 mg/m2 = 790 mg (100 % of original dose 400 mg/m2), Intravenous, Clinic/HOD 1 time, 2 of 4 cycles  Dose modification: 400 mg/m2 (original dose 400 mg/m2, Cycle 1), 800 mg/m2 (original dose 400 mg/m2, Cycle 1), 400 mg/m2 (original dose 400 mg/m2, Cycle 2)  Administration: 790 mg (12/31/2019), 790 mg (1/7/2020), 1,575 mg (1/14/2020), 790 mg (2/4/2020), 790 mg (2/11/2020)        Lung metastases    1/15/2019 Initial Diagnosis     Lung metastases      4/8/2019 - 4/8/2019 Chemotherapy     Treatment Summary   Plan Name: OP COLORECTAL FOLFIRI + BEVACIZUMAB Q2W  Treatment Goal: Control  Status: Inactive  Start Date: [No treatment day found]  End Date: [No treatment day found]  Provider: Karina Pettit MD  Chemotherapy: fluorouracil injection 835 mg, 400 mg/m2 = 835 mg, Intravenous, Clinic/HOD 1 time, 0 of 12 cycles  fluorouracil (ADRUCIL) 2,400 mg/m2 = 5,015 mg in sodium chloride 0.9% 200.3 mL chemo infusion, 2,400 mg/m2 = 5,015 mg, Intravenous, Over 46 hours, 0 of 12 cycles  bevacizumab (AVASTIN) 5 mg/kg = 500 mg in sodium chloride 0.9% 100 mL chemo infusion, 5 mg/kg = 500 mg, Intravenous, Clinic/HOD 1 time, 0 of 12 cycles  irinotecan (CAMPTOSAR) 180 mg/m2 = 376 mg in sodium chloride 0.9% 500 mL chemo infusion, 180 mg/m2 = 376 mg, Intravenous, Clinic/HOD 1 time, 0 of 12 cycles  leucovorin calcium 400 mg/m2 = 835 mg in dextrose 5 % 250 mL infusion, 400 mg/m2 = 835 mg, Intravenous, Clinic/HOD 1 time, 0 of 12 cycles      4/8/2019 - 12/23/2019 Chemotherapy     Treatment Summary   Plan Name: OP COLORECTAL FOLFIRI + BEVACIZUMAB Q2W  Treatment Goal: Control  Status: Inactive  Start Date: 4/8/2019  End Date: 12/3/2019  Provider: Karina Pettit MD  Chemotherapy: fluorouracil injection 835 mg, 400 mg/m2 = 835 mg, Intravenous, Clinic/HOD 1 time, 12  of 12 cycles  Administration: 835 mg (8/6/2019), 835 mg (8/27/2019), 835 mg (8/27/2019), 835 mg (9/10/2019), 835 mg (9/24/2019)  fluorouracil (ADRUCIL) 2,400 mg/m2 = 5,015 mg in sodium chloride 0.9% 200.3 mL chemo infusion, 2,400 mg/m2 = 5,015 mg, Intravenous, Over 46 hours, 12 of 12 cycles  Administration: 5,015 mg (8/6/2019), 5,015 mg (8/27/2019), 5,015 mg (9/10/2019), 5,015 mg (9/24/2019)  bevacizumab (AVASTIN) 5 mg/kg = 500 mg in sodium chloride 0.9% 100 mL chemo infusion, 5 mg/kg = 500 mg, Intravenous, Clinic/HOD 1 time, 14 of 14 cycles  Dose modification: 7.5 mg/kg (original dose 5 mg/kg, Cycle 14)  Administration: 500 mg (8/6/2019), 500 mg (8/27/2019), 500 mg (9/10/2019), 500 mg (9/24/2019), 500 mg (11/12/2019), 750 mg (12/3/2019)  irinotecan (CAMPTOSAR) 180 mg/m2 = 376 mg in sodium chloride 0.9% 500 mL chemo infusion, 180 mg/m2 = 376 mg, Intravenous, Clinic/HOD 1 time, 14 of 14 cycles  Dose modification: 200 mg/m2 (original dose 180 mg/m2, Cycle 14)  Administration: 376 mg (8/6/2019), 376 mg (8/27/2019), 376 mg (9/10/2019), 376 mg (9/24/2019), 376 mg (11/12/2019), 418 mg (12/3/2019)  leucovorin calcium 400 mg/m2 = 835 mg in dextrose 5 % 250 mL infusion, 400 mg/m2 = 835 mg, Intravenous, Clinic/HOD 1 time, 12 of 12 cycles  Administration: 835 mg (8/6/2019), 835 mg (8/27/2019), 835 mg (9/10/2019), 835 mg (9/24/2019)      12/22/2019 -  Chemotherapy     Treatment Summary   Plan Name: OP PANC NAB-PACLITAXEL + GEMCITABINE Day 1, 8 , 15 every 28 days  Treatment Goal: Maintenance  Status: Active  Start Date: 12/31/2019  End Date: 4/14/2020 (Planned)  Provider: Karina Pettit MD  Chemotherapy: PACLitaxel-protein bound (ABRAXANE) 100 mg/m2 = 200 mg in 40 mL infusion, 100 mg/m2 = 200 mg (100 % of original dose 100 mg/m2), Intravenous, Clinic/HOD 1 time, 2 of 4 cycles  Dose modification: 100 mg/m2 (original dose 100 mg/m2, Cycle 1), 125 mg/m2 (original dose 100 mg/m2, Cycle 1)  Administration: 200 mg (12/31/2019), 200  mg (1/7/2020), 250 mg (1/14/2020), 250 mg (2/4/2020), 250 mg (2/11/2020)  gemcitabine 790 mg in sodium chloride 0.9% 250 mL chemo infusion, 400 mg/m2 = 790 mg (100 % of original dose 400 mg/m2), Intravenous, Clinic/HOD 1 time, 2 of 4 cycles  Dose modification: 400 mg/m2 (original dose 400 mg/m2, Cycle 1), 800 mg/m2 (original dose 400 mg/m2, Cycle 1), 400 mg/m2 (original dose 400 mg/m2, Cycle 2)  Administration: 790 mg (12/31/2019), 790 mg (1/7/2020), 1,575 mg (1/14/2020), 790 mg (2/4/2020), 790 mg (2/11/2020)        Hepatic metastases    1/15/2019 Initial Diagnosis     Hepatic metastases      4/8/2019 - 4/8/2019 Chemotherapy     Treatment Summary   Plan Name: OP COLORECTAL FOLFIRI + BEVACIZUMAB Q2W  Treatment Goal: Control  Status: Inactive  Start Date: [No treatment day found]  End Date: [No treatment day found]  Provider: Karina Pettit MD  Chemotherapy: fluorouracil injection 835 mg, 400 mg/m2 = 835 mg, Intravenous, Clinic/HOD 1 time, 0 of 12 cycles  fluorouracil (ADRUCIL) 2,400 mg/m2 = 5,015 mg in sodium chloride 0.9% 200.3 mL chemo infusion, 2,400 mg/m2 = 5,015 mg, Intravenous, Over 46 hours, 0 of 12 cycles  bevacizumab (AVASTIN) 5 mg/kg = 500 mg in sodium chloride 0.9% 100 mL chemo infusion, 5 mg/kg = 500 mg, Intravenous, Clinic/HOD 1 time, 0 of 12 cycles  irinotecan (CAMPTOSAR) 180 mg/m2 = 376 mg in sodium chloride 0.9% 500 mL chemo infusion, 180 mg/m2 = 376 mg, Intravenous, Clinic/HOD 1 time, 0 of 12 cycles  leucovorin calcium 400 mg/m2 = 835 mg in dextrose 5 % 250 mL infusion, 400 mg/m2 = 835 mg, Intravenous, Clinic/HOD 1 time, 0 of 12 cycles      4/8/2019 - 12/23/2019 Chemotherapy     Treatment Summary   Plan Name: OP COLORECTAL FOLFIRI + BEVACIZUMAB Q2W  Treatment Goal: Control  Status: Inactive  Start Date: 4/8/2019  End Date: 12/3/2019  Provider: Karina Pettit MD  Chemotherapy: fluorouracil injection 835 mg, 400 mg/m2 = 835 mg, Intravenous, Clinic/HOD 1 time, 12 of 12 cycles  Administration: 835 mg  (8/6/2019), 835 mg (8/27/2019), 835 mg (8/27/2019), 835 mg (9/10/2019), 835 mg (9/24/2019)  fluorouracil (ADRUCIL) 2,400 mg/m2 = 5,015 mg in sodium chloride 0.9% 200.3 mL chemo infusion, 2,400 mg/m2 = 5,015 mg, Intravenous, Over 46 hours, 12 of 12 cycles  Administration: 5,015 mg (8/6/2019), 5,015 mg (8/27/2019), 5,015 mg (9/10/2019), 5,015 mg (9/24/2019)  bevacizumab (AVASTIN) 5 mg/kg = 500 mg in sodium chloride 0.9% 100 mL chemo infusion, 5 mg/kg = 500 mg, Intravenous, Clinic/HOD 1 time, 14 of 14 cycles  Dose modification: 7.5 mg/kg (original dose 5 mg/kg, Cycle 14)  Administration: 500 mg (8/6/2019), 500 mg (8/27/2019), 500 mg (9/10/2019), 500 mg (9/24/2019), 500 mg (11/12/2019), 750 mg (12/3/2019)  irinotecan (CAMPTOSAR) 180 mg/m2 = 376 mg in sodium chloride 0.9% 500 mL chemo infusion, 180 mg/m2 = 376 mg, Intravenous, Clinic/HOD 1 time, 14 of 14 cycles  Dose modification: 200 mg/m2 (original dose 180 mg/m2, Cycle 14)  Administration: 376 mg (8/6/2019), 376 mg (8/27/2019), 376 mg (9/10/2019), 376 mg (9/24/2019), 376 mg (11/12/2019), 418 mg (12/3/2019)  leucovorin calcium 400 mg/m2 = 835 mg in dextrose 5 % 250 mL infusion, 400 mg/m2 = 835 mg, Intravenous, Clinic/HOD 1 time, 12 of 12 cycles  Administration: 835 mg (8/6/2019), 835 mg (8/27/2019), 835 mg (9/10/2019), 835 mg (9/24/2019)      12/22/2019 -  Chemotherapy     Treatment Summary   Plan Name: OP PANC NAB-PACLITAXEL + GEMCITABINE Day 1, 8 , 15 every 28 days  Treatment Goal: Maintenance  Status: Active  Start Date: 12/31/2019  End Date: 4/14/2020 (Planned)  Provider: Karina Pettit MD  Chemotherapy: PACLitaxel-protein bound (ABRAXANE) 100 mg/m2 = 200 mg in 40 mL infusion, 100 mg/m2 = 200 mg (100 % of original dose 100 mg/m2), Intravenous, Clinic/HOD 1 time, 2 of 4 cycles  Dose modification: 100 mg/m2 (original dose 100 mg/m2, Cycle 1), 125 mg/m2 (original dose 100 mg/m2, Cycle 1)  Administration: 200 mg (12/31/2019), 200 mg (1/7/2020), 250 mg (1/14/2020), 250  mg (2/4/2020), 250 mg (2/11/2020)  gemcitabine 790 mg in sodium chloride 0.9% 250 mL chemo infusion, 400 mg/m2 = 790 mg (100 % of original dose 400 mg/m2), Intravenous, Clinic/HOD 1 time, 2 of 4 cycles  Dose modification: 400 mg/m2 (original dose 400 mg/m2, Cycle 1), 800 mg/m2 (original dose 400 mg/m2, Cycle 1), 400 mg/m2 (original dose 400 mg/m2, Cycle 2)  Administration: 790 mg (12/31/2019), 790 mg (1/7/2020), 1,575 mg (1/14/2020), 790 mg (2/4/2020), 790 mg (2/11/2020)           Indigo Stuart is a 64 y.o.  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  Kras was MUTATED No MSI : EGFR no overexpression    Tolerating lopressor for htn and lipitor for dyslipidemia  Tolerating zofran for nausea prn chemo     Cycle one folfox avastin January 15 2019   Failed regimen and admitted with SBO and found to have duodenal cancer   Hepatic mets progressed hence changed to irinotecan dropped  oxaliplatin   Cycle one folfiri avastin April 2019     Pt was seen for left arm swelling and ultrasound revealed a DVT in her arm , post lovenox , now on eliquis   Scan reviewed again  Here to resume Xeliri which was started Nov 8 2019 December 13 2019   DVT left internal jugular vein noted October 8 19 patient started on Eliquis  Here for evaluation of her imaging studies and scans  PET-CT with increasing SUV and increasing activity the cancer no evidence of new disease  Ultrasound revealed resolution of the blood clots in her left upper extremity    December 23 2019   PET CT mixed response explained again   cea now has normalized     January 7 2020  For chemo clearance   New rash to trunk and axilla     January 13 2020  For cycle 1 day 15  Rash better but remains   CEA has increased again       February 3 2020  For cycle 2 day 1 clearance after being delayed for one week with cytopenias   + leg pain new  Is on 1/2 dose eliquis after having nosebleeds  and thrombocytopenia  Both of these resolved     2-  Here for cycle 2 day 8   Pt with weakness and fatigue, decreased apprtite and chills no fever     2-  Here for cycle 2 day 15   Leg pain and weakness to where she is in a wheelchair on this new chemo   cea rising , on eliquis but platelets ok     February 24, 2020 here for clearance cycle 2 day 15 Gemzar Abraxane  Patient has a fever in office today of 102.5, she is tachycardic with a heart rate of 110.  She states she has not had any hot beverages and she did lot just eat lunch.  She is not feeling well somewhat diaphoretic    March 2 2020 post hospitalization for UTI and febrile neutropenia   Today patient is weak and pale she is to walker to walk in clinic and she states she cannot leave clinic had a walker she needs a wheelchair  No change in PMH, PFH, PSH since last OV     March 23 2020  No further epistaxis, she remains on Eliquis   Has seen IR for hepatic embolization    April 9, 2020  Patient feeling better means with emesis and nausea was seen in the emergency room and did not receive any anti emetics.  She was told she was dehydrated received fluids and was sent home.  She remains vomiting bile imaging studies were reviewed scans ordered by Interventional Radiology reviewed no evidence of obstruction.  Patient has had leukocytosis her urine has not been checked recently.  Labs as of 4 6 reveal of an elevated CRP at 298 and an elevated LDH of 578.  Her renal function now was normal alk-phos slightly elevated to 55 AST elevated at 117 total bili normal at 0.6 patient has been having 1 loose bowel movement a day she is tolerating her chemo medicine orally with no complaints taking it 3 weeks on 1 week off    April 16, 2020 patient has had a CT of abdomen and pelvis performed yesterday after she complained of continuous intractable nausea and I was worried about a bowel obstruction.  Scan did not reveal bowel obstruction but she does have an  increase in progression of metastatic hepatic disease causing some compression of vena cava  She is already on a blood thinner for prior history of thrombotic event and she is tolerating this without any evidence of bleeding she is stable on Lipitor for dyslipidemia and started Stivarga    Current Outpatient Medications:     amLODIPine (NORVASC) 5 MG tablet, Take 1 tablet (5 mg total) by mouth once daily., Disp: 90 tablet, Rfl: 3    atorvastatin (LIPITOR) 20 MG tablet, Take 1 tablet (20 mg total) by mouth every evening., Disp: 90 tablet, Rfl: 3    diphenoxylate-atropine 2.5-0.025 mg (LOMOTIL) 2.5-0.025 mg per tablet, Take 1 tablet by mouth 4 (four) times daily as needed for Diarrhea., Disp: 30 tablet, Rfl: 1    dronabinoL (MARINOL) 5 MG capsule, Take 1 capsule (5 mg total) by mouth 2 (two) times daily before meals. To increase appetite, Disp: 60 capsule, Rfl: 2    ELIQUIS 5 mg Tab, TAKE 1 TABLET BY MOUTH TWICE A DAY (Patient taking differently: Take by mouth 2 (two) times daily. ), Disp: 60 tablet, Rfl: 2    ferrous sulfate (FEOSOL) 325 mg (65 mg iron) Tab tablet, Take 1 tablet by mouth 2 (two) times daily., Disp: , Rfl: 3    metoprolol succinate (TOPROL-XL) 25 MG 24 hr tablet, Take 1 tablet (25 mg total) by mouth every evening., Disp: 90 tablet, Rfl: 3    ondansetron (ZOFRAN-ODT) 8 MG TbDL, Take 1 tablet (8 mg total) by mouth every 12 (twelve) hours as needed., Disp: 30 tablet, Rfl: 1    potassium chloride SA (K-DUR,KLOR-CON) 20 MEQ tablet, Take 1 tablet (20 mEq total) by mouth once daily., Disp: 30 tablet, Rfl: 11    prochlorperazine (COMPAZINE) 10 MG tablet, Take 1 tablet (10 mg total) by mouth every 6 (six) hours as needed., Disp: 30 tablet, Rfl: 1    pyridoxine, vitamin B6, (VITAMIN B-6) 50 MG Tab, Take 100 mg by mouth once daily. , Disp: , Rfl:     regorafenib (STIVARGA) 40 mg Tab, Take 4 tablets (160 mg) by mouth once daily For 21 days then hold for 7 days then resume., Disp: 84 tablet, Rfl: 2     senna-docusate 8.6-50 mg (SENOKOT-S) 8.6-50 mg per tablet, Take 2 tablets by mouth nightly as needed for Constipation., Disp: , Rfl:     sucralfate (CARAFATE) 100 mg/mL suspension, Take 10 mLs (1 g total) by mouth 4 (four) times daily., Disp: 420 mL, Rfl: 1    thiamine (VITAMIN B-1) 250 MG tablet, Take 250 mg by mouth once daily., Disp: , Rfl:   No current facility-administered medications for this visit.     Patient is tolerating Eliquis after extensive venous thrombosis.  She is tolerating Toprol-XL to help with tachycardia and hypertension.  She remains heating Zofran to help with nausea and gastritis like symptoms  Review of Systems:  General: Weight gain: No, Weight Loss: yes   Fatigue yes   Chills: No, Night Sweats: No, Insomnia: No, Excessive sleeping: yes  Respiratory:  Cough: No, Shortness of Breath:  yes  Wheezing: No, Excessive Snoring: No, Coughing up blood: No  Endocrine: Heat Intolerance: No, Cold Intolerance: No,   Excessive Thirst: No, Excessive Hunger: No  Eyes:  Blurred Vision: No, Double Vision: No   Light Sensitivity: No, Eye pain: No  Musculoskeletal: Muscle Aches/Pain: Yes   Joint Pain/Swelling: yes     Muscle Weakness:  Yes, Neck Pain: No, Back Pain:   Neurological:  increased shortness of breath with exertion nausea short of breath at rest  Headache Migraine: No, Dizziness/Vertigo:  yes  Cardiovascular: Chest Pain: No   Gastrointestinal: Nausea/Vomiting:  Yes, Constipation: No, Diarrhea: no was having stomach pain   Psych/Cog:  Depression: No, Anxiety: No, Hallucinations: No   : Frequent Urination: No, Incontinence: No, Blood of Urine: No, Urinary Infections: No  ENT:Hearing Loss: No, Earache: No, Ringing in Ears: No  Immune: Seasonal Allergies: No, Hives and/or Rashes: No  The remainder of the review of twelve body systems was reviewed and normal  + epistaxis and weakness      General patient is in no distress well developed well nourished  Psych pleasant affect no evidence of depression  no evidence of anxiety  Head normocephalic atraumatic, no hoarseness,  well-spoken speech intact  Eyes noninjected sclera conjunctiva appear pink  Face is symmetric  Skin intact no lesions noted no ecchymosis no rash  Neck with no limited range of motion no JVD evident  Chest with no use of accessory muscles no labored breathing no evidence of tachypnea  No respiratory distress, effort normal   Abdomen appears to be nondistended  Extremities with no cyanosis no evidence of edema  Neuro muscular cranial nerves appear grossly intact  Gait appears steady  No joint effusions  Behavior normal judgment normal        Lab Results   Component Value Date    WBC 12.21 04/15/2020    RBC 3.72 (L) 04/15/2020    HGB 9.7 (L) 04/15/2020    HCT 33.0 (L) 04/15/2020    MCV 89 04/15/2020    MCH 26.1 (L) 04/15/2020    MCHC 29.4 (L) 04/15/2020    RDW 16.8 (H) 04/15/2020     (H) 04/15/2020    MPV 8.4 (L) 04/15/2020    GRAN 9.3 (H) 04/15/2020    GRAN 76.3 (H) 04/15/2020    LYMPH 1.5 04/15/2020    LYMPH 12.6 (L) 04/15/2020    MONO 1.0 04/15/2020    MONO 8.4 04/15/2020    EOS 0.2 04/15/2020    BASO 0.08 04/15/2020    EOSINOPHIL 1.3 04/15/2020    BASOPHIL 0.7 04/15/2020         CMP  Sodium   Date Value Ref Range Status   04/15/2020 138 136 - 145 mmol/L Final     Potassium   Date Value Ref Range Status   04/15/2020 2.8 (L) 3.5 - 5.1 mmol/L Final     Chloride   Date Value Ref Range Status   04/15/2020 103 95 - 110 mmol/L Final     CO2   Date Value Ref Range Status   04/15/2020 23 23 - 29 mmol/L Final     Glucose   Date Value Ref Range Status   04/15/2020 101 70 - 110 mg/dL Final     BUN, Bld   Date Value Ref Range Status   04/15/2020 5 (L) 8 - 23 mg/dL Final     Creatinine   Date Value Ref Range Status   04/15/2020 0.6 0.5 - 1.4 mg/dL Final     Calcium   Date Value Ref Range Status   04/15/2020 8.5 (L) 8.7 - 10.5 mg/dL Final     Total Protein   Date Value Ref Range Status   04/15/2020 6.8 6.0 - 8.4 g/dL Final     Albumin   Date Value Ref  Range Status   04/15/2020 1.8 (L) 3.5 - 5.2 g/dL Final     Total Bilirubin   Date Value Ref Range Status   04/15/2020 0.6 0.1 - 1.0 mg/dL Final     Comment:     For infants and newborns, interpretation of results should be based  on gestational age, weight and in agreement with clinical  observations.  Premature Infant recommended reference ranges:  Up to 24 hours.............<8.0 mg/dL  Up to 48 hours............<12.0 mg/dL  3-5 days..................<15.0 mg/dL  6-29 days.................<15.0 mg/dL       Alkaline Phosphatase   Date Value Ref Range Status   04/15/2020 293 (H) 55 - 135 U/L Final     AST   Date Value Ref Range Status   04/15/2020 118 (H) 10 - 40 U/L Final     ALT   Date Value Ref Range Status   04/15/2020 39 10 - 44 U/L Final     Anion Gap   Date Value Ref Range Status   04/15/2020 12 8 - 16 mmol/L Final     eGFR if    Date Value Ref Range Status   04/15/2020 >60 >60 mL/min/1.73 m^2 Final     eGFR if non    Date Value Ref Range Status   04/15/2020 >60 >60 mL/min/1.73 m^2 Final     Comment:     Calculation used to obtain the estimated glomerular filtration  rate (eGFR) is the CKD-EPI equation.      Impression:           - Normal esophagus.                        - Normal stomach.                        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 Prosthesis placed. The                         proximal end of the stent is distal to the ampulla                         (refer to images).  No msi   There are no osseous abnormalities.      Impression PET CT December 2019       Continued improvement of the hepatic metastasis with decrease in size of the multifocal hepatic lesions    Stable subcentimeter mesenteric lymph nodes    No evidence of new metastatic disease      Electronically signed by: Charissa Marroquin MD  Date: 12/11/2019  Time: 11:03          FINDINGS:  The liver itself is normal are clear enlarged however there  are too numerous to count metastatic tumors of the liver parenchyma almost all of which have increased in size since the prior study.  Largest tumor in the posterior right lobe measures 9.2 cm.  The largest tumor in the left lobe measures 4.9 cm.  The gallbladder is absent.  Intrahepatic biliary dilatation is not seen.  The common bile duct is not dilated down to the ampulla.  The pancreas itself is of normal contour and CT density.  There is a large stent extending from the 4th portion of the duodenum into the jejunum.  This is open without dilation of the proximal duodenum or stomach seen.  The spleen is small and of normal CT density.    The adrenal glands are not enlarged.  The kidneys are of normal size contour and contrast enhancement.  There is a stable 3.3 cm cyst of the lower pole of the left kidney.  Stone or hydronephrosis is not seen.  The abdominal aorta is of normal caliber.  The inferior vena cava behind the liver is significantly compressed by 2 metastases 1 of which is in the caudate lobe and the other in the right lobe.  At the entry of the hepatic veins and a Salazar a more normal caliber leading to the right atrium.  There is edema of the central mesentery etiology uncertain but in the mid and lower abdomen the inferior vena cava again a compares compressed although a focal mass or adenopathy is not seen in the retroperitoneum.    A bowel anastomosis is noted in the rectum.  The rest of the colon appears of normal caliber.  Small amount of free fluid is noted in the pelvic cul de sac.  A uterus is not seen.  The bladder is of normal contour.      Impression       Increasing tumor burden within the liver parenchyma.  The largest tumor in the right lobe now measures 9.2 cm.  The largest tumor in the left lobe now measures 4.9 cm.  There is possible compression of the inferior vena cava secondary to a caudate lobe tumor and the right lobe tumor.  No biliary obstruction or dilation is demonstrated.   Prior cholecystectomy.  Large stent remains patent in the distal duodenum and proximal jejunum.  Edema of the central mesenteric fat etiology unknown.  There is possible compression of the mid inferior vena cava in the central abdomen without adenopathy or mass seen in the retroperitoneum.  Prior hysterectomy.  Small amount of free fluid in the cul de sac.  Prior partial bowel resection with anastomosis in the rectum.      Electronically signed by: Arie Fernandez MD  Date: 04/15/2020  Time: 18:17     Indigo Stuart #[id number] (CSN: [id number]) (64 y.o. F)    Results   CEA (Acc# [id number]) (Order [id number])   Reviewed By     Noemi Segovia RN on 4/15/2020 15:27   In Basket     Reviewed  Result Note  View in In Basket    MyChart Results Release     MyChart Status: Active  Results Release      Result Notes for CEA     Notes recorded by Karina Pettit MD on 4/15/2020 at 2:49 PM CDT  Needs stat CT abdomen pelvis   Contains abnormal data CEA   Order: [id number]   Status:  Final result   Visible to patient:  Yes (Patient Portal)   Next appt:  None   Dx:  Malignant neoplasm metastatic to lung...    Ref Range & Units 1d ago   CEA 0.0 - 5.0 ng/mL 813.7High                    Hypokalemia  -     potassium chloride SA (K-DUR,KLOR-CON) 20 MEQ tablet; Take 2 tablets (40 mEq total) by mouth once daily. for 2 days  Dispense: 6 tablet; Refill: 0    Other orders  -     acetaminophen tablet 650 mg  -     famotidine (PF) injection 20 mg  -     palonosetron (ALOXI) 0.25 mg, dexamethasone (DECADRON) 12 mg in sodium chloride 0.9% 50 mL IVPB  -     EPINEPHrine (EPIPEN) 0.3 mg/0.3 mL pen injection 0.3 mg  -     diphenhydrAMINE injection 50 mg  -     hydrocortisone sodium succinate injection 100 mg  -     DOCEtaxeL (TAXOTERE) 50 mg/m2 in sodium chloride 0.9% 250 mL chemo infusion  -     oxaliplatin (ELOXATIN) 85 mg/m2 in dextrose 5 % 500 mL chemo infusion  -     leucovorin calcium 200 mg/m2 in dextrose 5 % 250 mL infusion  -      fluorouracil (ADRUCIL) 2,600 mg/m2 in sodium chloride 0.9% 100 mL chemo infusion  -     diphenhydrAMINE (BENADRYL) 25 mg in sodium chloride 0.9% 50 mL IVPB  -     dextrose 5 % 250 mL flush bag  -     sodium chloride 0.9% 100 mL flush bag  -     sodium chloride 0.9% flush 10 mL  -     heparin, porcine (PF) 100 unit/mL injection flush 500 Units  -     alteplase injection 2 mg  -     sodium chloride 0.9% flush 10 mL  -     heparin, porcine (PF) 100 unit/mL injection flush 500 Units  -     alteplase injection 2 mg         April 16 2020 : increasing tumor burden and compromise vena cava   Must change to IV chemo FLOT every 2 weeks start APril 20 2020      1.  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  Recent imaging suggests progression and compression of vena cava is extremely worrisome patient is to continue Eliquis which will help with blood flow for now and must change to more aggressive chemotherapy immediately  2.  Stop Stivarga  4.  Thrombosis resolved on eliquis  left upper extremity   continue anticoagulation for such  3.  Diarrhea with hypokalemia replace potassium no further diarrhea with new medication  4.  Hepatic metastases.     5.   stent in place  7.  Rising CEA    Patient is post FOLFOX with Avastin, she has failed FOLFIRI with Avastin  Had been receiving Xeloda plus Avastin plus irinotecan    Abraxane  And gemzar failed due to toxicities    Cont norvasc for HTN monitored by Dr Arevalo   Tolerating lipitor for dyslipidemia  On statin monitoring counts closely    Start chemo ASAP labs will be reassessed in 2 weeks  acp documented

## 2020-04-17 ENCOUNTER — PATIENT MESSAGE (OUTPATIENT)
Dept: INFUSION THERAPY | Facility: HOSPITAL | Age: 65
End: 2020-04-17

## 2020-04-17 ENCOUNTER — PATIENT MESSAGE (OUTPATIENT)
Dept: HEMATOLOGY/ONCOLOGY | Facility: CLINIC | Age: 65
End: 2020-04-17

## 2020-04-17 ENCOUNTER — TELEPHONE (OUTPATIENT)
Dept: HEMATOLOGY/ONCOLOGY | Facility: CLINIC | Age: 65
End: 2020-04-17

## 2020-04-17 ENCOUNTER — OFFICE VISIT (OUTPATIENT)
Dept: HEMATOLOGY/ONCOLOGY | Facility: CLINIC | Age: 65
End: 2020-04-17
Payer: MEDICARE

## 2020-04-17 DIAGNOSIS — C78.7 HEPATIC METASTASES: ICD-10-CM

## 2020-04-17 DIAGNOSIS — C78.00 MALIGNANT NEOPLASM METASTATIC TO LUNG, UNSPECIFIED LATERALITY: ICD-10-CM

## 2020-04-17 DIAGNOSIS — Z51.11 ENCOUNTER FOR ANTINEOPLASTIC CHEMOTHERAPY: Primary | ICD-10-CM

## 2020-04-17 DIAGNOSIS — E87.6 HYPOKALEMIA: ICD-10-CM

## 2020-04-17 DIAGNOSIS — C17.0 DUODENAL CANCER: Primary | ICD-10-CM

## 2020-04-17 DIAGNOSIS — C17.0 DUODENAL CANCER: ICD-10-CM

## 2020-04-17 PROCEDURE — 99214 PR OFFICE/OUTPT VISIT, EST, LEVL IV, 30-39 MIN: ICD-10-PCS | Mod: 95,,, | Performed by: PHYSICIAN ASSISTANT

## 2020-04-17 PROCEDURE — 99214 OFFICE O/P EST MOD 30 MIN: CPT | Mod: 95,,, | Performed by: PHYSICIAN ASSISTANT

## 2020-04-17 NOTE — Clinical Note
Awaiting authorization for chemo for pt. Please ensure that pt has a 2 week follow up from start of cycle 1 with Dr. Pettit with CBC with diff and CMP to be cleared for cycle 2. Pt has been cleared for cycle 1 by Dr. Pettit and will need to start asap once authorized.

## 2020-04-17 NOTE — PROGRESS NOTES
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 Pt has previously failed folfox and was on folfiri, but had progression of disease. Patient was unable to tolerate Abraxane with gemcitabine. Pt states today that she vomited bile today, but otherwise she states she has been eating well with minimal vomiting. Pt does have occasional nausea. She states that she has residual paresthesias of the tips of her fingers on both hands from prior chemo treatment. Patient state no new changes since last being seen by Dr. Pettit. She denies fever/chills, drenching night sweats, decrease in appetite, significant weight loss, diarrhea and constipation.       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  - Clinical: Stage IV (cTX, cNX, cM1) - Signed by Karina Pettit MD on 3/11/2019        Chemotherapy     Treatment Summary   Plan Name: OP COLORECTAL FOLFOX + BEVACIZUMAB Q2W  Treatment Goal: Maintenance  Status: Active  Start Date: 1/21/2019  End Date: 7/3/2019 (Planned)  Provider: Karina Pettit MD  Chemotherapy: fluorouracil injection 835 mg, 400 mg/m2 = 835 mg, Intravenous, Clinic/[facility] 1 time, 4 of 12 cycles    fluorouracil (ADRUCIL) 2,400 mg/m2 = 5,015 mg in sodium chloride 0.9% 200.3 mL chemo infusion, 2,400 mg/m2 = 5,015 mg, Intravenous, Over 46 hours, 4 of 12 cycles    bevacizumab (AVASTIN) 5 mg/kg = 500 mg in sodium chloride 0.9% 100 mL chemo infusion, 5 mg/kg = 500 mg, Intravenous, Clinic/[facility] 1 time, 4 of 12 cycles    leucovorin calcium 400 mg/m2 = 835 mg in dextrose 5 % 250 mL infusion, 400 mg/m2 = 835 mg, Intravenous, Clinic/[facility] 1 time, 4 of 12 cycles    oxaliplatin (ELOXATIN) 85 mg/m2 = 178 mg in dextrose 5 % 500 mL chemo infusion, 85 mg/m2 = 178 mg, Intravenous, Clinic/[facility] 1 time, 4 of 12 cycles          4/8/2019 - 4/8/2019 Chemotherapy     Treatment Summary   Plan Name: OP COLORECTAL FOLFIRI + BEVACIZUMAB Q2W  Treatment Goal: Control  Status: Inactive  Start Date: [No treatment day found]  End Date: [No treatment day found]  Provider: Karina Pettit MD  Chemotherapy: fluorouracil injection 835 mg, 400 mg/m2 = 835 mg, Intravenous, Clinic/[facility] 1 time, 0 of 12 cycles  fluorouracil (ADRUCIL) 2,400 mg/m2 = 5,015 mg in sodium chloride 0.9% 200.3 mL chemo infusion, 2,400 mg/m2 = 5,015 mg, Intravenous, Over 46 hours, 0 of 12 cycles  bevacizumab (AVASTIN) 5 mg/kg = 500 mg in sodium chloride 0.9% 100 mL chemo infusion, 5 mg/kg = 500 mg, Intravenous, Clinic/[facility] 1 time, 0 of 12 cycles  irinotecan (CAMPTOSAR) 180 mg/m2 = 376 mg in sodium chloride 0.9% 500 mL chemo infusion, 180 mg/m2 = 376 mg, Intravenous, Clinic/[facility] 1 time, 0 of 12 cycles  leucovorin calcium 400 mg/m2 = 835 mg in dextrose 5 % 250 mL infusion, 400 mg/m2 = 835 mg, Intravenous, Clinic/[facility] 1 time, 0 of 12 cycles      12/22/2019 -  2/11/2020 Chemotherapy     Treatment Summary   Plan Name: OP PANC NAB-PACLITAXEL + GEMCITABINE Day 1, 8 , 15 every 28 days  Treatment Goal: Maintenance  Status: Inactive  Start Date: 12/31/2019  End Date: 2/11/2020  Provider: Karina Pettit MD  Chemotherapy: PACLitaxel-protein bound (ABRAXANE) 100 mg/m2 = 200 mg in 40 mL infusion, 100 mg/m2 = 200 mg (100 % of original dose 100 mg/m2), Intravenous, Clinic/[facility] 1 time, 2 of 4 cycles  Dose modification: 100 mg/m2 (original dose 100 mg/m2, Cycle 1), 125 mg/m2 (original dose 100 mg/m2, Cycle 1)  Administration: 200 mg (12/31/2019), 200 mg (1/7/2020), 250 mg (1/14/2020), 250 mg (2/4/2020), 250 mg (2/11/2020)  gemcitabine 790 mg in sodium chloride 0.9% 250 mL chemo infusion, 400 mg/m2 = 790 mg (100 % of original dose 400 mg/m2), Intravenous, Clinic/[facility] 1 time, 2 of 4 cycles  Dose modification: 400 mg/m2 (original dose 400 mg/m2, Cycle 1), 800 mg/m2 (original dose 400 mg/m2, Cycle 1), 400 mg/m2 (original dose 400 mg/m2, Cycle 2)  Administration: 790 mg (12/31/2019), 790 mg (1/7/2020), 1,575 mg (1/14/2020), 790 mg (2/4/2020), 790 mg (2/11/2020)      4/20/2020 -  Chemotherapy     Treatment Summary   Plan Name: duodenal bulb FLOT - FLUOROURACIL LEUCOVORIN OXALIPLATIN DOCETAXEL Q2W  Treatment Goal: Control  Status: Active  Start Date: 4/20/2020 (Planned)  End Date: 7/28/2020 (Planned)  Provider: Karina Pettit MD  Chemotherapy: DOCEtaxeL (TAXOTERE) in sodium chloride 0.9% 250 mL chemo infusion, 50 mg/m2, Intravenous, Clinic/[facility] 1 time, 0 of 8 cycles  leucovorin calcium in dextrose 5 % 250 mL infusion, 200 mg/m2, Intravenous, Clinic/[facility] 1 time, 0 of 8 cycles  oxaliplatin (ELOXATIN) in dextrose 5 % 500 mL chemo infusion, 85 mg/m2, Intravenous, Clinic/[facility] 1 time, 0 of 8 cycles  fluorouracil (ADRUCIL) in sodium chloride 0.9% 100 mL chemo infusion, 2,600 mg/m2, Intravenous, Over 24 hours, 0 of 8 cycles        Lung metastases    1/15/2019 Initial Diagnosis     Lung metastases       4/8/2019 - 4/8/2019 Chemotherapy     Treatment Summary   Plan Name: OP COLORECTAL FOLFIRI + BEVACIZUMAB Q2W  Treatment Goal: Control  Status: Inactive  Start Date: [No treatment day found]  End Date: [No treatment day found]  Provider: Karina Pettit MD  Chemotherapy: fluorouracil injection 835 mg, 400 mg/m2 = 835 mg, Intravenous, Clinic/[facility] 1 time, 0 of 12 cycles  fluorouracil (ADRUCIL) 2,400 mg/m2 = 5,015 mg in sodium chloride 0.9% 200.3 mL chemo infusion, 2,400 mg/m2 = 5,015 mg, Intravenous, Over 46 hours, 0 of 12 cycles  bevacizumab (AVASTIN) 5 mg/kg = 500 mg in sodium chloride 0.9% 100 mL chemo infusion, 5 mg/kg = 500 mg, Intravenous, Clinic/[facility] 1 time, 0 of 12 cycles  irinotecan (CAMPTOSAR) 180 mg/m2 = 376 mg in sodium chloride 0.9% 500 mL chemo infusion, 180 mg/m2 = 376 mg, Intravenous, Clinic/[facility] 1 time, 0 of 12 cycles  leucovorin calcium 400 mg/m2 = 835 mg in dextrose 5 % 250 mL infusion, 400 mg/m2 = 835 mg, Intravenous, Clinic/[facility] 1 time, 0 of 12 cycles      4/8/2019 - 12/23/2019 Chemotherapy     Treatment Summary   Plan Name: OP COLORECTAL FOLFIRI + BEVACIZUMAB Q2W  Treatment Goal: Control  Status: Inactive  Start Date: 4/8/2019  End Date: 12/3/2019  Provider: Karina Pettit MD  Chemotherapy: fluorouracil injection 835 mg, 400 mg/m2 = 835 mg, Intravenous, Clinic/[facility] 1 time, 12 of 12 cycles  Administration: 835 mg (8/6/2019), 835 mg (8/27/2019), 835 mg (8/27/2019), 835 mg (9/10/2019), 835 mg (9/24/2019)  fluorouracil (ADRUCIL) 2,400 mg/m2 = 5,015 mg in sodium chloride 0.9% 200.3 mL chemo infusion, 2,400 mg/m2 = 5,015 mg, Intravenous, Over 46 hours, 12 of 12 cycles  Administration: 5,015 mg (8/6/2019), 5,015 mg (8/27/2019), 5,015 mg (9/10/2019), 5,015 mg (9/24/2019)  bevacizumab (AVASTIN) 5 mg/kg = 500 mg in sodium chloride 0.9% 100 mL chemo infusion, 5 mg/kg = 500 mg, Intravenous, Clinic[facility] 1 time, 14 of 14 cycles  Dose modification: 7.5 mg/kg (original dose 5 mg/kg, Cycle 14)  Administration: 500 mg  (8/6/2019), 500 mg (8/27/2019), 500 mg (9/10/2019), 500 mg (9/24/2019), 500 mg (11/12/2019), 750 mg (12/3/2019)  irinotecan (CAMPTOSAR) 180 mg/m2 = 376 mg in sodium chloride 0.9% 500 mL chemo infusion, 180 mg/m2 = 376 mg, Intravenous, Clinic/[facility] 1 time, 14 of 14 cycles  Dose modification: 200 mg/m2 (original dose 180 mg/m2, Cycle 14)  Administration: 376 mg (8/6/2019), 376 mg (8/27/2019), 376 mg (9/10/2019), 376 mg (9/24/2019), 376 mg (11/12/2019), 418 mg (12/3/2019)  leucovorin calcium 400 mg/m2 = 835 mg in dextrose 5 % 250 mL infusion, 400 mg/m2 = 835 mg, Intravenous, Clinic/[facility] 1 time, 12 of 12 cycles  Administration: 835 mg (8/6/2019), 835 mg (8/27/2019), 835 mg (9/10/2019), 835 mg (9/24/2019)      12/22/2019 - 2/11/2020 Chemotherapy     Treatment Summary   Plan Name: OP PANC NAB-PACLITAXEL + GEMCITABINE Day 1, 8 , 15 every 28 days  Treatment Goal: Maintenance  Status: Inactive  Start Date: 12/31/2019  End Date: 2/11/2020  Provider: Karina Pettit MD  Chemotherapy: PACLitaxel-protein bound (ABRAXANE) 100 mg/m2 = 200 mg in 40 mL infusion, 100 mg/m2 = 200 mg (100 % of original dose 100 mg/m2), Intravenous, Clinic/[facility] 1 time, 2 of 4 cycles  Dose modification: 100 mg/m2 (original dose 100 mg/m2, Cycle 1), 125 mg/m2 (original dose 100 mg/m2, Cycle 1)  Administration: 200 mg (12/31/2019), 200 mg (1/7/2020), 250 mg (1/14/2020), 250 mg (2/4/2020), 250 mg (2/11/2020)  gemcitabine 790 mg in sodium chloride 0.9% 250 mL chemo infusion, 400 mg/m2 = 790 mg (100 % of original dose 400 mg/m2), Intravenous, Clinic/[facility] 1 time, 2 of 4 cycles  Dose modification: 400 mg/m2 (original dose 400 mg/m2, Cycle 1), 800 mg/m2 (original dose 400 mg/m2, Cycle 1), 400 mg/m2 (original dose 400 mg/m2, Cycle 2)  Administration: 790 mg (12/31/2019), 790 mg (1/7/2020), 1,575 mg (1/14/2020), 790 mg (2/4/2020), 790 mg (2/11/2020)      4/20/2020 -  Chemotherapy     Treatment Summary   Plan Name: duodenal bulb FLOT - FLUOROURACIL LEUCOVORIN  OXALIPLATIN DOCETAXEL Q2W  Treatment Goal: Control  Status: Active  Start Date: 4/20/2020 (Planned)  End Date: 7/28/2020 (Planned)  Provider: Karina Pettit MD  Chemotherapy: DOCEtaxeL (TAXOTERE) in sodium chloride 0.9% 250 mL chemo infusion, 50 mg/m2, Intravenous, Clinic/[facility] 1 time, 0 of 8 cycles  leucovorin calcium in dextrose 5 % 250 mL infusion, 200 mg/m2, Intravenous, Clinic/[facility] 1 time, 0 of 8 cycles  oxaliplatin (ELOXATIN) in dextrose 5 % 500 mL chemo infusion, 85 mg/m2, Intravenous, Clinic/[facility] 1 time, 0 of 8 cycles  fluorouracil (ADRUCIL) in sodium chloride 0.9% 100 mL chemo infusion, 2,600 mg/m2, Intravenous, Over 24 hours, 0 of 8 cycles        Hepatic metastases    1/15/2019 Initial Diagnosis     Hepatic metastases      4/8/2019 - 4/8/2019 Chemotherapy     Treatment Summary   Plan Name: OP COLORECTAL FOLFIRI + BEVACIZUMAB Q2W  Treatment Goal: Control  Status: Inactive  Start Date: [No treatment day found]  End Date: [No treatment day found]  Provider: Karina Pettit MD  Chemotherapy: fluorouracil injection 835 mg, 400 mg/m2 = 835 mg, Intravenous, Clinic/[facility] 1 time, 0 of 12 cycles  fluorouracil (ADRUCIL) 2,400 mg/m2 = 5,015 mg in sodium chloride 0.9% 200.3 mL chemo infusion, 2,400 mg/m2 = 5,015 mg, Intravenous, Over 46 hours, 0 of 12 cycles  bevacizumab (AVASTIN) 5 mg/kg = 500 mg in sodium chloride 0.9% 100 mL chemo infusion, 5 mg/kg = 500 mg, Intravenous, Clinic/[facility] 1 time, 0 of 12 cycles  irinotecan (CAMPTOSAR) 180 mg/m2 = 376 mg in sodium chloride 0.9% 500 mL chemo infusion, 180 mg/m2 = 376 mg, Intravenous, Clinic/[facility] 1 time, 0 of 12 cycles  leucovorin calcium 400 mg/m2 = 835 mg in dextrose 5 % 250 mL infusion, 400 mg/m2 = 835 mg, Intravenous, Clinic/[facility] 1 time, 0 of 12 cycles      4/8/2019 - 12/23/2019 Chemotherapy     Treatment Summary   Plan Name: OP COLORECTAL FOLFIRI + BEVACIZUMAB Q2W  Treatment Goal: Control  Status: Inactive  Start Date: 4/8/2019  End Date: 12/3/2019  Provider: Karina Pettit,  MD  Chemotherapy: fluorouracil injection 835 mg, 400 mg/m2 = 835 mg, Intravenous, Clinic/[facility] 1 time, 12 of 12 cycles  Administration: 835 mg (8/6/2019), 835 mg (8/27/2019), 835 mg (8/27/2019), 835 mg (9/10/2019), 835 mg (9/24/2019)  fluorouracil (ADRUCIL) 2,400 mg/m2 = 5,015 mg in sodium chloride 0.9% 200.3 mL chemo infusion, 2,400 mg/m2 = 5,015 mg, Intravenous, Over 46 hours, 12 of 12 cycles  Administration: 5,015 mg (8/6/2019), 5,015 mg (8/27/2019), 5,015 mg (9/10/2019), 5,015 mg (9/24/2019)  bevacizumab (AVASTIN) 5 mg/kg = 500 mg in sodium chloride 0.9% 100 mL chemo infusion, 5 mg/kg = 500 mg, Intravenous, Clinic/[facility] 1 time, 14 of 14 cycles  Dose modification: 7.5 mg/kg (original dose 5 mg/kg, Cycle 14)  Administration: 500 mg (8/6/2019), 500 mg (8/27/2019), 500 mg (9/10/2019), 500 mg (9/24/2019), 500 mg (11/12/2019), 750 mg (12/3/2019)  irinotecan (CAMPTOSAR) 180 mg/m2 = 376 mg in sodium chloride 0.9% 500 mL chemo infusion, 180 mg/m2 = 376 mg, Intravenous, Clinic/[facility] 1 time, 14 of 14 cycles  Dose modification: 200 mg/m2 (original dose 180 mg/m2, Cycle 14)  Administration: 376 mg (8/6/2019), 376 mg (8/27/2019), 376 mg (9/10/2019), 376 mg (9/24/2019), 376 mg (11/12/2019), 418 mg (12/3/2019)  leucovorin calcium 400 mg/m2 = 835 mg in dextrose 5 % 250 mL infusion, 400 mg/m2 = 835 mg, Intravenous, Clinic/[facility] 1 time, 12 of 12 cycles  Administration: 835 mg (8/6/2019), 835 mg (8/27/2019), 835 mg (9/10/2019), 835 mg (9/24/2019)      12/22/2019 - 2/11/2020 Chemotherapy     Treatment Summary   Plan Name: OP PANC NAB-PACLITAXEL + GEMCITABINE Day 1, 8 , 15 every 28 days  Treatment Goal: Maintenance  Status: Inactive  Start Date: 12/31/2019  End Date: 2/11/2020  Provider: Karina Pettit MD  Chemotherapy: PACLitaxel-protein bound (ABRAXANE) 100 mg/m2 = 200 mg in 40 mL infusion, 100 mg/m2 = 200 mg (100 % of original dose 100 mg/m2), Intravenous, Clinic/[facility] 1 time, 2 of 4 cycles  Dose modification: 100 mg/m2 (original dose 100  mg/m2, Cycle 1), 125 mg/m2 (original dose 100 mg/m2, Cycle 1)  Administration: 200 mg (12/31/2019), 200 mg (1/7/2020), 250 mg (1/14/2020), 250 mg (2/4/2020), 250 mg (2/11/2020)  gemcitabine 790 mg in sodium chloride 0.9% 250 mL chemo infusion, 400 mg/m2 = 790 mg (100 % of original dose 400 mg/m2), Intravenous, Clinic/[facility] 1 time, 2 of 4 cycles  Dose modification: 400 mg/m2 (original dose 400 mg/m2, Cycle 1), 800 mg/m2 (original dose 400 mg/m2, Cycle 1), 400 mg/m2 (original dose 400 mg/m2, Cycle 2)  Administration: 790 mg (12/31/2019), 790 mg (1/7/2020), 1,575 mg (1/14/2020), 790 mg (2/4/2020), 790 mg (2/11/2020)      4/20/2020 -  Chemotherapy     Treatment Summary   Plan Name: duodenal bulb FLOT - FLUOROURACIL LEUCOVORIN OXALIPLATIN DOCETAXEL Q2W  Treatment Goal: Control  Status: Active  Start Date: 4/20/2020 (Planned)  End Date: 7/28/2020 (Planned)  Provider: Karina Pettit MD  Chemotherapy: DOCEtaxeL (TAXOTERE) in sodium chloride 0.9% 250 mL chemo infusion, 50 mg/m2, Intravenous, Clinic/[facility] 1 time, 0 of 8 cycles  leucovorin calcium in dextrose 5 % 250 mL infusion, 200 mg/m2, Intravenous, Clinic/[facility] 1 time, 0 of 8 cycles  oxaliplatin (ELOXATIN) in dextrose 5 % 500 mL chemo infusion, 85 mg/m2, Intravenous, Clinic/[facility] 1 time, 0 of 8 cycles  fluorouracil (ADRUCIL) in sodium chloride 0.9% 100 mL chemo infusion, 2,600 mg/m2, Intravenous, Over 24 hours, 0 of 8 cycles        Overlapping malignant neoplasm of colon    2/10/2020 Initial Diagnosis     Overlapping malignant neoplasm of colon      4/20/2020 -  Chemotherapy     Treatment Summary   Plan Name: duodenal bulb FLOT - FLUOROURACIL LEUCOVORIN OXALIPLATIN DOCETAXEL Q2W  Treatment Goal: Control  Status: Active  Start Date: 4/20/2020 (Planned)  End Date: 7/28/2020 (Planned)  Provider: Karina Pettit MD  Chemotherapy: DOCEtaxeL (TAXOTERE) in sodium chloride 0.9% 250 mL chemo infusion, 50 mg/m2, Intravenous, Clinic/[facility] 1 time, 0 of 8 cycles  leucovorin calcium in dextrose  5 % 250 mL infusion, 200 mg/m2, Intravenous, Clinic/[facility] 1 time, 0 of 8 cycles  oxaliplatin (ELOXATIN) in dextrose 5 % 500 mL chemo infusion, 85 mg/m2, Intravenous, Clinic/[facility] 1 time, 0 of 8 cycles  fluorouracil (ADRUCIL) in sodium chloride 0.9% 100 mL chemo infusion, 2,600 mg/m2, Intravenous, Over 24 hours, 0 of 8 cycles         Review of Systems   Constitutional: Positive for fatigue. Negative for activity change, appetite change, chills, fever and unexpected weight change.   HENT: Negative for mouth sores and trouble swallowing.    Eyes: Negative for photophobia and visual disturbance.   Respiratory: Negative for chest tightness, shortness of breath, wheezing and stridor.    Cardiovascular: Negative for chest pain.   Gastrointestinal: Positive for nausea and vomiting. Negative for abdominal distention, abdominal pain, constipation and diarrhea.   Musculoskeletal: Negative for arthralgias, back pain, gait problem and myalgias.   Skin: Negative for color change, pallor and rash.   Neurological: Negative for tremors and facial asymmetry.   Psychiatric/Behavioral: Negative for agitation, behavioral problems, confusion and decreased concentration.        Past Medical History:   Diagnosis Date    Cancer     colon    Encounter for blood transfusion     Hypertension        Family History   Problem Relation Age of Onset    Cancer Mother         colon ca, liver ca    Cancer Father        Past Surgical History:   Procedure Laterality Date    CHOLECYSTECTOMY      COLON SURGERY      COLONOSCOPY N/A 3/30/2018    Procedure: COLONOSCOPY;  Surgeon: Daniel Magana MD;  Location: [facility];  Service: Endoscopy;  Laterality: N/A;    ESOPHAGOGASTRODUODENOSCOPY N/A 1/3/2019    Procedure: EGD (ESOPHAGOGASTRODUODENOSCOPY);  Surgeon: Adis Arguello MD;  Location: [facility];  Service: Endoscopy;  Laterality: N/A;    ESOPHAGOGASTRODUODENOSCOPY N/A 1/14/2019    Procedure: EGD (ESOPHAGOGASTRODUODENOSCOPY);  Surgeon: Monserrat AUSTIN  Filiberto Lopez MD;  Location: [facility] ENDO (2ND FLR);  Service: Endoscopy;  Laterality: N/A;  with duodenal stent placement per [facility]    HYSTERECTOMY      INSERTION OF TUNNELED CENTRAL VENOUS CATHETER (CVC) WITH SUBCUTANEOUS PORT N/A 1/4/2019    Procedure: [id number];  Surgeon: Emilio Romero MD;  Location: [facility];  Service: General;  Laterality: N/A;       Social History     Socioeconomic History    Marital status:      Spouse name: Not on file    Number of children: Not on file    Years of education: Not on file    Highest education level: Not on file   Occupational History    Not on file   Social Needs    Financial resource strain: Not on file    Food insecurity:     Worry: Not on file     Inability: Not on file    Transportation needs:     Medical: Not on file     Non-medical: Not on file   Tobacco Use    Smoking status: Never Smoker    Smokeless tobacco: Never Used   Substance and Sexual Activity    Alcohol use: No     Frequency: Never     Drinks per session: Patient refused     Binge frequency: Never    Drug use: No    Sexual activity: Not Currently   Lifestyle    Physical activity:     Days per week: 0 days     Minutes per session: 0 min    Stress: Not at all   Relationships    Social connections:     Talks on phone: More than three times a week     Gets together: Once a week     Attends Alevism service: Not on file     Active member of club or organization: No     Attends meetings of clubs or organizations: Never     Relationship status:    Other Topics Concern    Not on file   Social History Narrative    Not on file         Current Outpatient Medications:     amLODIPine (NORVASC) 5 MG tablet, Take 1 tablet (5 mg total) by mouth once daily., Disp: 90 tablet, Rfl: 3    atorvastatin (LIPITOR) 20 MG tablet, Take 1 tablet (20 mg total) by mouth every evening., Disp: 90 tablet, Rfl: 3    dronabinoL (MARINOL) 5 MG capsule, Take 1 capsule (5 mg total) by mouth 2  (two) times daily before meals. To increase appetite, Disp: 60 capsule, Rfl: 2    ELIQUIS 5 mg Tab, TAKE 1 TABLET BY MOUTH TWICE A DAY (Patient taking differently: Take by mouth 2 (two) times daily. ), Disp: 60 tablet, Rfl: 2    ferrous sulfate (FEOSOL) 325 mg (65 mg iron) Tab tablet, Take 1 tablet by mouth 2 (two) times daily., Disp: , Rfl: 3    metoprolol succinate (TOPROL-XL) 25 MG 24 hr tablet, Take 1 tablet (25 mg total) by mouth every evening., Disp: 90 tablet, Rfl: 3    ondansetron (ZOFRAN-ODT) 8 MG TbDL, Take 1 tablet (8 mg total) by mouth every 12 (twelve) hours as needed., Disp: 30 tablet, Rfl: 1    potassium chloride SA (K-DUR,KLOR-CON) 20 MEQ tablet, Take 1 tablet (20 mEq total) by mouth once daily., Disp: 30 tablet, Rfl: 11    potassium chloride SA (K-DUR,KLOR-CON) 20 MEQ tablet, Take 2 tablets (40 mEq total) by mouth once daily. for 2 days, Disp: 6 tablet, Rfl: 0    prochlorperazine (COMPAZINE) 10 MG tablet, Take 1 tablet (10 mg total) by mouth every 6 (six) hours as needed., Disp: 30 tablet, Rfl: 1    pyridoxine, vitamin B6, (VITAMIN B-6) 50 MG Tab, Take 100 mg by mouth once daily. , Disp: , Rfl:     senna-docusate 8.6-50 mg (SENOKOT-S) 8.6-50 mg per tablet, Take 2 tablets by mouth nightly as needed for Constipation., Disp: , Rfl:     sucralfate (CARAFATE) 100 mg/mL suspension, Take 10 mLs (1 g total) by mouth 4 (four) times daily., Disp: 420 mL, Rfl: 1    thiamine (VITAMIN B-1) 250 MG tablet, Take 250 mg by mouth once daily., Disp: , Rfl:     Review of patient's allergies indicates:   Allergen Reactions    Codeine Nausea And Vomiting    Erythromycin base Other (See Comments)    Lisinopril Palpitations     Cough        All medications and past history have been reviewed.  Objective:     Vitals:  There were no vitals taken for this visit.          There is no height or weight on file to calculate BSA.    Physical Exam  General patient is in no distress well developed well nourished  Psych  pleasant affect no evidence of depression no evidence of anxiety  Head normocephalic atraumatic, no hoarseness,  well-spoken speech intact  Eyes noninjected sclera conjunctiva appear pink  Face is symmetric  Skin intact no lesions noted no ecchymosis no rash  Neck with no limited range of motion no JVD evident  Chest with no use of accessory muscles no labored breathing no evidence of tachypnea  No respiratory distress, effort normal   Abdomen appears to be nondistended  Extremities with no cyanosis no evidence of edema  Neuro muscular cranial nerves appear grossly intact  Gait appears steady  No joint effusions  Behavior normal judgment normal    Labs:  Lab Results   Component Value Date    WBC 12.21 04/15/2020    HGB 9.7 (L) 04/15/2020    HCT 33.0 (L) 04/15/2020    MCV 89 04/15/2020     (H) 04/15/2020     Lab Results   Component Value Date     04/15/2020    K 2.8 (L) 04/15/2020     04/15/2020    CO2 23 04/15/2020    BUN 5 (L) 04/15/2020    CREATININE 0.6 04/15/2020     04/15/2020    CALCIUM 8.5 (L) 04/15/2020    MG 1.6 07/06/2019    ALKPHOS 293 (H) 04/15/2020    PROT 6.8 04/15/2020    ALBUMIN 1.8 (L) 04/15/2020    BILITOT 0.6 04/15/2020     (H) 04/15/2020    ALT 39 04/15/2020       All lab results and imaging results have been reviewed and discussed with the patient.     Assessment/Plan:       ICD-10-CM ICD-9-CM   1. Encounter for antineoplastic chemotherapy  -Start cycle 1 FLOT Tuesday, April 21, 2020.   -Follow up with Dr. Pettit 2 weeks after cycle 1 for clearance for cycle 2 with CBC with diff and CMP.  -I will leave consent, chemo calendar, and information regarding chemotherapy with infusion for patient to have upon start of chemo regimen.  -Take compazine and zofran as directed by Dr. Pettit for N/V.  -Pt is to be screened for covid19 prior to starting infusion.  -Advised healthy diet with fruits, whole grains, and lean meat and for pt to stay active.  -Pt made aware to stay  away from crowded places and ensure those around her practice proper hygiene during her chemotherapy course.  -Advised pt to do 1 tsp salt water with 1 tsp baking soda oral rinses before and after eating.   Z51.11 V58.11   2. Duodenal cancer   C17.0 152.0   3. Malignant neoplasm metastatic to lung, unspecified laterality   C78.00 197.0   4. Hepatic metastases   C78.7 197.7   5. Hypokalemia  -Continue Potassium supplementation as directed by Dr. Pettit E87.6 276.8     I met with the patient today for chemotherapy education and consent. she will be starting treatment with FLUOROURACIL LEUCOVORIN OXALIPLATIN DOCETAXEL Q2W x 8 cycles with cycle 1 to start on April 21, 2020. We discussed the mechanism of action, potential side effects of this treatment as well as ways she can manage them at home. Some of these side effects include but or not limited to fever, nausea, vomiting, decreased appetite, fatigue, weakness, cytopenias, myalgia/arthralgia, constipation, diarrhea, bleeding, headache, shortness of breath, nail changes, taste change, hair thinning/loss, mood disturbances, or edema. We also discussed dietary modifications she should make although this will be discussed in more detail with the dietician. [employer] educational handouts were reviewed. Patient notified to call anytime 24/7 because there is a physician on call for any problems that may arise.  Patient also notified to report to [facility] if they can not get in touch with a physician after hours. All of patient's questions were answered. The patient acknowledged full understanding of the risks, recommendations and plan.       Sincerely,  Osman Lorenzo PA-C    Note is available for collaborating MD; Dr. Pettit for review.    Electronically signed by: Osman Lorenzo PA-C

## 2020-04-18 ENCOUNTER — PATIENT MESSAGE (OUTPATIENT)
Dept: HEMATOLOGY/ONCOLOGY | Facility: CLINIC | Age: 65
End: 2020-04-18

## 2020-04-20 ENCOUNTER — LAB VISIT (OUTPATIENT)
Dept: INFUSION THERAPY | Facility: HOSPITAL | Age: 65
End: 2020-04-20
Attending: PHYSICIAN ASSISTANT
Payer: MEDICARE

## 2020-04-20 DIAGNOSIS — Z51.11 ENCOUNTER FOR ANTINEOPLASTIC CHEMOTHERAPY: ICD-10-CM

## 2020-04-20 DIAGNOSIS — C22.0 HCC (HEPATOCELLULAR CARCINOMA): Primary | ICD-10-CM

## 2020-04-20 DIAGNOSIS — C17.0 DUODENAL CANCER: Primary | ICD-10-CM

## 2020-04-20 DIAGNOSIS — Z03.818 ENCNTR FOR OBS FOR SUSP EXPSR TO OTH BIOLG AGENTS RULED OUT: Primary | ICD-10-CM

## 2020-04-20 DIAGNOSIS — C78.7 HEPATIC METASTASES: ICD-10-CM

## 2020-04-20 PROCEDURE — U0002 COVID-19 LAB TEST NON-CDC: HCPCS

## 2020-04-21 ENCOUNTER — SOCIAL WORK (OUTPATIENT)
Dept: HEMATOLOGY/ONCOLOGY | Facility: CLINIC | Age: 65
End: 2020-04-21

## 2020-04-21 ENCOUNTER — CLINICAL SUPPORT (OUTPATIENT)
Dept: HEMATOLOGY/ONCOLOGY | Facility: CLINIC | Age: 65
End: 2020-04-21
Payer: MEDICARE

## 2020-04-21 ENCOUNTER — INFUSION (OUTPATIENT)
Dept: INFUSION THERAPY | Facility: HOSPITAL | Age: 65
End: 2020-04-21
Attending: INTERNAL MEDICINE
Payer: MEDICARE

## 2020-04-21 VITALS
SYSTOLIC BLOOD PRESSURE: 113 MMHG | WEIGHT: 165.88 LBS | HEART RATE: 70 BPM | BODY MASS INDEX: 30.52 KG/M2 | TEMPERATURE: 97 F | HEIGHT: 62 IN | DIASTOLIC BLOOD PRESSURE: 72 MMHG | RESPIRATION RATE: 18 BRPM | OXYGEN SATURATION: 99 %

## 2020-04-21 DIAGNOSIS — C18.8 OVERLAPPING MALIGNANT NEOPLASM OF COLON: Primary | ICD-10-CM

## 2020-04-21 DIAGNOSIS — C78.00 MALIGNANT NEOPLASM METASTATIC TO LUNG, UNSPECIFIED LATERALITY: ICD-10-CM

## 2020-04-21 DIAGNOSIS — K31.5 DUODENAL STENOSIS: ICD-10-CM

## 2020-04-21 DIAGNOSIS — C17.0 DUODENAL CANCER: ICD-10-CM

## 2020-04-21 DIAGNOSIS — D70.1 CHEMOTHERAPY INDUCED NEUTROPENIA: ICD-10-CM

## 2020-04-21 DIAGNOSIS — C78.7 HEPATIC METASTASES: ICD-10-CM

## 2020-04-21 DIAGNOSIS — Z51.11 ENCOUNTER FOR ANTINEOPLASTIC CHEMOTHERAPY: ICD-10-CM

## 2020-04-21 DIAGNOSIS — T45.1X5A CHEMOTHERAPY INDUCED NEUTROPENIA: ICD-10-CM

## 2020-04-21 LAB — SARS-COV-2 RNA RESP QL NAA+PROBE: NOT DETECTED

## 2020-04-21 PROCEDURE — 63600175 PHARM REV CODE 636 W HCPCS: Performed by: INTERNAL MEDICINE

## 2020-04-21 PROCEDURE — 96367 TX/PROPH/DG ADDL SEQ IV INF: CPT

## 2020-04-21 PROCEDURE — 96415 CHEMO IV INFUSION ADDL HR: CPT

## 2020-04-21 PROCEDURE — 25000003 PHARM REV CODE 250: Performed by: INTERNAL MEDICINE

## 2020-04-21 PROCEDURE — 96366 THER/PROPH/DIAG IV INF ADDON: CPT

## 2020-04-21 PROCEDURE — 96368 THER/DIAG CONCURRENT INF: CPT

## 2020-04-21 PROCEDURE — S0028 INJECTION, FAMOTIDINE, 20 MG: HCPCS | Performed by: INTERNAL MEDICINE

## 2020-04-21 PROCEDURE — 96416 CHEMO PROLONG INFUSE W/PUMP: CPT

## 2020-04-21 PROCEDURE — 96375 TX/PRO/DX INJ NEW DRUG ADDON: CPT

## 2020-04-21 PROCEDURE — 96413 CHEMO IV INFUSION 1 HR: CPT

## 2020-04-21 PROCEDURE — 96417 CHEMO IV INFUS EACH ADDL SEQ: CPT

## 2020-04-21 RX ORDER — DIPHENHYDRAMINE HYDROCHLORIDE 50 MG/ML
50 INJECTION INTRAMUSCULAR; INTRAVENOUS ONCE AS NEEDED
Status: DISCONTINUED | OUTPATIENT
Start: 2020-04-21 | End: 2020-04-21 | Stop reason: HOSPADM

## 2020-04-21 RX ORDER — FAMOTIDINE 10 MG/ML
20 INJECTION INTRAVENOUS
Status: COMPLETED | OUTPATIENT
Start: 2020-04-21 | End: 2020-04-21

## 2020-04-21 RX ORDER — SODIUM CHLORIDE 0.9 % (FLUSH) 0.9 %
10 SYRINGE (ML) INJECTION
Status: DISCONTINUED | OUTPATIENT
Start: 2020-04-21 | End: 2020-04-21 | Stop reason: HOSPADM

## 2020-04-21 RX ORDER — HEPARIN 100 UNIT/ML
500 SYRINGE INTRAVENOUS
Status: DISCONTINUED | OUTPATIENT
Start: 2020-04-21 | End: 2020-04-21 | Stop reason: HOSPADM

## 2020-04-21 RX ORDER — ACETAMINOPHEN 325 MG/1
650 TABLET ORAL
Status: COMPLETED | OUTPATIENT
Start: 2020-04-21 | End: 2020-04-21

## 2020-04-21 RX ORDER — EPINEPHRINE 0.3 MG/.3ML
0.3 INJECTION SUBCUTANEOUS ONCE AS NEEDED
Status: DISCONTINUED | OUTPATIENT
Start: 2020-04-21 | End: 2020-04-21 | Stop reason: HOSPADM

## 2020-04-21 RX ADMIN — FAMOTIDINE 20 MG: 10 INJECTION INTRAVENOUS at 08:04

## 2020-04-21 RX ADMIN — OXALIPLATIN 155 MG: 5 INJECTION, SOLUTION INTRAVENOUS at 10:04

## 2020-04-21 RX ADMIN — ACETAMINOPHEN 650 MG: 325 TABLET ORAL at 08:04

## 2020-04-21 RX ADMIN — PALONOSETRON HYDROCHLORIDE: 0.25 INJECTION INTRAVENOUS at 08:04

## 2020-04-21 RX ADMIN — LEUCOVORIN CALCIUM 365 MG: 350 INJECTION, POWDER, LYOPHILIZED, FOR SOLUTION INTRAMUSCULAR; INTRAVENOUS at 10:04

## 2020-04-21 RX ADMIN — DIPHENHYDRAMINE HYDROCHLORIDE 25 MG: 50 INJECTION, SOLUTION INTRAMUSCULAR; INTRAVENOUS at 08:04

## 2020-04-21 RX ADMIN — DOCETAXEL ANHYDROUS 90 MG: 10 INJECTION, SOLUTION INTRAVENOUS at 09:04

## 2020-04-21 RX ADMIN — FLUOROURACIL 4730 MG: 50 INJECTION, SOLUTION INTRAVENOUS at 12:04

## 2020-04-22 ENCOUNTER — INFUSION (OUTPATIENT)
Dept: INFUSION THERAPY | Facility: HOSPITAL | Age: 65
End: 2020-04-22
Attending: INTERNAL MEDICINE
Payer: MEDICARE

## 2020-04-22 ENCOUNTER — PATIENT MESSAGE (OUTPATIENT)
Dept: HEMATOLOGY/ONCOLOGY | Facility: CLINIC | Age: 65
End: 2020-04-22

## 2020-04-22 VITALS
WEIGHT: 165.94 LBS | HEART RATE: 106 BPM | DIASTOLIC BLOOD PRESSURE: 74 MMHG | BODY MASS INDEX: 30.35 KG/M2 | SYSTOLIC BLOOD PRESSURE: 105 MMHG | RESPIRATION RATE: 18 BRPM | TEMPERATURE: 98 F

## 2020-04-22 DIAGNOSIS — C18.8 OVERLAPPING MALIGNANT NEOPLASM OF COLON: Primary | ICD-10-CM

## 2020-04-22 DIAGNOSIS — T45.1X5A CHEMOTHERAPY INDUCED NEUTROPENIA: ICD-10-CM

## 2020-04-22 DIAGNOSIS — D70.1 CHEMOTHERAPY INDUCED NEUTROPENIA: ICD-10-CM

## 2020-04-22 DIAGNOSIS — C78.7 HEPATIC METASTASES: ICD-10-CM

## 2020-04-22 DIAGNOSIS — Z51.11 ENCOUNTER FOR ANTINEOPLASTIC CHEMOTHERAPY: ICD-10-CM

## 2020-04-22 DIAGNOSIS — C17.0 DUODENAL CANCER: ICD-10-CM

## 2020-04-22 DIAGNOSIS — C78.00 MALIGNANT NEOPLASM METASTATIC TO LUNG, UNSPECIFIED LATERALITY: ICD-10-CM

## 2020-04-22 DIAGNOSIS — K31.5 DUODENAL STENOSIS: ICD-10-CM

## 2020-04-22 PROCEDURE — 63600175 PHARM REV CODE 636 W HCPCS: Performed by: INTERNAL MEDICINE

## 2020-04-22 PROCEDURE — 96523 IRRIG DRUG DELIVERY DEVICE: CPT

## 2020-04-22 RX ORDER — SODIUM CHLORIDE 0.9 % (FLUSH) 0.9 %
10 SYRINGE (ML) INJECTION
Status: DISCONTINUED | OUTPATIENT
Start: 2020-04-22 | End: 2020-04-22 | Stop reason: HOSPADM

## 2020-04-22 RX ORDER — FENTANYL CITRATE 50 UG/ML
50 INJECTION, SOLUTION INTRAMUSCULAR; INTRAVENOUS
Status: CANCELLED | OUTPATIENT
Start: 2020-04-22

## 2020-04-22 RX ORDER — MIDAZOLAM HYDROCHLORIDE 1 MG/ML
1 INJECTION INTRAMUSCULAR; INTRAVENOUS
Status: CANCELLED | OUTPATIENT
Start: 2020-04-22

## 2020-04-22 RX ORDER — HEPARIN 100 UNIT/ML
500 SYRINGE INTRAVENOUS
Status: DISCONTINUED | OUTPATIENT
Start: 2020-04-22 | End: 2020-04-22 | Stop reason: HOSPADM

## 2020-04-22 RX ORDER — HEPARIN SODIUM 200 [USP'U]/100ML
500 INJECTION, SOLUTION INTRAVENOUS CONTINUOUS
Status: CANCELLED | OUTPATIENT
Start: 2020-04-22

## 2020-04-22 RX ADMIN — HEPARIN 500 UNITS: 100 SYRINGE at 01:04

## 2020-04-23 ENCOUNTER — HOSPITAL ENCOUNTER (OUTPATIENT)
Dept: RADIOLOGY | Facility: HOSPITAL | Age: 65
Discharge: HOME | End: 2020-04-23
Attending: PHYSICIAN ASSISTANT | Admitting: RADIOLOGY
Payer: MEDICARE

## 2020-04-23 ENCOUNTER — TELEPHONE (OUTPATIENT)
Dept: INTERVENTIONAL RADIOLOGY/VASCULAR | Facility: CLINIC | Age: 65
End: 2020-04-23

## 2020-04-23 ENCOUNTER — HOSPITAL ENCOUNTER (OUTPATIENT)
Facility: HOSPITAL | Age: 65
Discharge: HOME | End: 2020-04-23
Attending: RADIOLOGY | Admitting: RADIOLOGY
Payer: MEDICARE

## 2020-04-23 VITALS
WEIGHT: 165 LBS | HEART RATE: 107 BPM | RESPIRATION RATE: 16 BRPM | BODY MASS INDEX: 30.36 KG/M2 | HEIGHT: 62 IN | TEMPERATURE: 98 F | DIASTOLIC BLOOD PRESSURE: 79 MMHG | SYSTOLIC BLOOD PRESSURE: 130 MMHG | OXYGEN SATURATION: 100 %

## 2020-04-23 DIAGNOSIS — C22.0 HCC (HEPATOCELLULAR CARCINOMA): ICD-10-CM

## 2020-04-23 DIAGNOSIS — C78.00 MALIGNANT NEOPLASM METASTATIC TO LUNG, UNSPECIFIED LATERALITY: ICD-10-CM

## 2020-04-23 DIAGNOSIS — C78.7 HEPATIC METASTASES: ICD-10-CM

## 2020-04-23 DIAGNOSIS — C17.0 DUODENAL CANCER: ICD-10-CM

## 2020-04-23 LAB — SARS-COV-2 RNA RESP QL NAA+PROBE: NOT DETECTED

## 2020-04-23 PROCEDURE — 78201 LIVER IMAGING STATIC ONLY: CPT | Mod: 26,,, | Performed by: RADIOLOGY

## 2020-04-23 PROCEDURE — 25500020 PHARM REV CODE 255: Performed by: RADIOLOGY

## 2020-04-23 PROCEDURE — 63600175 PHARM REV CODE 636 W HCPCS: Performed by: RADIOLOGY

## 2020-04-23 PROCEDURE — 25000003 PHARM REV CODE 250: Performed by: PHYSICIAN ASSISTANT

## 2020-04-23 PROCEDURE — 78999 NM FUSION SPECT CT: ICD-10-PCS | Mod: 26,,, | Performed by: RADIOLOGY

## 2020-04-23 PROCEDURE — 78201 NM LIVER IMAGING STATIC POST Y-90 EMBOLIZATION: ICD-10-PCS | Mod: 26,,, | Performed by: RADIOLOGY

## 2020-04-23 PROCEDURE — 78999 UNLISTED MISC PX DX NUC MED: CPT | Mod: TC

## 2020-04-23 PROCEDURE — 63600175 PHARM REV CODE 636 W HCPCS: Performed by: PHYSICIAN ASSISTANT

## 2020-04-23 PROCEDURE — 25000003 PHARM REV CODE 250: Performed by: RADIOLOGY

## 2020-04-23 PROCEDURE — 78999 UNLISTED MISC PX DX NUC MED: CPT | Mod: 26,,, | Performed by: RADIOLOGY

## 2020-04-23 PROCEDURE — U0002 COVID-19 LAB TEST NON-CDC: HCPCS

## 2020-04-23 PROCEDURE — 78201 LIVER IMAGING STATIC ONLY: CPT | Mod: TC

## 2020-04-23 RX ORDER — HEPARIN SODIUM 200 [USP'U]/100ML
INJECTION, SOLUTION INTRAVENOUS
Status: COMPLETED | OUTPATIENT
Start: 2020-04-23 | End: 2020-04-23

## 2020-04-23 RX ORDER — LIDOCAINE HYDROCHLORIDE 20 MG/ML
INJECTION, SOLUTION INFILTRATION; PERINEURAL CODE/TRAUMA/SEDATION MEDICATION
Status: COMPLETED | OUTPATIENT
Start: 2020-04-23 | End: 2020-04-23

## 2020-04-23 RX ORDER — FENTANYL CITRATE 50 UG/ML
INJECTION, SOLUTION INTRAMUSCULAR; INTRAVENOUS CODE/TRAUMA/SEDATION MEDICATION
Status: COMPLETED | OUTPATIENT
Start: 2020-04-23 | End: 2020-04-23

## 2020-04-23 RX ORDER — POTASSIUM CHLORIDE 20 MEQ/1
20 TABLET, EXTENDED RELEASE ORAL DAILY
Qty: 30 TABLET | Refills: 11 | Status: SHIPPED | OUTPATIENT
Start: 2020-04-23 | End: 2021-04-23

## 2020-04-23 RX ORDER — MIDAZOLAM HYDROCHLORIDE 1 MG/ML
INJECTION INTRAMUSCULAR; INTRAVENOUS CODE/TRAUMA/SEDATION MEDICATION
Status: COMPLETED | OUTPATIENT
Start: 2020-04-23 | End: 2020-04-23

## 2020-04-23 RX ORDER — SODIUM CHLORIDE 9 MG/ML
INJECTION, SOLUTION INTRAVENOUS CONTINUOUS
Status: DISCONTINUED | OUTPATIENT
Start: 2020-04-23 | End: 2020-04-23 | Stop reason: HOSPADM

## 2020-04-23 RX ORDER — DEXAMETHASONE SODIUM PHOSPHATE 4 MG/ML
INJECTION, SOLUTION INTRA-ARTICULAR; INTRALESIONAL; INTRAMUSCULAR; INTRAVENOUS; SOFT TISSUE CODE/TRAUMA/SEDATION MEDICATION
Status: COMPLETED | OUTPATIENT
Start: 2020-04-23 | End: 2020-04-23

## 2020-04-23 RX ORDER — LIDOCAINE HYDROCHLORIDE 10 MG/ML
1 INJECTION, SOLUTION EPIDURAL; INFILTRATION; INTRACAUDAL; PERINEURAL ONCE AS NEEDED
Status: DISCONTINUED | OUTPATIENT
Start: 2020-04-23 | End: 2020-04-23 | Stop reason: HOSPADM

## 2020-04-23 RX ADMIN — IOHEXOL 20 ML: 300 INJECTION, SOLUTION INTRAVENOUS at 03:04

## 2020-04-23 RX ADMIN — HEPARIN SODIUM IN SODIUM CHLORIDE 500 ML: 200 INJECTION INTRAVENOUS at 03:04

## 2020-04-23 RX ADMIN — MIDAZOLAM HYDROCHLORIDE 1 MG: 1 INJECTION, SOLUTION INTRAMUSCULAR; INTRAVENOUS at 02:04

## 2020-04-23 RX ADMIN — AMPICILLIN SODIUM AND SULBACTAM SODIUM 3 G: 2; 1 INJECTION, POWDER, FOR SOLUTION INTRAMUSCULAR; INTRAVENOUS at 01:04

## 2020-04-23 RX ADMIN — FENTANYL CITRATE 50 MCG: 50 INJECTION, SOLUTION INTRAMUSCULAR; INTRAVENOUS at 02:04

## 2020-04-23 RX ADMIN — SODIUM CHLORIDE: 0.9 INJECTION, SOLUTION INTRAVENOUS at 01:04

## 2020-04-23 RX ADMIN — DEXAMETHASONE SODIUM PHOSPHATE 20 MG: 4 INJECTION, SOLUTION INTRAMUSCULAR; INTRAVENOUS at 03:04

## 2020-04-23 RX ADMIN — LIDOCAINE HYDROCHLORIDE 5 ML: 20 INJECTION, SOLUTION INFILTRATION; PERINEURAL at 02:04

## 2020-04-23 NOTE — PLAN OF CARE
Pt arrived to  for Yttrium. Pt oriented to unit and staff. Plan of care reviewed with patient, patient verbalizes understanding. Comfort measures utilized. Pt safely transferred from stretcher to procedural table. Fall risk reviewed with patient, fall risk interventions maintained. Safety strap applied, positioner pillows utilized to minimize pressure points. Blankets applied. Pt prepped and draped utilizing standard sterile technique. Patient placed on continuous monitoring, as required by sedation policy. Timeouts completed utilizing standard universal time-out, per department and facility policy. RN to remain at bedside, continuous monitoring maintained. Pt resting comfortably. Denies pain/discomfort. Will continue to monitor. See flow sheets for monitoring, medication administration, and updates.

## 2020-04-23 NOTE — PROGRESS NOTES
Haley in IR stated ok for patient procedure to continue without labs collected; understanding verbalized.

## 2020-04-23 NOTE — DISCHARGE INSTRUCTIONS
For scheduling: Call Nessa at [phone]    For questions or concerns call: LAURIE MON-FRI 8 AM- 5PM [phone]. Radiology resident on call [phone].    For immediate concerns that are not emergent, you may call our radiology clinic at: [phone]

## 2020-04-23 NOTE — PROGRESS NOTES
Procedure complete. Pt tolerated well. Right groin sealed with 5Fr Exoseal deployed at 1510. Site clean, dry, intact, no bleeding, no hematoma. HOB to remain flat for 2 hours, until 1710. Pt to go to [facility] in care of RN for scan then to [facility] to recover. Report will be given at bedside.

## 2020-04-23 NOTE — PROGRESS NOTES
Pre op assessment complete except for IV. 2 unsuccessful attempts for IV by Bianca SIDDIQUI. IR notified: does not have anyone to start IV; Jessy ARVIZU charge  notified; anesthesia desk notified for assistance.   CRNA at  with ultrasound assisting for an IV.

## 2020-04-23 NOTE — PROCEDURES
Radiology Post-Procedure Note    Pre Op Diagnosis: metastatic carcinoma    Post Op Diagnosis: Same    Procedure: Y90 delivery    Procedure performed by: Ramesh Verde MD    Written Informed Consent Obtained: Yes  Specimen Removed: NO  Estimated Blood Loss: Minimal    Findings:   Via rt cfa, Y90 to right liver lobe performed. No complications. Exoseal to rt cfa.    Patient tolerated procedure well.    Ramesh Verde M.D.  Diagnostic and Interventional Radiologist  Department of Radiology  Pager: [phone]

## 2020-04-23 NOTE — PROGRESS NOTES
Spoke to Haley in IR and she was made aware patient was a difficult IV stick and anesthesia was able to successfully place an IV, but unable to obtain labs.    Haley to call me back regarding further plan; understanding verbalized.   Report given to DEWAYNE Garvin RN

## 2020-04-23 NOTE — DISCHARGE SUMMARY
Radiology Discharge Summary      Hospital Course: No complications    Admit Date: 4/23/2020  Discharge Date: 04/23/2020     Instructions Given to Patient: Yes  Diet: Resume prior diet  Activity: activity as tolerated and no driving for today    Description of Condition on Discharge: Stable  Vital Signs (Most Recent): Temp: 97.2 °F (36.2 °C) (04/23/20 1126)  Pulse: 107 (04/23/20 1600)  Resp: 16 (04/23/20 1600)  BP: 135/72 (04/23/20 1600)  SpO2: 99 % (04/23/20 1600)    Discharge Disposition: Home    Discharge Diagnosis: Metastatic carcinoma s/p Y90 to right liver lobe. Follow up as scheduled.    Ramesh Verde M.D.  Diagnostic and Interventional Radiologist  Department of Radiology  Pager: [phone]

## 2020-04-23 NOTE — H&P
Radiology History & Physical      SUBJECTIVE:     Chief Complaint: metastatic carcinoma    History of Present Illness:  Indigo Stuart is a 64 y.o. female who presents for Y90 of right liver lobe.    Past Medical History:   Diagnosis Date    Cancer     colon    Encounter for blood transfusion     Hypertension      Past Surgical History:   Procedure Laterality Date    CHOLECYSTECTOMY      COLON SURGERY      COLONOSCOPY N/A 3/30/2018    Procedure: COLONOSCOPY;  Surgeon: Daniel Magana MD;  Location: [facility] ENDO;  Service: Endoscopy;  Laterality: N/A;    ESOPHAGOGASTRODUODENOSCOPY N/A 1/3/2019    Procedure: EGD (ESOPHAGOGASTRODUODENOSCOPY);  Surgeon: Adis Arguello MD;  Location: [facility] ENDO;  Service: Endoscopy;  Laterality: N/A;    ESOPHAGOGASTRODUODENOSCOPY N/A 1/14/2019    Procedure: EGD (ESOPHAGOGASTRODUODENOSCOPY);  Surgeon: Monserrat Lopez MD;  Location: [facility] (2ND FLR);  Service: Endoscopy;  Laterality: N/A;  with duodenal stent placement per La/svn    HYSTERECTOMY      INSERTION OF TUNNELED CENTRAL VENOUS CATHETER (CVC) WITH SUBCUTANEOUS PORT N/A 1/4/2019    Procedure: [id number];  Surgeon: Emilio Romero MD;  Location: [facility] OR;  Service: General;  Laterality: N/A;       Home Meds:   Prior to Admission medications    Medication Sig Start Date End Date Taking? Authorizing Provider   amLODIPine (NORVASC) 5 MG tablet Take 1 tablet (5 mg total) by mouth once daily. 10/28/19  Yes Stephan Arevalo, DO   atorvastatin (LIPITOR) 20 MG tablet Take 1 tablet (20 mg total) by mouth every evening. 10/28/19  Yes Stephan Arevalo, DO   dronabinoL (MARINOL) 5 MG capsule Take 1 capsule (5 mg total) by mouth 2 (two) times daily before meals. To increase appetite 3/23/20  Yes Karina Pettit MD   metoprolol succinate (TOPROL-XL) 25 MG 24 hr tablet Take 1 tablet (25 mg total) by mouth every evening. 10/28/19  Yes Stephan Arevalo, DO   ondansetron (ZOFRAN-ODT) 8 MG TbDL Take 1 tablet (8 mg  total) by mouth every 12 (twelve) hours as needed. 3/18/20 3/18/21 Yes Karina Pettit MD   sucralfate (CARAFATE) 100 mg/mL suspension Take 10 mLs (1 g total) by mouth 4 (four) times daily. 4/9/20 4/9/21 Yes Karina Pettit MD   ELIQUIS 5 mg Tab TAKE 1 TABLET BY MOUTH TWICE A DAY  Patient taking differently: Take by mouth 2 (two) times daily.  1/10/20   Karina Pettit MD   ferrous sulfate (FEOSOL) 325 mg (65 mg iron) Tab tablet Take 1 tablet by mouth 2 (two) times daily. 3/7/19   Historical Provider, MD   potassium chloride SA (UDAY-DUR,KLOR-CON) 20 MEQ tablet Take 1 tablet (20 mEq total) by mouth once daily. 4/23/20 4/23/21  Madonna Shrestha MD   prochlorperazine (COMPAZINE) 10 MG tablet Take 1 tablet (10 mg total) by mouth every 6 (six) hours as needed. 4/9/20 4/9/21  Karina Pettit MD   pyridoxine, vitamin B6, (VITAMIN B-6) 50 MG Tab Take 100 mg by mouth once daily.     Historical Provider, MD   senna-docusate 8.6-50 mg (SENOKOT-S) 8.6-50 mg per tablet Take 2 tablets by mouth nightly as needed for Constipation.    Historical Provider, MD   thiamine (VITAMIN B-1) 250 MG tablet Take 250 mg by mouth once daily.    Historical Provider, MD   potassium chloride SA (UDAY-DRISS ESTRADAOR-CON) 20 MEQ tablet Take 1 tablet (20 mEq total) by mouth once daily. 7/8/19 4/23/20  Madonna Shrestha MD     Anticoagulants/Antiplatelets: no anticoagulation    Allergies:   Review of patient's allergies indicates:   Allergen Reactions    Codeine Nausea And Vomiting    Erythromycin base Other (See Comments)    Lisinopril Palpitations     Cough      Sedation History:  no adverse reactions    Review of Systems:   Hematological: no known coagulopathies  Respiratory: no shortness of breath  Cardiovascular: no chest pain  Gastrointestinal: no abdominal pain  Genito-Urinary: no dysuria  Musculoskeletal: negative  Neurological: no TIA or stroke symptoms         OBJECTIVE:     Vital Signs (Most Recent)  Temp: 97.2 °F (36.2 °C) (04/23/20 1126)  Pulse:  107 (04/23/20 1600)  Resp: 16 (04/23/20 1600)  BP: 135/72 (04/23/20 1600)  SpO2: 99 % (04/23/20 1600)    Physical Exam:  ASA: 3  Mallampati: 2    General: no acute distress  Mental Status: alert and oriented to person, place and time  HEENT: normocephalic, atraumatic  Chest: unlabored breathing  Heart: regular heart rate  Abdomen: nondistended  Extremity: moves all extremities    Laboratory  Lab Results   Component Value Date    INR 1.2 03/31/2020       Lab Results   Component Value Date    WBC 12.21 04/15/2020    HGB 9.7 (L) 04/15/2020    HCT 33.0 (L) 04/15/2020    MCV 89 04/15/2020     (H) 04/15/2020      Lab Results   Component Value Date     04/15/2020     04/15/2020    K 2.8 (L) 04/15/2020     04/15/2020    CO2 23 04/15/2020    BUN 5 (L) 04/15/2020    CREATININE 0.6 04/15/2020    CALCIUM 8.5 (L) 04/15/2020    MG 1.6 07/06/2019    ALT 39 04/15/2020     (H) 04/15/2020    ALBUMIN 1.8 (L) 04/15/2020    BILITOT 0.6 04/15/2020    BILIDIR 0.4 (H) 03/31/2020       ASSESSMENT/PLAN:     Sedation Plan: Moderate  Patient will undergo Y90 to right lobe.    Ramesh Verde M.D.  Diagnostic and Interventional Radiologist  Department of Radiology  Pager: [phone]

## 2020-04-23 NOTE — TELEPHONE ENCOUNTER
Unable to reach patient to deliver the following test results:    Your test was NEGATIVE for COVID-19 (coronavirus).  If you still have symptoms, treat with rest, fluids, and over-the-counter medications.  Continue to stay home and practice proper handwashing.     If your symptoms worsen or if you have any other concerns, please contact [facility] at [phone].     Sincerely,    Xiomara Fish PA-C

## 2020-04-23 NOTE — PROGRESS NOTES
Patient given discharge instructions and verbalized understanding of at home care. IV discontinued with catheter intact. Bandage to right groin clean, dry and intact. Patient discharged to ride via wheelchair.

## 2020-04-26 DIAGNOSIS — R10.13 DYSPEPSIA: ICD-10-CM

## 2020-04-28 ENCOUNTER — PATIENT MESSAGE (OUTPATIENT)
Dept: HEMATOLOGY/ONCOLOGY | Facility: CLINIC | Age: 65
End: 2020-04-28

## 2020-04-28 RX ORDER — SUCRALFATE 1 G/10ML
1 SUSPENSION ORAL 4 TIMES DAILY
Qty: 420 ML | Refills: 1 | OUTPATIENT
Start: 2020-04-28 | End: 2021-04-28

## 2020-04-29 ENCOUNTER — HOSPITAL ENCOUNTER (OUTPATIENT)
Facility: HOSPITAL | Age: 65
Discharge: HOME | End: 2020-05-01
Attending: EMERGENCY MEDICINE | Admitting: INTERNAL MEDICINE
Payer: MEDICARE

## 2020-04-29 DIAGNOSIS — D64.9 SYMPTOMATIC ANEMIA: ICD-10-CM

## 2020-04-29 DIAGNOSIS — R00.0 TACHYCARDIA: ICD-10-CM

## 2020-04-29 DIAGNOSIS — D64.9 ANEMIA, UNSPECIFIED TYPE: ICD-10-CM

## 2020-04-29 DIAGNOSIS — R53.0 NEOPLASTIC MALIGNANT RELATED FATIGUE: ICD-10-CM

## 2020-04-29 DIAGNOSIS — C17.0 DUODENAL CANCER: ICD-10-CM

## 2020-04-29 DIAGNOSIS — E86.0 DEHYDRATION: Primary | ICD-10-CM

## 2020-04-29 DIAGNOSIS — E43 SEVERE MALNUTRITION: ICD-10-CM

## 2020-04-29 DIAGNOSIS — C18.8 OVERLAPPING MALIGNANT NEOPLASM OF COLON: ICD-10-CM

## 2020-04-29 DIAGNOSIS — R53.1 WEAKNESS: ICD-10-CM

## 2020-04-29 PROBLEM — R50.9 FEVER: Status: RESOLVED | Noted: 2020-02-24 | Resolved: 2020-04-29

## 2020-04-29 PROBLEM — N39.0 UTI (URINARY TRACT INFECTION): Status: RESOLVED | Noted: 2020-02-24 | Resolved: 2020-04-29

## 2020-04-29 PROBLEM — R42 VERTIGO: Status: ACTIVE | Noted: 2020-04-29

## 2020-04-29 LAB
ALBUMIN SERPL BCP-MCNC: 1.7 G/DL (ref 3.5–5.2)
ALP SERPL-CCNC: 344 U/L (ref 55–135)
ALT SERPL W/O P-5'-P-CCNC: 38 U/L (ref 10–44)
ANION GAP SERPL CALC-SCNC: 12 MMOL/L (ref 8–16)
AST SERPL-CCNC: 67 U/L (ref 10–40)
BASOPHILS NFR BLD: 0 % (ref 0–1.9)
BILIRUB SERPL-MCNC: 0.8 MG/DL (ref 0.1–1)
BILIRUB UR QL STRIP: ABNORMAL
BUN SERPL-MCNC: 7 MG/DL (ref 8–23)
CALCIUM SERPL-MCNC: 7.8 MG/DL (ref 8.7–10.5)
CHLORIDE SERPL-SCNC: 99 MMOL/L (ref 95–110)
CLARITY UR: ABNORMAL
CO2 SERPL-SCNC: 27 MMOL/L (ref 23–29)
COLOR UR: YELLOW
CREAT SERPL-MCNC: 0.6 MG/DL (ref 0.5–1.4)
DIFFERENTIAL METHOD: ABNORMAL
EOSINOPHIL NFR BLD: 0 % (ref 0–8)
ERYTHROCYTE [DISTWIDTH] IN BLOOD BY AUTOMATED COUNT: 17.6 % (ref 11.5–14.5)
EST. GFR  (AFRICAN AMERICAN): >60 ML/MIN/1.73 M^2
EST. GFR  (NON AFRICAN AMERICAN): >60 ML/MIN/1.73 M^2
GLUCOSE SERPL-MCNC: 107 MG/DL (ref 70–110)
GLUCOSE UR QL STRIP: NEGATIVE
HCT VFR BLD AUTO: 31.4 % (ref 37–48.5)
HGB BLD-MCNC: 9.5 G/DL (ref 12–16)
HGB UR QL STRIP: NEGATIVE
IMM GRANULOCYTES # BLD AUTO: ABNORMAL K/UL
IMM GRANULOCYTES NFR BLD AUTO: ABNORMAL %
KETONES UR QL STRIP: ABNORMAL
LEUKOCYTE ESTERASE UR QL STRIP: NEGATIVE
LYMPHOCYTES NFR BLD: 73 % (ref 18–48)
MCH RBC QN AUTO: 26.2 PG (ref 27–31)
MCHC RBC AUTO-ENTMCNC: 30.3 G/DL (ref 32–36)
MCV RBC AUTO: 87 FL (ref 82–98)
MONOCYTES NFR BLD: 20 % (ref 4–15)
NEUTROPHILS NFR BLD: 7 % (ref 38–73)
NITRITE UR QL STRIP: NEGATIVE
NRBC BLD-RTO: 15 /100 WBC
PH UR STRIP: 6 [PH] (ref 5–8)
PLATELET # BLD AUTO: 92 K/UL (ref 150–350)
PLATELET BLD QL SMEAR: ABNORMAL
PMV BLD AUTO: 9.9 FL (ref 9.2–12.9)
POTASSIUM SERPL-SCNC: 3.6 MMOL/L (ref 3.5–5.1)
PROT SERPL-MCNC: 5.7 G/DL (ref 6–8.4)
PROT UR QL STRIP: ABNORMAL
RBC # BLD AUTO: 3.62 M/UL (ref 4–5.4)
SARS-COV-2 RDRP RESP QL NAA+PROBE: NEGATIVE
SODIUM SERPL-SCNC: 138 MMOL/L (ref 136–145)
SP GR UR STRIP: 1.02 (ref 1–1.03)
TSH SERPL DL<=0.005 MIU/L-ACNC: 3.66 UIU/ML (ref 0.4–4)
URN SPEC COLLECT METH UR: ABNORMAL
UROBILINOGEN UR STRIP-ACNC: 1 EU/DL
WBC # BLD AUTO: 1.23 K/UL (ref 3.9–12.7)

## 2020-04-29 PROCEDURE — 85007 BL SMEAR W/DIFF WBC COUNT: CPT

## 2020-04-29 PROCEDURE — 25000003 PHARM REV CODE 250: Performed by: INTERNAL MEDICINE

## 2020-04-29 PROCEDURE — 81003 URINALYSIS AUTO W/O SCOPE: CPT

## 2020-04-29 PROCEDURE — 93005 ELECTROCARDIOGRAM TRACING: CPT

## 2020-04-29 PROCEDURE — 63600175 PHARM REV CODE 636 W HCPCS: Performed by: INTERNAL MEDICINE

## 2020-04-29 PROCEDURE — G0378 HOSPITAL OBSERVATION PER HR: HCPCS

## 2020-04-29 PROCEDURE — 84443 ASSAY THYROID STIM HORMONE: CPT

## 2020-04-29 PROCEDURE — 99220 PR INITIAL OBSERVATION CARE,LEVL III: ICD-10-PCS | Mod: ,,, | Performed by: INTERNAL MEDICINE

## 2020-04-29 PROCEDURE — 99220 PR INITIAL OBSERVATION CARE,LEVL III: CPT | Mod: ,,, | Performed by: INTERNAL MEDICINE

## 2020-04-29 PROCEDURE — 36415 COLL VENOUS BLD VENIPUNCTURE: CPT

## 2020-04-29 PROCEDURE — 85027 COMPLETE CBC AUTOMATED: CPT

## 2020-04-29 PROCEDURE — 99285 EMERGENCY DEPT VISIT HI MDM: CPT | Mod: 25

## 2020-04-29 PROCEDURE — U0002 COVID-19 LAB TEST NON-CDC: HCPCS

## 2020-04-29 PROCEDURE — 80053 COMPREHEN METABOLIC PANEL: CPT

## 2020-04-29 PROCEDURE — 25000003 PHARM REV CODE 250: Performed by: EMERGENCY MEDICINE

## 2020-04-29 PROCEDURE — 96361 HYDRATE IV INFUSION ADD-ON: CPT

## 2020-04-29 RX ORDER — ACETAMINOPHEN 325 MG/1
650 TABLET ORAL EVERY 4 HOURS PRN
Status: DISCONTINUED | OUTPATIENT
Start: 2020-04-29 | End: 2020-05-01 | Stop reason: HOSPADM

## 2020-04-29 RX ORDER — SODIUM CHLORIDE, SODIUM LACTATE, POTASSIUM CHLORIDE, CALCIUM CHLORIDE 600; 310; 30; 20 MG/100ML; MG/100ML; MG/100ML; MG/100ML
INJECTION, SOLUTION INTRAVENOUS CONTINUOUS
Status: DISCONTINUED | OUTPATIENT
Start: 2020-04-29 | End: 2020-05-01 | Stop reason: HOSPADM

## 2020-04-29 RX ORDER — POTASSIUM CHLORIDE 20 MEQ/15ML
40 SOLUTION ORAL
Status: DISCONTINUED | OUTPATIENT
Start: 2020-04-29 | End: 2020-05-01 | Stop reason: HOSPADM

## 2020-04-29 RX ORDER — LANOLIN ALCOHOL/MO/W.PET/CERES
100 CREAM (GRAM) TOPICAL DAILY
Status: DISCONTINUED | OUTPATIENT
Start: 2020-04-30 | End: 2020-05-01 | Stop reason: HOSPADM

## 2020-04-29 RX ORDER — METOPROLOL SUCCINATE 25 MG/1
25 TABLET, EXTENDED RELEASE ORAL NIGHTLY
Status: DISCONTINUED | OUTPATIENT
Start: 2020-04-29 | End: 2020-05-01 | Stop reason: HOSPADM

## 2020-04-29 RX ORDER — SODIUM CHLORIDE 0.9 % (FLUSH) 0.9 %
10 SYRINGE (ML) INJECTION
Status: DISCONTINUED | OUTPATIENT
Start: 2020-04-29 | End: 2020-05-01 | Stop reason: HOSPADM

## 2020-04-29 RX ORDER — ATORVASTATIN CALCIUM 20 MG/1
20 TABLET, FILM COATED ORAL NIGHTLY
Status: DISCONTINUED | OUTPATIENT
Start: 2020-04-29 | End: 2020-05-01 | Stop reason: HOSPADM

## 2020-04-29 RX ORDER — PROCHLORPERAZINE MALEATE 10 MG
10 TABLET ORAL 4 TIMES DAILY PRN
Status: DISCONTINUED | OUTPATIENT
Start: 2020-04-29 | End: 2020-05-01 | Stop reason: HOSPADM

## 2020-04-29 RX ORDER — DRONABINOL 2.5 MG/1
5 CAPSULE ORAL
Status: DISCONTINUED | OUTPATIENT
Start: 2020-04-30 | End: 2020-05-01 | Stop reason: HOSPADM

## 2020-04-29 RX ORDER — HYDROCODONE BITARTRATE AND ACETAMINOPHEN 5; 325 MG/1; MG/1
1 TABLET ORAL EVERY 6 HOURS PRN
Status: DISCONTINUED | OUTPATIENT
Start: 2020-04-29 | End: 2020-05-01 | Stop reason: HOSPADM

## 2020-04-29 RX ORDER — AMLODIPINE BESYLATE 5 MG/1
5 TABLET ORAL DAILY
Status: DISCONTINUED | OUTPATIENT
Start: 2020-04-30 | End: 2020-05-01 | Stop reason: HOSPADM

## 2020-04-29 RX ORDER — SODIUM CHLORIDE 9 MG/ML
1000 INJECTION, SOLUTION INTRAVENOUS
Status: COMPLETED | OUTPATIENT
Start: 2020-04-29 | End: 2020-04-29

## 2020-04-29 RX ORDER — POTASSIUM CHLORIDE 20 MEQ/15ML
60 SOLUTION ORAL
Status: DISCONTINUED | OUTPATIENT
Start: 2020-04-29 | End: 2020-05-01 | Stop reason: HOSPADM

## 2020-04-29 RX ADMIN — SODIUM CHLORIDE, SODIUM LACTATE, POTASSIUM CHLORIDE, AND CALCIUM CHLORIDE: .6; .31; .03; .02 INJECTION, SOLUTION INTRAVENOUS at 08:04

## 2020-04-29 RX ADMIN — ATORVASTATIN CALCIUM 20 MG: 20 TABLET, FILM COATED ORAL at 08:04

## 2020-04-29 RX ADMIN — METOPROLOL SUCCINATE 25 MG: 25 TABLET, EXTENDED RELEASE ORAL at 08:04

## 2020-04-29 RX ADMIN — SODIUM CHLORIDE 1000 ML: 0.9 INJECTION, SOLUTION INTRAVENOUS at 01:04

## 2020-04-29 RX ADMIN — APIXABAN 5 MG: 2.5 TABLET, FILM COATED ORAL at 08:04

## 2020-04-29 NOTE — ED NOTES
Resting comfortably in bed, respirations even and unlabored, skin warm and dry, no signs or symptoms of distress noted. Extra warm blankets given applied to Pt. Bed in low and locked position, side rails up x 2, call light within reach of patient. Will continue to monitor.

## 2020-04-29 NOTE — ED NOTES
AAOx4, resting comfortably in bed, respirations even and unlabored, skin warm and dry, no signs or symptoms of distress noted. Bed in low and locked position, side rails up x 2, call light within reach of patient. Will continue to monitor.

## 2020-04-29 NOTE — ED PROVIDER NOTES
Encounter Date: 4/29/2020    SCRIBE #1 NOTE: I, Kisha Savage, am scribing for, and in the presence of, Bridger Aparicio MD.       History     Chief Complaint   Patient presents with    Fatigue     Co increased weakness over the past 3 weeks; Chemo 1 week ago    Dizziness       Time seen by provider: 12:41 PM on 04/29/2020    Indigo Stuart is a 64 y.o. female who presents to the ED with complaints of weakness and dizziness. Weakness is generalized and started x2 weeks ago. She reports dizziness is positional and started x1-2 weeks ago. She endorses diarrhea yesterday but denies diarrhea today. Denies fever, chills, cough, congestion, SOB, CP, abdominal pain, vomiting, blood in stool, or urinary symptoms. Last chemotherapy was x1 week ago. Patient denies history of heart problems or Afib. She denies other new symptoms currently. She has no other medical concerns or complaints at this moment. PMHx includes HTN and colon cancer. SHx includes hysterectomy, colonoscopy, and EGD.     The history is provided by the patient.     Review of patient's allergies indicates:   Allergen Reactions    Codeine Nausea And Vomiting    Erythromycin base Other (See Comments)    Lisinopril Palpitations     Cough      Past Medical History:   Diagnosis Date    Cancer     colon    Encounter for blood transfusion     Hypertension      Past Surgical History:   Procedure Laterality Date    CHOLECYSTECTOMY      COLON SURGERY      COLONOSCOPY N/A 3/30/2018    Procedure: COLONOSCOPY;  Surgeon: Daniel Magana MD;  Location: [facility];  Service: Endoscopy;  Laterality: N/A;    ESOPHAGOGASTRODUODENOSCOPY N/A 1/3/2019    Procedure: EGD (ESOPHAGOGASTRODUODENOSCOPY);  Surgeon: Adis Arguello MD;  Location: [facility];  Service: Endoscopy;  Laterality: N/A;    ESOPHAGOGASTRODUODENOSCOPY N/A 1/14/2019    Procedure: EGD (ESOPHAGOGASTRODUODENOSCOPY);  Surgeon: Monserrat Lopez MD;  Location: [facility] ([address] [place] [address]);  Service:  Endoscopy;  Laterality: N/A;  with duodenal stent placement per La/svn    HYSTERECTOMY      INSERTION OF TUNNELED CENTRAL VENOUS CATHETER (CVC) WITH SUBCUTANEOUS PORT N/A 1/4/2019    Procedure: [id number];  Surgeon: Emilio Romero MD;  Location: [facility];  Service: General;  Laterality: N/A;     Family History   Problem Relation Age of Onset    Cancer Mother         colon ca, liver ca    Cancer Father      Social History     Tobacco Use    Smoking status: Never Smoker    Smokeless tobacco: Never Used   Substance Use Topics    Alcohol use: No     Frequency: Never     Drinks per session: Patient refused     Binge frequency: Never    Drug use: No     Review of Systems   Constitutional: Negative for chills and fever.   HENT: Negative for congestion.    Respiratory: Negative for cough and shortness of breath.    Cardiovascular: Negative for chest pain.   Gastrointestinal: Positive for diarrhea (resolved). Negative for abdominal pain, blood in stool and vomiting.   Genitourinary: Negative for decreased urine volume, difficulty urinating, dysuria and hematuria.   Musculoskeletal: Negative for back pain.   Skin: Negative for rash.   Neurological: Positive for dizziness and weakness.   Hematological: Does not bruise/bleed easily.   Psychiatric/Behavioral: The patient is not nervous/anxious.        Physical Exam     Initial Vitals   BP Pulse Resp Temp SpO2   04/29/20 1232 04/29/20 1231 04/29/20 1231 04/29/20 1231 04/29/20 1231   116/65 (!) 129 20 99.3 °F (37.4 °C) 100 %      MAP       --                Physical Exam    Nursing note and vitals reviewed.  Constitutional: She appears well-developed and well-nourished.   HENT:   Head: Normocephalic and atraumatic.   Eyes:   Pale conjunctivae.    Neck: Normal range of motion. Neck supple.   Cardiovascular: Regular rhythm and normal heart sounds. Tachycardia present.  Exam reveals no gallop and no friction rub.    No murmur heard.  Pulmonary/Chest: Effort  normal and breath sounds normal. No respiratory distress. She has no wheezes. She has no rhonchi. She has no rales.   Equal, bilateral breath sounds noted without wheezing.    Abdominal: Soft. She exhibits no distension. There is no tenderness.   No palpable abdominal tenderness noted.    Musculoskeletal: Normal range of motion.   Neurological: She is alert and oriented to person, place, and time.   Skin: Skin is warm and dry. No erythema.   Psychiatric: She has a normal mood and affect.         ED Course   Procedures  Labs Reviewed   CBC W/ AUTO DIFFERENTIAL - Abnormal; Notable for the following components:       Result Value    WBC 1.23 (*)     RBC 3.62 (*)     Hemoglobin 9.5 (*)     Hematocrit 31.4 (*)     Mean Corpuscular Hemoglobin 26.2 (*)     Mean Corpuscular Hemoglobin Conc 30.3 (*)     RDW 17.6 (*)     Platelets 92 (*)     nRBC 15 (*)     Gran% 7.0 (*)     Lymph% 73.0 (*)     Mono% 20.0 (*)     All other components within normal limits    Narrative:     WBC critical result(s) called and verbal readback obtained from   Leighann Hackett RN  by KM1 04/29/2020 14:38   COMPREHENSIVE METABOLIC PANEL - Abnormal; Notable for the following components:    BUN, Bld 7 (*)     Calcium 7.8 (*)     Total Protein 5.7 (*)     Albumin 1.7 (*)     Alkaline Phosphatase 344 (*)     AST 67 (*)     All other components within normal limits   URINALYSIS, REFLEX TO URINE CULTURE - Abnormal; Notable for the following components:    Appearance, UA Hazy (*)     Protein, UA Trace (*)     Ketones, UA Trace (*)     Bilirubin (UA) 1+ (*)     All other components within normal limits    Narrative:     Preferred Collection Type->Urine, Clean Catch   TSH   SARS-COV-2 RNA AMPLIFICATION, QUAL     EKG Readings: (Independently Interpreted)   Rhythm: Sinus Tachycardia. Heart Rate: 118. Ectopy: No Ectopy. Conduction: Normal. ST Segments: Non-Specific ST Segment Depression. T Waves: Normal. Axis: Normal. Clinical Impression: Sinus Tachycardia        Imaging Results          X-Ray Chest AP Portable (Final result)  Result time 04/29/20 12:57:14    Final result by Arie Fernandez Jr., MD (04/29/20 12:57:14)                 Impression:      Venous Port-A-Cath in good position otherwise negative chest      Electronically signed by: Arie Fernandez MD  Date:    04/29/2020  Time:    12:57             Narrative:    EXAMINATION:  XR CHEST AP PORTABLE    CLINICAL HISTORY:  Weakness    TECHNIQUE:  Single frontal view of the chest was performed.    COMPARISON:  Prior chest of April 6, 2020.    FINDINGS:  A venous Port-A-Cath is noted in place on the left with tip of the central line ending in the superior vena cava.  The mediastinal and cardiac size and contours normal.  No intrapulmonary masses or infiltrates are seen.  There is no pneumothorax or pleural effusion.                                 Medical Decision Making:   History:   Old Medical Records: I decided to obtain old medical records.  Independently Interpreted Test(s):   I have ordered and independently interpreted EKG Reading(s) - see prior notes  Clinical Tests:   Lab Tests: Reviewed and Ordered  Radiological Study: Reviewed and Ordered  Medical Tests: Reviewed and Ordered  ED Management:  64-year-old female presents with a 2 week history of generalized weakness with associated orthostatic dizziness.  She appears dehydrated with sinus tachycardia suggestive of dehydration.  I discussed the case with Dr. Shrestha who recommends admission for hydration.  Other:   I have discussed this case with another health care provider.       APC / Resident Notes:   I, Dr. Bridger Aparicio III, personally performed the services described in this documentation. All medical record entries made by the scribe were at my direction and in my presence.  I have reviewed the chart and agree that the record reflects my personal performance and is accurate and complete       Scribe Attestation:   Scribe #1: I performed the above  scribed service and the documentation accurately describes the services I performed. I attest to the accuracy of the note.               Clinical Impression:       ICD-10-CM ICD-9-CM   1. Dehydration E86.0 276.51   2. Weakness R53.1 780.79   3. Tachycardia R00.0 785.0         Disposition:   Disposition: Admitted     ED Disposition Condition    Admit                           Bridger Aparicio III, MD  04/29/20 [id number]

## 2020-04-30 ENCOUNTER — TELEPHONE (OUTPATIENT)
Dept: HEMATOLOGY/ONCOLOGY | Facility: CLINIC | Age: 65
End: 2020-04-30

## 2020-04-30 ENCOUNTER — TELEPHONE (OUTPATIENT)
Dept: INFUSION THERAPY | Facility: HOSPITAL | Age: 65
End: 2020-04-30

## 2020-04-30 PROBLEM — R19.7 DIARRHEA IN ADULT PATIENT: Status: ACTIVE | Noted: 2020-04-30

## 2020-04-30 LAB
ALBUMIN SERPL BCP-MCNC: 1.3 G/DL (ref 3.5–5.2)
ALP SERPL-CCNC: 259 U/L (ref 55–135)
ALT SERPL W/O P-5'-P-CCNC: 35 U/L (ref 10–44)
ANION GAP SERPL CALC-SCNC: 9 MMOL/L (ref 8–16)
ANISOCYTOSIS BLD QL SMEAR: SLIGHT
AST SERPL-CCNC: 61 U/L (ref 10–40)
BACTERIA #/AREA URNS HPF: ABNORMAL /HPF
BASOPHILS NFR BLD: 1 % (ref 0–1.9)
BILIRUB SERPL-MCNC: 0.7 MG/DL (ref 0.1–1)
BILIRUB UR QL STRIP: NEGATIVE
BUN SERPL-MCNC: 10 MG/DL (ref 8–23)
CALCIUM SERPL-MCNC: 7.3 MG/DL (ref 8.7–10.5)
CHLORIDE SERPL-SCNC: 102 MMOL/L (ref 95–110)
CLARITY UR: CLEAR
CO2 SERPL-SCNC: 27 MMOL/L (ref 23–29)
COLOR UR: YELLOW
CREAT SERPL-MCNC: 0.5 MG/DL (ref 0.5–1.4)
DIFFERENTIAL METHOD: ABNORMAL
EOSINOPHIL NFR BLD: 0 % (ref 0–8)
ERYTHROCYTE [DISTWIDTH] IN BLOOD BY AUTOMATED COUNT: 17.8 % (ref 11.5–14.5)
EST. GFR  (AFRICAN AMERICAN): >60 ML/MIN/1.73 M^2
EST. GFR  (NON AFRICAN AMERICAN): >60 ML/MIN/1.73 M^2
GLUCOSE SERPL-MCNC: 87 MG/DL (ref 70–110)
GLUCOSE UR QL STRIP: NEGATIVE
HCT VFR BLD AUTO: 23.4 % (ref 37–48.5)
HGB BLD-MCNC: 7 G/DL (ref 12–16)
HGB UR QL STRIP: ABNORMAL
HYPOCHROMIA BLD QL SMEAR: ABNORMAL
IMM GRANULOCYTES # BLD AUTO: ABNORMAL K/UL (ref 0–0.04)
IMM GRANULOCYTES NFR BLD AUTO: ABNORMAL % (ref 0–0.5)
KETONES UR QL STRIP: ABNORMAL
LEUKOCYTE ESTERASE UR QL STRIP: NEGATIVE
LYMPHOCYTES NFR BLD: 75 % (ref 18–48)
MCH RBC QN AUTO: 25.9 PG (ref 27–31)
MCHC RBC AUTO-ENTMCNC: 29.9 G/DL (ref 32–36)
MCV RBC AUTO: 87 FL (ref 82–98)
MICROSCOPIC COMMENT: ABNORMAL
MONOCYTES NFR BLD: 15 % (ref 4–15)
NEUTROPHILS NFR BLD: 5 % (ref 38–73)
NEUTS BAND NFR BLD MANUAL: 4 %
NITRITE UR QL STRIP: POSITIVE
NRBC BLD-RTO: 10 /100 WBC
PH UR STRIP: 6 [PH] (ref 5–8)
PLATELET # BLD AUTO: 71 K/UL (ref 150–350)
PLATELET BLD QL SMEAR: ABNORMAL
PMV BLD AUTO: 10.9 FL (ref 9.2–12.9)
POTASSIUM SERPL-SCNC: 3 MMOL/L (ref 3.5–5.1)
PROT SERPL-MCNC: 4.6 G/DL (ref 6–8.4)
PROT UR QL STRIP: ABNORMAL
RBC # BLD AUTO: 2.7 M/UL (ref 4–5.4)
RBC #/AREA URNS HPF: 15 /HPF (ref 0–4)
SODIUM SERPL-SCNC: 138 MMOL/L (ref 136–145)
SP GR UR STRIP: 1.02 (ref 1–1.03)
SQUAMOUS #/AREA URNS HPF: 3 /HPF
URN SPEC COLLECT METH UR: ABNORMAL
UROBILINOGEN UR STRIP-ACNC: NEGATIVE EU/DL
WBC # BLD AUTO: 1.11 K/UL (ref 3.9–12.7)

## 2020-04-30 PROCEDURE — 99226 PR SUBSEQUENT OBSERVATION CARE,LEVEL III: CPT | Mod: ,,, | Performed by: INTERNAL MEDICINE

## 2020-04-30 PROCEDURE — G0378 HOSPITAL OBSERVATION PER HR: HCPCS

## 2020-04-30 PROCEDURE — 99226 PR SUBSEQUENT OBSERVATION CARE,LEVEL III: ICD-10-PCS | Mod: ,,, | Performed by: INTERNAL MEDICINE

## 2020-04-30 PROCEDURE — 97116 GAIT TRAINING THERAPY: CPT

## 2020-04-30 PROCEDURE — 96376 TX/PRO/DX INJ SAME DRUG ADON: CPT

## 2020-04-30 PROCEDURE — 63700000 PHARM REV CODE 250 ALT 637 W/O HCPCS: Performed by: INTERNAL MEDICINE

## 2020-04-30 PROCEDURE — 85027 COMPLETE CBC AUTOMATED: CPT

## 2020-04-30 PROCEDURE — 81000 URINALYSIS NONAUTO W/SCOPE: CPT

## 2020-04-30 PROCEDURE — 85007 BL SMEAR W/DIFF WBC COUNT: CPT

## 2020-04-30 PROCEDURE — 80053 COMPREHEN METABOLIC PANEL: CPT

## 2020-04-30 PROCEDURE — 36415 COLL VENOUS BLD VENIPUNCTURE: CPT

## 2020-04-30 PROCEDURE — 25000003 PHARM REV CODE 250: Performed by: INTERNAL MEDICINE

## 2020-04-30 PROCEDURE — 96374 THER/PROPH/DIAG INJ IV PUSH: CPT

## 2020-04-30 PROCEDURE — 97161 PT EVAL LOW COMPLEX 20 MIN: CPT

## 2020-04-30 PROCEDURE — 63600175 PHARM REV CODE 636 W HCPCS: Performed by: INTERNAL MEDICINE

## 2020-04-30 RX ORDER — POTASSIUM CHLORIDE 7.45 MG/ML
10 INJECTION INTRAVENOUS
Status: COMPLETED | OUTPATIENT
Start: 2020-04-30 | End: 2020-04-30

## 2020-04-30 RX ORDER — LOPERAMIDE HYDROCHLORIDE 2 MG/1
2 CAPSULE ORAL 4 TIMES DAILY PRN
Status: DISCONTINUED | OUTPATIENT
Start: 2020-04-30 | End: 2020-05-01 | Stop reason: HOSPADM

## 2020-04-30 RX ADMIN — ATORVASTATIN CALCIUM 20 MG: 20 TABLET, FILM COATED ORAL at 08:04

## 2020-04-30 RX ADMIN — POTASSIUM CHLORIDE 10 MEQ: 10 INJECTION, SOLUTION INTRAVENOUS at 04:04

## 2020-04-30 RX ADMIN — LOPERAMIDE HYDROCHLORIDE 2 MG: 2 CAPSULE ORAL at 10:04

## 2020-04-30 RX ADMIN — THIAMINE HCL TAB 100 MG 250 MG: 100 TAB at 09:04

## 2020-04-30 RX ADMIN — POTASSIUM CHLORIDE 60 MEQ: 20 SOLUTION ORAL at 10:04

## 2020-04-30 RX ADMIN — APIXABAN 5 MG: 2.5 TABLET, FILM COATED ORAL at 08:04

## 2020-04-30 RX ADMIN — DRONABINOL 5 MG: 2.5 CAPSULE ORAL at 04:04

## 2020-04-30 RX ADMIN — METOPROLOL SUCCINATE 25 MG: 25 TABLET, EXTENDED RELEASE ORAL at 08:04

## 2020-04-30 RX ADMIN — POTASSIUM CHLORIDE 10 MEQ: 10 INJECTION, SOLUTION INTRAVENOUS at 12:04

## 2020-04-30 RX ADMIN — SODIUM CHLORIDE, SODIUM LACTATE, POTASSIUM CHLORIDE, AND CALCIUM CHLORIDE: .6; .31; .03; .02 INJECTION, SOLUTION INTRAVENOUS at 06:04

## 2020-04-30 RX ADMIN — LOPERAMIDE HYDROCHLORIDE 2 MG: 2 CAPSULE ORAL at 11:04

## 2020-04-30 RX ADMIN — APIXABAN 5 MG: 2.5 TABLET, FILM COATED ORAL at 09:04

## 2020-04-30 RX ADMIN — POTASSIUM CHLORIDE 10 MEQ: 10 INJECTION, SOLUTION INTRAVENOUS at 10:04

## 2020-04-30 RX ADMIN — PYRIDOXINE HCL TAB 50 MG 100 MG: 50 TAB at 09:04

## 2020-04-30 RX ADMIN — DRONABINOL 5 MG: 2.5 CAPSULE ORAL at 06:04

## 2020-04-30 NOTE — CONSULTS
"  [facility]  Adult Nutrition  Consult Note    SUMMARY   Intervention: texture modified diet/commercial beverage    Recommendations  Recommendation/Intervention:   1.) Continue with Mechanical soft diet (IDDSI level 5) per SLP   2.) Recommend boost plus strawberry mixed with yogurt TID  Goals: 1.) PO intake >50% during admit   Nutrition Goal Status: new  Communication of SUNSHINE Fall: (second sign to MD)     1. Dehydration    2. Weakness    3. Tachycardia      Past Medical History:   Diagnosis Date    Cancer     colon    Encounter for blood transfusion     Hypertension          Reason for Assessment  Reason For Assessment: consult  General Information Comments: admits with chemotherapy induced neutropenia. Diarrhea started this am. Family reported pt has been losing weight since starting chemo. Per chart review, 8.2% weight loss noted since last admit in February 2020 when patient weighed 182 lbs. Looking back farther pt has lost 18% weight loss since November 2019 when she weighed 205 lbs. Follows a mechanical soft diet at home. Due to recent hospital wide restrictions to limit the transfer of covid-19, we are not performing any physical exams at this time. All S/S will be observational. NFPE to be performed at a later date.     Nutrition Risk Screen  Nutrition Risk Screen: dysphagia or difficulty swallowing(mechanical soft foods)    Nutrition/Diet History  Spiritual, Cultural Beliefs, Spiritism Practices, Values that Affect Care: no  Food Allergies: NKFA  Factors Affecting Nutritional Intake: decreased appetite    Anthropometrics  Temp: 98.4 °F (36.9 °C)  Height Method: Stated  Height: 5' 2"  Height (inches): 62 in  Weight Method: Bed Scale  Weight: 76.2 kg (167 lb 15.9 oz)  Weight (lb): 167.99 lb  Ideal Body Weight (IBW), Female: 110 lb  % Ideal Body Weight, Female (lb): 152.72 %  BMI (Calculated): 30.7  BMI Grade: 30 - 34.9- obesity - grade I  Weight Loss: unintentional  Usual Body Weight " (UBW), k.18 kg(x5 months ago. 2019)  % Usual Body Weight: 81.95  % Weight Change From Usual Weight: -18.22 %       Lab/Procedures/Meds  Pertinent Labs Reviewed: reviewed  BMP  Lab Results   Component Value Date     2020    K 3.0 (L) 2020     2020    CO2 27 2020    BUN 10 2020    CREATININE 0.5 2020    CALCIUM 7.3 (L) 2020    ANIONGAP 9 2020    ESTGFRAFRICA >60 2020    EGFRNONAA >60 2020     Lab Results   Component Value Date    ALBUMIN 1.3 (L) 2020     Lab Results   Component Value Date    CALCIUM 7.3 (L) 2020     No results for input(s): POCTGLUCOSE in the last 24 hours.    Pertinent Medications Reviewed: reviewed  Scheduled Meds:   amLODIPine  5 mg Oral Daily    apixaban  5 mg Oral BID    atorvastatin  20 mg Oral QHS    dronabinoL  5 mg Oral BID AC    metoprolol succinate  25 mg Oral QHS    potassium chloride  10 mEq Intravenous Q1H    pyridoxine (vitamin B6)  100 mg Oral Daily    thiamine  250 mg Oral Daily     Continuous Infusions:   lactated ringers 100 mL/hr at 20 0633           Estimated/Assessed Needs  Weight Used For Calorie Calculations: 76.2 kg (167 lb 15.9 oz)  Energy Calorie Requirements (kcal): 1489-3335  Energy Need Method: Towns-St Jeor(x 1.5-1.6 (AF) )  Protein Requirements:  g  Weight Used For Protein Calculations: 76.2 kg (167 lb 15.9 oz)  Estimated Fluid Requirement Method: RDA Method  RDA Method (mL): 1800  CHO Requirement: 250 g      Nutrition Prescription Ordered  Current Diet Order: Mechanical Soft (IDDSI level 5)     Evaluation of Received Nutrient/Fluid Intake  IV Fluid (mL): 2400  % Intake of Estimated Energy Needs: 0 - 25 %  % Meal Intake: 0 - 25 %    Nutrition Risk  Level of Risk/Frequency of Follow-up: (x2 weekly)     Assessment and Plan    Severe malnutrition  Contributing Nutrition Diagnosis  Inadequate energy intake    Related to (etiology):   Increased nutrient  needs    Signs and Symptoms (as evidenced by):   18% weight loss in 5 months  EEN <50% for greater than 1 month        Interventions/Recommendations (treatment strategy):  Send boost plus mixed with yogurt at meals     Nutrition Diagnosis Status:   New           Monitor and Evaluation  Food and Nutrient Intake: energy intake, food and beverage intake  Food and Nutrient Adminstration: diet order  Anthropometric Measurements: weight, weight change, body mass index  Biochemical Data, Medical Tests and Procedures: electrolyte and renal panel, glucose/endocrine profile, lipid profile  Nutrition-Focused Physical Findings: overall appearance     Malnutrition Assessment  Malnutrition Type: chronic illness  Energy Intake: severe energy intake  Skin (Micronutrient): (marlo score 15. leg swelling)   Energy Intake (Malnutrition): less than or equal to 50% for greater than or equal to 1 month   Severe Weight Loss (Malnutrition): greater than 10% in 6 months    Nutrition Follow-Up    RD Follow-up?: Yes

## 2020-04-30 NOTE — TELEPHONE ENCOUNTER
"This nurse spoke with patient's son Riley, who reports one of the doctor's overseeing her care at the hospital told them they need to consult with Dr. Pettit, because "it doesn't look good."  Son reports patient was scheduled to be D/C today, but the doctor came in and said she could not go home.  Informed patient's son, that per Dr. Pettit, we will not be rounding.  Dr. Pettit notified of above via telephone.      ----- Message from Yesica Napoles MA sent at 4/30/2020  2:19 PM CDT -----  Contact: son nicki  Mother, inpatient  Is DR Pettit going by to see her  Call back     "

## 2020-04-30 NOTE — PLAN OF CARE
Intervention: texture modified diet/commercial beverage     Recommendations  Recommendation/Intervention:   1.) Continue with Mechanical soft diet (IDDSI level 5) per SLP   2.) Recommend boost plus strawberry mixed with yogurt TID  Goals: 1.) PO intake >50% during admit   Nutrition Goal Status: new  Communication of RD Recs: (second sign to MD)

## 2020-04-30 NOTE — ASSESSMENT & PLAN NOTE
Acutely caused by BPPV and decreased p.o. Intake.  No significant neurological changes but consider CT brain to rule out mass lesion.

## 2020-04-30 NOTE — ASSESSMENT & PLAN NOTE
Contributing Nutrition Diagnosis  Inadequate energy intake    Related to (etiology):   Increased nutrient needs    Signs and Symptoms (as evidenced by):   18% weight loss in 5 months  EEN <50% for greater than 1 month        Interventions/Recommendations (treatment strategy):  Send boost plus mixed with yogurt at meals     Nutrition Diagnosis Status:   New

## 2020-04-30 NOTE — PLAN OF CARE
Problem: Physical Therapy Goal  Goal: Physical Therapy Goal  Description  Goals to be met by: discharge     Patient will increase functional independence with mobility by performin. Supine to sit with Modified [place]  2. Sit to supine with Modified [place]  3. Rolling to Left and Right with Modified [place].  4. Sit to stand transfer with Modified [place]  5. Bed to chair transfer with Modified [place] using Rolling Walker  6. Gait  x 30 feet with Stand-by Assistance using Rolling Walker.      Outcome: Ongoing, Progressing  Patient seen for initial evaluation to facilitate mobility and establish goals.

## 2020-04-30 NOTE — PT/OT/SLP EVAL
Physical Therapy Evaluation    Patient Name:  Indigo Stuart   MRN:  [id number]    Recommendations:     Discharge Recommendations:  home with home health   Discharge Equipment Recommendations: none   Barriers to discharge: None    Assessment:     Indigo Stuart is a 64 y.o. female admitted with a medical diagnosis of Chemotherapy induced neutropenia.  She presents with the following impairments/functional limitations:  weakness, other (comment), impaired functional mobilty, gait instability(vertigo, edema) .    Rehab Prognosis: Fair; patient would benefit from acute skilled PT services to address these deficits and reach maximum level of function.    Recent Surgery: * No surgery found *      Plan:     During this hospitalization, patient to be seen 6 x/week to address the identified rehab impairments via gait training, therapeutic activities, therapeutic exercises and progress toward the following goals:    · Plan of Care Expires:  05/29/20    Subjective     Chief Complaint: dizziness when rolling side to side in bed.  Patient/Family Comments/goals: to be able to walk using rollator.  Pain/Comfort:  · Pain Rating 1: 0/10    Patients cultural, spiritual, Spiritism conflicts given the current situation: no    Living Environment:  Lives with  and daughter, no steps  Prior to admission, patients level of function was limited to household ambulation.  Equipment used at home: rollator.  DME owned (not currently used): none.  Upon discharge, patient will have assistance from family..    Objective:     Communicated with nurse Tatum prior to session.  Patient found supine with SCD, peripheral IV  upon PT entry to room.    General Precautions: Standard, special contact   Orthopedic Precautions:N/A   Braces: N/A     Exams:  · Cognitive Exam:  Patient is oriented to Person, Place, Time and Situation  · RUE ROM: WFL  · RUE Strength: 3+/5  · LUE ROM: WFL  · LUE Strength: 3+/5  · RLE ROM: WFL  · RLE Strength:  3-/5  · LLE ROM: WFL  · LLE Strength: 3-/5    Functional Mobility:  · Bed Mobility:     · Rolling Left:  stand by assistance  · Rolling Right: stand by assistance  · Supine to Sit: minimum assistance  · Sit to Supine: minimum assistance  · Transfers:     · Sit to Stand:  minimum assistance with rolling walker  · Bed to Chair: minimum assistance with  rolling walker  using  Step Transfer  · Toilet Transfer: minimum assistance with  rolling walker  using  Step Transfer  · Gait: 12 ft x 2 on RW with min A.  · Balance: good sitting balance; use of RW for dynamic gait      Therapeutic Activities and Exercises:   Patient seen for initial evaluation and treatment. Instructed on active ankle pumps and leg lifts; Bed mobility, transfers and gait with minimal assist. Ambulated 12 ft x 2. Toiletting with min A. C/o dizzy spells on initial bed mobility during rolling in transfers.    AM-PAC 6 CLICK MOBILITY  Total Score:16     Patient left supine with all lines intact, call button in reach and nurse present.    GOALS:   Multidisciplinary Problems     Physical Therapy Goals        Problem: Physical Therapy Goal    Goal Priority Disciplines Outcome Goal Variances Interventions   Physical Therapy Goal     PT, PT/OT Ongoing, Progressing     Description:  Goals to be met by: discharge     Patient will increase functional independence with mobility by performin. Supine to sit with Modified [place]  2. Sit to supine with Modified [place]  3. Rolling to Left and Right with Modified [place].  4. Sit to stand transfer with Modified [place]  5. Bed to chair transfer with Modified [place] using Rolling Walker  6. Gait  x 30 feet with Stand-by Assistance using Rolling Walker.                       History:     Past Medical History:   Diagnosis Date    Cancer     colon    Encounter for blood transfusion     Hypertension        Past Surgical History:   Procedure Laterality Date    CHOLECYSTECTOMY       COLON SURGERY      COLONOSCOPY N/A 3/30/2018    Procedure: COLONOSCOPY;  Surgeon: Daniel Magana MD;  Location: [facility] ENDO;  Service: Endoscopy;  Laterality: N/A;    ESOPHAGOGASTRODUODENOSCOPY N/A 1/3/2019    Procedure: EGD (ESOPHAGOGASTRODUODENOSCOPY);  Surgeon: Adis Arguello MD;  Location: [facility];  Service: Endoscopy;  Laterality: N/A;    ESOPHAGOGASTRODUODENOSCOPY N/A 1/14/2019    Procedure: EGD (ESOPHAGOGASTRODUODENOSCOPY);  Surgeon: Monserrat Lopez MD;  Location: [facility] (2ND FLR);  Service: Endoscopy;  Laterality: N/A;  with duodenal stent placement per La/svn    HYSTERECTOMY      INSERTION OF TUNNELED CENTRAL VENOUS CATHETER (CVC) WITH SUBCUTANEOUS PORT N/A 1/4/2019    Procedure: [id number];  Surgeon: Emilio Romero MD;  Location: [facility];  Service: General;  Laterality: N/A;       Time Tracking:     PT Received On: 04/30/20  PT Start Time: 0835     PT Stop Time: 0905  PT Total Time (min): 30 min     Billable Minutes: Evaluation 15 and Gait Training 15      Wan Colin, PT  04/30/2020

## 2020-04-30 NOTE — TELEPHONE ENCOUNTER
Per Dr West, inpt consults are supposed to be given to established physician. Informed Dr Pettit of hospital consult.

## 2020-04-30 NOTE — PLAN OF CARE
"Plan of care reviewed with pt. States "understanding." Pt is AAOx4. IV site is without redness and swelling. IV fluids given as ordered. VSS. No tele. Bed in low position, bed alarm set, call light in reach. Instructed to call staff for assistance. Will continue to monitor, observe and note any changes. Safety maintained.   "

## 2020-04-30 NOTE — SUBJECTIVE & OBJECTIVE
Past Medical History:   Diagnosis Date    Cancer     colon    Encounter for blood transfusion     Hypertension        Past Surgical History:   Procedure Laterality Date    CHOLECYSTECTOMY      COLON SURGERY      COLONOSCOPY N/A 3/30/2018    Procedure: COLONOSCOPY;  Surgeon: Daniel Magana MD;  Location: [facility] ENDO;  Service: Endoscopy;  Laterality: N/A;    ESOPHAGOGASTRODUODENOSCOPY N/A 1/3/2019    Procedure: EGD (ESOPHAGOGASTRODUODENOSCOPY);  Surgeon: Adis Arguello MD;  Location: [facility] ENDO;  Service: Endoscopy;  Laterality: N/A;    ESOPHAGOGASTRODUODENOSCOPY N/A 1/14/2019    Procedure: EGD (ESOPHAGOGASTRODUODENOSCOPY);  Surgeon: Monserrat Lopez MD;  Location: [facility] FLR);  Service: Endoscopy;  Laterality: N/A;  with duodenal stent placement per La/svn    HYSTERECTOMY      INSERTION OF TUNNELED CENTRAL VENOUS CATHETER (CVC) WITH SUBCUTANEOUS PORT N/A 1/4/2019    Procedure: [id number];  Surgeon: Emilio Romero MD;  Location: [facility];  Service: General;  Laterality: N/A;       Review of patient's allergies indicates:   Allergen Reactions    Codeine Nausea And Vomiting    Erythromycin base Other (See Comments)    Lisinopril Palpitations     Cough        No current facility-administered medications on file prior to encounter.      Current Outpatient Medications on File Prior to Encounter   Medication Sig    amLODIPine (NORVASC) 5 MG tablet Take 1 tablet (5 mg total) by mouth once daily.    atorvastatin (LIPITOR) 20 MG tablet Take 1 tablet (20 mg total) by mouth every evening.    ELIQUIS 5 mg Tab TAKE 1 TABLET BY MOUTH TWICE A DAY (Patient taking differently: Take by mouth 2 (two) times daily. )    ferrous sulfate (FEOSOL) 325 mg (65 mg iron) Tab tablet Take 1 tablet by mouth 2 (two) times daily.    metoprolol succinate (TOPROL-XL) 25 MG 24 hr tablet Take 1 tablet (25 mg total) by mouth every evening.    potassium chloride SA (K-DUR,KLOR-CON) 20 MEQ tablet Take 1  tablet (20 mEq total) by mouth once daily.    pyridoxine, vitamin B6, (VITAMIN B-6) 50 MG Tab Take 100 mg by mouth once daily.     senna-docusate 8.6-50 mg (SENOKOT-S) 8.6-50 mg per tablet Take 2 tablets by mouth nightly as needed for Constipation.    sucralfate (CARAFATE) 100 mg/mL suspension Take 10 mLs (1 g total) by mouth 4 (four) times daily.    thiamine (VITAMIN B-1) 250 MG tablet Take 250 mg by mouth once daily.    dronabinoL (MARINOL) 5 MG capsule Take 1 capsule (5 mg total) by mouth 2 (two) times daily before meals. To increase appetite    ondansetron (ZOFRAN-ODT) 8 MG TbDL Take 1 tablet (8 mg total) by mouth every 12 (twelve) hours as needed.    prochlorperazine (COMPAZINE) 10 MG tablet Take 1 tablet (10 mg total) by mouth every 6 (six) hours as needed.     Family History     Problem Relation (Age of Onset)    Cancer Mother, Father        Tobacco Use    Smoking status: Never Smoker    Smokeless tobacco: Never Used   Substance and Sexual Activity    Alcohol use: No     Frequency: Never     Drinks per session: Patient refused     Binge frequency: Never    Drug use: No    Sexual activity: Not Currently     Review of Systems   Constitutional: Positive for activity change and fatigue. Negative for fever.   HENT: Negative for congestion and sore throat.    Eyes: Negative for pain and redness.   Respiratory: Negative for cough and shortness of breath.    Cardiovascular: Negative for chest pain and palpitations.   Gastrointestinal: Negative for abdominal distention and abdominal pain.   Endocrine: Positive for cold intolerance. Negative for heat intolerance.   Genitourinary: Negative for difficulty urinating and dysuria.   Musculoskeletal: Positive for back pain and myalgias.   Skin: Positive for pallor. Negative for color change and rash.   Allergic/Immunologic: Negative for environmental allergies and food allergies.   Neurological: Negative for seizures, syncope, speech difficulty and numbness.    Hematological: Negative for adenopathy. Does not bruise/bleed easily.   Psychiatric/Behavioral: Negative for agitation and confusion.     Objective:     Vital Signs (Most Recent):  Temp: 98.9 °F (37.2 °C) (04/29/20 2001)  Pulse: (!) 123 (04/29/20 2001)  Resp: 18 (04/29/20 2001)  BP: (!) 112/57 (04/29/20 2001)  SpO2: 98 % (04/29/20 2001) Vital Signs (24h Range):  Temp:  [98.9 °F (37.2 °C)-99.3 °F (37.4 °C)] 98.9 °F (37.2 °C)  Pulse:  [101-129] 123  Resp:  [18-20] 18  SpO2:  [97 %-100 %] 98 %  BP: (102-116)/(57-76) 112/57     Weight: 76.2 kg (168 lb)  Body mass index is 30.73 kg/m².    Physical Exam        Significant Labs:   Recent Lab Results       04/29/20  1736   04/29/20  1350   04/29/20  1319        Albumin     1.7     Alkaline Phosphatase     344     ALT     38     Anion Gap     12     Appearance, UA   Hazy       AST     67     Basophil%     0.0     Bilirubin (UA)   1+  Comment:  Positive urine bilirubin is not confirmed. Correlate with   serum bilirubin and clinical presentation.         BILIRUBIN TOTAL     0.8  Comment:  For infants and newborns, interpretation of results should be based  on gestational age, weight and in agreement with clinical  observations.  Premature Infant recommended reference ranges:  Up to 24 hours.............<8.0 mg/dL  Up to 48 hours............<12.0 mg/dL  3-5 days..................<15.0 mg/dL  6-29 days.................<15.0 mg/dL       BUN, Bld     7     Calcium     7.8     Chloride     99     CO2     27     Color, UA   Yellow       Creatinine     0.6     Differential Method     Manual     eGFR if      >60     eGFR if non      >60  Comment:  Calculation used to obtain the estimated glomerular filtration  rate (eGFR) is the CKD-EPI equation.        Eosinophil%     0.0     Glucose     107     Glucose, UA   Negative       Gran%     7.0     Hematocrit     31.4     Hemoglobin     9.5     Immature Grans (Abs)     CANCELED  Comment:  Mild elevation in  immature granulocytes is non specific and   can be seen in a variety of conditions including stress response,   acute inflammation, trauma and pregnancy. Correlation with other   laboratory and clinical findings is essential.    Result canceled by the ancillary.       Immature Granulocytes     CANCELED  Comment:  Result canceled by the ancillary.     Ketones, UA   Trace       Leukocytes, UA   Negative       Lymph%     73.0     MCH     26.2     MCHC     30.3     MCV     87     Mono%     20.0     MPV     9.9     NITRITE UA   Negative       nRBC     15     Occult Blood UA   Negative       pH, UA   6.0       Platelet Estimate     Appears normal     Platelets     92     Potassium     3.6     PROTEIN TOTAL     5.7     Protein, UA   Trace  Comment:  Recommend a 24 hour urine protein or a urine   protein/creatinine ratio if globulin induced proteinuria is  clinically suspected.         RBC     3.62     RDW     17.6     SARS-CoV-2 RNA, Amplification, Qual Negative  Comment:  This test utilizes isothermal nucleic acid amplification   technology to detect the SARS-CoV-2 RdRp nucleic acid segment.   The analytical sensitivity (limit of detection) is 125 genome   equivalents/mL.   A POSITIVE result implies infection with the SARS-CoV-2 virus;  the patient is presumed to be contagious.    A NEGATIVE result means that SARS-CoV-2 nucleic acids are not  present above the limit of detection. It does not rule out the   possibility of COVID-19 and should not be the sole basis for   treatment decisions. If COVID-19 is strongly suspected based on  clinical and exposure history, re-testing should be considered.   This test is only for use under the Food and Drug   Administration s Emergency Use Authorization (EUA).   Commercial kits are provided by [employer].   Performance characteristics of the EUA have been independently  verified by [facility] Department of  Pathology and Laboratory Medicine.    _________________________________________________________________  The ID NOW COVID-19 Letter of Authorization, along with the   authorized Fact Sheet for Healthcare Providers, the authorized Fact  Sheet for Patients, and authorized labeling are available on the FDA   website:  www.fda.gov/MedicalDevices/Safety/EmergencySituations/wee232907.htm           Sodium     138     Specific Gravity, UA   1.020       Specimen UA   Urine, Catheterized       TSH     3.658     UROBILINOGEN UA   1.0       WBC     1.23  Comment:  WBC critical result(s) called and verbal readback obtained from   Leighann Hackett RN  by KM1 04/29/2020 14:38             Significant Imaging: I have reviewed all pertinent imaging results/findings within the past 24 hours.

## 2020-04-30 NOTE — NURSING
"LAUREN Salcedo NP notifed pt has not voided during shift. Stated "feeling pressure but unable to void." Bladder scan indicated 280cc. Order received to in & out cath.       0600- In & out cath done. Pt tolerated well. 125cc of concentrated yellow urine obtained.  "

## 2020-04-30 NOTE — SUBJECTIVE & OBJECTIVE
Interval History: No overnight events, admitted last night.  Awaiting oncology to see. Diarrhea started this morning. No recent dizziness. Afebrile.    Review of Systems   Constitutional: Positive for fatigue. Negative for chills and fever.   Respiratory: Negative for cough and shortness of breath.    Cardiovascular: Positive for leg swelling. Negative for chest pain.   Gastrointestinal: Positive for diarrhea. Negative for abdominal pain and blood in stool.     Objective:     Vital Signs (Most Recent):  Temp: 98.4 °F (36.9 °C) (04/30/20 0811)  Pulse: 78 (04/30/20 0811)  Resp: 18 (04/30/20 0811)  BP: 123/77 (04/30/20 0811)  SpO2: (!) 92 % (04/30/20 0811) Vital Signs (24h Range):  Temp:  [98.4 °F (36.9 °C)-99.3 °F (37.4 °C)] 98.4 °F (36.9 °C)  Pulse:  [] 78  Resp:  [18-20] 18  SpO2:  [92 %-100 %] 92 %  BP: (102-123)/(57-77) 123/77     Weight: 76.2 kg (168 lb)  Body mass index is 30.73 kg/m².    Intake/Output Summary (Last 24 hours) at 4/30/2020 1019  Last data filed at 4/30/2020 0633  Gross per 24 hour   Intake 1976.67 ml   Output 125 ml   Net 1851.67 ml      Physical Exam   Constitutional:  Non-toxic appearance. She has a sickly appearance.   Cardiovascular: Normal rate and regular rhythm.   Abdominal: Soft. Normal appearance. Bowel sounds are increased. There is generalized tenderness.       Significant Labs: All pertinent labs within the past 24 hours have been reviewed.    Significant Imaging: I have reviewed and interpreted all pertinent imaging results/findings within the past 24 hours.

## 2020-04-30 NOTE — HPI
Patient is a 64-year-old with past medical history of duodenal/colon cancer widespread metastatic disease, hypertension, no nutrition, anemia, thrombocytopenia, neutropenic fever and obesity presenting with several weeks of fatigue, decreased appetite, decreased p.o. intake, dehydration, and most recently vertigo.  Today symptoms progressively got worse and she went to the ED.  Dizziness worsens when she moves her head returns to her side, it feels like the room is spinning.  It improves if she lies still with her eyes closed after 5-10 minutes.  Dizziness is only associated with the above.  She has never had this before.

## 2020-04-30 NOTE — PLAN OF CARE
CM completed discharge assessment over the phone with pt. Pt verified information on facesheet as correct. Denies POA/LW. Reports living at listed address with her spouse, Val[place]. PCP is Dr. Arevalo. Pharm is [facility] on [address]. Pt denies hh/hd. DME- rollator, sc, raised toilet. Pt reports being modified independent with ADLs , reports past week that pt has been having to help her with walking. Pt does not drive. Transportation provided by spouse, reports he will provide transportation home upon Dc. Dc plan is home with possibly new home health pending pt/[place]. CM following.        04/30/20 1210   Discharge Assessment   Assessment Type Discharge Planning Assessment   Confirmed/corrected address and phone number on facesheet? Yes   Assessment information obtained from? Patient   Communicated expected length of stay with patient/caregiver yes   Prior to hospitilization cognitive status: Alert/Oriented   Prior to hospitalization functional status: Independent;Assistive Equipment   Current cognitive status: Alert/Oriented   Current Functional Status: Needs Assistance   Lives With spouse   Able to Return to Prior Arrangements yes   Is patient able to care for self after discharge? Yes   Patient's perception of discharge disposition home or selfcare   Readmission Within the Last 30 Days no previous admission in last 30 days   Patient currently being followed by outpatient case management? No   Patient currently receives any other outside agency services? No   Equipment Currently Used at Home rollator;raised toilet;shower chair   Do you have any problems affording any of your prescribed medications? No   Is the patient taking medications as prescribed? yes   Does the patient have transportation home? Yes   Transportation Anticipated family or friend will provide   Does the patient receive services at the [facility]? No   Discharge Plan A Home with family   Discharge Plan B Home Health   DME Needed Upon Discharge  none    Patient/Family in Agreement with Plan yes

## 2020-04-30 NOTE — TELEPHONE ENCOUNTER
Spoke to patient's son who    ----- Message from Yesica Napoles MA sent at 4/30/2020  2:19 PM CDT -----  Contact: son nicki  Mother, inpatient  Is DR Pettit going by to see her  Call back

## 2020-04-30 NOTE — PROGRESS NOTES
[facility] Hospital Medicine  Progress Note    Patient Name: Indigo Stuart  MRN: [id number]  Patient Class: OP- Observation   Admission Date: 4/29/2020  Length of Stay: 0 days  Attending Physician: Kiko Fuller MD  Primary Care Provider: John Conner MD        Subjective:     Principal Problem:Chemotherapy induced neutropenia        HPI:  Patient is a 64-year-old with past medical history of duodenal/colon cancer widespread metastatic disease, hypertension, no nutrition, anemia, thrombocytopenia, neutropenic fever and obesity presenting with several weeks of fatigue, decreased appetite, decreased p.o. intake, dehydration, and most recently vertigo.  Today symptoms progressively got worse and she went to the ED.  Dizziness worsens when she moves her head returns to her side, it feels like the room is spinning.  It improves if she lies still with her eyes closed after 5-10 minutes.  Dizziness is only associated with the above.  She has never had this before.    Overview/Hospital Course:  No notes on file    Interval History: No overnight events, admitted last night.  Awaiting oncology to see. Diarrhea started this morning. No recent dizziness. Afebrile.    Review of Systems   Constitutional: Positive for fatigue. Negative for chills and fever.   Respiratory: Negative for cough and shortness of breath.    Cardiovascular: Positive for leg swelling. Negative for chest pain.   Gastrointestinal: Positive for diarrhea. Negative for abdominal pain and blood in stool.     Objective:     Vital Signs (Most Recent):  Temp: 98.4 °F (36.9 °C) (04/30/20 0811)  Pulse: 78 (04/30/20 0811)  Resp: 18 (04/30/20 0811)  BP: 123/77 (04/30/20 0811)  SpO2: (!) 92 % (04/30/20 0811) Vital Signs (24h Range):  Temp:  [98.4 °F (36.9 °C)-99.3 °F (37.4 °C)] 98.4 °F (36.9 °C)  Pulse:  [] 78  Resp:  [18-20] 18  SpO2:  [92 %-100 %] 92 %  BP: (102-123)/(57-77) 123/77     Weight: 76.2 kg (168 lb)  Body mass  index is 30.73 kg/m².    Intake/Output Summary (Last 24 hours) at 4/30/2020 1019  Last data filed at 4/30/2020 0633  Gross per 24 hour   Intake 1976.67 ml   Output 125 ml   Net 1851.67 ml      Physical Exam   Constitutional:  Non-toxic appearance. She has a sickly appearance.   Cardiovascular: Normal rate and regular rhythm.   Abdominal: Soft. Normal appearance. Bowel sounds are increased. There is generalized tenderness.       Significant Labs: All pertinent labs within the past 24 hours have been reviewed.    Significant Imaging: I have reviewed and interpreted all pertinent imaging results/findings within the past 24 hours.      Assessment/Plan:      * Chemotherapy induced neutropenia  Consult oncology.  Workup for neutropenic fever if needed      Vertigo  Acutely caused by BPPV and decreased p.o. Intake.  No significant neurological changes but consider CT brain to rule out mass lesion.      Dehydration  IV fluids, encourage p.o. intake.      Diarrhea in adult patient  Rule out C.diff    Fatigue  Secondary to metastatic disease      Severe malnutrition  Consult nutrition, dietary supplementation as needed      Overlapping malignant neoplasm of colon        Hyperlipidemia        Hepatic metastases        Lung metastases        Duodenal cancer  With significant metastatic disease, followed by Dr. Pettit.    Hypokalemia  Replace via IV        VTE Risk Mitigation (From admission, onward)         Ordered     apixaban tablet 5 mg  2 times daily      04/29/20 2012     IP VTE HIGH RISK PATIENT  Once      04/29/20 2012     Place sequential compression device  Until discontinued      04/29/20 2012     Place JUAN hose  Until discontinued      04/29/20 2012                      Kiko Fuller MD  Department of Hospital Medicine   [facility]

## 2020-04-30 NOTE — H&P
[facility] Hospital Medicine  History & Physical    Patient Name: Indigo Stuart  MRN: [id number]  Admission Date: 4/29/2020  Attending Physician: Kiko Fuller MD   Primary Care Provider: John Conner MD         Patient information was obtained from patient, spouse/SO and ER records.     Subjective:     Principal Problem:Vertigo    Chief Complaint:   Chief Complaint   Patient presents with    Fatigue     Co increased weakness over the past 3 weeks; Chemo 1 week ago    Dizziness        HPI: Patient is a 64-year-old with past medical history of duodenal/colon cancer widespread metastatic disease, hypertension, no nutrition, anemia, thrombocytopenia, neutropenic fever and obesity presenting with several weeks of fatigue, decreased appetite, decreased p.o. intake, dehydration, and most recently vertigo.  Today symptoms progressively got worse and she went to the ED.  Dizziness worsens when she moves her head returns to her side, it feels like the room is spinning.  It improves if she lies still with her eyes closed after 5-10 minutes.  Dizziness is only associated with the above.  She has never had this before.    Past Medical History:   Diagnosis Date    Cancer     colon    Encounter for blood transfusion     Hypertension        Past Surgical History:   Procedure Laterality Date    CHOLECYSTECTOMY      COLON SURGERY      COLONOSCOPY N/A 3/30/2018    Procedure: COLONOSCOPY;  Surgeon: Daniel Magana MD;  Location: [facility];  Service: Endoscopy;  Laterality: N/A;    ESOPHAGOGASTRODUODENOSCOPY N/A 1/3/2019    Procedure: EGD (ESOPHAGOGASTRODUODENOSCOPY);  Surgeon: Adis Arguello MD;  Location: [facility];  Service: Endoscopy;  Laterality: N/A;    ESOPHAGOGASTRODUODENOSCOPY N/A 1/14/2019    Procedure: EGD (ESOPHAGOGASTRODUODENOSCOPY);  Surgeon: Monserrat Lopez MD;  Location: [facility] ([address] [place] [address]);  Service: Endoscopy;  Laterality: N/A;  with duodenal stent placement  per [facility]    HYSTERECTOMY      INSERTION OF TUNNELED CENTRAL VENOUS CATHETER (CVC) WITH SUBCUTANEOUS PORT N/A 1/4/2019    Procedure: [id number];  Surgeon: Emilio Romero MD;  Location: [facility];  Service: General;  Laterality: N/A;       Review of patient's allergies indicates:   Allergen Reactions    Codeine Nausea And Vomiting    Erythromycin base Other (See Comments)    Lisinopril Palpitations     Cough        No current facility-administered medications on file prior to encounter.      Current Outpatient Medications on File Prior to Encounter   Medication Sig    amLODIPine (NORVASC) 5 MG tablet Take 1 tablet (5 mg total) by mouth once daily.    atorvastatin (LIPITOR) 20 MG tablet Take 1 tablet (20 mg total) by mouth every evening.    ELIQUIS 5 mg Tab TAKE 1 TABLET BY MOUTH TWICE A DAY (Patient taking differently: Take by mouth 2 (two) times daily. )    ferrous sulfate (FEOSOL) 325 mg (65 mg iron) Tab tablet Take 1 tablet by mouth 2 (two) times daily.    metoprolol succinate (TOPROL-XL) 25 MG 24 hr tablet Take 1 tablet (25 mg total) by mouth every evening.    potassium chloride SA (K-DUR,KLOR-CON) 20 MEQ tablet Take 1 tablet (20 mEq total) by mouth once daily.    pyridoxine, vitamin B6, (VITAMIN B-6) 50 MG Tab Take 100 mg by mouth once daily.     senna-docusate 8.6-50 mg (SENOKOT-S) 8.6-50 mg per tablet Take 2 tablets by mouth nightly as needed for Constipation.    sucralfate (CARAFATE) 100 mg/mL suspension Take 10 mLs (1 g total) by mouth 4 (four) times daily.    thiamine (VITAMIN B-1) 250 MG tablet Take 250 mg by mouth once daily.    dronabinoL (MARINOL) 5 MG capsule Take 1 capsule (5 mg total) by mouth 2 (two) times daily before meals. To increase appetite    ondansetron (ZOFRAN-ODT) 8 MG TbDL Take 1 tablet (8 mg total) by mouth every 12 (twelve) hours as needed.    prochlorperazine (COMPAZINE) 10 MG tablet Take 1 tablet (10 mg total) by mouth every 6 (six) hours as needed.      Family History     Problem Relation (Age of Onset)    Cancer Mother, Father        Tobacco Use    Smoking status: Never Smoker    Smokeless tobacco: Never Used   Substance and Sexual Activity    Alcohol use: No     Frequency: Never     Drinks per session: Patient refused     Binge frequency: Never    Drug use: No    Sexual activity: Not Currently     Review of Systems   Constitutional: Positive for activity change and fatigue. Negative for fever.   HENT: Negative for congestion and sore throat.    Eyes: Negative for pain and redness.   Respiratory: Negative for cough and shortness of breath.    Cardiovascular: Negative for chest pain and palpitations.   Gastrointestinal: Negative for abdominal distention and abdominal pain.   Endocrine: Positive for cold intolerance. Negative for heat intolerance.   Genitourinary: Negative for difficulty urinating and dysuria.   Musculoskeletal: Positive for back pain and myalgias.   Skin: Positive for pallor. Negative for color change and rash.   Allergic/Immunologic: Negative for environmental allergies and food allergies.   Neurological: Negative for seizures, syncope, speech difficulty and numbness.   Hematological: Negative for adenopathy. Does not bruise/bleed easily.   Psychiatric/Behavioral: Negative for agitation and confusion.     Objective:     Vital Signs (Most Recent):  Temp: 98.9 °F (37.2 °C) (04/29/20 2001)  Pulse: (!) 123 (04/29/20 2001)  Resp: 18 (04/29/20 2001)  BP: (!) 112/57 (04/29/20 2001)  SpO2: 98 % (04/29/20 2001) Vital Signs (24h Range):  Temp:  [98.9 °F (37.2 °C)-99.3 °F (37.4 °C)] 98.9 °F (37.2 °C)  Pulse:  [101-129] 123  Resp:  [18-20] 18  SpO2:  [97 %-100 %] 98 %  BP: (102-116)/(57-76) 112/57     Weight: 76.2 kg (168 lb)  Body mass index is 30.73 kg/m².    Physical Exam        Significant Labs:   Recent Lab Results       04/29/20  1736   04/29/20  1350   04/29/20  1319        Albumin     1.7     Alkaline Phosphatase     344     ALT     38      Anion Gap     12     Appearance, UA   Hazy       AST     67     Basophil%     0.0     Bilirubin (UA)   1+  Comment:  Positive urine bilirubin is not confirmed. Correlate with   serum bilirubin and clinical presentation.         BILIRUBIN TOTAL     0.8  Comment:  For infants and newborns, interpretation of results should be based  on gestational age, weight and in agreement with clinical  observations.  Premature Infant recommended reference ranges:  Up to 24 hours.............<8.0 mg/dL  Up to 48 hours............<12.0 mg/dL  3-5 days..................<15.0 mg/dL  6-29 days.................<15.0 mg/dL       BUN, Bld     7     Calcium     7.8     Chloride     99     CO2     27     Color, UA   Yellow       Creatinine     0.6     Differential Method     Manual     eGFR if      >60     eGFR if non      >60  Comment:  Calculation used to obtain the estimated glomerular filtration  rate (eGFR) is the CKD-EPI equation.        Eosinophil%     0.0     Glucose     107     Glucose, UA   Negative       Gran%     7.0     Hematocrit     31.4     Hemoglobin     9.5     Immature Grans (Abs)     CANCELED  Comment:  Mild elevation in immature granulocytes is non specific and   can be seen in a variety of conditions including stress response,   acute inflammation, trauma and pregnancy. Correlation with other   laboratory and clinical findings is essential.    Result canceled by the ancillary.       Immature Granulocytes     CANCELED  Comment:  Result canceled by the ancillary.     Ketones, UA   Trace       Leukocytes, UA   Negative       Lymph%     73.0     MCH     26.2     MCHC     30.3     MCV     87     Mono%     20.0     MPV     9.9     NITRITE UA   Negative       nRBC     15     Occult Blood UA   Negative       pH, UA   6.0       Platelet Estimate     Appears normal     Platelets     92     Potassium     3.6     PROTEIN TOTAL     5.7     Protein, UA   Trace  Comment:  Recommend a 24 hour urine  protein or a urine   protein/creatinine ratio if globulin induced proteinuria is  clinically suspected.         RBC     3.62     RDW     17.6     SARS-CoV-2 RNA, Amplification, Qual Negative  Comment:  This test utilizes isothermal nucleic acid amplification   technology to detect the SARS-CoV-2 RdRp nucleic acid segment.   The analytical sensitivity (limit of detection) is 125 genome   equivalents/mL.   A POSITIVE result implies infection with the SARS-CoV-2 virus;  the patient is presumed to be contagious.    A NEGATIVE result means that SARS-CoV-2 nucleic acids are not  present above the limit of detection. It does not rule out the   possibility of COVID-19 and should not be the sole basis for   treatment decisions. If COVID-19 is strongly suspected based on  clinical and exposure history, re-testing should be considered.   This test is only for use under the Food and Drug   Administration s Emergency Use Authorization (EUA).   Commercial kits are provided by [employer].   Performance characteristics of the EUA have been independently  verified by [facility] Department of  Pathology and Laboratory Medicine.   _________________________________________________________________  The ID NOW COVID-19 Letter of Authorization, along with the   authorized Fact Sheet for Healthcare Providers, the authorized Fact  Sheet for Patients, and authorized labeling are available on the FDA   website:  www.fda.gov/MedicalDevices/Safety/EmergencySituations/vgb884612.htm           Sodium     138     Specific Gravity, UA   1.020       Specimen UA   Urine, Catheterized       TSH     3.658     UROBILINOGEN UA   1.0       WBC     1.23  Comment:  WBC critical result(s) called and verbal readback obtained from   Leighann Hackett RN  by KM1 04/29/2020 14:38             Significant Imaging: I have reviewed all pertinent imaging results/findings within the past 24 hours.    Assessment/Plan:     * Vertigo  Acutely caused by  BPPV and decreased p.o. Intake.  No significant neurological changes but consider CT brain to rule out mass lesion.  Admit to observation.      Dehydration  IV fluids, encourage p.o. intake.      Chemotherapy induced neutropenia  Consult oncology.  Workup for neutropenic fever if needed      Fatigue  Secondary to metastatic disease      Severe malnutrition  Consult nutrition, dietary supplementation as needed      Lung metastases        Duodenal cancer  With significant metastatic disease, followed by Dr. Pettit.    Hypokalemia          VTE Risk Mitigation (From admission, onward)         Ordered     apixaban tablet 5 mg  2 times daily      04/29/20 2012     IP VTE HIGH RISK PATIENT  Once      04/29/20 2012     Place sequential compression device  Until discontinued      04/29/20 2012     Place JUAN hose  Until discontinued      04/29/20 2012                   Advance Care Planning  Code Status  In light of the patients advanced and terminal illness, we reviewed what the patients preferences for care would be at the very end of life.  The patient wishes to have a natural, peaceful death.  Along those lines, the patient does not wish to have CPR or other invasive treatments performed when her heart and/or breathing stops.  I communicated to the patient that a DNR order would be placed in her medical record to reflect this preference. We also discussed with her  over the phone.  A DNR form was completed and will be scanned into EPIC.  I spent a total of 10 minutes engaging the patient in this advance care planning discussion.     Kiko Fuller MD  Department of Hospital Medicine   [facility]

## 2020-04-30 NOTE — ASSESSMENT & PLAN NOTE
Acutely caused by BPPV and decreased p.o. Intake.  No significant neurological changes but consider CT brain to rule out mass lesion.  Admit to observation.

## 2020-04-30 NOTE — TELEPHONE ENCOUNTER
Called to schedule patient for COVID screening and she stated that she was admitted into the hospital last night.

## 2020-05-01 ENCOUNTER — PATIENT MESSAGE (OUTPATIENT)
Dept: HEMATOLOGY/ONCOLOGY | Facility: CLINIC | Age: 65
End: 2020-05-01

## 2020-05-01 VITALS
HEART RATE: 75 BPM | TEMPERATURE: 99 F | DIASTOLIC BLOOD PRESSURE: 72 MMHG | SYSTOLIC BLOOD PRESSURE: 115 MMHG | OXYGEN SATURATION: 97 % | WEIGHT: 168 LBS | RESPIRATION RATE: 18 BRPM | HEIGHT: 62 IN | BODY MASS INDEX: 30.91 KG/M2

## 2020-05-01 PROBLEM — E86.0 DEHYDRATION: Status: RESOLVED | Noted: 2020-04-29 | Resolved: 2020-05-01

## 2020-05-01 PROBLEM — E87.6 HYPOKALEMIA: Status: RESOLVED | Noted: 2018-12-04 | Resolved: 2020-05-01

## 2020-05-01 PROBLEM — R19.7 DIARRHEA IN ADULT PATIENT: Status: RESOLVED | Noted: 2020-04-30 | Resolved: 2020-05-01

## 2020-05-01 LAB
ABO + RH BLD: NORMAL
ALBUMIN SERPL BCP-MCNC: 1.3 G/DL (ref 3.5–5.2)
ALP SERPL-CCNC: 227 U/L (ref 55–135)
ALT SERPL W/O P-5'-P-CCNC: 32 U/L (ref 10–44)
ANION GAP SERPL CALC-SCNC: 7 MMOL/L (ref 8–16)
ANISOCYTOSIS BLD QL SMEAR: SLIGHT
AST SERPL-CCNC: 50 U/L (ref 10–40)
BASOPHILS NFR BLD: 0 % (ref 0–1.9)
BILIRUB SERPL-MCNC: 0.7 MG/DL (ref 0.1–1)
BLD GP AB SCN CELLS X3 SERPL QL: NORMAL
BLD PROD TYP BPU: NORMAL
BLOOD UNIT EXPIRATION DATE: NORMAL
BLOOD UNIT TYPE CODE: 6200
BLOOD UNIT TYPE: NORMAL
BUN SERPL-MCNC: 7 MG/DL (ref 8–23)
CALCIUM SERPL-MCNC: 7.6 MG/DL (ref 8.7–10.5)
CHLORIDE SERPL-SCNC: 101 MMOL/L (ref 95–110)
CO2 SERPL-SCNC: 27 MMOL/L (ref 23–29)
CODING SYSTEM: NORMAL
CREAT SERPL-MCNC: 0.5 MG/DL (ref 0.5–1.4)
DIFFERENTIAL METHOD: ABNORMAL
DISPENSE STATUS: NORMAL
EOSINOPHIL NFR BLD: 0 % (ref 0–8)
ERYTHROCYTE [DISTWIDTH] IN BLOOD BY AUTOMATED COUNT: 17.8 % (ref 11.5–14.5)
EST. GFR  (AFRICAN AMERICAN): >60 ML/MIN/1.73 M^2
EST. GFR  (NON AFRICAN AMERICAN): >60 ML/MIN/1.73 M^2
GLUCOSE SERPL-MCNC: 95 MG/DL (ref 70–110)
HCT VFR BLD AUTO: 23.4 % (ref 37–48.5)
HGB BLD-MCNC: 6.9 G/DL (ref 12–16)
IMM GRANULOCYTES # BLD AUTO: ABNORMAL K/UL
IMM GRANULOCYTES NFR BLD AUTO: ABNORMAL %
LYMPHOCYTES NFR BLD: 56 % (ref 18–48)
MCH RBC QN AUTO: 24.7 PG (ref 27–31)
MCHC RBC AUTO-ENTMCNC: 29.5 G/DL (ref 32–36)
MCV RBC AUTO: 84 FL (ref 82–98)
MONOCYTES NFR BLD: 24 % (ref 4–15)
NEUTROPHILS NFR BLD: 20 % (ref 38–73)
NRBC BLD-RTO: 14 /100 WBC
NUM UNITS TRANS PACKED RBC: NORMAL
PLATELET # BLD AUTO: 86 K/UL (ref 150–350)
PLATELET BLD QL SMEAR: ABNORMAL
PMV BLD AUTO: 10.3 FL (ref 9.2–12.9)
POTASSIUM SERPL-SCNC: 3.6 MMOL/L (ref 3.5–5.1)
PROT SERPL-MCNC: 4.5 G/DL (ref 6–8.4)
RBC # BLD AUTO: 2.79 M/UL (ref 4–5.4)
SODIUM SERPL-SCNC: 135 MMOL/L (ref 136–145)
WBC # BLD AUTO: 1.14 K/UL (ref 3.9–12.7)

## 2020-05-01 PROCEDURE — 99217 PR OBSERVATION CARE DISCHARGE: CPT | Mod: ,,, | Performed by: INTERNAL MEDICINE

## 2020-05-01 PROCEDURE — G0378 HOSPITAL OBSERVATION PER HR: HCPCS

## 2020-05-01 PROCEDURE — 80053 COMPREHEN METABOLIC PANEL: CPT

## 2020-05-01 PROCEDURE — 99215 OFFICE O/P EST HI 40 MIN: CPT | Mod: ,,, | Performed by: INTERNAL MEDICINE

## 2020-05-01 PROCEDURE — 36415 COLL VENOUS BLD VENIPUNCTURE: CPT

## 2020-05-01 PROCEDURE — 25000003 PHARM REV CODE 250: Performed by: INTERNAL MEDICINE

## 2020-05-01 PROCEDURE — 85007 BL SMEAR W/DIFF WBC COUNT: CPT

## 2020-05-01 PROCEDURE — P9016 RBC LEUKOCYTES REDUCED: HCPCS

## 2020-05-01 PROCEDURE — 86920 COMPATIBILITY TEST SPIN: CPT

## 2020-05-01 PROCEDURE — 85027 COMPLETE CBC AUTOMATED: CPT

## 2020-05-01 PROCEDURE — 99217 PR OBSERVATION CARE DISCHARGE: ICD-10-PCS | Mod: ,,, | Performed by: INTERNAL MEDICINE

## 2020-05-01 PROCEDURE — 99215 PR OFFICE/OUTPT VISIT, EST, LEVL V, 40-54 MIN: ICD-10-PCS | Mod: ,,, | Performed by: INTERNAL MEDICINE

## 2020-05-01 PROCEDURE — 86901 BLOOD TYPING SEROLOGIC RH(D): CPT

## 2020-05-01 PROCEDURE — 36430 TRANSFUSION BLD/BLD COMPNT: CPT

## 2020-05-01 PROCEDURE — 96372 THER/PROPH/DIAG INJ SC/IM: CPT

## 2020-05-01 PROCEDURE — 63600175 PHARM REV CODE 636 W HCPCS: Performed by: INTERNAL MEDICINE

## 2020-05-01 RX ORDER — ACETAMINOPHEN 325 MG/1
650 TABLET ORAL ONCE
Status: COMPLETED | OUTPATIENT
Start: 2020-05-01 | End: 2020-05-01

## 2020-05-01 RX ORDER — SODIUM CHLORIDE 0.9 % (FLUSH) 0.9 %
10 SYRINGE (ML) INJECTION
Status: DISCONTINUED | OUTPATIENT
Start: 2020-05-01 | End: 2020-05-01 | Stop reason: HOSPADM

## 2020-05-01 RX ORDER — DIPHENHYDRAMINE HCL 25 MG
25 CAPSULE ORAL ONCE
Status: COMPLETED | OUTPATIENT
Start: 2020-05-01 | End: 2020-05-01

## 2020-05-01 RX ORDER — HYDROCODONE BITARTRATE AND ACETAMINOPHEN 500; 5 MG/1; MG/1
TABLET ORAL
Status: DISCONTINUED | OUTPATIENT
Start: 2020-05-01 | End: 2020-05-01 | Stop reason: HOSPADM

## 2020-05-01 RX ADMIN — ACETAMINOPHEN 650 MG: 325 TABLET ORAL at 10:05

## 2020-05-01 RX ADMIN — APIXABAN 5 MG: 2.5 TABLET, FILM COATED ORAL at 10:05

## 2020-05-01 RX ADMIN — TBO-FILGRASTIM 300 MCG: 300 INJECTION, SOLUTION SUBCUTANEOUS at 10:05

## 2020-05-01 RX ADMIN — THIAMINE HCL TAB 100 MG 250 MG: 100 TAB at 10:05

## 2020-05-01 RX ADMIN — DIPHENHYDRAMINE HYDROCHLORIDE 25 MG: 25 CAPSULE ORAL at 10:05

## 2020-05-01 RX ADMIN — PYRIDOXINE HCL TAB 50 MG 100 MG: 50 TAB at 10:05

## 2020-05-01 NOTE — PLAN OF CARE
"Plan of care reviewed with pt. States "understanding." Pt is AAOx4. IV site is without redness and swelling. IV fluids given as ordered. VSS. No tele. Willams to gravity. Bed in low position, bed alarm set, call light in reach. Instructed to call staff for assistance. Will continue to monitor, observe and note any changes. Safety maintained.   "

## 2020-05-01 NOTE — PLAN OF CARE
Pt AAO x 4. Weight shift assistance provided. Willams removed and pt bladder scanned. Room air. Pt afebrile. PIV cdi. 1 unit PRBC administered. Call bell in reach, bed locked, bed alarm on, and fall band and non skid socks on. Will continue to monitor.

## 2020-05-01 NOTE — PLAN OF CARE
05/01/20 0947   Final Note   Assessment Type Final Discharge Note   Anticipated Discharge Disposition Home-Health

## 2020-05-01 NOTE — DISCHARGE SUMMARY
[facility] Hospital Medicine  Discharge Summary      Patient Name: Indigo Stuart  MRN: [id number]  Admission Date: 4/29/2020  Hospital Length of Stay: 0 days  Discharge Date and Time: 5/1/2020    Attending Physician: Abel Fuller MD   Discharging Provider: Abel Fuller MD  Primary Care Provider: John Conner MD      HPI:   Patient is a 64-year-old with past medical history of duodenal/colon cancer widespread metastatic disease, hypertension, no nutrition, anemia, thrombocytopenia, neutropenic fever and obesity presenting with several weeks of fatigue, decreased appetite, decreased p.o. intake, dehydration, and most recently vertigo.  Today symptoms progressively got worse and she went to the ED.  Dizziness worsens when she moves her head returns to her side, it feels like the room is spinning.  It improves if she lies still with her eyes closed after 5-10 minutes.  Dizziness is only associated with the above.  She has never had this before.    * No surgery found *      Hospital Course:   Patient was hydrated with IVF's, seen by nutrition and PT, who recommended home health PT.  She was given 1 u PRBC's and started on neupogen and will have close outpatient follow-up with her oncology, Dr. Pettit. Dizziness was only episodic and again consistent with BPPV.     Patient seen examined the day of discharge.  She is in bed eating breakfast, appears in better spirits, regular rate and rhythm, no murmurs, abdomen soft nontender, clear to auscultation bilaterally.     Consults:   Consults (From admission, onward)        Status Ordering Provider     Case Management/  Once     Provider:  (Not yet assigned)    ABEL Johnson     Inpatient consult to Oncology  Once     Provider:  MD Alejandro Gonsalez JONATHAN E.     Inpatient consult to Registered Dietitian/Nutritionist  Once     Provider:  (Not yet assigned)    ABEL Johnson  E.          No new Assessment & Plan notes have been filed under this hospital service since the last note was generated.  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    Specialty:  Hematology and Oncology  Contact information:  [address] [address]  [phone]             [facility].    Why:  Home Health  Contact information:  [address], [address]  [facility] [id number] [phone]               Patient Instructions:      Ambulatory referral/consult to Home Health   Standing Status: Future   Referral Priority: Routine Referral Type: Home Health   Referral Reason: Specialty Services Required   Requested Specialty: Home Health Services   Number of Visits Requested: 1     Diet Adult Regular     Notify your health care provider if you experience any of the following:     Notify your health care provider if you experience any of the following:  increased confusion or weakness     Notify your health care provider if you experience any of the following:  persistent dizziness, light-headedness, or visual  disturbances     Notify your health care provider if you experience any of the following:  worsening rash     Notify your health care provider if you experience any of the following:  severe persistent headache     Notify your health care provider if you experience any of the following:  difficulty breathing or increased cough     Notify your health care provider if you experience any of the following:  redness, tenderness, or signs of infection (pain, swelling, redness, odor or green/yellow discharge around incision site)     Notify your health care provider if you experience any of the following:  persistent nausea and vomiting or diarrhea     Notify your health care provider if you experience any of the following:  temperature >100.4     Notify your health care provider if you experience any of the following:  severe uncontrolled pain     Activity as tolerated       Significant Diagnostic Studies: Labs: All labs within the past 24 hours have been reviewed    Pending Diagnostic Studies:     None         Medications:  Reconciled Home Medications:      Medication List      CHANGE how you take these medications    ELIQUIS 5 mg Tab  Generic drug:  apixaban  TAKE 1 TABLET BY MOUTH TWICE A DAY  What changed:  how much to take        CONTINUE taking these medications    amLODIPine 5 MG tablet  Commonly known as:  NORVASC  Take 1 tablet (5 mg total) by mouth once daily.     atorvastatin 20 MG tablet  Commonly known as:  LIPITOR  Take 1 tablet (20 mg total) by mouth every evening.     dronabinoL 5 MG capsule  Commonly known as:  MARINOL  Take 1 capsule (5 mg total) by mouth 2 (two) times daily before meals. To increase appetite     ferrous sulfate 325 mg (65 mg iron) Tab tablet  Commonly known as:  FEOSOL  Take 1 tablet by mouth 2 (two) times daily.     metoprolol succinate 25 MG 24 hr tablet  Commonly known as:  TOPROL-XL  Take 1 tablet (25 mg total) by mouth every evening.     ondansetron 8 MG Tbdl  Commonly known as:   ZOFRAN-ODT  Take 1 tablet (8 mg total) by mouth every 12 (twelve) hours as needed.     potassium chloride SA 20 MEQ tablet  Commonly known as:  K-DUR,KLOR-CON  Take 1 tablet (20 mEq total) by mouth once daily.     prochlorperazine 10 MG tablet  Commonly known as:  COMPAZINE  Take 1 tablet (10 mg total) by mouth every 6 (six) hours as needed.     SENOKOT-S 8.6-50 mg per tablet  Generic drug:  senna-docusate 8.6-50 mg  Take 2 tablets by mouth nightly as needed for Constipation.     VITAMIN B-1 250 MG tablet  Generic drug:  thiamine  Take 250 mg by mouth once daily.     VITAMIN B-6 50 MG Tab  Generic drug:  pyridoxine (vitamin B6)  Take 100 mg by mouth once daily.        ASK your doctor about these medications    sucralfate 100 mg/mL suspension  Commonly known as:  CARAFATE  Take 10 mLs (1 g total) by mouth 4 (four) times daily.            Indwelling Lines/Drains at time of discharge:   Lines/Drains/Airways     Central Venous Catheter Line                 PowerPort A Cath Single Lumen left atrial;left subclavian -- days          Drain                 Urethral Catheter 04/30/20 2050 Straight-tip less than 1 day                Time spent on the discharge of patient: 35 minutes  Patient was seen and examined on the date of discharge and determined to be suitable for discharge.         Kiko Fuller MD  Department of Hospital Medicine  [facility]

## 2020-05-01 NOTE — PLAN OF CARE
I sent the pts HH orders and HH packet to [facility]/ [facility]  and updated the pts AVS. Cinthya Chaparro, JESSICA     05/01/20 0939   Post-Acute Status   Post-Acute Authorization Home Health   Home Health Status Referrals Sent

## 2020-05-01 NOTE — NURSING
Pt only having one wet brief this shift. 3 bms this shift. Bladder scan done and highest found was 302. Pt reports feeling of having to void. Helped patient to bedside commode and pt had diarrhea but no urine. Passed on to night shift.

## 2020-05-01 NOTE — CONSULTS
[facility]  Hematology/Oncology  Consult Note    Patient Name: Indigo Stuart  MRN: [id number]  Admission Date: 4/29/2020  Hospital Length of Stay: 0 days  Code Status: DNR   Attending Provider: Kiko Fuller MD  Consulting Provider: Karina Pettit MD  Primary Care Physician: John Conner MD  Principal Problem:Chemotherapy induced neutropenia    Consults  Subjective:     HPI:     Indigo Stuart is a 64 y.o. 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 She has undergone radioembolization to the liver. She is to begin  FLOT q2w x 8 cycles. Pt has previously failed folfox and was on folfiri, but had progression of disease. Patient was unable to tolerate Abraxane with gemcitabine.  Pt remains with unrelenting nausea and intermittent emesis.  She is tolerating eliquis for chronic DVT involving LUE.. Pt presents with pancytopenia , no evidence of bleeding     Last scan in April     FINDINGS:  The liver itself is normal are clear enlarged however there are too numerous to count metastatic tumors of the liver parenchyma almost all of which have increased in size since the prior study.  Largest tumor in the posterior right lobe measures 9.2 cm.  The largest tumor in the left lobe measures 4.9 cm.  The gallbladder is absent.  Intrahepatic biliary dilatation is not seen.  The common bile duct is not dilated down to the ampulla.  The pancreas itself is of normal contour and CT density.  There is a large stent extending from the 4th portion of the duodenum into the jejunum.  This is open without dilation of the proximal duodenum or stomach seen.  The spleen is small and of normal CT density.    The adrenal glands are not enlarged.  The kidneys are of normal size contour and contrast enhancement.  There is a stable 3.3 cm cyst of the lower pole of the  left kidney.  Stone or hydronephrosis is not seen.  The abdominal aorta is of normal caliber.  The inferior vena cava behind the liver is significantly compressed by 2 metastases 1 of which is in the caudate lobe and the other in the right lobe.  At the entry of the hepatic veins and a Salazar a more normal caliber leading to the right atrium.  There is edema of the central mesentery etiology uncertain but in the mid and lower abdomen the inferior vena cava again a compares compressed although a focal mass or adenopathy is not seen in the retroperitoneum.    A bowel anastomosis is noted in the rectum.  The rest of the colon appears of normal caliber.  Small amount of free fluid is noted in the pelvic cul de sac.  A uterus is not seen.  The bladder is of normal contour.      Impression       Increasing tumor burden within the liver parenchyma.  The largest tumor in the right lobe now measures 9.2 cm.  The largest tumor in the left lobe now measures 4.9 cm.  There is possible compression of the inferior vena cava secondary to a caudate lobe tumor and the right lobe tumor.  No biliary obstruction or dilation is demonstrated.  Prior cholecystectomy.  Large stent remains patent in the distal duodenum and proximal jejunum.  Edema of the central mesenteric fat etiology unknown.  There is possible compression of the mid inferior vena cava in the central abdomen without adenopathy or mass seen in the retroperitoneum.  Prior hysterectomy.  Small amount of free fluid in the cul de sac.  Prior partial bowel resection with anastomosis in the rectum.               Oncology Treatment Plan:   duodenal bulb FLOT - FLUOROURACIL LEUCOVORIN OXALIPLATIN DOCETAXEL Q2W    Medications:  Continuous Infusions:   lactated ringers 100 mL/hr at 04/30/20 0633     Scheduled Meds:   amLODIPine  5 mg Oral Daily    apixaban  5 mg Oral BID    atorvastatin  20 mg Oral QHS    dronabinoL  5 mg Oral BID AC    metoprolol succinate  25 mg Oral QHS     pyridoxine (vitamin B6)  100 mg Oral Daily    thiamine  250 mg Oral Daily     PRN Meds:acetaminophen, HYDROcodone-acetaminophen, loperamide, potassium chloride 10%, potassium chloride 10%, prochlorperazine, sodium chloride 0.9%     Review of patient's allergies indicates:   Allergen Reactions    Codeine Nausea And Vomiting    Erythromycin base Other (See Comments)    Lisinopril Palpitations     Cough         Past Medical History:   Diagnosis Date    Cancer     colon    Encounter for blood transfusion     Hypertension      Past Surgical History:   Procedure Laterality Date    CHOLECYSTECTOMY      COLON SURGERY      COLONOSCOPY N/A 3/30/2018    Procedure: COLONOSCOPY;  Surgeon: Daniel Magana MD;  Location: [facility];  Service: Endoscopy;  Laterality: N/A;    ESOPHAGOGASTRODUODENOSCOPY N/A 1/3/2019    Procedure: EGD (ESOPHAGOGASTRODUODENOSCOPY);  Surgeon: Adis Arguello MD;  Location: [facility];  Service: Endoscopy;  Laterality: N/A;    ESOPHAGOGASTRODUODENOSCOPY N/A 1/14/2019    Procedure: EGD (ESOPHAGOGASTRODUODENOSCOPY);  Surgeon: Monserrat Lopez MD;  Location: [facility] ([facility] F[facility]);  Service: Endoscopy;  Laterality: N/A;  with duodenal stent placement per La/svn    HYSTERECTOMY      INSERTION OF TUNNELED CENTRAL VENOUS CATHETER (CVC) WITH SUBCUTANEOUS PORT N/A 1/4/2019    Procedure: [id number]-CATH;  Surgeon: Emilio Romero MD;  Location: [facility];  Service: General;  Laterality: N/A;     Family History     Problem Relation (Age of Onset)    Cancer Mother, Father        Tobacco Use    Smoking status: Never Smoker    Smokeless tobacco: Never Used   Substance and Sexual Activity    Alcohol use: No     Frequency: Never     Drinks per session: Patient refused     Binge frequency: Never    Drug use: No    Sexual activity: Not Currently       Review of Systems  Objective:     Vital Signs (Most Recent):  Temp: 99.8 °F (37.7 °C) (05/01/20 0308)  Pulse: 91 (05/01/20 0308)  Resp: 18  (05/01/20 0308)  BP: 109/63 (05/01/20 0308)  SpO2: 95 % (05/01/20 0308) Vital Signs (24h Range):  Temp:  [98 °F (36.7 °C)-100.6 °F (38.1 °C)] 99.8 °F (37.7 °C)  Pulse:  [] 91  Resp:  [16-22] 18  SpO2:  [92 %-99 %] 95 %  BP: ()/(53-77) 109/63     Weight: 76.2 kg (167 lb 15.9 oz)  Body mass index is 30.73 kg/m².  Body surface area is 1.83 meters squared.      Intake/Output Summary (Last 24 hours) at 5/1/2020 0635  Last data filed at 5/1/2020 0552  Gross per 24 hour   Intake 3381.67 ml   Output 200 ml   Net 3181.67 ml       Physical Exam   Over the  phone patient does not sound as if they were in any distress.  Breathing is not labored.  Speech is audible.  No hoarseness.  No evidence of choking         Significant Labs:     Lab Results   Component Value Date    WBC 1.14 (LL) 05/01/2020    RBC 2.79 (L) 05/01/2020    HGB 6.9 (L) 05/01/2020    HCT 23.4 (L) 05/01/2020    MCV 84 05/01/2020    MCH 24.7 (L) 05/01/2020    MCHC 29.5 (L) 05/01/2020    RDW 17.8 (H) 05/01/2020    PLT 86 (L) 05/01/2020    MPV 10.3 05/01/2020    GRAN 5.0 (L) 04/30/2020    LYMPH 75.0 (H) 04/30/2020    MONO 15.0 04/30/2020    EOS 0.2 04/15/2020    BASO 0.08 04/15/2020    EOSINOPHIL 0.0 04/30/2020    BASOPHIL 1.0 04/30/2020         Diagnostic Results:  reviewed    Assessment/Plan:     Active Diagnoses:    Diagnosis Date Noted POA    PRINCIPAL PROBLEM:  Chemotherapy induced neutropenia [D70.1, T45.1X5A] 02/01/2019 Yes    Diarrhea in adult patient [R19.7] 04/30/2020 Yes    Dehydration [E86.0] 04/29/2020 Yes    Vertigo [R42] 04/29/2020 Yes    Overlapping malignant neoplasm of colon [C18.8] 02/10/2020 Yes    Hyperlipidemia [E78.5] 10/28/2019 Yes    Hepatic metastases [C78.7] 01/15/2019 Yes    Lung metastases [C78.00] 01/15/2019 Yes    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  Today proceed with Neupogen this will be continued as an outpatient I will handle this when she is discharged.  Proceed with 1 unit of packed red blood cells to help increase her oxygen level which will help with weakness and dizziness.  Patient is aware that she must make herself eat to increase her strength.  She is to continue her blood thinner despite thrombocytopenia after embolization as she does have extensive thromboses.  Bleeding precautions have been explain.  Patient is hopeful to be discharged today.  Heme-Onc is fine with such.  Will follow up on Monday with further labs and further recommendations as an outpatient if she is discharged today.    Karina Pettit MD  Hematology/Oncology  [facility]

## 2020-05-01 NOTE — PLAN OF CARE
Pt alert and oriented. Multiple Bms this shift. Potassium replaced per orders. VSS. Changed and brief applied. Updated patients son on plan of care. Updated pt on plan of care. Iv fluids infusing. Iv site clean and intact. Pt denies pain and nausea. Repositioned as needed. Safety maintained. All questions answered. Will continue to monitor.

## 2020-05-01 NOTE — HOSPITAL COURSE
Patient was hydrated with IVF's, seen by nutrition and PT, who recommended home health PT.  She was given 1 u PRBC's and started on neupogen and will have close outpatient follow-up with her oncology, Dr. Pettit. Dizziness was only episodic and again consistent with BPPV.

## 2020-05-01 NOTE — PLAN OF CARE
The pt has a video visit scheduled with Dr. Pettit for May 4 at 9:20 am. Cinthya Chaparro, JESSICA     05/01/20 0927   Post-Acute Status   Post-Acute Authorization Other

## 2020-05-01 NOTE — PLAN OF CARE
Date Status/Notes Created By   · 5/1/2020 9:46:35 AM Completed  Cinthya Chaparro  · 5/1/2020 9:45:09 AM Note: Thank you  Isabella [email or web address]  · 5/1/2020 9:45:04 AM Accepted  Isabella [email or web address]@[email or web address]  · 5/1/2020 9:39:18 AM New  Cinthya Chaparro  ·    The pt is accepted by [facility]  via [facility]. Discharge destination booked and AVS updated. Cinthya Chaparro LCSW     05/01/20 0946   Post-Acute Status   Post-Acute Authorization Home Health   Home Health Status Set-up Complete

## 2020-05-01 NOTE — NURSING
"LAUREN Salcedo NP notified of pt unable to void. States "feeling pressure, but unable to void." Bladder scan indicated 300cc.    Order received for sandoval catheter. Sandoval catheter placed. Tolerated well. 150cc concentrated urine obtained. Will continue to monitor.  "

## 2020-05-02 DIAGNOSIS — I82.629 ACUTE VENOUS EMBOLISM AND THROMBOSIS OF DEEP VEINS OF UPPER EXTREMITY, UNSPECIFIED LATERALITY: ICD-10-CM

## 2020-05-04 ENCOUNTER — OFFICE VISIT (OUTPATIENT)
Dept: HEMATOLOGY/ONCOLOGY | Facility: CLINIC | Age: 65
End: 2020-05-04
Payer: MEDICARE

## 2020-05-04 ENCOUNTER — TELEPHONE (OUTPATIENT)
Dept: HEMATOLOGY/ONCOLOGY | Facility: CLINIC | Age: 65
End: 2020-05-04

## 2020-05-04 ENCOUNTER — TELEPHONE (OUTPATIENT)
Dept: MEDSURG UNIT | Facility: HOSPITAL | Age: 65
End: 2020-05-04

## 2020-05-04 DIAGNOSIS — C18.8 OVERLAPPING MALIGNANT NEOPLASM OF COLON: ICD-10-CM

## 2020-05-04 DIAGNOSIS — C78.7 HEPATIC METASTASES: ICD-10-CM

## 2020-05-04 DIAGNOSIS — R62.7 FAILURE TO THRIVE IN ADULT: ICD-10-CM

## 2020-05-04 DIAGNOSIS — R10.13 DYSPEPSIA: ICD-10-CM

## 2020-05-04 DIAGNOSIS — C78.00 MALIGNANT NEOPLASM METASTATIC TO LUNG, UNSPECIFIED LATERALITY: ICD-10-CM

## 2020-05-04 DIAGNOSIS — C17.0 DUODENAL CANCER: Primary | ICD-10-CM

## 2020-05-04 DIAGNOSIS — R04.0 EPISTAXIS: ICD-10-CM

## 2020-05-04 DIAGNOSIS — E78.5 HYPERLIPIDEMIA, UNSPECIFIED HYPERLIPIDEMIA TYPE: ICD-10-CM

## 2020-05-04 DIAGNOSIS — R15.9 INCONTINENCE OF FECES, UNSPECIFIED FECAL INCONTINENCE TYPE: ICD-10-CM

## 2020-05-04 DIAGNOSIS — R53.1 WEAKNESS: ICD-10-CM

## 2020-05-04 DIAGNOSIS — Z51.5 END OF LIFE CARE: ICD-10-CM

## 2020-05-04 PROCEDURE — 1159F PR MEDICATION LIST DOCUMENTED IN MEDICAL RECORD: ICD-10-PCS | Mod: ,,, | Performed by: INTERNAL MEDICINE

## 2020-05-04 PROCEDURE — 1159F MED LIST DOCD IN RCRD: CPT | Mod: ,,, | Performed by: INTERNAL MEDICINE

## 2020-05-04 PROCEDURE — 1101F PR PT FALLS ASSESS DOC 0-1 FALLS W/OUT INJ PAST YR: ICD-10-PCS | Mod: ,,, | Performed by: INTERNAL MEDICINE

## 2020-05-04 PROCEDURE — 99215 OFFICE O/P EST HI 40 MIN: CPT | Mod: 95,GV,, | Performed by: INTERNAL MEDICINE

## 2020-05-04 PROCEDURE — 99215 PR OFFICE/OUTPT VISIT, EST, LEVL V, 40-54 MIN: ICD-10-PCS | Mod: 95,GV,, | Performed by: INTERNAL MEDICINE

## 2020-05-04 PROCEDURE — 1101F PT FALLS ASSESS-DOCD LE1/YR: CPT | Mod: ,,, | Performed by: INTERNAL MEDICINE

## 2020-05-04 RX ORDER — SUCRALFATE 1 G/10ML
1 SUSPENSION ORAL 4 TIMES DAILY
Qty: 420 ML | Refills: 1 | Status: SHIPPED | OUTPATIENT
Start: 2020-05-04 | End: 2021-05-04

## 2020-05-04 NOTE — TELEPHONE ENCOUNTER
Faxed this morning at 1100. Confirmed receipt with Cesilia at [email or web address].     ----- Message from Karina Pettit MD sent at 5/4/2020  9:38 AM CDT -----  I am finishing her note, she needs hospice :( can u fax to [email or web address] for me

## 2020-05-04 NOTE — PROGRESS NOTES
The patient location is:  At home with her son and she is lying in bed  The chief complaint leading to consultation is:  How are my blood counts  Patient has consented to this visit via televisit  Visit type: Virtual visit with synchronous audio and video plus interrupted audio   Total time spent with patient:  Over 50 min with more than 90% on medical discussion, counseling and coordination of care.  Each patient to whom he or she provides medical services by telemedicine is:  (1) informed of the relationship between the physician and patient and the respective role of any other health care provider with respect to management of the patient; and (2) notified that he or she may decline to receive medical services by telemedicine and may withdraw from such care at any time.    This visit is to also involve counseling, patient education, informed consent for ordered diagnostic and laboratory tests and motivational interviewing    This document has been created using [employer] dictation software and free typing.  It has been checked for errors but some errors may still exist.        Notes: see below   CC:  I can barely move my legs, I cannot stand up at all without falling to the floor and I am defecating on myself.           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  - Clinical: Stage IV (cTX, cNX, cM1) - Signed by Karina Pettit MD on 3/11/2019       Chemotherapy     Treatment Summary   Plan Name: OP COLORECTAL FOLFOX + BEVACIZUMAB Q2W  Treatment Goal: Maintenance  Status: Active  Start Date: 1/21/2019  End Date: 7/3/2019 (Planned)  Provider: Karina Pettit MD  Chemotherapy: fluorouracil injection 835 mg, 400 mg/m2 = 835 mg, Intravenous, Clinic/HOD 1 time, 4 of 12 cycles    fluorouracil (ADRUCIL) 2,400 mg/m2 = 5,015 mg in sodium chloride 0.9% 200.3 mL chemo infusion, 2,400  mg/m2 = 5,015 mg, Intravenous, Over 46 hours, 4 of 12 cycles    bevacizumab (AVASTIN) 5 mg/kg = 500 mg in sodium chloride 0.9% 100 mL chemo infusion, 5 mg/kg = 500 mg, Intravenous, Clinic/HOD 1 time, 4 of 12 cycles    leucovorin calcium 400 mg/m2 = 835 mg in dextrose 5 % 250 mL infusion, 400 mg/m2 = 835 mg, Intravenous, Clinic/HOD 1 time, 4 of 12 cycles    oxaliplatin (ELOXATIN) 85 mg/m2 = 178 mg in dextrose 5 % 500 mL chemo infusion, 85 mg/m2 = 178 mg, Intravenous, Clinic/HOD 1 time, 4 of 12 cycles          4/8/2019 - 4/8/2019 Chemotherapy     Treatment Summary   Plan Name: OP COLORECTAL FOLFIRI + BEVACIZUMAB Q2W  Treatment Goal: Control  Status: Inactive  Start Date: [No treatment day found]  End Date: [No treatment day found]  Provider: Karina Pettit MD  Chemotherapy: fluorouracil injection 835 mg, 400 mg/m2 = 835 mg, Intravenous, Clinic/HOD 1 time, 0 of 12 cycles  fluorouracil (ADRUCIL) 2,400 mg/m2 = 5,015 mg in sodium chloride 0.9% 200.3 mL chemo infusion, 2,400 mg/m2 = 5,015 mg, Intravenous, Over 46 hours, 0 of 12 cycles  bevacizumab (AVASTIN) 5 mg/kg = 500 mg in sodium chloride 0.9% 100 mL chemo infusion, 5 mg/kg = 500 mg, Intravenous, Clinic/HOD 1 time, 0 of 12 cycles  irinotecan (CAMPTOSAR) 180 mg/m2 = 376 mg in sodium chloride 0.9% 500 mL chemo infusion, 180 mg/m2 = 376 mg, Intravenous, Clinic/HOD 1 time, 0 of 12 cycles  leucovorin calcium 400 mg/m2 = 835 mg in dextrose 5 % 250 mL infusion, 400 mg/m2 = 835 mg, Intravenous, Clinic/HOD 1 time, 0 of 12 cycles      12/22/2019 - 2/11/2020 Chemotherapy     Treatment Summary   Plan Name: OP PANC NAB-PACLITAXEL + GEMCITABINE Day 1, 8 , 15 every 28 days  Treatment Goal: Maintenance  Status: Inactive  Start Date: 12/31/2019  End Date: 2/11/2020  Provider: Karina Pettit MD  Chemotherapy: PACLitaxel-protein bound (ABRAXANE) 100 mg/m2 = 200 mg in 40 mL infusion, 100 mg/m2 = 200 mg (100 % of original dose 100 mg/m2), Intravenous, Clinic/HOD 1 time, 2 of 4  cycles  Dose modification: 100 mg/m2 (original dose 100 mg/m2, Cycle 1), 125 mg/m2 (original dose 100 mg/m2, Cycle 1)  Administration: 200 mg (12/31/2019), 200 mg (1/7/2020), 250 mg (1/14/2020), 250 mg (2/4/2020), 250 mg (2/11/2020)  gemcitabine 790 mg in sodium chloride 0.9% 250 mL chemo infusion, 400 mg/m2 = 790 mg (100 % of original dose 400 mg/m2), Intravenous, Clinic/HOD 1 time, 2 of 4 cycles  Dose modification: 400 mg/m2 (original dose 400 mg/m2, Cycle 1), 800 mg/m2 (original dose 400 mg/m2, Cycle 1), 400 mg/m2 (original dose 400 mg/m2, Cycle 2)  Administration: 790 mg (12/31/2019), 790 mg (1/7/2020), 1,575 mg (1/14/2020), 790 mg (2/4/2020), 790 mg (2/11/2020)      4/20/2020 -  Chemotherapy     Treatment Summary   Plan Name: duodenal bulb FLOT - FLUOROURACIL LEUCOVORIN OXALIPLATIN DOCETAXEL Q2W  Treatment Goal: Control  Status: Active  Start Date: 4/21/2020  End Date: 7/29/2020 (Planned)  Provider: Karina Pettit MD  Chemotherapy: DOCEtaxeL (TAXOTERE) in sodium chloride 0.9% 250 mL chemo infusion, 50 mg/m2 = 95 mg, Intravenous, Clinic/HOD 1 time, 1 of 8 cycles  Administration: 90 mg (4/21/2020)  leucovorin calcium in dextrose 5 % 250 mL infusion, 200 mg/m2 = 375 mg, Intravenous, Clinic/HOD 1 time, 1 of 8 cycles  Administration: 365 mg (4/21/2020)  oxaliplatin (ELOXATIN) in dextrose 5 % 500 mL chemo infusion, 85 mg/m2 = 159 mg, Intravenous, Clinic/HOD 1 time, 1 of 8 cycles  Administration: 155 mg (4/21/2020)  fluorouracil (ADRUCIL) in sodium chloride 0.9% 100 mL chemo infusion, 2,600 mg/m2 = 4,860 mg, Intravenous, Over 24 hours, 1 of 8 cycles  Administration: 4,730 mg (4/21/2020)        Lung metastases    1/15/2019 Initial Diagnosis     Lung metastases      4/8/2019 - 4/8/2019 Chemotherapy     Treatment Summary   Plan Name: OP COLORECTAL FOLFIRI + BEVACIZUMAB Q2W  Treatment Goal: Control  Status: Inactive  Start Date: [No treatment day found]  End Date: [No treatment day found]  Provider: Karina Pettit,  MD  Chemotherapy: fluorouracil injection 835 mg, 400 mg/m2 = 835 mg, Intravenous, Clinic/HOD 1 time, 0 of 12 cycles  fluorouracil (ADRUCIL) 2,400 mg/m2 = 5,015 mg in sodium chloride 0.9% 200.3 mL chemo infusion, 2,400 mg/m2 = 5,015 mg, Intravenous, Over 46 hours, 0 of 12 cycles  bevacizumab (AVASTIN) 5 mg/kg = 500 mg in sodium chloride 0.9% 100 mL chemo infusion, 5 mg/kg = 500 mg, Intravenous, Clinic/HOD 1 time, 0 of 12 cycles  irinotecan (CAMPTOSAR) 180 mg/m2 = 376 mg in sodium chloride 0.9% 500 mL chemo infusion, 180 mg/m2 = 376 mg, Intravenous, Clinic/HOD 1 time, 0 of 12 cycles  leucovorin calcium 400 mg/m2 = 835 mg in dextrose 5 % 250 mL infusion, 400 mg/m2 = 835 mg, Intravenous, Clinic/HOD 1 time, 0 of 12 cycles      4/8/2019 - 12/23/2019 Chemotherapy     Treatment Summary   Plan Name: OP COLORECTAL FOLFIRI + BEVACIZUMAB Q2W  Treatment Goal: Control  Status: Inactive  Start Date: 4/8/2019  End Date: 12/3/2019  Provider: Karina Pettit MD  Chemotherapy: fluorouracil injection 835 mg, 400 mg/m2 = 835 mg, Intravenous, Clinic/HOD 1 time, 12 of 12 cycles  Administration: 835 mg (8/6/2019), 835 mg (8/27/2019), 835 mg (8/27/2019), 835 mg (9/10/2019), 835 mg (9/24/2019)  fluorouracil (ADRUCIL) 2,400 mg/m2 = 5,015 mg in sodium chloride 0.9% 200.3 mL chemo infusion, 2,400 mg/m2 = 5,015 mg, Intravenous, Over 46 hours, 12 of 12 cycles  Administration: 5,015 mg (8/6/2019), 5,015 mg (8/27/2019), 5,015 mg (9/10/2019), 5,015 mg (9/24/2019)  bevacizumab (AVASTIN) 5 mg/kg = 500 mg in sodium chloride 0.9% 100 mL chemo infusion, 5 mg/kg = 500 mg, Intravenous, Clinic/HOD 1 time, 14 of 14 cycles  Dose modification: 7.5 mg/kg (original dose 5 mg/kg, Cycle 14)  Administration: 500 mg (8/6/2019), 500 mg (8/27/2019), 500 mg (9/10/2019), 500 mg (9/24/2019), 500 mg (11/12/2019), 750 mg (12/3/2019)  irinotecan (CAMPTOSAR) 180 mg/m2 = 376 mg in sodium chloride 0.9% 500 mL chemo infusion, 180 mg/m2 = 376 mg, Intravenous, Clinic/HOD 1 time,  14 of 14 cycles  Dose modification: 200 mg/m2 (original dose 180 mg/m2, Cycle 14)  Administration: 376 mg (8/6/2019), 376 mg (8/27/2019), 376 mg (9/10/2019), 376 mg (9/24/2019), 376 mg (11/12/2019), 418 mg (12/3/2019)  leucovorin calcium 400 mg/m2 = 835 mg in dextrose 5 % 250 mL infusion, 400 mg/m2 = 835 mg, Intravenous, Clinic/HOD 1 time, 12 of 12 cycles  Administration: 835 mg (8/6/2019), 835 mg (8/27/2019), 835 mg (9/10/2019), 835 mg (9/24/2019)      12/22/2019 - 2/11/2020 Chemotherapy     Treatment Summary   Plan Name: OP PANC NAB-PACLITAXEL + GEMCITABINE Day 1, 8 , 15 every 28 days  Treatment Goal: Maintenance  Status: Inactive  Start Date: 12/31/2019  End Date: 2/11/2020  Provider: Karina Pettit MD  Chemotherapy: PACLitaxel-protein bound (ABRAXANE) 100 mg/m2 = 200 mg in 40 mL infusion, 100 mg/m2 = 200 mg (100 % of original dose 100 mg/m2), Intravenous, Clinic/HOD 1 time, 2 of 4 cycles  Dose modification: 100 mg/m2 (original dose 100 mg/m2, Cycle 1), 125 mg/m2 (original dose 100 mg/m2, Cycle 1)  Administration: 200 mg (12/31/2019), 200 mg (1/7/2020), 250 mg (1/14/2020), 250 mg (2/4/2020), 250 mg (2/11/2020)  gemcitabine 790 mg in sodium chloride 0.9% 250 mL chemo infusion, 400 mg/m2 = 790 mg (100 % of original dose 400 mg/m2), Intravenous, Clinic/HOD 1 time, 2 of 4 cycles  Dose modification: 400 mg/m2 (original dose 400 mg/m2, Cycle 1), 800 mg/m2 (original dose 400 mg/m2, Cycle 1), 400 mg/m2 (original dose 400 mg/m2, Cycle 2)  Administration: 790 mg (12/31/2019), 790 mg (1/7/2020), 1,575 mg (1/14/2020), 790 mg (2/4/2020), 790 mg (2/11/2020)      4/20/2020 -  Chemotherapy     Treatment Summary   Plan Name: duodenal bulb FLOT - FLUOROURACIL LEUCOVORIN OXALIPLATIN DOCETAXEL Q2W  Treatment Goal: Control  Status: Active  Start Date: 4/21/2020  End Date: 7/29/2020 (Planned)  Provider: Karina Pettit MD  Chemotherapy: DOCEtaxeL (TAXOTERE) in sodium chloride 0.9% 250 mL chemo infusion, 50 mg/m2 = 95 mg, Intravenous,  Clinic/HOD 1 time, 1 of 8 cycles  Administration: 90 mg (4/21/2020)  leucovorin calcium in dextrose 5 % 250 mL infusion, 200 mg/m2 = 375 mg, Intravenous, Clinic/HOD 1 time, 1 of 8 cycles  Administration: 365 mg (4/21/2020)  oxaliplatin (ELOXATIN) in dextrose 5 % 500 mL chemo infusion, 85 mg/m2 = 159 mg, Intravenous, Clinic/HOD 1 time, 1 of 8 cycles  Administration: 155 mg (4/21/2020)  fluorouracil (ADRUCIL) in sodium chloride 0.9% 100 mL chemo infusion, 2,600 mg/m2 = 4,860 mg, Intravenous, Over 24 hours, 1 of 8 cycles  Administration: 4,730 mg (4/21/2020)        Hepatic metastases    1/15/2019 Initial Diagnosis     Hepatic metastases      4/8/2019 - 4/8/2019 Chemotherapy     Treatment Summary   Plan Name: OP COLORECTAL FOLFIRI + BEVACIZUMAB Q2W  Treatment Goal: Control  Status: Inactive  Start Date: [No treatment day found]  End Date: [No treatment day found]  Provider: Karina Pettit MD  Chemotherapy: fluorouracil injection 835 mg, 400 mg/m2 = 835 mg, Intravenous, Clinic/HOD 1 time, 0 of 12 cycles  fluorouracil (ADRUCIL) 2,400 mg/m2 = 5,015 mg in sodium chloride 0.9% 200.3 mL chemo infusion, 2,400 mg/m2 = 5,015 mg, Intravenous, Over 46 hours, 0 of 12 cycles  bevacizumab (AVASTIN) 5 mg/kg = 500 mg in sodium chloride 0.9% 100 mL chemo infusion, 5 mg/kg = 500 mg, Intravenous, Clinic/HOD 1 time, 0 of 12 cycles  irinotecan (CAMPTOSAR) 180 mg/m2 = 376 mg in sodium chloride 0.9% 500 mL chemo infusion, 180 mg/m2 = 376 mg, Intravenous, Clinic/HOD 1 time, 0 of 12 cycles  leucovorin calcium 400 mg/m2 = 835 mg in dextrose 5 % 250 mL infusion, 400 mg/m2 = 835 mg, Intravenous, Clinic/HOD 1 time, 0 of 12 cycles      4/8/2019 - 12/23/2019 Chemotherapy     Treatment Summary   Plan Name: OP COLORECTAL FOLFIRI + BEVACIZUMAB Q2W  Treatment Goal: Control  Status: Inactive  Start Date: 4/8/2019  End Date: 12/3/2019  Provider: Karina Pettit MD  Chemotherapy: fluorouracil injection 835 mg, 400 mg/m2 = 835 mg, Intravenous, Clinic/HOD 1  time, 12 of 12 cycles  Administration: 835 mg (8/6/2019), 835 mg (8/27/2019), 835 mg (8/27/2019), 835 mg (9/10/2019), 835 mg (9/24/2019)  fluorouracil (ADRUCIL) 2,400 mg/m2 = 5,015 mg in sodium chloride 0.9% 200.3 mL chemo infusion, 2,400 mg/m2 = 5,015 mg, Intravenous, Over 46 hours, 12 of 12 cycles  Administration: 5,015 mg (8/6/2019), 5,015 mg (8/27/2019), 5,015 mg (9/10/2019), 5,015 mg (9/24/2019)  bevacizumab (AVASTIN) 5 mg/kg = 500 mg in sodium chloride 0.9% 100 mL chemo infusion, 5 mg/kg = 500 mg, Intravenous, Clinic/HOD 1 time, 14 of 14 cycles  Dose modification: 7.5 mg/kg (original dose 5 mg/kg, Cycle 14)  Administration: 500 mg (8/6/2019), 500 mg (8/27/2019), 500 mg (9/10/2019), 500 mg (9/24/2019), 500 mg (11/12/2019), 750 mg (12/3/2019)  irinotecan (CAMPTOSAR) 180 mg/m2 = 376 mg in sodium chloride 0.9% 500 mL chemo infusion, 180 mg/m2 = 376 mg, Intravenous, Clinic/HOD 1 time, 14 of 14 cycles  Dose modification: 200 mg/m2 (original dose 180 mg/m2, Cycle 14)  Administration: 376 mg (8/6/2019), 376 mg (8/27/2019), 376 mg (9/10/2019), 376 mg (9/24/2019), 376 mg (11/12/2019), 418 mg (12/3/2019)  leucovorin calcium 400 mg/m2 = 835 mg in dextrose 5 % 250 mL infusion, 400 mg/m2 = 835 mg, Intravenous, Clinic/HOD 1 time, 12 of 12 cycles  Administration: 835 mg (8/6/2019), 835 mg (8/27/2019), 835 mg (9/10/2019), 835 mg (9/24/2019)      12/22/2019 - 2/11/2020 Chemotherapy     Treatment Summary   Plan Name: OP PANC NAB-PACLITAXEL + GEMCITABINE Day 1, 8 , 15 every 28 days  Treatment Goal: Maintenance  Status: Inactive  Start Date: 12/31/2019  End Date: 2/11/2020  Provider: Karina Pettit MD  Chemotherapy: PACLitaxel-protein bound (ABRAXANE) 100 mg/m2 = 200 mg in 40 mL infusion, 100 mg/m2 = 200 mg (100 % of original dose 100 mg/m2), Intravenous, Clinic/HOD 1 time, 2 of 4 cycles  Dose modification: 100 mg/m2 (original dose 100 mg/m2, Cycle 1), 125 mg/m2 (original dose 100 mg/m2, Cycle 1)  Administration: 200 mg  (12/31/2019), 200 mg (1/7/2020), 250 mg (1/14/2020), 250 mg (2/4/2020), 250 mg (2/11/2020)  gemcitabine 790 mg in sodium chloride 0.9% 250 mL chemo infusion, 400 mg/m2 = 790 mg (100 % of original dose 400 mg/m2), Intravenous, Clinic/HOD 1 time, 2 of 4 cycles  Dose modification: 400 mg/m2 (original dose 400 mg/m2, Cycle 1), 800 mg/m2 (original dose 400 mg/m2, Cycle 1), 400 mg/m2 (original dose 400 mg/m2, Cycle 2)  Administration: 790 mg (12/31/2019), 790 mg (1/7/2020), 1,575 mg (1/14/2020), 790 mg (2/4/2020), 790 mg (2/11/2020)      4/20/2020 -  Chemotherapy     Treatment Summary   Plan Name: duodenal bulb FLOT - FLUOROURACIL LEUCOVORIN OXALIPLATIN DOCETAXEL Q2W  Treatment Goal: Control  Status: Active  Start Date: 4/21/2020  End Date: 7/29/2020 (Planned)  Provider: Karina Pettit MD  Chemotherapy: DOCEtaxeL (TAXOTERE) in sodium chloride 0.9% 250 mL chemo infusion, 50 mg/m2 = 95 mg, Intravenous, Clinic/HOD 1 time, 1 of 8 cycles  Administration: 90 mg (4/21/2020)  leucovorin calcium in dextrose 5 % 250 mL infusion, 200 mg/m2 = 375 mg, Intravenous, Clinic/HOD 1 time, 1 of 8 cycles  Administration: 365 mg (4/21/2020)  oxaliplatin (ELOXATIN) in dextrose 5 % 500 mL chemo infusion, 85 mg/m2 = 159 mg, Intravenous, Clinic/HOD 1 time, 1 of 8 cycles  Administration: 155 mg (4/21/2020)  fluorouracil (ADRUCIL) in sodium chloride 0.9% 100 mL chemo infusion, 2,600 mg/m2 = 4,860 mg, Intravenous, Over 24 hours, 1 of 8 cycles  Administration: 4,730 mg (4/21/2020)        Overlapping malignant neoplasm of colon    2/10/2020 Initial Diagnosis     Overlapping malignant neoplasm of colon      4/20/2020 -  Chemotherapy     Treatment Summary   Plan Name: duodenal bulb FLOT - FLUOROURACIL LEUCOVORIN OXALIPLATIN DOCETAXEL Q2W  Treatment Goal: Control  Status: Active  Start Date: 4/21/2020  End Date: 7/29/2020 (Planned)  Provider: Karina Pettit MD  Chemotherapy: DOCEtaxeL (TAXOTERE) in sodium chloride 0.9% 250 mL chemo infusion, 50 mg/m2 =  95 mg, Intravenous, Clinic/HOD 1 time, 1 of 8 cycles  Administration: 90 mg (4/21/2020)  leucovorin calcium in dextrose 5 % 250 mL infusion, 200 mg/m2 = 375 mg, Intravenous, Clinic/HOD 1 time, 1 of 8 cycles  Administration: 365 mg (4/21/2020)  oxaliplatin (ELOXATIN) in dextrose 5 % 500 mL chemo infusion, 85 mg/m2 = 159 mg, Intravenous, Clinic/HOD 1 time, 1 of 8 cycles  Administration: 155 mg (4/21/2020)  fluorouracil (ADRUCIL) in sodium chloride 0.9% 100 mL chemo infusion, 2,600 mg/m2 = 4,860 mg, Intravenous, Over 24 hours, 1 of 8 cycles  Administration: 4,730 mg (4/21/2020)           Indigo Stuart is a 64 y.o.  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  Kras was MUTATED No MSI : EGFR no overexpression    Tolerating lopressor for htn and lipitor for dyslipidemia  Tolerating zofran for nausea prn chemo     Cycle one folfox avastin January 15 2019   Failed regimen and admitted with SBO and found to have duodenal cancer   Hepatic mets progressed hence changed to irinotecan dropped  oxaliplatin   Cycle one folfiri avastin April 2019     Pt was seen for left arm swelling and ultrasound revealed a DVT in her arm , post lovenox , now on eliquis   Scan reviewed again  Here to resume Xeliri which was started Nov 8 2019 December 13 2019   DVT left internal jugular vein noted October 8 19 patient started on Eliquis  Here for evaluation of her imaging studies and scans  PET-CT with increasing SUV and increasing activity the cancer no evidence of new disease  Ultrasound revealed resolution of the blood clots in her left upper extremity    December 23 2019   PET CT mixed response explained again   cea now has normalized     January 7 2020  For chemo clearance   New rash to trunk and axilla     January 13 2020  For cycle 1 day 15  Rash better but remains   CEA has increased again       February 3 2020  For cycle 2 day 1 clearance after  being delayed for one week with cytopenias   + leg pain new  Is on 1/2 dose eliquis after having nosebleeds and thrombocytopenia  Both of these resolved     2-  Here for cycle 2 day 8   Pt with weakness and fatigue, decreased apprtite and chills no fever     2-  Here for cycle 2 day 15   Leg pain and weakness to where she is in a wheelchair on this new chemo   cea rising , on eliquis but platelets ok     February 24, 2020 here for clearance cycle 2 day 15 Gemzar Abraxane  Patient has a fever in office today of 102.5, she is tachycardic with a heart rate of 110.  She states she has not had any hot beverages and she did lot just eat lunch.  She is not feeling well somewhat diaphoretic    March 2 2020 post hospitalization for UTI and febrile neutropenia   Today patient is weak and pale she is to walker to walk in clinic and she states she cannot leave clinic had a walker she needs a wheelchair  No change in PMH, PFH, PSH since last OV     March 23 2020  No further epistaxis, she remains on Eliquis   Has seen IR for hepatic embolization    April 9, 2020  Patient feeling better means with emesis and nausea was seen in the emergency room and did not receive any anti emetics.  She was told she was dehydrated received fluids and was sent home.  She remains vomiting bile imaging studies were reviewed scans ordered by Interventional Radiology reviewed no evidence of obstruction.  Patient has had leukocytosis her urine has not been checked recently.  Labs as of 4 6 reveal of an elevated CRP at 298 and an elevated LDH of 578.  Her renal function now was normal alk-phos slightly elevated to 55 AST elevated at 117 total bili normal at 0.6 patient has been having 1 loose bowel movement a day she is tolerating her chemo medicine orally with no complaints taking it 3 weeks on 1 week off    April 16, 2020 patient has had a CT of abdomen and pelvis performed yesterday after she complained of continuous intractable nausea  and I was worried about a bowel obstruction.  Scan did not reveal bowel obstruction but she does have an increase in progression of metastatic hepatic disease causing some compression of vena cava  She is already on a blood thinner for prior history of thrombotic event and she is tolerating this without any evidence of bleeding she is stable on Lipitor for dyslipidemia and started Stivarga    May 4, 2020 patient was admitted to the hospital with severe weakness nausea vomiting after her 1st dose of 5 fluorouracil with leucovorin oxaliplatin Taxotere.  She was recently discharged from the hospital this is her follow-up virtual visit.  She is laying in bed crying.  She is not in pain but she is unable to stand.  She is also having to wear diapers because she is having bowel incontinence.  She is exhausted and would like to discuss comfort measures.    Current Outpatient Medications:     amLODIPine (NORVASC) 5 MG tablet, Take 1 tablet (5 mg total) by mouth once daily., Disp: 90 tablet, Rfl: 3    atorvastatin (LIPITOR) 20 MG tablet, Take 1 tablet (20 mg total) by mouth every evening., Disp: 90 tablet, Rfl: 3    dronabinoL (MARINOL) 5 MG capsule, Take 1 capsule (5 mg total) by mouth 2 (two) times daily before meals. To increase appetite, Disp: 60 capsule, Rfl: 2    ELIQUIS 5 mg Tab, TAKE 1 TABLET BY MOUTH TWICE A DAY (Patient taking differently: Take by mouth 2 (two) times daily. ), Disp: 60 tablet, Rfl: 2    ferrous sulfate (FEOSOL) 325 mg (65 mg iron) Tab tablet, Take 1 tablet by mouth 2 (two) times daily., Disp: , Rfl: 3    metoprolol succinate (TOPROL-XL) 25 MG 24 hr tablet, Take 1 tablet (25 mg total) by mouth every evening., Disp: 90 tablet, Rfl: 3    ondansetron (ZOFRAN-ODT) 8 MG TbDL, Take 1 tablet (8 mg total) by mouth every 12 (twelve) hours as needed., Disp: 30 tablet, Rfl: 1    potassium chloride SA (K-DUR,KLOR-CON) 20 MEQ tablet, Take 1 tablet (20 mEq total) by mouth once daily., Disp: 30 tablet,  Rfl: 11    prochlorperazine (COMPAZINE) 10 MG tablet, Take 1 tablet (10 mg total) by mouth every 6 (six) hours as needed., Disp: 30 tablet, Rfl: 1    pyridoxine, vitamin B6, (VITAMIN B-6) 50 MG Tab, Take 100 mg by mouth once daily. , Disp: , Rfl:     senna-docusate 8.6-50 mg (SENOKOT-S) 8.6-50 mg per tablet, Take 2 tablets by mouth nightly as needed for Constipation., Disp: , Rfl:     sucralfate (CARAFATE) 100 mg/mL suspension, Take 10 mLs (1 g total) by mouth 4 (four) times daily., Disp: 420 mL, Rfl: 1    thiamine (VITAMIN B-1) 250 MG tablet, Take 250 mg by mouth once daily., Disp: , Rfl:     Patient is tolerating Eliquis after extensive venous thrombosis.  She is tolerating Toprol-XL to help with tachycardia and hypertension.  She remains heating Zofran to help with nausea and gastritis like symptoms  Review of Systems:  CONSTITUTIONAL: No fevers, chills, night sweats, ++wt. loss, appetite changes  SKIN: no rashes or itching  ENT: No headaches, head trauma, vision changes, or eye pain  LYMPH NODES: None noticed   CV: No chest pain, palpitations.   RESP: ++ dyspnea on exertion, no cough, wheezing, or hemoptysis  GI: ++  nausea, emesis, diarrhea, .   : No dysuria, hematuria, urgency, or frequency   HEME: No easy bruising, + bleeding problems  PSYCHIATRIC: No depression, anxiety, psychosis, hallucinations.  NEURO: No seizures, memory loss, dizziness or difficulty sleeping  MSK: No arthralgias    General patient is weak and looks ill   Psych crying affect no evidence of depression   Head normocephalic atraumatic, no hoarseness  Eyes noninjected sclera conjunctiva appear pink  Skin intact no lesions noted + epistaxis   Neck with no limited range of motion no JVD evident  Chest with no use of accessory muscles no labored breathing    No respiratory distress, effort normal   Abdomen appears to be distended  Extremities with   edemat  No joint effusions  Behavior normal judgment normal        Lab Results    Component Value Date    WBC 1.14 (LL) 05/01/2020    RBC 2.79 (L) 05/01/2020    HGB 6.9 (L) 05/01/2020    HCT 23.4 (L) 05/01/2020    MCV 84 05/01/2020    MCH 24.7 (L) 05/01/2020    MCHC 29.5 (L) 05/01/2020    RDW 17.8 (H) 05/01/2020    PLT 86 (L) 05/01/2020    MPV 10.3 05/01/2020    GRAN 20.0 (L) 05/01/2020    LYMPH 56.0 (H) 05/01/2020    MONO 24.0 (H) 05/01/2020    EOS 0.2 04/15/2020    BASO 0.08 04/15/2020    EOSINOPHIL 0.0 05/01/2020    BASOPHIL 0.0 05/01/2020         CMP  Sodium   Date Value Ref Range Status   05/01/2020 135 (L) 136 - 145 mmol/L Final     Potassium   Date Value Ref Range Status   05/01/2020 3.6 3.5 - 5.1 mmol/L Final     Chloride   Date Value Ref Range Status   05/01/2020 101 95 - 110 mmol/L Final     CO2   Date Value Ref Range Status   05/01/2020 27 23 - 29 mmol/L Final     Glucose   Date Value Ref Range Status   05/01/2020 95 70 - 110 mg/dL Final     BUN, Bld   Date Value Ref Range Status   05/01/2020 7 (L) 8 - 23 mg/dL Final     Creatinine   Date Value Ref Range Status   05/01/2020 0.5 0.5 - 1.4 mg/dL Final     Calcium   Date Value Ref Range Status   05/01/2020 7.6 (L) 8.7 - 10.5 mg/dL Final     Total Protein   Date Value Ref Range Status   05/01/2020 4.5 (L) 6.0 - 8.4 g/dL Final     Albumin   Date Value Ref Range Status   05/01/2020 1.3 (L) 3.5 - 5.2 g/dL Final     Total Bilirubin   Date Value Ref Range Status   05/01/2020 0.7 0.1 - 1.0 mg/dL Final     Comment:     For infants and newborns, interpretation of results should be based  on gestational age, weight and in agreement with clinical  observations.  Premature Infant recommended reference ranges:  Up to 24 hours.............<8.0 mg/dL  Up to 48 hours............<12.0 mg/dL  3-5 days..................<15.0 mg/dL  6-29 days.................<15.0 mg/dL       Alkaline Phosphatase   Date Value Ref Range Status   05/01/2020 227 (H) 55 - 135 U/L Final     AST   Date Value Ref Range Status   05/01/2020 50 (H) 10 - 40 U/L Final     ALT   Date  Value Ref Range Status   05/01/2020 32 10 - 44 U/L Final     Anion Gap   Date Value Ref Range Status   05/01/2020 7 (L) 8 - 16 mmol/L Final     eGFR if    Date Value Ref Range Status   05/01/2020 >60 >60 mL/min/1.73 m^2 Final     eGFR if non    Date Value Ref Range Status   05/01/2020 >60 >60 mL/min/1.73 m^2 Final     Comment:     Calculation used to obtain the estimated glomerular filtration  rate (eGFR) is the CKD-EPI equation.      Impression:           - Normal esophagus.                        - Normal stomach.                        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 Prosthesis placed. The                         proximal end of the stent is distal to the ampulla                         (refer to images).  No msi   There are no osseous abnormalities.      Impression PET CT December 2019       Continued improvement of the hepatic metastasis with decrease in size of the multifocal hepatic lesions    Stable subcentimeter mesenteric lymph nodes    No evidence of new metastatic disease      Electronically signed by: Charissa Marroquin MD  Date: 12/11/2019  Time: 11:03          FINDINGS:  The liver itself is normal are clear enlarged however there are too numerous to count metastatic tumors of the liver parenchyma almost all of which have increased in size since the prior study.  Largest tumor in the posterior right lobe measures 9.2 cm.  The largest tumor in the left lobe measures 4.9 cm.  The gallbladder is absent.  Intrahepatic biliary dilatation is not seen.  The common bile duct is not dilated down to the ampulla.  The pancreas itself is of normal contour and CT density.  There is a large stent extending from the 4th portion of the duodenum into the jejunum.  This is open without dilation of the proximal duodenum or stomach seen.  The spleen is small and of normal CT density.    The adrenal glands are not enlarged.  The  kidneys are of normal size contour and contrast enhancement.  There is a stable 3.3 cm cyst of the lower pole of the left kidney.  Stone or hydronephrosis is not seen.  The abdominal aorta is of normal caliber.  The inferior vena cava behind the liver is significantly compressed by 2 metastases 1 of which is in the caudate lobe and the other in the right lobe.  At the entry of the hepatic veins and a Salazar a more normal caliber leading to the right atrium.  There is edema of the central mesentery etiology uncertain but in the mid and lower abdomen the inferior vena cava again a compares compressed although a focal mass or adenopathy is not seen in the retroperitoneum.    A bowel anastomosis is noted in the rectum.  The rest of the colon appears of normal caliber.  Small amount of free fluid is noted in the pelvic cul de sac.  A uterus is not seen.  The bladder is of normal contour.      Impression       Increasing tumor burden within the liver parenchyma.  The largest tumor in the right lobe now measures 9.2 cm.  The largest tumor in the left lobe now measures 4.9 cm.  There is possible compression of the inferior vena cava secondary to a caudate lobe tumor and the right lobe tumor.  No biliary obstruction or dilation is demonstrated.  Prior cholecystectomy.  Large stent remains patent in the distal duodenum and proximal jejunum.  Edema of the central mesenteric fat etiology unknown.  There is possible compression of the mid inferior vena cava in the central abdomen without adenopathy or mass seen in the retroperitoneum.  Prior hysterectomy.  Small amount of free fluid in the cul de sac.  Prior partial bowel resection with anastomosis in the rectum.      Electronically signed by: Arie Fernandez MD  Date: 04/15/2020  Time: 18:17     Indigo Stuart #[id number] (CSN: [id number]) (64 y.o. F)    Results   CEA (Acc# [id number]) (Order [id number])   Reviewed By     Noemi Segovia RN on 4/15/2020 15:27   In Basket      Reviewed  Result Note  View in In Basket    [employer] Results Release     [place] Status: Active  Results Release      Result Notes for CEA     Notes recorded by Karina Pettit MD on 4/15/2020 at 2:49 PM CDT  Needs stat CT abdomen pelvis   Contains abnormal data CEA   Order: [id number]   Status:  Final result   Visible to patient:  Yes (Patient Portal)   Next appt:  None   Dx:  Malignant neoplasm metastatic to lung...    Ref Range & Units 1d ago   CEA 0.0 - 5.0 ng/mL 813.7High                    Duodenal cancer  -     Ambulatory referral/consult to Hospice; Future; Expected date: 05/11/2020    Dyspepsia  -     sucralfate (CARAFATE) 100 mg/mL suspension; Take 10 mLs (1 g total) by mouth 4 (four) times daily.  Dispense: 420 mL; Refill: 1    Hepatic metastases  -     Ambulatory referral/consult to Hospice; Future; Expected date: 05/11/2020    Malignant neoplasm metastatic to lung, unspecified laterality    Overlapping malignant neoplasm of colon    Hyperlipidemia, unspecified hyperlipidemia type    Incontinence of feces, unspecified fecal incontinence type  -     Ambulatory referral/consult to Hospice; Future; Expected date: 05/11/2020    Failure to thrive in adult  -     Ambulatory referral/consult to Hospice; Future; Expected date: 05/11/2020    End of life care  -     Ambulatory referral/consult to Hospice; Future; Expected date: 05/11/2020    Epistaxis  -     Ambulatory referral/consult to Hospice; Future; Expected date: 05/11/2020    Weakness  -     Ambulatory referral/consult to Hospice; Future; Expected date: 05/11/2020 April 16 2020 : increasing tumor burden and compromise vena cava   Must change to IV chemo FLOT every 2 weeks start APril 20 2020      1.  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 B.i.d.  Hold blood pressure medication for now till she starts eating again continue Carafate may continue her antinausea medications and may continue statin

## 2020-05-05 ENCOUNTER — TELEPHONE (OUTPATIENT)
Dept: HEMATOLOGY/ONCOLOGY | Facility: CLINIC | Age: 65
End: 2020-05-05

## 2020-05-06 RX ORDER — APIXABAN 5 MG/1
TABLET, FILM COATED ORAL
Qty: 60 TABLET | Refills: 2 | OUTPATIENT
Start: 2020-05-06

## 2020-05-27 ENCOUNTER — TELEPHONE (OUTPATIENT)
Dept: HEMATOLOGY/ONCOLOGY | Facility: CLINIC | Age: 65
End: 2020-05-27

## 2020-06-22 ENCOUNTER — TELEPHONE (OUTPATIENT)
Dept: HEMATOLOGY/ONCOLOGY | Facility: CLINIC | Age: 65
End: 2020-06-22

## 2023-08-14 NOTE — NURSING
0915- to [facility] for ultrasound   continue lisinopril 2.5 mg once a day monitor blood pressure if systolic blood pressure less than 100 hold medication.

## 2023-08-16 NOTE — PROGRESS NOTES
I met with patient to update her distress screening; her initial screening was completed in Jan 2019.  Today she indicated a rating of 0.  She denied needing psychosocial support at this time.  I provided my contact information in the event she needs supportive services in the future.    Bilateral Rotation Flap Text: The defect edges were debeveled with a #15 scalpel blade. Given the location of the defect, shape of the defect and the proximity to free margins a bilateral rotation flap was deemed most appropriate. Using a sterile surgical marker, an appropriate rotation flap was drawn incorporating the defect and placing the expected incisions within the relaxed skin tension lines where possible. The area thus outlined was incised deep to adipose tissue with a #15 scalpel blade. The skin margins were undermined to an appropriate distance in all directions utilizing iris scissors. Following this, the designed flap was carried over into the primary defect and sutured into place.

## 2024-09-26 NOTE — PROGRESS NOTES
NUTRITION CONSULT    I spoke to Ms. Stuart while she received her infusion this morning. She is starting treatment today with 5-FU, Oxaliplatin, Taxotere. Since our last visit in December, she is still dealing with decreased appetite & continued unintentional wt loss. CW: 165#. She is only able to eat small amounts of food at a time, but is not eating frequently enough throughout the day. She has tried Ensure drinks in the past; states that they cause diarrhea.     Plan:   1. Recommend 5-6 small, frequent meals/day. Provided printed handouts listing high-kcal/high-pro foods/liquids.   2. Provided samples of Ensure Clear and Boost Breeze drinks.   3. Increase electrolyte-rich fluid intake.   4. Provided pt with RD contact info again; encouraged her to call with any questions/concerns. \  5. Will f/u prn.  
Male

## (undated) DEVICE — SEE MEDLINE ITEM 146292

## (undated) DEVICE — ELECTRODE REM PLYHSV RETURN 9

## (undated) DEVICE — DRAPE LAP TIBURON 77X122IN

## (undated) DEVICE — BLADE SURG #15 CARBON STEEL

## (undated) DEVICE — SUT 3-0 VICRYL / SH (J416)

## (undated) DEVICE — GOWN X-LG STERILE BACK

## (undated) DEVICE — DRESSING TRANS 4X4 TEGADERM

## (undated) DEVICE — SLEEVE SCD EXPRESS CALF MEDIUM

## (undated) DEVICE — PACK CUSTOM UNIV BASIN SLI

## (undated) DEVICE — SYR 10CC LUER LOCK

## (undated) DEVICE — SEE MEDLINE ITEM 153151

## (undated) DEVICE — SUT 2-0 VICRYL / SH (J417)

## (undated) DEVICE — TEGADERM IV

## (undated) DEVICE — SUT MONOCRYL 4-0 PS-2

## (undated) DEVICE — SEE MEDLINE ITEM 152622

## (undated) DEVICE — SOL 9P NACL IRR PIC IL

## (undated) DEVICE — APPLICATOR CHLORAPREP ORN 26ML

## (undated) DEVICE — DRAPE C ARM 42 X 120 10/BX

## (undated) DEVICE — GLOVE PROTEXIS PI CLASSIC 7.5

## (undated) DEVICE — LINER SUCTION 3000CC